# Patient Record
Sex: MALE | Race: WHITE | Employment: OTHER | URBAN - METROPOLITAN AREA
[De-identification: names, ages, dates, MRNs, and addresses within clinical notes are randomized per-mention and may not be internally consistent; named-entity substitution may affect disease eponyms.]

---

## 2017-10-18 PROBLEM — M17.12 PRIMARY OSTEOARTHRITIS OF LEFT KNEE: Status: ACTIVE | Noted: 2017-10-18

## 2019-04-16 ENCOUNTER — HOSPITAL ENCOUNTER (INPATIENT)
Age: 74
LOS: 2 days | Discharge: HOME | DRG: 728 | End: 2019-04-18
Attending: EMERGENCY MEDICINE | Admitting: FAMILY MEDICINE
Payer: MEDICARE

## 2019-04-16 ENCOUNTER — APPOINTMENT (OUTPATIENT)
Dept: ULTRASOUND IMAGING | Age: 74
DRG: 728 | End: 2019-04-16
Payer: MEDICARE

## 2019-04-16 ENCOUNTER — APPOINTMENT (OUTPATIENT)
Dept: GENERAL RADIOLOGY | Age: 74
DRG: 728 | End: 2019-04-16
Payer: MEDICARE

## 2019-04-16 DIAGNOSIS — N45.1 EPIDIDYMITIS: Primary | ICD-10-CM

## 2019-04-16 DIAGNOSIS — N30.00 ACUTE CYSTITIS WITHOUT HEMATURIA: ICD-10-CM

## 2019-04-16 DIAGNOSIS — R52 PAIN: ICD-10-CM

## 2019-04-16 PROBLEM — E66.9 CLASS 1 OBESITY IN ADULT: Status: ACTIVE | Noted: 2019-04-16

## 2019-04-16 PROBLEM — N39.0 UTI (URINARY TRACT INFECTION): Status: ACTIVE | Noted: 2019-04-16

## 2019-04-16 PROBLEM — G47.33 OSA ON CPAP: Status: ACTIVE | Noted: 2019-04-16

## 2019-04-16 PROBLEM — N40.0 BPH (BENIGN PROSTATIC HYPERPLASIA): Status: ACTIVE | Noted: 2019-04-16

## 2019-04-16 PROBLEM — E78.5 HLD (HYPERLIPIDEMIA): Status: ACTIVE | Noted: 2019-04-16

## 2019-04-16 PROBLEM — R00.0 SINUS TACHYCARDIA: Status: ACTIVE | Noted: 2019-04-16

## 2019-04-16 PROBLEM — Z99.89 OSA ON CPAP: Status: ACTIVE | Noted: 2019-04-16

## 2019-04-16 PROBLEM — E11.9 TYPE 2 DIABETES MELLITUS (HCC): Status: ACTIVE | Noted: 2019-04-16

## 2019-04-16 PROBLEM — E66.811 CLASS 1 OBESITY IN ADULT: Status: ACTIVE | Noted: 2019-04-16

## 2019-04-16 PROBLEM — R65.10 SIRS (SYSTEMIC INFLAMMATORY RESPONSE SYNDROME) (HCC): Status: ACTIVE | Noted: 2019-04-16

## 2019-04-16 PROBLEM — R60.9 EDEMA: Status: ACTIVE | Noted: 2019-04-16

## 2019-04-16 PROBLEM — N43.1 INFECTED HYDROCELE: Status: ACTIVE | Noted: 2019-04-16

## 2019-04-16 PROBLEM — R31.9 URINARY TRACT INFECTION WITH HEMATURIA: Status: ACTIVE | Noted: 2019-04-16

## 2019-04-16 PROBLEM — Z96.652 STATUS POST TOTAL LEFT KNEE REPLACEMENT: Status: ACTIVE | Noted: 2019-04-16

## 2019-04-16 PROBLEM — H91.93 HEARING IMPAIRED PERSON, BILATERAL: Status: ACTIVE | Noted: 2019-04-16

## 2019-04-16 PROBLEM — D64.9 ANEMIA: Status: ACTIVE | Noted: 2019-04-16

## 2019-04-16 PROBLEM — I10 ESSENTIAL HYPERTENSION: Status: ACTIVE | Noted: 2019-04-16

## 2019-04-16 LAB
ALBUMIN SERPL-MCNC: 3.8 G/DL (ref 3.5–5.2)
ALP BLD-CCNC: 133 U/L (ref 40–129)
ALT SERPL-CCNC: 42 U/L (ref 0–40)
ANION GAP SERPL CALCULATED.3IONS-SCNC: 10 MMOL/L (ref 7–16)
AST SERPL-CCNC: 35 U/L (ref 0–39)
BACTERIA: ABNORMAL /HPF
BASOPHILS ABSOLUTE: 0.03 E9/L (ref 0–0.2)
BASOPHILS RELATIVE PERCENT: 0.3 % (ref 0–2)
BILIRUB SERPL-MCNC: 0.6 MG/DL (ref 0–1.2)
BILIRUBIN URINE: NEGATIVE
BLOOD, URINE: ABNORMAL
BUN BLDV-MCNC: 15 MG/DL (ref 8–23)
CALCIUM SERPL-MCNC: 9.4 MG/DL (ref 8.6–10.2)
CHLORIDE BLD-SCNC: 100 MMOL/L (ref 98–107)
CLARITY: CLEAR
CO2: 29 MMOL/L (ref 22–29)
COLOR: YELLOW
CREAT SERPL-MCNC: 0.8 MG/DL (ref 0.7–1.2)
EOSINOPHILS ABSOLUTE: 0.47 E9/L (ref 0.05–0.5)
EOSINOPHILS RELATIVE PERCENT: 4 % (ref 0–6)
EPITHELIAL CELLS, UA: ABNORMAL /HPF
GFR AFRICAN AMERICAN: >60
GFR NON-AFRICAN AMERICAN: >60 ML/MIN/1.73
GLUCOSE BLD-MCNC: 161 MG/DL (ref 74–99)
GLUCOSE URINE: NEGATIVE MG/DL
HCT VFR BLD CALC: 37.7 % (ref 37–54)
HEMOGLOBIN: 12.3 G/DL (ref 12.5–16.5)
IMMATURE GRANULOCYTES #: 0.05 E9/L
IMMATURE GRANULOCYTES %: 0.4 % (ref 0–5)
KETONES, URINE: NEGATIVE MG/DL
LACTIC ACID: 1.3 MMOL/L (ref 0.5–2.2)
LEUKOCYTE ESTERASE, URINE: ABNORMAL
LYMPHOCYTES ABSOLUTE: 1.06 E9/L (ref 1.5–4)
LYMPHOCYTES RELATIVE PERCENT: 9.1 % (ref 20–42)
MCH RBC QN AUTO: 31.1 PG (ref 26–35)
MCHC RBC AUTO-ENTMCNC: 32.6 % (ref 32–34.5)
MCV RBC AUTO: 95.4 FL (ref 80–99.9)
MONOCYTES ABSOLUTE: 1.05 E9/L (ref 0.1–0.95)
MONOCYTES RELATIVE PERCENT: 9 % (ref 2–12)
NEUTROPHILS ABSOLUTE: 9.04 E9/L (ref 1.8–7.3)
NEUTROPHILS RELATIVE PERCENT: 77.2 % (ref 43–80)
NITRITE, URINE: POSITIVE
PDW BLD-RTO: 11.8 FL (ref 11.5–15)
PH UA: 5.5 (ref 5–9)
PLATELET # BLD: 337 E9/L (ref 130–450)
PMV BLD AUTO: 9.8 FL (ref 7–12)
POTASSIUM REFLEX MAGNESIUM: 4 MMOL/L (ref 3.5–5)
PRO-BNP: 48 PG/ML (ref 0–125)
PROTEIN UA: ABNORMAL MG/DL
RBC # BLD: 3.95 E12/L (ref 3.8–5.8)
RBC UA: ABNORMAL /HPF (ref 0–2)
SODIUM BLD-SCNC: 139 MMOL/L (ref 132–146)
SPECIFIC GRAVITY UA: >=1.03 (ref 1–1.03)
TOTAL PROTEIN: 7.5 G/DL (ref 6.4–8.3)
TROPONIN: <0.01 NG/ML (ref 0–0.03)
UROBILINOGEN, URINE: 2 E.U./DL
WBC # BLD: 11.7 E9/L (ref 4.5–11.5)
WBC UA: >20 /HPF (ref 0–5)

## 2019-04-16 PROCEDURE — 96366 THER/PROPH/DIAG IV INF ADDON: CPT

## 2019-04-16 PROCEDURE — 6360000002 HC RX W HCPCS: Performed by: EMERGENCY MEDICINE

## 2019-04-16 PROCEDURE — 83880 ASSAY OF NATRIURETIC PEPTIDE: CPT

## 2019-04-16 PROCEDURE — 85025 COMPLETE CBC W/AUTO DIFF WBC: CPT

## 2019-04-16 PROCEDURE — 87040 BLOOD CULTURE FOR BACTERIA: CPT

## 2019-04-16 PROCEDURE — 99285 EMERGENCY DEPT VISIT HI MDM: CPT

## 2019-04-16 PROCEDURE — 2060000000 HC ICU INTERMEDIATE R&B

## 2019-04-16 PROCEDURE — 71045 X-RAY EXAM CHEST 1 VIEW: CPT

## 2019-04-16 PROCEDURE — 80053 COMPREHEN METABOLIC PANEL: CPT

## 2019-04-16 PROCEDURE — 93971 EXTREMITY STUDY: CPT

## 2019-04-16 PROCEDURE — 84484 ASSAY OF TROPONIN QUANT: CPT

## 2019-04-16 PROCEDURE — 93005 ELECTROCARDIOGRAM TRACING: CPT | Performed by: EMERGENCY MEDICINE

## 2019-04-16 PROCEDURE — 83605 ASSAY OF LACTIC ACID: CPT

## 2019-04-16 PROCEDURE — 93975 VASCULAR STUDY: CPT

## 2019-04-16 PROCEDURE — 36415 COLL VENOUS BLD VENIPUNCTURE: CPT

## 2019-04-16 PROCEDURE — 96365 THER/PROPH/DIAG IV INF INIT: CPT

## 2019-04-16 PROCEDURE — 81001 URINALYSIS AUTO W/SCOPE: CPT

## 2019-04-16 PROCEDURE — 96375 TX/PRO/DX INJ NEW DRUG ADDON: CPT

## 2019-04-16 PROCEDURE — 76870 US EXAM SCROTUM: CPT

## 2019-04-16 PROCEDURE — 2580000003 HC RX 258: Performed by: EMERGENCY MEDICINE

## 2019-04-16 RX ORDER — GUAIFENESIN 400 MG/1
400 TABLET ORAL 3 TIMES DAILY PRN
COMMUNITY

## 2019-04-16 RX ORDER — DIMETHICONE, OXYBENZONE, AND PADIMATE O 2; 2.5; 6.6 G/100G; G/100G; G/100G
STICK TOPICAL PRN
Status: DISCONTINUED | OUTPATIENT
Start: 2019-04-16 | End: 2019-04-18 | Stop reason: HOSPADM

## 2019-04-16 RX ORDER — CIPROFLOXACIN 2 MG/ML
400 INJECTION, SOLUTION INTRAVENOUS ONCE
Status: DISCONTINUED | OUTPATIENT
Start: 2019-04-16 | End: 2019-04-16

## 2019-04-16 RX ORDER — DEXTROSE MONOHYDRATE 25 G/50ML
12.5 INJECTION, SOLUTION INTRAVENOUS PRN
Status: DISCONTINUED | OUTPATIENT
Start: 2019-04-16 | End: 2019-04-18 | Stop reason: HOSPADM

## 2019-04-16 RX ORDER — FLUCONAZOLE 150 MG/1
150 TABLET ORAL
COMMUNITY
Start: 2019-04-15 | End: 2021-03-27

## 2019-04-16 RX ORDER — ACETAMINOPHEN 325 MG/1
650 TABLET ORAL EVERY 4 HOURS PRN
Status: DISCONTINUED | OUTPATIENT
Start: 2019-04-16 | End: 2019-04-18 | Stop reason: HOSPADM

## 2019-04-16 RX ORDER — GLIPIZIDE 5 MG/1
5 TABLET ORAL
Status: DISCONTINUED | OUTPATIENT
Start: 2019-04-17 | End: 2019-04-18 | Stop reason: HOSPADM

## 2019-04-16 RX ORDER — FLUTICASONE PROPIONATE 50 MCG
1 SPRAY, SUSPENSION (ML) NASAL 2 TIMES DAILY
COMMUNITY

## 2019-04-16 RX ORDER — FENTANYL CITRATE 50 UG/ML
100 INJECTION, SOLUTION INTRAMUSCULAR; INTRAVENOUS ONCE
Status: COMPLETED | OUTPATIENT
Start: 2019-04-16 | End: 2019-04-16

## 2019-04-16 RX ORDER — TAMSULOSIN HYDROCHLORIDE 0.4 MG/1
0.4 CAPSULE ORAL DAILY
Status: DISCONTINUED | OUTPATIENT
Start: 2019-04-17 | End: 2019-04-18 | Stop reason: HOSPADM

## 2019-04-16 RX ORDER — DIPHENHYDRAMINE HCL 25 MG
25 TABLET ORAL EVERY 6 HOURS PRN
COMMUNITY

## 2019-04-16 RX ORDER — FINASTERIDE 5 MG/1
5 TABLET, FILM COATED ORAL DAILY
Status: DISCONTINUED | OUTPATIENT
Start: 2019-04-17 | End: 2019-04-18 | Stop reason: HOSPADM

## 2019-04-16 RX ORDER — LEVOFLOXACIN 5 MG/ML
750 INJECTION, SOLUTION INTRAVENOUS ONCE
Status: COMPLETED | OUTPATIENT
Start: 2019-04-16 | End: 2019-04-16

## 2019-04-16 RX ORDER — DEXTROSE MONOHYDRATE 50 MG/ML
100 INJECTION, SOLUTION INTRAVENOUS PRN
Status: DISCONTINUED | OUTPATIENT
Start: 2019-04-16 | End: 2019-04-18 | Stop reason: HOSPADM

## 2019-04-16 RX ORDER — M-VIT,TX,IRON,MINS/CALC/FOLIC 27MG-0.4MG
1 TABLET ORAL DAILY
Status: DISCONTINUED | OUTPATIENT
Start: 2019-04-17 | End: 2019-04-18 | Stop reason: HOSPADM

## 2019-04-16 RX ORDER — NICOTINE POLACRILEX 4 MG
15 LOZENGE BUCCAL PRN
Status: DISCONTINUED | OUTPATIENT
Start: 2019-04-16 | End: 2019-04-18 | Stop reason: HOSPADM

## 2019-04-16 RX ORDER — SIMVASTATIN 40 MG
40 TABLET ORAL NIGHTLY
Status: DISCONTINUED | OUTPATIENT
Start: 2019-04-17 | End: 2019-04-18 | Stop reason: HOSPADM

## 2019-04-16 RX ORDER — LEVOFLOXACIN 5 MG/ML
750 INJECTION, SOLUTION INTRAVENOUS EVERY 24 HOURS
Status: DISCONTINUED | OUTPATIENT
Start: 2019-04-17 | End: 2019-04-18 | Stop reason: HOSPADM

## 2019-04-16 RX ORDER — CIPROFLOXACIN 500 MG/1
500 TABLET, FILM COATED ORAL 2 TIMES DAILY
Refills: 0 | Status: ON HOLD | COMMUNITY
Start: 2019-04-15 | End: 2019-04-18 | Stop reason: HOSPADM

## 2019-04-16 RX ORDER — 0.9 % SODIUM CHLORIDE 0.9 %
1000 INTRAVENOUS SOLUTION INTRAVENOUS ONCE
Status: COMPLETED | OUTPATIENT
Start: 2019-04-16 | End: 2019-04-16

## 2019-04-16 RX ORDER — LORATADINE 10 MG/1
10 TABLET ORAL DAILY PRN
COMMUNITY

## 2019-04-16 RX ORDER — NYSTATIN 100000 U/G
10000 CREAM TOPICAL 2 TIMES DAILY
COMMUNITY
Start: 2019-04-15 | End: 2019-04-30

## 2019-04-16 RX ORDER — VALSARTAN AND HYDROCHLOROTHIAZIDE 80; 12.5 MG/1; MG/1
1 TABLET, FILM COATED ORAL DAILY
Status: DISCONTINUED | OUTPATIENT
Start: 2019-04-17 | End: 2019-04-17 | Stop reason: CLARIF

## 2019-04-16 RX ORDER — FLUTICASONE PROPIONATE 50 MCG
1 SPRAY, SUSPENSION (ML) NASAL 2 TIMES DAILY
Status: DISCONTINUED | OUTPATIENT
Start: 2019-04-17 | End: 2019-04-18 | Stop reason: HOSPADM

## 2019-04-16 RX ORDER — DOXAZOSIN 2 MG/1
1 TABLET ORAL DAILY
Status: DISCONTINUED | OUTPATIENT
Start: 2019-04-17 | End: 2019-04-18 | Stop reason: HOSPADM

## 2019-04-16 RX ADMIN — SODIUM CHLORIDE 1000 ML: 9 INJECTION, SOLUTION INTRAVENOUS at 17:10

## 2019-04-16 RX ADMIN — LEVOFLOXACIN 750 MG: 5 INJECTION, SOLUTION INTRAVENOUS at 20:21

## 2019-04-16 RX ADMIN — FENTANYL CITRATE 100 MCG: 50 INJECTION, SOLUTION INTRAMUSCULAR; INTRAVENOUS at 17:10

## 2019-04-16 ASSESSMENT — ENCOUNTER SYMPTOMS
EYE PAIN: 0
SINUS PRESSURE: 0
EYE DISCHARGE: 0
WHEEZING: 0
SORE THROAT: 0
EYE REDNESS: 0
BACK PAIN: 0
DIARRHEA: 0
VOMITING: 0
ABDOMINAL PAIN: 0
COUGH: 0
SHORTNESS OF BREATH: 0
NAUSEA: 0

## 2019-04-16 ASSESSMENT — PAIN DESCRIPTION - ORIENTATION: ORIENTATION: LEFT

## 2019-04-16 ASSESSMENT — PAIN DESCRIPTION - LOCATION: LOCATION: KNEE

## 2019-04-16 ASSESSMENT — PAIN SCALES - GENERAL
PAINLEVEL_OUTOF10: 5
PAINLEVEL_OUTOF10: 8
PAINLEVEL_OUTOF10: 0

## 2019-04-16 NOTE — ED NOTES
Bed: 18A-18  Expected date:   Expected time:   Means of arrival:   Comments:  ems     Kareen Hodgkins, RN  04/16/19 0503

## 2019-04-16 NOTE — ED PROVIDER NOTES
ATTENDING PROVIDER ATTESTATION:     Kolton Garvin presented to the emergency department for evaluation of Groin Swelling (From [facility], [place], pt is deaf but can read lips. Uses oxygen normally, is being treated for UTI. Went to get an ultrasound done but his heart rate was too high)   and was initially evaluated by the Medical Resident. See Original ED Note for H&P and ED course above. I have reviewed and discussed the case, including pertinent history (medical, surgical, family and social) and exam findings with the Medical Resident assigned to Kolton Garvin. I have personally performed and/or participated in the history, exam, medical decision making, and procedures and agree with all pertinent clinical information. I have reviewed my findings and recommendations with the assigned Medical Resident, Kolton Garvin and members of family present at the time of disposition. My findings/plan: The primary encounter diagnosis was Epididymitis. Diagnoses of Pain and Acute cystitis without hematuria were also pertinent to this visit. Discharge Medication List as of 4/18/2019  3:13 PM      START taking these medications    Details   levofloxacin (LEVAQUIN) 250 MG tablet Take 2 tablets by mouth daily for 10 days, Disp-20 tablet, R-0Normal           Kristie Jinny, DO      29-year-old deaf male, brought to the emergency Department for primary complaint of testicular swelling and pain. Patient states symptoms began approximately 3 days ago with dysuria, he notes pain and swelling in his right testicle which began today, his symptoms are better with nothing, and worse with palpation and movement. He also notes burning with urination, he denies any blood in his urine or change in his urinary color or smell. Additionally he notes swelling throughout the entirety of his left lower extremity, however denies any pain. He recently had a knee replacement surgery.  He denies any fevers, chills, nausea, vomiting, chest pain, shortness of breath. He has never experienced symptoms like this before. He denies any testicular trauma. He was started on ciprofloxacin by PCP for urinary tract infection symptoms, he took his 1st dose last night 2nd dose this morning. The history is provided by the patient. Review of Systems   Constitutional: Negative for chills and fever. HENT: Negative for ear pain, sinus pressure and sore throat. Eyes: Negative for pain, discharge and redness. Respiratory: Negative for cough, shortness of breath and wheezing. Cardiovascular: Positive for leg swelling. Negative for chest pain. Gastrointestinal: Negative for abdominal pain, diarrhea, nausea and vomiting. Genitourinary: Positive for dysuria, scrotal swelling and testicular pain. Negative for frequency. Musculoskeletal: Negative for arthralgias and back pain. Skin: Negative for rash and wound. Neurological: Negative for weakness and headaches. Hematological: Negative for adenopathy. All other systems reviewed and are negative. Physical Exam   Constitutional: He is oriented to person, place, and time. He appears well-developed and well-nourished. Sitting upright, no acute distress   HENT:   Head: Normocephalic and atraumatic. Right Ear: External ear normal.   Left Ear: External ear normal.   Mouth/Throat: Oropharynx is clear and moist.   Eyes: Pupils are equal, round, and reactive to light. EOM are normal.   Neck: Normal range of motion. Neck supple. Cardiovascular: Regular rhythm and normal heart sounds. No murmur heard. Tachycardic   Pulmonary/Chest: Effort normal and breath sounds normal. No respiratory distress. He has no wheezes. He has no rales. Abdominal: Soft. Bowel sounds are normal. There is no tenderness. There is no rebound and no guarding. Genitourinary: Penis normal.   Genitourinary Comments:  There is notable swelling and erythema with tenderness to palpation of the patient's right mL/min/1.73    GFR African American >60     Calcium 9.4 8.6 - 10.2 mg/dL    Total Protein 7.5 6.4 - 8.3 g/dL    Alb 3.8 3.5 - 5.2 g/dL    Total Bilirubin 0.6 0.0 - 1.2 mg/dL    Alkaline Phosphatase 133 (H) 40 - 129 U/L    ALT 42 (H) 0 - 40 U/L    AST 35 0 - 39 U/L   Troponin   Result Value Ref Range    Troponin <0.01 0.00 - 0.03 ng/mL   Brain Natriuretic Peptide   Result Value Ref Range    Pro-BNP 48 0 - 125 pg/mL   Urinalysis, reflex to microscopic   Result Value Ref Range    Color, UA Yellow Straw/Yellow    Clarity, UA Clear Clear    Glucose, Ur Negative Negative mg/dL    Bilirubin Urine Negative Negative    Ketones, Urine Negative Negative mg/dL    Specific Gravity, UA >=1.030 1.005 - 1.030    Blood, Urine MODERATE (A) Negative    pH, UA 5.5 5.0 - 9.0    Protein, UA TRACE Negative mg/dL    Urobilinogen, Urine 2.0 (A) <2.0 E.U./dL    Nitrite, Urine POSITIVE (A) Negative    Leukocyte Esterase, Urine SMALL (A) Negative   Lactic Acid, Plasma   Result Value Ref Range    Lactic Acid 1.3 0.5 - 2.2 mmol/L   Microscopic Urinalysis   Result Value Ref Range    WBC, UA >20 0 - 5 /HPF    RBC, UA 0-1 0 - 2 /HPF    Epi Cells RARE /HPF    Bacteria, UA FEW (A) /HPF   Magnesium   Result Value Ref Range    Magnesium 1.9 1.6 - 2.6 mg/dL   TSH without Reflex   Result Value Ref Range    TSH 1.670 0.270 - 4.200 uIU/mL   T4, free   Result Value Ref Range    T4 Free 1.56 0.93 - 1.70 ng/dL   Basic Metabolic Panel   Result Value Ref Range    Sodium 137 132 - 146 mmol/L    Potassium 3.6 3.5 - 5.0 mmol/L    Chloride 101 98 - 107 mmol/L    CO2 26 22 - 29 mmol/L    Anion Gap 10 7 - 16 mmol/L    Glucose 181 (H) 74 - 99 mg/dL    BUN 13 8 - 23 mg/dL    CREATININE 0.9 0.7 - 1.2 mg/dL    GFR Non-African American >60 >=60 mL/min/1.73    GFR African American >60     Calcium 8.5 (L) 8.6 - 10.2 mg/dL   Procalcitonin   Result Value Ref Range    Procalcitonin 0.16 (H) 0.00 - 0.08 ng/mL   Psa screening   Result Value Ref Range    PSA 17.63 (H) 0.00 - 4.00 ng/mL   Basic Metabolic Panel   Result Value Ref Range    Sodium 142 132 - 146 mmol/L    Potassium 4.2 3.5 - 5.0 mmol/L    Chloride 104 98 - 107 mmol/L    CO2 25 22 - 29 mmol/L    Anion Gap 13 7 - 16 mmol/L    Glucose 169 (H) 74 - 99 mg/dL    BUN 11 8 - 23 mg/dL    CREATININE 0.8 0.7 - 1.2 mg/dL    GFR Non-African American >60 >=60 mL/min/1.73    GFR African American >60     Calcium 8.9 8.6 - 10.2 mg/dL   CBC Auto Differential   Result Value Ref Range    WBC 7.1 4.5 - 11.5 E9/L    RBC 3.46 (L) 3.80 - 5.80 E12/L    Hemoglobin 11.0 (L) 12.5 - 16.5 g/dL    Hematocrit 33.2 (L) 37.0 - 54.0 %    MCV 96.0 80.0 - 99.9 fL    MCH 31.8 26.0 - 35.0 pg    MCHC 33.1 32.0 - 34.5 %    RDW 11.9 11.5 - 15.0 fL    Platelets 310 949 - 891 E9/L    MPV 9.9 7.0 - 12.0 fL    Neutrophils % 65.0 43.0 - 80.0 %    Immature Granulocytes % 0.6 0.0 - 5.0 %    Lymphocytes % 14.6 (L) 20.0 - 42.0 %    Monocytes % 11.8 2.0 - 12.0 %    Eosinophils % 7.6 (H) 0.0 - 6.0 %    Basophils % 0.4 0.0 - 2.0 %    Neutrophils # 4.63 1.80 - 7.30 E9/L    Immature Granulocytes # 0.04 E9/L    Lymphocytes # 1.04 (L) 1.50 - 4.00 E9/L    Monocytes # 0.84 0.10 - 0.95 E9/L    Eosinophils # 0.54 (H) 0.05 - 0.50 E9/L    Basophils # 0.03 0.00 - 0.20 E9/L   POCT Glucose   Result Value Ref Range    Meter Glucose 152 (H) 74 - 99 mg/dL   POCT Glucose   Result Value Ref Range    Meter Glucose 144 (H) 74 - 99 mg/dL   POCT Glucose   Result Value Ref Range    Meter Glucose 181 (H) 74 - 99 mg/dL   POCT Glucose   Result Value Ref Range    Meter Glucose 145 (H) 74 - 99 mg/dL   POCT Glucose   Result Value Ref Range    Meter Glucose 209 (H) 74 - 99 mg/dL   POCT Glucose   Result Value Ref Range    Meter Glucose 167 (H) 74 - 99 mg/dL   POCT Glucose   Result Value Ref Range    Meter Glucose 208 (H) 74 - 99 mg/dL   EKG 12 Lead   Result Value Ref Range    Ventricular Rate 105 BPM    Atrial Rate 105 BPM    P-R Interval 174 ms    QRS Duration 88 ms    Q-T Interval 350 ms    QTc Calculation (Hailey) 462 ms    P Axis 52 degrees    R Axis 21 degrees    T Axis 14 degrees       RADIOLOGY:  Ct Abdomen Pelvis Wo Contrast    Result Date: 2019  Patient MRN:  [id number] : 1945 Age: 69 years Gender: Male Order Date:  2019 8:00 AM EXAM: CT ABDOMEN PELVIS WO CONTRAST number of images 388 Technique: Low-dose CT  acquisition technique included one of following options; 1 . Automated exposure control, 2. Adjustment of MA and or KV according to patient's size or 3. Use of iterative reconstruction. INDICATION:  uti  stones? COMPARISON: None FINDINGS: The lung bases demonstrate cardiomegaly with a tiny pericardial effusion. Small pleural effusions and atelectasis are present in the lung bases likely mild CHF. There is coronary artery calcification. Liver is fatty infiltrated. 1.1 cm low-attenuation lesion is noted in the liver and similar one in the spleen. The gallbladder is slightly distended. Pancreas, the adrenals the kidneys are unremarkable except for a 9 mm cystic lesion in the right kidney. Degenerative changes are identified in the lumbar spine Pelvis. The bladder is partially distended with wall thickening. 5.5 x 5.8 cm mass is identified in the bladder floor which appears to be contiguous with enlarged lobulated prostate gland which measures 6 x 6.1 x 6.1 cm. There is diverticulosis of the colon without diverticulitis. The appendix is normal.     No obstructing renal or ureteral calculus or hydronephrosis. Enlarged lobulated prostate gland with invasion of the bladder floor with appearance of a mass in the bladder floor. Prostate malignancy with the bladder invasion is considered. Intrinsic bladder malignancy is less likely and cystoscopic assessment is recommended. There is mild thickening of the bladder wall which may be due to bladder outlet obstruction versus cystitis. Atelectasis and pleural effusion in lung bases likely mild CHF.  ALERT:  THIS IS AN ABNORMAL REPORT     Us Scrotum And Testicles    Result Date: 2019  Patient MRN: [id number] : 1945 Age:  69 years Gender: Male Order Date: 2019 6:15 PM Exam: [address] Number of Images: 62 views Indication:  R52 pain Comparison: None. Findings: Ultrasound examination of the testicles reveals a normal right and left testicle without masses. The right testicle measures 4.5 x 3.7 x 2.4 cm and the left testicle measures 4.5 x 3.2 x 2.8 cm. Homogenous echo pattern is seen throughout. The left epididymis appears to be normal. The right epididymis is enlarged measuring 4.7 x 1.5 x 1.1 cm. The left epididymis measures 1.8 x 1.2 x 1.0 cm. . There is bilateral hydrocele seen. The right hydrocele appears to be septated. .      Enlarged right a previous suggesting of epididymitis Bilateral hydrocele Right-sided hydrocele appears to have septation. Vee Spindle Chest Portable    Result Date: 2019  Patient MRN: [id number] : 1945 Age:  69 years Gender: Male Order Date: 2019 4:00 PM Exam: XR CHEST PORTABLE Number of Views: 1 Indication:   Arrhythmia. Groin swelling. Comparison: None Findings: There is a normal cardiomediastinal silhouette with clear lungs. . No pneumothorax, pleural effusion or focal areas of airspace consolidation. No radiographic evidence of acute cardiopulmonary disease. Us Dup Abd Pel Retro Scrot Complete    Result Date: 2019  Patient MRN:  [id number] : 1945 Age: 69 years Gender: Male Order Date:  2019 4:00 PM EXAM: US DUP ABD PEL RETRO SCROT COMPLETE NUMBER OF IMAGES:  62 INDICATION: Right scrotal pain and swelling  COMPARISON: None The right testicle measures 4.5 x 3.7 x 2.4 cm, and shows intact vascularity and no evidence of focal parenchymal abnormality. The epididymis shows mildly prominent flow and slightly increased size which could represent a spectrum of epididymis.  There is a small epididymal head cyst. A septated hydrocele on the right may represent a pyocele, given physician, Dr. Aylin Boone. I have discussed the patient's initial exam, treatment and plan of care with the on coming physician. I have notified the patient / family of the change in treating physician and answered their questions to this point. [KS]   2030 Discussed case with Dr. Jesenia Martines, she will admit patient. [MF]      ED Course User Index  [KS] Arabella Price DO  [MF] Aramis Juárez DO             Counseling:  I have spoken with the patient and discussed todays results, in addition to providing specific details for the plan of care and counseling regarding the diagnosis and prognosis. Their questions are answered at this time and they are agreeable with the plan of admission.    --------------------------------- ADDITIONAL PROVIDER NOTES ---------------------------------  Consultations:   Spoke with Dr. Jesenia Martines. Discussed case. They will admit the patient. This patient's ED course included: a personal history and physicial examination    This patient has remained hemodynamically stable during their ED course. Diagnosis:  1. Epididymitis    2. Pain    3. Acute cystitis without hematuria        Disposition:  Patient's disposition: Admit to med/surg floor  Patient's condition is stable.            Arabella Price DO  Resident  04/19/19 2134       Darylene Budge,   04/20/19 5787 Vandana Patterson, DO  04/22/19 2890

## 2019-04-17 ENCOUNTER — APPOINTMENT (OUTPATIENT)
Dept: CT IMAGING | Age: 74
DRG: 728 | End: 2019-04-17
Payer: MEDICARE

## 2019-04-17 LAB
ANION GAP SERPL CALCULATED.3IONS-SCNC: 10 MMOL/L (ref 7–16)
BUN BLDV-MCNC: 13 MG/DL (ref 8–23)
CALCIUM SERPL-MCNC: 8.5 MG/DL (ref 8.6–10.2)
CHLORIDE BLD-SCNC: 101 MMOL/L (ref 98–107)
CO2: 26 MMOL/L (ref 22–29)
CREAT SERPL-MCNC: 0.9 MG/DL (ref 0.7–1.2)
GFR AFRICAN AMERICAN: >60
GFR NON-AFRICAN AMERICAN: >60 ML/MIN/1.73
GLUCOSE BLD-MCNC: 181 MG/DL (ref 74–99)
MAGNESIUM: 1.9 MG/DL (ref 1.6–2.6)
METER GLUCOSE: 144 MG/DL (ref 74–99)
METER GLUCOSE: 145 MG/DL (ref 74–99)
METER GLUCOSE: 152 MG/DL (ref 74–99)
METER GLUCOSE: 181 MG/DL (ref 74–99)
METER GLUCOSE: 209 MG/DL (ref 74–99)
POTASSIUM SERPL-SCNC: 3.6 MMOL/L (ref 3.5–5)
PROCALCITONIN: 0.16 NG/ML (ref 0–0.08)
PROSTATE SPECIFIC ANTIGEN: 17.63 NG/ML (ref 0–4)
SODIUM BLD-SCNC: 137 MMOL/L (ref 132–146)
T4 FREE: 1.56 NG/DL (ref 0.93–1.7)
TSH SERPL DL<=0.05 MIU/L-ACNC: 1.67 UIU/ML (ref 0.27–4.2)

## 2019-04-17 PROCEDURE — 97110 THERAPEUTIC EXERCISES: CPT

## 2019-04-17 PROCEDURE — G0103 PSA SCREENING: HCPCS

## 2019-04-17 PROCEDURE — 82962 GLUCOSE BLOOD TEST: CPT

## 2019-04-17 PROCEDURE — 2060000000 HC ICU INTERMEDIATE R&B

## 2019-04-17 PROCEDURE — 97161 PT EVAL LOW COMPLEX 20 MIN: CPT

## 2019-04-17 PROCEDURE — 84439 ASSAY OF FREE THYROXINE: CPT

## 2019-04-17 PROCEDURE — 6360000002 HC RX W HCPCS: Performed by: FAMILY MEDICINE

## 2019-04-17 PROCEDURE — 2580000003 HC RX 258: Performed by: FAMILY MEDICINE

## 2019-04-17 PROCEDURE — 6370000000 HC RX 637 (ALT 250 FOR IP): Performed by: FAMILY MEDICINE

## 2019-04-17 PROCEDURE — 83735 ASSAY OF MAGNESIUM: CPT

## 2019-04-17 PROCEDURE — 87088 URINE BACTERIA CULTURE: CPT

## 2019-04-17 PROCEDURE — 74176 CT ABD & PELVIS W/O CONTRAST: CPT

## 2019-04-17 PROCEDURE — 80048 BASIC METABOLIC PNL TOTAL CA: CPT

## 2019-04-17 PROCEDURE — 84145 PROCALCITONIN (PCT): CPT

## 2019-04-17 PROCEDURE — 97530 THERAPEUTIC ACTIVITIES: CPT

## 2019-04-17 PROCEDURE — 36415 COLL VENOUS BLD VENIPUNCTURE: CPT

## 2019-04-17 PROCEDURE — 84443 ASSAY THYROID STIM HORMONE: CPT

## 2019-04-17 RX ORDER — HYDROCHLOROTHIAZIDE 12.5 MG/1
12.5 TABLET ORAL DAILY
Status: DISCONTINUED | OUTPATIENT
Start: 2019-04-17 | End: 2019-04-18 | Stop reason: HOSPADM

## 2019-04-17 RX ORDER — VALSARTAN 80 MG/1
80 TABLET ORAL DAILY
Status: DISCONTINUED | OUTPATIENT
Start: 2019-04-17 | End: 2019-04-18 | Stop reason: HOSPADM

## 2019-04-17 RX ORDER — SODIUM CHLORIDE 9 MG/ML
INJECTION, SOLUTION INTRAVENOUS ONCE
Status: COMPLETED | OUTPATIENT
Start: 2019-04-17 | End: 2019-04-17

## 2019-04-17 RX ADMIN — HYDROCHLOROTHIAZIDE 12.5 MG: 12.5 TABLET ORAL at 10:25

## 2019-04-17 RX ADMIN — Medication: at 01:18

## 2019-04-17 RX ADMIN — INSULIN LISPRO 4 UNITS: 100 INJECTION, SOLUTION INTRAVENOUS; SUBCUTANEOUS at 21:40

## 2019-04-17 RX ADMIN — GLIPIZIDE 5 MG: 5 TABLET ORAL at 07:36

## 2019-04-17 RX ADMIN — FLUTICASONE PROPIONATE 1 SPRAY: 50 SPRAY, METERED NASAL at 10:25

## 2019-04-17 RX ADMIN — TAMSULOSIN HYDROCHLORIDE 0.4 MG: 0.4 CAPSULE ORAL at 10:25

## 2019-04-17 RX ADMIN — VALSARTAN 80 MG: 80 TABLET, FILM COATED ORAL at 10:25

## 2019-04-17 RX ADMIN — SODIUM CHLORIDE: 9 INJECTION, SOLUTION INTRAVENOUS at 01:25

## 2019-04-17 RX ADMIN — FINASTERIDE 5 MG: 5 TABLET, FILM COATED ORAL at 10:25

## 2019-04-17 RX ADMIN — SIMVASTATIN 40 MG: 40 TABLET, FILM COATED ORAL at 21:40

## 2019-04-17 RX ADMIN — MULTIPLE VITAMINS W/ MINERALS TAB 1 TABLET: TAB at 10:25

## 2019-04-17 RX ADMIN — ACETAMINOPHEN 650 MG: 325 TABLET, FILM COATED ORAL at 01:22

## 2019-04-17 RX ADMIN — DOXAZOSIN 1 MG: 2 TABLET ORAL at 10:30

## 2019-04-17 RX ADMIN — ACETAMINOPHEN 650 MG: 325 TABLET, FILM COATED ORAL at 11:35

## 2019-04-17 RX ADMIN — ACETAMINOPHEN 650 MG: 325 TABLET, FILM COATED ORAL at 21:40

## 2019-04-17 RX ADMIN — LEVOFLOXACIN 750 MG: 5 INJECTION, SOLUTION INTRAVENOUS at 21:40

## 2019-04-17 RX ADMIN — FLUTICASONE PROPIONATE 1 SPRAY: 50 SPRAY, METERED NASAL at 21:40

## 2019-04-17 RX ADMIN — INSULIN LISPRO 2 UNITS: 100 INJECTION, SOLUTION INTRAVENOUS; SUBCUTANEOUS at 11:35

## 2019-04-17 RX ADMIN — GLIPIZIDE 5 MG: 5 TABLET ORAL at 17:13

## 2019-04-17 ASSESSMENT — PAIN DESCRIPTION - FREQUENCY: FREQUENCY: INTERMITTENT

## 2019-04-17 ASSESSMENT — PAIN DESCRIPTION - DESCRIPTORS: DESCRIPTORS: SORE;DISCOMFORT

## 2019-04-17 ASSESSMENT — PAIN DESCRIPTION - PAIN TYPE: TYPE: ACUTE PAIN

## 2019-04-17 ASSESSMENT — PAIN SCALES - GENERAL
PAINLEVEL_OUTOF10: 5
PAINLEVEL_OUTOF10: 0
PAINLEVEL_OUTOF10: 5
PAINLEVEL_OUTOF10: 0
PAINLEVEL_OUTOF10: 0
PAINLEVEL_OUTOF10: 5

## 2019-04-17 ASSESSMENT — PAIN DESCRIPTION - LOCATION: LOCATION: ABDOMEN

## 2019-04-17 ASSESSMENT — PAIN DESCRIPTION - ORIENTATION: ORIENTATION: LOWER

## 2019-04-17 NOTE — CARE COORDINATION
Per pt report from home requesting shower chair, would like MARGOT for [facility] at discharge, plan is home with no further needs. S/O Razia will provide transport home at discharge (she was leaving for work and thought her phone number was in the chart however, it is not) no phone provided.

## 2019-04-17 NOTE — CONSULTS
[address]                  [address] [facility]  [facility]                                  CONSULTATION    PATIENT NAME: Alberto Cade                  :        1945  MED REC NO:   [id number]                            ROOM:       7408  ACCOUNT NO:   [de-identified]                           ADMIT DATE: 2019  PROVIDER:     Lincoln Bo MD    CONSULT DATE:  2019    He is in room 7408. CHIEF COMPLAINT:  Swelling of his scrotum. HISTORY OF PRESENT ILLNESS:  This 77-year-old male who began having some  dysuria and was treated as a urinary tract infection, then developed  swelling and pain in his scrotum, came to the emergency room and was  found to have significant swelling of his right scrotum. His urinalysis  is compatible with urinary tract infection. He is being treated with  Levaquin. The patient denies any significant history of urinary  difficulty in the past.  A CAT scan of his abdomen was done showing a  rather significant enlargement of his prostate with intravesical  extension. PAST MEDICAL HISTORY:  Significant for hearing loss, diabetes mellitus,  hyperlipidemia, hypertension, anemia and osteoarthritis. ALLERGIES:  None known to medication. PAST SURGICAL HISTORY:  The patient has had a knee replacement, has also  had rotator cuff repair and shoulder surgery in the past.    FAMILY HISTORY:  Denies any history of genitourinary cancers  significant. SOCIAL HISTORY:  The patient is a lifelong nonsmoker. He is . PHYSICAL EXAMINATION:  GENERAL:  The patient is a very alert male, who speaks with nasal  quality of his voice. He is alert and oriented. His abdomen is soft. GENITOURINARY:  Penis is normal.  His left scrotum is normal.  On his  right, he has symmetrical enlargement, tenderness without any definable  differentiation between his testes and his epididymis.     IMPRESSION AND PLAN:  His probable BPH with bladder outlet obstruction  causing urinary tract infection and now with a right epididymo-orchitis. The plan is to start him on Flomax and Proscar, treat him for the  infection. The patient would most likely benefit from cystoscopy and  TUR of his prostate at some point in the future.               Nila Wiggins MD    D: 04/17/2019 11:22:42       T: 04/17/2019 11:24:34     [id number]  Job#: [id number]     Doc#: [id number]    CC:

## 2019-04-17 NOTE — H&P
Hospital Medicine History & Physical      PCP: Thea Hale DO    Date of Admission: 4/16/2019    Date of Service: Pt seen/examined on 4/16/19 and Admitted to Inpatient with expected LOS greater than two midnights due to medical therapy. Chief Complaint:  Testicular pain      History Of Present Illness:      69 y.o. male who presented to [facility] with past medical history of sleep apnea on CPAP, diabetes, arthritis status post left total knee arthroplasty done on March 26, 2019, BPH, oxygen dependent, uses 2 L at home, hypertension, hyperlipidemia and hearing impairment. Patient was just discharged from the rehab facility 5 days ago after his surgery. He started having pain in his groin 2 days ago. He describes this as burning, mild and on and off, associated with dysuria and hematuria. He has had subjective fevers and sweats. No chest pain or shortness of breath. No nausea or vomiting. No cough, he reports having cold sores on his lips. Patient was seen by PCP, he was started on Cipro yesterday for UTI, he has only had 2 doses. He was sent from the PCPs office to the ER because of tachycardia heart rate as high as 152 and hypotension. Vital signs notable for heart rate of 115, labs shows hemoglobin of 12.3, white count 11.5, ALT 42, chest x-ray is negative, Doppler ultrasound of the left leg is negative for DVT. Ultrasound of the scrotum shows bilateral epididymitis worse on the right than the left with right-sided hydrocele that has septations concerning for pyocele. He is being admitted for further management.     Past Medical History:          Diagnosis Date    Deaf, bilateral     Diabetes ([address]     Hypertension     Osteoarthritis     Primary osteoarthritis of left knee 10/18/2017       Past Surgical History:          Procedure Laterality Date    CARPAL TUNNEL RELEASE      ROTATOR CUFF REPAIR  2/12    SHOULDER SURGERY         Medications Prior to Admission:      Prior to Admission medications    Medication Sig Start Date End Date Taking? Authorizing Provider   fluticasone (FLONASE) 50 MCG/ACT nasal spray 1 spray by Each Nare route 2 times daily   Yes Historical Provider, MD   nystatin (MYCOSTATIN) 019224 UNIT/GM cream Apply 10,000 Units topically 2 times daily 4/15/19 4/30/19 Yes Historical Provider, MD   ciprofloxacin (CIPRO) 500 MG tablet Take 500 mg by mouth 2 times daily 4/15/19 4/29/19 Yes Historical Provider, MD   loratadine (CLARITIN) 10 MG tablet Take 10 mg by mouth daily as needed   Yes Historical Provider, MD   guaiFENesin (MUCOSA) 400 MG tablet Take 400 mg by mouth 3 times daily as needed for Cough   Yes Historical Provider, MD   fluconazole (DIFLUCAN) 150 MG tablet Take 150 mg by mouth every 7 days FOR 4 MORE DOSES 4/15/19  Yes Historical Provider, MD   diphenhydrAMINE (BENADRYL ALLERGY) 25 MG tablet Take 25 mg by mouth every 6 hours as needed for Itching   Yes Historical Provider, MD   meloxicam (MOBIC) 15 MG tablet Take 1 tablet by mouth daily 11/29/17  Yes EPI Benoit   tamsulosin (FLOMAX) 0.4 MG capsule Take 0.4 mg by mouth daily   Yes Historical Provider, MD   glipiZIDE (GLUCOTROL) 5 MG tablet Take 5 mg by mouth 2 times daily (before meals)   Yes Historical Provider, MD   finasteride (PROSCAR) 5 MG tablet Take 5 mg by mouth daily   Yes Historical Provider, MD   valsartan-hydrochlorothiazide (DIOVAN-HCT) 80-12.5 MG per tablet  6/12/16  Yes Historical Provider, MD   metFORMIN (GLUCOPHAGE) 500 MG tablet  6/12/16  Yes Historical Provider, MD   simvastatin (ZOCOR) 40 MG tablet  6/12/16  Yes Historical Provider, MD   aspirin 81 MG tablet Take 81 mg by mouth daily   Yes Historical Provider, MD   Multiple Vitamins-Minerals (THERAPEUTIC MULTIVITAMIN-MINERALS) tablet Take 1 tablet by mouth daily   Yes Historical Provider, MD   terazosin (HYTRIN) 1 MG capsule Take 1 mg by mouth nightly   Yes Historical Provider, MD       Allergies:  Patient has no known allergies.     Social insight  Capillary Refill: Brisk,< 3 seconds   Peripheral Pulses: +2 palpable, equal bilaterally       Labs:     Recent Labs     04/16/19  1651   WBC 11.7*   HGB 12.3*   HCT 37.7        Recent Labs     04/16/19  1651      K 4.0      CO2 29   BUN 15   CREATININE 0.8   CALCIUM 9.4     Recent Labs     04/16/19  1651   AST 35   ALT 42*   BILITOT 0.6   ALKPHOS 133*     No results for input(s): INR in the last 72 hours. Recent Labs     04/16/19  1651   TROPONINI <0.01       Urinalysis:      Lab Results   Component Value Date    NITRU POSITIVE 04/16/2019    WBCUA >20 04/16/2019    BACTERIA FEW 04/16/2019    RBCUA 0-1 04/16/2019    BLOODU MODERATE 04/16/2019    SPECGRAV >=1.030 04/16/2019    GLUCOSEU Negative 04/16/2019       Radiology: Reviewed and documented    US DUP ABD PEL RETRO SCROT COMPLETE   Final Result   A septated hydrocele in the right hemiscrotum could represent a   pyocele, given appropriate clinical evidence, such as leukocytosis. The scrotal wall is minimally prominent. The right epididymis is asymmetrically prominent and shows increased   vascularity suggesting a spectrum of epididymitis, which could be   correlated clinically. There is no evidence of testicular parenchymal abnormality or vascular   compromise in the scrotum on either side. Varices are suspected in the left hemiscrotum, but are small. ALERT:  THIS IS AN ABNORMAL REPORT-possible right-sided epididymitis   and septated hydrocele on the right which may represent a pyocele, no   evidence of torsion on either side. US SCROTUM AND TESTICLES   Final Result       Enlarged right a previous suggesting of epididymitis      Bilateral hydrocele      Right-sided hydrocele appears to have septation. Madonna Martinez US DUP LOWER EXTREMITY LEFT ADAN   Final Result      No evidence for deep vein thrombosis of the left lower extremity.       XR CHEST PORTABLE   Final Result   No radiographic evidence of acute cardiopulmonary disease. ASSESSMENT:    Active Hospital Problems    Diagnosis Date Noted    Epididymitis [N45.1] 04/16/2019    Urinary tract infection with hematuria [N39.0, R31.9] 04/16/2019    SIRS (systemic inflammatory response syndrome) ([facility] [address] [R65.10] 04/16/2019    Anemia [D64.9] 04/16/2019    Status post total left knee replacement [Z96.652] 04/16/2019    Edema [R60.9] 04/16/2019    Sinus tachycardia [R00.0] 04/16/2019    Type 2 diabetes mellitus ([address] [E11.9] 04/16/2019    BPH (benign prostatic hyperplasia) [N40.0] 04/16/2019    [place]LL[place] on CPAP [G47.33, Z99.89] 04/16/2019    Essential hypertension [I10] 04/16/2019    HLD (hyperlipidemia) [E78.5] 04/16/2019    Infected hydrocele [N43.1] 04/16/2019    Hearing impaired person, bilateral [H91.93] 04/16/2019    Class 1 obesity in adult [E66.9] 04/16/2019       PLAN:  #1. Bilateral epididymitis with possible pyocele. IV Levaquin, consult to urology. Pain control. #2. Bilateral hydrocele secondary to I. #3. SIRS secondary to above. Blood cultures, procal.  #4. Anemia, monitor hemoglobin. #5. Bilateral lower extremities edema likely secondary to immobility postop. Ultrasound of the left leg is negative for DVT. 6. Sinus tachycardia secondary to infection. Gentle fluids, continuous cardiac monitoring, check thyroid function and magnesium. #7. Diabetes type II controlled, insulin sliding scale. #8. Osteoarthritis of the knee status post left total knee arthroplasty  #9. BPH, resume home meds. #10. Sleep apnea on CPAP. He does not have his machine and does not know his settings. We'll order CPAP when its available. #11. Hyperlipidemia, continue home meds. #12. Hypertension, controlled, continue home meds. #13. Hearing impaired person. #14. Obesity, dietary and lifestyle modification.     DVT Prophylaxis: lovenox  Diet: No diet orders on file  ada  Code Status: No Order full    PT/OT Eval Status: as needed    Dispo - inpatient tele Jaime Traylor MD    Thank you Sofia Marie DO for the opportunity to be involved in this patient's care.

## 2019-04-17 NOTE — CARE COORDINATION
Verified with Zenaida liaison via phone [phone] pt is active with [facility] care was receiving therapy, and CPA, will need MARGOT order at discharge.

## 2019-04-17 NOTE — PROGRESS NOTES
Patient given new urinal and specimen cup for urine specimen, instructed to call when patient voids, in order to send specimen as well as get a post void residual measurement.      Dario Hubbard RN, BSN

## 2019-04-17 NOTE — PROGRESS NOTES
Hospitalist Progress Note    CC: Epididymitis    Hospital course:    -- Admitted overnight for Testicular Swelling and Pain  -- Possible UTI / Epidodymitis   -- CT Imaging suggests Prostatic Mass    Admit date: 4/16/2019  Days in hospital:  1    24 Hour Events:   -- Admitted overnight     Subjective:   -- Patient seen and examined, chart reviewed  -- He is deaf and  is used. He is also able to read lips rather well. -- States that his pain is more bearable at this time   -- Denies F/C/N/V/D       ROS:   Pertinent items are noted in HPI. Objective:    /70   Pulse 86   Temp 98.1 °F (36.7 °C) (Temporal)   Resp 18   Ht 6' 1\" (1.854 m)   Wt 244 lb (110.7 kg)   SpO2 95%   BMI 32.19 kg/m²     Gen: Awake, alert, very pleasant   HEENT: NC/AT, moist mucous membranes, no oropharyngeal erythema or exudate  Neck: supple, trachea midline, no anterior cervical or SC LAD  Heart:  Normal s1/s2, RRR, no murmurs, gallops, or rubs. Lungs:  Clear to ausuclation bilaterally, no wheeze, no rales  Abd: bowel sounds present, soft, nontender, nondistended, no masses  : Swollen and tender Bilateral Testicles. Impressively so   Extrem:  No clubbing, cyanosis  Skin: no rashes or lesions  Psych: A & O x3  Neuro: grossly intact, moves all four extremities. Assessment:    Principal Problem:    Epididymitis  Active Problems:    Urinary tract infection with hematuria    SIRS (systemic inflammatory response syndrome) (HCC)    BPH (benign prostatic hyperplasia)    Anemia    Status post total left knee replacement    Edema    Sinus tachycardia    Type 2 diabetes mellitus (HCC)    WELLINGTON on CPAP    Essential hypertension    HLD (hyperlipidemia)    Infected hydrocele    Hearing impaired person, bilateral    Class 1 obesity in adult  Resolved Problems:    * No resolved hospital problems.  *      Plan:  Continue on antibiotics at this time  -- Await urine culture, will the last 72 hours.   UA:  Recent Labs     04/16/19 1915   COLORU Yellow   PHUR 5.5   WBCUA >20   RBCUA 0-1   BACTERIA FEW*   CLARITYU Clear   SPECGRAV >=1.030   LEUKOCYTESUR SMALL*   UROBILINOGEN 2.0*   BILIRUBINUR Negative   BLOODU MODERATE*   GLUCOSEU Negative       Invalid input(s): ABG  Lab Results   Component Value Date    CALCIUM 8.5 (L) 04/17/2019                 Electronically signed by Melody Shah DO on 4/17/2019 at 5:38 PM

## 2019-04-17 NOTE — ED NOTES
Blood cultures obtained from right antecubital , per policy. Set one of two drawn at this time. Blood cultures obtained from left hand, per policy. Set two of two drawn at this time.                           Jennifer Fletcher RN  04/17/19 7989

## 2019-04-18 VITALS
OXYGEN SATURATION: 96 % | SYSTOLIC BLOOD PRESSURE: 134 MMHG | BODY MASS INDEX: 32.34 KG/M2 | TEMPERATURE: 98.2 F | DIASTOLIC BLOOD PRESSURE: 72 MMHG | HEIGHT: 73 IN | RESPIRATION RATE: 20 BRPM | HEART RATE: 96 BPM | WEIGHT: 244 LBS

## 2019-04-18 LAB
ANION GAP SERPL CALCULATED.3IONS-SCNC: 13 MMOL/L (ref 7–16)
BASOPHILS ABSOLUTE: 0.03 E9/L (ref 0–0.2)
BASOPHILS RELATIVE PERCENT: 0.4 % (ref 0–2)
BUN BLDV-MCNC: 11 MG/DL (ref 8–23)
CALCIUM SERPL-MCNC: 8.9 MG/DL (ref 8.6–10.2)
CHLORIDE BLD-SCNC: 104 MMOL/L (ref 98–107)
CO2: 25 MMOL/L (ref 22–29)
CREAT SERPL-MCNC: 0.8 MG/DL (ref 0.7–1.2)
EOSINOPHILS ABSOLUTE: 0.54 E9/L (ref 0.05–0.5)
EOSINOPHILS RELATIVE PERCENT: 7.6 % (ref 0–6)
GFR AFRICAN AMERICAN: >60
GFR NON-AFRICAN AMERICAN: >60 ML/MIN/1.73
GLUCOSE BLD-MCNC: 169 MG/DL (ref 74–99)
HCT VFR BLD CALC: 33.2 % (ref 37–54)
HEMOGLOBIN: 11 G/DL (ref 12.5–16.5)
IMMATURE GRANULOCYTES #: 0.04 E9/L
IMMATURE GRANULOCYTES %: 0.6 % (ref 0–5)
LYMPHOCYTES ABSOLUTE: 1.04 E9/L (ref 1.5–4)
LYMPHOCYTES RELATIVE PERCENT: 14.6 % (ref 20–42)
MCH RBC QN AUTO: 31.8 PG (ref 26–35)
MCHC RBC AUTO-ENTMCNC: 33.1 % (ref 32–34.5)
MCV RBC AUTO: 96 FL (ref 80–99.9)
METER GLUCOSE: 167 MG/DL (ref 74–99)
METER GLUCOSE: 208 MG/DL (ref 74–99)
MONOCYTES ABSOLUTE: 0.84 E9/L (ref 0.1–0.95)
MONOCYTES RELATIVE PERCENT: 11.8 % (ref 2–12)
NEUTROPHILS ABSOLUTE: 4.63 E9/L (ref 1.8–7.3)
NEUTROPHILS RELATIVE PERCENT: 65 % (ref 43–80)
PDW BLD-RTO: 11.9 FL (ref 11.5–15)
PLATELET # BLD: 269 E9/L (ref 130–450)
PMV BLD AUTO: 9.9 FL (ref 7–12)
POTASSIUM SERPL-SCNC: 4.2 MMOL/L (ref 3.5–5)
RBC # BLD: 3.46 E12/L (ref 3.8–5.8)
SODIUM BLD-SCNC: 142 MMOL/L (ref 132–146)
WBC # BLD: 7.1 E9/L (ref 4.5–11.5)

## 2019-04-18 PROCEDURE — 97530 THERAPEUTIC ACTIVITIES: CPT

## 2019-04-18 PROCEDURE — 85025 COMPLETE CBC W/AUTO DIFF WBC: CPT

## 2019-04-18 PROCEDURE — 82962 GLUCOSE BLOOD TEST: CPT

## 2019-04-18 PROCEDURE — 80048 BASIC METABOLIC PNL TOTAL CA: CPT

## 2019-04-18 PROCEDURE — 36415 COLL VENOUS BLD VENIPUNCTURE: CPT

## 2019-04-18 PROCEDURE — 6370000000 HC RX 637 (ALT 250 FOR IP): Performed by: FAMILY MEDICINE

## 2019-04-18 PROCEDURE — 97165 OT EVAL LOW COMPLEX 30 MIN: CPT

## 2019-04-18 RX ORDER — LEVOFLOXACIN 250 MG/1
500 TABLET ORAL DAILY
Qty: 20 TABLET | Refills: 0 | Status: SHIPPED | OUTPATIENT
Start: 2019-04-18 | End: 2019-04-28

## 2019-04-18 RX ADMIN — GLIPIZIDE 5 MG: 5 TABLET ORAL at 06:54

## 2019-04-18 RX ADMIN — TAMSULOSIN HYDROCHLORIDE 0.4 MG: 0.4 CAPSULE ORAL at 08:46

## 2019-04-18 RX ADMIN — INSULIN LISPRO 2 UNITS: 100 INJECTION, SOLUTION INTRAVENOUS; SUBCUTANEOUS at 06:53

## 2019-04-18 RX ADMIN — FINASTERIDE 5 MG: 5 TABLET, FILM COATED ORAL at 08:43

## 2019-04-18 RX ADMIN — HYDROCHLOROTHIAZIDE 12.5 MG: 12.5 TABLET ORAL at 08:43

## 2019-04-18 RX ADMIN — ACETAMINOPHEN 650 MG: 325 TABLET, FILM COATED ORAL at 07:42

## 2019-04-18 RX ADMIN — FLUTICASONE PROPIONATE 1 SPRAY: 50 SPRAY, METERED NASAL at 08:43

## 2019-04-18 RX ADMIN — MULTIPLE VITAMINS W/ MINERALS TAB 1 TABLET: TAB at 08:43

## 2019-04-18 RX ADMIN — VALSARTAN 80 MG: 80 TABLET, FILM COATED ORAL at 08:43

## 2019-04-18 RX ADMIN — INSULIN LISPRO 4 UNITS: 100 INJECTION, SOLUTION INTRAVENOUS; SUBCUTANEOUS at 11:54

## 2019-04-18 RX ADMIN — DOXAZOSIN 1 MG: 2 TABLET ORAL at 08:43

## 2019-04-18 ASSESSMENT — PAIN SCALES - GENERAL
PAINLEVEL_OUTOF10: 2
PAINLEVEL_OUTOF10: 0
PAINLEVEL_OUTOF10: 4

## 2019-04-18 ASSESSMENT — PAIN DESCRIPTION - PAIN TYPE
TYPE: ACUTE PAIN
TYPE: ACUTE PAIN

## 2019-04-18 ASSESSMENT — PAIN DESCRIPTION - FREQUENCY
FREQUENCY: INTERMITTENT
FREQUENCY: INTERMITTENT

## 2019-04-18 ASSESSMENT — PAIN - FUNCTIONAL ASSESSMENT
PAIN_FUNCTIONAL_ASSESSMENT: ACTIVITIES ARE NOT PREVENTED
PAIN_FUNCTIONAL_ASSESSMENT: ACTIVITIES ARE NOT PREVENTED

## 2019-04-18 ASSESSMENT — PAIN DESCRIPTION - ORIENTATION
ORIENTATION: LOWER
ORIENTATION: LOWER;MID

## 2019-04-18 ASSESSMENT — PAIN DESCRIPTION - ONSET
ONSET: SUDDEN
ONSET: GRADUAL

## 2019-04-18 ASSESSMENT — PAIN DESCRIPTION - DESCRIPTORS
DESCRIPTORS: DISCOMFORT;SORE
DESCRIPTORS: ACHING;DISCOMFORT;SORE

## 2019-04-18 ASSESSMENT — PAIN DESCRIPTION - PROGRESSION
CLINICAL_PROGRESSION: GRADUALLY IMPROVING
CLINICAL_PROGRESSION: NOT CHANGED

## 2019-04-18 ASSESSMENT — PAIN DESCRIPTION - LOCATION
LOCATION: BACK
LOCATION: ABDOMEN

## 2019-04-18 NOTE — DISCHARGE INSTR - COC
Continuity of Care Form    Patient Name: Keyla Borrero   :  1945  MRN:  [id number]    Admit date:  2019  Discharge date:  ***    Code Status Order: No Order   Advance Directives:   [facility] Documentation     Date/Time Healthcare Directive Type of Healthcare Directive Copy in [address] [facility] [address] Agent's Name Healthcare Agent's Phone Number    19 2215  No, patient does not have an advance directive for healthcare treatment -- -- -- -- --          Admitting Physician:  Alex Mays MD  PCP: Zeina Phillips DO    Discharging Nurse: [facility] Unit/Room#: 3736/1774-U  Discharging Unit Phone Number: ***    Emergency Contact:   Extended Emergency Contact Information  Primary Emergency Contact: Jaydon Phone: [phone]  Relation: Other    Past Surgical History:  Past Surgical History:   Procedure Laterality Date    CARPAL TUNNEL RELEASE      ROTATOR CUFF REPAIR      SHOULDER SURGERY         Immunization History:   Immunization History   Administered Date(s) Administered    Influenza Vaccine, unspecified formulation 10/13/2018    Td, unspecified formulation 2013       Active Problems:  Patient Active Problem List   Diagnosis Code    Degenerative arthritis of knee, bilateral M17.0    Prepatellar bursitis of both knees M70.41, M70.42    Primary osteoarthritis of left knee M17.12    Epididymitis N45.1    Urinary tract infection with hematuria N39.0, R31.9    SIRS (systemic inflammatory response syndrome) (HCC) R65.10    Anemia D64.9    Status post total left knee replacement Z96.652    Edema R60.9    Sinus tachycardia R00.0    Type 2 diabetes mellitus ([facility] [address] E11.9    BPH (benign prostatic hyperplasia) N40.0    WELL[place] on CPAP G47.33, Z99.89    Essential hypertension I10    HLD (hyperlipidemia) E78.5    Infected hydrocele N43.1    Hearing impaired person, bilateral H91.93    Class 1 obesity in adult E66.9 Pt found laying on the couch in the quite room. Escorted pt back to room. Provided wash clothes and towels, soap, tooth brush, comb, mouthwash, lotion, and deodorant for pt to clean up in sink with.   Notified HIEN ANTON. Isolation/Infection:   Isolation          No Isolation            Nurse Assessment:  Last Vital Signs: /72   Pulse 96   Temp 98.2 °F (36.8 °C) (Temporal)   Resp 20   Ht 6' 1\" (1.854 m)   Wt 244 lb (110.7 kg)   SpO2 96%   BMI 32.19 kg/m²     Last documented pain score (0-10 scale): Pain Level: 0  Last Weight:   Wt Readings from Last 1 Encounters:   19 244 lb (110.7 kg)     Mental Status:  {IP PT MENTAL STATUS:}    IV Access:  { ELINOR IV ACCESS:675894812}    Nursing Mobility/ADLs:  Walking   {CHP DME YHVF:907496770}  Transfer  {CHP DME QOKU:950377840}  Bathing  {CHP DME WQOL:096784037}  Dressing  {CHP DME BSMD:072416373}  Toileting  {CHP DME EUIQ:860688841}  Feeding  {P DME FEXM:080234249}  Med Admin  {P DME OXO}  Med Delivery   { ELIONR MED Delivery:693192443}    Wound Care Documentation and Therapy:        Elimination:  Continence:   · Bowel: {YES / PB:43911}  · Bladder: {YES / BREANNA:32064}  Urinary Catheter: {Urinary Catheter:844133872}   Colostomy/Ileostomy/Ileal Conduit: {YES / FH:21792}       Date of Last BM: ***    Intake/Output Summary (Last 24 hours) at 2019 1513  Last data filed at 2019 1457  Gross per 24 hour   Intake 1080 ml   Output 1800 ml   Net -720 ml     I/O last 3 completed shifts:   In: 1080 [P.O.:1080]  Out: 1800 [Urine:1800]    Safety Concerns:     [address] Safety Concerns:285395001}    Impairments/Disabilities:      [address] Impairments/Disabilities:487802263}    Nutrition Therapy:  Current Nutrition Therapy:   [address] Diet List:452265999}    Routes of Feeding: {CHP DME Other Feedings:991788275}  Liquids: {Slp liquid thickness:79546}  Daily Fluid Restriction: {CHP DME Yes amt example:762491911}  Last Modified Barium Swallow with Video (Video Swallowing Test): {Done Not Done FTX}    Treatments at the Time of Hospital Discharge:   Respiratory Treatments: ***  Oxygen Therapy:  {Therapy; copd oxygen:67195}  Ventilator:    { CC Vent

## 2019-04-18 NOTE — PROGRESS NOTES
on antibiotics at this time  -- Await urine culture - pending   -- Reviewed CT A/P --> Noted Prostate Lesion  -- Urology input noted   -- Continue other chronic home medications       Prognosis:  Fair    Code status:  Full     DVT prophylaxis: [] Lovenox  [] SQ Heparin  [x] SCDs  [] warfarin/oral direct thrombin inhibitor [] Encourage ambulation  GI prophylaxis: [] PPI/P0dbogndc  [] not indicated  Diet:  DIET CARB CONTROL;    Disposition:  [] Home  [] Home with home health [] Rehab [] Psych [] SNF  [] LTAC  [] Long term nursing home or group home [] Transfer to ICU  [] Transfer to PCU [] Other:     Medications:  Scheduled Meds:   valsartan  80 mg Oral Daily    And    hydrochlorothiazide  12.5 mg Oral Daily    levofloxacin  750 mg Intravenous Q24H    finasteride  5 mg Oral Daily    fluticasone  1 spray Each Nare BID    glipiZIDE  5 mg Oral BID AC    therapeutic multivitamin-minerals  1 tablet Oral Daily    simvastatin  40 mg Oral Nightly    tamsulosin  0.4 mg Oral Daily    doxazosin  1 mg Oral Daily    insulin lispro  0-10 Units Subcutaneous 4x Daily WC       PRN Meds:  medicated lip balm, glucose, dextrose, glucagon (rDNA), dextrose, acetaminophen    IV:   dextrose           Intake/Output Summary (Last 24 hours) at 4/18/2019 0941  Last data filed at 4/18/2019 0614  Gross per 24 hour   Intake 1254 ml   Output 2800 ml   Net -1546 ml       Results:  CBC:   Recent Labs     04/16/19  1651 04/18/19  0641   WBC 11.7* 7.1   HGB 12.3* 11.0*   HCT 37.7 33.2*   MCV 95.4 96.0    269     BMP:   Recent Labs     04/16/19  1651 04/17/19  1124 04/18/19  0641    137 142   K 4.0 3.6 4.2    101 104   CO2 29 26 25   BUN 15 13 11   CREATININE 0.8 0.9 0.8     Mag: No results for input(s): MAG in the last 72 hours.   Phos: No results found for: PHOS  No components found for: GLU    LIVER PROFILE:   Recent Labs     04/16/19  1651   AST 35   ALT 42*   BILITOT 0.6   ALKPHOS 133*     PT/INR: No results for input(s): PROTIME, INR in the last 72 hours. APTT: No results for input(s): APTT in the last 72 hours.   UA:  Recent Labs     04/16/19  1915   COLORU Yellow   PHUR 5.5   WBCUA >20   RBCUA 0-1   BACTERIA FEW*   CLARITYU Clear   SPECGRAV >=1.030   LEUKOCYTESUR SMALL*   UROBILINOGEN 2.0*   BILIRUBINUR Negative   BLOODU MODERATE*   GLUCOSEU Negative       Invalid input(s): ABG  Lab Results   Component Value Date    CALCIUM 8.9 04/18/2019                 Electronically signed by Vadim Morillo MD on 4/18/2019 at 9:41 AM

## 2019-04-18 NOTE — PROGRESS NOTES
Functional mobility [x]  ADLs [x] Strength [x]  Cognition []  Functional transfers  [x] IADLs [x] Safety Awareness [x]  Endurance [x]  Fine Motor Coordination [] Balance [x] Vision/perception [] Sensation []   Gross Motor Coordination [] ROM [] Delirium []                  Motor Control []    Plan of Care:   ADL retraining [x]   Equipment needs [x]   Neuromuscular re-education [x] Energy Conservation Techniques [x]  Functional Transfer training [x] Patient and/or Family Education [x]  Functional Mobility training [x]  Environmental Modifications [x]  Cognitive re-training []   Compensatory techniques for ADLs [x]  Splinting Needs []   Positioning to improve overall function [x]   Therapeutic Activity [x]  Therapeutic Exercise  [x]  Visual/Perceptual: []    Delirium prevention/treatment  []   Other:  []    Rehab Potential: Good for established goals     Patient / Family Goal:  Return home    Patient and/or family were instructed diagnosis, prognosis/goals and plan of care. Demonstrated good understanding. [] Malnutrition indicators have been identified and nursing has been notified to ensure a dietitian consult is ordered.        AM-PAC Daily Activity Inpatient   How much help for putting on and taking off regular lower body clothing?: A Little  How much help for Bathing?: A Little  How much help for Toileting?: A Little  How much help for putting on and taking off regular upper body clothing?: None  How much help for taking care of personal grooming?: A Little  How much help for eating meals?: None  AM-[facility] Inpatient Daily Activity Raw Score: 20  AM-PAC Inpatient ADL T-Scale Score : 42.03  ADL Inpatient CMS 0-100% Score: 38.32  ADL Inpatient CMS G-Code Modifier : CJ       low Evaluation + 11 timed treatment minutes  Tx Time in: 1019  Tx Time out: [address] OTR/L #3389

## 2019-04-18 NOTE — PLAN OF CARE
Problem: Falls - Risk of:  Goal: Will remain free from falls  Description  Will remain free from falls  Outcome: Met This Shift     Problem: Falls - Risk of:  Goal: Absence of physical injury  Description  Absence of physical injury  Outcome: Met This Shift     Problem: Risk for Impaired Skin Integrity  Goal: Tissue integrity - skin and mucous membranes  Description  Structural intactness and normal physiological function of skin and  mucous membranes.   Outcome: Met This Shift     Problem: Pain:  Goal: Pain level will decrease  Description  Pain level will decrease  Outcome: Met This Shift

## 2019-04-18 NOTE — PROGRESS NOTES
Perfect serve message sent to Dr. Eliana García per wife/family request for the patient to be discharged home today.

## 2019-04-18 NOTE — PROGRESS NOTES
Physical Therapy  Facility/Department: [facility]  Daily Treatment Note  NAME: Ilene Manjarrez  : 1945  MRN: [id number]    Date of Service: 2019  Evaluating Therapist: Jeffrie Habermann, PT, DPT  [id number]     Room #: 3795/1658-W  DIAGNOSIS: Epididymitis   PRECAUTIONS: Falls, Deaf Bilaterally,  tablet, also reads lips  PMH: L TKA 3/26, OA, HTN, DM, RTC repair, Carpal tunnel L wrist     Social:  Pt lives with his girlfriend in a 2 story home with first floor setup. There are 3 stairs and 1 HR to enter. Pt was recently discharged home from [place] [id number] on 19. Pt has a friend that comes and assists from 11-7 every day. Pt uses a [employer] for amb.               Initial Evaluation  Date:  Treatment   19 Short Term/ Long Term   Goals   Was pt agreeable to Eval/treatment? yes yes      Does pt have pain? 4-5 L knee at rest   6/10 L knee with amb No c/o pain in L knee, increased LBP did not quantify on pan scale       Bed Mobility  Rolling: Min A  Supine to sit: Min A  Sit to supine: Supervision  Scooting: SBA to EOB Rolling: SBA to R using bed rail   Supine to sit: SBA  Sit to supine: NT  Scooting: SBA to EOB Mod Independent    Transfers Sit to stand: SBA  Stand to sit: SBA  Stand pivot: SBA Sit to stand: SBA  Stand to sit: SBA  Stand pivot: SBA w/ww Mod Independent    Ambulation   220 feet using wide WW with Supervision 150 ft  X 1 w/ww SBA >400 feet using WW with Mod Independent    Stair negotiation:  4 steps using 1 rail with SBA  NT  See comments >4 steps using 1 rail with Mod Independent      AM-PAC Basic Mobility Inpatient Short Form Raw Score:             Balance: fair +        Patient education  Pt was educated on safety w/ functional mobility     Patient response to education:   Pt verbalized understanding Pt demonstrated skill Pt requires further education in this area   x x x     Additional Comments: pt supine , alert.  Performed bed mobility as noted , sat EOB x 6 mins d/t

## 2019-04-19 LAB
EKG ATRIAL RATE: 105 BPM
EKG P AXIS: 52 DEGREES
EKG P-R INTERVAL: 174 MS
EKG Q-T INTERVAL: 350 MS
EKG QRS DURATION: 88 MS
EKG QTC CALCULATION (BAZETT): 462 MS
EKG R AXIS: 21 DEGREES
EKG T AXIS: 14 DEGREES
EKG VENTRICULAR RATE: 105 BPM

## 2019-04-20 LAB — URINE CULTURE, ROUTINE: NORMAL

## 2019-04-21 LAB
BLOOD CULTURE, ROUTINE: NORMAL
CULTURE, BLOOD 2: NORMAL

## 2019-12-29 ENCOUNTER — APPOINTMENT (OUTPATIENT)
Dept: GENERAL RADIOLOGY | Age: 74
End: 2019-12-29
Payer: MEDICARE

## 2019-12-29 ENCOUNTER — APPOINTMENT (OUTPATIENT)
Dept: CT IMAGING | Age: 74
End: 2019-12-29
Payer: MEDICARE

## 2019-12-29 ENCOUNTER — HOSPITAL ENCOUNTER (EMERGENCY)
Age: 74
Discharge: HOME | End: 2019-12-29
Attending: EMERGENCY MEDICINE
Payer: MEDICARE

## 2019-12-29 VITALS
HEIGHT: 74 IN | TEMPERATURE: 98.7 F | HEART RATE: 105 BPM | SYSTOLIC BLOOD PRESSURE: 130 MMHG | BODY MASS INDEX: 30.16 KG/M2 | OXYGEN SATURATION: 94 % | RESPIRATION RATE: 18 BRPM | DIASTOLIC BLOOD PRESSURE: 83 MMHG | WEIGHT: 235 LBS

## 2019-12-29 DIAGNOSIS — S16.1XXA ACUTE STRAIN OF NECK MUSCLE, INITIAL ENCOUNTER: Primary | ICD-10-CM

## 2019-12-29 DIAGNOSIS — S46.912A STRAIN OF LEFT SHOULDER, INITIAL ENCOUNTER: ICD-10-CM

## 2019-12-29 DIAGNOSIS — M79.605 LEFT LEG PAIN: ICD-10-CM
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
## 2019-12-29 DIAGNOSIS — S80.852A FOREIGN BODY OF LEFT LOWER LEG, INITIAL ENCOUNTER: ICD-10-CM

## 2019-12-29 PROCEDURE — 73030 X-RAY EXAM OF SHOULDER: CPT

## 2019-12-29 PROCEDURE — 73552 X-RAY EXAM OF FEMUR 2/>: CPT

## 2019-12-29 PROCEDURE — 99284 EMERGENCY DEPT VISIT MOD MDM: CPT

## 2019-12-29 PROCEDURE — 73562 X-RAY EXAM OF KNEE 3: CPT

## 2019-12-29 PROCEDURE — 70450 CT HEAD/BRAIN W/O DYE: CPT

## 2019-12-29 PROCEDURE — 72125 CT NECK SPINE W/O DYE: CPT

## 2019-12-29 RX ORDER — ORPHENADRINE CITRATE 100 MG/1
100 TABLET, EXTENDED RELEASE ORAL 2 TIMES DAILY
Qty: 20 TABLET | Refills: 0 | Status: SHIPPED | OUTPATIENT
Start: 2019-12-29 | End: 2020-01-08

## 2021-03-27 ENCOUNTER — APPOINTMENT (OUTPATIENT)
Dept: GENERAL RADIOLOGY | Age: 76
DRG: 310 | End: 2021-03-27
Payer: MEDICARE

## 2021-03-27 ENCOUNTER — HOSPITAL ENCOUNTER (INPATIENT)
Age: 76
LOS: 1 days | Discharge: HOME | DRG: 310 | End: 2021-03-28
Attending: EMERGENCY MEDICINE | Admitting: INTERNAL MEDICINE
Payer: MEDICARE

## 2021-03-27 DIAGNOSIS — I49.8 OTHER CARDIAC ARRHYTHMIA: Primary | ICD-10-CM

## 2021-03-27 DIAGNOSIS — I48.91 ATRIAL FIBRILLATION WITH RVR (HCC): ICD-10-CM

## 2021-03-27 LAB
ALBUMIN SERPL-MCNC: 4.2 G/DL (ref 3.5–5.2)
ALP BLD-CCNC: 61 U/L (ref 40–129)
ALT SERPL-CCNC: 13 U/L (ref 0–40)
ANION GAP SERPL CALCULATED.3IONS-SCNC: 12 MMOL/L (ref 7–16)
APTT: 31.2 SEC (ref 24.5–35.1)
AST SERPL-CCNC: 12 U/L (ref 0–39)
BASOPHILS ABSOLUTE: 0.02 E9/L (ref 0–0.2)
BASOPHILS RELATIVE PERCENT: 0.2 % (ref 0–2)
BILIRUB SERPL-MCNC: 0.7 MG/DL (ref 0–1.2)
BUN BLDV-MCNC: 17 MG/DL (ref 8–23)
CALCIUM SERPL-MCNC: 9.4 MG/DL (ref 8.6–10.2)
CHLORIDE BLD-SCNC: 100 MMOL/L (ref 98–107)
CO2: 23 MMOL/L (ref 22–29)
CREAT SERPL-MCNC: 0.9 MG/DL (ref 0.7–1.2)
EKG ATRIAL RATE: 86 BPM
EKG P AXIS: 6 DEGREES
EKG P-R INTERVAL: 208 MS
EKG Q-T INTERVAL: 352 MS
EKG QRS DURATION: 84 MS
EKG QTC CALCULATION (BAZETT): 437 MS
EKG R AXIS: 5 DEGREES
EKG T AXIS: 2 DEGREES
EKG VENTRICULAR RATE: 93 BPM
EOSINOPHILS ABSOLUTE: 0.06 E9/L (ref 0.05–0.5)
EOSINOPHILS RELATIVE PERCENT: 0.6 % (ref 0–6)
GFR AFRICAN AMERICAN: >60
GFR NON-AFRICAN AMERICAN: >60 ML/MIN/1.73
GLUCOSE BLD-MCNC: 290 MG/DL (ref 74–99)
HBA1C MFR BLD: 8.1 % (ref 4–5.6)
HCT VFR BLD CALC: 41.9 % (ref 37–54)
HEMOGLOBIN: 14.4 G/DL (ref 12.5–16.5)
IMMATURE GRANULOCYTES #: 0.04 E9/L
IMMATURE GRANULOCYTES %: 0.4 % (ref 0–5)
INR BLD: 1.1
LYMPHOCYTES ABSOLUTE: 1.48 E9/L (ref 1.5–4)
LYMPHOCYTES RELATIVE PERCENT: 15.8 % (ref 20–42)
MCH RBC QN AUTO: 32.1 PG (ref 26–35)
MCHC RBC AUTO-ENTMCNC: 34.4 % (ref 32–34.5)
MCV RBC AUTO: 93.3 FL (ref 80–99.9)
METER GLUCOSE: 205 MG/DL (ref 74–99)
METER GLUCOSE: 277 MG/DL (ref 74–99)
MONOCYTES ABSOLUTE: 0.92 E9/L (ref 0.1–0.95)
MONOCYTES RELATIVE PERCENT: 9.8 % (ref 2–12)
NEUTROPHILS ABSOLUTE: 6.83 E9/L (ref 1.8–7.3)
NEUTROPHILS RELATIVE PERCENT: 73.2 % (ref 43–80)
PDW BLD-RTO: 11.1 FL (ref 11.5–15)
PLATELET # BLD: 215 E9/L (ref 130–450)
PMV BLD AUTO: 10.7 FL (ref 7–12)
POTASSIUM SERPL-SCNC: 4.4 MMOL/L (ref 3.5–5)
PROTHROMBIN TIME: 11.7 SEC (ref 9.3–12.4)
RBC # BLD: 4.49 E12/L (ref 3.8–5.8)
SODIUM BLD-SCNC: 135 MMOL/L (ref 132–146)
TOTAL PROTEIN: 7.1 G/DL (ref 6.4–8.3)
TROPONIN: <0.01 NG/ML (ref 0–0.03)
TSH SERPL DL<=0.05 MIU/L-ACNC: 1.16 UIU/ML (ref 0.27–4.2)
WBC # BLD: 9.4 E9/L (ref 4.5–11.5)

## 2021-03-27 PROCEDURE — 93005 ELECTROCARDIOGRAM TRACING: CPT | Performed by: INTERNAL MEDICINE

## 2021-03-27 PROCEDURE — 84443 ASSAY THYROID STIM HORMONE: CPT

## 2021-03-27 PROCEDURE — 71045 X-RAY EXAM CHEST 1 VIEW: CPT

## 2021-03-27 PROCEDURE — 85610 PROTHROMBIN TIME: CPT

## 2021-03-27 PROCEDURE — 2580000003 HC RX 258: Performed by: EMERGENCY MEDICINE

## 2021-03-27 PROCEDURE — 85730 THROMBOPLASTIN TIME PARTIAL: CPT

## 2021-03-27 PROCEDURE — 2580000003 HC RX 258: Performed by: NURSE PRACTITIONER

## 2021-03-27 PROCEDURE — 96366 THER/PROPH/DIAG IV INF ADDON: CPT

## 2021-03-27 PROCEDURE — 80053 COMPREHEN METABOLIC PANEL: CPT

## 2021-03-27 PROCEDURE — 96375 TX/PRO/DX INJ NEW DRUG ADDON: CPT

## 2021-03-27 PROCEDURE — 93010 ELECTROCARDIOGRAM REPORT: CPT | Performed by: INTERNAL MEDICINE

## 2021-03-27 PROCEDURE — 84484 ASSAY OF TROPONIN QUANT: CPT

## 2021-03-27 PROCEDURE — 93005 ELECTROCARDIOGRAM TRACING: CPT | Performed by: EMERGENCY MEDICINE

## 2021-03-27 PROCEDURE — 96372 THER/PROPH/DIAG INJ SC/IM: CPT

## 2021-03-27 PROCEDURE — 36415 COLL VENOUS BLD VENIPUNCTURE: CPT

## 2021-03-27 PROCEDURE — 96365 THER/PROPH/DIAG IV INF INIT: CPT

## 2021-03-27 PROCEDURE — 6360000002 HC RX W HCPCS: Performed by: EMERGENCY MEDICINE

## 2021-03-27 PROCEDURE — 6370000000 HC RX 637 (ALT 250 FOR IP): Performed by: NURSE PRACTITIONER

## 2021-03-27 PROCEDURE — 83036 HEMOGLOBIN GLYCOSYLATED A1C: CPT

## 2021-03-27 PROCEDURE — 2140000000 HC CCU INTERMEDIATE R&B

## 2021-03-27 PROCEDURE — 2500000003 HC RX 250 WO HCPCS: Performed by: EMERGENCY MEDICINE

## 2021-03-27 PROCEDURE — 96361 HYDRATE IV INFUSION ADD-ON: CPT

## 2021-03-27 PROCEDURE — 85025 COMPLETE CBC W/AUTO DIFF WBC: CPT

## 2021-03-27 PROCEDURE — 99284 EMERGENCY DEPT VISIT MOD MDM: CPT

## 2021-03-27 PROCEDURE — 73110 X-RAY EXAM OF WRIST: CPT

## 2021-03-27 PROCEDURE — 82962 GLUCOSE BLOOD TEST: CPT

## 2021-03-27 RX ORDER — VALSARTAN AND HYDROCHLOROTHIAZIDE 80; 12.5 MG/1; MG/1
1 TABLET, FILM COATED ORAL DAILY
Status: DISCONTINUED | OUTPATIENT
Start: 2021-03-27 | End: 2021-03-27 | Stop reason: SDUPTHER

## 2021-03-27 RX ORDER — FINASTERIDE 5 MG/1
5 TABLET, FILM COATED ORAL DAILY
Status: DISCONTINUED | OUTPATIENT
Start: 2021-03-28 | End: 2021-03-28 | Stop reason: HOSPADM

## 2021-03-27 RX ORDER — TAMSULOSIN HYDROCHLORIDE 0.4 MG/1
0.4 CAPSULE ORAL DAILY
Status: DISCONTINUED | OUTPATIENT
Start: 2021-03-28 | End: 2021-03-28 | Stop reason: HOSPADM

## 2021-03-27 RX ORDER — ACETAMINOPHEN 650 MG/1
650 SUPPOSITORY RECTAL EVERY 6 HOURS PRN
Status: DISCONTINUED | OUTPATIENT
Start: 2021-03-27 | End: 2021-03-28 | Stop reason: HOSPADM

## 2021-03-27 RX ORDER — VALSARTAN 80 MG/1
80 TABLET ORAL DAILY
Status: DISCONTINUED | OUTPATIENT
Start: 2021-03-28 | End: 2021-03-28 | Stop reason: HOSPADM

## 2021-03-27 RX ORDER — ONDANSETRON 2 MG/ML
4 INJECTION INTRAMUSCULAR; INTRAVENOUS EVERY 6 HOURS PRN
Status: DISCONTINUED | OUTPATIENT
Start: 2021-03-27 | End: 2021-03-28 | Stop reason: HOSPADM

## 2021-03-27 RX ORDER — DEXTROSE MONOHYDRATE 25 G/50ML
12.5 INJECTION, SOLUTION INTRAVENOUS PRN
Status: DISCONTINUED | OUTPATIENT
Start: 2021-03-27 | End: 2021-03-28 | Stop reason: HOSPADM

## 2021-03-27 RX ORDER — DILTIAZEM HYDROCHLORIDE 5 MG/ML
INJECTION INTRAVENOUS
Status: DISCONTINUED
Start: 2021-03-27 | End: 2021-03-27

## 2021-03-27 RX ORDER — DOXAZOSIN 2 MG/1
1 TABLET ORAL DAILY
Status: DISCONTINUED | OUTPATIENT
Start: 2021-03-27 | End: 2021-03-27

## 2021-03-27 RX ORDER — NICOTINE POLACRILEX 4 MG
15 LOZENGE BUCCAL PRN
Status: DISCONTINUED | OUTPATIENT
Start: 2021-03-27 | End: 2021-03-28 | Stop reason: HOSPADM

## 2021-03-27 RX ORDER — CETIRIZINE HYDROCHLORIDE 10 MG/1
10 TABLET ORAL DAILY
Status: DISCONTINUED | OUTPATIENT
Start: 2021-03-28 | End: 2021-03-28 | Stop reason: HOSPADM

## 2021-03-27 RX ORDER — SODIUM CHLORIDE 0.9 % (FLUSH) 0.9 %
10 SYRINGE (ML) INJECTION PRN
Status: DISCONTINUED | OUTPATIENT
Start: 2021-03-27 | End: 2021-03-28 | Stop reason: HOSPADM

## 2021-03-27 RX ORDER — 0.9 % SODIUM CHLORIDE 0.9 %
250 INTRAVENOUS SOLUTION INTRAVENOUS ONCE
Status: COMPLETED | OUTPATIENT
Start: 2021-03-27 | End: 2021-03-27

## 2021-03-27 RX ORDER — SODIUM CHLORIDE 9 MG/ML
25 INJECTION, SOLUTION INTRAVENOUS PRN
Status: DISCONTINUED | OUTPATIENT
Start: 2021-03-27 | End: 2021-03-28 | Stop reason: HOSPADM

## 2021-03-27 RX ORDER — SODIUM CHLORIDE 0.9 % (FLUSH) 0.9 %
10 SYRINGE (ML) INJECTION EVERY 12 HOURS SCHEDULED
Status: DISCONTINUED | OUTPATIENT
Start: 2021-03-27 | End: 2021-03-28 | Stop reason: HOSPADM

## 2021-03-27 RX ORDER — PROMETHAZINE HYDROCHLORIDE 25 MG/1
12.5 TABLET ORAL EVERY 6 HOURS PRN
Status: DISCONTINUED | OUTPATIENT
Start: 2021-03-27 | End: 2021-03-28 | Stop reason: HOSPADM

## 2021-03-27 RX ORDER — DILTIAZEM HYDROCHLORIDE 5 MG/ML
10 INJECTION INTRAVENOUS ONCE
Status: COMPLETED | OUTPATIENT
Start: 2021-03-27 | End: 2021-03-27

## 2021-03-27 RX ORDER — POLYETHYLENE GLYCOL 3350 17 G/17G
17 POWDER, FOR SOLUTION ORAL DAILY PRN
Status: DISCONTINUED | OUTPATIENT
Start: 2021-03-27 | End: 2021-03-28 | Stop reason: HOSPADM

## 2021-03-27 RX ORDER — ASPIRIN 81 MG/1
81 TABLET ORAL DAILY
Status: DISCONTINUED | OUTPATIENT
Start: 2021-03-27 | End: 2021-03-28 | Stop reason: HOSPADM

## 2021-03-27 RX ORDER — DEXTROSE MONOHYDRATE 50 MG/ML
100 INJECTION, SOLUTION INTRAVENOUS PRN
Status: DISCONTINUED | OUTPATIENT
Start: 2021-03-27 | End: 2021-03-28 | Stop reason: HOSPADM

## 2021-03-27 RX ORDER — HYDROCHLOROTHIAZIDE 12.5 MG/1
12.5 TABLET ORAL DAILY
Status: DISCONTINUED | OUTPATIENT
Start: 2021-03-28 | End: 2021-03-28 | Stop reason: HOSPADM

## 2021-03-27 RX ORDER — INSULIN GLARGINE 100 [IU]/ML
20 INJECTION, SOLUTION SUBCUTANEOUS NIGHTLY
Status: DISCONTINUED | OUTPATIENT
Start: 2021-03-27 | End: 2021-03-28 | Stop reason: HOSPADM

## 2021-03-27 RX ORDER — ACETAMINOPHEN 325 MG/1
650 TABLET ORAL EVERY 6 HOURS PRN
Status: DISCONTINUED | OUTPATIENT
Start: 2021-03-27 | End: 2021-03-28 | Stop reason: HOSPADM

## 2021-03-27 RX ORDER — ATORVASTATIN CALCIUM 20 MG/1
20 TABLET, FILM COATED ORAL DAILY
Status: DISCONTINUED | OUTPATIENT
Start: 2021-03-28 | End: 2021-03-28 | Stop reason: HOSPADM

## 2021-03-27 RX ORDER — DILTIAZEM HYDROCHLORIDE 5 MG/ML
10 INJECTION INTRAVENOUS ONCE
Status: DISCONTINUED | OUTPATIENT
Start: 2021-03-27 | End: 2021-03-27

## 2021-03-27 RX ADMIN — ENOXAPARIN SODIUM 120 MG: 120 INJECTION SUBCUTANEOUS at 10:55

## 2021-03-27 RX ADMIN — INSULIN LISPRO 4 UNITS: 100 INJECTION, SOLUTION INTRAVENOUS; SUBCUTANEOUS at 17:24

## 2021-03-27 RX ADMIN — INSULIN LISPRO 3 UNITS: 100 INJECTION, SOLUTION INTRAVENOUS; SUBCUTANEOUS at 21:14

## 2021-03-27 RX ADMIN — INSULIN GLARGINE 20 UNITS: 100 INJECTION, SOLUTION SUBCUTANEOUS at 21:14

## 2021-03-27 RX ADMIN — SODIUM CHLORIDE, PRESERVATIVE FREE 10 ML: 5 INJECTION INTRAVENOUS at 21:14

## 2021-03-27 RX ADMIN — SODIUM CHLORIDE 250 ML: 9 INJECTION, SOLUTION INTRAVENOUS at 08:40

## 2021-03-27 RX ADMIN — DILTIAZEM HYDROCHLORIDE 10 MG: 5 INJECTION INTRAVENOUS at 08:41

## 2021-03-27 ASSESSMENT — PAIN SCALES - GENERAL
PAINLEVEL_OUTOF10: 0
PAINLEVEL_OUTOF10: 0

## 2021-03-27 ASSESSMENT — PAIN DESCRIPTION - PAIN TYPE: TYPE: ACUTE PAIN
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
## 2021-03-27 NOTE — ED PROVIDER NOTES
HPI:  3/27/21,   Time: 8:28 AM EDT         Amy Redmond is a 78 y.o. male presenting to the ED for left wrist pain, beginnin 3 days ago. The complaint has been persistent, mild in severity, and worsened by changing position, movement . Patient has a secondary more concerning complaining that on arrival here he was found to be moderately dyspneic and was found to have a pulse of 140 and slightly irregular. Patient is deaf and history and physical are obtained through wife on the cell phone and the . He denies any cardiac history. Denies any history of atrial fibrillation. He denies any chest pain. He states he is very slightly short of breath. No fever no chills. He has never had this before. History and physical is limited due to the fact the patient is deaf. Information is exchanged through  through the video screen. ROS:   Pertinent positives and negatives are stated within HPI, all other systems reviewed and are negative.  --------------------------------------------- PAST HISTORY ---------------------------------------------  Past Medical History:  has a past medical history of Deaf, bilateral, Diabetes ([address] Hypertension, Osteoarthritis, and Primary osteoarthritis of left knee. Past Surgical History:  has a past surgical history that includes Rotator cuff repair (2/12); shoulder surgery; and Carpal tunnel release. Social History:  reports that he has never smoked. He has never used smokeless tobacco. He reports that he does not drink alcohol or use drugs. Family History: family history is not on file. The patients home medications have been reviewed.     Allergies: Ibuprofen    -------------------------------------------------- RESULTS -------------------------------------------------  All laboratory and radiology results have been personally reviewed by myself   LABS:  Results for orders placed or performed during the hospital encounter of 03/27/21   Comprehensive Metabolic Panel   Result Value Ref Range    Sodium 135 132 - 146 mmol/L    Potassium 4.4 3.5 - 5.0 mmol/L    Chloride 100 98 - 107 mmol/L    CO2 23 22 - 29 mmol/L    Anion Gap 12 7 - 16 mmol/L    Glucose 290 (H) 74 - 99 mg/dL    BUN 17 8 - 23 mg/dL    CREATININE 0.9 0.7 - 1.2 mg/dL    GFR Non-African American >60 >=60 mL/min/1.73    GFR African American >60     Calcium 9.4 8.6 - 10.2 mg/dL    Total Protein 7.1 6.4 - 8.3 g/dL    Albumin 4.2 3.5 - 5.2 g/dL    Total Bilirubin 0.7 0.0 - 1.2 mg/dL    Alkaline Phosphatase 61 40 - 129 U/L    ALT 13 0 - 40 U/L    AST 12 0 - 39 U/L   CBC Auto Differential   Result Value Ref Range    WBC 9.4 4.5 - 11.5 E9/L    RBC 4.49 3.80 - 5.80 E12/L    Hemoglobin 14.4 12.5 - 16.5 g/dL    Hematocrit 41.9 37.0 - 54.0 %    MCV 93.3 80.0 - 99.9 fL    MCH 32.1 26.0 - 35.0 pg    MCHC 34.4 32.0 - 34.5 %    RDW 11.1 (L) 11.5 - 15.0 fL    Platelets 258 717 - 786 E9/L    MPV 10.7 7.0 - 12.0 fL    Neutrophils % 73.2 43.0 - 80.0 %    Immature Granulocytes % 0.4 0.0 - 5.0 %    Lymphocytes % 15.8 (L) 20.0 - 42.0 %    Monocytes % 9.8 2.0 - 12.0 %    Eosinophils % 0.6 0.0 - 6.0 %    Basophils % 0.2 0.0 - 2.0 %    Neutrophils Absolute 6.83 1.80 - 7.30 E9/L    Immature Granulocytes # 0.04 E9/L    Lymphocytes Absolute 1.48 (L) 1.50 - 4.00 E9/L    Monocytes Absolute 0.92 0.10 - 0.95 E9/L    Eosinophils Absolute 0.06 0.05 - 0.50 E9/L    Basophils Absolute 0.02 0.00 - 0.20 E9/L   APTT   Result Value Ref Range    aPTT 31.2 24.5 - 35.1 sec   Protime-INR   Result Value Ref Range    Protime 11.7 9.3 - 12.4 sec    INR 1.1    Troponin   Result Value Ref Range    Troponin <0.01 0.00 - 0.03 ng/mL   EKG 12 Lead   Result Value Ref Range    Ventricular Rate 93 BPM    Atrial Rate 86 BPM    P-R Interval 208 ms    QRS Duration 84 ms    Q-T Interval 352 ms    QTc Calculation (Bazett) 437 ms    P Axis 6 degrees    R Axis 5 degrees    T Axis 2 degrees       RADIOLOGY:  Interpreted by Radiologist.  XR CHEST PORTABLE   Final Result   No acute process. XR WRIST LEFT (MIN 3 VIEWS)   Final Result   1. There is no acute fracture or dislocation of the left wrist.             ------------------------- NURSING NOTES AND VITALS REVIEWED ---------------------------   The nursing notes within the ED encounter and vital signs as below have been reviewed. /86   Pulse 88   Temp 97.7 °F (36.5 °C) (Infrared)   Resp 16   Ht 6' 2\" (1.88 m)   Wt 248 lb (112.5 kg)   SpO2 96%   BMI 31.84 kg/m²   Oxygen Saturation Interpretation: Normal      ---------------------------------------------------PHYSICAL EXAM--------------------------------------      Constitutional/General: Alert and oriented x3, well appearing, non toxic in NAD  Head: NC/AT  Eyes: PERRL, EOMI  Mouth: Oropharynx clear, handling secretions, no trismus  Neck: Supple, full ROM, no meningeal signs  Pulmonary: Lungs clear to auscultation bilaterally, no wheezes, rales, or rhonchi. Not in respiratory distress  Cardiovascular: Tachycardic rate approximately 140 and irregularly irregular. , no murmurs, gallops, or rubs. 2+ distal pulses  Abdomen: Soft, non tender, non distended,   Extremities: Moves all extremities x 4.  Warm and well perfused  Skin: warm and dry without rash  Neurologic: GCS 15,  Psych: Normal Affect      ------------------------------ ED COURSE/MEDICAL DECISION MAKING----------------------  Medications   dilTIAZem 100 mg in dextrose 5 % 100 mL infusion ([facility] [place]) (5 mg/hr Intravenous New Bag 3/27/21 1056)   dilTIAZem 25 MG/5ML injection (has no administration in time range)   dilTIAZem injection 10 mg (10 mg Intravenous Given 3/27/21 0841)   0.9 % sodium chloride bolus (0 mLs Intravenous Stopped 3/27/21 1022)   enoxaparin (LOVENOX) injection 120 mg (120 mg Subcutaneous Given 3/27/21 1055)         Medical Decision Making:      Hospital course; patient's heart rate slowed down into the 90s with PACs however then he would intermittently revert into A. fib -130; thus patient was advised to the  that he should be admitted for cardiac monitoring and for evaluation of his atrial fibrillation and he is agreeable although he states he only wants to stay 1 day. Spoke with hospitalist, Dr. Warren Malone and she will accept the patient to telemetry bed  Counseling: The emergency provider has spoken with the patient through a  and discussed todays results, in addition to providing specific details for the plan of care and counseling regarding the diagnosis and prognosis. Questions are answered at this time and they are agreeable with the plan.      --------------------------------- IMPRESSION AND DISPOSITION ---------------------------------    IMPRESSION  1.  Atrial fibrillation with RVR ([address]        DISPOSITION  Disposition: Admit to telemetry  Patient condition is stable                  Yobani Bruce MD  03/28/21 9144

## 2021-03-28 VITALS
RESPIRATION RATE: 20 BRPM | HEIGHT: 74 IN | OXYGEN SATURATION: 94 % | DIASTOLIC BLOOD PRESSURE: 87 MMHG | SYSTOLIC BLOOD PRESSURE: 157 MMHG | WEIGHT: 248 LBS | BODY MASS INDEX: 31.83 KG/M2 | HEART RATE: 80 BPM | TEMPERATURE: 98 F

## 2021-03-28 PROBLEM — M25.532 LEFT WRIST PAIN: Status: ACTIVE | Noted: 2021-03-28

## 2021-03-28 PROBLEM — I48.91 ATRIAL FIBRILLATION WITH RVR (HCC): Status: RESOLVED | Noted: 2021-03-27 | Resolved: 2021-03-28

## 2021-03-28 PROBLEM — I49.9 ARRHYTHMIA: Status: ACTIVE | Noted: 2021-03-28

## 2021-03-28 LAB
ANION GAP SERPL CALCULATED.3IONS-SCNC: 6 MMOL/L (ref 7–16)
BASOPHILS ABSOLUTE: 0.01 E9/L (ref 0–0.2)
BASOPHILS RELATIVE PERCENT: 0.2 % (ref 0–2)
BUN BLDV-MCNC: 14 MG/DL (ref 8–23)
CALCIUM SERPL-MCNC: 8.7 MG/DL (ref 8.6–10.2)
CHLORIDE BLD-SCNC: 104 MMOL/L (ref 98–107)
CO2: 30 MMOL/L (ref 22–29)
CREAT SERPL-MCNC: 0.9 MG/DL (ref 0.7–1.2)
EKG ATRIAL RATE: 84 BPM
EKG P AXIS: 49 DEGREES
EKG P-R INTERVAL: 204 MS
EKG Q-T INTERVAL: 356 MS
EKG QRS DURATION: 84 MS
EKG QTC CALCULATION (BAZETT): 420 MS
EKG R AXIS: 24 DEGREES
EKG T AXIS: 12 DEGREES
EKG VENTRICULAR RATE: 84 BPM
EOSINOPHILS ABSOLUTE: 0.13 E9/L (ref 0.05–0.5)
EOSINOPHILS RELATIVE PERCENT: 2.2 % (ref 0–6)
GFR AFRICAN AMERICAN: >60
GFR NON-AFRICAN AMERICAN: >60 ML/MIN/1.73
GLUCOSE BLD-MCNC: 169 MG/DL (ref 74–99)
HCT VFR BLD CALC: 37.9 % (ref 37–54)
HEMOGLOBIN: 12.8 G/DL (ref 12.5–16.5)
IMMATURE GRANULOCYTES #: 0.02 E9/L
IMMATURE GRANULOCYTES %: 0.3 % (ref 0–5)
LYMPHOCYTES ABSOLUTE: 1.58 E9/L (ref 1.5–4)
LYMPHOCYTES RELATIVE PERCENT: 27.3 % (ref 20–42)
MCH RBC QN AUTO: 32.9 PG (ref 26–35)
MCHC RBC AUTO-ENTMCNC: 33.8 % (ref 32–34.5)
MCV RBC AUTO: 97.4 FL (ref 80–99.9)
METER GLUCOSE: 166 MG/DL (ref 74–99)
METER GLUCOSE: 202 MG/DL (ref 74–99)
MONOCYTES ABSOLUTE: 0.8 E9/L (ref 0.1–0.95)
MONOCYTES RELATIVE PERCENT: 13.8 % (ref 2–12)
NEUTROPHILS ABSOLUTE: 3.25 E9/L (ref 1.8–7.3)
NEUTROPHILS RELATIVE PERCENT: 56.2 % (ref 43–80)
PDW BLD-RTO: 11.4 FL (ref 11.5–15)
PLATELET # BLD: 173 E9/L (ref 130–450)
PMV BLD AUTO: 10.8 FL (ref 7–12)
POTASSIUM REFLEX MAGNESIUM: 4.2 MMOL/L (ref 3.5–5)
RBC # BLD: 3.89 E12/L (ref 3.8–5.8)
SODIUM BLD-SCNC: 140 MMOL/L (ref 132–146)
WBC # BLD: 5.8 E9/L (ref 4.5–11.5)

## 2021-03-28 PROCEDURE — 85025 COMPLETE CBC W/AUTO DIFF WBC: CPT

## 2021-03-28 PROCEDURE — 2580000003 HC RX 258: Performed by: NURSE PRACTITIONER

## 2021-03-28 PROCEDURE — 36415 COLL VENOUS BLD VENIPUNCTURE: CPT

## 2021-03-28 PROCEDURE — 80048 BASIC METABOLIC PNL TOTAL CA: CPT

## 2021-03-28 PROCEDURE — G0378 HOSPITAL OBSERVATION PER HR: HCPCS

## 2021-03-28 PROCEDURE — 6370000000 HC RX 637 (ALT 250 FOR IP): Performed by: NURSE PRACTITIONER

## 2021-03-28 PROCEDURE — 82962 GLUCOSE BLOOD TEST: CPT

## 2021-03-28 PROCEDURE — 6370000000 HC RX 637 (ALT 250 FOR IP): Performed by: INTERNAL MEDICINE

## 2021-03-28 PROCEDURE — 93010 ELECTROCARDIOGRAM REPORT: CPT | Performed by: INTERNAL MEDICINE

## 2021-03-28 RX ORDER — IBUPROFEN 400 MG/1
400 TABLET ORAL EVERY 6 HOURS PRN
Qty: 56 TABLET | Refills: 0 | Status: SHIPPED | OUTPATIENT
Start: 2021-03-28 | End: 2021-04-11

## 2021-03-28 RX ORDER — IBUPROFEN 400 MG/1
400 TABLET ORAL EVERY 6 HOURS PRN
Status: DISCONTINUED | OUTPATIENT
Start: 2021-03-28 | End: 2021-03-28 | Stop reason: HOSPADM

## 2021-03-28 RX ADMIN — CETIRIZINE HYDROCHLORIDE 10 MG: 10 TABLET, FILM COATED ORAL at 08:23

## 2021-03-28 RX ADMIN — ASPIRIN 81 MG: 81 TABLET, COATED ORAL at 08:23

## 2021-03-28 RX ADMIN — IBUPROFEN 400 MG: 400 TABLET, FILM COATED ORAL at 10:00

## 2021-03-28 RX ADMIN — HYDROCHLOROTHIAZIDE 12.5 MG: 12.5 TABLET ORAL at 10:00

## 2021-03-28 RX ADMIN — INSULIN LISPRO 2 UNITS: 100 INJECTION, SOLUTION INTRAVENOUS; SUBCUTANEOUS at 08:24

## 2021-03-28 RX ADMIN — ACETAMINOPHEN 650 MG: 325 TABLET ORAL at 08:23

## 2021-03-28 RX ADMIN — SODIUM CHLORIDE, PRESERVATIVE FREE 10 ML: 5 INJECTION INTRAVENOUS at 08:23

## 2021-03-28 RX ADMIN — TAMSULOSIN HYDROCHLORIDE 0.4 MG: 0.4 CAPSULE ORAL at 08:26

## 2021-03-28 RX ADMIN — VALSARTAN 80 MG: 80 TABLET, FILM COATED ORAL at 08:23

## 2021-03-28 RX ADMIN — FINASTERIDE 5 MG: 5 TABLET, FILM COATED ORAL at 08:23

## 2021-03-28 RX ADMIN — INSULIN LISPRO 4 UNITS: 100 INJECTION, SOLUTION INTRAVENOUS; SUBCUTANEOUS at 11:03

## 2021-03-28 ASSESSMENT — PAIN SCALES - GENERAL
PAINLEVEL_OUTOF10: 3
PAINLEVEL_OUTOF10: 3

## 2021-03-28 ASSESSMENT — PAIN DESCRIPTION - FREQUENCY
FREQUENCY: INTERMITTENT
FREQUENCY: INTERMITTENT

## 2021-03-28 ASSESSMENT — PAIN DESCRIPTION - DESCRIPTORS
DESCRIPTORS: ACHING;SORE;DISCOMFORT
DESCRIPTORS: ACHING;SORE;DISCOMFORT

## 2021-03-28 ASSESSMENT — PAIN DESCRIPTION - PAIN TYPE
TYPE: ACUTE PAIN
TYPE: ACUTE PAIN

## 2021-03-28 ASSESSMENT — PAIN DESCRIPTION - LOCATION
LOCATION: WRIST
LOCATION: WRIST

## 2021-03-28 NOTE — DISCHARGE SUMMARY
atherosclerotic development    Activity: activity as tolerated    Significant labs:  CBC:   Recent Labs     03/27/21  0831 03/28/21  0618   WBC 9.4 5.8   RBC 4.49 3.89   HGB 14.4 12.8   HCT 41.9 37.9   MCV 93.3 97.4   RDW 11.1* 11.4*    173     BMP:   Recent Labs     03/27/21  0831 03/28/21  0618    140   K 4.4 4.2    104   CO2 23 30*   BUN 17 14   CREATININE 0.9 0.9     LFT:  Recent Labs     03/27/21  0831   PROT 7.1   ALKPHOS 61   ALT 13   AST 12   BILITOT 0.7     PT/INR:   Recent Labs     03/27/21  0831   INR 1.1   APTT 31.2     BNP: No results for input(s): BNP in the last 72 hours. Hgb A1C:   Lab Results   Component Value Date    LABA1C 8.1 (H) 03/27/2021     Folate and B12: No results found for: [facility], No results found for: FOLATE  Thyroid Studies:   Lab Results   Component Value Date    TSH 1.160 03/27/2021       Urinalysis:    Lab Results   Component Value Date    NITRU POSITIVE 04/16/2019    WBCUA >20 04/16/2019    BACTERIA FEW 04/16/2019    RBCUA 0-1 04/16/2019    BLOODU MODERATE 04/16/2019    SPECGRAV >=1.030 04/16/2019    GLUCOSEU Negative 04/16/2019       Imaging:  Xr Wrist Left (min 3 Views)    Result Date: 3/27/2021  EXAMINATION: 3 XRAY VIEWS OF THE LEFT WRIST 3/27/2021 8:43 am COMPARISON: None. HISTORY: ORDERING SYSTEM PROVIDED HISTORY: pain TECHNOLOGIST PROVIDED HISTORY: portable Reason for exam:->pain FINDINGS: There is no fracture or dislocation of the left wrist.  There is a subchondral cyst seen within the scaphoid bone and capitate bone. The radiocarpal joint is unremarkable. 1. There is no acute fracture or dislocation of the left wrist.     Xr Chest Portable    Result Date: 3/27/2021  EXAMINATION: ONE XRAY VIEW OF THE CHEST 3/27/2021 8:43 am COMPARISON: 04/16/2019 HISTORY: ORDERING SYSTEM PROVIDED HISTORY: a fib TECHNOLOGIST PROVIDED HISTORY: Reason for exam:->a fib FINDINGS: The lungs are without acute focal process. There is no effusion or pneumothorax.  The cardiomediastinal silhouette is without acute process. The osseous structures are without acute process. No acute process.        Discharge Medications:      Medication List      START taking these medications    ibuprofen 400 MG tablet  Commonly known as: ADVIL;MOTRIN  Take 1 tablet by mouth every 6 hours as needed for Pain        CONTINUE taking these medications    aspirin 81 MG tablet     Benadryl Allergy 25 MG tablet  Generic drug: diphenhydrAMINE     finasteride 5 MG tablet  Commonly known as: PROSCAR     fluticasone 50 MCG/ACT nasal spray  Commonly known as: FLONASE     glipiZIDE 5 MG tablet  Commonly known as: GLUCOTROL     loratadine 10 MG tablet  Commonly known as: CLARITIN     meloxicam 15 MG tablet  Commonly known as: Mobic  Take 1 tablet by mouth daily     metFORMIN 500 MG tablet  Commonly known as: GLUCOPHAGE     Mucosa 400 MG tablet  Generic drug: guaiFENesin     simvastatin 40 MG tablet  Commonly known as: ZOCOR     tamsulosin 0.4 MG capsule  Commonly known as: FLOMAX     terazosin 1 MG capsule  Commonly known as: HYTRIN     therapeutic multivitamin-minerals tablet     valsartan-hydroCHLOROthiazide 80-12.5 MG per tablet  Commonly known as: DIOVAN-HCT        STOP taking these medications    fluconazole 150 MG tablet  Commonly known as: DIFLUCAN           Where to Get Your Medications      These medications were sent to [id number] [address] [place] [facility] [phone]  [address] [place] [address] [facility] [address] [place] [id number]    Phone: [phone]   · ibuprofen 400 MG tablet         Time Spent on discharge is more than 35 minutes in the examination, evaluation, counseling and review of medications and discharge plan.    +++++++++++++++++++++++++++++++++++++++++++++++++  DO Lelo Armenta Physician - [address] [place], [place]  +++++++++++++++++++++++++++++++++++++++++++++++++  NOTE: This report was transcribed using voice recognition software. Every effort was made to ensure accuracy; however, inadvertent computerized transcription errors may be present.

## 2021-03-28 NOTE — PLAN OF CARE
Problem: Pain:  Goal: Pain level will decrease  Description: Pain level will decrease  Outcome: Met This Shift  Goal: Control of acute pain  Description: Control of acute pain  Outcome: Met This Shift     Problem: Cardiac Output - Decreased:  Goal: Hemodynamic stability will improve  Description: Hemodynamic stability will improve  Outcome: Met This Shift

## 2022-09-01 ENCOUNTER — APPOINTMENT (OUTPATIENT)
Dept: CT IMAGING | Age: 77
End: 2022-09-01
Payer: MEDICARE

## 2022-09-01 ENCOUNTER — HOSPITAL ENCOUNTER (EMERGENCY)
Age: 77
Discharge: ANOTHER HOSPITAL | End: 2022-09-01
Attending: EMERGENCY MEDICINE
Payer: MEDICARE

## 2022-09-01 ENCOUNTER — APPOINTMENT (OUTPATIENT)
Dept: GENERAL RADIOLOGY | Age: 77
End: 2022-09-01
Payer: MEDICARE

## 2022-09-01 VITALS
DIASTOLIC BLOOD PRESSURE: 70 MMHG | RESPIRATION RATE: 17 BRPM | TEMPERATURE: 97 F | SYSTOLIC BLOOD PRESSURE: 111 MMHG | HEART RATE: 78 BPM | WEIGHT: 248 LBS | BODY MASS INDEX: 31.84 KG/M2 | OXYGEN SATURATION: 95 %
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
## 2022-09-01 DIAGNOSIS — I71.019 DISSECTION OF THORACIC AORTA: Primary | ICD-10-CM

## 2022-09-01 LAB
ALBUMIN SERPL-MCNC: 4.5 G/DL (ref 3.5–5.2)
ALP BLD-CCNC: 65 U/L (ref 40–129)
ALT SERPL-CCNC: 15 U/L (ref 0–40)
ANION GAP SERPL CALCULATED.3IONS-SCNC: 14 MMOL/L (ref 7–16)
APTT: 28.1 SEC (ref 24.5–35.1)
AST SERPL-CCNC: 14 U/L (ref 0–39)
BASOPHILS ABSOLUTE: 0.03 E9/L (ref 0–0.2)
BASOPHILS RELATIVE PERCENT: 0.4 % (ref 0–2)
BILIRUB SERPL-MCNC: 0.6 MG/DL (ref 0–1.2)
BUN BLDV-MCNC: 16 MG/DL (ref 6–23)
CALCIUM SERPL-MCNC: 9.8 MG/DL (ref 8.6–10.2)
CHLORIDE BLD-SCNC: 107 MMOL/L (ref 98–107)
CO2: 21 MMOL/L (ref 22–29)
CREAT SERPL-MCNC: 0.8 MG/DL (ref 0.7–1.2)
EOSINOPHILS ABSOLUTE: 0.08 E9/L (ref 0.05–0.5)
EOSINOPHILS RELATIVE PERCENT: 1 % (ref 0–6)
GFR AFRICAN AMERICAN: >60
GFR NON-AFRICAN AMERICAN: >60 ML/MIN/1.73
GLUCOSE BLD-MCNC: 268 MG/DL (ref 74–99)
HCT VFR BLD CALC: 41.5 % (ref 37–54)
HEMOGLOBIN: 14.7 G/DL (ref 12.5–16.5)
IMMATURE GRANULOCYTES #: 0.01 E9/L
IMMATURE GRANULOCYTES %: 0.1 % (ref 0–5)
INR BLD: 1
LACTIC ACID: 1.7 MMOL/L (ref 0.5–2.2)
LYMPHOCYTES ABSOLUTE: 1.57 E9/L (ref 1.5–4)
LYMPHOCYTES RELATIVE PERCENT: 20.4 % (ref 20–42)
MAGNESIUM: 1.7 MG/DL (ref 1.6–2.6)
MCH RBC QN AUTO: 33.4 PG (ref 26–35)
MCHC RBC AUTO-ENTMCNC: 35.4 % (ref 32–34.5)
MCV RBC AUTO: 94.3 FL (ref 80–99.9)
METER GLUCOSE: 264 MG/DL (ref 74–99)
MONOCYTES ABSOLUTE: 0.64 E9/L (ref 0.1–0.95)
MONOCYTES RELATIVE PERCENT: 8.3 % (ref 2–12)
NEUTROPHILS ABSOLUTE: 5.36 E9/L (ref 1.8–7.3)
NEUTROPHILS RELATIVE PERCENT: 69.8 % (ref 43–80)
PDW BLD-RTO: 11.3 FL (ref 11.5–15)
PLATELET # BLD: 202 E9/L (ref 130–450)
PMV BLD AUTO: 11.1 FL (ref 7–12)
POTASSIUM SERPL-SCNC: 4.2 MMOL/L (ref 3.5–5)
PROTHROMBIN TIME: 11.2 SEC (ref 9.3–12.4)
RBC # BLD: 4.4 E12/L (ref 3.8–5.8)
SODIUM BLD-SCNC: 142 MMOL/L (ref 132–146)
TOTAL PROTEIN: 7.2 G/DL (ref 6.4–8.3)
TROPONIN, HIGH SENSITIVITY: 14 NG/L (ref 0–11)
TROPONIN, HIGH SENSITIVITY: 15 NG/L (ref 0–11)
WBC # BLD: 7.7 E9/L (ref 4.5–11.5)

## 2022-09-01 PROCEDURE — 84484 ASSAY OF TROPONIN QUANT: CPT

## 2022-09-01 PROCEDURE — 85730 THROMBOPLASTIN TIME PARTIAL: CPT

## 2022-09-01 PROCEDURE — 74177 CT ABD & PELVIS W/CONTRAST: CPT

## 2022-09-01 PROCEDURE — 72125 CT NECK SPINE W/O DYE: CPT

## 2022-09-01 PROCEDURE — 85025 COMPLETE CBC W/AUTO DIFF WBC: CPT

## 2022-09-01 PROCEDURE — 6360000002 HC RX W HCPCS: Performed by: EMERGENCY MEDICINE

## 2022-09-01 PROCEDURE — 70450 CT HEAD/BRAIN W/O DYE: CPT

## 2022-09-01 PROCEDURE — 72170 X-RAY EXAM OF PELVIS: CPT

## 2022-09-01 PROCEDURE — 83735 ASSAY OF MAGNESIUM: CPT

## 2022-09-01 PROCEDURE — 85610 PROTHROMBIN TIME: CPT

## 2022-09-01 PROCEDURE — 96374 THER/PROPH/DIAG INJ IV PUSH: CPT

## 2022-09-01 PROCEDURE — 82962 GLUCOSE BLOOD TEST: CPT

## 2022-09-01 PROCEDURE — 93005 ELECTROCARDIOGRAM TRACING: CPT | Performed by: EMERGENCY MEDICINE

## 2022-09-01 PROCEDURE — 71275 CT ANGIOGRAPHY CHEST: CPT

## 2022-09-01 PROCEDURE — 2580000003 HC RX 258: Performed by: EMERGENCY MEDICINE

## 2022-09-01 PROCEDURE — 6360000004 HC RX CONTRAST MEDICATION: Performed by: RADIOLOGY

## 2022-09-01 PROCEDURE — 72131 CT LUMBAR SPINE W/O DYE: CPT

## 2022-09-01 PROCEDURE — 80053 COMPREHEN METABOLIC PANEL: CPT

## 2022-09-01 PROCEDURE — 83605 ASSAY OF LACTIC ACID: CPT

## 2022-09-01 PROCEDURE — 99285 EMERGENCY DEPT VISIT HI MDM: CPT

## 2022-09-01 RX ORDER — FENTANYL CITRATE 50 UG/ML
50 INJECTION, SOLUTION INTRAMUSCULAR; INTRAVENOUS ONCE
Status: COMPLETED | OUTPATIENT
Start: 2022-09-01 | End: 2022-09-01

## 2022-09-01 RX ORDER — 0.9 % SODIUM CHLORIDE 0.9 %
1000 INTRAVENOUS SOLUTION INTRAVENOUS ONCE
Status: COMPLETED | OUTPATIENT
Start: 2022-09-01 | End: 2022-09-01

## 2022-09-01 RX ADMIN — FENTANYL CITRATE 50 MCG: 50 INJECTION, SOLUTION INTRAMUSCULAR; INTRAVENOUS at 18:50

## 2022-09-01 RX ADMIN — SODIUM CHLORIDE 1000 ML: 9 INJECTION, SOLUTION INTRAVENOUS at 18:49

## 2022-09-01 RX ADMIN — IOPAMIDOL 90 ML: 755 INJECTION, SOLUTION INTRAVENOUS at 18:44

## 2022-09-01 ASSESSMENT — PAIN SCALES - GENERAL: PAINLEVEL_OUTOF10: 2

## 2022-09-01 ASSESSMENT — PAIN DESCRIPTION - LOCATION: LOCATION: CHEST

## 2022-09-01 ASSESSMENT — PAIN DESCRIPTION - FREQUENCY: FREQUENCY: INTERMITTENT

## 2022-09-01 ASSESSMENT — PAIN DESCRIPTION - DESCRIPTORS: DESCRIPTORS: ACHING

## 2022-09-01 ASSESSMENT — PAIN DESCRIPTION - PAIN TYPE: TYPE: ACUTE PAIN

## 2022-09-01 ASSESSMENT — PAIN DESCRIPTION - ORIENTATION: ORIENTATION: LEFT

## 2022-09-01 ASSESSMENT — PAIN - FUNCTIONAL ASSESSMENT: PAIN_FUNCTIONAL_ASSESSMENT: 0-10
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
## 2022-09-01 NOTE — ED NOTES
Radiology Procedure Waiver   Name: John De La Vega  : 1945  MRN: [id number]    Date:  22    Time: 6:04 PM EDT    Benefits of immediately proceeding with Radiology exam(s) without pre-testing outweigh the risks or are not indicated as specified below and therefore the following is/are being waived:    [] Pregnancy test   [] Patients LMP on-time and regular.   [] Patient had Tubal Ligation or has other Contraception Device. [] Patient  is Menopausal or Premenarcheal.    [] Patient had Full or Partial Hysterectomy. [] Protocol for Iodine allergy    [] MRI Questionnaire     [x] BUN/Creatinine   [] Patient age w/no hx of renal dysfunction. [] Patient on Dialysis. [] Recent Normal Labs. Electronically signed by Jesusita Greer MD on 22 at 6:04 PM EDT          Emergent. Missed life threatening diagnosis outweighs risk of contrast administration.      Jesusita Greer MD  22

## 2022-09-01 NOTE — ED NOTES
CT called, per akhil to wait 15 minutes to take pt. This RN asked if pt could go now due to pt being tachycardic and per Dr. Negrito Espinosa pt needs to go over now.  Notified MD of wait in [address] [place] [address]  09/01/22 8398

## 2022-09-02 LAB
EKG ATRIAL RATE: 94 BPM
EKG P AXIS: 18 DEGREES
EKG P-R INTERVAL: 198 MS
EKG Q-T INTERVAL: 352 MS
EKG QRS DURATION: 94 MS
EKG QTC CALCULATION (BAZETT): 440 MS
EKG R AXIS: 57 DEGREES
EKG T AXIS: 49 DEGREES
EKG VENTRICULAR RATE: 94 BPM

## 2022-10-04 PROBLEM — E66.8 MODERATE OBESITY: Status: ACTIVE | Noted: 2019-04-16

## 2022-10-04 PROBLEM — R07.89 ATYPICAL CHEST PAIN: Status: ACTIVE | Noted: 2022-10-04

## 2022-11-09 ENCOUNTER — OFFICE VISIT (OUTPATIENT)
Dept: CARDIOLOGY CLINIC | Age: 77
End: 2022-11-09
Payer: MEDICARE

## 2022-11-09 VITALS
DIASTOLIC BLOOD PRESSURE: 82 MMHG | RESPIRATION RATE: 18 BRPM | WEIGHT: 243 LBS | SYSTOLIC BLOOD PRESSURE: 124 MMHG | HEART RATE: 79 BPM | HEIGHT: 74 IN | BODY MASS INDEX: 31.18 KG/M2 | OXYGEN SATURATION: 97 %

## 2022-11-09 DIAGNOSIS — R07.9 CHEST PAIN, UNSPECIFIED TYPE: ICD-10-CM

## 2022-11-09 DIAGNOSIS — R07.89 ATYPICAL CHEST PAIN: Primary | ICD-10-CM

## 2022-11-09 PROCEDURE — 99203 OFFICE O/P NEW LOW 30 MIN: CPT | Performed by: INTERNAL MEDICINE

## 2022-11-09 PROCEDURE — 1036F TOBACCO NON-USER: CPT | Performed by: INTERNAL MEDICINE

## 2022-11-09 PROCEDURE — 3078F DIAST BP <80 MM HG: CPT | Performed by: INTERNAL MEDICINE

## 2022-11-09 PROCEDURE — 1123F ACP DISCUSS/DSCN MKR DOCD: CPT | Performed by: INTERNAL MEDICINE

## 2022-11-09 PROCEDURE — G8427 DOCREV CUR MEDS BY ELIG CLIN: HCPCS | Performed by: INTERNAL MEDICINE

## 2022-11-09 PROCEDURE — 93000 ELECTROCARDIOGRAM COMPLETE: CPT | Performed by: INTERNAL MEDICINE

## 2022-11-09 PROCEDURE — 3074F SYST BP LT 130 MM HG: CPT | Performed by: INTERNAL MEDICINE

## 2022-11-09 PROCEDURE — G8484 FLU IMMUNIZE NO ADMIN: HCPCS | Performed by: INTERNAL MEDICINE

## 2022-11-09 PROCEDURE — G8417 CALC BMI ABV UP PARAM F/U: HCPCS | Performed by: INTERNAL MEDICINE

## 2022-11-09 PROCEDURE — 3017F COLORECTAL CA SCREEN DOC REV: CPT | Performed by: INTERNAL MEDICINE

## 2022-11-09 RX ORDER — METOPROLOL SUCCINATE 25 MG/1
25 TABLET, EXTENDED RELEASE ORAL DAILY
Qty: 30 TABLET | Refills: 5 | Status: SHIPPED
Start: 2022-11-09 | End: 2022-11-11

## 2022-11-09 ASSESSMENT — ENCOUNTER SYMPTOMS
CONSTIPATION: 0
NAUSEA: 0
VOMITING: 0
ABDOMINAL PAIN: 0
WHEEZING: 0
BLOOD IN STOOL: 0
BACK PAIN: 0
SHORTNESS OF BREATH: 0
COUGH: 0
DIARRHEA: 0
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
## 2022-11-11 RX ORDER — METOPROLOL SUCCINATE 25 MG/1
25 TABLET, EXTENDED RELEASE ORAL DAILY
Qty: 90 TABLET | Refills: 3 | Status: SHIPPED | OUTPATIENT
Start: 2022-11-11

## 2022-12-09 ENCOUNTER — TELEPHONE (OUTPATIENT)
Dept: CARDIOLOGY | Age: 77
End: 2022-12-09

## 2022-12-09 NOTE — TELEPHONE ENCOUNTER
Spoke to Derek, the patient's interpretor on the phone. She was reminded of Michael's schedule stress test at [facility]. Cardiology at 915 am and where to report. She was instructed on NPO after MN except meds with water but to avoid taking his diabetic medications the am of the test. She was also instructed on avoidance of caffeine products for 12 hours. She verbalized an understanding.

## 2022-12-12 ENCOUNTER — HOSPITAL ENCOUNTER (OUTPATIENT)
Dept: CARDIOLOGY | Age: 77
Discharge: HOME | End: 2022-12-12
Payer: MEDICARE

## 2022-12-12 VITALS
SYSTOLIC BLOOD PRESSURE: 118 MMHG | HEIGHT: 74 IN | WEIGHT: 243 LBS | HEART RATE: 79 BPM | DIASTOLIC BLOOD PRESSURE: 74 MMHG | BODY MASS INDEX: 31.18 KG/M2 | RESPIRATION RATE: 16 BRPM

## 2022-12-12 DIAGNOSIS — R07.9 CHEST PAIN, UNSPECIFIED TYPE: ICD-10-CM

## 2022-12-12 PROCEDURE — 2580000003 HC RX 258: Performed by: INTERNAL MEDICINE

## 2022-12-12 PROCEDURE — 93017 CV STRESS TEST TRACING ONLY: CPT

## 2022-12-12 PROCEDURE — 78452 HT MUSCLE IMAGE SPECT MULT: CPT

## 2022-12-12 PROCEDURE — 3430000000 HC RX DIAGNOSTIC RADIOPHARMACEUTICAL: Performed by: INTERNAL MEDICINE

## 2022-12-12 PROCEDURE — A9502 TC99M TETROFOSMIN: HCPCS | Performed by: INTERNAL MEDICINE

## 2022-12-12 PROCEDURE — 6360000002 HC RX W HCPCS: Performed by: INTERNAL MEDICINE

## 2022-12-12 RX ORDER — SODIUM CHLORIDE 0.9 % (FLUSH) 0.9 %
10 SYRINGE (ML) INJECTION PRN
Status: DISCONTINUED | OUTPATIENT
Start: 2022-12-12 | End: 2022-12-13 | Stop reason: HOSPADM

## 2022-12-12 RX ADMIN — SODIUM CHLORIDE, PRESERVATIVE FREE 10 ML: 5 INJECTION INTRAVENOUS at 10:37

## 2022-12-12 RX ADMIN — REGADENOSON 0.4 MG: 0.08 INJECTION, SOLUTION INTRAVENOUS at 10:37

## 2022-12-12 RX ADMIN — SODIUM CHLORIDE, PRESERVATIVE FREE 10 ML: 5 INJECTION INTRAVENOUS at 09:39

## 2022-12-12 RX ADMIN — SODIUM CHLORIDE, PRESERVATIVE FREE 10 ML: 5 INJECTION INTRAVENOUS at 10:38

## 2022-12-12 RX ADMIN — TETROFOSMIN 27.9 MILLICURIE: 0.23 INJECTION, POWDER, LYOPHILIZED, FOR SOLUTION INTRAVENOUS at 10:37

## 2022-12-12 RX ADMIN — TETROFOSMIN 8.6 MILLICURIE: 0.23 INJECTION, POWDER, LYOPHILIZED, FOR SOLUTION INTRAVENOUS at 09:39

## 2022-12-12 NOTE — DISCHARGE INSTRUCTIONS
[address] and Vascular Lab      Instructions to Patients    The following are the instructions for patients who have had a procedure in our office today. Patient name: Dipesh Valdes    Radionuclide Activity: 40mCi of 99mTc-Tetrofosmin (Myoview)    Date Administered: 12/12/2022    Expires: 48 hours after scheduled appointment time      Patient may resume normal activity unless otherwise instructed. Patient may resume medications as normal.  If the need should arise, patient may call [phone] between the hours of 8:00am-4:30pm.  After hours there is at least one physician on-call at all times for those patients needing assistance. Patients may call [phone] and the answering service will direct the patient to one of our physicians for assistance. After the patient's test if they are going to be leaving from an airport in the near future they should take this letter with them to verify the test and radionuclide used for their test.      This letter verifies that the above named bearer received an injection of a radionuclide for medical purpose/usage only.         Electronically signed by Opal Dawson on 12/12/2022 at 10:32 AM

## 2022-12-12 NOTE — PROCEDURES
[id number] [facility] [address] and Vascular Lab - [address]. [facility], [address] [place], [place] [phone]               Pharmacologic Stress Nuclear Gated SPECT Study    Name: [address] [address] Account Number: [de-identified]    :  1945          Sex: male         Date of Study:  2022    Height: 6' 2\" (188 cm)         Weight: 243 lb (110.2 kg)     Ordering Provider: Alyson Franks MD          PCP: Melissa Uriostegui DO      Cardiologist: Alyson Franks MD             Interpreting Physician: Alyson Franks MD  _________________________________________________________________________________    Indication:   Detecting the presence and location of coronary artery disease    Clinical History:   Patient has no known history of coronary artery disease. Resting ECG: Sinus rhythm at 79 bpm with borderline first-degree block, low voltage in precordial leads and nonspecific T wave changes. Procedure:   Pharmacologic stress testing was performed with regadenoson 0.4 mg for 15 seconds. The heart rate was 79 at baseline and colby to 120 beats during the infusion. The blood pressure at baseline was 118/74 and blood pressure at the end of infusion was 90/52. Blood pressure response was hypotensive during the stress procedure. The patient experienced shortness of breathe and lightheadedness during the infusion. ECG during the infusion remained unchanged above abnormal baseline. IMAGING: Myocardial perfusion imaging was performed at rest 30-35 minutes following the intravenous injection of 8.6 mCi of (Tc-tetrofosmin) followed by 10 ml of Normal Saline. As per infusion protocol, the patient was injected intravenously with 27.9 mCi of (Tc-tetrofosmin) followed by 10 ml of Normal Saline. Gated post-stress tomographic imaging was performed 45 minutes after stress. FINDINGS: The overall quality of the study was good.      Left ventricular cavity size was noted to be normal during stress and rest.   TID was 1.05. Rotational analog analysis demonstrated mild increase in bowel uptake noted mainly on resting imaging. The gated SPECT stress imaging in the short, vertical long, and horizontal long axis demonstrated moderate size inferior defect of moderate intensity. This defect was fixed. Gated SPECT left ventricular ejection fraction was calculated to be 57%, with absence of segmental wall motion abnormalities. Impression:    Moderate size fixed inferior defect probably due to diaphragmatic attenuation artifact and less likely scar from previous myocardial infarction  Absence of reversible ischemia. Absence of segmental wall motion abnormalities. Ejection fraction 57%. 4.   Low risk pharmacologic stress test    Thank you for sending your patient to this [employer].      Electronically signed by Grey Sharp MD on 12/12/22 at 12:07 PM EST

## 2023-01-11 ENCOUNTER — TELEPHONE (OUTPATIENT)
Dept: CARDIOLOGY | Age: 78
End: 2023-01-11

## 2023-01-11 NOTE — TELEPHONE ENCOUNTER
I called patient to schedule an Echo, but I had to l/m.   Electronically signed by Chuckie Pelletier on 1/11/2023 at 3:04 PM

## 2023-02-09 ENCOUNTER — TELEPHONE (OUTPATIENT)
Dept: CARDIOLOGY | Age: 78
End: 2023-02-09

## 2023-02-09 NOTE — TELEPHONE ENCOUNTER
Attempt to reach the patient was unsuccessful. Left a VM reminding him of his 2:00pm echo. Phone number given to return call with any questions.

## 2023-02-10 ENCOUNTER — HOSPITAL ENCOUNTER (OUTPATIENT)
Dept: CARDIOLOGY | Age: 78
Discharge: HOME | End: 2023-02-10
Payer: MEDICARE

## 2023-02-10 DIAGNOSIS — R07.9 CHEST PAIN, UNSPECIFIED TYPE: ICD-10-CM

## 2023-02-10 LAB
LV EF: 55 %
LVEF MODALITY: NORMAL

## 2023-02-10 PROCEDURE — 93306 TTE W/DOPPLER COMPLETE: CPT

## 2023-04-22 ENCOUNTER — HOSPITAL ENCOUNTER (OUTPATIENT)
Age: 78
Discharge: HOME | End: 2023-04-22
Payer: MEDICARE

## 2023-04-22 LAB
BILIRUB UR QL STRIP: NEGATIVE
CLARITY UR: CLEAR
COLOR UR: YELLOW
GLUCOSE UR STRIP-MCNC: >=1000 MG/DL
HGB UR QL STRIP: NEGATIVE
KETONES UR STRIP-MCNC: NEGATIVE MG/DL
LEUKOCYTE ESTERASE UR QL STRIP: NEGATIVE
NITRITE UR QL STRIP: NEGATIVE
PH UR STRIP: 5 [PH] (ref 5–9)
PROT UR STRIP-MCNC: NEGATIVE MG/DL
SP GR UR STRIP: 1.02 (ref 1–1.03)
UROBILINOGEN UR STRIP-ACNC: 0.2 E.U./DL

## 2023-04-22 PROCEDURE — 87186 SC STD MICRODIL/AGAR DIL: CPT

## 2023-04-22 PROCEDURE — 87088 URINE BACTERIA CULTURE: CPT

## 2023-04-22 PROCEDURE — 81003 URINALYSIS AUTO W/O SCOPE: CPT

## 2023-04-22 PROCEDURE — 87077 CULTURE AEROBIC IDENTIFY: CPT

## 2023-04-23 LAB
BACTERIA UR CULT: ABNORMAL
ORGANISM: ABNORMAL

## 2023-04-24 LAB
BACTERIA UR CULT: ABNORMAL
ORGANISM: ABNORMAL

## 2023-04-26 RX ORDER — METOPROLOL SUCCINATE 25 MG/1
25 TABLET, EXTENDED RELEASE ORAL DAILY
Qty: 30 TABLET | Refills: 3 | Status: SHIPPED | OUTPATIENT
Start: 2023-04-26

## 2023-04-27 ENCOUNTER — OFFICE VISIT (OUTPATIENT)
Dept: CARDIOLOGY CLINIC | Age: 78
End: 2023-04-27
Payer: MEDICARE

## 2023-04-27 VITALS
SYSTOLIC BLOOD PRESSURE: 120 MMHG | DIASTOLIC BLOOD PRESSURE: 64 MMHG | HEIGHT: 74 IN | HEART RATE: 82 BPM | BODY MASS INDEX: 28.44 KG/M2 | RESPIRATION RATE: 18 BRPM | WEIGHT: 221.6 LBS

## 2023-04-27 DIAGNOSIS — E78.00 PURE HYPERCHOLESTEROLEMIA: Primary | ICD-10-CM

## 2023-04-27 PROCEDURE — 3078F DIAST BP <80 MM HG: CPT | Performed by: INTERNAL MEDICINE

## 2023-04-27 PROCEDURE — 1123F ACP DISCUSS/DSCN MKR DOCD: CPT | Performed by: INTERNAL MEDICINE

## 2023-04-27 PROCEDURE — 99213 OFFICE O/P EST LOW 20 MIN: CPT | Performed by: INTERNAL MEDICINE

## 2023-04-27 PROCEDURE — 93000 ELECTROCARDIOGRAM COMPLETE: CPT | Performed by: INTERNAL MEDICINE

## 2023-04-27 PROCEDURE — 1036F TOBACCO NON-USER: CPT | Performed by: INTERNAL MEDICINE

## 2023-04-27 PROCEDURE — 3074F SYST BP LT 130 MM HG: CPT | Performed by: INTERNAL MEDICINE

## 2023-04-27 PROCEDURE — G8417 CALC BMI ABV UP PARAM F/U: HCPCS | Performed by: INTERNAL MEDICINE

## 2023-04-27 PROCEDURE — G8427 DOCREV CUR MEDS BY ELIG CLIN: HCPCS | Performed by: INTERNAL MEDICINE

## 2023-04-27 RX ORDER — CETIRIZINE HYDROCHLORIDE 10 MG/1
10 TABLET ORAL DAILY
COMMUNITY

## 2023-04-27 RX ORDER — MONTELUKAST SODIUM 10 MG/1
10 TABLET ORAL NIGHTLY
COMMUNITY

## 2023-04-27 ASSESSMENT — ENCOUNTER SYMPTOMS
ABDOMINAL PAIN: 0
COUGH: 0
NAUSEA: 0
WHEEZING: 0
BACK PAIN: 0
BLOOD IN STOOL: 0
DIARRHEA: 0
VOMITING: 0
SHORTNESS OF BREATH: 0
CONSTIPATION: 0
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
## 2023-05-02 ENCOUNTER — HOSPITAL ENCOUNTER (OUTPATIENT)
Age: 78
Discharge: HOME | End: 2023-05-04

## 2023-05-02 PROCEDURE — 88305 TISSUE EXAM BY PATHOLOGIST: CPT

## 2023-05-10 ENCOUNTER — HOSPITAL ENCOUNTER (OUTPATIENT)
Age: 78
Discharge: HOME | End: 2023-05-10
Payer: MEDICARE

## 2023-05-10 LAB
BUN SERPL-MCNC: 16 MG/DL (ref 6–23)
CREAT SERPL-MCNC: 0.8 MG/DL (ref 0.7–1.2)

## 2023-05-10 PROCEDURE — 84520 ASSAY OF UREA NITROGEN: CPT

## 2023-05-10 PROCEDURE — 82565 ASSAY OF CREATININE: CPT

## 2023-05-10 PROCEDURE — 36415 COLL VENOUS BLD VENIPUNCTURE: CPT

## 2023-07-05 ENCOUNTER — TELEPHONE (OUTPATIENT)
Dept: CASE MANAGEMENT | Age: 78
End: 2023-07-05

## 2023-07-05 NOTE — TELEPHONE ENCOUNTER
Met with patient during his initial consultation with Dr Marcos Kwok for his recent Prostate Cancer diagnosis. Introduced myself and explained my role with patients receiving treatment at our center. Patient was friendly and receptive. He is deaf and  Shashank Miller was with him to interpret with sign language for visit today. They deny any questions or needs at this time. He follows [facility] Dr Claus Norman for Urology. Next steps include repeat PSA, 24 months of Hormone Therapy, Space OAR, CT simulation and Radiation planning and treatments per Dr Kayleen Mora recommendations and follow up care. Provided patient with literature on Patient Resource Prostate Cancer, [employer] Possible Side Effects of Radiation Treatment for Prostate Cancer, Cancer Care Online Prostate Cancer Support Group/Resources, Local Resources and [employer] List. Provided with my contact information and encouraged patient to call with questions, concerns or needs. Verbalizes understanding. Patient appreciative of visit. Will continue to follow.

## 2023-11-27 ENCOUNTER — HOSPITAL ENCOUNTER (OUTPATIENT)
Age: 78
Discharge: HOME | End: 2023-11-27
Payer: MEDICARE

## 2023-11-27 LAB — PSA SERPL-MCNC: 0.75 NG/ML (ref 0–4)

## 2023-11-27 PROCEDURE — 36415 COLL VENOUS BLD VENIPUNCTURE: CPT

## 2023-11-27 PROCEDURE — 84153 ASSAY OF PSA TOTAL: CPT

## 2024-01-03 RX ORDER — METOPROLOL SUCCINATE 25 MG/1
25 TABLET, EXTENDED RELEASE ORAL DAILY
Qty: 90 TABLET | Refills: 3 | Status: SHIPPED | OUTPATIENT
Start: 2024-01-03

## 2024-02-14 ENCOUNTER — HOSPITAL ENCOUNTER (OUTPATIENT)
Age: 79
Discharge: HOME | End: 2024-02-14
Payer: MEDICARE

## 2024-02-14 LAB — PSA SERPL-MCNC: 0.04 NG/ML (ref 0–4)

## 2024-02-14 PROCEDURE — 84153 ASSAY OF PSA TOTAL: CPT

## 2024-02-14 PROCEDURE — 36415 COLL VENOUS BLD VENIPUNCTURE: CPT

## 2024-03-27 PROBLEM — C61 PROSTATE CANCER (HCC): Status: ACTIVE | Noted: 2024-03-27

## 2024-03-28 ENCOUNTER — HOSPITAL ENCOUNTER (OUTPATIENT)
Age: 79
Discharge: HOME | End: 2024-03-30

## 2024-04-03 LAB — SURGICAL PATHOLOGY REPORT: NORMAL

## 2024-04-04 ENCOUNTER — TELEPHONE (OUTPATIENT)
Dept: CASE MANAGEMENT | Age: 79
End: 2024-04-04

## 2024-04-04 NOTE — TELEPHONE ENCOUNTER
Patient had his 3 month follow up with Dr Valenzuela on 3/29/24. I was not in the office on the day of his visit. Copy of his Treatment Summary and Survivorship Care Plan mailed to patient at his home address. Copies sent to his PCP and Urologist. Provided him with a letter outlining information on the document and provided with my contact information to call with any questions, concerns or needs. He will continue to follow with urology.

## 2024-07-30 ENCOUNTER — APPOINTMENT (OUTPATIENT)
Dept: GENERAL RADIOLOGY | Age: 79
DRG: 392 | End: 2024-07-30
Payer: MEDICARE

## 2024-07-30 ENCOUNTER — APPOINTMENT (OUTPATIENT)
Dept: CT IMAGING | Age: 79
DRG: 392 | End: 2024-07-30
Payer: MEDICARE

## 2024-07-30 ENCOUNTER — HOSPITAL ENCOUNTER (INPATIENT)
Age: 79
LOS: 4 days | Discharge: ANOTHER HOSPITAL | DRG: 392 | End: 2024-08-03
Attending: STUDENT IN AN ORGANIZED HEALTH CARE EDUCATION/TRAINING PROGRAM | Admitting: STUDENT IN AN ORGANIZED HEALTH CARE EDUCATION/TRAINING PROGRAM
Payer: MEDICARE

## 2024-07-30 DIAGNOSIS — K31.89 DUODENAL MASS: Primary | ICD-10-CM

## 2024-07-30 DIAGNOSIS — R10.9 ABDOMINAL PAIN, UNSPECIFIED ABDOMINAL LOCATION: ICD-10-CM

## 2024-07-30 DIAGNOSIS — R74.8 ELEVATED LIVER ENZYMES: ICD-10-CM

## 2024-07-30 PROBLEM — R17 JAUNDICE: Status: ACTIVE | Noted: 2024-07-30

## 2024-07-30 LAB
ALBUMIN SERPL-MCNC: 3.9 G/DL (ref 3.5–5.2)
ALP SERPL-CCNC: 413 U/L (ref 40–129)
ALT SERPL-CCNC: 365 U/L (ref 0–40)
AMMONIA PLAS-SCNC: 20 UMOL/L (ref 16–60)
ANION GAP SERPL CALCULATED.3IONS-SCNC: 10 MMOL/L (ref 7–16)
AST SERPL-CCNC: 200 U/L (ref 0–39)
BACTERIA URNS QL MICRO: ABNORMAL
BASOPHILS # BLD: 0.02 K/UL (ref 0–0.2)
BASOPHILS NFR BLD: 1 % (ref 0–2)
BILIRUB SERPL-MCNC: 6.5 MG/DL (ref 0–1.2)
BILIRUB UR QL STRIP: ABNORMAL
BUN SERPL-MCNC: 20 MG/DL (ref 6–23)
CALCIUM SERPL-MCNC: 9.4 MG/DL (ref 8.6–10.2)
CHLORIDE SERPL-SCNC: 100 MMOL/L (ref 98–107)
CLARITY UR: CLEAR
CO2 SERPL-SCNC: 22 MMOL/L (ref 22–29)
COLOR UR: YELLOW
CREAT SERPL-MCNC: 0.8 MG/DL (ref 0.7–1.2)
EOSINOPHIL # BLD: 0.1 K/UL (ref 0.05–0.5)
EOSINOPHILS RELATIVE PERCENT: 2 % (ref 0–6)
ERYTHROCYTE [DISTWIDTH] IN BLOOD BY AUTOMATED COUNT: 11.9 % (ref 11.5–15)
GFR, ESTIMATED: >90 ML/MIN/1.73M2
GLUCOSE BLD-MCNC: 216 MG/DL (ref 74–99)
GLUCOSE SERPL-MCNC: 358 MG/DL (ref 74–99)
GLUCOSE UR STRIP-MCNC: >=1000 MG/DL
HCT VFR BLD AUTO: 40.7 % (ref 37–54)
HGB BLD-MCNC: 13.5 G/DL (ref 12.5–16.5)
HGB UR QL STRIP.AUTO: NEGATIVE
IMM GRANULOCYTES # BLD AUTO: 0.03 K/UL (ref 0–0.58)
IMM GRANULOCYTES NFR BLD: 1 % (ref 0–5)
INR PPP: 1.1
KETONES UR STRIP-MCNC: NEGATIVE MG/DL
LACTATE BLDV-SCNC: 1.4 MMOL/L (ref 0.5–2.2)
LEUKOCYTE ESTERASE UR QL STRIP: NEGATIVE
LIPASE SERPL-CCNC: 35 U/L (ref 13–60)
LYMPHOCYTES NFR BLD: 0.65 K/UL (ref 1.5–4)
LYMPHOCYTES RELATIVE PERCENT: 15 % (ref 20–42)
MAGNESIUM SERPL-MCNC: 2.2 MG/DL (ref 1.6–2.6)
MCH RBC QN AUTO: 33 PG (ref 26–35)
MCHC RBC AUTO-ENTMCNC: 33.2 G/DL (ref 32–34.5)
MCV RBC AUTO: 99.5 FL (ref 80–99.9)
MONOCYTES NFR BLD: 0.54 K/UL (ref 0.1–0.95)
MONOCYTES NFR BLD: 13 % (ref 2–12)
NEUTROPHILS NFR BLD: 68 % (ref 43–80)
NEUTS SEG NFR BLD: 2.91 K/UL (ref 1.8–7.3)
NITRITE UR QL STRIP: NEGATIVE
PARTIAL THROMBOPLASTIN TIME: 38.3 SEC (ref 24.5–35.1)
PH UR STRIP: 6 [PH] (ref 5–9)
PLATELET # BLD AUTO: 212 K/UL (ref 130–450)
PMV BLD AUTO: 10.7 FL (ref 7–12)
POTASSIUM SERPL-SCNC: 4.4 MMOL/L (ref 3.5–5)
PROT SERPL-MCNC: 7.2 G/DL (ref 6.4–8.3)
PROT UR STRIP-MCNC: NEGATIVE MG/DL
PROTHROMBIN TIME: 11.8 SEC (ref 9.3–12.4)
RBC # BLD AUTO: 4.09 M/UL (ref 3.8–5.8)
RBC #/AREA URNS HPF: ABNORMAL /HPF
SODIUM SERPL-SCNC: 132 MMOL/L (ref 132–146)
SP GR UR STRIP: 1.01 (ref 1–1.03)
TROPONIN I SERPL HS-MCNC: 13 NG/L (ref 0–11)
TROPONIN I SERPL HS-MCNC: 15 NG/L (ref 0–11)
UROBILINOGEN UR STRIP-ACNC: 1 EU/DL (ref 0–1)
WBC #/AREA URNS HPF: ABNORMAL /HPF
WBC OTHER # BLD: 4.3 K/UL (ref 4.5–11.5)

## 2024-07-30 PROCEDURE — 6360000002 HC RX W HCPCS

## 2024-07-30 PROCEDURE — 85730 THROMBOPLASTIN TIME PARTIAL: CPT

## 2024-07-30 PROCEDURE — 6370000000 HC RX 637 (ALT 250 FOR IP): Performed by: NURSE PRACTITIONER

## 2024-07-30 PROCEDURE — 83605 ASSAY OF LACTIC ACID: CPT

## 2024-07-30 PROCEDURE — 99285 EMERGENCY DEPT VISIT HI MDM: CPT

## 2024-07-30 PROCEDURE — 93005 ELECTROCARDIOGRAM TRACING: CPT

## 2024-07-30 PROCEDURE — 71046 X-RAY EXAM CHEST 2 VIEWS: CPT

## 2024-07-30 PROCEDURE — 85610 PROTHROMBIN TIME: CPT

## 2024-07-30 PROCEDURE — 82962 GLUCOSE BLOOD TEST: CPT

## 2024-07-30 PROCEDURE — 74177 CT ABD & PELVIS W/CONTRAST: CPT

## 2024-07-30 PROCEDURE — 83735 ASSAY OF MAGNESIUM: CPT

## 2024-07-30 PROCEDURE — 2580000003 HC RX 258: Performed by: NURSE PRACTITIONER

## 2024-07-30 PROCEDURE — 80053 COMPREHEN METABOLIC PANEL: CPT

## 2024-07-30 PROCEDURE — 81001 URINALYSIS AUTO W/SCOPE: CPT

## 2024-07-30 PROCEDURE — 84484 ASSAY OF TROPONIN QUANT: CPT

## 2024-07-30 PROCEDURE — 82140 ASSAY OF AMMONIA: CPT

## 2024-07-30 PROCEDURE — 85025 COMPLETE CBC W/AUTO DIFF WBC: CPT

## 2024-07-30 PROCEDURE — 83690 ASSAY OF LIPASE: CPT

## 2024-07-30 PROCEDURE — 99223 1ST HOSP IP/OBS HIGH 75: CPT | Performed by: STUDENT IN AN ORGANIZED HEALTH CARE EDUCATION/TRAINING PROGRAM

## 2024-07-30 PROCEDURE — APPSS60 APP SPLIT SHARED TIME 46-60 MINUTES: Performed by: NURSE PRACTITIONER

## 2024-07-30 PROCEDURE — 1200000000 HC SEMI PRIVATE

## 2024-07-30 PROCEDURE — 6360000004 HC RX CONTRAST MEDICATION: Performed by: RADIOLOGY

## 2024-07-30 RX ORDER — POTASSIUM CHLORIDE 7.45 MG/ML
10 INJECTION INTRAVENOUS PRN
Status: DISCONTINUED | OUTPATIENT
Start: 2024-07-30 | End: 2024-08-03 | Stop reason: HOSPADM

## 2024-07-30 RX ORDER — ENOXAPARIN SODIUM 100 MG/ML
40 INJECTION SUBCUTANEOUS DAILY
Status: DISCONTINUED | OUTPATIENT
Start: 2024-07-31 | End: 2024-08-02 | Stop reason: DRUGHIGH

## 2024-07-30 RX ORDER — M-VIT,TX,IRON,MINS/CALC/FOLIC 27MG-0.4MG
1 TABLET ORAL DAILY
Status: DISCONTINUED | OUTPATIENT
Start: 2024-07-31 | End: 2024-08-03 | Stop reason: HOSPADM

## 2024-07-30 RX ORDER — SODIUM CHLORIDE 9 MG/ML
INJECTION, SOLUTION INTRAVENOUS PRN
Status: DISCONTINUED | OUTPATIENT
Start: 2024-07-30 | End: 2024-08-03 | Stop reason: HOSPADM

## 2024-07-30 RX ORDER — SODIUM CHLORIDE 9 MG/ML
INJECTION, SOLUTION INTRAVENOUS CONTINUOUS
Status: ACTIVE | OUTPATIENT
Start: 2024-07-30 | End: 2024-08-01

## 2024-07-30 RX ORDER — FINASTERIDE 5 MG/1
5 TABLET, FILM COATED ORAL DAILY
Status: DISCONTINUED | OUTPATIENT
Start: 2024-07-31 | End: 2024-08-03 | Stop reason: HOSPADM

## 2024-07-30 RX ORDER — INSULIN LISPRO 100 [IU]/ML
0-4 INJECTION, SOLUTION INTRAVENOUS; SUBCUTANEOUS NIGHTLY
Status: DISCONTINUED | OUTPATIENT
Start: 2024-07-30 | End: 2024-08-03 | Stop reason: HOSPADM

## 2024-07-30 RX ORDER — POTASSIUM CHLORIDE 20 MEQ/1
40 TABLET, EXTENDED RELEASE ORAL PRN
Status: DISCONTINUED | OUTPATIENT
Start: 2024-07-30 | End: 2024-08-03 | Stop reason: HOSPADM

## 2024-07-30 RX ORDER — METOPROLOL SUCCINATE 25 MG/1
25 TABLET, EXTENDED RELEASE ORAL DAILY
Status: DISCONTINUED | OUTPATIENT
Start: 2024-07-31 | End: 2024-08-03 | Stop reason: HOSPADM

## 2024-07-30 RX ORDER — ACETAMINOPHEN 650 MG/1
650 SUPPOSITORY RECTAL EVERY 6 HOURS PRN
Status: DISCONTINUED | OUTPATIENT
Start: 2024-07-30 | End: 2024-07-30

## 2024-07-30 RX ORDER — ACETAMINOPHEN 325 MG/1
650 TABLET ORAL EVERY 6 HOURS PRN
Status: DISCONTINUED | OUTPATIENT
Start: 2024-07-30 | End: 2024-07-30

## 2024-07-30 RX ORDER — SODIUM CHLORIDE 0.9 % (FLUSH) 0.9 %
5-40 SYRINGE (ML) INJECTION EVERY 12 HOURS SCHEDULED
Status: DISCONTINUED | OUTPATIENT
Start: 2024-07-30 | End: 2024-08-03 | Stop reason: HOSPADM

## 2024-07-30 RX ORDER — ONDANSETRON 2 MG/ML
4 INJECTION INTRAMUSCULAR; INTRAVENOUS EVERY 6 HOURS PRN
Status: DISCONTINUED | OUTPATIENT
Start: 2024-07-30 | End: 2024-08-03 | Stop reason: HOSPADM

## 2024-07-30 RX ORDER — MAGNESIUM SULFATE IN WATER 40 MG/ML
2000 INJECTION, SOLUTION INTRAVENOUS PRN
Status: DISCONTINUED | OUTPATIENT
Start: 2024-07-30 | End: 2024-08-03 | Stop reason: HOSPADM

## 2024-07-30 RX ORDER — 0.9 % SODIUM CHLORIDE 0.9 %
1000 INTRAVENOUS SOLUTION INTRAVENOUS ONCE
Status: COMPLETED | OUTPATIENT
Start: 2024-07-30 | End: 2024-07-30

## 2024-07-30 RX ORDER — POLYETHYLENE GLYCOL 3350 17 G/17G
17 POWDER, FOR SOLUTION ORAL DAILY PRN
Status: DISCONTINUED | OUTPATIENT
Start: 2024-07-30 | End: 2024-08-03 | Stop reason: HOSPADM

## 2024-07-30 RX ORDER — ONDANSETRON 4 MG/1
4 TABLET, ORALLY DISINTEGRATING ORAL EVERY 8 HOURS PRN
Status: DISCONTINUED | OUTPATIENT
Start: 2024-07-30 | End: 2024-08-03 | Stop reason: HOSPADM

## 2024-07-30 RX ORDER — ATORVASTATIN CALCIUM 20 MG/1
20 TABLET, FILM COATED ORAL DAILY
Status: DISCONTINUED | OUTPATIENT
Start: 2024-07-31 | End: 2024-07-30

## 2024-07-30 RX ORDER — CETIRIZINE HYDROCHLORIDE 10 MG/1
10 TABLET ORAL DAILY
Status: DISCONTINUED | OUTPATIENT
Start: 2024-07-31 | End: 2024-08-03 | Stop reason: HOSPADM

## 2024-07-30 RX ORDER — SODIUM CHLORIDE 0.9 % (FLUSH) 0.9 %
5-40 SYRINGE (ML) INJECTION PRN
Status: DISCONTINUED | OUTPATIENT
Start: 2024-07-30 | End: 2024-08-03 | Stop reason: HOSPADM

## 2024-07-30 RX ORDER — TAMSULOSIN HYDROCHLORIDE 0.4 MG/1
0.4 CAPSULE ORAL DAILY
Status: DISCONTINUED | OUTPATIENT
Start: 2024-07-31 | End: 2024-08-03 | Stop reason: HOSPADM

## 2024-07-30 RX ORDER — INSULIN LISPRO 100 [IU]/ML
0-8 INJECTION, SOLUTION INTRAVENOUS; SUBCUTANEOUS
Status: DISCONTINUED | OUTPATIENT
Start: 2024-07-31 | End: 2024-08-03 | Stop reason: HOSPADM

## 2024-07-30 RX ORDER — MONTELUKAST SODIUM 10 MG/1
10 TABLET ORAL NIGHTLY
Status: DISCONTINUED | OUTPATIENT
Start: 2024-07-30 | End: 2024-08-03 | Stop reason: HOSPADM

## 2024-07-30 RX ADMIN — SODIUM CHLORIDE: 9 INJECTION, SOLUTION INTRAVENOUS at 22:21

## 2024-07-30 RX ADMIN — MONTELUKAST SODIUM 10 MG: 10 TABLET, FILM COATED ORAL at 22:30

## 2024-07-30 RX ADMIN — SODIUM CHLORIDE, PRESERVATIVE FREE 10 ML: 5 INJECTION INTRAVENOUS at 22:21

## 2024-07-30 RX ADMIN — IOPAMIDOL 75 ML: 755 INJECTION, SOLUTION INTRAVENOUS at 18:35

## 2024-07-30 RX ADMIN — SODIUM CHLORIDE 1000 ML: 9 INJECTION, SOLUTION INTRAVENOUS at 16:05

## 2024-07-30 ASSESSMENT — LIFESTYLE VARIABLES
HOW OFTEN DO YOU HAVE A DRINK CONTAINING ALCOHOL: NEVER
HOW MANY STANDARD DRINKS CONTAINING ALCOHOL DO YOU HAVE ON A TYPICAL DAY: PATIENT DOES NOT DRINK

## 2024-07-30 ASSESSMENT — PAIN - FUNCTIONAL ASSESSMENT: PAIN_FUNCTIONAL_ASSESSMENT: NONE - DENIES PAIN

## 2024-07-30 NOTE — ED PROVIDER NOTES
Shared JOHANNA-ED Attending Visit.  CC: No        [facility] 5W MED SURG  ED  Encounter Note  Admit Date/RoomTime: 2024  3:18 PM  ED Room: [facility]  NAME: Michael Garcia  : 1945  MRN: [id number]  PCP: Moshe Moran DO    CHIEF COMPLAINT     Abnormal Lab (Sent by [facility] high bilirubin and liver enzymes)    HISTORY OF PRESENT ILLNESS        Michael Garcia is a 79 y.o. male who presents to the ED patient  Emergency department after being seen by his family doctor yesterday.  Patient states he has been having abdominal pain for the past 3 weeks.  Patient states fatigue.  Patient states the whites of his eyes have yellowed over the last 3 weeks.  Patient states he is scheduled for an echocardiogram in 2 days.  Patient states he had a cardiac workup yesterday at his doctor's office and the lab work revealed elevated liver enzymes.  Patient received a call this morning that he is present to the emergency department for further evaluation.  Patient is likewise to have a stress test completed on Thursday.  Patient at present denies chest pain or shortness of breath.  Patient complains of diffuse abdominal pain.  Patient states he is a diabetic.  REVIEW OF SYSTEMS     Pertinent positives and negatives are stated within HPI, all other systems reviewed and are negative.    Past Medical History:  has a past medical history of Deaf, bilateral, Diabetes (HCC), Hypertension, Osteoarthritis, Primary osteoarthritis of left knee, and Prostate cancer (HCC).  Surgical History:  has a past surgical history that includes Rotator cuff repair (); shoulder surgery; and Carpal tunnel release.  Social History:  reports that he has never smoked. He has never used smokeless tobacco. He reports that he does not drink alcohol and does not use drugs.  Family History: family history includes Heart Attack in his brother; Heart Disease in his paternal cousin, sister, and sister.   Allergies: Patient has no known

## 2024-07-30 NOTE — ED NOTES
RN has spoken with patient about plan of care and interventions. All questions and concerns addressed at this time. No further issues at this time. Pt with family member. Awaiting CT results.

## 2024-07-31 ENCOUNTER — APPOINTMENT (OUTPATIENT)
Dept: GENERAL RADIOLOGY | Age: 79
DRG: 392 | End: 2024-07-31
Payer: MEDICARE

## 2024-07-31 LAB
ALBUMIN SERPL-MCNC: 3.5 G/DL (ref 3.5–5.2)
ALP SERPL-CCNC: 368 U/L (ref 40–129)
ALT SERPL-CCNC: 335 U/L (ref 0–40)
ANION GAP SERPL CALCULATED.3IONS-SCNC: 9 MMOL/L (ref 7–16)
AST SERPL-CCNC: 192 U/L (ref 0–39)
BASOPHILS # BLD: 0.01 K/UL (ref 0–0.2)
BASOPHILS NFR BLD: 0 % (ref 0–2)
BILIRUB DIRECT SERPL-MCNC: 5.6 MG/DL (ref 0–0.3)
BILIRUB INDIRECT SERPL-MCNC: 1.2 MG/DL (ref 0–1)
BILIRUB SERPL-MCNC: 6.7 MG/DL (ref 0–1.2)
BILIRUB SERPL-MCNC: 6.8 MG/DL (ref 0–1.2)
BUN SERPL-MCNC: 14 MG/DL (ref 6–23)
CALCIUM SERPL-MCNC: 9.2 MG/DL (ref 8.6–10.2)
CEA SERPL-MCNC: 2.1 NG/ML (ref 0–5.2)
CHLORIDE SERPL-SCNC: 106 MMOL/L (ref 98–107)
CHOLEST SERPL-MCNC: 182 MG/DL
CO2 SERPL-SCNC: 22 MMOL/L (ref 22–29)
CREAT SERPL-MCNC: 0.7 MG/DL (ref 0.7–1.2)
EOSINOPHIL # BLD: 0.13 K/UL (ref 0.05–0.5)
EOSINOPHILS RELATIVE PERCENT: 4 % (ref 0–6)
ERYTHROCYTE [DISTWIDTH] IN BLOOD BY AUTOMATED COUNT: 12.2 % (ref 11.5–15)
FERRITIN SERPL-MCNC: 653 NG/ML
GFR, ESTIMATED: >90 ML/MIN/1.73M2
GLUCOSE BLD-MCNC: 174 MG/DL (ref 74–99)
GLUCOSE BLD-MCNC: 188 MG/DL (ref 74–99)
GLUCOSE BLD-MCNC: 284 MG/DL (ref 74–99)
GLUCOSE SERPL-MCNC: 197 MG/DL (ref 74–99)
HCT VFR BLD AUTO: 38.6 % (ref 37–54)
HDLC SERPL-MCNC: 41 MG/DL
HGB BLD-MCNC: 12.4 G/DL (ref 12.5–16.5)
IMM GRANULOCYTES # BLD AUTO: <0.03 K/UL (ref 0–0.58)
IMM GRANULOCYTES NFR BLD: 0 % (ref 0–5)
IRON SATN MFR SERPL: 37 % (ref 20–55)
IRON SERPL-MCNC: 136 UG/DL (ref 59–158)
LDLC SERPL CALC-MCNC: 106 MG/DL
LYMPHOCYTES NFR BLD: 0.52 K/UL (ref 1.5–4)
LYMPHOCYTES RELATIVE PERCENT: 15 % (ref 20–42)
MCH RBC QN AUTO: 32.6 PG (ref 26–35)
MCHC RBC AUTO-ENTMCNC: 32.1 G/DL (ref 32–34.5)
MCV RBC AUTO: 101.6 FL (ref 80–99.9)
MONOCYTES NFR BLD: 0.52 K/UL (ref 0.1–0.95)
MONOCYTES NFR BLD: 15 % (ref 2–12)
NEUTROPHILS NFR BLD: 66 % (ref 43–80)
NEUTS SEG NFR BLD: 2.29 K/UL (ref 1.8–7.3)
PLATELET # BLD AUTO: 178 K/UL (ref 130–450)
PMV BLD AUTO: 11.3 FL (ref 7–12)
POTASSIUM SERPL-SCNC: 4.4 MMOL/L (ref 3.5–5)
PROT SERPL-MCNC: 6.3 G/DL (ref 6.4–8.3)
RBC # BLD AUTO: 3.8 M/UL (ref 3.8–5.8)
RBC # BLD: ABNORMAL 10*6/UL
RBC # BLD: ABNORMAL 10*6/UL
SODIUM SERPL-SCNC: 137 MMOL/L (ref 132–146)
TIBC SERPL-MCNC: 369 UG/DL (ref 250–450)
TRIGL SERPL-MCNC: 177 MG/DL
VLDLC SERPL CALC-MCNC: 35 MG/DL
WBC OTHER # BLD: 3.5 K/UL (ref 4.5–11.5)

## 2024-07-31 PROCEDURE — 6370000000 HC RX 637 (ALT 250 FOR IP): Performed by: NURSE PRACTITIONER

## 2024-07-31 PROCEDURE — 83540 ASSAY OF IRON: CPT

## 2024-07-31 PROCEDURE — 2580000003 HC RX 258: Performed by: NURSE PRACTITIONER

## 2024-07-31 PROCEDURE — 86039 ANTINUCLEAR ANTIBODIES (ANA): CPT

## 2024-07-31 PROCEDURE — 82785 ASSAY OF IGE: CPT

## 2024-07-31 PROCEDURE — 80074 ACUTE HEPATITIS PANEL: CPT

## 2024-07-31 PROCEDURE — 6370000000 HC RX 637 (ALT 250 FOR IP)

## 2024-07-31 PROCEDURE — 86665 EPSTEIN-BARR CAPSID VCA: CPT

## 2024-07-31 PROCEDURE — 82787 IGG 1 2 3 OR 4 EACH: CPT

## 2024-07-31 PROCEDURE — 82962 GLUCOSE BLOOD TEST: CPT

## 2024-07-31 PROCEDURE — 86790 VIRUS ANTIBODY NOS: CPT

## 2024-07-31 PROCEDURE — 80053 COMPREHEN METABOLIC PANEL: CPT

## 2024-07-31 PROCEDURE — 36415 COLL VENOUS BLD VENIPUNCTURE: CPT

## 2024-07-31 PROCEDURE — 86644 CMV ANTIBODY: CPT

## 2024-07-31 PROCEDURE — 80061 LIPID PANEL: CPT

## 2024-07-31 PROCEDURE — 99232 SBSQ HOSP IP/OBS MODERATE 35: CPT

## 2024-07-31 PROCEDURE — 86255 FLUORESCENT ANTIBODY SCREEN: CPT

## 2024-07-31 PROCEDURE — 86301 IMMUNOASSAY TUMOR CA 19-9: CPT

## 2024-07-31 PROCEDURE — 74240 X-RAY XM UPR GI TRC 1CNTRST: CPT

## 2024-07-31 PROCEDURE — 82784 ASSAY IGA/IGD/IGG/IGM EACH: CPT

## 2024-07-31 PROCEDURE — 82248 BILIRUBIN DIRECT: CPT

## 2024-07-31 PROCEDURE — 82378 CARCINOEMBRYONIC ANTIGEN: CPT

## 2024-07-31 PROCEDURE — 85025 COMPLETE CBC W/AUTO DIFF WBC: CPT

## 2024-07-31 PROCEDURE — 86038 ANTINUCLEAR ANTIBODIES: CPT

## 2024-07-31 PROCEDURE — 83550 IRON BINDING TEST: CPT

## 2024-07-31 PROCEDURE — 6360000002 HC RX W HCPCS: Performed by: NURSE PRACTITIONER

## 2024-07-31 PROCEDURE — 82728 ASSAY OF FERRITIN: CPT

## 2024-07-31 PROCEDURE — 1200000000 HC SEMI PRIVATE

## 2024-07-31 PROCEDURE — 6360000004 HC RX CONTRAST MEDICATION: Performed by: RADIOLOGY

## 2024-07-31 RX ORDER — INSULIN GLARGINE 100 [IU]/ML
10 INJECTION, SOLUTION SUBCUTANEOUS NIGHTLY
Status: DISCONTINUED | OUTPATIENT
Start: 2024-07-31 | End: 2024-08-03 | Stop reason: HOSPADM

## 2024-07-31 RX ORDER — GLUCAGON 1 MG/ML
1 KIT INJECTION PRN
Status: DISCONTINUED | OUTPATIENT
Start: 2024-07-31 | End: 2024-08-03 | Stop reason: HOSPADM

## 2024-07-31 RX ORDER — DEXTROSE MONOHYDRATE 100 MG/ML
INJECTION, SOLUTION INTRAVENOUS CONTINUOUS PRN
Status: DISCONTINUED | OUTPATIENT
Start: 2024-07-31 | End: 2024-08-03 | Stop reason: HOSPADM

## 2024-07-31 RX ORDER — DOCUSATE SODIUM 100 MG/1
200 CAPSULE, LIQUID FILLED ORAL DAILY
Status: DISCONTINUED | OUTPATIENT
Start: 2024-07-31 | End: 2024-08-03 | Stop reason: HOSPADM

## 2024-07-31 RX ADMIN — CETIRIZINE HYDROCHLORIDE 10 MG: 10 TABLET, FILM COATED ORAL at 13:58

## 2024-07-31 RX ADMIN — SODIUM CHLORIDE, PRESERVATIVE FREE 10 ML: 5 INJECTION INTRAVENOUS at 14:02

## 2024-07-31 RX ADMIN — DOCUSATE SODIUM 200 MG: 100 CAPSULE, LIQUID FILLED ORAL at 13:57

## 2024-07-31 RX ADMIN — TAMSULOSIN HYDROCHLORIDE 0.4 MG: 0.4 CAPSULE ORAL at 13:57

## 2024-07-31 RX ADMIN — SODIUM CHLORIDE: 9 INJECTION, SOLUTION INTRAVENOUS at 14:07

## 2024-07-31 RX ADMIN — FINASTERIDE 5 MG: 5 TABLET, FILM COATED ORAL at 13:58

## 2024-07-31 RX ADMIN — Medication 1 TABLET: at 13:57

## 2024-07-31 RX ADMIN — MONTELUKAST SODIUM 10 MG: 10 TABLET, FILM COATED ORAL at 21:51

## 2024-07-31 RX ADMIN — METOPROLOL SUCCINATE 25 MG: 25 TABLET, EXTENDED RELEASE ORAL at 13:58

## 2024-07-31 RX ADMIN — DIATRIZOATE MEGLUMINE AND DIATRIZOATE SODIUM 120 ML: 660; 100 LIQUID ORAL; RECTAL at 12:04

## 2024-07-31 RX ADMIN — INSULIN LISPRO 4 UNITS: 100 INJECTION, SOLUTION INTRAVENOUS; SUBCUTANEOUS at 17:11

## 2024-07-31 RX ADMIN — INSULIN GLARGINE 10 UNITS: 100 INJECTION, SOLUTION SUBCUTANEOUS at 21:52

## 2024-07-31 RX ADMIN — ENOXAPARIN SODIUM 40 MG: 100 INJECTION SUBCUTANEOUS at 13:57
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
## 2024-07-31 NOTE — CARE COORDINATION
Social Work/Discharge Planning:  Met with patient and his girlfriend Razia and completed initial assessment.  Explained Social Work role and discussed transition of care/discharge planning.  Patient is hearing impaired.  He is able to read lips, but his girlfriend assisted with sign language.  Patient lives with his girlfriend in a three story house.  PTA he is independent with no adaptive device.  He has a glucometer at home.  He has a skilled nursing facility at [facility].  He has a home health care history but can not recall company name.  His PCP is Dr. Dave Marinelli whom he saw on Monday 7/29.  He plans to return home at discharge and denies any home care needs at this time.  Will continue to follow and assist if needed.  Electronically signed by RUDI Ohara on 7/31/2024 at 1:24 PM

## 2024-07-31 NOTE — ACP (ADVANCE CARE PLANNING)
Advance Care Planning   Healthcare Decision Maker:    Primary Decision Maker: Razia Tim - [facility] - [phone]    Click here to complete Healthcare Decision Makers including selection of the Healthcare Decision Maker Relationship (ie \"Primary\").

## 2024-07-31 NOTE — ED NOTES
ED to Inpatient Handoff Report    Notified Aleksandra that electronic handoff available and patient ready for transport to room 502.    Safety Risks: Risk of falls    Patient in Restraints: no    Constant Observer or Patient : no    Telemetry Monitoring Ordered: No          Order to transfer to unit without monitor: NA    Last MEWS: 1 Time completed: 2121         Vitals:    07/30/24 1433 07/30/24 2121   BP: 98/61 127/80   Pulse: 92 69   Resp: 16 16   Temp: 99 °F (37.2 °C) 98.1 °F (36.7 °C)   TempSrc: Oral Oral   SpO2: 96% 96%   Weight: 99.8 kg (220 lb)    Height: 1.88 m (6' 2\")        Opportunity for questions and clarification was provided.

## 2024-07-31 NOTE — PROGRESS NOTES
[facility] Hospitalist Progress Note    Admitting Date and Time: 7/30/2024  3:18 PM  Admit Dx: Duodenal mass [K31.89]  Elevated liver enzymes [R74.8]    Subjective:  Patient is being followed for Duodenal mass [K31.89]  Elevated liver enzymes [R74.8]     Patient was seen at bedside.  Patient deaf able to have conversation and understand appropriately.  Feels fatigue and tired and having constipation.  Denies any fever, chills, nausea, vomiting, abdominal pain.  Vital stable    ROS: denies fever, chills, cp, sob, n/v, HA unless stated above.      insulin glargine  10 Units SubCUTAneous Nightly    docusate sodium  200 mg Oral Daily    sodium chloride flush  5-40 mL IntraVENous 2 times per day    enoxaparin  40 mg SubCUTAneous Daily    cetirizine  10 mg Oral Daily    finasteride  5 mg Oral Daily    metoprolol succinate  25 mg Oral Daily    montelukast  10 mg Oral Nightly    therapeutic multivitamin-minerals  1 tablet Oral Daily    tamsulosin  0.4 mg Oral Daily    insulin lispro  0-8 Units SubCUTAneous TID WC    insulin lispro  0-4 Units SubCUTAneous Nightly     glucose, 4 tablet, PRN  dextrose bolus, 125 mL, PRN   Or  dextrose bolus, 250 mL, PRN  glucagon (rDNA), 1 mg, PRN  dextrose, , Continuous PRN  diatrizoate meglumine-sodium, 140 mL, ONCE PRN  sodium chloride flush, 5-40 mL, PRN  sodium chloride, , PRN  potassium chloride, 40 mEq, PRN   Or  potassium alternative oral replacement, 40 mEq, PRN   Or  potassium chloride, 10 mEq, PRN  magnesium sulfate, 2,000 mg, PRN  ondansetron, 4 mg, Q8H PRN   Or  ondansetron, 4 mg, Q6H PRN  polyethylene glycol, 17 g, Daily PRN         Objective:    /60   Pulse 59   Temp 98.8 °F (37.1 °C) (Oral)   Resp 18   Ht 1.88 m (6' 2\")   Wt 99.3 kg (219 lb)   SpO2 94%   BMI 28.12 kg/m²     General Appearance: alert and oriented to person, place and time and in no acute distress  Skin: warm and dry  Head: normocephalic and atraumatic  Eyes: pupils equal, round, and

## 2024-07-31 NOTE — H&P
[facility] Hospitalist Group History and Physical      CHIEF COMPLAINT:  Abdominal pain, fatigue, and abnormal labs    History of Present Illness:  This is a 79 year old male with PMH significant for diabetes, HTN, [place], prostate CA, BPH, HLD, and bilateral deafness.   at bedside.  Patient to ED due to abdominal pain, fatigue, and abnormal lab results.  Patient evaluated by PCP and sent to ED for additional evaluation due to elevated liver enzymes.  Patient reports having abdominal pain and fatigue for the last 3 weeks.  Reports abdominal pain as intermittent and describes pain as feeling \"hungry.\"  Denies recent illness, fever, chest pain, palpitations, back pain, and nausea/vomiting.  Does report that his urine is brown in color and that he has had problems this last week with constipation.  Also has had symptoms of chest pain and back pain prior to this last week and has an appointment/follow-up with cardiology on Thursday.    Labs remarkable for blood sugar 358, troponin 15 and then 13, alkaline phos 413, , , and total bili 6.5.  Urine positive for glucose, moderate bili, and 1+ bacteria.  CT scan showing possible duodenitis or groove pancreatitis or underlying mass.  Chest x-ray negative.  EKG sinus rhythm with first-degree AV block.  Patient received 1 L fluid bolus in ED.  GI consulted.  Will admit for further evaluation and treatment.    Informant(s) for H&P: Patient and chart    REVIEW OF SYSTEMS:  A comprehensive review of systems was negative except for: what is in the HPI      PMH:  Past Medical History:   Diagnosis Date    Deaf, bilateral     Diabetes (HCC)     Hypertension     Osteoarthritis     Primary osteoarthritis of left knee 10/18/2017    Prostate cancer (HCC) 03/27/2024       Surgical History:  Past Surgical History:   Procedure Laterality Date    CARPAL TUNNEL RELEASE      ROTATOR CUFF REPAIR  2/12    SHOULDER SURGERY         Medications Prior to    GLUCOSE 358*   CALCIUM 9.4       Recent Labs     07/30/24  1550   WBC 4.3*   RBC 4.09   HGB 13.5   HCT 40.7   MCV 99.5   MCH 33.0   MCHC 33.2   RDW 11.9      MPV 10.7       No results for input(s): \"POCGLU\" in the last 72 hours.        Radiology:   CT ABDOMEN PELVIS W IV CONTRAST Additional Contrast? None   Final Result   1. Asymmetric thickening of the duodenal C loop region medial margin adjacent   to the pancreas concerning for duodenitis or groove pancreatitis however   given asymmetry associated ulceration or underlying mass would be difficult   to exclude with attention to resolution on follow-up recommended.   Considerations for endoscopy based upon lab values and lipase values.   2. Other incidental findings as above.         XR CHEST (2 VW)   Final Result   No acute process.             EKG:     ASSESSMENT:      Principal Problem:    Duodenal mass  Active Problems:    Type 2 diabetes mellitus without complication, without long-term current use of insulin (HCC)    BPH (benign prostatic hyperplasia)    Essential hypertension    Hearing impaired person, bilateral    Elevated liver enzymes    Jaundice  Resolved Problems:    * No resolved hospital problems. *      PLAN:    Elevated liver enzymes  Jaundice  Duodenal mass  -clear liquid diet and then NPO after midnight for possible procedure.  -GI consulted in ED. Input appreciated  -Monitor liver enzymes and trend  -IVF    DM type 2  -Hold oral medications  -AC HS BS checks with insulin SSC. Hypoglycemic protocol as needed    BPH  -Also history of prostate CA.   -Continue Proscar and Flomax.    Essential HTN  -Monitor BP  -Continue lopressor and hold valsartan-hydrochlorothiazide for now    Code Status: Full code  DVT prophylaxis: Lovenox    46 minutes or more spent reviewing patient chart, assessing patient, discussing plan of care with patient and family, discussing plan of care with collaborating physician, and documentation.       NOTE: This report

## 2024-08-01 ENCOUNTER — ANESTHESIA EVENT (OUTPATIENT)
Dept: ENDOSCOPY | Age: 79
End: 2024-08-01
Payer: MEDICARE

## 2024-08-01 ENCOUNTER — APPOINTMENT (OUTPATIENT)
Dept: CT IMAGING | Age: 79
DRG: 392 | End: 2024-08-01
Payer: MEDICARE

## 2024-08-01 PROBLEM — R63.4 ABNORMAL WEIGHT LOSS: Status: ACTIVE | Noted: 2024-08-01

## 2024-08-01 PROBLEM — C80.1 OBSTRUCTIVE JAUNDICE DUE TO MALIGNANT NEOPLASM (HCC): Status: ACTIVE | Noted: 2024-08-01

## 2024-08-01 PROBLEM — K83.1 OBSTRUCTIVE JAUNDICE DUE TO MALIGNANT NEOPLASM (HCC): Status: ACTIVE | Noted: 2024-08-01

## 2024-08-01 PROBLEM — C25.9 PANCREATIC ADENOCARCINOMA (HCC): Status: ACTIVE | Noted: 2024-08-01

## 2024-08-01 LAB
ALBUMIN SERPL-MCNC: 3.3 G/DL (ref 3.5–5.2)
ALP SERPL-CCNC: 347 U/L (ref 40–129)
ALT SERPL-CCNC: 324 U/L (ref 0–40)
ANA SER QL IA: NEGATIVE
ANION GAP SERPL CALCULATED.3IONS-SCNC: 8 MMOL/L (ref 7–16)
AST SERPL-CCNC: 185 U/L (ref 0–39)
BASOPHILS # BLD: 0.02 K/UL (ref 0–0.2)
BASOPHILS NFR BLD: 1 % (ref 0–2)
BILIRUB SERPL-MCNC: 6.8 MG/DL (ref 0–1.2)
BUN SERPL-MCNC: 9 MG/DL (ref 6–23)
CALCIUM SERPL-MCNC: 9 MG/DL (ref 8.6–10.2)
CHLORIDE SERPL-SCNC: 107 MMOL/L (ref 98–107)
CMV IGG SERPL QL IA: PRESENT
CO2 SERPL-SCNC: 23 MMOL/L (ref 22–29)
CREAT SERPL-MCNC: 0.7 MG/DL (ref 0.7–1.2)
EOSINOPHIL # BLD: 0.11 K/UL (ref 0.05–0.5)
EOSINOPHILS RELATIVE PERCENT: 4 % (ref 0–6)
ERYTHROCYTE [DISTWIDTH] IN BLOOD BY AUTOMATED COUNT: 12.2 % (ref 11.5–15)
GFR, ESTIMATED: >90 ML/MIN/1.73M2
GLUCOSE BLD-MCNC: 158 MG/DL (ref 74–99)
GLUCOSE BLD-MCNC: 192 MG/DL (ref 74–99)
GLUCOSE BLD-MCNC: 206 MG/DL (ref 74–99)
GLUCOSE BLD-MCNC: 275 MG/DL (ref 74–99)
GLUCOSE SERPL-MCNC: 171 MG/DL (ref 74–99)
HAV IGM SERPL QL IA: NONREACTIVE
HBV CORE IGM SERPL QL IA: NONREACTIVE
HBV SURFACE AG SERPL QL IA: NONREACTIVE
HCT VFR BLD AUTO: 36.6 % (ref 37–54)
HCV AB SERPL QL IA: NONREACTIVE
HGB BLD-MCNC: 11.9 G/DL (ref 12.5–16.5)
IGG SERPL-MCNC: 746 MG/DL (ref 700–1600)
IGM SERPL-MCNC: 78 MG/DL (ref 40–230)
IMM GRANULOCYTES # BLD AUTO: <0.03 K/UL (ref 0–0.58)
IMM GRANULOCYTES NFR BLD: 0 % (ref 0–5)
LYMPHOCYTES NFR BLD: 0.49 K/UL (ref 1.5–4)
LYMPHOCYTES RELATIVE PERCENT: 16 % (ref 20–42)
MCH RBC QN AUTO: 33.1 PG (ref 26–35)
MCHC RBC AUTO-ENTMCNC: 32.5 G/DL (ref 32–34.5)
MCV RBC AUTO: 101.7 FL (ref 80–99.9)
MITOCHONDRIA AB SER QL: NEGATIVE
MONOCYTES NFR BLD: 0.47 K/UL (ref 0.1–0.95)
MONOCYTES NFR BLD: 16 % (ref 2–12)
NEUTROPHILS NFR BLD: 63 % (ref 43–80)
NEUTS SEG NFR BLD: 1.89 K/UL (ref 1.8–7.3)
PLATELET # BLD AUTO: 169 K/UL (ref 130–450)
PMV BLD AUTO: 11 FL (ref 7–12)
POTASSIUM SERPL-SCNC: 4.4 MMOL/L (ref 3.5–5)
PROT SERPL-MCNC: 6.1 G/DL (ref 6.4–8.3)
RBC # BLD AUTO: 3.6 M/UL (ref 3.8–5.8)
RBC # BLD: NORMAL 10*6/UL
SODIUM SERPL-SCNC: 138 MMOL/L (ref 132–146)
WBC OTHER # BLD: 3 K/UL (ref 4.5–11.5)

## 2024-08-01 PROCEDURE — APPSS60 APP SPLIT SHARED TIME 46-60 MINUTES: Performed by: CLINICAL NURSE SPECIALIST

## 2024-08-01 PROCEDURE — 74175 CTA ABDOMEN W/CONTRAST: CPT

## 2024-08-01 PROCEDURE — 6360000004 HC RX CONTRAST MEDICATION: Performed by: RADIOLOGY

## 2024-08-01 PROCEDURE — 71260 CT THORAX DX C+: CPT

## 2024-08-01 PROCEDURE — 6370000000 HC RX 637 (ALT 250 FOR IP)

## 2024-08-01 PROCEDURE — 82962 GLUCOSE BLOOD TEST: CPT

## 2024-08-01 PROCEDURE — 80053 COMPREHEN METABOLIC PANEL: CPT

## 2024-08-01 PROCEDURE — 85025 COMPLETE CBC W/AUTO DIFF WBC: CPT

## 2024-08-01 PROCEDURE — 2580000003 HC RX 258: Performed by: NURSE PRACTITIONER

## 2024-08-01 PROCEDURE — 6370000000 HC RX 637 (ALT 250 FOR IP): Performed by: NURSE PRACTITIONER

## 2024-08-01 PROCEDURE — 99232 SBSQ HOSP IP/OBS MODERATE 35: CPT | Performed by: STUDENT IN AN ORGANIZED HEALTH CARE EDUCATION/TRAINING PROGRAM

## 2024-08-01 PROCEDURE — 36415 COLL VENOUS BLD VENIPUNCTURE: CPT

## 2024-08-01 PROCEDURE — 99223 1ST HOSP IP/OBS HIGH 75: CPT | Performed by: TRANSPLANT SURGERY

## 2024-08-01 PROCEDURE — 1200000000 HC SEMI PRIVATE

## 2024-08-01 RX ORDER — INDOMETHACIN 100 MG
100 SUPPOSITORY, RECTAL RECTAL SEE ADMIN INSTRUCTIONS
Status: DISCONTINUED | OUTPATIENT
Start: 2024-08-02 | End: 2024-08-03 | Stop reason: HOSPADM

## 2024-08-01 RX ORDER — SODIUM CHLORIDE, SODIUM LACTATE, POTASSIUM CHLORIDE, AND CALCIUM CHLORIDE .6; .31; .03; .02 G/100ML; G/100ML; G/100ML; G/100ML
800 INJECTION, SOLUTION INTRAVENOUS ONCE
Status: COMPLETED | OUTPATIENT
Start: 2024-08-02 | End: 2024-08-02

## 2024-08-01 RX ADMIN — TAMSULOSIN HYDROCHLORIDE 0.4 MG: 0.4 CAPSULE ORAL at 08:49

## 2024-08-01 RX ADMIN — SODIUM CHLORIDE, PRESERVATIVE FREE 10 ML: 5 INJECTION INTRAVENOUS at 21:20

## 2024-08-01 RX ADMIN — IOPAMIDOL 75 ML: 755 INJECTION, SOLUTION INTRAVENOUS at 15:58

## 2024-08-01 RX ADMIN — SODIUM CHLORIDE: 9 INJECTION, SOLUTION INTRAVENOUS at 16:14

## 2024-08-01 RX ADMIN — METOPROLOL SUCCINATE 25 MG: 25 TABLET, EXTENDED RELEASE ORAL at 08:49

## 2024-08-01 RX ADMIN — DOCUSATE SODIUM 200 MG: 100 CAPSULE, LIQUID FILLED ORAL at 08:49

## 2024-08-01 RX ADMIN — MONTELUKAST SODIUM 10 MG: 10 TABLET, FILM COATED ORAL at 21:19

## 2024-08-01 RX ADMIN — FINASTERIDE 5 MG: 5 TABLET, FILM COATED ORAL at 08:49

## 2024-08-01 RX ADMIN — INSULIN GLARGINE 10 UNITS: 100 INJECTION, SOLUTION SUBCUTANEOUS at 21:19

## 2024-08-01 RX ADMIN — INSULIN LISPRO 2 UNITS: 100 INJECTION, SOLUTION INTRAVENOUS; SUBCUTANEOUS at 11:01

## 2024-08-01 ASSESSMENT — ENCOUNTER SYMPTOMS
COLOR CHANGE: 1
ABDOMINAL PAIN: 1

## 2024-08-01 ASSESSMENT — LIFESTYLE VARIABLES: SMOKING_STATUS: 0

## 2024-08-01 NOTE — PATIENT CARE CONFERENCE
[facility] Quality Flow/Interdisciplinary Rounds Progress Note        Quality Flow Rounds held on August 1, 2024    Disciplines Attending:  Bedside Nurse, , , and Nursing Unit Leadership    Michael Garcia was admitted on 7/30/2024  3:18 PM    Anticipated Discharge Date:       Disposition:    Mike Score:  Mike Scale Score: 21    Readmission Risk              Risk of Unplanned Readmission:  13           Discussed patient goal for the day, patient clinical progression, and barriers to discharge.  The following Goal(s) of the Day/Commitment(s) have been identified:  Diagnostics - Report Results      Sulma Patel RN  August 1, 2024

## 2024-08-01 NOTE — PROGRESS NOTES
Notified Dr. Santoyo of new consult via perfect serve  Electronically signed by Marisa Hdz RN on 8/1/2024 at 8:05 AM

## 2024-08-01 NOTE — PLAN OF CARE
Problem: Safety - Adult  Goal: Free from fall injury  8/1/2024 1123 by Leonel Cunningham RN  Outcome: Progressing  7/31/2024 2334 by Eliza Borjas RN  Outcome: Progressing  7/31/2024 2333 by Eliza Borjas RN  Outcome: Progressing     Problem: Nutrition Deficit:  Goal: Optimize nutritional status  8/1/2024 1123 by Leonel Cunningham RN  Outcome: Progressing  7/31/2024 2334 by Eliza Borjas RN  Outcome: Progressing  7/31/2024 2333 by Eliza Borjas RN  Outcome: Progressing     Problem: Skin/Tissue Integrity  Goal: Absence of new skin breakdown  Description: 1.  Monitor for areas of redness and/or skin breakdown  2.  Assess vascular access sites hourly  3.  Every 4-6 hours minimum:  Change oxygen saturation probe site  4.  Every 4-6 hours:  If on nasal continuous positive airway pressure, respiratory therapy assess nares and determine need for appliance change or resting period.  8/1/2024 1123 by Leonel Cunningham RN  Outcome: Progressing  7/31/2024 2334 by Eliza Borjas RN  Outcome: Progressing  7/31/2024 2333 by Eliza Borjas RN  Outcome: Progressing

## 2024-08-01 NOTE — PROGRESS NOTES
PROGRESS NOTE      Patient Presents with/Seen in Consultation For      Reason for Consult: elevated liver enzymes   CHIEF COMPLAINT:  abdominal pain, fatigues and abnormal labs   Subjective:     Patient seen laying in bed in no apparent distress. States he feels well. Tolerated diet. Had BM this am. SBFT d/w pt. Plan for EGD/ERCP tomorrow pt agrees.     Review of Systems  Aside from what was mentioned in the PMH and HPI, essentially unremarkable, all others negative.    Objective:     /72   Pulse 63   Temp 97.9 °F (36.6 °C) (Oral)   Resp 18   Ht 1.88 m (6' 2\")   Wt 100.7 kg (222 lb)   SpO2 95%   BMI 28.50 kg/m²     General appearance: alert, awake, laying in bed, and cooperative  Eyes: conjunctiva pale, sclera anicteric. PERRL.  Lungs: clear to auscultation bilaterally  Heart: regular rate and rhythm, no murmur, 2+ pulses; no edema  Abdomen: soft, non-tender; bowel sounds normal; no masses,  no organomegaly  Extremities: extremities without edema  Pulses: 2+ and symmetric  Skin: Skin color, texture, turgor normal.   Neurologic: Grossly normal, deaf (able to communicate)    [START ON 8/2/2024] ampicillin-sulbactam (UNASYN) 3,000 mg in sodium chloride 0.9 % 100 mL IVPB, See Admin Instructions  [START ON 8/2/2024] lactated ringers bolus 800 mL, Once  [START ON 8/2/2024] indomethacin (INDOCIN) 100 MG suppository 100 mg, See Admin Instructions  insulin glargine (LANTUS) injection vial 10 Units, Nightly  glucose chewable tablet 16 g, PRN  dextrose bolus 10% 125 mL, PRN   Or  dextrose bolus 10% 250 mL, PRN  glucagon injection 1 mg, PRN  dextrose 10 % infusion, Continuous PRN  docusate sodium (COLACE) capsule 200 mg, Daily  diatrizoate meglumine-sodium (GASTROGRAFIN) 66-10 % solution 140 mL, ONCE PRN  sodium chloride flush 0.9 % injection 5-40 mL, 2 times per day  sodium chloride flush 0.9 % injection 5-40 mL, PRN  0.9 % sodium chloride infusion, PRN  potassium chloride (KLOR-CON M) extended release tablet 40

## 2024-08-01 NOTE — PROGRESS NOTES
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]  Elevated liver enzymes [R74.8]   Pt feels okay  Per RN: no additional concerns    ROS: denies fever, chills, cp, sob, n/v, HA unless otherwise noted above     [START ON 8/2/2024] ampicillin-sulbactam  3,000 mg IntraVENous See Admin Instructions    [START ON 8/2/2024] lactated ringers  800 mL IntraVENous Once    [START ON 8/2/2024] indomethacin  100 mg Rectal See Admin Instructions    insulin glargine  10 Units SubCUTAneous Nightly    docusate sodium  200 mg Oral Daily    sodium chloride flush  5-40 mL IntraVENous 2 times per day    enoxaparin  40 mg SubCUTAneous Daily    cetirizine  10 mg Oral Daily    finasteride  5 mg Oral Daily    metoprolol succinate  25 mg Oral Daily    montelukast  10 mg Oral Nightly    therapeutic multivitamin-minerals  1 tablet Oral Daily    tamsulosin  0.4 mg Oral Daily    insulin lispro  0-8 Units SubCUTAneous TID WC    insulin lispro  0-4 Units SubCUTAneous Nightly     glucose, 4 tablet, PRN  dextrose bolus, 125 mL, PRN   Or  dextrose bolus, 250 mL, PRN  glucagon (rDNA), 1 mg, PRN  dextrose, , Continuous PRN  diatrizoate meglumine-sodium, 140 mL, ONCE PRN  sodium chloride flush, 5-40 mL, PRN  sodium chloride, , PRN  potassium chloride, 40 mEq, PRN   Or  potassium alternative oral replacement, 40 mEq, PRN   Or  potassium chloride, 10 mEq, PRN  magnesium sulfate, 2,000 mg, PRN  ondansetron, 4 mg, Q8H PRN   Or  ondansetron, 4 mg, Q6H PRN  polyethylene glycol, 17 g, Daily PRN         Objective:  /72   Pulse 63   Temp 97.9 °F (36.6 °C) (Oral)   Resp 18   Ht 1.88 m (6' 2\")   Wt 100.7 kg (222 lb)   SpO2 95%   BMI 28.50 kg/m²     General Appearance: alert and oriented to person, place and time and in NAD, sitting in bed, deaf but able to read lips  Skin: warm

## 2024-08-01 NOTE — PLAN OF CARE
Problem: Safety - Adult  Goal: Free from fall injury  7/31/2024 2334 by Eliza Borjas RN  Outcome: Progressing  7/31/2024 2333 by Eliza Borjas RN  Outcome: Progressing     Problem: Nutrition Deficit:  Goal: Optimize nutritional status  7/31/2024 2334 by Eliza Borjas RN  Outcome: Progressing  7/31/2024 2333 by Eliza Borjas RN  Outcome: Progressing     Problem: Skin/Tissue Integrity  Goal: Absence of new skin breakdown  Description: 1.  Monitor for areas of redness and/or skin breakdown  2.  Assess vascular access sites hourly  3.  Every 4-6 hours minimum:  Change oxygen saturation probe site  4.  Every 4-6 hours:  If on nasal continuous positive airway pressure, respiratory therapy assess nares and determine need for appliance change or resting period.  7/31/2024 2334 by Eliza Borjas RN  Outcome: Progressing  7/31/2024 2333 by Eliza Borjas RN  Outcome: Progressing

## 2024-08-01 NOTE — PLAN OF CARE
Problem: Safety - Adult  Goal: Free from fall injury  Outcome: Progressing     Problem: Nutrition Deficit:  Goal: Optimize nutritional status  Outcome: Progressing     Problem: Skin/Tissue Integrity  Goal: Absence of new skin breakdown  Description: 1.  Monitor for areas of redness and/or skin breakdown  2.  Assess vascular access sites hourly  3.  Every 4-6 hours minimum:  Change oxygen saturation probe site  4.  Every 4-6 hours:  If on nasal continuous positive airway pressure, respiratory therapy assess nares and determine need for appliance change or resting period.  Outcome: Progressing

## 2024-08-01 NOTE — PROGRESS NOTES
Physician Progress Note      PATIENT:               KELSEY AYALA  CSN #:                  [id number]  :                       1945  ADMIT DATE:       2024 3:18 PM  DISCH DATE:  RESPONDING  PROVIDER #:        Mendel Flower MD          QUERY TEXT:    Patient admitted with duodenal mass. Pt noted to have BPH and was treated with   Flomax.?If possible, please document in progress notes and discharge summary   if you are evaluating and/or treating any of the following:    The medical record reflects the following:  Risk Factors: 79 year old male with hx of BPH  Clinical Indicators: BPH  Treatment: Flomax    Thank you,  Stephan RN, CCDS  [phone]  Options provided:  -- BPH with partial/complete urinary obstruction  -- BPH with urinary retention without obstruction  -- Other - I will add my own diagnosis  -- Disagree - Not applicable / Not valid  -- Disagree - Clinically unable to determine / Unknown  -- Refer to Clinical Documentation Reviewer    PROVIDER RESPONSE TEXT:    This patient has BPH with urinary retention without obstruction.    Query created by: Stephan Del Toro on 2024 12:00 PM      Electronically signed by:  Mendel Flower MD 2024 10:45 PM

## 2024-08-01 NOTE — CONSULTS
Hepatobiliary and Pancreatic Surgery Attending History and Physical    Patient's Name/Date of Birth: Michael Garcia /1945 (79 y.o.)    Date: August 1, 2024     CC:fatigue, abd pain, and jaundice      Physician Consulted: Dr. Santoyo  Reason for consultation: duodenal mass   Consulting Provider: BUD Ruby    HPI:  Michael Garcia is a 79 y.o. male with PMHx Prostate CA, DM, HTN, Osteoarthritis, and he is deaf who presents with abdominal pain x 3 weeks, fatigue, and jaundice. He was seen by his PCP yesterday, had labs and was called to go to ED for further evaluation of elevated LFTs.  Pt reportedly had pain upper abdomen/epigastric region x 3 weeks, noted dark urine, and weight loss 20# over the past month so he saw his PCP, was noted to be jaundiced, labs done and he was called to go to ED for evaluation.  States he has lost 225#but is unsure over the period of time and states that it was initially intentional.  States since admission his pain has resolved. Reports diarrhea post CT scan with oral contrast, stool brown.  Denies nausea, vomiting, chills, fever, hematochezia, melena, or hematemesis.  CT concerning for duodenal mass therefore HPB consulted.  Colonoscopy 3/28/2024 with Dr. Monge pathology showed Sigmoid Colon Biopsy: Focal active colitis with focal cryptitis, mild to moderate mucosal inflammation and erosions. No granulomas, dysplasia, significant architectural distortion nor   evidence of microscopic, lymphocytic or collagenous colitis seen. The findings are non-specific and may be ischemia or treatment effect. The differential diagnosis includes acute self-limited colitis, infectious, diverticular disease associated colitis or colitis associated with NSAIDs. If lesion persist or progress, then   inflammatory bowel disease may be considered.  Currently denies abdominal pain, nausea, or vomiting.  States his urine remains dark.  Denies tobacco, alcohol, or illicit drug        ROS:   Review of Systems   Constitutional:  Positive for fatigue.   Gastrointestinal:  Positive for abdominal pain.   Genitourinary:         Dark urine   Skin:  Positive for color change.       Physical Exam:  Vitals:    08/01/24 0659   BP: 123/72   Pulse: 63   Resp: 18   Temp: 97.9 °F (36.6 °C)   SpO2: 95%       PSYCH: mood and affect normal, alert and oriented x 3: No apparent distress, comfortable  EYES: Sclera white, pupils equal round and reactive to light  ENMT: Deaf, lip reads, answers questions appropriately, trachea midline, ears externally intact  LYMPH: no obvious lympadenopathy in neck.   RESP: Respiratory effort was normal with no retractions or use of accessory muscles.  CV:  No pedal edema  GI/ Abdomen: Soft, nondistended, nontender, no guarding, no peritoneal signs  MSK: no clubbing/ no cyanosis/ gaitnormal    Assessment & Plan 79-year-old male with unintentional weight loss and jaundice found to have bile duct obstruction concerning for duodenal mass versus pancreatic mass versus cholangiocarcinoma  -Triphasic CTA   -Chest CT for staging  -EGD with ERCP tomorrow with Dr. Carlos  -CEA normal at 2.1, CA 19-9 pending, chromogranin A ordered  -Full liquid diet, NPO after MN for EGD tomorrow  -IVF  -Trend labs    Time/Communication  Greater than 50% of time spent, total 55 minutes in counseling and coordination of care at the bedside regarding goals of care and diagnosis and prognosis.    Thank you for the consultation allowing me to take part in Mr. Garcia's care.     Case discussed, images reviewed, and plan developed with Dr Santoyo     NOTE: This report was transcribed using voice recognition software. Every effort was made to ensure accuracy; however, inadvertent computerized transcription errors may be present.    SLADE FARRELL-ACNS-BC, FNP-BC  8/1/2024  11:12 AM    Attending Physician Statement:    Chief Complaint:   Chief Complaint   Patient presents with    Abnormal Lab     Sent by

## 2024-08-01 NOTE — ANESTHESIA PRE PROCEDURE
9.0 08/01/2024 06:25 AM    BILITOT 6.8 08/01/2024 06:25 AM    ALKPHOS 347 08/01/2024 06:25 AM     08/01/2024 06:25 AM     08/01/2024 06:25 AM       POC Tests:   Recent Labs     08/01/24  1100   POCGLU 206*       Coags:   Lab Results   Component Value Date/Time    PROTIME 11.8 07/30/2024 03:50 PM    INR 1.1 07/30/2024 03:50 PM    APTT 38.3 07/30/2024 03:50 PM    APTT 28.1 09/01/2022 06:09 PM       HCG (If Applicable): No results found for: \"PREGTESTUR\", \"PREGSERUM\", \"HCG\", \"HCGQUANT\"     ABGs: No results found for: \"PHART\", \"PO2ART\", \"UXT0FMS\", \"RQX1GLD\", \"BEART\", \"U1IDMWCX\"     Type & Screen (If Applicable):  No results found for: \"LABABO\"    Drug/Infectious Status (If Applicable):  Lab Results   Component Value Date/Time    HEPCAB NONREACTIVE 07/31/2024 01:47 PM       COVID-19 Screening (If Applicable): No results found for: \"COVID19\"        Anesthesia Evaluation  Patient summary reviewed and Nursing notes reviewed   no history of anesthetic complications:   Airway: Mallampati: III  TM distance: >3 FB   Neck ROM: full  Mouth opening: > = 3 FB   Dental:          Pulmonary:normal exam  breath sounds clear to auscultation  (+)     sleep apnea (Doesn't use CPAP since 60# weight loss):           (-) not a current smoker                          ROS comment: Allergic rhinitis   Cardiovascular:  Exercise tolerance: good (>4 METS)  (+) hypertension: mild, valvular problems/murmurs (Mild TR):, dysrhythmias:, murmur: Grade 2, hyperlipidemia      ECG reviewed  Rhythm: regular  Rate: normal  Echocardiogram reviewed         Beta Blocker:  Dose within 24 Hrs      ROS comment: EKG:  Sinus rhythm with 1st degree AV block  Otherwise normal ECG  No previous ECGs available    ECHO:   Technically adequate study.   Mild tricuspid regurgitation.   RVSP 33 mmHg.   EF 55%.       Neuro/Psych:                ROS comment: Deafness GI/Hepatic/Renal:   (+) liver disease (Transaminitis):         HAN comment: BPH  Duodenal

## 2024-08-02 ENCOUNTER — APPOINTMENT (OUTPATIENT)
Dept: GENERAL RADIOLOGY | Age: 79
DRG: 392 | End: 2024-08-02
Payer: MEDICARE

## 2024-08-02 ENCOUNTER — ANESTHESIA (OUTPATIENT)
Dept: ENDOSCOPY | Age: 79
End: 2024-08-02
Payer: MEDICARE

## 2024-08-02 LAB
ALBUMIN SERPL-MCNC: 3.4 G/DL (ref 3.5–5.2)
ALP SERPL-CCNC: 350 U/L (ref 40–129)
ALT SERPL-CCNC: 318 U/L (ref 0–40)
ANION GAP SERPL CALCULATED.3IONS-SCNC: 9 MMOL/L (ref 7–16)
AST SERPL-CCNC: 168 U/L (ref 0–39)
BASOPHILS # BLD: 0.02 K/UL (ref 0–0.2)
BASOPHILS NFR BLD: 1 % (ref 0–2)
BILIRUB SERPL-MCNC: 6.8 MG/DL (ref 0–1.2)
BUN SERPL-MCNC: 6 MG/DL (ref 6–23)
CALCIUM SERPL-MCNC: 8.9 MG/DL (ref 8.6–10.2)
CANCER AG19-9 SERPL IA-ACNC: 569 U/ML (ref 0–35)
CHLORIDE SERPL-SCNC: 106 MMOL/L (ref 98–107)
CO2 SERPL-SCNC: 22 MMOL/L (ref 22–29)
CREAT SERPL-MCNC: 0.6 MG/DL (ref 0.7–1.2)
EOSINOPHIL # BLD: 0.15 K/UL (ref 0.05–0.5)
EOSINOPHILS RELATIVE PERCENT: 5 % (ref 0–6)
ERYTHROCYTE [DISTWIDTH] IN BLOOD BY AUTOMATED COUNT: 12 % (ref 11.5–15)
GFR, ESTIMATED: >90 ML/MIN/1.73M2
GLUCOSE BLD-MCNC: 176 MG/DL (ref 74–99)
GLUCOSE BLD-MCNC: 191 MG/DL (ref 74–99)
GLUCOSE BLD-MCNC: 206 MG/DL (ref 74–99)
GLUCOSE BLD-MCNC: 247 MG/DL (ref 74–99)
GLUCOSE SERPL-MCNC: 191 MG/DL (ref 74–99)
HCT VFR BLD AUTO: 36.6 % (ref 37–54)
HGB BLD-MCNC: 12.1 G/DL (ref 12.5–16.5)
IGE SERPL-ACNC: 15 IU/ML (ref 0–100)
IMM GRANULOCYTES # BLD AUTO: <0.03 K/UL (ref 0–0.58)
IMM GRANULOCYTES NFR BLD: 0 % (ref 0–5)
INR PPP: 1.1
LIPASE SERPL-CCNC: 16 U/L (ref 13–60)
LYMPHOCYTES NFR BLD: 0.48 K/UL (ref 1.5–4)
LYMPHOCYTES RELATIVE PERCENT: 15 % (ref 20–42)
MCH RBC QN AUTO: 33 PG (ref 26–35)
MCHC RBC AUTO-ENTMCNC: 33.1 G/DL (ref 32–34.5)
MCV RBC AUTO: 99.7 FL (ref 80–99.9)
MONOCYTES NFR BLD: 0.42 K/UL (ref 0.1–0.95)
MONOCYTES NFR BLD: 13 % (ref 2–12)
NEUTROPHILS NFR BLD: 66 % (ref 43–80)
NEUTS SEG NFR BLD: 2.12 K/UL (ref 1.8–7.3)
PLATELET # BLD AUTO: 176 K/UL (ref 130–450)
PMV BLD AUTO: 10.8 FL (ref 7–12)
POTASSIUM SERPL-SCNC: 3.8 MMOL/L (ref 3.5–5)
PROT SERPL-MCNC: 6 G/DL (ref 6.4–8.3)
PROTHROMBIN TIME: 11.7 SEC (ref 9.3–12.4)
RBC # BLD AUTO: 3.67 M/UL (ref 3.8–5.8)
RBC # BLD: NORMAL 10*6/UL
SODIUM SERPL-SCNC: 137 MMOL/L (ref 132–146)
WBC OTHER # BLD: 3.2 K/UL (ref 4.5–11.5)

## 2024-08-02 PROCEDURE — 74330 X-RAY BILE/PANC ENDOSCOPY: CPT

## 2024-08-02 PROCEDURE — 2720000010 HC SURG SUPPLY STERILE: Performed by: INTERNAL MEDICINE

## 2024-08-02 PROCEDURE — 6370000000 HC RX 637 (ALT 250 FOR IP)

## 2024-08-02 PROCEDURE — 80053 COMPREHEN METABOLIC PANEL: CPT

## 2024-08-02 PROCEDURE — 7100000010 HC PHASE II RECOVERY - FIRST 15 MIN: Performed by: INTERNAL MEDICINE

## 2024-08-02 PROCEDURE — 6360000002 HC RX W HCPCS: Performed by: NURSE ANESTHETIST, CERTIFIED REGISTERED

## 2024-08-02 PROCEDURE — 0DB98ZX EXCISION OF DUODENUM, VIA NATURAL OR ARTIFICIAL OPENING ENDOSCOPIC, DIAGNOSTIC: ICD-10-PCS | Performed by: INTERNAL MEDICINE

## 2024-08-02 PROCEDURE — 86316 IMMUNOASSAY TUMOR OTHER: CPT

## 2024-08-02 PROCEDURE — 7100000011 HC PHASE II RECOVERY - ADDTL 15 MIN: Performed by: INTERNAL MEDICINE

## 2024-08-02 PROCEDURE — 3700000001 HC ADD 15 MINUTES (ANESTHESIA): Performed by: INTERNAL MEDICINE

## 2024-08-02 PROCEDURE — 2709999900 HC NON-CHARGEABLE SUPPLY: Performed by: INTERNAL MEDICINE

## 2024-08-02 PROCEDURE — 3609018800 HC ERCP DX COLLECTION SPECIMEN BRUSHING/WASHING: Performed by: INTERNAL MEDICINE

## 2024-08-02 PROCEDURE — 0D798ZZ DILATION OF DUODENUM, VIA NATURAL OR ARTIFICIAL OPENING ENDOSCOPIC: ICD-10-PCS | Performed by: INTERNAL MEDICINE

## 2024-08-02 PROCEDURE — 85025 COMPLETE CBC W/AUTO DIFF WBC: CPT

## 2024-08-02 PROCEDURE — 2580000003 HC RX 258: Performed by: NURSE ANESTHETIST, CERTIFIED REGISTERED

## 2024-08-02 PROCEDURE — C1726 CATH, BAL DIL, NON-VASCULAR: HCPCS | Performed by: INTERNAL MEDICINE

## 2024-08-02 PROCEDURE — 2500000003 HC RX 250 WO HCPCS: Performed by: NURSE ANESTHETIST, CERTIFIED REGISTERED

## 2024-08-02 PROCEDURE — 3609017700 HC EGD DILATION GASTRIC/DUODENAL STRICTURE: Performed by: INTERNAL MEDICINE

## 2024-08-02 PROCEDURE — 6360000002 HC RX W HCPCS: Performed by: NURSE PRACTITIONER

## 2024-08-02 PROCEDURE — 2580000003 HC RX 258: Performed by: NURSE PRACTITIONER

## 2024-08-02 PROCEDURE — 82962 GLUCOSE BLOOD TEST: CPT

## 2024-08-02 PROCEDURE — 99232 SBSQ HOSP IP/OBS MODERATE 35: CPT | Performed by: STUDENT IN AN ORGANIZED HEALTH CARE EDUCATION/TRAINING PROGRAM

## 2024-08-02 PROCEDURE — 83690 ASSAY OF LIPASE: CPT

## 2024-08-02 PROCEDURE — 1200000000 HC SEMI PRIVATE

## 2024-08-02 PROCEDURE — C1769 GUIDE WIRE: HCPCS | Performed by: INTERNAL MEDICINE

## 2024-08-02 PROCEDURE — 6370000000 HC RX 637 (ALT 250 FOR IP): Performed by: NURSE PRACTITIONER

## 2024-08-02 PROCEDURE — 88305 TISSUE EXAM BY PATHOLOGIST: CPT

## 2024-08-02 PROCEDURE — 3700000000 HC ANESTHESIA ATTENDED CARE: Performed by: INTERNAL MEDICINE

## 2024-08-02 PROCEDURE — 85610 PROTHROMBIN TIME: CPT

## 2024-08-02 RX ORDER — LABETALOL HYDROCHLORIDE 5 MG/ML
INJECTION, SOLUTION INTRAVENOUS PRN
Status: DISCONTINUED | OUTPATIENT
Start: 2024-08-02 | End: 2024-08-02 | Stop reason: SDUPTHER

## 2024-08-02 RX ORDER — ENOXAPARIN SODIUM 100 MG/ML
30 INJECTION SUBCUTANEOUS 2 TIMES DAILY
Status: DISCONTINUED | OUTPATIENT
Start: 2024-08-02 | End: 2024-08-03 | Stop reason: HOSPADM

## 2024-08-02 RX ORDER — HYDRALAZINE HYDROCHLORIDE 20 MG/ML
5 INJECTION INTRAMUSCULAR; INTRAVENOUS
Status: DISCONTINUED | OUTPATIENT
Start: 2024-08-02 | End: 2024-08-03

## 2024-08-02 RX ORDER — MEPERIDINE HYDROCHLORIDE 25 MG/ML
12.5 INJECTION INTRAMUSCULAR; INTRAVENOUS; SUBCUTANEOUS ONCE
Status: DISCONTINUED | OUTPATIENT
Start: 2024-08-02 | End: 2024-08-03

## 2024-08-02 RX ORDER — NALOXONE HYDROCHLORIDE 0.4 MG/ML
INJECTION, SOLUTION INTRAMUSCULAR; INTRAVENOUS; SUBCUTANEOUS PRN
Status: DISCONTINUED | OUTPATIENT
Start: 2024-08-02 | End: 2024-08-03

## 2024-08-02 RX ORDER — SODIUM CHLORIDE, SODIUM LACTATE, POTASSIUM CHLORIDE, CALCIUM CHLORIDE 600; 310; 30; 20 MG/100ML; MG/100ML; MG/100ML; MG/100ML
INJECTION, SOLUTION INTRAVENOUS CONTINUOUS PRN
Status: DISCONTINUED | OUTPATIENT
Start: 2024-08-02 | End: 2024-08-02 | Stop reason: SDUPTHER

## 2024-08-02 RX ORDER — SODIUM CHLORIDE 9 MG/ML
INJECTION, SOLUTION INTRAVENOUS CONTINUOUS PRN
Status: DISCONTINUED | OUTPATIENT
Start: 2024-08-02 | End: 2024-08-02

## 2024-08-02 RX ORDER — SODIUM CHLORIDE 0.9 % (FLUSH) 0.9 %
5-40 SYRINGE (ML) INJECTION PRN
Status: DISCONTINUED | OUTPATIENT
Start: 2024-08-02 | End: 2024-08-03

## 2024-08-02 RX ORDER — FENTANYL CITRATE 50 UG/ML
25 INJECTION, SOLUTION INTRAMUSCULAR; INTRAVENOUS EVERY 5 MIN PRN
Status: DISCONTINUED | OUTPATIENT
Start: 2024-08-02 | End: 2024-08-03

## 2024-08-02 RX ORDER — PROPOFOL 10 MG/ML
INJECTION, EMULSION INTRAVENOUS PRN
Status: DISCONTINUED | OUTPATIENT
Start: 2024-08-02 | End: 2024-08-02 | Stop reason: SDUPTHER

## 2024-08-02 RX ORDER — LIDOCAINE HYDROCHLORIDE 20 MG/ML
INJECTION, SOLUTION INFILTRATION; PERINEURAL PRN
Status: DISCONTINUED | OUTPATIENT
Start: 2024-08-02 | End: 2024-08-02 | Stop reason: SDUPTHER

## 2024-08-02 RX ORDER — PROCHLORPERAZINE EDISYLATE 5 MG/ML
5 INJECTION INTRAMUSCULAR; INTRAVENOUS
Status: DISCONTINUED | OUTPATIENT
Start: 2024-08-02 | End: 2024-08-03

## 2024-08-02 RX ORDER — SODIUM CHLORIDE 9 MG/ML
INJECTION, SOLUTION INTRAVENOUS PRN
Status: DISCONTINUED | OUTPATIENT
Start: 2024-08-02 | End: 2024-08-03

## 2024-08-02 RX ORDER — SODIUM CHLORIDE 0.9 % (FLUSH) 0.9 %
5-40 SYRINGE (ML) INJECTION EVERY 12 HOURS SCHEDULED
Status: DISCONTINUED | OUTPATIENT
Start: 2024-08-02 | End: 2024-08-03

## 2024-08-02 RX ORDER — LABETALOL HYDROCHLORIDE 5 MG/ML
5 INJECTION, SOLUTION INTRAVENOUS
Status: DISCONTINUED | OUTPATIENT
Start: 2024-08-02 | End: 2024-08-03

## 2024-08-02 RX ORDER — DIPHENHYDRAMINE HYDROCHLORIDE 50 MG/ML
12.5 INJECTION INTRAMUSCULAR; INTRAVENOUS
Status: DISCONTINUED | OUTPATIENT
Start: 2024-08-02 | End: 2024-08-03

## 2024-08-02 RX ADMIN — PROPOFOL 20 MG: 10 INJECTION, EMULSION INTRAVENOUS at 14:26

## 2024-08-02 RX ADMIN — Medication 100 MG: at 12:44

## 2024-08-02 RX ADMIN — SODIUM CHLORIDE, POTASSIUM CHLORIDE, SODIUM LACTATE AND CALCIUM CHLORIDE: 600; 310; 30; 20 INJECTION, SOLUTION INTRAVENOUS at 13:42

## 2024-08-02 RX ADMIN — SODIUM CHLORIDE, PRESERVATIVE FREE 10 ML: 5 INJECTION INTRAVENOUS at 20:40

## 2024-08-02 RX ADMIN — INSULIN GLARGINE 10 UNITS: 100 INJECTION, SOLUTION SUBCUTANEOUS at 20:37

## 2024-08-02 RX ADMIN — PROPOFOL 70 MG: 10 INJECTION, EMULSION INTRAVENOUS at 13:46

## 2024-08-02 RX ADMIN — PROPOFOL 30 MG: 10 INJECTION, EMULSION INTRAVENOUS at 14:08

## 2024-08-02 RX ADMIN — PROPOFOL 75 MCG/KG/MIN: 10 INJECTION, EMULSION INTRAVENOUS at 13:47

## 2024-08-02 RX ADMIN — SODIUM CHLORIDE 3000 MG: 9 INJECTION, SOLUTION INTRAVENOUS at 12:44

## 2024-08-02 RX ADMIN — PROPOFOL 20 MG: 10 INJECTION, EMULSION INTRAVENOUS at 14:42

## 2024-08-02 RX ADMIN — SODIUM CHLORIDE, POTASSIUM CHLORIDE, SODIUM LACTATE AND CALCIUM CHLORIDE 800 ML: 600; 310; 30; 20 INJECTION, SOLUTION INTRAVENOUS at 10:37

## 2024-08-02 RX ADMIN — ONDANSETRON 4 MG: 2 INJECTION INTRAMUSCULAR; INTRAVENOUS at 16:27

## 2024-08-02 RX ADMIN — SODIUM CHLORIDE, PRESERVATIVE FREE 10 ML: 5 INJECTION INTRAVENOUS at 10:45

## 2024-08-02 RX ADMIN — LABETALOL HYDROCHLORIDE 5 MG: 5 INJECTION INTRAVENOUS at 14:08

## 2024-08-02 RX ADMIN — ENOXAPARIN SODIUM 30 MG: 100 INJECTION SUBCUTANEOUS at 20:37

## 2024-08-02 RX ADMIN — PROPOFOL 20 MG: 10 INJECTION, EMULSION INTRAVENOUS at 14:00

## 2024-08-02 RX ADMIN — LIDOCAINE HYDROCHLORIDE 60 MG: 20 INJECTION, SOLUTION INFILTRATION; PERINEURAL at 13:46

## 2024-08-02 RX ADMIN — MONTELUKAST SODIUM 10 MG: 10 TABLET, FILM COATED ORAL at 20:38

## 2024-08-02 ASSESSMENT — PAIN SCALES - GENERAL
PAINLEVEL_OUTOF10: 0

## 2024-08-02 NOTE — CARE COORDINATION
Updated plan of care. Patient is from home independently. Patient admitted with ABD pain. Plan for a EDG/ERCP today. Discharge plan is home with no needs. Family to provide transport at time of discharge.   Electronically signed by Rere Foster RN on 8/2/2024 at 8:11 AM

## 2024-08-02 NOTE — PROGRESS NOTES
Hepatobiliary and Pancreatic Surgery Progress Note    CC:fatigue, abd pain, and jaundice     Subjective: Patient declines ASL iPad, states he reads lips and appropriate conversation well.  Patient states he is feeling better today.  Denies abdominal pain, nausea, or diarrhea.  States he was tolerating diet, currently n.p.o. for EGD today.  Discussed with patient's await findings prior to delineation of further plans and he agrees.  All his questions answered.    OBJECTIVE      Physical    /67   Pulse 53   Temp 98 °F (36.7 °C) (Oral)   Resp 18   Ht 1.88 m (6' 2\")   Wt 103 kg (227 lb)   SpO2 94%   BMI 29.15 kg/m²       General appearance: deaf, reads lips, appropriate conversation, answers all questions appropriately, appears in no acute distress  Lungs:respiratory effort normal without accessory numbers  Heart: no pedal edema  Abdomen: soft, nondistended, nontympanic, no guarding, no peritoneal signs, normoactive bowel sounds  Extremities: ROM normal    ASSESSMENT/PLAN:  79-year-old male who is deaf however is able to read lips. He presented to the hospital with obstructive jaundice with a bilirubin of 6.8. 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  -Patient is scheduled for an EGD with ERCP today with Dr. Carlos.  -I re-discussed concerns with him.  We also discussed that if an ERCP is unsuccessful he will need transfer to [place] for a PTHC insertion, -Patient has obstructive jaundice due to malignant neoplasm consistent with pancreatic adenocarcinoma.  -Patient will likely need an endoscopic ultrasound after he undergoes biliary decompression unless his brushings are positive  -His CEA equals 2.1, CA 19-9 and chromogranin A are pending.  -Chest CT for staging noted, negative for metastasis     Thank you for the consultation and allowing me to take part in Ty Garcia's care.    Greater than or equal to 35 minutes was spent providing face-to-face  patient care, including:  and coordinating care, reviewing the chart, labs, and diagnostics, as well as medical decision making. Greater than 50% of this time was spent instructing and counseling the patient face to face regarding findings and recommendations.    Case discussed, images reviewed, and plan developed with Dr Abdifatah Arreola [id number], FNP-BC 8/2/2024 10:22 AM     Attending Physician Statement:    Chief Complaint:   Chief Complaint   Patient presents with    Abnormal Lab     Sent by [facility] high bilirubin and liver enzymes       I have examined the patient and performed the key aspects of physical exam, reviewed the record (including all pertinent and new radiology images and laboratory findings), and discussed the case with the surgical team.  I agree with the assessment and plan with the following additions, corrections, and changes. 14pt review of symptoms completed and negative except as mentioned.    No issues. Patient feels well. Heading down for ERCP today. Bili is still 6.8. Discussed he will need EUS after ERCP is done likely outpatient for biopsies of the mass in his pancreas. Ca 19-9 569 in setting of obstruction,  CEA 2.1. Likely pancreatic adenocarcinoma with obstructive jaundice and likely PV involvement.     Yary Gardiner MD  08/02/24  1:36 PM

## 2024-08-02 NOTE — PROGRESS NOTES
PROCEDURE PERFORMED:  EGD and ERCP with any interventions as indicated     PREOPERATIVE DIAGNOSES:  Elevated liver chemistries, dilated common bile duct, biliary obstruction.      POSTOPERATIVE DIAGNOSES:  Biliary stricture, biliary obstruction     ANESTHESIA:  LMAC     DESCRIPTION OF PROCEDURE:  With the patient in swimmers position, an Olympus gastroscope was introduced into the esophagus, advanced through the GE junction into the gastric body, advanced through the pylorus into duodenal bulb, second portion of duodenum, the ampulla was visualized. Biliary stricture was noted in the distal duodenal bulb. The scope was able to pass with moderate difficulty.  Stricturing was secondary to what appeared to be an exophtic mass. Mass was noted to be 3cm in length with significant erythema an nodularity. Multiple biopsies were perform. The stricture was dilated up to 15mm starting at 10mm with pneumatic balloon, this was performed over a guidewire and under fluoroscopy. A Guide wire was advaned as a guide  Next a the side-viewing video duodenoscope was introduced into the esophagus, advanced through the GE junction into the gastric body, advanced through the pylorus into duodenal bulb stricturing in the bulb however prevented the scope from traversing the stricture/mass. Attempts were make with the dilation balloon again with an attempt of a \"slide-by\" however efforts proved futile. Attempts to traverse the stricture were made for ~1hour at which time the patient began vomiting gastric secretions request made by anesthesi and decision was made to terminate the procedure.     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 PTHC  3) Will require EUS to evaluate further.    4) Assessment and plan

## 2024-08-02 NOTE — PATIENT CARE CONFERENCE
P Quality Flow/Interdisciplinary Rounds Progress Note        Quality Flow Rounds held on August 2, 2024    Disciplines Attending:  Bedside Nurse, , , and Nursing Unit Leadership    Michael Garcia was admitted on 7/30/2024  3:18 PM    Anticipated Discharge Date:       Disposition:    Mike Score:  Mike Scale Score: 21    Readmission Risk              Risk of Unplanned Readmission:  14           Discussed patient goal for the day, patient clinical progression, and barriers to discharge.  The following Goal(s) of the Day/Commitment(s) have been identified:  Diagnostics - Report Results EGD/ERCP      Sulma Patel RN  August 2, 2024

## 2024-08-02 NOTE — PLAN OF CARE
Problem: Safety - Adult  Goal: Free from fall injury  8/2/2024 1112 by Jasmyne Ding RN  Outcome: Progressing  8/1/2024 2233 by Alize Mcnally RN  Outcome: Progressing     Problem: Nutrition Deficit:  Goal: Optimize nutritional status  8/2/2024 1112 by Jasmyne Ding RN  Outcome: Progressing  8/1/2024 2233 by Alize Mcnally RN  Outcome: Progressing     Problem: Skin/Tissue Integrity  Goal: Absence of new skin breakdown  Description: 1.  Monitor for areas of redness and/or skin breakdown  2.  Assess vascular access sites hourly  3.  Every 4-6 hours minimum:  Change oxygen saturation probe site  4.  Every 4-6 hours:  If on nasal continuous positive airway pressure, respiratory therapy assess nares and determine need for appliance change or resting period.  8/2/2024 1112 by Jasmyne Ding RN  Outcome: Progressing  8/1/2024 2233 by Alize Mcnally RN  Outcome: Progressing     Problem: Chronic Conditions and Co-morbidities  Goal: Patient's chronic conditions and co-morbidity symptoms are monitored and maintained or improved  Outcome: Progressing

## 2024-08-02 NOTE — PROGRESS NOTES
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]  Elevated liver enzymes [R74.8]    Subjective:  Patient is being followed for Duodenal mass [K31.89]  Elevated liver enzymes [R74.8]   Pt feels okay  Per RN: no additional concerns    ROS: denies fever, chills, cp, sob, n/v, HA unless otherwise noted above     ampicillin-sulbactam  3,000 mg IntraVENous See Admin Instructions    lactated ringers  800 mL IntraVENous Once    indomethacin  100 mg Rectal See Admin Instructions    insulin glargine  10 Units SubCUTAneous Nightly    docusate sodium  200 mg Oral Daily    sodium chloride flush  5-40 mL IntraVENous 2 times per day    enoxaparin  40 mg SubCUTAneous Daily    cetirizine  10 mg Oral Daily    finasteride  5 mg Oral Daily    metoprolol succinate  25 mg Oral Daily    montelukast  10 mg Oral Nightly    therapeutic multivitamin-minerals  1 tablet Oral Daily    tamsulosin  0.4 mg Oral Daily    insulin lispro  0-8 Units SubCUTAneous TID WC    insulin lispro  0-4 Units SubCUTAneous Nightly     glucose, 4 tablet, PRN  dextrose bolus, 125 mL, PRN   Or  dextrose bolus, 250 mL, PRN  glucagon (rDNA), 1 mg, PRN  dextrose, , Continuous PRN  diatrizoate meglumine-sodium, 140 mL, ONCE PRN  sodium chloride flush, 5-40 mL, PRN  sodium chloride, , PRN  potassium chloride, 40 mEq, PRN   Or  potassium alternative oral replacement, 40 mEq, PRN   Or  potassium chloride, 10 mEq, PRN  magnesium sulfate, 2,000 mg, PRN  ondansetron, 4 mg, Q8H PRN   Or  ondansetron, 4 mg, Q6H PRN  polyethylene glycol, 17 g, Daily PRN         Objective:  /67   Pulse 53   Temp 98 °F (36.7 °C) (Oral)   Resp 18   Ht 1.88 m (6' 2\")   Wt 103 kg (227 lb)   SpO2 94%   BMI 29.15 kg/m²     General Appearance: alert and oriented to person, place and time and in NAD, sleeping in bed but wakes easily to touch, deaf but able to read lips  Skin: warm and dry  Head: normocephalic and  mass vs duodenal mass. Trend labs, improving. Hepatitis labs ordered  Benign prostate hyperplasia: History of prostate cancer.  Continue with medication  Diabetic mellitus SSI with hypoglycemic protocol and Lantus 10 units, adjust as needed  Essential hypertension continue with home medication      Dispo: home    NOTE: Portions of this report may have been transcribed using voice recognition software. Every effort was made to ensure accuracy; however, inadvertent computerized transcription errors may be present.  Electronically signed by Galen Graf MD on 8/2/2024 at 7:58 AM

## 2024-08-02 NOTE — PROGRESS NOTES
Patient for EGD/ERCP today with Dr. Carlos.  Consent has been signed.  Patient denies any chest pain and/or shortness of breath.  Has been NPO.  Assessment unchanged.  Labs reviewed.  Okay to proceed

## 2024-08-02 NOTE — PROGRESS NOTES
program not used due to patient refusal.  Patient preferred for his girlfriend to translate using sign [email or web address].

## 2024-08-02 NOTE — PLAN OF CARE
Problem: Safety - Adult  Goal: Free from fall injury  8/1/2024 2233 by Alize Mcnally RN  Outcome: Progressing  8/1/2024 1123 by Leonel Cunningham RN  Outcome: Progressing     Problem: Nutrition Deficit:  Goal: Optimize nutritional status  8/1/2024 2233 by Alize Mcnally RN  Outcome: Progressing  8/1/2024 1123 by Leonel Cunningham RN  Outcome: Progressing     Problem: Skin/Tissue Integrity  Goal: Absence of new skin breakdown  Description: 1.  Monitor for areas of redness and/or skin breakdown  2.  Assess vascular access sites hourly  3.  Every 4-6 hours minimum:  Change oxygen saturation probe site  4.  Every 4-6 hours:  If on nasal continuous positive airway pressure, respiratory therapy assess nares and determine need for appliance change or resting period.  8/1/2024 2233 by Alize Mcnally RN  Outcome: Progressing  8/1/2024 1123 by Leonel Cunningham RN  Outcome: Progressing

## 2024-08-02 NOTE — PROGRESS NOTES
DVT Prophylaxis Adjustment Policy (DVT Prophylaxis)     This patient is on DVT Prophylaxis medication that requires a dose adjustment      Date Body Weight IBW  Adjusted BW SCr  CrCl Dialysis status   8/2/2024 103 kg (227 lb) Ideal body weight: 82.2 kg (181 lb 3.5 oz)  Adjusted ideal body weight: 90.5 kg (199 lb 8.5 oz) Serum creatinine: 0.6 mg/dL (L) 08/02/24 0350  Estimated creatinine clearance: 132 mL/min (A) N/a       Pharmacy has dose-adjusted the DVT Prophylaxis regimen to match   the recommendations from the following table        Ordered Medication:Lovenox 40mg daily    Order Changed/converted to: Lovenox 30mg BID      These changes were made per protocol according to the [facility] Pharmacist   Review for Appropriate Use and Automatic Dose Adjustments of   Subcutaneous Anticoagulants Policy     *Please note this dose may need readjusted if patient's condition changes.    Please contact pharmacy with any questions regarding these changes.    Crystal Cifuentes RPH  8/2/2024  8:13 AM

## 2024-08-03 ENCOUNTER — HOSPITAL ENCOUNTER (INPATIENT)
Age: 79
LOS: 6 days | Discharge: HOME | DRG: 445 | End: 2024-08-09
Attending: TRANSPLANT SURGERY | Admitting: TRANSPLANT SURGERY
Payer: MEDICARE

## 2024-08-03 VITALS
OXYGEN SATURATION: 95 % | TEMPERATURE: 98.8 F | WEIGHT: 227 LBS | RESPIRATION RATE: 18 BRPM | BODY MASS INDEX: 29.13 KG/M2 | DIASTOLIC BLOOD PRESSURE: 72 MMHG | SYSTOLIC BLOOD PRESSURE: 110 MMHG | HEIGHT: 74 IN | HEART RATE: 62 BPM

## 2024-08-03 DIAGNOSIS — C25.9 ADENOCARCINOMA OF PANCREAS (HCC): Primary | ICD-10-CM

## 2024-08-03 LAB
ALBUMIN SERPL-MCNC: 3.3 G/DL (ref 3.5–5.2)
ALP SERPL-CCNC: 308 U/L (ref 40–129)
ALT SERPL-CCNC: 291 U/L (ref 0–40)
ANION GAP SERPL CALCULATED.3IONS-SCNC: 8 MMOL/L (ref 7–16)
AST SERPL-CCNC: 154 U/L (ref 0–39)
BASOPHILS # BLD: 0.02 K/UL (ref 0–0.2)
BASOPHILS NFR BLD: 1 % (ref 0–2)
BILIRUB SERPL-MCNC: 6.7 MG/DL (ref 0–1.2)
BUN SERPL-MCNC: 10 MG/DL (ref 6–23)
CALCIUM SERPL-MCNC: 8.8 MG/DL (ref 8.6–10.2)
CHLORIDE SERPL-SCNC: 107 MMOL/L (ref 98–107)
CO2 SERPL-SCNC: 25 MMOL/L (ref 22–29)
CREAT SERPL-MCNC: 0.6 MG/DL (ref 0.7–1.2)
EOSINOPHIL # BLD: 0.07 K/UL (ref 0.05–0.5)
EOSINOPHILS RELATIVE PERCENT: 2 % (ref 0–6)
ERYTHROCYTE [DISTWIDTH] IN BLOOD BY AUTOMATED COUNT: 12.3 % (ref 11.5–15)
GFR, ESTIMATED: >90 ML/MIN/1.73M2
GLUCOSE BLD-MCNC: 168 MG/DL (ref 74–99)
GLUCOSE BLD-MCNC: 206 MG/DL (ref 74–99)
GLUCOSE BLD-MCNC: 250 MG/DL (ref 74–99)
GLUCOSE BLD-MCNC: 305 MG/DL (ref 74–99)
GLUCOSE SERPL-MCNC: 243 MG/DL (ref 74–99)
HCT VFR BLD AUTO: 34.5 % (ref 37–54)
HGB BLD-MCNC: 11.3 G/DL (ref 12.5–16.5)
IGG4 SER-MCNC: 13 MG/DL (ref 1–123)
IMM GRANULOCYTES # BLD AUTO: <0.03 K/UL (ref 0–0.58)
IMM GRANULOCYTES NFR BLD: 1 % (ref 0–5)
LIPASE SERPL-CCNC: 16 U/L (ref 13–60)
LYMPHOCYTES NFR BLD: 0.4 K/UL (ref 1.5–4)
LYMPHOCYTES RELATIVE PERCENT: 11 % (ref 20–42)
MCH RBC QN AUTO: 32.7 PG (ref 26–35)
MCHC RBC AUTO-ENTMCNC: 32.8 G/DL (ref 32–34.5)
MCV RBC AUTO: 99.7 FL (ref 80–99.9)
MONOCYTES NFR BLD: 0.49 K/UL (ref 0.1–0.95)
MONOCYTES NFR BLD: 13 % (ref 2–12)
NEUTROPHILS NFR BLD: 73 % (ref 43–80)
NEUTS SEG NFR BLD: 2.71 K/UL (ref 1.8–7.3)
PLATELET # BLD AUTO: 167 K/UL (ref 130–450)
PMV BLD AUTO: 11.2 FL (ref 7–12)
POTASSIUM SERPL-SCNC: 4.2 MMOL/L (ref 3.5–5)
PROT SERPL-MCNC: 5.8 G/DL (ref 6.4–8.3)
RBC # BLD AUTO: 3.46 M/UL (ref 3.8–5.8)
RBC # BLD: NORMAL 10*6/UL
SODIUM SERPL-SCNC: 140 MMOL/L (ref 132–146)
WBC OTHER # BLD: 3.7 K/UL (ref 4.5–11.5)

## 2024-08-03 PROCEDURE — 83690 ASSAY OF LIPASE: CPT

## 2024-08-03 PROCEDURE — 82962 GLUCOSE BLOOD TEST: CPT

## 2024-08-03 PROCEDURE — 80053 COMPREHEN METABOLIC PANEL: CPT

## 2024-08-03 PROCEDURE — 2580000003 HC RX 258: Performed by: NURSE PRACTITIONER

## 2024-08-03 PROCEDURE — 6370000000 HC RX 637 (ALT 250 FOR IP): Performed by: NURSE PRACTITIONER

## 2024-08-03 PROCEDURE — 0F9930Z DRAINAGE OF COMMON BILE DUCT WITH DRAINAGE DEVICE, PERCUTANEOUS APPROACH: ICD-10-PCS | Performed by: TRANSPLANT SURGERY

## 2024-08-03 PROCEDURE — 85025 COMPLETE CBC W/AUTO DIFF WBC: CPT

## 2024-08-03 PROCEDURE — 6360000002 HC RX W HCPCS: Performed by: NURSE PRACTITIONER

## 2024-08-03 PROCEDURE — 99232 SBSQ HOSP IP/OBS MODERATE 35: CPT | Performed by: TRANSPLANT SURGERY

## 2024-08-03 PROCEDURE — 6370000000 HC RX 637 (ALT 250 FOR IP): Performed by: STUDENT IN AN ORGANIZED HEALTH CARE EDUCATION/TRAINING PROGRAM

## 2024-08-03 PROCEDURE — 99222 1ST HOSP IP/OBS MODERATE 55: CPT | Performed by: TRANSPLANT SURGERY

## 2024-08-03 PROCEDURE — 99239 HOSP IP/OBS DSCHRG MGMT >30: CPT | Performed by: STUDENT IN AN ORGANIZED HEALTH CARE EDUCATION/TRAINING PROGRAM

## 2024-08-03 PROCEDURE — 1200000000 HC SEMI PRIVATE

## 2024-08-03 PROCEDURE — BF101ZZ FLUOROSCOPY OF BILE DUCTS USING LOW OSMOLAR CONTRAST: ICD-10-PCS | Performed by: TRANSPLANT SURGERY

## 2024-08-03 RX ORDER — SODIUM CHLORIDE 9 MG/ML
INJECTION, SOLUTION INTRAVENOUS PRN
Status: CANCELLED | OUTPATIENT
Start: 2024-08-03

## 2024-08-03 RX ORDER — ENOXAPARIN SODIUM 100 MG/ML
30 INJECTION SUBCUTANEOUS 2 TIMES DAILY
Status: DISCONTINUED | OUTPATIENT
Start: 2024-08-03 | End: 2024-08-09 | Stop reason: HOSPADM

## 2024-08-03 RX ORDER — INSULIN GLARGINE 100 [IU]/ML
10 INJECTION, SOLUTION SUBCUTANEOUS NIGHTLY
Status: DISCONTINUED | OUTPATIENT
Start: 2024-08-03 | End: 2024-08-09 | Stop reason: HOSPADM

## 2024-08-03 RX ORDER — METOPROLOL SUCCINATE 25 MG/1
25 TABLET, EXTENDED RELEASE ORAL DAILY
Status: DISCONTINUED | OUTPATIENT
Start: 2024-08-04 | End: 2024-08-09 | Stop reason: HOSPADM

## 2024-08-03 RX ORDER — INSULIN LISPRO 100 [IU]/ML
0-4 INJECTION, SOLUTION INTRAVENOUS; SUBCUTANEOUS NIGHTLY
Status: CANCELLED | OUTPATIENT
Start: 2024-08-03

## 2024-08-03 RX ORDER — MAGNESIUM SULFATE IN WATER 40 MG/ML
2000 INJECTION, SOLUTION INTRAVENOUS PRN
Status: CANCELLED | OUTPATIENT
Start: 2024-08-03

## 2024-08-03 RX ORDER — INSULIN LISPRO 100 [IU]/ML
0-8 INJECTION, SOLUTION INTRAVENOUS; SUBCUTANEOUS
Status: DISCONTINUED | OUTPATIENT
Start: 2024-08-04 | End: 2024-08-07

## 2024-08-03 RX ORDER — POTASSIUM CHLORIDE 7.45 MG/ML
10 INJECTION INTRAVENOUS PRN
Status: DISCONTINUED | OUTPATIENT
Start: 2024-08-03 | End: 2024-08-04 | Stop reason: SDUPTHER

## 2024-08-03 RX ORDER — ONDANSETRON 4 MG/1
4 TABLET, ORALLY DISINTEGRATING ORAL EVERY 8 HOURS PRN
Status: CANCELLED | OUTPATIENT
Start: 2024-08-03

## 2024-08-03 RX ORDER — MAGNESIUM SULFATE HEPTAHYDRATE 40 MG/ML
2000 INJECTION, SOLUTION INTRAVENOUS PRN
Status: DISCONTINUED | OUTPATIENT
Start: 2024-08-03 | End: 2024-08-04 | Stop reason: SDUPTHER

## 2024-08-03 RX ORDER — POTASSIUM CHLORIDE 7.45 MG/ML
10 INJECTION INTRAVENOUS PRN
Status: CANCELLED | OUTPATIENT
Start: 2024-08-03

## 2024-08-03 RX ORDER — ONDANSETRON 2 MG/ML
4 INJECTION INTRAMUSCULAR; INTRAVENOUS EVERY 6 HOURS PRN
Status: CANCELLED | OUTPATIENT
Start: 2024-08-03

## 2024-08-03 RX ORDER — INDOMETHACIN 100 MG
100 SUPPOSITORY, RECTAL RECTAL SEE ADMIN INSTRUCTIONS
Status: CANCELLED | OUTPATIENT
Start: 2024-08-03

## 2024-08-03 RX ORDER — DEXTROSE MONOHYDRATE 100 MG/ML
INJECTION, SOLUTION INTRAVENOUS CONTINUOUS PRN
Status: CANCELLED | OUTPATIENT
Start: 2024-08-03

## 2024-08-03 RX ORDER — ENOXAPARIN SODIUM 100 MG/ML
30 INJECTION SUBCUTANEOUS 2 TIMES DAILY
Status: CANCELLED | OUTPATIENT
Start: 2024-08-03

## 2024-08-03 RX ORDER — POLYETHYLENE GLYCOL 3350 17 G/17G
17 POWDER, FOR SOLUTION ORAL DAILY PRN
Status: CANCELLED | OUTPATIENT
Start: 2024-08-03

## 2024-08-03 RX ORDER — FINASTERIDE 5 MG/1
5 TABLET, FILM COATED ORAL DAILY
Status: DISCONTINUED | OUTPATIENT
Start: 2024-08-04 | End: 2024-08-09 | Stop reason: HOSPADM

## 2024-08-03 RX ORDER — SODIUM CHLORIDE 0.9 % (FLUSH) 0.9 %
10 SYRINGE (ML) INJECTION PRN
Status: DISCONTINUED | OUTPATIENT
Start: 2024-08-03 | End: 2024-08-04 | Stop reason: SDUPTHER

## 2024-08-03 RX ORDER — TAMSULOSIN HYDROCHLORIDE 0.4 MG/1
0.4 CAPSULE ORAL DAILY
Status: DISCONTINUED | OUTPATIENT
Start: 2024-08-04 | End: 2024-08-09 | Stop reason: HOSPADM

## 2024-08-03 RX ORDER — ONDANSETRON 2 MG/ML
4 INJECTION INTRAMUSCULAR; INTRAVENOUS EVERY 6 HOURS PRN
Status: DISCONTINUED | OUTPATIENT
Start: 2024-08-03 | End: 2024-08-04 | Stop reason: SDUPTHER

## 2024-08-03 RX ORDER — TAMSULOSIN HYDROCHLORIDE 0.4 MG/1
0.4 CAPSULE ORAL DAILY
Status: CANCELLED | OUTPATIENT
Start: 2024-08-04

## 2024-08-03 RX ORDER — MONTELUKAST SODIUM 10 MG/1
10 TABLET ORAL NIGHTLY
Status: CANCELLED | OUTPATIENT
Start: 2024-08-03

## 2024-08-03 RX ORDER — M-VIT,TX,IRON,MINS/CALC/FOLIC 27MG-0.4MG
1 TABLET ORAL DAILY
Status: DISCONTINUED | OUTPATIENT
Start: 2024-08-04 | End: 2024-08-09 | Stop reason: HOSPADM

## 2024-08-03 RX ORDER — METOPROLOL SUCCINATE 25 MG/1
25 TABLET, EXTENDED RELEASE ORAL DAILY
Status: CANCELLED | OUTPATIENT
Start: 2024-08-04

## 2024-08-03 RX ORDER — M-VIT,TX,IRON,MINS/CALC/FOLIC 27MG-0.4MG
1 TABLET ORAL DAILY
Status: CANCELLED | OUTPATIENT
Start: 2024-08-04

## 2024-08-03 RX ORDER — CETIRIZINE HYDROCHLORIDE 10 MG/1
10 TABLET ORAL DAILY
Status: DISCONTINUED | OUTPATIENT
Start: 2024-08-04 | End: 2024-08-09 | Stop reason: HOSPADM

## 2024-08-03 RX ORDER — CETIRIZINE HYDROCHLORIDE 10 MG/1
10 TABLET ORAL DAILY
Status: CANCELLED | OUTPATIENT
Start: 2024-08-04

## 2024-08-03 RX ORDER — POTASSIUM CHLORIDE 20 MEQ/1
40 TABLET, EXTENDED RELEASE ORAL PRN
Status: CANCELLED | OUTPATIENT
Start: 2024-08-03

## 2024-08-03 RX ORDER — SODIUM CHLORIDE 9 MG/ML
INJECTION, SOLUTION INTRAVENOUS PRN
Status: DISCONTINUED | OUTPATIENT
Start: 2024-08-03 | End: 2024-08-04 | Stop reason: SDUPTHER

## 2024-08-03 RX ORDER — ONDANSETRON 4 MG/1
4 TABLET, ORALLY DISINTEGRATING ORAL EVERY 8 HOURS PRN
Status: DISCONTINUED | OUTPATIENT
Start: 2024-08-03 | End: 2024-08-04 | Stop reason: SDUPTHER

## 2024-08-03 RX ORDER — INSULIN GLARGINE 100 [IU]/ML
10 INJECTION, SOLUTION SUBCUTANEOUS NIGHTLY
Status: CANCELLED | OUTPATIENT
Start: 2024-08-03

## 2024-08-03 RX ORDER — SODIUM CHLORIDE 0.9 % (FLUSH) 0.9 %
10 SYRINGE (ML) INJECTION EVERY 12 HOURS SCHEDULED
Status: DISCONTINUED | OUTPATIENT
Start: 2024-08-03 | End: 2024-08-04 | Stop reason: SDUPTHER

## 2024-08-03 RX ORDER — POTASSIUM CHLORIDE 20 MEQ/1
40 TABLET, EXTENDED RELEASE ORAL PRN
Status: DISCONTINUED | OUTPATIENT
Start: 2024-08-03 | End: 2024-08-04 | Stop reason: SDUPTHER

## 2024-08-03 RX ORDER — MONTELUKAST SODIUM 10 MG/1
10 TABLET ORAL NIGHTLY
Status: DISCONTINUED | OUTPATIENT
Start: 2024-08-03 | End: 2024-08-09 | Stop reason: HOSPADM

## 2024-08-03 RX ORDER — FINASTERIDE 5 MG/1
5 TABLET, FILM COATED ORAL DAILY
Status: CANCELLED | OUTPATIENT
Start: 2024-08-04

## 2024-08-03 RX ORDER — GLUCAGON 1 MG/ML
1 KIT INJECTION PRN
Status: CANCELLED | OUTPATIENT
Start: 2024-08-03

## 2024-08-03 RX ORDER — DOCUSATE SODIUM 100 MG/1
200 CAPSULE, LIQUID FILLED ORAL DAILY
Status: DISCONTINUED | OUTPATIENT
Start: 2024-08-04 | End: 2024-08-08

## 2024-08-03 RX ORDER — SODIUM CHLORIDE 0.9 % (FLUSH) 0.9 %
5-40 SYRINGE (ML) INJECTION PRN
Status: CANCELLED | OUTPATIENT
Start: 2024-08-03

## 2024-08-03 RX ORDER — INDOMETHACIN 100 MG
100 SUPPOSITORY, RECTAL RECTAL SEE ADMIN INSTRUCTIONS
Status: DISCONTINUED | OUTPATIENT
Start: 2024-08-03 | End: 2024-08-05

## 2024-08-03 RX ORDER — INSULIN LISPRO 100 [IU]/ML
0-8 INJECTION, SOLUTION INTRAVENOUS; SUBCUTANEOUS
Status: CANCELLED | OUTPATIENT
Start: 2024-08-04

## 2024-08-03 RX ORDER — INSULIN LISPRO 100 [IU]/ML
0-4 INJECTION, SOLUTION INTRAVENOUS; SUBCUTANEOUS NIGHTLY
Status: DISCONTINUED | OUTPATIENT
Start: 2024-08-03 | End: 2024-08-07

## 2024-08-03 RX ORDER — SODIUM CHLORIDE 0.9 % (FLUSH) 0.9 %
5-40 SYRINGE (ML) INJECTION EVERY 12 HOURS SCHEDULED
Status: CANCELLED | OUTPATIENT
Start: 2024-08-03

## 2024-08-03 RX ORDER — DOCUSATE SODIUM 100 MG/1
200 CAPSULE, LIQUID FILLED ORAL DAILY
Status: CANCELLED | OUTPATIENT
Start: 2024-08-04

## 2024-08-03 RX ADMIN — TAMSULOSIN HYDROCHLORIDE 0.4 MG: 0.4 CAPSULE ORAL at 08:50

## 2024-08-03 RX ADMIN — METOPROLOL SUCCINATE 25 MG: 25 TABLET, EXTENDED RELEASE ORAL at 08:43

## 2024-08-03 RX ADMIN — FINASTERIDE 5 MG: 5 TABLET, FILM COATED ORAL at 08:43

## 2024-08-03 RX ADMIN — SODIUM CHLORIDE, PRESERVATIVE FREE 10 ML: 5 INJECTION INTRAVENOUS at 08:44

## 2024-08-03 RX ADMIN — CETIRIZINE HYDROCHLORIDE 10 MG: 10 TABLET, FILM COATED ORAL at 08:43

## 2024-08-03 RX ADMIN — INSULIN LISPRO 2 UNITS: 100 INJECTION, SOLUTION INTRAVENOUS; SUBCUTANEOUS at 16:28

## 2024-08-03 RX ADMIN — INSULIN LISPRO 6 UNITS: 100 INJECTION, SOLUTION INTRAVENOUS; SUBCUTANEOUS at 11:31

## 2024-08-03 RX ADMIN — Medication 1 TABLET: at 08:43

## 2024-08-03 RX ADMIN — INSULIN GLARGINE 10 UNITS: 100 INJECTION, SOLUTION SUBCUTANEOUS at 20:36

## 2024-08-03 RX ADMIN — MONTELUKAST 10 MG: 10 TABLET, FILM COATED ORAL at 20:35

## 2024-08-03 RX ADMIN — DOCUSATE SODIUM 200 MG: 100 CAPSULE, LIQUID FILLED ORAL at 08:43

## 2024-08-03 RX ADMIN — ENOXAPARIN SODIUM 30 MG: 100 INJECTION SUBCUTANEOUS at 08:43

## 2024-08-03 ASSESSMENT — PAIN SCALES - GENERAL
PAINLEVEL_OUTOF10: 0
PAINLEVEL_OUTOF10: 0

## 2024-08-03 NOTE — PLAN OF CARE
Problem: Safety - Adult  Goal: Free from fall injury  8/3/2024 0046 by Alize Mcnally RN  Outcome: Progressing  8/2/2024 1112 by Jasmyne Ding RN  Outcome: Progressing     Problem: Nutrition Deficit:  Goal: Optimize nutritional status  8/3/2024 0046 by Alize Mcnally RN  Outcome: Progressing  8/2/2024 1112 by Jasmyne Ding RN  Outcome: Progressing     Problem: Skin/Tissue Integrity  Goal: Absence of new skin breakdown  Description: 1.  Monitor for areas of redness and/or skin breakdown  2.  Assess vascular access sites hourly  3.  Every 4-6 hours minimum:  Change oxygen saturation probe site  4.  Every 4-6 hours:  If on nasal continuous positive airway pressure, respiratory therapy assess nares and determine need for appliance change or resting period.  8/3/2024 0046 by Alize Mcnally RN  Outcome: Progressing  8/2/2024 1112 by Jasmyne Ding RN  Outcome: Progressing     Problem: Chronic Conditions and Co-morbidities  Goal: Patient's chronic conditions and co-morbidity symptoms are monitored and maintained or improved  8/3/2024 0046 by Alize Mcnally RN  Outcome: Progressing  8/2/2024 1112 by Jasmyne Ding RN  Outcome: Progressing     Problem: Pain  Goal: Verbalizes/displays adequate comfort level or baseline comfort level  Outcome: Progressing
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
## 2024-08-03 NOTE — PROGRESS NOTES
PROGRESS NOTE      Patient Presents with/Seen in Consultation For      Reason for Consult: elevated liver enzymes   CHIEF COMPLAINT:  abdominal pain, fatigues and abnormal labs   Subjective:   Patient seen sitting in bed in no apparent distress.  Procedures discussed with patient.  Plan for PTHC drain and transfer to [facility].  Patient will need EUS.  No current complaints.    Review of Systems  Aside from what was mentioned in the PMH and HPI, essentially unremarkable, all others negative.    Objective:     /67   Pulse 55   Temp 98.1 °F (36.7 °C) (Oral)   Resp 18   Ht 1.88 m (6' 2\")   Wt 103 kg (227 lb)   SpO2 93%   BMI 29.15 kg/m²     General appearance: alert, awake, laying in bed, and cooperative  Eyes: conjunctiva pale, sclera anicteric. PERRL.  Lungs: clear to auscultation bilaterally  Heart: regular rate and rhythm, no murmur, 2+ pulses; no edema  Abdomen: soft, non-tender; bowel sounds normal; no masses,  no organomegaly  Extremities: extremities without edema  Pulses: 2+ and symmetric  Skin: Skin color, texture, turgor normal.   Neurologic: Grossly normal, deaf (able to communicate)    enoxaparin Sodium (LOVENOX) injection 30 mg, BID  ampicillin-sulbactam (UNASYN) 3,000 mg in sodium chloride 0.9 % 100 mL IVPB, See Admin Instructions  indomethacin (INDOCIN) 100 MG suppository 100 mg, See Admin Instructions  insulin glargine (LANTUS) injection vial 10 Units, Nightly  glucose chewable tablet 16 g, PRN  dextrose bolus 10% 125 mL, PRN   Or  dextrose bolus 10% 250 mL, PRN  glucagon injection 1 mg, PRN  dextrose 10 % infusion, Continuous PRN  docusate sodium (COLACE) capsule 200 mg, Daily  diatrizoate meglumine-sodium (GASTROGRAFIN) 66-10 % solution 140 mL, ONCE PRN  sodium chloride flush 0.9 % injection 5-40 mL, 2 times per day  sodium chloride flush 0.9 % injection 5-40 mL, PRN  0.9 % sodium chloride infusion, PRN  potassium chloride (KLOR-CON M) extended release tablet 40 mEq, PRN   Or  potassium  bicarb-citric acid (EFFER-K) effervescent tablet 40 mEq, PRN   Or  potassium chloride 10 mEq/100 mL IVPB (Peripheral Line), PRN  magnesium sulfate 2000 mg in 50 mL IVPB premix, PRN  ondansetron (ZOFRAN-ODT) disintegrating tablet 4 mg, Q8H PRN   Or  ondansetron (ZOFRAN) injection 4 mg, Q6H PRN  polyethylene glycol (GLYCOLAX) packet 17 g, Daily PRN  cetirizine (ZYRTEC) tablet 10 mg, Daily  finasteride (PROSCAR) tablet 5 mg, Daily  metoprolol succinate (TOPROL XL) extended release tablet 25 mg, Daily  montelukast (SINGULAIR) tablet 10 mg, Nightly  therapeutic multivitamin-minerals 1 tablet, Daily  tamsulosin (FLOMAX) capsule 0.4 mg, Daily  insulin lispro (HUMALOG,ADMELOG) injection vial 0-8 Units, TID WC  insulin lispro (HUMALOG,ADMELOG) injection vial 0-4 Units, Nightly         Data Review  CBC:   Lab Results   Component Value Date/Time    WBC 3.2 08/02/2024 03:50 AM    RBC 3.67 08/02/2024 03:50 AM    HGB 12.1 08/02/2024 03:50 AM    HCT 36.6 08/02/2024 03:50 AM    MCV 99.7 08/02/2024 03:50 AM    MCH 33.0 08/02/2024 03:50 AM    MCHC 33.1 08/02/2024 03:50 AM    RDW 12.0 08/02/2024 03:50 AM     08/02/2024 03:50 AM    MPV 10.8 08/02/2024 03:50 AM     CMP:    Lab Results   Component Value Date/Time     08/02/2024 03:50 AM    K 3.8 08/02/2024 03:50 AM    K 4.2 03/28/2021 06:18 AM     08/02/2024 03:50 AM    CO2 22 08/02/2024 03:50 AM    BUN 6 08/02/2024 03:50 AM    CREATININE 0.6 08/02/2024 03:50 AM    GFRAA >60 09/01/2022 06:09 PM    LABGLOM >90 08/02/2024 03:50 AM    LABGLOM >60 05/10/2023 08:51 AM    GLUCOSE 191 08/02/2024 03:50 AM    GLUCOSE 146 10/04/2010 10:20 AM    CALCIUM 8.9 08/02/2024 03:50 AM    BILITOT 6.8 08/02/2024 03:50 AM    ALKPHOS 350 08/02/2024 03:50 AM     08/02/2024 03:50 AM     08/02/2024 03:50 AM     Hepatic Function Panel:    Lab Results   Component Value Date/Time    ALKPHOS 350 08/02/2024 03:50 AM     08/02/2024 03:50 AM     08/02/2024 03:50 AM

## 2024-08-03 NOTE — PROGRESS NOTES
Access Center called with bed assignment to [facility] Room number 5207, nurse to nurse number [phone]. [facility] tentative  time 8 pm. Electronically signed by Peggy Rea RN on 8/3/2024 at 5:03 PM

## 2024-08-03 NOTE — PROGRESS NOTES
Hepatobiliary and Pancreatic Surgery Progress Note    CC:fatigue, abd pain, and jaundice     Subjective: Patient underwent an attempted EGD with ERCP yesterday.  The ERCP was unsuccessful due to compression of the duodenum and inability to pass the scope.    OBJECTIVE      Physical    /67   Pulse 55   Temp 98.1 °F (36.7 °C) (Oral)   Resp 18   Ht 1.88 m (6' 2\")   Wt 103 kg (227 lb)   SpO2 93%   BMI 29.15 kg/m²       General appearance: deaf, reads lips, appropriate conversation, answers all questions appropriately, appears in no acute distress  Lungs:respiratory effort normal without accessory numbers  Heart: no pedal edema  Abdomen: soft, nondistended, nontympanic, no guarding, no peritoneal signs, normoactive bowel sounds  Extremities: ROM normal    ASSESSMENT/PLAN:  79-year-old male who is deaf however is able to read lips. He presented to the hospital with obstructive jaundice with a bilirubin of 6.8. 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  -Discussed with patient that he will need a PTHC.  -Will plan to transfer patient to my service at [facility] for the PTHC.  He will be admitted to me.  The transfer center has already been notified.  -Patient will also need an endoscopic ultrasound  -CEA is normal, CA 19-9 = 569  -I discussed the case with Dr. Garcia about needing a PTHC.    Demetrius Santoyo MD  08/03/24  9:11 AM

## 2024-08-03 NOTE — ANESTHESIA POSTPROCEDURE EVALUATION
Department of Anesthesiology  Postprocedure Note    Patient: Michael Garcia  MRN: [id number]  YOB: 1945  Date of evaluation: 8/2/2024    Procedure Summary       Date: 08/02/24 Room / Location: [facility]    Anesthesia Start: 1342 Anesthesia Stop: 1507    Procedures:       ESOPHAGOGASTRODUODENOSCOPY DILATION BALLOON      FAILED ENDOSCOPIC RETROGRADE CHOLANGIOPANCREATOGRAPHY Diagnosis:       Elevated liver enzymes      Abdominal pain, unspecified abdominal location      (Elevated liver enzymes [R74.8])      (Abdominal pain, unspecified abdominal location [R10.9])    Surgeons: Demetrius Carlos DO Responsible Provider: Ashley Batista DO    Anesthesia Type: MAC ASA Status: 3            Anesthesia Type: No value filed.    Lindsey Phase I:      Lindsey Phase II: Lindsey Score: 10    Anesthesia Post Evaluation    Patient location during evaluation: PACU  Patient participation: complete - patient participated  Level of consciousness: awake  Airway patency: patent  Nausea & Vomiting: no nausea and no vomiting  Cardiovascular status: hemodynamically stable  Respiratory status: acceptable  Hydration status: stable  Pain management: adequate    No notable events documented.

## 2024-08-03 NOTE — PROGRESS NOTES
Call from [facility]. Patient to be transferred to [facility] at 2000 by PAS.    Bed 5207  Report #: [phone]    Patient and family updated.

## 2024-08-03 NOTE — PLAN OF CARE
Problem: Safety - Adult  Goal: Free from fall injury  8/3/2024 1214 by Elizabeth Swanson RN  Outcome: Progressing     Problem: Nutrition Deficit:  Goal: Optimize nutritional status  8/3/2024 1214 by Elizabeth Swanson RN  Outcome: Progressing     Problem: Skin/Tissue Integrity  Goal: Absence of new skin breakdown  Description: 1.  Monitor for areas of redness and/or skin breakdown  2.  Assess vascular access sites hourly  3.  Every 4-6 hours minimum:  Change oxygen saturation probe site  4.  Every 4-6 hours:  If on nasal continuous positive airway pressure, respiratory therapy assess nares and determine need for appliance change or resting period.  8/3/2024 1214 by Elizabeth Swanson RN  Outcome: Progressing     Problem: Chronic Conditions and Co-morbidities  Goal: Patient's chronic conditions and co-morbidity symptoms are monitored and maintained or improved  8/3/2024 1214 by Elizabeth Swanson RN  Outcome: Progressing     Problem: Pain  Goal: Verbalizes/displays adequate comfort level or baseline comfort level  8/3/2024 1214 by Elizabeth Swanson RN  Outcome: Progressing     Problem: Discharge Planning  Goal: Discharge to home or other facility with appropriate resources  Outcome: Progressing     Problem: Gastrointestinal - Adult  Goal: Minimal or absence of nausea and vomiting  Outcome: Progressing     Problem: Gastrointestinal - Adult  Goal: Maintains adequate nutritional intake  Outcome: Progressing

## 2024-08-04 LAB
ALBUMIN SERPL-MCNC: 3.3 G/DL (ref 3.5–5.2)
ALP SERPL-CCNC: 294 U/L (ref 40–129)
ALT SERPL-CCNC: 292 U/L (ref 0–40)
ANION GAP SERPL CALCULATED.3IONS-SCNC: 7 MMOL/L (ref 7–16)
AST SERPL-CCNC: 154 U/L (ref 0–39)
BASOPHILS # BLD: 0.03 K/UL (ref 0–0.2)
BASOPHILS NFR BLD: 1 % (ref 0–2)
BILIRUB SERPL-MCNC: 6.7 MG/DL (ref 0–1.2)
BUN SERPL-MCNC: 6 MG/DL (ref 6–23)
CALCIUM SERPL-MCNC: 8.8 MG/DL (ref 8.6–10.2)
CHLORIDE SERPL-SCNC: 108 MMOL/L (ref 98–107)
CHROMOGRANIN A: 74 NG/ML (ref 0–187)
CO2 SERPL-SCNC: 25 MMOL/L (ref 22–29)
CREAT SERPL-MCNC: 0.8 MG/DL (ref 0.7–1.2)
EKG ATRIAL RATE: 78 BPM
EKG P AXIS: 53 DEGREES
EKG P-R INTERVAL: 216 MS
EKG Q-T INTERVAL: 374 MS
EKG QRS DURATION: 88 MS
EKG QTC CALCULATION (BAZETT): 426 MS
EKG R AXIS: 21 DEGREES
EKG T AXIS: 19 DEGREES
EKG VENTRICULAR RATE: 78 BPM
EOSINOPHIL # BLD: 0.14 K/UL (ref 0.05–0.5)
EOSINOPHILS RELATIVE PERCENT: 4 % (ref 0–6)
ERYTHROCYTE [DISTWIDTH] IN BLOOD BY AUTOMATED COUNT: 12.2 % (ref 11.5–15)
GFR, ESTIMATED: >90 ML/MIN/1.73M2
GLUCOSE BLD-MCNC: 162 MG/DL (ref 74–99)
GLUCOSE BLD-MCNC: 187 MG/DL (ref 74–99)
GLUCOSE BLD-MCNC: 233 MG/DL (ref 74–99)
GLUCOSE BLD-MCNC: 304 MG/DL (ref 74–99)
GLUCOSE SERPL-MCNC: 178 MG/DL (ref 74–99)
HCT VFR BLD AUTO: 34.6 % (ref 37–54)
HEPATITIS E AB, IGG: NEGATIVE
HEPATITIS E IGM AB: NEGATIVE
HGB BLD-MCNC: 11.4 G/DL (ref 12.5–16.5)
LIPASE SERPL-CCNC: 14 U/L (ref 13–60)
LYMPHOCYTES NFR BLD: 0.47 K/UL (ref 1.5–4)
LYMPHOCYTES RELATIVE PERCENT: 12 % (ref 20–42)
MCH RBC QN AUTO: 32.7 PG (ref 26–35)
MCHC RBC AUTO-ENTMCNC: 32.9 G/DL (ref 32–34.5)
MCV RBC AUTO: 99.1 FL (ref 80–99.9)
MONOCYTES NFR BLD: 0.44 K/UL (ref 0.1–0.95)
MONOCYTES NFR BLD: 11 % (ref 2–12)
NEUTROPHILS NFR BLD: 72 % (ref 43–80)
NEUTS SEG NFR BLD: 2.81 K/UL (ref 1.8–7.3)
PLATELET # BLD AUTO: 169 K/UL (ref 130–450)
PMV BLD AUTO: 11.3 FL (ref 7–12)
POTASSIUM SERPL-SCNC: 4.1 MMOL/L (ref 3.5–5)
PROT SERPL-MCNC: 5.6 G/DL (ref 6.4–8.3)
RBC # BLD AUTO: 3.49 M/UL (ref 3.8–5.8)
RBC # BLD: ABNORMAL 10*6/UL
SODIUM SERPL-SCNC: 140 MMOL/L (ref 132–146)
WBC OTHER # BLD: 3.9 K/UL (ref 4.5–11.5)

## 2024-08-04 PROCEDURE — 6370000000 HC RX 637 (ALT 250 FOR IP)

## 2024-08-04 PROCEDURE — 85025 COMPLETE CBC W/AUTO DIFF WBC: CPT

## 2024-08-04 PROCEDURE — 1200000000 HC SEMI PRIVATE

## 2024-08-04 PROCEDURE — 2580000003 HC RX 258: Performed by: STUDENT IN AN ORGANIZED HEALTH CARE EDUCATION/TRAINING PROGRAM

## 2024-08-04 PROCEDURE — 36415 COLL VENOUS BLD VENIPUNCTURE: CPT

## 2024-08-04 PROCEDURE — 83690 ASSAY OF LIPASE: CPT

## 2024-08-04 PROCEDURE — 82962 GLUCOSE BLOOD TEST: CPT

## 2024-08-04 PROCEDURE — 80053 COMPREHEN METABOLIC PANEL: CPT

## 2024-08-04 RX ORDER — SODIUM CHLORIDE 9 MG/ML
INJECTION, SOLUTION INTRAVENOUS PRN
Status: DISCONTINUED | OUTPATIENT
Start: 2024-08-04 | End: 2024-08-09 | Stop reason: HOSPADM

## 2024-08-04 RX ORDER — LIDOCAINE 4 G/G
1 PATCH TOPICAL DAILY
Status: DISCONTINUED | OUTPATIENT
Start: 2024-08-04 | End: 2024-08-09 | Stop reason: HOSPADM

## 2024-08-04 RX ORDER — ENOXAPARIN SODIUM 100 MG/ML
30 INJECTION SUBCUTANEOUS 2 TIMES DAILY
Status: DISCONTINUED | OUTPATIENT
Start: 2024-08-04 | End: 2024-08-04 | Stop reason: SDUPTHER

## 2024-08-04 RX ORDER — POTASSIUM CHLORIDE 7.45 MG/ML
10 INJECTION INTRAVENOUS PRN
Status: DISCONTINUED | OUTPATIENT
Start: 2024-08-04 | End: 2024-08-05

## 2024-08-04 RX ORDER — SODIUM CHLORIDE 0.9 % (FLUSH) 0.9 %
5-40 SYRINGE (ML) INJECTION PRN
Status: DISCONTINUED | OUTPATIENT
Start: 2024-08-04 | End: 2024-08-09 | Stop reason: HOSPADM

## 2024-08-04 RX ORDER — ONDANSETRON 2 MG/ML
4 INJECTION INTRAMUSCULAR; INTRAVENOUS EVERY 6 HOURS PRN
Status: DISCONTINUED | OUTPATIENT
Start: 2024-08-04 | End: 2024-08-09 | Stop reason: HOSPADM

## 2024-08-04 RX ORDER — MAGNESIUM SULFATE HEPTAHYDRATE 40 MG/ML
2000 INJECTION, SOLUTION INTRAVENOUS PRN
Status: DISCONTINUED | OUTPATIENT
Start: 2024-08-04 | End: 2024-08-05

## 2024-08-04 RX ORDER — SODIUM CHLORIDE, SODIUM LACTATE, POTASSIUM CHLORIDE, CALCIUM CHLORIDE 600; 310; 30; 20 MG/100ML; MG/100ML; MG/100ML; MG/100ML
INJECTION, SOLUTION INTRAVENOUS CONTINUOUS
Status: DISCONTINUED | OUTPATIENT
Start: 2024-08-04 | End: 2024-08-06

## 2024-08-04 RX ORDER — POTASSIUM CHLORIDE 20 MEQ/1
40 TABLET, EXTENDED RELEASE ORAL PRN
Status: DISCONTINUED | OUTPATIENT
Start: 2024-08-04 | End: 2024-08-05

## 2024-08-04 RX ORDER — POLYETHYLENE GLYCOL 3350 17 G/17G
17 POWDER, FOR SOLUTION ORAL DAILY PRN
Status: DISCONTINUED | OUTPATIENT
Start: 2024-08-04 | End: 2024-08-08

## 2024-08-04 RX ORDER — GLUCAGON 1 MG/ML
1 KIT INJECTION PRN
Status: DISCONTINUED | OUTPATIENT
Start: 2024-08-04 | End: 2024-08-09 | Stop reason: HOSPADM

## 2024-08-04 RX ORDER — SODIUM CHLORIDE 0.9 % (FLUSH) 0.9 %
5-40 SYRINGE (ML) INJECTION EVERY 12 HOURS SCHEDULED
Status: DISCONTINUED | OUTPATIENT
Start: 2024-08-04 | End: 2024-08-09 | Stop reason: HOSPADM

## 2024-08-04 RX ORDER — DEXTROSE MONOHYDRATE 100 MG/ML
INJECTION, SOLUTION INTRAVENOUS CONTINUOUS PRN
Status: DISCONTINUED | OUTPATIENT
Start: 2024-08-04 | End: 2024-08-09 | Stop reason: HOSPADM

## 2024-08-04 RX ORDER — ONDANSETRON 4 MG/1
4 TABLET, ORALLY DISINTEGRATING ORAL EVERY 8 HOURS PRN
Status: DISCONTINUED | OUTPATIENT
Start: 2024-08-04 | End: 2024-08-09 | Stop reason: HOSPADM

## 2024-08-04 RX ADMIN — SODIUM CHLORIDE, POTASSIUM CHLORIDE, SODIUM LACTATE AND CALCIUM CHLORIDE: 600; 310; 30; 20 INJECTION, SOLUTION INTRAVENOUS at 10:31

## 2024-08-04 RX ADMIN — FINASTERIDE 5 MG: 5 TABLET, FILM COATED ORAL at 10:16

## 2024-08-04 RX ADMIN — MONTELUKAST 10 MG: 10 TABLET, FILM COATED ORAL at 20:42

## 2024-08-04 RX ADMIN — DOCUSATE SODIUM 200 MG: 100 CAPSULE, LIQUID FILLED ORAL at 10:16

## 2024-08-04 RX ADMIN — CETIRIZINE HYDROCHLORIDE 10 MG: 10 TABLET, FILM COATED ORAL at 10:16

## 2024-08-04 RX ADMIN — Medication 1 TABLET: at 10:16

## 2024-08-04 RX ADMIN — INSULIN LISPRO 6 UNITS: 100 INJECTION, SOLUTION INTRAVENOUS; SUBCUTANEOUS at 16:26

## 2024-08-04 RX ADMIN — INSULIN LISPRO 2 UNITS: 100 INJECTION, SOLUTION INTRAVENOUS; SUBCUTANEOUS at 12:43

## 2024-08-04 RX ADMIN — TAMSULOSIN HYDROCHLORIDE 0.4 MG: 0.4 CAPSULE ORAL at 10:16

## 2024-08-04 RX ADMIN — SODIUM CHLORIDE, PRESERVATIVE FREE 10 ML: 5 INJECTION INTRAVENOUS at 10:16

## 2024-08-04 ASSESSMENT — PAIN DESCRIPTION - LOCATION: LOCATION: BACK

## 2024-08-04 ASSESSMENT — PAIN SCALES - GENERAL: PAINLEVEL_OUTOF10: 3

## 2024-08-04 ASSESSMENT — PAIN DESCRIPTION - ORIENTATION: ORIENTATION: POSTERIOR

## 2024-08-04 ASSESSMENT — PAIN DESCRIPTION - PAIN TYPE: TYPE: ACUTE PAIN

## 2024-08-04 NOTE — PLAN OF CARE
Problem: Chronic Conditions and Co-morbidities  Goal: Patient's chronic conditions and co-morbidity symptoms are monitored and maintained or improved  Outcome: Progressing     Problem: Discharge Planning  Goal: Discharge to home or other facility with appropriate resources  Outcome: Progressing     Problem: Pain  Goal: Verbalizes/displays adequate comfort level or baseline comfort level  Outcome: Adequate for Discharge     Problem: Safety - Adult  Goal: Free from fall injury  Outcome: Completed     Problem: ABCDS Injury Assessment  Goal: Absence of physical injury  Outcome: Completed
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
## 2024-08-04 NOTE — H&P (VIEW-ONLY)
Hepatobiliary and Pancreatic Surgery   H&P Note      Patient's Name/Date of Birth: Michael Garcia / 1945    Date: August 3, 2024     PCP: Moshe Moran DO     Chief Complaint: fatigue, abd pain and jaundice    HPI:   Michael Garcia is a 79 y.o. male with PMHx Prostate CA, DM, HTN, Osteoarthritis, and he is deaf who presents with abdominal pain x 3 weeks, fatigue, and jaundice. He was seen by his PCP yesterday, had labs and was called to go to ED for further evaluation of elevated LFTs.  Pt reportedly had pain upper abdomen/epigastric region x 3 weeks, noted dark urine, and weight loss 20# over the past month so he saw his PCP, was noted to be jaundiced, labs done and he was called to go to ED for evaluation.  States he has lost 225# but is unsure over the period of time and states that it was initially intentional.  States since admission his pain has resolved. Reports diarrhea post CT scan with oral contrast, stool brown.  Denies nausea, vomiting, chills, fever, hematochezia, melena, or hematemesis.  CT concerning for duodenal mass therefore [facility] consulted.  Colonoscopy 3/28/2024 with Dr. Monge pathology showed Sigmoid Colon Biopsy: Focal active colitis with focal cryptitis, mild to moderate mucosal inflammation and erosions. No granulomas, dysplasia, significant architectural distortion nor   evidence of microscopic, lymphocytic or collagenous colitis seen. The findings are non-specific and may be ischemia or treatment effect. The differential diagnosis includes acute self-limited colitis, infectious, diverticular disease associated colitis or colitis associated with NSAIDs. If lesion persist or progress, then   inflammatory bowel disease may be considered.      Currently denies abdominal pain, nausea, or vomiting.  States his urine remains dark.    Denies tobacco, alcohol, or illicit drug use.  Mother  from bone cancer.  Father  but unknown cause of death.  1 sister  from heart  cancer (HCC)    Duodenal mass    Elevated liver enzymes    Jaundice    Pancreatic adenocarcinoma (HCC)    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 (HCC)    Abnormal weight loss       Plan:  -Discussed with patient that he will need a PTHC.  -Patient will also need an endoscopic ultrasound this admission with Dr. Carlos, timing TBD  -CEA is normal, CA 19-9 = 569  -Dr. Santoyo discussed the case with Dr. Dennis about needing a PTHC. Plan tentatively for Monday 8/5.   -Ok for FLD through the weekend.     Discussed w Dr. Yarelis Alonzo,   PGY-3 General Surgery Resident     Attending Physician Statement:    Chief Complaint: 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    I have examined the patient and performed the key aspects of physical exam, reviewed the record (including all pertinent and new radiology images and laboratory findings), and discussed the case with the surgical team.  I agree with the assessment and plan with the following additions, corrections, and changes. 14pt review of symptoms completed and negative except as mentioned.    Patient states that he is feeling better.  Start IV fluids   Continue diet then n.p.o. at midnight  Will plan to reconsult Dr. Carlos for an EUS  Patient needs a PTHC placement due to a failed ERCP due to duodenal compression      Demetrius Santoyo MD  08/04/24  10:42 AM

## 2024-08-04 NOTE — PROGRESS NOTES
4 Eyes Skin Assessment     NAME:  Michael Garcia  YOB: 1945  MEDICAL RECORD NUMBER:  [id number]    The patient is being assessed for  Admission    I agree that at least one RN has performed a thorough Head to Toe Skin Assessment on the patient. ALL assessment sites listed below have been assessed.      Areas assessed by both nurses:    Head, Face, Ears, Shoulders, Back, Chest, Arms, Elbows, Hands, Sacrum. Buttock, Coccyx, Ischium, Legs. Feet and Heels, and Under Medical Devices         Does the Patient have a Wound? No noted wound(s)       Mike Prevention initiated by RN: No  Wound Care Orders initiated by RN: No    Pressure Injury (Stage 3,4, Unstageable, DTI, NWPT, and Complex wounds) if present, place Wound referral order by RN under : No    New Ostomies, if present place, Ostomy referral order under : No     Nurse 1 eSignature: Electronically signed by Lien Dwyer RN on 8/3/24 at 10:39 PM EDT    **SHARE this note so that the co-signing nurse can place an eSignature**    Nurse 2 eSignature: Electronically signed by Justo Myers RN on 8/3/24 at 10:41 PM EDT

## 2024-08-04 NOTE — CONSULTS
RE-CONSULT NOTE      Patient Presents with/Seen in Consultation For      Reason for Consult: elevated liver enzymes   CHIEF COMPLAINT:  abdominal pain, fatigues and abnormal labs   Subjective:   Patient transfer from [facility] for PTHC and EUS. Re-consulted for EUS procedure.    Patient without complaints at this time.  States he feels well.  Tolerating diet.  Family at bedside plan of care discussed with both.  Review of Systems  Aside from what was mentioned in the PMH and HPI, essentially unremarkable, all others negative.    Objective:     /78   Pulse 53   Temp 97.7 °F (36.5 °C) (Temporal)   Resp 16   Ht 1.88 m (6' 2\")   Wt 104.8 kg (231 lb)   SpO2 95%   BMI 29.66 kg/m²     General appearance: alert, awake, laying in bed, and cooperative  Eyes: conjunctiva pale, sclera anicteric. PERRL.  Lungs: clear to auscultation bilaterally  Heart: regular rate and rhythm, no murmur, 2+ pulses; no edema  Abdomen: soft, non-tender; bowel sounds normal; no masses,  no organomegaly  Extremities: extremities without edema  Pulses: 2+ and symmetric  Skin: Skin color, texture, turgor normal.   Neurologic: Grossly normal, deaf (able to communicate)    0.9 % sodium chloride infusion, PRN  dextrose 10 % infusion, Continuous PRN  dextrose bolus 10% 125 mL, PRN   Or  dextrose bolus 10% 250 mL, PRN  glucagon injection 1 mg, PRN  glucose chewable tablet 16 g, PRN  magnesium sulfate 2000 mg in water 50 mL IVPB, PRN  ondansetron (ZOFRAN-ODT) disintegrating tablet 4 mg, Q8H PRN   Or  ondansetron (ZOFRAN) injection 4 mg, Q6H PRN  polyethylene glycol (GLYCOLAX) packet 17 g, Daily PRN  potassium chloride (KLOR-CON M) extended release tablet 40 mEq, PRN   Or  potassium bicarb-citric acid (EFFER-K) effervescent tablet 40 mEq, PRN   Or  potassium chloride 10 mEq/100 mL IVPB (Peripheral Line), PRN  sodium chloride flush 0.9 % injection 5-40 mL, 2 times per day  sodium chloride flush 0.9 % injection 5-40 mL, PRN  lactated ringers IV soln  294 08/04/2024 05:35 AM     08/04/2024 05:35 AM     08/04/2024 05:35 AM    BILITOT 6.7 08/04/2024 05:35 AM    BILIDIR 5.6 07/31/2024 06:08 AM    IBILI 1.2 07/31/2024 06:08 AM     No components found for: \"CHLPL\"    Lab Results   Component Value Date    TRIG 182 (H) 10/04/2010       Lab Results   Component Value Date    HDL 41 07/31/2024    HDL 32.0 (A) 10/04/2010     No components found for: \"LDLCALC\"  No components found for: \"LABVLDL\"   PT/INR:    Lab Results   Component Value Date/Time    PROTIME 11.7 08/02/2024 03:50 AM    INR 1.1 08/02/2024 03:50 AM     IRON:    Lab Results   Component Value Date/Time    IRON 136 07/31/2024 01:47 PM     Iron Saturation:  No components found for: \"PERCENTFE\"  FERRITIN:    Lab Results   Component Value Date/Time    FERRITIN 653 07/31/2024 01:47 PM         Assessment:   Pancreatic mass with duodenal obstruction  Elevated LFT's 2/2 to above   Obstructive jaundice   Fatigue  Constipation  Abdominal bloating  EGD 8/2/24-1) 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 2) Terminated ERCP, unable to traverse duodenal stricture with ERCP scope  CA 19-9 569    Plan:   EUS TBD, pending endo availability  [facility] placement ordered, plan for tomorrow   CEA WNL, CA 19-9 569  Continue Stool softeners   Trend labs  Supportive care  Will follow      Note: This report was completed utilizing computer voice recognition software. Every effort has been made to ensure accuracy, however; inadvertent computerized transcription errors may be present.     Discussed with Dr. Carlos  Plan per Dr. Breanna Saavedra APRN-NP-C 8/4/2024  12:35 PM For Dr. Carlos    GI attending addendum:  The patient case was reviewed and discussed with the GI midlevel team.     Demetrius Carlos DO

## 2024-08-04 NOTE — H&P
Hepatobiliary and Pancreatic Surgery   H&P Note      Patient's Name/Date of Birth: Michael Garcia / 1945    Date: August 3, 2024     PCP: Msohe Moran DO     Chief Complaint: fatigue, abd pain and jaundice    HPI:   Michael Garcia is a 79 y.o. male with PMHx Prostate CA, DM, HTN, Osteoarthritis, and he is deaf who presents with abdominal pain x 3 weeks, fatigue, and jaundice. He was seen by his PCP yesterday, had labs and was called to go to ED for further evaluation of elevated LFTs.  Pt reportedly had pain upper abdomen/epigastric region x 3 weeks, noted dark urine, and weight loss 20# over the past month so he saw his PCP, was noted to be jaundiced, labs done and he was called to go to ED for evaluation.  States he has lost 225# but is unsure over the period of time and states that it was initially intentional.  States since admission his pain has resolved. Reports diarrhea post CT scan with oral contrast, stool brown.  Denies nausea, vomiting, chills, fever, hematochezia, melena, or hematemesis.  CT concerning for duodenal mass therefore [facility] consulted.  Colonoscopy 3/28/2024 with Dr. Monge pathology showed Sigmoid Colon Biopsy: Focal active colitis with focal cryptitis, mild to moderate mucosal inflammation and erosions. No granulomas, dysplasia, significant architectural distortion nor   evidence of microscopic, lymphocytic or collagenous colitis seen. The findings are non-specific and may be ischemia or treatment effect. The differential diagnosis includes acute self-limited colitis, infectious, diverticular disease associated colitis or colitis associated with NSAIDs. If lesion persist or progress, then   inflammatory bowel disease may be considered.      Currently denies abdominal pain, nausea, or vomiting.  States his urine remains dark.    Denies tobacco, alcohol, or illicit drug use.  Mother  from bone cancer.  Father  but unknown cause of death.  1 sister  from heart

## 2024-08-04 NOTE — PROGRESS NOTES
GENERAL SURGERY  DAILY PROGRESS NOTE  8/4/2024  Subjective:  Patient indicates he is doing well.  Says he is passing gas, denies nausea or vomiting.  Was tolerating full liquid diet.  I/O this shift:  In: 920 [P.O.:920]  Out: -   No intake/output data recorded.    Objective:  /78   Pulse 53   Temp 97.7 °F (36.5 °C) (Temporal)   Resp 16   Ht 1.88 m (6' 2\")   Wt 104.8 kg (231 lb)   SpO2 95%   BMI 29.66 kg/m²     - General: No acute distress. Awake and conversant.  - CV: Regular rate.  - Respiratory: Respirations are non-labored   - Abdomen: Soft, minimally-distended, non-nontender.  - Skin: Warm. No rashes or ulcers.  - Extremities: No cyanosis or edema.    Lab Results   Component Value Date    WBC 3.9 (L) 08/04/2024    HGB 11.4 (L) 08/04/2024    HCT 34.6 (L) 08/04/2024    MCV 99.1 08/04/2024     08/04/2024     Lab Results   Component Value Date     08/04/2024    K 4.1 08/04/2024     (H) 08/04/2024    CO2 25 08/04/2024    BUN 6 08/04/2024    CREATININE 0.8 08/04/2024    GLUCOSE 178 (H) 08/04/2024    CALCIUM 8.8 08/04/2024    BILITOT 6.7 (H) 08/04/2024    ALKPHOS 294 (H) 08/04/2024     (H) 08/04/2024     (H) 08/04/2024    LABGLOM >90 08/04/2024    GFRAA >60 09/01/2022       ASSESSMENT/PLAN:    79 y.o. male who is deaf however is able to read lips. He presented to the hospital with obstructive jaundice with a bilirubin of 6.8. 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     Advance to regular diet  N.p.o. at midnight  Will check a morning INR  IR consult for PTHC tomorrow  GI consult Dr. Carlos  -Discussed with Dr. Yarelis Dodd DO  General Surgery Resident, PGY-1    Electronically signed by Jax Dodd DO on 8/4/24 at 1:32 PM EDT

## 2024-08-05 ENCOUNTER — PREP FOR PROCEDURE (OUTPATIENT)
Dept: HEMATOLOGY | Age: 79
End: 2024-08-05

## 2024-08-05 DIAGNOSIS — C25.9 ADENOCARCINOMA OF PANCREAS (HCC): ICD-10-CM

## 2024-08-05 LAB
ALBUMIN SERPL-MCNC: 3.5 G/DL (ref 3.5–5.2)
ALP SERPL-CCNC: 316 U/L (ref 40–129)
ALT SERPL-CCNC: 268 U/L (ref 0–40)
ANION GAP SERPL CALCULATED.3IONS-SCNC: 12 MMOL/L (ref 7–16)
AST SERPL-CCNC: 123 U/L (ref 0–39)
BASOPHILS # BLD: 0 K/UL (ref 0–0.2)
BASOPHILS NFR BLD: 0 % (ref 0–2)
BILIRUB SERPL-MCNC: 7.7 MG/DL (ref 0–1.2)
BUN SERPL-MCNC: 11 MG/DL (ref 6–23)
CALCIUM SERPL-MCNC: 9.2 MG/DL (ref 8.6–10.2)
CHLORIDE SERPL-SCNC: 100 MMOL/L (ref 98–107)
CO2 SERPL-SCNC: 24 MMOL/L (ref 22–29)
CREAT SERPL-MCNC: 0.7 MG/DL (ref 0.7–1.2)
EBV VCA IGG SER-ACNC: 773 U/ML
EOSINOPHIL # BLD: 0.04 K/UL (ref 0.05–0.5)
EOSINOPHILS RELATIVE PERCENT: 1 % (ref 0–6)
ERYTHROCYTE [DISTWIDTH] IN BLOOD BY AUTOMATED COUNT: 12.3 % (ref 11.5–15)
GFR, ESTIMATED: >90 ML/MIN/1.73M2
GLUCOSE BLD-MCNC: 160 MG/DL (ref 74–99)
GLUCOSE BLD-MCNC: 175 MG/DL (ref 74–99)
GLUCOSE BLD-MCNC: 213 MG/DL (ref 74–99)
GLUCOSE BLD-MCNC: 239 MG/DL (ref 74–99)
GLUCOSE SERPL-MCNC: 182 MG/DL (ref 74–99)
HCT VFR BLD AUTO: 40.2 % (ref 37–54)
HGB BLD-MCNC: 13.2 G/DL (ref 12.5–16.5)
INR PPP: 1.1
LYMPHOCYTES NFR BLD: 0.48 K/UL (ref 1.5–4)
LYMPHOCYTES RELATIVE PERCENT: 10 % (ref 20–42)
MCH RBC QN AUTO: 32.8 PG (ref 26–35)
MCHC RBC AUTO-ENTMCNC: 32.8 G/DL (ref 32–34.5)
MCV RBC AUTO: 99.8 FL (ref 80–99.9)
MONOCYTES NFR BLD: 0.43 K/UL (ref 0.1–0.95)
MONOCYTES NFR BLD: 9 % (ref 2–12)
NEUTROPHILS NFR BLD: 81 % (ref 43–80)
NEUTS SEG NFR BLD: 4.04 K/UL (ref 1.8–7.3)
PLATELET # BLD AUTO: 216 K/UL (ref 130–450)
PMV BLD AUTO: 11.9 FL (ref 7–12)
POTASSIUM SERPL-SCNC: 3.8 MMOL/L (ref 3.5–5)
PROT SERPL-MCNC: 6.3 G/DL (ref 6.4–8.3)
PROTHROMBIN TIME: 11.8 SEC (ref 9.3–12.4)
RBC # BLD AUTO: 4.03 M/UL (ref 3.8–5.8)
RBC # BLD: ABNORMAL 10*6/UL
SODIUM SERPL-SCNC: 136 MMOL/L (ref 132–146)
WBC OTHER # BLD: 5 K/UL (ref 4.5–11.5)

## 2024-08-05 PROCEDURE — 80053 COMPREHEN METABOLIC PANEL: CPT

## 2024-08-05 PROCEDURE — 99232 SBSQ HOSP IP/OBS MODERATE 35: CPT | Performed by: TRANSPLANT SURGERY

## 2024-08-05 PROCEDURE — 6370000000 HC RX 637 (ALT 250 FOR IP)

## 2024-08-05 PROCEDURE — 85610 PROTHROMBIN TIME: CPT

## 2024-08-05 PROCEDURE — 36415 COLL VENOUS BLD VENIPUNCTURE: CPT

## 2024-08-05 PROCEDURE — 85025 COMPLETE CBC W/AUTO DIFF WBC: CPT

## 2024-08-05 PROCEDURE — 82962 GLUCOSE BLOOD TEST: CPT

## 2024-08-05 PROCEDURE — 1200000000 HC SEMI PRIVATE

## 2024-08-05 PROCEDURE — 6360000002 HC RX W HCPCS: Performed by: STUDENT IN AN ORGANIZED HEALTH CARE EDUCATION/TRAINING PROGRAM

## 2024-08-05 PROCEDURE — 2580000003 HC RX 258: Performed by: STUDENT IN AN ORGANIZED HEALTH CARE EDUCATION/TRAINING PROGRAM

## 2024-08-05 RX ORDER — OXYCODONE HYDROCHLORIDE 10 MG/1
10 TABLET ORAL EVERY 4 HOURS PRN
Status: DISCONTINUED | OUTPATIENT
Start: 2024-08-05 | End: 2024-08-09 | Stop reason: HOSPADM

## 2024-08-05 RX ORDER — SODIUM CHLORIDE 9 MG/ML
INJECTION, SOLUTION INTRAVENOUS PRN
Status: CANCELLED | OUTPATIENT
Start: 2024-08-05

## 2024-08-05 RX ORDER — OXYCODONE HYDROCHLORIDE 5 MG/1
5 TABLET ORAL EVERY 4 HOURS PRN
Status: DISCONTINUED | OUTPATIENT
Start: 2024-08-05 | End: 2024-08-09 | Stop reason: HOSPADM

## 2024-08-05 RX ORDER — SODIUM CHLORIDE 0.9 % (FLUSH) 0.9 %
5-40 SYRINGE (ML) INJECTION PRN
Status: CANCELLED | OUTPATIENT
Start: 2024-08-05

## 2024-08-05 RX ORDER — BISACODYL 10 MG
10 SUPPOSITORY, RECTAL RECTAL DAILY PRN
Status: DISCONTINUED | OUTPATIENT
Start: 2024-08-05 | End: 2024-08-05

## 2024-08-05 RX ORDER — SODIUM CHLORIDE 0.9 % (FLUSH) 0.9 %
5-40 SYRINGE (ML) INJECTION EVERY 12 HOURS SCHEDULED
Status: CANCELLED | OUTPATIENT
Start: 2024-08-05

## 2024-08-05 RX ADMIN — DOCUSATE SODIUM 200 MG: 100 CAPSULE, LIQUID FILLED ORAL at 14:27

## 2024-08-05 RX ADMIN — FINASTERIDE 5 MG: 5 TABLET, FILM COATED ORAL at 14:21

## 2024-08-05 RX ADMIN — TAMSULOSIN HYDROCHLORIDE 0.4 MG: 0.4 CAPSULE ORAL at 14:23

## 2024-08-05 RX ADMIN — CETIRIZINE HYDROCHLORIDE 10 MG: 10 TABLET, FILM COATED ORAL at 14:28

## 2024-08-05 RX ADMIN — INSULIN LISPRO 2 UNITS: 100 INJECTION, SOLUTION INTRAVENOUS; SUBCUTANEOUS at 17:39

## 2024-08-05 RX ADMIN — INSULIN GLARGINE 10 UNITS: 100 INJECTION, SOLUTION SUBCUTANEOUS at 22:14

## 2024-08-05 RX ADMIN — MONTELUKAST 10 MG: 10 TABLET, FILM COATED ORAL at 20:17

## 2024-08-05 RX ADMIN — ONDANSETRON 4 MG: 2 INJECTION INTRAMUSCULAR; INTRAVENOUS at 15:42

## 2024-08-05 RX ADMIN — SODIUM CHLORIDE, PRESERVATIVE FREE 10 ML: 5 INJECTION INTRAVENOUS at 15:43

## 2024-08-05 RX ADMIN — Medication 1 TABLET: at 11:45

## 2024-08-05 NOTE — PRE-PROCEDURE INSTRUCTIONS
Special Procedures/Interventional Radiology Pre-Procedure Evaluation    Patient Name: Michael Garcia  MRN: [id number]  : 1945  Date of Procedure: 24  Planned Procedure: PTHC    Is the patient alert, oriented, and capable of providing informed procedural consent? Yes  Has the patient adhered to NPO status since midnight? Yes  Are recent lab results within acceptable parameters to safely proceed with the procedure? Yes  Has medical/surgical/allergy history been reviewed? Yes  Have anticoagulant medications been appropriately withheld as per protocol? Yes  Is the patient receiving intravenous antibiotic therapy? Yes    I have discussed and reviewed this information with the patient's primary RN. Plan to proceed with the procedure today. However, the exact timing of the procedure cannot be specified at this moment. There is also a possibility that the procedure may be rescheduled due to the department's caseload and prioritization of cases.

## 2024-08-05 NOTE — PLAN OF CARE
Problem: Chronic Conditions and Co-morbidities  Goal: Patient's chronic conditions and co-morbidity symptoms are monitored and maintained or improved  8/5/2024 0908 by Jesika Sin RN  Outcome: Progressing     Problem: Discharge Planning  Goal: Discharge to home or other facility with appropriate resources  8/5/2024 0908 by Jesika Sin RN  Outcome: Progressing     Problem: Pain  Goal: Verbalizes/displays adequate comfort level or baseline comfort level  8/5/2024 0908 by Jesika Sin RN  Outcome: Progressing

## 2024-08-05 NOTE — PROGRESS NOTES
PROGRESS NOTE      Patient Presents with/Seen in Consultation For      Reason for Consult: elevated liver enzymes   CHIEF COMPLAINT:  abdominal pain, fatigues and abnormal labs   Subjective:   Patient seen sitting in chair in no apparent distress. Waiting procedure. No current complaints. Reports stools are hard. POC d/w pt and family at bedside.   Review of Systems  Aside from what was mentioned in the PMH and HPI, essentially unremarkable, all others negative.    Objective:     /72   Pulse 50   Temp 97.9 °F (36.6 °C) (Temporal)   Resp 16   Ht 1.88 m (6' 2\")   Wt 104.8 kg (231 lb)   SpO2 96%   BMI 29.66 kg/m²     General appearance: alert, awake, sitting in chair, and cooperative  Eyes: conjunctiva pale, sclera icteric. PERRL.  Lungs: clear to auscultation bilaterally  Heart: regular rate and rhythm, no murmur, 2+ pulses; no edema  Abdomen: soft, non-tender; bowel sounds normal; no masses,  no organomegaly  Extremities: extremities without edema  Pulses: 2+ and symmetric  Skin: Skin color AA , texture, turgor normal.   Neurologic: Grossly normal, deaf (able to communicate)    0.9 % sodium chloride infusion, PRN  dextrose 10 % infusion, Continuous PRN  dextrose bolus 10% 125 mL, PRN   Or  dextrose bolus 10% 250 mL, PRN  glucagon injection 1 mg, PRN  glucose chewable tablet 16 g, PRN  ondansetron (ZOFRAN-ODT) disintegrating tablet 4 mg, Q8H PRN   Or  ondansetron (ZOFRAN) injection 4 mg, Q6H PRN  polyethylene glycol (GLYCOLAX) packet 17 g, Daily PRN  sodium chloride flush 0.9 % injection 5-40 mL, 2 times per day  sodium chloride flush 0.9 % injection 5-40 mL, PRN  lactated ringers IV soln infusion, Continuous  lidocaine 4 % external patch 1 patch, Daily  ampicillin-sulbactam (UNASYN) 3,000 mg in sodium chloride 0.9 % 100 mL IVPB ([id number]), See Admin Instructions  cetirizine (ZYRTEC) tablet 10 mg, Daily  diatrizoate meglumine-sodium (GASTROGRAFIN) 66-10 % solution 140 mL, ONCE PRN  docusate sodium

## 2024-08-05 NOTE — CARE COORDINATION
08/05/24 Transition of Care: patient is a transfer from [facility] due to need for hepatobiliary surgeon consult. Dr Santoyo consulted. He is NPO for PTHC due to failed erpc/egd on 8/2 at [place]. He is alert and oriented. He is from home with his SO. He is deaf but reads lips. He has a three story home. He was independent. He has a glucometer. He follows with DR Dave Marinelli and his pharmacy is [facility] on [address] . He plans on returning home at discharge. He does not expect to have any home going  needs. Will follow and assist as needed. Electronically signed by Lanie Epps RN CM on 8/5/2024 at 10:17 AM    Case Management Assessment  Initial Evaluation    Date/Time of Evaluation: 8/5/2024 10:18 AM  Assessment Completed by: Lanie Epps RN    If patient is discharged prior to next notation, then this note serves as note for discharge by case management.    Patient Name: Michael Garcia                   YOB: 1945  Diagnosis: 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 (HCC) [K83.1, C80.1]                   Date / Time: 8/3/2024  7:41 PM    Patient Admission Status: Inpatient   Readmission Risk (Low < 19, Mod (19-27), High > 27): Readmission Risk Score: 17.2    Current PCP: Moshe Moran, DO  PCP verified by ? Yes    Chart Reviewed: Yes      History Provided by: Patient  Patient Orientation: Alert and Oriented    Patient Cognition: Alert    Hospitalization in the last 30 days (Readmission):  Yes    If yes, Readmission Assessment in  Navigator will be completed.    Advance Directives:      Code Status: Full Code   Patient's Primary Decision Maker is: Legal Next of Kin    Primary Decision Maker: Razia Tim - Ismael - [phone]    Discharge Planning:    Patient lives with: Spouse/Significant Other Type of Home: House  Primary Care Giver: Self  Patient Support Systems include: Spouse/Significant Other   Current Financial resources:    Current community resources:    Current services  prior to admission: None            Current DME:              Type of Home Care services:  None    ADLS  Prior functional level: Independent in ADLs/IADLs  Current functional level: Independent in ADLs/IADLs    PT AM-PAC:   /24  OT AM-PAC:   /24    Family can provide assistance at DC: Yes  Would you like Case Management to discuss the discharge plan with any other family members/significant others, and if so, who? No  Plans to Return to Present Housing: Yes  Other Identified Issues/Barriers to RETURNING to current housing: none   Potential Assistance needed at discharge: N/A            Potential DME:    Patient expects to discharge to: House  Plan for transportation at discharge:      Financial    Payor: MEDICARE / Plan: MEDICARE PART A AND B / Product Type: *No Product type* /     Does insurance require precert for SNF: Yes    Potential assistance Purchasing Medications:    Meds-to-Beds request:        [id number] - [place], [place] - [address] - P [phone] - F [phone]  [facility] [address]  Phone: [phone] Fax: [phone]      Notes:    Factors facilitating achievement of predicted outcomes: Family support    Barriers to discharge: Pain and Medical complications    Additional Case Management Notes: see notes     The Plan for Transition of Care is related to the following treatment goals of 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 (HCC) [K83.1, C80.1]    IF APPLICABLE: The Patient and/or patient representative Michael and his family were provided with a choice of provider and agrees with the discharge plan. Freedom of choice list with basic dialogue that supports the patient's individualized plan of care/goals and shares the quality data associated with the providers was provided to:     Patient Representative Name:       The Patient and/or Patient Representative Agree with the Discharge Plan?      Lanie Epps RN  Case Management Department  Ph:  [phone]

## 2024-08-05 NOTE — PROGRESS NOTES
[facility] rescheduled for tomorrow 8/6/24, updated perfect Dr. Asia Serrano via perfect serve and diet order placed.  NPO at midnight.

## 2024-08-05 NOTE — PLAN OF CARE
Problem: Chronic Conditions and Co-morbidities  Goal: Patient's chronic conditions and co-morbidity symptoms are monitored and maintained or improved  8/5/2024 1747 by Jesika Sin RN  Outcome: Progressing     Problem: Discharge Planning  Goal: Discharge to home or other facility with appropriate resources  8/5/2024 1747 by Jesika Sin RN  Outcome: Progressing     Problem: Pain  Goal: Verbalizes/displays adequate comfort level or baseline comfort level  8/5/2024 1747 by Jesika Sin RN  Outcome: Progressing

## 2024-08-05 NOTE — PLAN OF CARE
Problem: Chronic Conditions and Co-morbidities  Goal: Patient's chronic conditions and co-morbidity symptoms are monitored and maintained or improved  8/4/2024 2302 by Juanita Garcia RN  Outcome: Progressing  8/4/2024 2302 by Juanita Garcia RN  Outcome: Progressing  8/4/2024 1734 by Pipo Abdullahi RN  Outcome: Progressing     Problem: Discharge Planning  Goal: Discharge to home or other facility with appropriate resources  8/4/2024 2302 by Juanita Garcia RN  Outcome: Progressing  8/4/2024 2302 by Juanita Garcia RN  Outcome: Progressing  8/4/2024 1734 by Pipo Abdullahi RN  Outcome: Progressing     Problem: Pain  Goal: Verbalizes/displays adequate comfort level or baseline comfort level  8/4/2024 2302 by Juanita Garcia RN  Outcome: Progressing  8/4/2024 2302 by Juanita Garcia RN  Outcome: Progressing  8/4/2024 1734 by Pipo Abdullahi RN  Outcome: Adequate for Discharge

## 2024-08-05 NOTE — PROGRESS NOTES
HEPATOBILIARY AND PANCREATIC SURGERY  DAILY PROGRESS NOTE  8/5/2024  Subjective:  No issues overnight. Patient doing well this morning, no complaints.     No intake/output data recorded.  I/O last 3 completed shifts:  In: 2135.2 [P.O.:1160; I.V.:975.2]  Out: -     Objective:  /65   Pulse 50   Temp 97.7 °F (36.5 °C) (Temporal)   Resp 16   Ht 1.88 m (6' 2\")   Wt 104.8 kg (231 lb)   SpO2 95%   BMI 29.66 kg/m²     - General: No acute distress. Awake and conversant.  - CV: Regular rate.  - Respiratory: Respirations are non-labored   - Abdomen: Soft, minimally-distended, non-nontender.  - Skin: Warm. No rashes or ulcers.  - Extremities: No cyanosis or edema.    Lab Results   Component Value Date    WBC 5.0 08/05/2024    HGB 13.2 08/05/2024    HCT 40.2 08/05/2024    MCV 99.8 08/05/2024     08/05/2024     Lab Results   Component Value Date     08/04/2024    K 4.1 08/04/2024     (H) 08/04/2024    CO2 25 08/04/2024    BUN 6 08/04/2024    CREATININE 0.8 08/04/2024    GLUCOSE 178 (H) 08/04/2024    CALCIUM 8.8 08/04/2024    BILITOT 6.7 (H) 08/04/2024    ALKPHOS 294 (H) 08/04/2024     (H) 08/04/2024     (H) 08/04/2024    LABGLOM >90 08/04/2024    GFRAA >60 09/01/2022       ASSESSMENT/PLAN:    79 y.o. male who is deaf however is able to read lips. He presented to the hospital with obstructive jaundice with a bilirubin of 6.8. 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  I agree with the assessment and plan with the following additions, corrections, and changes. 14pt review of symptoms completed and negative except as mentioned.    CEA= 2.1, CA 19-9= 561  Biopsies pending from EGD  Discussed case with Dr. Carlos who states that he had a large ulceration at the duodenal bulb that was noncompliant with balloon dilation.  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  [facility] today  Given the close proximity of the duodenum to the hepatic artery I am concerned that if this is a cancer and placing a duodenal stent for a duodenal obstruction will cause significant issues after neoadjuvant chemotherapy at the time of his surgical resection.  I have placed him on the schedule next week for a Whipple procedure with a possible portal vein reconstruction after evaluating his images.  Patient does not need to be in the hospital until next week.  However hopefully we can have some decompression after the [facility].    Demetrius Santoyo MD  08/05/24  12:21 PM

## 2024-08-06 ENCOUNTER — APPOINTMENT (OUTPATIENT)
Dept: INTERVENTIONAL RADIOLOGY/VASCULAR | Age: 79
DRG: 445 | End: 2024-08-06
Attending: TRANSPLANT SURGERY
Payer: MEDICARE

## 2024-08-06 LAB
ALBUMIN SERPL-MCNC: 3 G/DL (ref 3.5–5.2)
ALP SERPL-CCNC: 282 U/L (ref 40–129)
ALT SERPL-CCNC: 224 U/L (ref 0–40)
ANION GAP SERPL CALCULATED.3IONS-SCNC: 9 MMOL/L (ref 7–16)
AST SERPL-CCNC: 115 U/L (ref 0–39)
BASOPHILS # BLD: 0.02 K/UL (ref 0–0.2)
BASOPHILS NFR BLD: 1 % (ref 0–2)
BILIRUB SERPL-MCNC: 7.6 MG/DL (ref 0–1.2)
BUN SERPL-MCNC: 9 MG/DL (ref 6–23)
CALCIUM SERPL-MCNC: 8.9 MG/DL (ref 8.6–10.2)
CHLORIDE SERPL-SCNC: 102 MMOL/L (ref 98–107)
CO2 SERPL-SCNC: 26 MMOL/L (ref 22–29)
CREAT SERPL-MCNC: 0.8 MG/DL (ref 0.7–1.2)
EOSINOPHIL # BLD: 0.14 K/UL (ref 0.05–0.5)
EOSINOPHILS RELATIVE PERCENT: 4 % (ref 0–6)
ERYTHROCYTE [DISTWIDTH] IN BLOOD BY AUTOMATED COUNT: 12.5 % (ref 11.5–15)
GFR, ESTIMATED: >90 ML/MIN/1.73M2
GLUCOSE BLD-MCNC: 166 MG/DL (ref 74–99)
GLUCOSE BLD-MCNC: 200 MG/DL (ref 74–99)
GLUCOSE BLD-MCNC: 201 MG/DL (ref 74–99)
GLUCOSE BLD-MCNC: 260 MG/DL (ref 74–99)
GLUCOSE SERPL-MCNC: 190 MG/DL (ref 74–99)
HCT VFR BLD AUTO: 35.5 % (ref 37–54)
HGB BLD-MCNC: 11.9 G/DL (ref 12.5–16.5)
IMM GRANULOCYTES # BLD AUTO: <0.03 K/UL (ref 0–0.58)
IMM GRANULOCYTES NFR BLD: 1 % (ref 0–5)
INR PPP: 1.2
LYMPHOCYTES NFR BLD: 0.46 K/UL (ref 1.5–4)
LYMPHOCYTES RELATIVE PERCENT: 13 % (ref 20–42)
MCH RBC QN AUTO: 33.9 PG (ref 26–35)
MCHC RBC AUTO-ENTMCNC: 33.5 G/DL (ref 32–34.5)
MCV RBC AUTO: 101.1 FL (ref 80–99.9)
MONOCYTES NFR BLD: 0.63 K/UL (ref 0.1–0.95)
MONOCYTES NFR BLD: 18 % (ref 2–12)
NEUTROPHILS NFR BLD: 65 % (ref 43–80)
NEUTS SEG NFR BLD: 2.32 K/UL (ref 1.8–7.3)
PLATELET # BLD AUTO: 180 K/UL (ref 130–450)
PMV BLD AUTO: 11.6 FL (ref 7–12)
POTASSIUM SERPL-SCNC: 4.3 MMOL/L (ref 3.5–5)
PROT SERPL-MCNC: 5.5 G/DL (ref 6.4–8.3)
PROTHROMBIN TIME: 13.3 SEC (ref 9.3–12.4)
RBC # BLD AUTO: 3.51 M/UL (ref 3.8–5.8)
RBC # BLD: ABNORMAL 10*6/UL
SODIUM SERPL-SCNC: 137 MMOL/L (ref 132–146)
WBC OTHER # BLD: 3.6 K/UL (ref 4.5–11.5)

## 2024-08-06 PROCEDURE — 6370000000 HC RX 637 (ALT 250 FOR IP)

## 2024-08-06 PROCEDURE — 99232 SBSQ HOSP IP/OBS MODERATE 35: CPT | Performed by: TRANSPLANT SURGERY

## 2024-08-06 PROCEDURE — 2580000003 HC RX 258: Performed by: STUDENT IN AN ORGANIZED HEALTH CARE EDUCATION/TRAINING PROGRAM

## 2024-08-06 PROCEDURE — 6360000002 HC RX W HCPCS: Performed by: STUDENT IN AN ORGANIZED HEALTH CARE EDUCATION/TRAINING PROGRAM

## 2024-08-06 PROCEDURE — 85025 COMPLETE CBC W/AUTO DIFF WBC: CPT

## 2024-08-06 PROCEDURE — 85610 PROTHROMBIN TIME: CPT

## 2024-08-06 PROCEDURE — 2709999900 IR BILIARY DRAIN INT AND EXT

## 2024-08-06 PROCEDURE — 99153 MOD SED SAME PHYS/QHP EA: CPT

## 2024-08-06 PROCEDURE — 87205 SMEAR GRAM STAIN: CPT

## 2024-08-06 PROCEDURE — 36415 COLL VENOUS BLD VENIPUNCTURE: CPT

## 2024-08-06 PROCEDURE — 87075 CULTR BACTERIA EXCEPT BLOOD: CPT

## 2024-08-06 PROCEDURE — 87102 FUNGUS ISOLATION CULTURE: CPT

## 2024-08-06 PROCEDURE — 6360000002 HC RX W HCPCS

## 2024-08-06 PROCEDURE — 2580000003 HC RX 258: Performed by: RADIOLOGY

## 2024-08-06 PROCEDURE — 2500000003 HC RX 250 WO HCPCS: Performed by: RADIOLOGY

## 2024-08-06 PROCEDURE — 87070 CULTURE OTHR SPECIMN AEROBIC: CPT

## 2024-08-06 PROCEDURE — 47534 PLMT BILIARY DRAINAGE CATH: CPT

## 2024-08-06 PROCEDURE — 80053 COMPREHEN METABOLIC PANEL: CPT

## 2024-08-06 PROCEDURE — 6370000000 HC RX 637 (ALT 250 FOR IP): Performed by: STUDENT IN AN ORGANIZED HEALTH CARE EDUCATION/TRAINING PROGRAM

## 2024-08-06 PROCEDURE — 6360000004 HC RX CONTRAST MEDICATION: Performed by: RADIOLOGY

## 2024-08-06 PROCEDURE — 6360000002 HC RX W HCPCS: Performed by: RADIOLOGY

## 2024-08-06 PROCEDURE — 82962 GLUCOSE BLOOD TEST: CPT

## 2024-08-06 PROCEDURE — 99152 MOD SED SAME PHYS/QHP 5/>YRS: CPT

## 2024-08-06 PROCEDURE — 1200000000 HC SEMI PRIVATE

## 2024-08-06 RX ORDER — KETOROLAC TROMETHAMINE 30 MG/ML
INJECTION, SOLUTION INTRAMUSCULAR; INTRAVENOUS PRN
Status: COMPLETED | OUTPATIENT
Start: 2024-08-06 | End: 2024-08-06

## 2024-08-06 RX ORDER — LIDOCAINE HYDROCHLORIDE AND EPINEPHRINE BITARTRATE 20; .01 MG/ML; MG/ML
INJECTION, SOLUTION SUBCUTANEOUS PRN
Status: COMPLETED | OUTPATIENT
Start: 2024-08-06 | End: 2024-08-06

## 2024-08-06 RX ORDER — DIPHENHYDRAMINE HYDROCHLORIDE 50 MG/ML
INJECTION INTRAMUSCULAR; INTRAVENOUS PRN
Status: COMPLETED | OUTPATIENT
Start: 2024-08-06 | End: 2024-08-06

## 2024-08-06 RX ORDER — LIDOCAINE HYDROCHLORIDE 20 MG/ML
INJECTION, SOLUTION INFILTRATION; PERINEURAL PRN
Status: COMPLETED | OUTPATIENT
Start: 2024-08-06 | End: 2024-08-06

## 2024-08-06 RX ORDER — PROCHLORPERAZINE EDISYLATE 5 MG/ML
10 INJECTION INTRAMUSCULAR; INTRAVENOUS EVERY 6 HOURS PRN
Status: DISCONTINUED | OUTPATIENT
Start: 2024-08-06 | End: 2024-08-09 | Stop reason: HOSPADM

## 2024-08-06 RX ORDER — MIDAZOLAM HYDROCHLORIDE 1 MG/ML
INJECTION INTRAMUSCULAR; INTRAVENOUS PRN
Status: COMPLETED | OUTPATIENT
Start: 2024-08-06 | End: 2024-08-06

## 2024-08-06 RX ORDER — FENTANYL CITRATE 50 UG/ML
INJECTION, SOLUTION INTRAMUSCULAR; INTRAVENOUS PRN
Status: COMPLETED | OUTPATIENT
Start: 2024-08-06 | End: 2024-08-06

## 2024-08-06 RX ADMIN — SODIUM CHLORIDE, PRESERVATIVE FREE 10 ML: 5 INJECTION INTRAVENOUS at 20:49

## 2024-08-06 RX ADMIN — MONTELUKAST 10 MG: 10 TABLET, FILM COATED ORAL at 20:43

## 2024-08-06 RX ADMIN — FENTANYL CITRATE 25 MCG: 50 INJECTION, SOLUTION INTRAMUSCULAR; INTRAVENOUS at 13:43

## 2024-08-06 RX ADMIN — Medication 1 TABLET: at 18:51

## 2024-08-06 RX ADMIN — FENTANYL CITRATE 25 MCG: 50 INJECTION, SOLUTION INTRAMUSCULAR; INTRAVENOUS at 13:02

## 2024-08-06 RX ADMIN — MIDAZOLAM 0.5 MG: 1 INJECTION INTRAMUSCULAR; INTRAVENOUS at 12:54

## 2024-08-06 RX ADMIN — INSULIN LISPRO 2 UNITS: 100 INJECTION, SOLUTION INTRAVENOUS; SUBCUTANEOUS at 11:03

## 2024-08-06 RX ADMIN — FINASTERIDE 5 MG: 5 TABLET, FILM COATED ORAL at 18:50

## 2024-08-06 RX ADMIN — MIDAZOLAM 0.5 MG: 1 INJECTION INTRAMUSCULAR; INTRAVENOUS at 13:24

## 2024-08-06 RX ADMIN — PROCHLORPERAZINE EDISYLATE 10 MG: 5 INJECTION INTRAMUSCULAR; INTRAVENOUS at 22:37

## 2024-08-06 RX ADMIN — INSULIN LISPRO 2 UNITS: 100 INJECTION, SOLUTION INTRAVENOUS; SUBCUTANEOUS at 16:41

## 2024-08-06 RX ADMIN — DIPHENHYDRAMINE HYDROCHLORIDE 25 MG: 50 INJECTION INTRAMUSCULAR; INTRAVENOUS at 12:55

## 2024-08-06 RX ADMIN — DIPHENHYDRAMINE HYDROCHLORIDE 25 MG: 50 INJECTION INTRAMUSCULAR; INTRAVENOUS at 13:39

## 2024-08-06 RX ADMIN — FENTANYL CITRATE 25 MCG: 50 INJECTION, SOLUTION INTRAMUSCULAR; INTRAVENOUS at 12:54

## 2024-08-06 RX ADMIN — TAMSULOSIN HYDROCHLORIDE 0.4 MG: 0.4 CAPSULE ORAL at 18:51

## 2024-08-06 RX ADMIN — MIDAZOLAM 0.5 MG: 1 INJECTION INTRAMUSCULAR; INTRAVENOUS at 13:02

## 2024-08-06 RX ADMIN — OXYCODONE HYDROCHLORIDE 5 MG: 5 TABLET ORAL at 18:51

## 2024-08-06 RX ADMIN — MIDAZOLAM 0.5 MG: 1 INJECTION INTRAMUSCULAR; INTRAVENOUS at 13:39

## 2024-08-06 RX ADMIN — INSULIN GLARGINE 10 UNITS: 100 INJECTION, SOLUTION SUBCUTANEOUS at 20:43

## 2024-08-06 RX ADMIN — PIPERACILLIN AND TAZOBACTAM 3375 MG: 3; .375 INJECTION, POWDER, FOR SOLUTION INTRAVENOUS at 12:33

## 2024-08-06 RX ADMIN — LIDOCAINE HYDROCHLORIDE,EPINEPHRINE BITARTRATE 10 ML: 20; .01 INJECTION, SOLUTION INFILTRATION; PERINEURAL at 13:12

## 2024-08-06 RX ADMIN — HYDROMORPHONE HYDROCHLORIDE 0.5 MG: 1 INJECTION, SOLUTION INTRAMUSCULAR; INTRAVENOUS; SUBCUTANEOUS at 13:45

## 2024-08-06 RX ADMIN — ONDANSETRON 4 MG: 2 INJECTION INTRAMUSCULAR; INTRAVENOUS at 21:32

## 2024-08-06 RX ADMIN — IOPAMIDOL 70 ML: 755 INJECTION, SOLUTION INTRAVENOUS at 13:57

## 2024-08-06 RX ADMIN — LIDOCAINE HYDROCHLORIDE 10 ML: 20 INJECTION, SOLUTION INFILTRATION; PERINEURAL at 13:11

## 2024-08-06 RX ADMIN — CETIRIZINE HYDROCHLORIDE 10 MG: 10 TABLET, FILM COATED ORAL at 18:49

## 2024-08-06 RX ADMIN — HYDROMORPHONE HYDROCHLORIDE 0.5 MG: 1 INJECTION, SOLUTION INTRAMUSCULAR; INTRAVENOUS; SUBCUTANEOUS at 12:55

## 2024-08-06 RX ADMIN — FENTANYL CITRATE 25 MCG: 50 INJECTION, SOLUTION INTRAMUSCULAR; INTRAVENOUS at 13:24

## 2024-08-06 RX ADMIN — KETOROLAC TROMETHAMINE 30 MG: 30 INJECTION, SOLUTION INTRAMUSCULAR at 12:55

## 2024-08-06 RX ADMIN — DOCUSATE SODIUM 200 MG: 100 CAPSULE, LIQUID FILLED ORAL at 18:49

## 2024-08-06 RX ADMIN — FENTANYL CITRATE 25 MCG: 50 INJECTION, SOLUTION INTRAMUSCULAR; INTRAVENOUS at 13:39

## 2024-08-06 ASSESSMENT — PAIN DESCRIPTION - DESCRIPTORS: DESCRIPTORS: DISCOMFORT;SORE;TENDER

## 2024-08-06 ASSESSMENT — PAIN DESCRIPTION - ORIENTATION: ORIENTATION: ANTERIOR;RIGHT

## 2024-08-06 ASSESSMENT — PAIN - FUNCTIONAL ASSESSMENT: PAIN_FUNCTIONAL_ASSESSMENT: PREVENTS OR INTERFERES SOME ACTIVE ACTIVITIES AND ADLS

## 2024-08-06 ASSESSMENT — PAIN SCALES - GENERAL: PAINLEVEL_OUTOF10: 6

## 2024-08-06 ASSESSMENT — PAIN DESCRIPTION - LOCATION: LOCATION: ABDOMEN

## 2024-08-06 NOTE — PROGRESS NOTES
HEPATOBILIARY AND PANCREATIC SURGERY  DAILY PROGRESS NOTE  8/6/2024  Subjective:  No issues overnight. Patient doing well this morning, no complaints.     No intake/output data recorded.  I/O last 3 completed shifts:  In: 2077.9 [P.O.:480; I.V.:1597.9]  Out: -     Objective:  BP (!) 108/55   Pulse 50   Temp 97.8 °F (36.6 °C) (Temporal)   Resp 18   Ht 1.88 m (6' 2\")   Wt 104.8 kg (231 lb)   SpO2 98%   BMI 29.66 kg/m²     - General: No acute distress. Awake and conversant.  - CV: Regular rate.  - Respiratory: Respirations are non-labored   - Abdomen: Soft, minimally-distended, non-nontender.  - Skin: Warm. No rashes or ulcers.  - Extremities: No cyanosis or edema.    Lab Results   Component Value Date    WBC 3.6 (L) 08/06/2024    HGB 11.9 (L) 08/06/2024    HCT 35.5 (L) 08/06/2024    .1 (H) 08/06/2024     08/06/2024     Lab Results   Component Value Date     08/06/2024    K 4.3 08/06/2024     08/06/2024    CO2 26 08/06/2024    BUN 9 08/06/2024    CREATININE 0.8 08/06/2024    GLUCOSE 190 (H) 08/06/2024    CALCIUM 8.9 08/06/2024    BILITOT 7.6 (H) 08/06/2024    ALKPHOS 282 (H) 08/06/2024     (H) 08/06/2024     (H) 08/06/2024    LABGLOM >90 08/06/2024    GFRAA >60 09/01/2022       ASSESSMENT/PLAN:    79 y.o. male who is deaf however is able to read lips.  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    Plan:  -Failed ERCP/EGD 8/2 due to duo compression and unable to pass scope  -Plan for PTHC with IR today 8/6  -NPO for procedure, okay for diet after  -Lovenox held for procedure   -OOB as tolerated, ambulate in halls   -patient is scheduled for Whipple 8/14    Asia Major MD  General Surgery Resident, PGY-1    Attending Physician Statement:    Chief Complaint: 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  I have examined the patient and performed the key aspects of physical exam, reviewed the record (including all pertinent and  new radiology images and laboratory findings), and discussed the case with the surgical team.  I agree with the assessment and plan with the following additions, corrections, and changes. 14pt review of symptoms completed and negative except as mentioned.    Awaiting pathology from patient's EGD  Patient is scheduled for a PTHC today  Patient had emesis last evening which is consistent with his partial duodenal obstruction as there was an inability to pass the duodenoscope into the duodenum at the time of the ERCP.  Post PTHC I will start him on a full liquid diet with shakes and we will not advance his diet.  Patient is scheduled for surgery next week on Wednesday 8/14 for a Whipple procedure with possible portal vein reconstruction.  We will keep his PTHC open and draining for 48 hours prior to capping    Demetrius Santoyo MD  08/06/24  10:12 AM

## 2024-08-06 NOTE — PROGRESS NOTES
PROGRESS NOTE      Patient Presents with/Seen in Consultation For      Reason for Consult: elevated liver enzymes   CHIEF COMPLAINT:  abdominal pain, fatigues and abnormal labs   Subjective:   Patient seen laying in bed in no apparent distress.  Patient back from PTHC drain placement.  States he feels well.  Bilious drainage noted.  Plan of care discussed with patient and family at bedside.  Review of Systems  Aside from what was mentioned in the PMH and HPI, essentially unremarkable, all others negative.    Objective:     BP (!) 142/79   Pulse 50   Temp 97.9 °F (36.6 °C) (Temporal)   Resp 18   Ht 1.88 m (6' 2\")   Wt 104.8 kg (231 lb)   SpO2 94%   BMI 29.66 kg/m²     General appearance: alert, awake, sitting in chair, and cooperative  Eyes: conjunctiva pale, sclera icteric. PERRL.  Lungs: clear to auscultation bilaterally  Heart: regular rate and rhythm, no murmur, 2+ pulses; no edema  Abdomen: soft, non-tender; bowel sounds normal; no masses,  no organomegaly, PTHC drain with bilious drainage noted  Extremities: extremities without edema  Pulses: 2+ and symmetric  Skin: Skin color AA , texture, turgor normal.   Neurologic: Grossly normal, deaf (able to communicate)    HYDROmorphone (DILAUDID) injection 0.5 mg, Q4H PRN  oxyCODONE (ROXICODONE) immediate release tablet 5 mg, Q4H PRN   Or  oxyCODONE HCl (OXY-IR) immediate release tablet 10 mg, Q4H PRN  0.9 % sodium chloride infusion, PRN  dextrose 10 % infusion, Continuous PRN  dextrose bolus 10% 125 mL, PRN   Or  dextrose bolus 10% 250 mL, PRN  glucagon injection 1 mg, PRN  glucose chewable tablet 16 g, PRN  ondansetron (ZOFRAN-ODT) disintegrating tablet 4 mg, Q8H PRN   Or  ondansetron (ZOFRAN) injection 4 mg, Q6H PRN  polyethylene glycol (GLYCOLAX) packet 17 g, Daily PRN  sodium chloride flush 0.9 % injection 5-40 mL, 2 times per day  sodium chloride flush 0.9 % injection 5-40 mL, PRN  lactated ringers IV soln infusion, Continuous  lidocaine 4 % external patch  1 patch, Daily  ampicillin-sulbactam (UNASYN) 3,000 mg in sodium chloride 0.9 % 100 mL IVPB (Cigf5Tls), See Admin Instructions  cetirizine (ZYRTEC) tablet 10 mg, Daily  diatrizoate meglumine-sodium (GASTROGRAFIN) 66-10 % solution 140 mL, ONCE PRN  docusate sodium (COLACE) capsule 200 mg, Daily  finasteride (PROSCAR) tablet 5 mg, Daily  insulin glargine (LANTUS) injection vial 10 Units, Nightly  insulin lispro (HUMALOG,ADMELOG) injection vial 0-4 Units, Nightly  insulin lispro (HUMALOG,ADMELOG) injection vial 0-8 Units, TID WC  metoprolol succinate (TOPROL XL) extended release tablet 25 mg, Daily  montelukast (SINGULAIR) tablet 10 mg, Nightly  tamsulosin (FLOMAX) capsule 0.4 mg, Daily  therapeutic multivitamin-minerals 1 tablet, Daily  [Held by provider] enoxaparin Sodium (LOVENOX) injection 30 mg, BID         Data Review  CBC:   Lab Results   Component Value Date/Time    WBC 3.6 08/06/2024 06:08 AM    RBC 3.51 08/06/2024 06:08 AM    HGB 11.9 08/06/2024 06:08 AM    HCT 35.5 08/06/2024 06:08 AM    .1 08/06/2024 06:08 AM    MCH 33.9 08/06/2024 06:08 AM    MCHC 33.5 08/06/2024 06:08 AM    RDW 12.5 08/06/2024 06:08 AM     08/06/2024 06:08 AM    MPV 11.6 08/06/2024 06:08 AM     CMP:    Lab Results   Component Value Date/Time     08/06/2024 06:08 AM    K 4.3 08/06/2024 06:08 AM    K 4.2 03/28/2021 06:18 AM     08/06/2024 06:08 AM    CO2 26 08/06/2024 06:08 AM    BUN 9 08/06/2024 06:08 AM    CREATININE 0.8 08/06/2024 06:08 AM    GFRAA >60 09/01/2022 06:09 PM    LABGLOM >90 08/06/2024 06:08 AM    LABGLOM >60 05/10/2023 08:51 AM    GLUCOSE 190 08/06/2024 06:08 AM    GLUCOSE 146 10/04/2010 10:20 AM    CALCIUM 8.9 08/06/2024 06:08 AM    BILITOT 7.6 08/06/2024 06:08 AM    ALKPHOS 282 08/06/2024 06:08 AM     08/06/2024 06:08 AM     08/06/2024 06:08 AM     Hepatic Function Panel:    Lab Results   Component Value Date/Time    ALKPHOS 282 08/06/2024 06:08 AM     08/06/2024 06:08 AM

## 2024-08-06 NOTE — BRIEF OP NOTE
Brief Postoperative Note      Patient: Michael Garcia  YOB: 1945  MRN: [id number]    Date of Procedure: 8/6/2024    Biliary neoplasm and obstruction    Post-Op Diagnosis: Same       PTC RY Dennis    Assistant:  * No surgical staff found *    Anesthesia: * Moderate sedation*    Estimated Blood Loss (mL): Minimal    Complications: None    Specimens:   Bile    Implants:  * No implants in log *      Drains: * No LDAs found *    Findings:  Infection Present At Time Of Surgery (PATOS) (choose all levels that have infection present):  No infection present  Other Findings: 10 Fr biliary catheter placement.  CBD obstruction    Electronically signed by Rodríguez Dennis MD on 8/6/2024 at 1:54 PM

## 2024-08-06 NOTE — PROCEDURES
PROCEDURE NOTE  Date: 8/6/2024   Name: Michael Garcia  YOB: 1945    [facility]  Discussed patient and IR procedure with bedside RN, all questions answered. Will call when able to send for patient.

## 2024-08-06 NOTE — PROGRESS NOTES
Patient arrival to procedure area via stretcher with hospital transportation staff for an anticipated and scheduled PTHC.    Allergies, current home medications, and history & physical reviewed. Confirmation that patient adhered to the required fasting instructions prior to the procedure. Vital signs indicate a safe level to proceed with procedure. See flowsheets for detailed documentation of vital signs.    Detailed procedural instructions were provided to the patient, ensuring they understood each step of the upcoming procedure. Ample opportunity given to ask questions, to which satisfactory answers were provided. The patient demonstrated a clear understanding of the information.    Throughout the interaction, the patient appeared calm and cooperative. The patient was reassured and made comfortable in preparation for the procedure.

## 2024-08-06 NOTE — PLAN OF CARE
Problem: Chronic Conditions and Co-morbidities  Goal: Patient's chronic conditions and co-morbidity symptoms are monitored and maintained or improved  8/6/2024 0456 by Lien Dwyer RN  Outcome: Progressing  8/5/2024 1747 by Jesika Sin RN  Outcome: Progressing     Problem: Discharge Planning  Goal: Discharge to home or other facility with appropriate resources  8/6/2024 0456 by Lien Dwyer RN  Outcome: Progressing  8/5/2024 1747 by Jesika Sin RN  Outcome: Progressing     Problem: Pain  Goal: Verbalizes/displays adequate comfort level or baseline comfort level  8/6/2024 0456 by Lien Dwyer RN  Outcome: Progressing  8/5/2024 1747 by Jesika Sin RN  Outcome: Progressing

## 2024-08-06 NOTE — PROCEDURES
PROCEDURE NOTE  Date: 8/6/2024   Name: Michael Garcia  YOB: 1945    Procedures: [facility]  Report given to IGLESIA Lopes regarding procedure. All questions answered at this time.

## 2024-08-06 NOTE — CARE COORDINATION
08/06/24 Update CM Note: patient is NPO for PTHC today. Fluids are continued. Discharge plan remains unchanged and patient will discharge to home once cleared by all involved. Electronically signed by Lanie Epps RN CM on 8/6/2024 at 6:41 AM

## 2024-08-06 NOTE — PRE SEDATION
Sedation Pre-Procedure Note    Patient Name: Michael Garcia   YOB: 1945  Room/Bed: 5207/5207-A  Medical Record Number: [id number]  Date: 8/6/2024   Time: 1:53 PM       Indication:  Biliary neoplasm    Consent: I have discussed with the patient and/or the patient representative the indication, alternatives, and the possible risks and/or complications of the planned procedure and the anesthesia methods. The patient and/or patient representative appear to understand and agree to proceed.    Vital Signs:   Vitals:    08/06/24 1345   BP: (!) 163/94   Pulse: 70   Resp: 20   Temp:    SpO2: 100%       Past Medical History:   has a past medical history of Deaf, bilateral, Diabetes (HCC), Hypertension, Osteoarthritis, Primary osteoarthritis of left knee, and Prostate cancer (HCC).    Past Surgical History:   has a past surgical history that includes Rotator cuff repair (2/12); shoulder surgery; Carpal tunnel release; Upper gastrointestinal endoscopy (N/A, 8/2/2024); and ERCP (N/A, 8/2/2024).    Medications:   Scheduled Meds:    piperacillin-tazobactam  3,375 mg IntraVENous Once    sodium chloride flush  5-40 mL IntraVENous 2 times per day    lidocaine  1 patch TransDERmal Daily    ampicillin-sulbactam  3,000 mg IntraVENous See Admin Instructions    cetirizine  10 mg Oral Daily    docusate sodium  200 mg Oral Daily    finasteride  5 mg Oral Daily    insulin glargine  10 Units SubCUTAneous Nightly    insulin lispro  0-4 Units SubCUTAneous Nightly    insulin lispro  0-8 Units SubCUTAneous TID     metoprolol succinate  25 mg Oral Daily    montelukast  10 mg Oral Nightly    tamsulosin  0.4 mg Oral Daily    therapeutic multivitamin-minerals  1 tablet Oral Daily    [Held by provider] enoxaparin  30 mg SubCUTAneous BID     Continuous Infusions:    sodium chloride      dextrose      lactated ringers IV soln 50 mL/hr at 08/04/24 1051     PRN Meds: fentanNYL, midazolam, diphenhydrAMINE, ketorolac, HYDROmorphone,  lidocaine, lidocaine-EPINEPHrine, HYDROmorphone, oxyCODONE **OR** oxyCODONE, sodium chloride, dextrose, dextrose bolus **OR** dextrose bolus, glucagon (rDNA), glucose, ondansetron **OR** ondansetron, polyethylene glycol, sodium chloride flush, diatrizoate meglumine-sodium  Home Meds:   Prior to Admission medications    Medication Sig Start Date End Date Taking? Authorizing Provider   metoprolol succinate (TOPROL XL) 25 MG extended release tablet TAKE 1 TABLET BY MOUTH DAILY 1/3/24   Elana Michael MD   montelukast (SINGULAIR) 10 MG tablet Take 1 tablet by mouth nightly    Elliott Thakkar MD   empagliflozin (JARDIANCE) 25 MG tablet Take 1 tablet by mouth daily    Elliott Thakkar MD   cetirizine (ZYRTEC) 10 MG tablet Take 1 tablet by mouth daily    Elliott Thakkar MD   Cholecalciferol (VITAMIN D3) 125 MCG (5000 UT) TABS Take by mouth  Patient not taking: Reported on 4/27/2023    Elliott Thakkar MD   ibuprofen (ADVIL;MOTRIN) 400 MG tablet Take 1 tablet by mouth every 6 hours as needed for Pain  Patient not taking: Reported on 4/27/2023 3/28/21 4/27/23  Percy Gamboa DO   fluticasone (FLONASE) 50 MCG/ACT nasal spray 1 spray by Each Nare route 2 times daily  Patient not taking: Reported on 4/27/2023    Elliott Thakkar MD   loratadine (CLARITIN) 10 MG tablet Take 1 tablet by mouth daily as needed  Patient not taking: Reported on 4/27/2023    Elliott Thakkar MD   guaiFENesin 400 MG tablet Take 400 mg by mouth 3 times daily as needed for Cough  Patient not taking: Reported on 11/9/2022    Elliott Thakkar MD   diphenhydrAMINE (BENADRYL) 25 MG tablet Take 25 mg by mouth every 6 hours as needed for Itching  Patient not taking: Reported on 11/9/2022    Elliott Thakkar MD   meloxicam (MOBIC) 15 MG tablet Take 1 tablet by mouth daily  Patient not taking: Reported on 11/9/2022 11/29/17   Stacey Mobley PA   tamsulosin (FLOMAX) 0.4 MG capsule Take 1 capsule by mouth daily Pt  taking 2 capsules at 0.4mg in the morning.    Elliott Thakkar MD   glipiZIDE (GLUCOTROL) 5 MG tablet Take 3 tablets by mouth daily Pt taking 10mg twice a day.    Elliott Thakkar MD   finasteride (PROSCAR) 5 MG tablet Take 1 tablet by mouth daily    Elliott Thakkar MD   valsartan-hydrochlorothiazide (DIOVAN-HCT) 80-12.5 MG per tablet  6/12/16   Elliott Thakkar MD   metFORMIN (GLUCOPHAGE) 500 MG tablet  6/12/16   Elliott Thakkar MD   simvastatin (ZOCOR) 40 MG tablet  6/12/16   Elliott Thakkar MD   aspirin 81 MG tablet Take 81 mg by mouth daily  Patient not taking: Reported on 4/27/2023    Elliott Thakkar MD   Multiple Vitamins-Minerals (THERAPEUTIC MULTIVITAMIN-MINERALS) tablet Take 1 tablet by mouth daily    Elliott Thakkar MD   terazosin (HYTRIN) 1 MG capsule Take 1 mg by mouth nightly  Patient not taking: Reported on 4/27/2023    Elliott Thakkar MD     Coumadin Use Last 7 Days:  no  Antiplatelet drug therapy use last 7 days: no  Other anticoagulant use last 7 days: no  Additional Medication Information:  N/A      Pre-Sedation Documentation and Exam:   I have reviewed the patient's history and review of systems.    Mallampati Airway Assessment:  Mallampati Class I - (soft palate, fauces, uvula & anterior/posterior tonsillar pillars are visible)    Prior History of Anesthesia Complications:   none    ASA Classification:  Class 3 - A patient with severe systemic disease that limits activity but is not incapacitating    Sedation/ Anesthesia Plan:   intravenous sedation    Medications Planned:   fentanyl intravenously    Patient is an appropriate candidate for plan of sedation: yes    Electronically signed by Rodríguez Dennis MD on 8/6/2024 at 1:53 PM

## 2024-08-07 LAB
ALBUMIN SERPL-MCNC: 3.2 G/DL (ref 3.5–5.2)
ALP SERPL-CCNC: 273 U/L (ref 40–129)
ALT SERPL-CCNC: 208 U/L (ref 0–40)
ANION GAP SERPL CALCULATED.3IONS-SCNC: 10 MMOL/L (ref 7–16)
AST SERPL-CCNC: 91 U/L (ref 0–39)
BASOPHILS # BLD: 0.02 K/UL (ref 0–0.2)
BASOPHILS NFR BLD: 0 % (ref 0–2)
BILIRUB SERPL-MCNC: 4.1 MG/DL (ref 0–1.2)
BUN SERPL-MCNC: 12 MG/DL (ref 6–23)
CALCIUM SERPL-MCNC: 9 MG/DL (ref 8.6–10.2)
CHLORIDE SERPL-SCNC: 104 MMOL/L (ref 98–107)
CO2 SERPL-SCNC: 24 MMOL/L (ref 22–29)
CREAT SERPL-MCNC: 0.8 MG/DL (ref 0.7–1.2)
EOSINOPHIL # BLD: 0.03 K/UL (ref 0.05–0.5)
EOSINOPHILS RELATIVE PERCENT: 0 % (ref 0–6)
ERYTHROCYTE [DISTWIDTH] IN BLOOD BY AUTOMATED COUNT: 12.3 % (ref 11.5–15)
GFR, ESTIMATED: >90 ML/MIN/1.73M2
GLUCOSE BLD-MCNC: 166 MG/DL (ref 74–99)
GLUCOSE BLD-MCNC: 189 MG/DL (ref 74–99)
GLUCOSE BLD-MCNC: 243 MG/DL (ref 74–99)
GLUCOSE BLD-MCNC: 261 MG/DL (ref 74–99)
GLUCOSE SERPL-MCNC: 188 MG/DL (ref 74–99)
HCT VFR BLD AUTO: 36.1 % (ref 37–54)
HGB BLD-MCNC: 12.1 G/DL (ref 12.5–16.5)
IMM GRANULOCYTES # BLD AUTO: 0.03 K/UL (ref 0–0.58)
IMM GRANULOCYTES NFR BLD: 0 % (ref 0–5)
LYMPHOCYTES NFR BLD: 0.4 K/UL (ref 1.5–4)
LYMPHOCYTES RELATIVE PERCENT: 5 % (ref 20–42)
MCH RBC QN AUTO: 33.9 PG (ref 26–35)
MCHC RBC AUTO-ENTMCNC: 33.5 G/DL (ref 32–34.5)
MCV RBC AUTO: 101.1 FL (ref 80–99.9)
MONOCYTES NFR BLD: 0.68 K/UL (ref 0.1–0.95)
MONOCYTES NFR BLD: 9 % (ref 2–12)
NEUTROPHILS NFR BLD: 84 % (ref 43–80)
NEUTS SEG NFR BLD: 6.23 K/UL (ref 1.8–7.3)
PLATELET # BLD AUTO: 176 K/UL (ref 130–450)
PMV BLD AUTO: 11.3 FL (ref 7–12)
POTASSIUM SERPL-SCNC: 3.9 MMOL/L (ref 3.5–5)
PROT SERPL-MCNC: 5.8 G/DL (ref 6.4–8.3)
RBC # BLD AUTO: 3.57 M/UL (ref 3.8–5.8)
RBC # BLD: ABNORMAL 10*6/UL
SODIUM SERPL-SCNC: 138 MMOL/L (ref 132–146)
WBC OTHER # BLD: 7.4 K/UL (ref 4.5–11.5)

## 2024-08-07 PROCEDURE — 6370000000 HC RX 637 (ALT 250 FOR IP)

## 2024-08-07 PROCEDURE — 80053 COMPREHEN METABOLIC PANEL: CPT

## 2024-08-07 PROCEDURE — 85025 COMPLETE CBC W/AUTO DIFF WBC: CPT

## 2024-08-07 PROCEDURE — 2580000003 HC RX 258: Performed by: STUDENT IN AN ORGANIZED HEALTH CARE EDUCATION/TRAINING PROGRAM

## 2024-08-07 PROCEDURE — 82962 GLUCOSE BLOOD TEST: CPT

## 2024-08-07 PROCEDURE — 6370000000 HC RX 637 (ALT 250 FOR IP): Performed by: STUDENT IN AN ORGANIZED HEALTH CARE EDUCATION/TRAINING PROGRAM

## 2024-08-07 PROCEDURE — 99233 SBSQ HOSP IP/OBS HIGH 50: CPT | Performed by: TRANSPLANT SURGERY

## 2024-08-07 PROCEDURE — 1200000000 HC SEMI PRIVATE

## 2024-08-07 PROCEDURE — 36415 COLL VENOUS BLD VENIPUNCTURE: CPT

## 2024-08-07 PROCEDURE — 6360000002 HC RX W HCPCS

## 2024-08-07 RX ORDER — INSULIN LISPRO 100 [IU]/ML
0-4 INJECTION, SOLUTION INTRAVENOUS; SUBCUTANEOUS NIGHTLY
Status: DISCONTINUED | OUTPATIENT
Start: 2024-08-07 | End: 2024-08-09 | Stop reason: HOSPADM

## 2024-08-07 RX ORDER — INSULIN LISPRO 100 [IU]/ML
0-16 INJECTION, SOLUTION INTRAVENOUS; SUBCUTANEOUS
Status: DISCONTINUED | OUTPATIENT
Start: 2024-08-07 | End: 2024-08-09 | Stop reason: HOSPADM

## 2024-08-07 RX ADMIN — ENOXAPARIN SODIUM 30 MG: 100 INJECTION SUBCUTANEOUS at 21:59

## 2024-08-07 RX ADMIN — OXYCODONE HYDROCHLORIDE 10 MG: 10 TABLET ORAL at 00:28

## 2024-08-07 RX ADMIN — INSULIN LISPRO 8 UNITS: 100 INJECTION, SOLUTION INTRAVENOUS; SUBCUTANEOUS at 12:08

## 2024-08-07 RX ADMIN — SODIUM CHLORIDE, PRESERVATIVE FREE 10 ML: 5 INJECTION INTRAVENOUS at 10:06

## 2024-08-07 RX ADMIN — PROCHLORPERAZINE EDISYLATE 10 MG: 5 INJECTION INTRAMUSCULAR; INTRAVENOUS at 05:25

## 2024-08-07 RX ADMIN — OXYCODONE HYDROCHLORIDE 5 MG: 5 TABLET ORAL at 10:04

## 2024-08-07 RX ADMIN — INSULIN GLARGINE 10 UNITS: 100 INJECTION, SOLUTION SUBCUTANEOUS at 21:59

## 2024-08-07 RX ADMIN — FINASTERIDE 5 MG: 5 TABLET, FILM COATED ORAL at 09:56

## 2024-08-07 RX ADMIN — SODIUM CHLORIDE, PRESERVATIVE FREE 10 ML: 5 INJECTION INTRAVENOUS at 22:00

## 2024-08-07 RX ADMIN — CETIRIZINE HYDROCHLORIDE 10 MG: 10 TABLET, FILM COATED ORAL at 09:56

## 2024-08-07 RX ADMIN — MONTELUKAST 10 MG: 10 TABLET, FILM COATED ORAL at 21:59

## 2024-08-07 RX ADMIN — TAMSULOSIN HYDROCHLORIDE 0.4 MG: 0.4 CAPSULE ORAL at 09:55

## 2024-08-07 RX ADMIN — Medication 1 TABLET: at 09:55

## 2024-08-07 RX ADMIN — DOCUSATE SODIUM 200 MG: 100 CAPSULE, LIQUID FILLED ORAL at 09:55

## 2024-08-07 RX ADMIN — INSULIN LISPRO 4 UNITS: 100 INJECTION, SOLUTION INTRAVENOUS; SUBCUTANEOUS at 16:17

## 2024-08-07 RX ADMIN — OXYCODONE HYDROCHLORIDE 10 MG: 10 TABLET ORAL at 06:31

## 2024-08-07 RX ADMIN — ENOXAPARIN SODIUM 30 MG: 100 INJECTION SUBCUTANEOUS at 09:56

## 2024-08-07 ASSESSMENT — PAIN DESCRIPTION - ORIENTATION
ORIENTATION: RIGHT
ORIENTATION: RIGHT;UPPER;ANTERIOR
ORIENTATION: RIGHT

## 2024-08-07 ASSESSMENT — PAIN DESCRIPTION - LOCATION
LOCATION: ABDOMEN

## 2024-08-07 ASSESSMENT — PAIN SCALES - GENERAL
PAINLEVEL_OUTOF10: 5
PAINLEVEL_OUTOF10: 6
PAINLEVEL_OUTOF10: 7

## 2024-08-07 ASSESSMENT — PAIN DESCRIPTION - DESCRIPTORS
DESCRIPTORS: DISCOMFORT;SORE;TENDER
DESCRIPTORS: SHARP;SORE;DISCOMFORT
DESCRIPTORS: PATIENT UNABLE TO DESCRIBE

## 2024-08-07 ASSESSMENT — PAIN - FUNCTIONAL ASSESSMENT
PAIN_FUNCTIONAL_ASSESSMENT: PREVENTS OR INTERFERES SOME ACTIVE ACTIVITIES AND ADLS
PAIN_FUNCTIONAL_ASSESSMENT: PREVENTS OR INTERFERES SOME ACTIVE ACTIVITIES AND ADLS

## 2024-08-07 NOTE — PROGRESS NOTES
PROGRESS NOTE      Patient Presents with/Seen in Consultation For      Reason for Consult: elevated liver enzymes   CHIEF COMPLAINT:  abdominal pain, fatigues and abnormal labs   Subjective:   Patient seen resting comfortably in bed, pt without complaints at this time. POC d/w pt.   Review of Systems  Aside from what was mentioned in the PMH and HPI, essentially unremarkable, all others negative.    Objective:     /66   Pulse 78   Temp 97.7 °F (36.5 °C) (Temporal)   Resp 16   Ht 1.88 m (6' 2\")   Wt 104.8 kg (231 lb)   SpO2 94%   BMI 29.66 kg/m²     General appearance: alert, awake, laying in bed, and cooperative  Eyes: conjunctiva pale, sclera icteric. PERRL.  Lungs: clear to auscultation bilaterally  Heart: regular rate and rhythm, no murmur, 2+ pulses; no edema  Abdomen: soft, non-tender; bowel sounds normal; no masses,  no organomegaly, PTHC drain with bilious drainage noted  Extremities: extremities without edema  Pulses: 2+ and symmetric  Skin: Skin color AA , texture, turgor normal.   Neurologic: Grossly normal, deaf (able to communicate)    insulin lispro (HUMALOG,ADMELOG) injection vial 0-16 Units, TID WC  insulin lispro (HUMALOG,ADMELOG) injection vial 0-4 Units, Nightly  HYDROmorphone (DILAUDID) injection 0.5 mg, Q4H PRN  prochlorperazine (COMPAZINE) injection 10 mg, Q6H PRN  oxyCODONE (ROXICODONE) immediate release tablet 5 mg, Q4H PRN   Or  oxyCODONE HCl (OXY-IR) immediate release tablet 10 mg, Q4H PRN  0.9 % sodium chloride infusion, PRN  dextrose 10 % infusion, Continuous PRN  dextrose bolus 10% 125 mL, PRN   Or  dextrose bolus 10% 250 mL, PRN  glucagon injection 1 mg, PRN  glucose chewable tablet 16 g, PRN  ondansetron (ZOFRAN-ODT) disintegrating tablet 4 mg, Q8H PRN   Or  ondansetron (ZOFRAN) injection 4 mg, Q6H PRN  polyethylene glycol (GLYCOLAX) packet 17 g, Daily PRN  sodium chloride flush 0.9 % injection 5-40 mL, 2 times per day  sodium chloride flush 0.9 % injection 5-40 mL,  06:08 AM     No components found for: \"CHLPL\"    Lab Results   Component Value Date    TRIG 182 (H) 10/04/2010       Lab Results   Component Value Date    HDL 41 07/31/2024    HDL 32.0 (A) 10/04/2010     No components found for: \"LDLCALC\"  No components found for: \"LABVLDL\"   PT/INR:    Lab Results   Component Value Date/Time    PROTIME 13.3 08/06/2024 06:08 AM    INR 1.2 08/06/2024 06:08 AM     IRON:    Lab Results   Component Value Date/Time    IRON 136 07/31/2024 01:47 PM     Iron Saturation:  No components found for: \"PERCENTFE\"  FERRITIN:    Lab Results   Component Value Date/Time    FERRITIN 653 07/31/2024 01:47 PM         Assessment:   Pancreatic/duodenal mass  Duodenal obstruction  Elevated LFT's 2/2 to above   Obstructive jaundice   Fatigue  Constipation  Abdominal bloating  EGD 8/2/24-1) 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 2) Terminated ERCP, unable to traverse duodenal stricture with ERCP scope  CA [id number] [id number]    Plan:   Surgery per [facility] as OP   PTHC drain placed 8/6/24; drain care per protocol   Diet per [facility]   Continue Stool softeners   Dulcolax suppositories as needed   Supportive care  Will see as needed    Note: This report was completed utilizing computer voice recognition software. Every effort has been made to ensure accuracy, however; inadvertent computerized transcription errors may be present.     Discussed with Dr. Carlos  Plan per Dr. Breanna Saavedra APRN-NP-C 8/7/2024  1:36 PM For Dr. Carlos    GI attending addendum:    The patient case was reviewed and discussed with the GI midlevel team.       Demetrius Carlos DO       Ndc (100 Mg/Mg Syringe): 07513-357-46 Ndc (100 Mg/Mg Syringe): 02974-802-09

## 2024-08-07 NOTE — CARE COORDINATION
08/07/24 Update CM Note: patient is post PTHC placement. He is on full liquids/supplements only. Drain is open. He is to ambulate in the hallway. He is resumed on his lovenox. Currently he plans on returning home with his fiance. Will follow for home going needs. He is scheduled for surgery on 8/14/24 for a Whipple procedure. Electronically signed by Lanie Epps RN CM on 8/7/2024 at 10:28 AM

## 2024-08-07 NOTE — PROGRESS NOTES
HEPATOBILIARY AND PANCREATIC SURGERY  DAILY PROGRESS NOTE  8/7/2024  Subjective:  PTHC placed by IR yesterday. Patient had two more episodes of emesis in the afternoon. Feels a bit nauseated. Some soreness in RUQ otherwise states pain is well controlled.      No intake/output data recorded.  I/O last 3 completed shifts:  In: 240 [P.O.:240]  Out: 500 [Emesis/NG output:500]    Objective:  BP (!) 143/54   Pulse 50   Temp 97.6 °F (36.4 °C) (Temporal)   Resp 16   Ht 1.88 m (6' 2\")   Wt 104.8 kg (231 lb)   SpO2 94%   BMI 29.66 kg/m²     - General: No acute distress. Awake and conversant.  - CV: Regular rate.  - Respiratory: Respirations are non-labored   - Abdomen: Soft, non-distended, minimally tender to palpation RUQ. PTHC drain in place with 500 cc light bilious output   - Skin: Warm. No rashes or ulcers.  - Extremities: No cyanosis or edema.    Lab Results   Component Value Date    WBC 7.4 08/07/2024    HGB 12.1 (L) 08/07/2024    HCT 36.1 (L) 08/07/2024    .1 (H) 08/07/2024     08/07/2024     Lab Results   Component Value Date     08/06/2024    K 4.3 08/06/2024     08/06/2024    CO2 26 08/06/2024    BUN 9 08/06/2024    CREATININE 0.8 08/06/2024    GLUCOSE 190 (H) 08/06/2024    CALCIUM 8.9 08/06/2024    BILITOT 7.6 (H) 08/06/2024    ALKPHOS 282 (H) 08/06/2024     (H) 08/06/2024     (H) 08/06/2024    LABGLOM >90 08/06/2024    GFRAA >60 09/01/2022       ASSESSMENT/PLAN:    79 y.o. male who is deaf however is able to read lips.  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     Plan:  - maintain patient on FLD + supplements only, no plans to advance diet at this time  - drain care - please flush per order   - plan to keep PTHC open and draining for at least 48 hours  - strict intake/output  - resume Lovenox for DVT ppx  -Lovenox held for  procedure   - OOB as tolerated, ambulate in halls   - patient is scheduled for Whipple 8/14, no plans for rendezvous at this time    Discussed with Dr. Santoyo.     Latosha Faustin MD  General Surgery Resident, PGY-4    Attending Physician Statement:    Chief Complaint: 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    I have examined the patient and performed the key aspects of physical exam, reviewed the record (including all pertinent and new radiology images and laboratory findings), and discussed the case with the surgical team.  I agree with the assessment and plan with the following additions, corrections, and changes. 14pt review of symptoms completed and negative except as mentioned.    -CEA is normal however CA 19-9 is elevated  -Patient had a PTHC placed yesterday and it is draining appropriately.  There is draining old dark bile.  - Keep PTHC open and draining for the next 24 hours  -Patient did have emesis x 3 yesterday.  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  The patient has a partial small bowel obstruction.  -If patient has any emesis today patient will need a PICC placed with TPN.  -I discussed the Whipple procedure today with him at length.  -Patient is scheduled for Whipple procedure next week.  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  Due to the proximal stricture causing the bowel obstruction if a duodenal stent is placed proximally it would be extremely difficult dissection along the hepatic artery after neoadjuvant chemotherapy and the inflammatory nature of having a duodenal stent in place.  Due to this we are planning for an upfront resection.  -Will see her in the next 24 hours ago.  If the patient is able to tolerate shakes he will be able to be discharged home at the earliest on Friday hopefully with a capped PTHC  -- Patient and  MD Yarelis  08/07/24  10:20 AM

## 2024-08-07 NOTE — PLAN OF CARE
Problem: Chronic Conditions and Co-morbidities  Goal: Patient's chronic conditions and co-morbidity symptoms are monitored and maintained or improved  Outcome: Progressing     Problem: Discharge Planning  Goal: Discharge to home or other facility with appropriate resources  Outcome: Progressing     Problem: Pain  Goal: Verbalizes/displays adequate comfort level or baseline comfort level  8/7/2024 1939 by Marianne Vera, RN  Outcome: Progressing  8/7/2024 1314 by Ari Hassan, RN  Outcome: Progressing

## 2024-08-08 LAB
ALBUMIN SERPL-MCNC: 3.3 G/DL (ref 3.5–5.2)
ALP SERPL-CCNC: 255 U/L (ref 40–129)
ALT SERPL-CCNC: 171 U/L (ref 0–40)
ANION GAP SERPL CALCULATED.3IONS-SCNC: 8 MMOL/L (ref 7–16)
AST SERPL-CCNC: 58 U/L (ref 0–39)
BASOPHILS # BLD: 0.02 K/UL (ref 0–0.2)
BASOPHILS NFR BLD: 0 % (ref 0–2)
BILIRUB SERPL-MCNC: 3.9 MG/DL (ref 0–1.2)
BUN SERPL-MCNC: 9 MG/DL (ref 6–23)
CALCIUM SERPL-MCNC: 9.4 MG/DL (ref 8.6–10.2)
CHLORIDE SERPL-SCNC: 104 MMOL/L (ref 98–107)
CO2 SERPL-SCNC: 26 MMOL/L (ref 22–29)
CREAT SERPL-MCNC: 0.7 MG/DL (ref 0.7–1.2)
EOSINOPHIL # BLD: 0.11 K/UL (ref 0.05–0.5)
EOSINOPHILS RELATIVE PERCENT: 2 % (ref 0–6)
ERYTHROCYTE [DISTWIDTH] IN BLOOD BY AUTOMATED COUNT: 12.7 % (ref 11.5–15)
GFR, ESTIMATED: >90 ML/MIN/1.73M2
GLUCOSE BLD-MCNC: 141 MG/DL (ref 74–99)
GLUCOSE BLD-MCNC: 167 MG/DL (ref 74–99)
GLUCOSE BLD-MCNC: 168 MG/DL (ref 74–99)
GLUCOSE BLD-MCNC: 212 MG/DL (ref 74–99)
GLUCOSE SERPL-MCNC: 171 MG/DL (ref 74–99)
HCT VFR BLD AUTO: 37 % (ref 37–54)
HGB BLD-MCNC: 12.2 G/DL (ref 12.5–16.5)
IMM GRANULOCYTES # BLD AUTO: 0.03 K/UL (ref 0–0.58)
IMM GRANULOCYTES NFR BLD: 0 % (ref 0–5)
LYMPHOCYTES NFR BLD: 0.5 K/UL (ref 1.5–4)
LYMPHOCYTES RELATIVE PERCENT: 7 % (ref 20–42)
MCH RBC QN AUTO: 32.6 PG (ref 26–35)
MCHC RBC AUTO-ENTMCNC: 33 G/DL (ref 32–34.5)
MCV RBC AUTO: 98.9 FL (ref 80–99.9)
MONOCYTES NFR BLD: 0.76 K/UL (ref 0.1–0.95)
MONOCYTES NFR BLD: 11 % (ref 2–12)
NEUTROPHILS NFR BLD: 80 % (ref 43–80)
NEUTS SEG NFR BLD: 5.73 K/UL (ref 1.8–7.3)
PHOSPHATE SERPL-MCNC: 3.4 MG/DL (ref 2.5–4.5)
PLATELET # BLD AUTO: 180 K/UL (ref 130–450)
PMV BLD AUTO: 11.4 FL (ref 7–12)
POTASSIUM SERPL-SCNC: 4.5 MMOL/L (ref 3.5–5)
PROT SERPL-MCNC: 6.1 G/DL (ref 6.4–8.3)
RBC # BLD AUTO: 3.74 M/UL (ref 3.8–5.8)
RBC # BLD: ABNORMAL 10*6/UL
SODIUM SERPL-SCNC: 138 MMOL/L (ref 132–146)
WBC OTHER # BLD: 7.2 K/UL (ref 4.5–11.5)

## 2024-08-08 PROCEDURE — 84100 ASSAY OF PHOSPHORUS: CPT

## 2024-08-08 PROCEDURE — 2580000003 HC RX 258: Performed by: STUDENT IN AN ORGANIZED HEALTH CARE EDUCATION/TRAINING PROGRAM

## 2024-08-08 PROCEDURE — 1200000000 HC SEMI PRIVATE

## 2024-08-08 PROCEDURE — 36415 COLL VENOUS BLD VENIPUNCTURE: CPT

## 2024-08-08 PROCEDURE — 80053 COMPREHEN METABOLIC PANEL: CPT

## 2024-08-08 PROCEDURE — 6360000002 HC RX W HCPCS: Performed by: STUDENT IN AN ORGANIZED HEALTH CARE EDUCATION/TRAINING PROGRAM

## 2024-08-08 PROCEDURE — 82962 GLUCOSE BLOOD TEST: CPT

## 2024-08-08 PROCEDURE — 2580000003 HC RX 258

## 2024-08-08 PROCEDURE — 6370000000 HC RX 637 (ALT 250 FOR IP): Performed by: STUDENT IN AN ORGANIZED HEALTH CARE EDUCATION/TRAINING PROGRAM

## 2024-08-08 PROCEDURE — 6360000002 HC RX W HCPCS

## 2024-08-08 PROCEDURE — 6370000000 HC RX 637 (ALT 250 FOR IP)

## 2024-08-08 PROCEDURE — 85025 COMPLETE CBC W/AUTO DIFF WBC: CPT

## 2024-08-08 PROCEDURE — 2500000003 HC RX 250 WO HCPCS: Performed by: STUDENT IN AN ORGANIZED HEALTH CARE EDUCATION/TRAINING PROGRAM

## 2024-08-08 PROCEDURE — 99232 SBSQ HOSP IP/OBS MODERATE 35: CPT | Performed by: TRANSPLANT SURGERY

## 2024-08-08 RX ORDER — POLYETHYLENE GLYCOL 3350 17 G/17G
17 POWDER, FOR SOLUTION ORAL 2 TIMES DAILY
Status: DISCONTINUED | OUTPATIENT
Start: 2024-08-08 | End: 2024-08-09 | Stop reason: HOSPADM

## 2024-08-08 RX ORDER — SENNA AND DOCUSATE SODIUM 50; 8.6 MG/1; MG/1
2 TABLET, FILM COATED ORAL 2 TIMES DAILY
Status: DISCONTINUED | OUTPATIENT
Start: 2024-08-08 | End: 2024-08-09 | Stop reason: HOSPADM

## 2024-08-08 RX ORDER — BISACODYL 5 MG/1
5 TABLET, DELAYED RELEASE ORAL DAILY
Status: DISCONTINUED | OUTPATIENT
Start: 2024-08-08 | End: 2024-08-09 | Stop reason: HOSPADM

## 2024-08-08 RX ADMIN — TAMSULOSIN HYDROCHLORIDE 0.4 MG: 0.4 CAPSULE ORAL at 08:39

## 2024-08-08 RX ADMIN — AMPICILLIN SODIUM AND SULBACTAM SODIUM 3000 MG: 2; 1 INJECTION, POWDER, FOR SOLUTION INTRAMUSCULAR; INTRAVENOUS at 22:32

## 2024-08-08 RX ADMIN — POLYETHYLENE GLYCOL 3350 17 G: 17 POWDER, FOR SOLUTION ORAL at 21:12

## 2024-08-08 RX ADMIN — BISACODYL 5 MG: 5 TABLET, COATED ORAL at 21:12

## 2024-08-08 RX ADMIN — MONTELUKAST 10 MG: 10 TABLET, FILM COATED ORAL at 21:12

## 2024-08-08 RX ADMIN — INSULIN LISPRO 4 UNITS: 100 INJECTION, SOLUTION INTRAVENOUS; SUBCUTANEOUS at 11:34

## 2024-08-08 RX ADMIN — SENNOSIDES AND DOCUSATE SODIUM 2 TABLET: 50; 8.6 TABLET ORAL at 22:40

## 2024-08-08 RX ADMIN — ENOXAPARIN SODIUM 30 MG: 100 INJECTION SUBCUTANEOUS at 21:11

## 2024-08-08 RX ADMIN — ENOXAPARIN SODIUM 30 MG: 100 INJECTION SUBCUTANEOUS at 08:40

## 2024-08-08 RX ADMIN — CETIRIZINE HYDROCHLORIDE 10 MG: 10 TABLET, FILM COATED ORAL at 08:50

## 2024-08-08 RX ADMIN — SODIUM CHLORIDE, PRESERVATIVE FREE 10 ML: 5 INJECTION INTRAVENOUS at 22:16

## 2024-08-08 RX ADMIN — SODIUM CHLORIDE, PRESERVATIVE FREE 10 ML: 5 INJECTION INTRAVENOUS at 08:40

## 2024-08-08 RX ADMIN — ONDANSETRON 4 MG: 2 INJECTION INTRAMUSCULAR; INTRAVENOUS at 08:59

## 2024-08-08 RX ADMIN — SENNOSIDES AND DOCUSATE SODIUM 2 TABLET: 50; 8.6 TABLET ORAL at 08:49

## 2024-08-08 RX ADMIN — INSULIN GLARGINE 10 UNITS: 100 INJECTION, SOLUTION SUBCUTANEOUS at 22:04

## 2024-08-08 RX ADMIN — Medication 1 TABLET: at 08:49

## 2024-08-08 RX ADMIN — FINASTERIDE 5 MG: 5 TABLET, FILM COATED ORAL at 08:49

## 2024-08-08 RX ADMIN — BISACODYL 5 MG: 5 TABLET, COATED ORAL at 08:49

## 2024-08-08 RX ADMIN — METOPROLOL SUCCINATE 25 MG: 25 TABLET, EXTENDED RELEASE ORAL at 08:40

## 2024-08-08 RX ADMIN — SODIUM PHOSPHATE, MONOBASIC, MONOHYDRATE AND SODIUM PHOSPHATE, DIBASIC, ANHYDROUS 30 MMOL: 142; 276 INJECTION, SOLUTION INTRAVENOUS at 23:43

## 2024-08-08 RX ADMIN — POLYETHYLENE GLYCOL 3350 17 G: 17 POWDER, FOR SOLUTION ORAL at 08:49

## 2024-08-08 NOTE — PROGRESS NOTES
HEPATOBILIARY AND PANCREATIC SURGERY  DAILY PROGRESS NOTE  8/8/2024  Subjective:  Patient with no further episodes of emesis. Soreness improving. Denies any other issues.     I/O this shift:  In: 240 [P.O.:240]  Out: 60 [Emesis/NG output:60]  I/O last 3 completed shifts:  In: 1450 [P.O.:1440; Other:10]  Out: 720 [Emesis/NG output:720]    Objective:  /76   Pulse 84   Temp 97.8 °F (36.6 °C) (Temporal)   Resp 16   Ht 1.88 m (6' 2\")   Wt 104.8 kg (231 lb)   SpO2 98%   BMI 29.66 kg/m²     - General: No acute distress. Awake and conversant.  - CV: Regular rate.  - Respiratory: Respirations are non-labored   - Abdomen: Soft, non-distended, minimally tender to palpation RUQ. PTHC drain in place with 445 cc light bilious output   - Skin: Warm. No rashes or ulcers.  - Extremities: No cyanosis or edema.    Lab Results   Component Value Date    WBC 7.2 08/08/2024    HGB 12.2 (L) 08/08/2024    HCT 37.0 08/08/2024    MCV 98.9 08/08/2024     08/08/2024     Lab Results   Component Value Date     08/08/2024    K 4.5 08/08/2024     08/08/2024    CO2 26 08/08/2024    BUN 9 08/08/2024    CREATININE 0.7 08/08/2024    GLUCOSE 171 (H) 08/08/2024    CALCIUM 9.4 08/08/2024    BILITOT 3.9 (H) 08/08/2024    ALKPHOS 255 (H) 08/08/2024    AST 58 (H) 08/08/2024     (H) 08/08/2024    LABGLOM >90 08/08/2024    GFRAA >60 09/01/2022       ASSESSMENT/PLAN:    79 y.o. male who is deaf however is able to read lips.  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     Plan:  - maintain patient on FLD + supplements only, no plans to advance diet at this time  - drain care - please flush per order   - to cap PTHC today  - strict intake/output  - resume Lovenox for DVT ppx  - OOB as tolerated, ambulate in halls   - patient is scheduled for Whipple 8/14, no plans for rendezvous at

## 2024-08-08 NOTE — DISCHARGE SUMMARY
Physician Discharge Summary     Patient ID:  Michael Garcia  [id number]  79 y.o.  1945    Admit date: 8/3/2024    Discharge date and time:8/9/2024    Admitting Physician: Demetrius Santoyo III, MD     Admission Diagnoses: 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 (HCC) [K83.1, C80.1]    Discharge Diagnoses: Principal Problem:    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 (HCC)  Resolved Problems:    * No resolved hospital problems. *      Admission Condition: poor    Discharged Condition: stable    Hospital Course:  Michael Garcia is a 79 y.o. male who presented with abdominal pain and jaundice. Work up revealed bilirubin 6.8 and a 3.6 x 2.3 cm pancreatic head mass invading the duodenum. Underwent EGD/ERCP with biopsy 8/2 and PTHC placement 8/6. The patient's course was otherwise uneventful. He progressed well, pain was controlled on PO medications. He was tolerating a regular diet with no nausea or vomiting, and was in a suitable condition for discharge to home in stable condition.    Consults:   IP CONSULT TO GENERAL SURGERY  IP CONSULT TO GI    Significant Diagnostic Studies:   No results found.    Discharge Exam:    /75   Pulse 63   Temp 97 °F (36.1 °C) (Temporal)   Resp 16   Ht 1.88 m (6' 2\")   Wt 100.2 kg (221 lb)   SpO2 97%   BMI 28.37 kg/m²     - General: No acute distress. Awake and conversant.  - CV: Regular rate.  - Respiratory: Respirations are non-labored   - Abdomen: Soft, non-distended, minimally tender to palpation RUQ. PTHC drain in place.    - Skin: Warm. No rashes or ulcers.  - Extremities: No cyanosis or edema.    Disposition: home    In process/preliminary results:  Outstanding Order Results       Date and Time Order Name Status Description    8/6/2024  1:40 PM Culture, Body Fluid Preliminary     8/6/2024  1:40 PM Culture, Fungus In process     8/6/2024  1:40 PM Culture, Anaerobic Preliminary             Patient Instructions:   Current Discharge Medication  Dr. Santoyo's office first BEFORE taking the cap off the drain. We will send a fresh bag home with you in case you need it.   You may shower with the drain.     When should I call the doctor?  Call your doctor if:  You have an elevated temperature (greater than 101 or higher)  The drain stitches come loose or break, or if the drain comes out  The area on your skin around the drain gets red, swollen, or painful    Medications  Take medications as prescribed. Do not drive while on narcotic pain medications.     Follow Up  You will follow up with Dr. Santoyo in the office next week.     Follow up:   Demetrius Santoyo III, MD  [address] [facility] [address]  [phone]    Follow up       Signed:  Latosha Faustin MD  8/8/2024  4:59 PM    Attending Physician Statement:    Chief Complaint: obstructive jaundice, pancreatic adenocarcinoma    I have examined the patient and performed the key aspects of physical exam, reviewed the record (including all pertinent and new radiology images and laboratory findings), and discussed the case with the surgical team.  I agree with the assessment and plan with the following additions, corrections, and changes. 14pt review of symptoms completed and negative except as mentioned.    Patient states that he is doing well and tolerating a a full liquid diet with supplements.  His duodenal biopsy was negative with only inflammation despite the ulceration.  This is mostly consistent with his pancreatic adenocarcinoma  Keep PTHC capped  Patient is scheduled for a Whipple next week  We went over the risk benefits return of the complications of Whipple procedure and he agreed to proceed    Demetrius Santoyo MD  08/09/24  10:41 AM

## 2024-08-08 NOTE — CARE COORDINATION
08/08/24 Update CM Note: Patient is POD 2 of PTHC drain. Today will be capped. He is continued on clear diet. Will follow for next plan of care. Patient is scheduled for a Whipple procedure on 8/14/24. Electronically signed by Lanie Epps RN CM on 8/8/2024 at 9:28 AM

## 2024-08-08 NOTE — PROGRESS NOTES
Patient has no complaints of increased pain or discomfort in abdomen since capping Biliary drain. Instructed patient to notify immediately if symptoms change.

## 2024-08-08 NOTE — PLAN OF CARE
Problem: Chronic Conditions and Co-morbidities  Goal: Patient's chronic conditions and co-morbidity symptoms are monitored and maintained or improved  8/8/2024 0921 by Solange Louis, RN  Outcome: Progressing     Problem: Discharge Planning  Goal: Discharge to home or other facility with appropriate resources  8/8/2024 0921 by Solange Louis, RN  Outcome: Progressing     Problem: Pain  Goal: Verbalizes/displays adequate comfort level or baseline comfort level  8/8/2024 0921 by Solange Louis, RN  Outcome: Progressing

## 2024-08-09 VITALS
RESPIRATION RATE: 16 BRPM | WEIGHT: 221 LBS | SYSTOLIC BLOOD PRESSURE: 143 MMHG | HEIGHT: 74 IN | OXYGEN SATURATION: 96 % | DIASTOLIC BLOOD PRESSURE: 69 MMHG | BODY MASS INDEX: 28.36 KG/M2 | HEART RATE: 48 BPM | TEMPERATURE: 97.3 F

## 2024-08-09 LAB
ALBUMIN SERPL-MCNC: 3 G/DL (ref 3.5–5.2)
ALP SERPL-CCNC: 233 U/L (ref 40–129)
ALT SERPL-CCNC: 129 U/L (ref 0–40)
ANION GAP SERPL CALCULATED.3IONS-SCNC: 11 MMOL/L (ref 7–16)
AST SERPL-CCNC: 48 U/L (ref 0–39)
BASOPHILS # BLD: 0.02 K/UL (ref 0–0.2)
BASOPHILS NFR BLD: 0 % (ref 0–2)
BILIRUB SERPL-MCNC: 3.5 MG/DL (ref 0–1.2)
BUN SERPL-MCNC: 9 MG/DL (ref 6–23)
CALCIUM SERPL-MCNC: 8.8 MG/DL (ref 8.6–10.2)
CHLORIDE SERPL-SCNC: 105 MMOL/L (ref 98–107)
CO2 SERPL-SCNC: 25 MMOL/L (ref 22–29)
CREAT SERPL-MCNC: 0.7 MG/DL (ref 0.7–1.2)
EOSINOPHIL # BLD: 0.13 K/UL (ref 0.05–0.5)
EOSINOPHILS RELATIVE PERCENT: 3 % (ref 0–6)
ERYTHROCYTE [DISTWIDTH] IN BLOOD BY AUTOMATED COUNT: 12.3 % (ref 11.5–15)
GFR, ESTIMATED: >90 ML/MIN/1.73M2
GLUCOSE BLD-MCNC: 137 MG/DL (ref 74–99)
GLUCOSE SERPL-MCNC: 131 MG/DL (ref 74–99)
HCT VFR BLD AUTO: 35.9 % (ref 37–54)
HGB BLD-MCNC: 12.2 G/DL (ref 12.5–16.5)
IMM GRANULOCYTES # BLD AUTO: <0.03 K/UL (ref 0–0.58)
IMM GRANULOCYTES NFR BLD: 0 % (ref 0–5)
LYMPHOCYTES NFR BLD: 0.43 K/UL (ref 1.5–4)
LYMPHOCYTES RELATIVE PERCENT: 8 % (ref 20–42)
MCH RBC QN AUTO: 34 PG (ref 26–35)
MCHC RBC AUTO-ENTMCNC: 34 G/DL (ref 32–34.5)
MCV RBC AUTO: 100 FL (ref 80–99.9)
MICROORGANISM SPEC CULT: NO GROWTH
MICROORGANISM/AGENT SPEC: NORMAL
MONOCYTES NFR BLD: 0.62 K/UL (ref 0.1–0.95)
MONOCYTES NFR BLD: 12 % (ref 2–12)
NEUTROPHILS NFR BLD: 77 % (ref 43–80)
NEUTS SEG NFR BLD: 3.95 K/UL (ref 1.8–7.3)
PHOSPHATE SERPL-MCNC: 4.7 MG/DL (ref 2.5–4.5)
PLATELET # BLD AUTO: 173 K/UL (ref 130–450)
PMV BLD AUTO: 11.2 FL (ref 7–12)
POTASSIUM SERPL-SCNC: 4.1 MMOL/L (ref 3.5–5)
PROT SERPL-MCNC: 5.8 G/DL (ref 6.4–8.3)
RBC # BLD AUTO: 3.59 M/UL (ref 3.8–5.8)
RBC # BLD: ABNORMAL 10*6/UL
SODIUM SERPL-SCNC: 141 MMOL/L (ref 132–146)
SPECIMEN DESCRIPTION: NORMAL
SURGICAL PATHOLOGY REPORT: NORMAL
WBC OTHER # BLD: 5.2 K/UL (ref 4.5–11.5)

## 2024-08-09 PROCEDURE — 6370000000 HC RX 637 (ALT 250 FOR IP)

## 2024-08-09 PROCEDURE — 82962 GLUCOSE BLOOD TEST: CPT

## 2024-08-09 PROCEDURE — 85025 COMPLETE CBC W/AUTO DIFF WBC: CPT

## 2024-08-09 PROCEDURE — 6360000002 HC RX W HCPCS

## 2024-08-09 PROCEDURE — 99232 SBSQ HOSP IP/OBS MODERATE 35: CPT | Performed by: TRANSPLANT SURGERY

## 2024-08-09 PROCEDURE — 80053 COMPREHEN METABOLIC PANEL: CPT

## 2024-08-09 PROCEDURE — 84100 ASSAY OF PHOSPHORUS: CPT

## 2024-08-09 PROCEDURE — 2580000003 HC RX 258: Performed by: STUDENT IN AN ORGANIZED HEALTH CARE EDUCATION/TRAINING PROGRAM

## 2024-08-09 PROCEDURE — 6370000000 HC RX 637 (ALT 250 FOR IP): Performed by: STUDENT IN AN ORGANIZED HEALTH CARE EDUCATION/TRAINING PROGRAM

## 2024-08-09 PROCEDURE — 36415 COLL VENOUS BLD VENIPUNCTURE: CPT

## 2024-08-09 RX ORDER — OXYCODONE HYDROCHLORIDE 10 MG/1
10 TABLET ORAL EVERY 4 HOURS PRN
Qty: 42 TABLET | Refills: 0 | Status: ON HOLD | OUTPATIENT
Start: 2024-08-09 | End: 2024-08-23 | Stop reason: HOSPADM

## 2024-08-09 RX ORDER — PANTOPRAZOLE SODIUM 40 MG/1
40 TABLET, DELAYED RELEASE ORAL 2 TIMES DAILY
Qty: 60 TABLET | Refills: 0 | Status: ON HOLD | OUTPATIENT
Start: 2024-08-09 | End: 2024-08-23

## 2024-08-09 RX ADMIN — SENNOSIDES AND DOCUSATE SODIUM 2 TABLET: 50; 8.6 TABLET ORAL at 09:10

## 2024-08-09 RX ADMIN — TAMSULOSIN HYDROCHLORIDE 0.4 MG: 0.4 CAPSULE ORAL at 09:10

## 2024-08-09 RX ADMIN — Medication 1 TABLET: at 09:11

## 2024-08-09 RX ADMIN — SODIUM CHLORIDE, PRESERVATIVE FREE 10 ML: 5 INJECTION INTRAVENOUS at 09:10

## 2024-08-09 RX ADMIN — CETIRIZINE HYDROCHLORIDE 10 MG: 10 TABLET, FILM COATED ORAL at 09:11

## 2024-08-09 RX ADMIN — FINASTERIDE 5 MG: 5 TABLET, FILM COATED ORAL at 09:11

## 2024-08-09 RX ADMIN — ENOXAPARIN SODIUM 30 MG: 100 INJECTION SUBCUTANEOUS at 09:10

## 2024-08-09 RX ADMIN — POLYETHYLENE GLYCOL 3350 17 G: 17 POWDER, FOR SOLUTION ORAL at 09:10

## 2024-08-09 RX ADMIN — METOPROLOL SUCCINATE 25 MG: 25 TABLET, EXTENDED RELEASE ORAL at 09:10

## 2024-08-09 NOTE — CARE COORDINATION
Met with patient at bedside to notify of hospital follow-up appointment with Dr Moran.    AUG    12 Appointment 11:00 AM  Dr. Moshe Moran, DO

## 2024-08-09 NOTE — PLAN OF CARE
Problem: Chronic Conditions and Co-morbidities  Goal: Patient's chronic conditions and co-morbidity symptoms are monitored and maintained or improved  8/9/2024 0942 by Solange Louis, RN  Outcome: Adequate for Discharge  8/9/2024 0940 by Sloange Louis, RN  Outcome: Progressing  8/9/2024 0121 by Juanita Garcia RN  Outcome: Progressing     Problem: Discharge Planning  Goal: Discharge to home or other facility with appropriate resources  8/9/2024 0942 by Solange Louis, RN  Outcome: Adequate for Discharge  8/9/2024 0940 by Solange Louis, RN  Outcome: Progressing  8/9/2024 0121 by Juanita Garcia RN  Outcome: Progressing     Problem: Pain  Goal: Verbalizes/displays adequate comfort level or baseline comfort level  8/9/2024 0942 by Solange Louis, RN  Outcome: Adequate for Discharge  8/9/2024 0940 by Solange Louis, RN  Outcome: Progressing  8/9/2024 0121 by Juanita Garcia RN  Outcome: Progressing

## 2024-08-09 NOTE — CARE COORDINATION
08/09/24 Update CM Note; patient will discharge to home today with his fiance. He is alert and oriented. He does not voice any needs currently. Electronically signed by Lanie Epps RN CM on 8/9/2024 at 8:49 AM

## 2024-08-09 NOTE — PLAN OF CARE
Problem: Chronic Conditions and Co-morbidities  Goal: Patient's chronic conditions and co-morbidity symptoms are monitored and maintained or improved  8/9/2024 0940 by Solange Louis, RN  Outcome: Progressing  8/9/2024 0121 by Juanita Garcia RN  Outcome: Progressing     Problem: Discharge Planning  Goal: Discharge to home or other facility with appropriate resources  8/9/2024 0940 by Solange Louis, RN  Outcome: Progressing  8/9/2024 0121 by Juanita Garcia RN  Outcome: Progressing     Problem: Pain  Goal: Verbalizes/displays adequate comfort level or baseline comfort level  8/9/2024 0940 by Solange Louis, RN  Outcome: Progressing  8/9/2024 0121 by Juanita Garcia, RN  Outcome: Progressing

## 2024-08-09 NOTE — PROGRESS NOTES
CLINICAL PHARMACY NOTE: MEDS TO BEDS    Total # of Prescriptions Filled: 2   The following medications were delivered to the patient:  Pantoprazole 40  Oxycodone 10    Additional Documentation:  Delivered to pt

## 2024-08-09 NOTE — DISCHARGE INSTRUCTIONS
Drain Care  What is the drain for?  Your drain is to stop fluid from building up under the skin.     General Instructions  Keep the drain capped. Please call Dr. Santoyo's office first BEFORE taking the cap off the drain. We will send a fresh bag home with you in case you need it.   You may shower with the drain.     When should I call the doctor?  Call your doctor if:  You have an elevated temperature (greater than 101 or higher)  The drain stitches come loose or break, or if the drain comes out  The area on your skin around the drain gets red, swollen, or painful    Medications  Take medications as prescribed. Do not drive while on narcotic pain medications.     Follow Up  You will follow up with Dr. Santoyo in the office next week.          Learning About Biliary Drain Care  What is a biliary drain?  A biliary drain allows bile to flow out from a blocked bile duct into a collection bag outside the body. Bile is a liquid made by the liver. It helps digest fats. Blocked or narrowed bile ducts can stop the flow of bile and cause yellowing of the skin (jaundice) or an infection of the liver.  The drain is a thin plastic tube (catheter) that the doctor places in the bile duct. From there the tube passes out through a drain site on your skin and into a collection bag. The bag may be attached to a belt or strapped to the leg. About 2 to 4 cups of bile will collect in the bag each day. The amount should be fairly constant from day to day.  The bile that comes out of the drain may look bloody at first, but it will soon change to its normal yellow-green color.  How long the drain stays in place depends on what caused the problem with your bile duct. Your doctor will discuss this with you.  How can you care for your biliary drain at home?  Your doctor or ostomy nurse can answer any questions you have about caring for your drain or bag.  Learn to change the bandage.   You may have a bandage on your skin where the

## 2024-08-11 LAB
MICROORGANISM SPEC CULT: NORMAL
MICROORGANISM SPEC CULT: NORMAL
MICROORGANISM/AGENT SPEC: NORMAL
SPECIMEN DESCRIPTION: NORMAL
SPECIMEN DESCRIPTION: NORMAL

## 2024-08-12 ENCOUNTER — ANESTHESIA EVENT (OUTPATIENT)
Dept: OPERATING ROOM | Age: 79
End: 2024-08-12
Payer: MEDICARE

## 2024-08-12 ENCOUNTER — TELEPHONE (OUTPATIENT)
Dept: HEMATOLOGY | Age: 79
End: 2024-08-12

## 2024-08-12 NOTE — PROGRESS NOTES
Patient was recently discharged from hospital on 8/9.  Additional labs did not need to be drawn.  Verified with Amber from Dr. Santoyo that T&S can be drawn day of surgery.

## 2024-08-12 NOTE — TELEPHONE ENCOUNTER
I scheduled patient for his Whipple per Dr. Santoyo and he is scheduled on 8/14/24 at 7:30am at [facility].  Patient was just recently discharged from the hospital and I checked with Dr. Santoyo to see if patient needed to come in for PAT and he does need type and cross done prior to surgery.  I left a message on PAT VM with above information and Natalee from PAT will have patient come in early on day of surgery to have that done since everything else was completed on the inpatient side.    Electronically signed by Amber Schmitz RN on 8/12/2024 at 12:26 PM

## 2024-08-12 NOTE — PROGRESS NOTES
[facility]   PRE-ADMISSION TESTING GENERAL INSTRUCTIONS  PAT Phone Number: [phone]      GENERAL INSTRUCTIONS:    [x] Antibacterial Soap Shower the Night before AND the morning of surgery.  [] CHG Wipes instruction sheet and wipes given.  [x] Do not wear makeup, lotions, powders, deodorant the morning of surgery.  [x] No solid food after midnight. You may have SIPS of clear liquids up until 2 hours before your arrival time to the hospital.   [x] You may brush your teeth, gargle, but do not swallow water.   [x] No tobacco products, illegal drugs, or alcohol within 24 hours of your surgery.  [x] Jewelry or valuables should not be brought to the hospital. All body and/or tongue piercing's must be removed prior to arriving to hospital. No contact lens or hair pins.   [x] Arrange transportation with a responsible adult  to and from the hospital. Arrange for someone to be with you for the remainder of the day and for 24 hours after your procedure due to having had anesthesia.          -Who will be your  for transportation? rosita        -Who will be staying with you for 24 hrs after your procedure? rosita  [x] Bring insurance card and photo ID.  [x] Bring copy of living will or healthcare power of  papers to be placed in your electronic record.  [] Urine Pregnancy test will be preformed the day of surgery. A specimen sample may be brought from home.  [] Transfusion (Green) Bracelet: Please bring with you to hospital, day of surgery.     PARKING INSTRUCTIONS:     [x] ARRIVAL DATE & TIME: 8/14 @ 0500  [x] Times are subject to change. We will contact you the business day before surgery if that were to occur.  [x] Enter into the [facility] Entrance. Two people may accompany you. Masks are not required.  [x] Parking Lot \"I\" is where you will park. It is located on the corner of [address] and [address]. The entrance is on [address].   Only one vehicle - per

## 2024-08-14 ENCOUNTER — HOSPITAL ENCOUNTER (INPATIENT)
Age: 79
LOS: 23 days | Discharge: NURSING, REHAB OR LONG-TERM CARE | End: 2024-09-06
Attending: TRANSPLANT SURGERY | Admitting: TRANSPLANT SURGERY
Payer: MEDICARE

## 2024-08-14 ENCOUNTER — ANESTHESIA (OUTPATIENT)
Dept: OPERATING ROOM | Age: 79
End: 2024-08-14
Payer: MEDICARE

## 2024-08-14 DIAGNOSIS — C25.9 ADENOCARCINOMA OF PANCREAS (HCC): ICD-10-CM

## 2024-08-14 DIAGNOSIS — Z46.59 ENCOUNTER FOR NASOGASTRIC (NG) TUBE PLACEMENT: ICD-10-CM

## 2024-08-14 DIAGNOSIS — Z01.812 PRE-OPERATIVE LABORATORY EXAMINATION: ICD-10-CM

## 2024-08-14 DIAGNOSIS — C25.0 ADENOCARCINOMA OF HEAD OF PANCREAS (HCC): Primary | ICD-10-CM

## 2024-08-14 DIAGNOSIS — Z98.890 S/P EXPLORATORY LAPAROTOMY: ICD-10-CM

## 2024-08-14 LAB
ALBUMIN SERPL-MCNC: 3 G/DL (ref 3.5–5.2)
ALBUMIN SERPL-MCNC: 3.2 G/DL (ref 3.5–5.2)
ALBUMIN SERPL-MCNC: 3.6 G/DL (ref 3.5–5.2)
ALP SERPL-CCNC: 134 U/L (ref 40–129)
ALP SERPL-CCNC: 135 U/L (ref 40–129)
ALP SERPL-CCNC: 168 U/L (ref 40–129)
ALT SERPL-CCNC: 162 U/L (ref 0–40)
ALT SERPL-CCNC: 446 U/L (ref 0–40)
ALT SERPL-CCNC: 477 U/L (ref 0–40)
AMYLASE SERPL-CCNC: 9 U/L (ref 20–100)
ANION GAP SERPL CALCULATED.3IONS-SCNC: 12 MMOL/L (ref 7–16)
ANION GAP SERPL CALCULATED.3IONS-SCNC: 13 MMOL/L (ref 7–16)
ANION GAP SERPL CALCULATED.3IONS-SCNC: 16 MMOL/L (ref 7–16)
AST SERPL-CCNC: 155 U/L (ref 0–39)
AST SERPL-CCNC: 535 U/L (ref 0–39)
AST SERPL-CCNC: 628 U/L (ref 0–39)
BASOPHILS # BLD: 0.02 K/UL (ref 0–0.2)
BASOPHILS NFR BLD: 0 % (ref 0–2)
BILIRUB SERPL-MCNC: 10.8 MG/DL (ref 0–1.2)
BILIRUB SERPL-MCNC: 12.6 MG/DL (ref 0–1.2)
BILIRUB SERPL-MCNC: 13.8 MG/DL (ref 0–1.2)
BUN BLD-MCNC: 18 MG/DL (ref 6–23)
BUN BLD-MCNC: 18 MG/DL (ref 6–23)
BUN BLD-MCNC: 19 MG/DL (ref 6–23)
BUN BLD-MCNC: 19 MG/DL (ref 6–23)
BUN BLD-MCNC: 21 MG/DL (ref 6–23)
BUN BLD-MCNC: 21 MG/DL (ref 6–23)
BUN SERPL-MCNC: 17 MG/DL (ref 6–23)
BUN SERPL-MCNC: 20 MG/DL (ref 6–23)
BUN SERPL-MCNC: 20 MG/DL (ref 6–23)
CA-I BLD-SCNC: 1.08 MMOL/L (ref 1.15–1.33)
CA-I BLD-SCNC: 1.11 MMOL/L (ref 1.15–1.33)
CA-I BLD-SCNC: 1.11 MMOL/L (ref 1.15–1.33)
CA-I BLD-SCNC: 1.14 MMOL/L (ref 1.15–1.33)
CA-I BLD-SCNC: 1.17 MMOL/L (ref 1.15–1.33)
CA-I BLD-SCNC: 1.17 MMOL/L (ref 1.15–1.33)
CA-I BLD-SCNC: 1.18 MMOL/L (ref 1.15–1.33)
CALCIUM SERPL-MCNC: 8.3 MG/DL (ref 8.6–10.2)
CALCIUM SERPL-MCNC: 8.4 MG/DL (ref 8.6–10.2)
CALCIUM SERPL-MCNC: 8.6 MG/DL (ref 8.6–10.2)
CHLORIDE BLD-SCNC: 103 MMOL/L (ref 100–108)
CHLORIDE BLD-SCNC: 104 MMOL/L (ref 100–108)
CHLORIDE BLD-SCNC: 106 MMOL/L (ref 100–108)
CHLORIDE BLD-SCNC: 108 MMOL/L (ref 100–108)
CHLORIDE SERPL-SCNC: 103 MMOL/L (ref 98–107)
CHLORIDE SERPL-SCNC: 105 MMOL/L (ref 98–107)
CHLORIDE SERPL-SCNC: 106 MMOL/L (ref 98–107)
CO2 BLD CALC-SCNC: 19 MMOL/L (ref 22–29)
CO2 BLD CALC-SCNC: 20 MMOL/L (ref 22–29)
CO2 BLD CALC-SCNC: 20 MMOL/L (ref 22–29)
CO2 BLD CALC-SCNC: 21 MMOL/L (ref 22–29)
CO2 BLD CALC-SCNC: 22 MMOL/L (ref 22–29)
CO2 BLD CALC-SCNC: 23 MMOL/L (ref 22–29)
CO2 SERPL-SCNC: 19 MMOL/L (ref 22–29)
CO2 SERPL-SCNC: 21 MMOL/L (ref 22–29)
CO2 SERPL-SCNC: 22 MMOL/L (ref 22–29)
CREAT BLD-MCNC: 0.6 MG/DL (ref 0.7–1.2)
CREAT BLD-MCNC: 0.7 MG/DL (ref 0.7–1.2)
CREAT BLD-MCNC: 0.8 MG/DL (ref 0.7–1.2)
CREAT BLD-MCNC: 0.8 MG/DL (ref 0.7–1.2)
CREAT SERPL-MCNC: 0.7 MG/DL (ref 0.7–1.2)
CREAT SERPL-MCNC: 0.7 MG/DL (ref 0.7–1.2)
CREAT SERPL-MCNC: 0.8 MG/DL (ref 0.7–1.2)
EGFR, POC: >90 ML/MIN/1.73M2
EOSINOPHIL # BLD: 0 K/UL (ref 0.05–0.5)
EOSINOPHILS RELATIVE PERCENT: 0 % (ref 0–6)
ERYTHROCYTE [DISTWIDTH] IN BLOOD BY AUTOMATED COUNT: 11.9 % (ref 11.5–15)
GFR, ESTIMATED: >90 ML/MIN/1.73M2
GLUCOSE BLD-MCNC: 127 MG/DL (ref 74–99)
GLUCOSE BLD-MCNC: 166 MG/DL (ref 74–99)
GLUCOSE BLD-MCNC: 172 MG/DL (ref 74–99)
GLUCOSE BLD-MCNC: 173 MG/DL (ref 74–99)
GLUCOSE BLD-MCNC: 176 MG/DL (ref 74–99)
GLUCOSE BLD-MCNC: 184 MG/DL (ref 74–99)
GLUCOSE BLD-MCNC: 188 MG/DL (ref 74–99)
GLUCOSE BLD-MCNC: 188 MG/DL (ref 74–99)
GLUCOSE BLD-MCNC: 191 MG/DL (ref 74–99)
GLUCOSE BLD-MCNC: 193 MG/DL (ref 74–99)
GLUCOSE BLD-MCNC: 200 MG/DL (ref 74–99)
GLUCOSE BLD-MCNC: 200 MG/DL (ref 74–99)
GLUCOSE BLD-MCNC: 218 MG/DL (ref 74–99)
GLUCOSE BLD-MCNC: 228 MG/DL (ref 74–99)
GLUCOSE BLD-MCNC: 237 MG/DL (ref 74–99)
GLUCOSE BLD-MCNC: 241 MG/DL (ref 74–99)
GLUCOSE BLD-MCNC: 241 MG/DL (ref 74–99)
GLUCOSE BLD-MCNC: 253 MG/DL (ref 74–99)
GLUCOSE BLD-MCNC: 264 MG/DL (ref 74–99)
GLUCOSE SERPL-MCNC: 215 MG/DL (ref 74–99)
GLUCOSE SERPL-MCNC: 224 MG/DL (ref 74–99)
GLUCOSE SERPL-MCNC: 232 MG/DL (ref 74–99)
HBA1C MFR BLD: 8 % (ref 4–5.6)
HCT VFR BLD AUTO: 30 % (ref 37–54)
HCT VFR BLD AUTO: 30.6 % (ref 37–54)
HCT VFR BLD AUTO: 32 % (ref 37–54)
HCT VFR BLD AUTO: 33 % (ref 37–54)
HCT VFR BLD AUTO: 33 % (ref 37–54)
HCT VFR BLD AUTO: 35 % (ref 37–54)
HCT VFR BLD AUTO: 36 % (ref 37–54)
HGB BLD-MCNC: 10.4 G/DL (ref 12.5–16.5)
IMM GRANULOCYTES # BLD AUTO: <0.03 K/UL (ref 0–0.58)
IMM GRANULOCYTES NFR BLD: 0 % (ref 0–5)
LYMPHOCYTES NFR BLD: 0.32 K/UL (ref 1.5–4)
LYMPHOCYTES RELATIVE PERCENT: 4 % (ref 20–42)
MAGNESIUM SERPL-MCNC: 2.2 MG/DL (ref 1.6–2.6)
MCH RBC QN AUTO: 33.5 PG (ref 26–35)
MCHC RBC AUTO-ENTMCNC: 34 G/DL (ref 32–34.5)
MCV RBC AUTO: 98.7 FL (ref 80–99.9)
MONOCYTES NFR BLD: 1.11 K/UL (ref 0.1–0.95)
MONOCYTES NFR BLD: 14 % (ref 2–12)
NEGATIVE BASE EXCESS, ART: 2.8 MMOL/L
NEGATIVE BASE EXCESS, ART: 3.1 MMOL/L
NEGATIVE BASE EXCESS, ART: 4 MMOL/L
NEGATIVE BASE EXCESS, ART: 4.5 MMOL/L
NEGATIVE BASE EXCESS, ART: 6.5 MMOL/L
NEGATIVE BASE EXCESS, ART: 7.1 MMOL/L
NEUTROPHILS NFR BLD: 82 % (ref 43–80)
NEUTS SEG NFR BLD: 6.63 K/UL (ref 1.8–7.3)
PHOSPHATE SERPL-MCNC: 3.1 MG/DL (ref 2.5–4.5)
PLATELET # BLD AUTO: 250 K/UL (ref 130–450)
PMV BLD AUTO: 10.4 FL (ref 7–12)
POC ANION GAP: 10 MMOL/L (ref 7–16)
POC ANION GAP: 11 MMOL/L (ref 7–16)
POC ANION GAP: 11 MMOL/L (ref 7–16)
POC ANION GAP: 13 MMOL/L (ref 7–16)
POC ANION GAP: 7 MMOL/L (ref 7–16)
POC ANION GAP: 8 MMOL/L (ref 7–16)
POC HCO3: 18.8 MMOL/L (ref 22–26)
POC HCO3: 20.1 MMOL/L (ref 22–26)
POC HCO3: 20.4 MMOL/L (ref 22–26)
POC HCO3: 21.2 MMOL/L (ref 22–26)
POC HCO3: 22.2 MMOL/L (ref 22–26)
POC HCO3: 23.1 MMOL/L (ref 22–26)
POC HEMOGLOBIN (CALC): 10.2 G/DL (ref 12.5–15.5)
POC HEMOGLOBIN (CALC): 10.8 G/DL (ref 12.5–15.5)
POC HEMOGLOBIN (CALC): 11.1 G/DL (ref 12.5–15.5)
POC HEMOGLOBIN (CALC): 11.3 G/DL (ref 12.5–15.5)
POC HEMOGLOBIN (CALC): 12.1 G/DL (ref 12.5–15.5)
POC HEMOGLOBIN (CALC): 12.3 G/DL (ref 12.5–15.5)
POC LACTIC ACID: 0.7 MMOL/L (ref 0.5–2.2)
POC LACTIC ACID: 1 MMOL/L (ref 0.5–2.2)
POC LACTIC ACID: 1.7 MMOL/L (ref 0.5–2.2)
POC LACTIC ACID: 2.2 MMOL/L (ref 0.5–2.2)
POC LACTIC ACID: 2.2 MMOL/L (ref 0.5–2.2)
POC LACTIC ACID: 2.6 MMOL/L (ref 0.5–2.2)
POC O2 SATURATION: 98.3 % (ref 92–98.5)
POC O2 SATURATION: 98.7 % (ref 92–98.5)
POC O2 SATURATION: 98.9 % (ref 92–98.5)
POC O2 SATURATION: 99.5 % (ref 92–98.5)
POC O2 SATURATION: 99.6 % (ref 92–98.5)
POC O2 SATURATION: 99.8 % (ref 92–98.5)
POC PCO2: 36.4 MM HG (ref 35–45)
POC PCO2: 38.3 MM HG (ref 35–45)
POC PCO2: 38.5 MM HG (ref 35–45)
POC PCO2: 39.9 MM HG (ref 35–45)
POC PCO2: 43.3 MM HG (ref 35–45)
POC PCO2: 43.8 MM HG (ref 35–45)
POC PH: 7.28 (ref 7.35–7.45)
POC PH: 7.3 (ref 7.35–7.45)
POC PH: 7.33 (ref 7.35–7.45)
POC PH: 7.35 (ref 7.35–7.45)
POC PH: 7.35 (ref 7.35–7.45)
POC PH: 7.36 (ref 7.35–7.45)
POC PO2: 113 MM HG (ref 60–80)
POC PO2: 131.7 MM HG (ref 60–80)
POC PO2: 137.6 MM HG (ref 60–80)
POC PO2: 181.4 MM HG (ref 60–80)
POC PO2: 186.8 MM HG (ref 60–80)
POC PO2: 243.9 MM HG (ref 60–80)
POTASSIUM BLD-SCNC: 3.2 MMOL/L (ref 3.5–5)
POTASSIUM BLD-SCNC: 3.3 MMOL/L (ref 3.5–5)
POTASSIUM BLD-SCNC: 3.3 MMOL/L (ref 3.5–5)
POTASSIUM BLD-SCNC: 3.5 MMOL/L (ref 3.5–5)
POTASSIUM BLD-SCNC: 3.7 MMOL/L (ref 3.5–5)
POTASSIUM BLD-SCNC: 3.9 MMOL/L (ref 3.5–5)
POTASSIUM SERPL-SCNC: 3.8 MMOL/L (ref 3.5–5)
POTASSIUM SERPL-SCNC: 4.3 MMOL/L (ref 3.5–5)
POTASSIUM SERPL-SCNC: 4.3 MMOL/L (ref 3.5–5)
PROT SERPL-MCNC: 5.5 G/DL (ref 6.4–8.3)
PROT SERPL-MCNC: 5.5 G/DL (ref 6.4–8.3)
PROT SERPL-MCNC: 6.4 G/DL (ref 6.4–8.3)
RBC # BLD AUTO: 3.1 M/UL (ref 3.8–5.8)
RBC # BLD: ABNORMAL 10*6/UL
SODIUM BLD-SCNC: 132 MMOL/L (ref 132–146)
SODIUM BLD-SCNC: 134 MMOL/L (ref 132–146)
SODIUM BLD-SCNC: 135 MMOL/L (ref 132–146)
SODIUM BLD-SCNC: 135 MMOL/L (ref 132–146)
SODIUM BLD-SCNC: 139 MMOL/L (ref 132–146)
SODIUM BLD-SCNC: 139 MMOL/L (ref 132–146)
SODIUM SERPL-SCNC: 138 MMOL/L (ref 132–146)
SODIUM SERPL-SCNC: 139 MMOL/L (ref 132–146)
SODIUM SERPL-SCNC: 140 MMOL/L (ref 132–146)
WBC # BLD: ABNORMAL 10*3/UL
WBC OTHER # BLD: 8.1 K/UL (ref 4.5–11.5)

## 2024-08-14 PROCEDURE — 2500000003 HC RX 250 WO HCPCS: Performed by: STUDENT IN AN ORGANIZED HEALTH CARE EDUCATION/TRAINING PROGRAM

## 2024-08-14 PROCEDURE — P9047 ALBUMIN (HUMAN), 25%, 50ML: HCPCS

## 2024-08-14 PROCEDURE — 88342 IMHCHEM/IMCYTCHM 1ST ANTB: CPT

## 2024-08-14 PROCEDURE — 37799 UNLISTED PX VASCULAR SURGERY: CPT

## 2024-08-14 PROCEDURE — 86900 BLOOD TYPING SEROLOGIC ABO: CPT

## 2024-08-14 PROCEDURE — 86901 BLOOD TYPING SEROLOGIC RH(D): CPT

## 2024-08-14 PROCEDURE — 80047 BASIC METABLC PNL IONIZED CA: CPT

## 2024-08-14 PROCEDURE — 2000000000 HC ICU R&B

## 2024-08-14 PROCEDURE — 2580000003 HC RX 258: Performed by: CLINICAL NURSE SPECIALIST

## 2024-08-14 PROCEDURE — 85014 HEMATOCRIT: CPT

## 2024-08-14 PROCEDURE — 06Q80ZZ REPAIR PORTAL VEIN, OPEN APPROACH: ICD-10-PCS | Performed by: TRANSPLANT SURGERY

## 2024-08-14 PROCEDURE — 36556 INSERT NON-TUNNEL CV CATH: CPT

## 2024-08-14 PROCEDURE — 36620 INSERTION CATHETER ARTERY: CPT

## 2024-08-14 PROCEDURE — 49905 OMENTAL FLAP INTRA-ABDOM: CPT | Performed by: TRANSPLANT SURGERY

## 2024-08-14 PROCEDURE — 3700000000 HC ANESTHESIA ATTENDED CARE: Performed by: TRANSPLANT SURGERY

## 2024-08-14 PROCEDURE — 85025 COMPLETE CBC W/AUTO DIFF WBC: CPT

## 2024-08-14 PROCEDURE — 0DB90ZZ EXCISION OF DUODENUM, OPEN APPROACH: ICD-10-PCS | Performed by: TRANSPLANT SURGERY

## 2024-08-14 PROCEDURE — 84100 ASSAY OF PHOSPHORUS: CPT

## 2024-08-14 PROCEDURE — 2720000010 HC SURG SUPPLY STERILE: Performed by: TRANSPLANT SURGERY

## 2024-08-14 PROCEDURE — 7100000000 HC PACU RECOVERY - FIRST 15 MIN

## 2024-08-14 PROCEDURE — 6360000002 HC RX W HCPCS

## 2024-08-14 PROCEDURE — 88309 TISSUE EXAM BY PATHOLOGIST: CPT

## 2024-08-14 PROCEDURE — 48150 PARTIAL REMOVAL OF PANCREAS: CPT | Performed by: TRANSPLANT SURGERY

## 2024-08-14 PROCEDURE — 82150 ASSAY OF AMYLASE: CPT

## 2024-08-14 PROCEDURE — 86850 RBC ANTIBODY SCREEN: CPT

## 2024-08-14 PROCEDURE — 86923 COMPATIBILITY TEST ELECTRIC: CPT

## 2024-08-14 PROCEDURE — 80053 COMPREHEN METABOLIC PANEL: CPT

## 2024-08-14 PROCEDURE — 07BD0ZZ EXCISION OF AORTIC LYMPHATIC, OPEN APPROACH: ICD-10-PCS | Performed by: TRANSPLANT SURGERY

## 2024-08-14 PROCEDURE — 3600000005 HC SURGERY LEVEL 5 BASE: Performed by: TRANSPLANT SURGERY

## 2024-08-14 PROCEDURE — 6370000000 HC RX 637 (ALT 250 FOR IP)

## 2024-08-14 PROCEDURE — 83605 ASSAY OF LACTIC ACID: CPT

## 2024-08-14 PROCEDURE — 2580000003 HC RX 258

## 2024-08-14 PROCEDURE — 3700000001 HC ADD 15 MINUTES (ANESTHESIA): Performed by: TRANSPLANT SURGERY

## 2024-08-14 PROCEDURE — 3600000015 HC SURGERY LEVEL 5 ADDTL 15MIN: Performed by: TRANSPLANT SURGERY

## 2024-08-14 PROCEDURE — 35221 RPR BLD VSL DIR INTRA-ABDL: CPT | Performed by: TRANSPLANT SURGERY

## 2024-08-14 PROCEDURE — 7100000001 HC PACU RECOVERY - ADDTL 15 MIN

## 2024-08-14 PROCEDURE — 0FBG0ZZ EXCISION OF PANCREAS, OPEN APPROACH: ICD-10-PCS | Performed by: TRANSPLANT SURGERY

## 2024-08-14 PROCEDURE — 82962 GLUCOSE BLOOD TEST: CPT

## 2024-08-14 PROCEDURE — 6360000002 HC RX W HCPCS: Performed by: CLINICAL NURSE SPECIALIST

## 2024-08-14 PROCEDURE — 2709999900 HC NON-CHARGEABLE SUPPLY: Performed by: TRANSPLANT SURGERY

## 2024-08-14 PROCEDURE — 6360000002 HC RX W HCPCS: Performed by: STUDENT IN AN ORGANIZED HEALTH CARE EDUCATION/TRAINING PROGRAM

## 2024-08-14 PROCEDURE — 88305 TISSUE EXAM BY PATHOLOGIST: CPT

## 2024-08-14 PROCEDURE — 47801 PLACEMENT BILE DUCT SUPPORT: CPT | Performed by: TRANSPLANT SURGERY

## 2024-08-14 PROCEDURE — 82330 ASSAY OF CALCIUM: CPT

## 2024-08-14 PROCEDURE — 76998 US GUIDE INTRAOP: CPT | Performed by: TRANSPLANT SURGERY

## 2024-08-14 PROCEDURE — 02HV33Z INSERTION OF INFUSION DEVICE INTO SUPERIOR VENA CAVA, PERCUTANEOUS APPROACH: ICD-10-PCS | Performed by: TRANSPLANT SURGERY

## 2024-08-14 PROCEDURE — 82803 BLOOD GASES ANY COMBINATION: CPT

## 2024-08-14 PROCEDURE — 6370000000 HC RX 637 (ALT 250 FOR IP): Performed by: STUDENT IN AN ORGANIZED HEALTH CARE EDUCATION/TRAINING PROGRAM

## 2024-08-14 PROCEDURE — 0DXU0ZW TRANSFER OMENTUM TO ABDOMINAL REGION, OPEN APPROACH: ICD-10-PCS | Performed by: TRANSPLANT SURGERY

## 2024-08-14 PROCEDURE — 88304 TISSUE EXAM BY PATHOLOGIST: CPT

## 2024-08-14 PROCEDURE — 83036 HEMOGLOBIN GLYCOSYLATED A1C: CPT

## 2024-08-14 PROCEDURE — 0FT40ZZ RESECTION OF GALLBLADDER, OPEN APPROACH: ICD-10-PCS | Performed by: TRANSPLANT SURGERY

## 2024-08-14 PROCEDURE — 2500000003 HC RX 250 WO HCPCS

## 2024-08-14 PROCEDURE — 38747 REMOVE ABDOMINAL LYMPH NODES: CPT | Performed by: TRANSPLANT SURGERY

## 2024-08-14 PROCEDURE — 87081 CULTURE SCREEN ONLY: CPT

## 2024-08-14 PROCEDURE — P9041 ALBUMIN (HUMAN),5%, 50ML: HCPCS

## 2024-08-14 PROCEDURE — 2580000003 HC RX 258: Performed by: STUDENT IN AN ORGANIZED HEALTH CARE EDUCATION/TRAINING PROGRAM

## 2024-08-14 PROCEDURE — 83735 ASSAY OF MAGNESIUM: CPT

## 2024-08-14 DEVICE — CLIP INT M L GRN TI TRNSVRS GRV CHEVRON SHP W/ PRECIS TIP: Type: IMPLANTABLE DEVICE | Status: FUNCTIONAL

## 2024-08-14 DEVICE — CLIP INT SM TI EZ LD LIG SYS WECK HORIZON: Type: IMPLANTABLE DEVICE | Status: FUNCTIONAL

## 2024-08-14 DEVICE — FEEDING TUBE
Type: IMPLANTABLE DEVICE | Site: BILE DUCT | Status: FUNCTIONAL
Brand: ARGYLE

## 2024-08-14 RX ORDER — GLUCAGON 1 MG/ML
1 KIT INJECTION PRN
Status: DISCONTINUED | OUTPATIENT
Start: 2024-08-14 | End: 2024-09-07 | Stop reason: HOSPADM

## 2024-08-14 RX ORDER — DEXAMETHASONE SODIUM PHOSPHATE 10 MG/ML
INJECTION INTRAMUSCULAR; INTRAVENOUS PRN
Status: DISCONTINUED | OUTPATIENT
Start: 2024-08-14 | End: 2024-08-14 | Stop reason: SDUPTHER

## 2024-08-14 RX ORDER — SODIUM CHLORIDE, SODIUM LACTATE, POTASSIUM CHLORIDE, CALCIUM CHLORIDE 600; 310; 30; 20 MG/100ML; MG/100ML; MG/100ML; MG/100ML
INJECTION, SOLUTION INTRAVENOUS CONTINUOUS
Status: DISCONTINUED | OUTPATIENT
Start: 2024-08-14 | End: 2024-08-16

## 2024-08-14 RX ORDER — LIDOCAINE HYDROCHLORIDE 20 MG/ML
INJECTION, SOLUTION INTRAVENOUS PRN
Status: DISCONTINUED | OUTPATIENT
Start: 2024-08-14 | End: 2024-08-14 | Stop reason: SDUPTHER

## 2024-08-14 RX ORDER — OXYCODONE HYDROCHLORIDE 10 MG/1
10 TABLET ORAL EVERY 4 HOURS PRN
Status: DISCONTINUED | OUTPATIENT
Start: 2024-08-14 | End: 2024-09-07 | Stop reason: HOSPADM

## 2024-08-14 RX ORDER — SODIUM CHLORIDE 0.9 % (FLUSH) 0.9 %
5-40 SYRINGE (ML) INJECTION PRN
Status: DISCONTINUED | OUTPATIENT
Start: 2024-08-14 | End: 2024-08-14 | Stop reason: HOSPADM

## 2024-08-14 RX ORDER — SODIUM CHLORIDE, SODIUM LACTATE, POTASSIUM CHLORIDE, CALCIUM CHLORIDE 600; 310; 30; 20 MG/100ML; MG/100ML; MG/100ML; MG/100ML
INJECTION, SOLUTION INTRAVENOUS CONTINUOUS PRN
Status: DISCONTINUED | OUTPATIENT
Start: 2024-08-14 | End: 2024-08-14 | Stop reason: SDUPTHER

## 2024-08-14 RX ORDER — LIDOCAINE HYDROCHLORIDE 10 MG/ML
INJECTION, SOLUTION EPIDURAL; INFILTRATION; INTRACAUDAL; PERINEURAL PRN
Status: DISCONTINUED | OUTPATIENT
Start: 2024-08-14 | End: 2024-08-14 | Stop reason: SDUPTHER

## 2024-08-14 RX ORDER — LIDOCAINE HYDROCHLORIDE ANHYDROUS AND DEXTROSE MONOHYDRATE 5; 400 G/100ML; MG/100ML
INJECTION, SOLUTION INTRAVENOUS CONTINUOUS PRN
Status: DISCONTINUED | OUTPATIENT
Start: 2024-08-14 | End: 2024-08-14 | Stop reason: SDUPTHER

## 2024-08-14 RX ORDER — INSULIN GLARGINE 100 [IU]/ML
15 INJECTION, SOLUTION SUBCUTANEOUS NIGHTLY
Status: DISCONTINUED | OUTPATIENT
Start: 2024-08-14 | End: 2024-08-28

## 2024-08-14 RX ORDER — HYDROMORPHONE HYDROCHLORIDE 2 MG/ML
INJECTION, SOLUTION INTRAMUSCULAR; INTRAVENOUS; SUBCUTANEOUS PRN
Status: DISCONTINUED | OUTPATIENT
Start: 2024-08-14 | End: 2024-08-14 | Stop reason: SDUPTHER

## 2024-08-14 RX ORDER — SODIUM CHLORIDE 9 MG/ML
INJECTION, SOLUTION INTRAVENOUS PRN
Status: DISCONTINUED | OUTPATIENT
Start: 2024-08-14 | End: 2024-08-14 | Stop reason: HOSPADM

## 2024-08-14 RX ORDER — SODIUM CHLORIDE 0.9 % (FLUSH) 0.9 %
5-40 SYRINGE (ML) INJECTION PRN
Status: DISCONTINUED | OUTPATIENT
Start: 2024-08-14 | End: 2024-09-07 | Stop reason: HOSPADM

## 2024-08-14 RX ORDER — ONDANSETRON 2 MG/ML
4 INJECTION INTRAMUSCULAR; INTRAVENOUS EVERY 6 HOURS PRN
Status: DISCONTINUED | OUTPATIENT
Start: 2024-08-14 | End: 2024-09-07 | Stop reason: HOSPADM

## 2024-08-14 RX ORDER — GABAPENTIN 100 MG/1
200 CAPSULE ORAL 3 TIMES DAILY
Status: DISCONTINUED | OUTPATIENT
Start: 2024-08-14 | End: 2024-09-07 | Stop reason: HOSPADM

## 2024-08-14 RX ORDER — INSULIN LISPRO 100 [IU]/ML
0-8 INJECTION, SOLUTION INTRAVENOUS; SUBCUTANEOUS EVERY 4 HOURS
Status: DISCONTINUED | OUTPATIENT
Start: 2024-08-14 | End: 2024-08-14

## 2024-08-14 RX ORDER — CALCIUM CHLORIDE 100 MG/ML
INJECTION INTRAVENOUS; INTRAVENTRICULAR PRN
Status: DISCONTINUED | OUTPATIENT
Start: 2024-08-14 | End: 2024-08-14 | Stop reason: SDUPTHER

## 2024-08-14 RX ORDER — PROPOFOL 10 MG/ML
INJECTION, EMULSION INTRAVENOUS PRN
Status: DISCONTINUED | OUTPATIENT
Start: 2024-08-14 | End: 2024-08-14 | Stop reason: SDUPTHER

## 2024-08-14 RX ORDER — SODIUM CHLORIDE 9 MG/ML
INJECTION, SOLUTION INTRAVENOUS CONTINUOUS PRN
Status: DISCONTINUED | OUTPATIENT
Start: 2024-08-14 | End: 2024-08-14 | Stop reason: SDUPTHER

## 2024-08-14 RX ORDER — ALBUMIN, HUMAN INJ 5% 5 %
SOLUTION INTRAVENOUS PRN
Status: DISCONTINUED | OUTPATIENT
Start: 2024-08-14 | End: 2024-08-14 | Stop reason: SDUPTHER

## 2024-08-14 RX ORDER — ACETAMINOPHEN 325 MG/1
650 TABLET ORAL EVERY 6 HOURS SCHEDULED
Status: DISCONTINUED | OUTPATIENT
Start: 2024-08-14 | End: 2024-08-16

## 2024-08-14 RX ORDER — MIDAZOLAM HYDROCHLORIDE 1 MG/ML
INJECTION INTRAMUSCULAR; INTRAVENOUS PRN
Status: DISCONTINUED | OUTPATIENT
Start: 2024-08-14 | End: 2024-08-14 | Stop reason: SDUPTHER

## 2024-08-14 RX ORDER — FENTANYL CITRATE 50 UG/ML
INJECTION, SOLUTION INTRAMUSCULAR; INTRAVENOUS PRN
Status: DISCONTINUED | OUTPATIENT
Start: 2024-08-14 | End: 2024-08-14 | Stop reason: SDUPTHER

## 2024-08-14 RX ORDER — METOPROLOL TARTRATE 1 MG/ML
INJECTION, SOLUTION INTRAVENOUS PRN
Status: DISCONTINUED | OUTPATIENT
Start: 2024-08-14 | End: 2024-08-14 | Stop reason: SDUPTHER

## 2024-08-14 RX ORDER — ACETAMINOPHEN 650 MG/1
650 SUPPOSITORY RECTAL EVERY 6 HOURS SCHEDULED
Status: DISCONTINUED | OUTPATIENT
Start: 2024-08-14 | End: 2024-08-16

## 2024-08-14 RX ORDER — VASOPRESSIN 20 U/ML
INJECTION PARENTERAL PRN
Status: DISCONTINUED | OUTPATIENT
Start: 2024-08-14 | End: 2024-08-14 | Stop reason: SDUPTHER

## 2024-08-14 RX ORDER — ONDANSETRON 2 MG/ML
INJECTION INTRAMUSCULAR; INTRAVENOUS PRN
Status: DISCONTINUED | OUTPATIENT
Start: 2024-08-14 | End: 2024-08-14 | Stop reason: SDUPTHER

## 2024-08-14 RX ORDER — ALBUMIN (HUMAN) 12.5 G/50ML
SOLUTION INTRAVENOUS PRN
Status: DISCONTINUED | OUTPATIENT
Start: 2024-08-14 | End: 2024-08-14 | Stop reason: SDUPTHER

## 2024-08-14 RX ORDER — SODIUM CHLORIDE 0.9 % (FLUSH) 0.9 %
5-40 SYRINGE (ML) INJECTION EVERY 12 HOURS SCHEDULED
Status: DISCONTINUED | OUTPATIENT
Start: 2024-08-14 | End: 2024-09-07 | Stop reason: HOSPADM

## 2024-08-14 RX ORDER — SODIUM CHLORIDE, SODIUM LACTATE, POTASSIUM CHLORIDE, CALCIUM CHLORIDE 600; 310; 30; 20 MG/100ML; MG/100ML; MG/100ML; MG/100ML
INJECTION, SOLUTION INTRAVENOUS ONCE
Status: COMPLETED | OUTPATIENT
Start: 2024-08-14 | End: 2024-08-14

## 2024-08-14 RX ORDER — OXYCODONE HYDROCHLORIDE 5 MG/1
5 TABLET ORAL EVERY 4 HOURS PRN
Status: DISCONTINUED | OUTPATIENT
Start: 2024-08-14 | End: 2024-09-07 | Stop reason: HOSPADM

## 2024-08-14 RX ORDER — ONDANSETRON 4 MG/1
4 TABLET, ORALLY DISINTEGRATING ORAL EVERY 8 HOURS PRN
Status: DISCONTINUED | OUTPATIENT
Start: 2024-08-14 | End: 2024-09-07 | Stop reason: HOSPADM

## 2024-08-14 RX ORDER — VECURONIUM BROMIDE 1 MG/ML
INJECTION, POWDER, LYOPHILIZED, FOR SOLUTION INTRAVENOUS PRN
Status: DISCONTINUED | OUTPATIENT
Start: 2024-08-14 | End: 2024-08-14 | Stop reason: SDUPTHER

## 2024-08-14 RX ORDER — ROCURONIUM BROMIDE 10 MG/ML
INJECTION, SOLUTION INTRAVENOUS PRN
Status: DISCONTINUED | OUTPATIENT
Start: 2024-08-14 | End: 2024-08-14 | Stop reason: SDUPTHER

## 2024-08-14 RX ORDER — SODIUM CHLORIDE 9 MG/ML
INJECTION, SOLUTION INTRAVENOUS PRN
Status: DISCONTINUED | OUTPATIENT
Start: 2024-08-14 | End: 2024-08-16

## 2024-08-14 RX ORDER — SODIUM CHLORIDE 9 MG/ML
INJECTION, SOLUTION INTRAVENOUS PRN
Status: DISCONTINUED | OUTPATIENT
Start: 2024-08-14 | End: 2024-09-07 | Stop reason: HOSPADM

## 2024-08-14 RX ORDER — MAGNESIUM SULFATE HEPTAHYDRATE 500 MG/ML
INJECTION, SOLUTION INTRAMUSCULAR; INTRAVENOUS PRN
Status: DISCONTINUED | OUTPATIENT
Start: 2024-08-14 | End: 2024-08-14 | Stop reason: SDUPTHER

## 2024-08-14 RX ORDER — DEXTROSE MONOHYDRATE 100 MG/ML
INJECTION, SOLUTION INTRAVENOUS CONTINUOUS PRN
Status: DISCONTINUED | OUTPATIENT
Start: 2024-08-14 | End: 2024-09-07 | Stop reason: HOSPADM

## 2024-08-14 RX ORDER — SODIUM CHLORIDE 0.9 % (FLUSH) 0.9 %
5-40 SYRINGE (ML) INJECTION EVERY 12 HOURS SCHEDULED
Status: DISCONTINUED | OUTPATIENT
Start: 2024-08-14 | End: 2024-08-14 | Stop reason: HOSPADM

## 2024-08-14 RX ORDER — INSULIN LISPRO 100 [IU]/ML
0-16 INJECTION, SOLUTION INTRAVENOUS; SUBCUTANEOUS EVERY 4 HOURS
Status: DISCONTINUED | OUTPATIENT
Start: 2024-08-14 | End: 2024-08-23

## 2024-08-14 RX ADMIN — PROPOFOL 50 MG: 10 INJECTION, EMULSION INTRAVENOUS at 08:10

## 2024-08-14 RX ADMIN — PROPOFOL 150 MG: 10 INJECTION, EMULSION INTRAVENOUS at 07:37

## 2024-08-14 RX ADMIN — METOPROLOL TARTRATE 1 MG: 5 INJECTION INTRAVENOUS at 13:07

## 2024-08-14 RX ADMIN — PHENYLEPHRINE HYDROCHLORIDE 40 MCG/MIN: 10 INJECTION INTRAVENOUS at 08:36

## 2024-08-14 RX ADMIN — PROPOFOL 20 MG: 10 INJECTION, EMULSION INTRAVENOUS at 11:15

## 2024-08-14 RX ADMIN — LIDOCAINE HYDROCHLORIDE 100 MG: 20 INJECTION, SOLUTION INTRAVENOUS at 07:37

## 2024-08-14 RX ADMIN — VASOPRESSIN 2 UNITS: 20 INJECTION INTRAVENOUS at 14:37

## 2024-08-14 RX ADMIN — FENTANYL CITRATE 50 MCG: 0.05 INJECTION, SOLUTION INTRAMUSCULAR; INTRAVENOUS at 10:24

## 2024-08-14 RX ADMIN — SUGAMMADEX 200 MG: 100 INJECTION, SOLUTION INTRAVENOUS at 15:04

## 2024-08-14 RX ADMIN — INSULIN HUMAN 8 UNITS: 100 INJECTION, SOLUTION PARENTERAL at 10:05

## 2024-08-14 RX ADMIN — VASOPRESSIN 2 UNITS: 20 INJECTION INTRAVENOUS at 11:35

## 2024-08-14 RX ADMIN — SODIUM CHLORIDE: 9 INJECTION, SOLUTION INTRAVENOUS at 06:16

## 2024-08-14 RX ADMIN — VASOPRESSIN 1 UNITS: 20 INJECTION INTRAVENOUS at 09:08

## 2024-08-14 RX ADMIN — ROCURONIUM BROMIDE 50 MG: 10 INJECTION, SOLUTION INTRAVENOUS at 07:37

## 2024-08-14 RX ADMIN — PHENYLEPHRINE HYDROCHLORIDE 200 MCG: 10 INJECTION INTRAVENOUS at 08:28

## 2024-08-14 RX ADMIN — FENTANYL CITRATE 100 MCG: 0.05 INJECTION, SOLUTION INTRAMUSCULAR; INTRAVENOUS at 07:37

## 2024-08-14 RX ADMIN — INSULIN HUMAN 5 UNITS: 100 INJECTION, SOLUTION PARENTERAL at 09:01

## 2024-08-14 RX ADMIN — VASOPRESSIN 1 UNITS: 20 INJECTION INTRAVENOUS at 15:02

## 2024-08-14 RX ADMIN — LIDOCAINE HYDROCHLORIDE 1 MG: 10 INJECTION, SOLUTION EPIDURAL; INFILTRATION; INTRACAUDAL; PERINEURAL at 07:06

## 2024-08-14 RX ADMIN — VECURONIUM BROMIDE 3 MG: 1 INJECTION, POWDER, LYOPHILIZED, FOR SOLUTION INTRAVENOUS at 12:07

## 2024-08-14 RX ADMIN — SODIUM CHLORIDE, PRESERVATIVE FREE 40 MG: 5 INJECTION INTRAVENOUS at 16:35

## 2024-08-14 RX ADMIN — HYDROMORPHONE HYDROCHLORIDE 0.5 MG: 2 INJECTION, SOLUTION INTRAMUSCULAR; INTRAVENOUS; SUBCUTANEOUS at 11:29

## 2024-08-14 RX ADMIN — HYDROMORPHONE HYDROCHLORIDE 0.5 MG: 2 INJECTION, SOLUTION INTRAMUSCULAR; INTRAVENOUS; SUBCUTANEOUS at 13:06

## 2024-08-14 RX ADMIN — Medication 30 MG: at 08:11

## 2024-08-14 RX ADMIN — SODIUM PHOSPHATE, MONOBASIC, MONOHYDRATE AND SODIUM PHOSPHATE, DIBASIC, ANHYDROUS 20 MMOL: 142; 276 INJECTION, SOLUTION INTRAVENOUS at 19:00

## 2024-08-14 RX ADMIN — PHENYLEPHRINE HYDROCHLORIDE 200 MCG: 10 INJECTION INTRAVENOUS at 07:56

## 2024-08-14 RX ADMIN — INSULIN HUMAN 5 UNITS: 100 INJECTION, SOLUTION PARENTERAL at 10:43

## 2024-08-14 RX ADMIN — VECURONIUM BROMIDE 2 MG: 1 INJECTION, POWDER, LYOPHILIZED, FOR SOLUTION INTRAVENOUS at 09:08

## 2024-08-14 RX ADMIN — SODIUM BICARBONATE 50 MEQ: 84 INJECTION INTRAVENOUS at 09:03

## 2024-08-14 RX ADMIN — Medication 10 MG: at 11:29

## 2024-08-14 RX ADMIN — INSULIN HUMAN 5 UNITS: 100 INJECTION, SOLUTION PARENTERAL at 08:39

## 2024-08-14 RX ADMIN — PHENYLEPHRINE HYDROCHLORIDE 200 MCG: 10 INJECTION INTRAVENOUS at 07:38

## 2024-08-14 RX ADMIN — SODIUM CHLORIDE, POTASSIUM CHLORIDE, SODIUM LACTATE AND CALCIUM CHLORIDE: 600; 310; 30; 20 INJECTION, SOLUTION INTRAVENOUS at 14:52

## 2024-08-14 RX ADMIN — PIPERACILLIN AND TAZOBACTAM 3375 MG: 3; .375 INJECTION, POWDER, LYOPHILIZED, FOR SOLUTION INTRAVENOUS; PARENTERAL at 07:48

## 2024-08-14 RX ADMIN — SODIUM CHLORIDE: 9 INJECTION, SOLUTION INTRAVENOUS at 07:22

## 2024-08-14 RX ADMIN — VASOPRESSIN 1 UNITS: 20 INJECTION INTRAVENOUS at 11:10

## 2024-08-14 RX ADMIN — VECURONIUM BROMIDE 2 MG: 1 INJECTION, POWDER, LYOPHILIZED, FOR SOLUTION INTRAVENOUS at 11:26

## 2024-08-14 RX ADMIN — MAGNESIUM SULFATE HEPTAHYDRATE 2 G: 500 INJECTION, SOLUTION INTRAMUSCULAR; INTRAVENOUS at 08:23

## 2024-08-14 RX ADMIN — VECURONIUM BROMIDE 2 MG: 1 INJECTION, POWDER, LYOPHILIZED, FOR SOLUTION INTRAVENOUS at 13:07

## 2024-08-14 RX ADMIN — HYDROMORPHONE HYDROCHLORIDE 0.5 MG: 2 INJECTION, SOLUTION INTRAMUSCULAR; INTRAVENOUS; SUBCUTANEOUS at 09:49

## 2024-08-14 RX ADMIN — ONDANSETRON 4 MG: 2 INJECTION INTRAMUSCULAR; INTRAVENOUS at 14:58

## 2024-08-14 RX ADMIN — VECURONIUM BROMIDE 1 MG: 1 INJECTION, POWDER, LYOPHILIZED, FOR SOLUTION INTRAVENOUS at 10:48

## 2024-08-14 RX ADMIN — SODIUM CHLORIDE 5 UNITS/HR: 9 INJECTION, SOLUTION INTRAVENOUS at 14:43

## 2024-08-14 RX ADMIN — SODIUM CHLORIDE, POTASSIUM CHLORIDE, SODIUM LACTATE AND CALCIUM CHLORIDE: 600; 310; 30; 20 INJECTION, SOLUTION INTRAVENOUS at 15:59

## 2024-08-14 RX ADMIN — SODIUM CHLORIDE, POTASSIUM CHLORIDE, SODIUM LACTATE AND CALCIUM CHLORIDE: 600; 310; 30; 20 INJECTION, SOLUTION INTRAVENOUS at 11:55

## 2024-08-14 RX ADMIN — VASOPRESSIN 1 UNITS: 20 INJECTION INTRAVENOUS at 10:34

## 2024-08-14 RX ADMIN — VASOPRESSIN 1 UNITS: 20 INJECTION INTRAVENOUS at 08:36

## 2024-08-14 RX ADMIN — PHENYLEPHRINE HYDROCHLORIDE 200 MCG: 10 INJECTION INTRAVENOUS at 07:47

## 2024-08-14 RX ADMIN — VECURONIUM BROMIDE 5 MG: 1 INJECTION, POWDER, LYOPHILIZED, FOR SOLUTION INTRAVENOUS at 08:13

## 2024-08-14 RX ADMIN — PHENYLEPHRINE HYDROCHLORIDE 80 MCG/MIN: 10 INJECTION INTRAVENOUS at 10:37

## 2024-08-14 RX ADMIN — INSULIN LISPRO 4 UNITS: 100 INJECTION, SOLUTION INTRAVENOUS; SUBCUTANEOUS at 23:15

## 2024-08-14 RX ADMIN — SODIUM CHLORIDE, POTASSIUM CHLORIDE, SODIUM LACTATE AND CALCIUM CHLORIDE: 600; 310; 30; 20 INJECTION, SOLUTION INTRAVENOUS at 21:52

## 2024-08-14 RX ADMIN — SODIUM BICARBONATE 50 MEQ: 84 INJECTION INTRAVENOUS at 11:54

## 2024-08-14 RX ADMIN — HYDROMORPHONE HYDROCHLORIDE 0.5 MG: 1 INJECTION, SOLUTION INTRAMUSCULAR; INTRAVENOUS; SUBCUTANEOUS at 21:19

## 2024-08-14 RX ADMIN — SODIUM CHLORIDE, SODIUM LACTATE, POTASSIUM CHLORIDE, CALCIUM CHLORIDE: 600; 310; 30; 20 INJECTION, SOLUTION INTRAVENOUS at 09:04

## 2024-08-14 RX ADMIN — ONDANSETRON 4 MG: 2 INJECTION INTRAMUSCULAR; INTRAVENOUS at 21:19

## 2024-08-14 RX ADMIN — SODIUM CHLORIDE, POTASSIUM CHLORIDE, SODIUM LACTATE AND CALCIUM CHLORIDE: 600; 310; 30; 20 INJECTION, SOLUTION INTRAVENOUS at 07:22

## 2024-08-14 RX ADMIN — MIDAZOLAM 2 MG: 1 INJECTION INTRAMUSCULAR; INTRAVENOUS at 07:22

## 2024-08-14 RX ADMIN — VASOPRESSIN 2 UNITS: 20 INJECTION INTRAVENOUS at 14:55

## 2024-08-14 RX ADMIN — INSULIN HUMAN 5 UNITS: 100 INJECTION, SOLUTION PARENTERAL at 14:39

## 2024-08-14 RX ADMIN — PIPERACILLIN AND TAZOBACTAM 3375 MG: 3; .375 INJECTION, POWDER, LYOPHILIZED, FOR SOLUTION INTRAVENOUS; PARENTERAL at 09:48

## 2024-08-14 RX ADMIN — SODIUM CHLORIDE, POTASSIUM CHLORIDE, SODIUM LACTATE AND CALCIUM CHLORIDE: 600; 310; 30; 20 INJECTION, SOLUTION INTRAVENOUS at 18:21

## 2024-08-14 RX ADMIN — PHENYLEPHRINE HYDROCHLORIDE 80 MCG/MIN: 10 INJECTION INTRAVENOUS at 13:32

## 2024-08-14 RX ADMIN — PIPERACILLIN AND TAZOBACTAM 3375 MG: 3; .375 INJECTION, POWDER, LYOPHILIZED, FOR SOLUTION INTRAVENOUS at 17:41

## 2024-08-14 RX ADMIN — INSULIN GLARGINE 15 UNITS: 100 INJECTION, SOLUTION SUBCUTANEOUS at 21:07

## 2024-08-14 RX ADMIN — VECURONIUM BROMIDE 2 MG: 1 INJECTION, POWDER, LYOPHILIZED, FOR SOLUTION INTRAVENOUS at 10:08

## 2024-08-14 RX ADMIN — HYDROMORPHONE HYDROCHLORIDE 0.5 MG: 1 INJECTION, SOLUTION INTRAMUSCULAR; INTRAVENOUS; SUBCUTANEOUS at 16:55

## 2024-08-14 RX ADMIN — VASOPRESSIN 1 UNITS: 20 INJECTION INTRAVENOUS at 09:38

## 2024-08-14 RX ADMIN — INSULIN HUMAN 10 UNITS: 100 INJECTION, SOLUTION PARENTERAL at 11:55

## 2024-08-14 RX ADMIN — INSULIN HUMAN 10 UNITS: 100 INJECTION, SOLUTION PARENTERAL at 12:35

## 2024-08-14 RX ADMIN — CALCIUM CHLORIDE INJECTION 0.5 G: 100 INJECTION, SOLUTION INTRAVENOUS at 11:54

## 2024-08-14 RX ADMIN — VASOPRESSIN 1 UNITS: 20 INJECTION INTRAVENOUS at 10:25

## 2024-08-14 RX ADMIN — DEXAMETHASONE SODIUM PHOSPHATE 5 MG: 10 INJECTION INTRAMUSCULAR; INTRAVENOUS at 08:15

## 2024-08-14 RX ADMIN — HYDROMORPHONE HYDROCHLORIDE 0.5 MG: 2 INJECTION, SOLUTION INTRAMUSCULAR; INTRAVENOUS; SUBCUTANEOUS at 08:23

## 2024-08-14 RX ADMIN — PIPERACILLIN AND TAZOBACTAM 3375 MG: 3; .375 INJECTION, POWDER, LYOPHILIZED, FOR SOLUTION INTRAVENOUS; PARENTERAL at 11:46

## 2024-08-14 RX ADMIN — ALBUMIN (HUMAN) 25 G: 12.5 INJECTION, SOLUTION INTRAVENOUS at 07:32

## 2024-08-14 RX ADMIN — Medication 10 MG: at 09:47

## 2024-08-14 RX ADMIN — FENTANYL CITRATE 100 MCG: 0.05 INJECTION, SOLUTION INTRAMUSCULAR; INTRAVENOUS at 08:23

## 2024-08-14 RX ADMIN — LIDOCAINE HYDROCHLORIDE ANHYDROUS AND DEXTROSE MONOHYDRATE 1 MG/KG/HR: .4; 5 INJECTION, SOLUTION INTRAVENOUS at 08:07

## 2024-08-14 RX ADMIN — ALBUMIN (HUMAN) 25 G: 25 SOLUTION INTRAVENOUS at 10:29

## 2024-08-14 ASSESSMENT — PAIN - FUNCTIONAL ASSESSMENT
PAIN_FUNCTIONAL_ASSESSMENT: 0-10
PAIN_FUNCTIONAL_ASSESSMENT: PREVENTS OR INTERFERES SOME ACTIVE ACTIVITIES AND ADLS
PAIN_FUNCTIONAL_ASSESSMENT: PREVENTS OR INTERFERES SOME ACTIVE ACTIVITIES AND ADLS

## 2024-08-14 ASSESSMENT — PAIN DESCRIPTION - ORIENTATION: ORIENTATION: MID;RIGHT

## 2024-08-14 ASSESSMENT — PAIN SCALES - GENERAL
PAINLEVEL_OUTOF10: 0
PAINLEVEL_OUTOF10: 1
PAINLEVEL_OUTOF10: 10
PAINLEVEL_OUTOF10: 10
PAINLEVEL_OUTOF10: 0
PAINLEVEL_OUTOF10: 0

## 2024-08-14 ASSESSMENT — ENCOUNTER SYMPTOMS: SHORTNESS OF BREATH: 1

## 2024-08-14 ASSESSMENT — LIFESTYLE VARIABLES: SMOKING_STATUS: 0

## 2024-08-14 ASSESSMENT — PAIN DESCRIPTION - PAIN TYPE: TYPE: ACUTE PAIN;SURGICAL PAIN

## 2024-08-14 ASSESSMENT — PAIN DESCRIPTION - LOCATION: LOCATION: ABDOMEN

## 2024-08-14 ASSESSMENT — PAIN DESCRIPTION - DESCRIPTORS: DESCRIPTORS: DISCOMFORT;SORE

## 2024-08-14 NOTE — PROGRESS NOTES
Patient admitted to SICU with the following belongings:  None. The following belongings admitted with the patient, None, were sent home with the patient's family.

## 2024-08-14 NOTE — PROGRESS NOTES
HEPATOBILIARY  SURGERY  DAILY PROGRESS NOTE  8/14/2024    No chief complaint on file.      Subjective:  Seen in SICU post op day 0 from whipple procedure. Resting comfortably. Pain controlled     Objective:  /73   Pulse 93   Temp 99.1 °F (37.3 °C) (Bladder)   Resp 16   Ht 1.88 m (6' 2\")   Wt 95.5 kg (210 lb 8.6 oz)   SpO2 96%   BMI 27.03 kg/m²     GENERAL:  Lying in bed. No acute distress   HEAD: No gross abnormalities   EYES: scleral icterus   LUNGS:  No increased work of breathing  CARDIOVASCULAR:  RR, normotensive   ABDOMEN:  Soft, appropriately tender. SHARON x 2 w SS output. Jasmine w edilia colored urin   EXTREMITIES: No edema or swelling  SKIN: Warm and dry    UOP: 0.55cc/kg/hr     Assessment/Plan:  79 y.o. male with obstructive jaundice 2/2 pancreatic head mass s/p whipple procedure 8/14     - continue NPO, ok for sips with meds   - multimodal pain control  - monitor SHARON output and UOP   - invasive cardiac monitoring  - continue zosyn due to hyperbilirubinemia   - appreciate ICU care   - insulin gtt per ICU     Electronically signed by Anna Taylor MD on 8/14/2024 at 7:56 PM

## 2024-08-14 NOTE — PROGRESS NOTES
4 Eyes Skin Assessment     NAME:  Michael Garcia  YOB: 1945  MEDICAL RECORD NUMBER:  [id number]    The patient is being assessed for  Admission    I agree that at least one RN has performed a thorough Head to Toe Skin Assessment on the patient. ALL assessment sites listed below have been assessed.      Areas assessed by both nurses:    Head, Face, Ears, Shoulders, Back, Chest, Arms, Elbows, Hands, Sacrum. Buttock, Coccyx, Ischium, Legs. Feet and Heels, Under Medical Devices , and Other    -dry/flaky bilateral feet  -MLI surgical incision glued  -SHARON x2 RLQ/LLQ  -darkened skin on bilateral knees, BLE, BUE  -jaundiced       Does the Patient have a Wound? No noted wound(s)       Mike Prevention initiated by RN: Yes  Wound Care Orders initiated by RN: Yes    Pressure Injury (Stage 3,4, Unstageable, DTI, NWPT, and Complex wounds) if present, place Wound referral order by RN under : No    New Ostomies, if present place, Ostomy referral order under : No     Nurse 1 eSignature: Electronically signed by Jovanna Riggins RN on 8/14/24 at 4:01 PM EDT    **SHARE this note so that the co-signing nurse can place an eSignature**    Nurse 2 eSignature: Electronically signed by Claribel Martinez RN on 8/14/24 at 4:10 PM EDT

## 2024-08-14 NOTE — OP NOTE
Operative Note      Patient: Michael Garcia  YOB: 1945  MRN: [id number]    Date of Procedure: 8/14/2024    Pre-Op Diagnosis: 3.6cm pancreatic head mass consistent with pancreatic adenocarcinoma, small bowel obstruction due to tumor invasion into duodenum, obstructive jaundice due to malignant neoplasm, diabetes mellitus type 2     Post-Op Diagnosis: Same, accessory segment 6 hepatic duct       Procedure:  1.  Pancreaticoduodenectomy with placement of an 8f and 5F biliary stent   2.  omental pedicle flap  3.  Placement of SHARON drains  2  4.  Extended lymphadenectomy  5.  Intraoperative ultrasound for anatomy   6.  Portal vein reconstruction  7.  Removal of PTHC     Surgeon(s):  Demetrius Santoyo III, MD  FACS     Assistant:   PARDEEP Faustin MD ([id number])     ANESTHESIA: General Endotracheal      EBL: 700 mL     COMPLICATIONS: None.      SPECIMENS: pancreaticoduodenectomy, lymph nodes     DESCRIPTION OF PROCEDURE: The patient was brought into the operating room and  placed supine on the operating table. He was given zosyn preoperatively  and general anesthesia was induced.  He had riojas catheter was inserted.   The abdomen was then prepped and  draped in sterile fashion. A midline incision was made and dissection was  carried down through the subcutaneous tissue using electrocautery.  The  fascia was then incised at the umbilical region.  It was grasped with  Kocher's and elevated.  Peritoneum was then entered and the remainder ofthe incision was then opened.  Upon initial inspection of the abdomen,  there was no abnormality or obvious metastatic disease in the pelvis or at the root of the small bowel mesentery.  There was no metastatic disease in the liver.  There was also no peritoneal metastasis either. The pancreas was able to be palpated and the entire pancreas was soft with the mass present in the pancreatic head.  Next the colon and the duodenum were Kocherized. Attention was turned to the second and

## 2024-08-14 NOTE — ANESTHESIA PROCEDURE NOTES
Central Venous Line:    A central venous line was placed using ultrasound guidance, in the OR for the following indication(s): central venous access and CVP monitoring.8/14/2024 8:00 AM8/14/2024 8:09 AM    Sterility preparation included the following: provider used sterile gloves, gown, hat and mask, hand hygiene performed prior to central venous catheter insertion, sterile gel and probe cover used in ultrasound-guided central venous catheter insertion and maximum sterile barriers used during central venous catheter insertion.    The patient was placed in Trendelenburg position.The right internal jugular vein was prepped.    The site was prepped with Chloraprep.  A 7.5 Fr (size), 20 (length), introducer triple lumen was placed.    During the procedure, the following specific steps were taken: target vein identified, needle advanced into vein and blood aspirated and guidewire advanced into vein.    Intravenous verification was obtained by ultrasound and venous blood return.    Post insertion care included: all ports aspirated, all ports flushed easily, guidewire removed intact, Biopatch applied, line sutured in place and dressing applied.    During the procedure the patient experienced: patient tolerated procedure well with no complications and EBL 0mL.      Insertion site scrubbed per usage guidelines?: Yes  Skin prep agent dried for 3 minutes prior to procedure?:yes  Outcomes: uncomplicated and patient tolerated procedure well  Real-time US image taken/store: Yes  Anesthesia type: general..No  Staffing  Performed: Anesthesiologist   Anesthesiologist: Lien Adrian MD  Other anesthesia staff: Aram Vasquez RN  Performed by: Aram Vasquez RN  Authorized by: Lien Adrian MD    Preanesthetic Checklist  Completed: patient identified, IV checked, site marked, risks and benefits discussed, surgical/procedural consents, equipment checked, pre-op evaluation, timeout performed, anesthesia consent given, oxygen

## 2024-08-14 NOTE — ANESTHESIA PRE PROCEDURE
(<4 METS)  (+) hypertension:, angina:, CAD:, CABG/stent:, CHF:, orthopnea, pulmonary hypertension:, hyperlipidemia      ECG reviewed  Rhythm: regular  Rate: normal  Echocardiogram reviewed         Beta Blocker:  No for medical reason (pt had hypotension at recent doctors appt. stopped BP meds)         Neuro/Psych:   Negative Neuro/Psych ROS               ROS comment: Deaf bilateral GI/Hepatic/Renal:   (+) liver disease (elevated LFTs, albumin 3):     (-) hiatal hernia, GERD, PUD, no renal disease and no morbid obesity       Endo/Other:    (+) DiabetesType II DM, well controlled, blood dyscrasia (H&H 12.2, 35.9): anemia, arthritis: OA., electrolyte abnormalities, malignancy/cancer (pancreatic, prostate).                  ROS comment: Pancreatic adenocarcinoma 3.6 x 2.3 cm pancreatic head mass invading the duodenum. Abdominal:       Abdomen: tender.       PE comment: RUQ pain   Vascular: negative vascular ROS.         Other Findings:        EKG 12- Lead 07/30/2024  Sinus rhythm with 1st degree AV block  Otherwise normal ECG    ECHO 02/10/2023  Findings   Left Ventricle   Normal left ventricle size.   Normal left ventricle wall thickness.   No gross regional wall motion abnormality.   Normal diastolic function.   EF 55%.      Right Ventricle   Normal right ventricular size and function.      Left Atrium   Left atrium is of normal size.      Right Atrium   Normal right atrium size.      Mitral Valve   Normal mitral valve structure and function.   Physiologic and/or trace mitral regurgitation is present.   No evidence of mitral valve stenosis.      Tricuspid Valve   The tricuspid valve appears structurally normal.   Mild tricuspid regurgitation.   RVSP is 33 mmHg.      Aortic Valve   The aortic valve is trileaflet.   No evidence of aortic valve regurgitation.   No hemodynamically significant aortic stenosis is present.      Pulmonic Valve   Pulmonic valve is structurally normal.   No evidence of any pulmonic

## 2024-08-14 NOTE — INTERVAL H&P NOTE
Update History & Physical    The patient's History and Physical of August 3, 2024 was reviewed with the patient and I examined the patient. There was no change. The surgical site was confirmed by the patient and me.     Plan: The risks, benefits, expected outcome, and alternative to the recommended procedure have been discussed with the patient. Patient understands and wants to proceed with the procedure.     Electronically signed by Latosha Faustin MD on 8/14/2024 at 7:13 AM

## 2024-08-14 NOTE — CONSULTS
SURGICAL INTENSIVE CARE  CONSULT NOTE      Date of admission:  2024  Reason for ICU transfer:  critical care management after whipple procedure.     Physician Consulted: Dr. Acevedo.   Reason for Consult: critical care management after whipple procedure.   Referring Physician: Dr. Lockett     SUBJECTIVE:  Michael Garcia is a 79 y.o. male with PMHx of DM, HTN, prostate cancer, osteoarthritis, deafness, and a pancreatic head mass who is admitted to SICU after undergoing a whipple procedure. In the OR pt received 25 g of 5% albumin, 25 g of 25% albumin, 3 L of crystalloid fluid. He was extubated successfully. Pt was mildly hypotensive on arrival to SICU (107/57 via ruby). He has two drains in place.     Pt initially presented on  for abdominal pain, fatigue, jaundice, weight loss and dark urine. He had elevated LFTs at that time with Alk phos 413, ,  and tbili 6.5. CTAP showed asymmetric thickening at the duodenal C loop concerning for underlying mass/ulceration. Pt underwent ERCP on  that was unsuccessful due to the mass causing duodenal compression. HBP was consulted, CTA triphasic pancreas showed findings of intrahepatic biliary dilatation from possible CBD obstruction and minimal enhancement and heterogeneity near the ampullary portion of duodenum with considerations for duodenitis or groove pancreatitis versus underlying mass. Pt had a PTHC placed at that admission.     Fhx significant for bone cancer (mother), CAD (sister, brother), MI (brother). Father and 2 brothers  from unknown causes.       Past Medical History:   Diagnosis Date    Deaf, bilateral     Diabetes (HCC)     Hypertension     Osteoarthritis     Primary osteoarthritis of left knee 10/18/2017    Prostate cancer (HCC) 2024       Past Surgical History:   Procedure Laterality Date    CARPAL TUNNEL RELEASE      ERCP N/A 2024    FAILED ENDOSCOPIC RETROGRADE CHOLANGIOPANCREATOGRAPHY performed by

## 2024-08-14 NOTE — CONSENT
Informed Consent for Blood Component Transfusion Note    I have discussed with the patient the rationale for blood component transfusion; its benefits in treating or preventing fatigue, organ damage, or death; and its risk which includes mild transfusion reactions, rare risk of blood borne infection, or more serious but rare reactions. I have discussed the alternatives to transfusion, including the risk and consequences of not receiving transfusion. The patient had an opportunity to ask questions and had agreed to proceed with transfusion of blood components.    Electronically signed by Latosha Faustin MD on 8/14/24 at 4:42 PM EDT

## 2024-08-14 NOTE — PROGRESS NOTES
Patient arrived to the Surgical ICU from OR with riojas catheter intact and patent. Securing device applied. Riojas bag is hanging below the level of the bladder, safety clip attached to the bed sheet, tamper seal is intact, drainage bag is not on the floor, lack of dependent loop in tubing, and the drainage bag is less than half full.

## 2024-08-14 NOTE — PROCEDURES
PROCEDURE NOTE  Date: 8/14/2024   Name: Michael Garcia  YOB: 1945      Arterial Line:    An arterial line was placed using surface landmarks, in the procedure area for the following indication(s): continuous blood pressure monitoring and blood sampling needed.    A 20 gauge (size), 4.45 cm (length), Arrow (type) catheter was placed, Seldinger technique used, into the right radial artery and 1% Lidocaine 1mL, secured by Tegaderm.  Anesthesia type: Local  Local infiltration: Injection    Events:  patient tolerated procedure well with no complications and EBL < 5mL.8/14/2024 7:07 AM8/14/2024 7:14 AM  Anesthesiologist: Lien Adrian MD  Other anesthesia staff: Aram Vasquez RN  Performed: Other anesthesia staff   Preanesthetic Checklist  Completed: patient identified, IV checked, site marked, risks and benefits discussed, surgical/procedural consents, equipment checked, pre-op evaluation, timeout performed, anesthesia consent given, oxygen available, monitors applied/VS acknowledged, fire risk safety assessment completed and verbalized and blood product R/B/A discussed and consented

## 2024-08-15 PROBLEM — K56.609 SMALL BOWEL OBSTRUCTION (HCC): Status: ACTIVE | Noted: 2024-08-15

## 2024-08-15 PROBLEM — E11.9 DIABETES MELLITUS TYPE 2, DIET-CONTROLLED (HCC): Status: ACTIVE | Noted: 2024-08-15

## 2024-08-15 LAB
ALBUMIN SERPL-MCNC: 2.9 G/DL (ref 3.5–5.2)
ALP SERPL-CCNC: 130 U/L (ref 40–129)
ALT SERPL-CCNC: 378 U/L (ref 0–40)
ANION GAP SERPL CALCULATED.3IONS-SCNC: 14 MMOL/L (ref 7–16)
ARM BAND NUMBER: NORMAL
AST SERPL-CCNC: 346 U/L (ref 0–39)
BASOPHILS # BLD: 0 K/UL (ref 0–0.2)
BASOPHILS NFR BLD: 0 % (ref 0–2)
BILIRUB SERPL-MCNC: 9.7 MG/DL (ref 0–1.2)
BLOOD BANK DISPENSE STATUS: NORMAL
BLOOD BANK DISPENSE STATUS: NORMAL
BPU ID: NORMAL
BPU ID: NORMAL
BUN SERPL-MCNC: 14 MG/DL (ref 6–23)
CA-I BLD-SCNC: 1.13 MMOL/L (ref 1.15–1.33)
CALCIUM SERPL-MCNC: 8.3 MG/DL (ref 8.6–10.2)
CHLORIDE SERPL-SCNC: 106 MMOL/L (ref 98–107)
CO2 SERPL-SCNC: 22 MMOL/L (ref 22–29)
COMPONENT: NORMAL
COMPONENT: NORMAL
CREAT SERPL-MCNC: 0.7 MG/DL (ref 0.7–1.2)
EOSINOPHIL # BLD: 0 K/UL (ref 0.05–0.5)
EOSINOPHILS RELATIVE PERCENT: 0 % (ref 0–6)
ERYTHROCYTE [DISTWIDTH] IN BLOOD BY AUTOMATED COUNT: 12 % (ref 11.5–15)
GFR, ESTIMATED: >90 ML/MIN/1.73M2
GLUCOSE BLD-MCNC: 150 MG/DL (ref 74–99)
GLUCOSE BLD-MCNC: 164 MG/DL (ref 74–99)
GLUCOSE BLD-MCNC: 167 MG/DL (ref 74–99)
GLUCOSE BLD-MCNC: 168 MG/DL (ref 74–99)
GLUCOSE BLD-MCNC: 172 MG/DL (ref 74–99)
GLUCOSE BLD-MCNC: 206 MG/DL (ref 74–99)
GLUCOSE SERPL-MCNC: 203 MG/DL (ref 74–99)
HCT VFR BLD AUTO: 28.1 % (ref 37–54)
HGB BLD-MCNC: 9.5 G/DL (ref 12.5–16.5)
LYMPHOCYTES NFR BLD: 0.14 K/UL (ref 1.5–4)
LYMPHOCYTES RELATIVE PERCENT: 2 % (ref 20–42)
MAGNESIUM SERPL-MCNC: 2.1 MG/DL (ref 1.6–2.6)
MCH RBC QN AUTO: 33.7 PG (ref 26–35)
MCHC RBC AUTO-ENTMCNC: 33.8 G/DL (ref 32–34.5)
MCV RBC AUTO: 99.6 FL (ref 80–99.9)
MONOCYTES NFR BLD: 0.77 K/UL (ref 0.1–0.95)
MONOCYTES NFR BLD: 10 % (ref 2–12)
NEUTROPHILS NFR BLD: 89 % (ref 43–80)
NEUTS SEG NFR BLD: 7.18 K/UL (ref 1.8–7.3)
PHOSPHATE SERPL-MCNC: 2.6 MG/DL (ref 2.5–4.5)
PLATELET # BLD AUTO: 215 K/UL (ref 130–450)
PMV BLD AUTO: 10.9 FL (ref 7–12)
POTASSIUM SERPL-SCNC: 4.1 MMOL/L (ref 3.5–5)
PROT SERPL-MCNC: 5.2 G/DL (ref 6.4–8.3)
RBC # BLD AUTO: 2.82 M/UL (ref 3.8–5.8)
RBC # BLD: ABNORMAL 10*6/UL
SODIUM SERPL-SCNC: 142 MMOL/L (ref 132–146)
TRANSFUSION STATUS: NORMAL
TRANSFUSION STATUS: NORMAL
UNIT DIVISION: 0
UNIT DIVISION: 0
WBC OTHER # BLD: 8.1 K/UL (ref 4.5–11.5)

## 2024-08-15 PROCEDURE — 6360000002 HC RX W HCPCS

## 2024-08-15 PROCEDURE — 2580000003 HC RX 258: Performed by: STUDENT IN AN ORGANIZED HEALTH CARE EDUCATION/TRAINING PROGRAM

## 2024-08-15 PROCEDURE — 97530 THERAPEUTIC ACTIVITIES: CPT

## 2024-08-15 PROCEDURE — 6370000000 HC RX 637 (ALT 250 FOR IP): Performed by: SURGERY

## 2024-08-15 PROCEDURE — 6360000002 HC RX W HCPCS: Performed by: TRANSPLANT SURGERY

## 2024-08-15 PROCEDURE — P9047 ALBUMIN (HUMAN), 25%, 50ML: HCPCS

## 2024-08-15 PROCEDURE — 80053 COMPREHEN METABOLIC PANEL: CPT

## 2024-08-15 PROCEDURE — 6370000000 HC RX 637 (ALT 250 FOR IP)

## 2024-08-15 PROCEDURE — 82330 ASSAY OF CALCIUM: CPT

## 2024-08-15 PROCEDURE — 2580000003 HC RX 258

## 2024-08-15 PROCEDURE — 85025 COMPLETE CBC W/AUTO DIFF WBC: CPT

## 2024-08-15 PROCEDURE — 97166 OT EVAL MOD COMPLEX 45 MIN: CPT

## 2024-08-15 PROCEDURE — 97535 SELF CARE MNGMENT TRAINING: CPT

## 2024-08-15 PROCEDURE — 6370000000 HC RX 637 (ALT 250 FOR IP): Performed by: STUDENT IN AN ORGANIZED HEALTH CARE EDUCATION/TRAINING PROGRAM

## 2024-08-15 PROCEDURE — 37799 UNLISTED PX VASCULAR SURGERY: CPT

## 2024-08-15 PROCEDURE — 2140000000 HC CCU INTERMEDIATE R&B

## 2024-08-15 PROCEDURE — 82962 GLUCOSE BLOOD TEST: CPT

## 2024-08-15 PROCEDURE — 99024 POSTOP FOLLOW-UP VISIT: CPT | Performed by: TRANSPLANT SURGERY

## 2024-08-15 PROCEDURE — 94667 MNPJ CHEST WALL 1ST: CPT

## 2024-08-15 PROCEDURE — 99232 SBSQ HOSP IP/OBS MODERATE 35: CPT | Performed by: SURGERY

## 2024-08-15 PROCEDURE — 83735 ASSAY OF MAGNESIUM: CPT

## 2024-08-15 PROCEDURE — 84100 ASSAY OF PHOSPHORUS: CPT

## 2024-08-15 PROCEDURE — 2500000003 HC RX 250 WO HCPCS: Performed by: STUDENT IN AN ORGANIZED HEALTH CARE EDUCATION/TRAINING PROGRAM

## 2024-08-15 PROCEDURE — 6360000002 HC RX W HCPCS: Performed by: STUDENT IN AN ORGANIZED HEALTH CARE EDUCATION/TRAINING PROGRAM

## 2024-08-15 PROCEDURE — 97162 PT EVAL MOD COMPLEX 30 MIN: CPT

## 2024-08-15 RX ORDER — ALBUMIN (HUMAN) 12.5 G/50ML
25 SOLUTION INTRAVENOUS ONCE
Status: COMPLETED | OUTPATIENT
Start: 2024-08-15 | End: 2024-08-15

## 2024-08-15 RX ORDER — KETOROLAC TROMETHAMINE 30 MG/ML
15 INJECTION, SOLUTION INTRAMUSCULAR; INTRAVENOUS EVERY 6 HOURS
Status: COMPLETED | OUTPATIENT
Start: 2024-08-15 | End: 2024-08-20

## 2024-08-15 RX ORDER — SODIUM CHLORIDE, SODIUM LACTATE, POTASSIUM CHLORIDE, CALCIUM CHLORIDE 600; 310; 30; 20 MG/100ML; MG/100ML; MG/100ML; MG/100ML
INJECTION, SOLUTION INTRAVENOUS ONCE
Status: COMPLETED | OUTPATIENT
Start: 2024-08-15 | End: 2024-08-15

## 2024-08-15 RX ORDER — TAMSULOSIN HYDROCHLORIDE 0.4 MG/1
0.8 CAPSULE ORAL DAILY
Status: DISCONTINUED | OUTPATIENT
Start: 2024-08-15 | End: 2024-08-16

## 2024-08-15 RX ORDER — ENOXAPARIN SODIUM 100 MG/ML
40 INJECTION SUBCUTANEOUS DAILY
Status: DISCONTINUED | OUTPATIENT
Start: 2024-08-15 | End: 2024-08-17

## 2024-08-15 RX ADMIN — HYDROMORPHONE HYDROCHLORIDE 0.5 MG: 1 INJECTION, SOLUTION INTRAMUSCULAR; INTRAVENOUS; SUBCUTANEOUS at 02:41

## 2024-08-15 RX ADMIN — KETOROLAC TROMETHAMINE 15 MG: 30 INJECTION, SOLUTION INTRAMUSCULAR at 17:16

## 2024-08-15 RX ADMIN — OXYCODONE HYDROCHLORIDE 5 MG: 5 TABLET ORAL at 05:35

## 2024-08-15 RX ADMIN — ACETAMINOPHEN 650 MG: 325 TABLET ORAL at 23:24

## 2024-08-15 RX ADMIN — KETOROLAC TROMETHAMINE 15 MG: 30 INJECTION, SOLUTION INTRAMUSCULAR at 12:15

## 2024-08-15 RX ADMIN — ACETAMINOPHEN 650 MG: 325 TABLET ORAL at 17:16

## 2024-08-15 RX ADMIN — TAMSULOSIN HYDROCHLORIDE 0.8 MG: 0.4 CAPSULE ORAL at 09:02

## 2024-08-15 RX ADMIN — INSULIN LISPRO 4 UNITS: 100 INJECTION, SOLUTION INTRAVENOUS; SUBCUTANEOUS at 23:32

## 2024-08-15 RX ADMIN — SODIUM CHLORIDE, PRESERVATIVE FREE 10 ML: 5 INJECTION INTRAVENOUS at 16:38

## 2024-08-15 RX ADMIN — SODIUM CHLORIDE, POTASSIUM CHLORIDE, SODIUM LACTATE AND CALCIUM CHLORIDE: 600; 310; 30; 20 INJECTION, SOLUTION INTRAVENOUS at 15:22

## 2024-08-15 RX ADMIN — INSULIN GLARGINE 15 UNITS: 100 INJECTION, SOLUTION SUBCUTANEOUS at 19:51

## 2024-08-15 RX ADMIN — SODIUM CHLORIDE, PRESERVATIVE FREE 40 MG: 5 INJECTION INTRAVENOUS at 02:43

## 2024-08-15 RX ADMIN — PIPERACILLIN AND TAZOBACTAM 3375 MG: 3; .375 INJECTION, POWDER, LYOPHILIZED, FOR SOLUTION INTRAVENOUS at 03:00

## 2024-08-15 RX ADMIN — INSULIN LISPRO 4 UNITS: 100 INJECTION, SOLUTION INTRAVENOUS; SUBCUTANEOUS at 19:50

## 2024-08-15 RX ADMIN — SODIUM CHLORIDE, PRESERVATIVE FREE 10 ML: 5 INJECTION INTRAVENOUS at 10:35

## 2024-08-15 RX ADMIN — ALBUMIN (HUMAN) 25 G: 0.25 INJECTION, SOLUTION INTRAVENOUS at 17:28

## 2024-08-15 RX ADMIN — GABAPENTIN 200 MG: 100 CAPSULE ORAL at 19:51

## 2024-08-15 RX ADMIN — SODIUM CHLORIDE, POTASSIUM CHLORIDE, SODIUM LACTATE AND CALCIUM CHLORIDE: 600; 310; 30; 20 INJECTION, SOLUTION INTRAVENOUS at 03:25

## 2024-08-15 RX ADMIN — GABAPENTIN 200 MG: 100 CAPSULE ORAL at 08:57

## 2024-08-15 RX ADMIN — ENOXAPARIN SODIUM 40 MG: 100 INJECTION SUBCUTANEOUS at 09:02

## 2024-08-15 RX ADMIN — INSULIN LISPRO 4 UNITS: 100 INJECTION, SOLUTION INTRAVENOUS; SUBCUTANEOUS at 12:27

## 2024-08-15 RX ADMIN — OXYCODONE HYDROCHLORIDE 5 MG: 5 TABLET ORAL at 18:46

## 2024-08-15 RX ADMIN — HYDROMORPHONE HYDROCHLORIDE 0.25 MG: 1 INJECTION, SOLUTION INTRAMUSCULAR; INTRAVENOUS; SUBCUTANEOUS at 10:36

## 2024-08-15 RX ADMIN — ONDANSETRON 4 MG: 2 INJECTION INTRAMUSCULAR; INTRAVENOUS at 08:57

## 2024-08-15 RX ADMIN — PIPERACILLIN AND TAZOBACTAM 3375 MG: 3; .375 INJECTION, POWDER, LYOPHILIZED, FOR SOLUTION INTRAVENOUS at 10:12

## 2024-08-15 RX ADMIN — ONDANSETRON 4 MG: 2 INJECTION INTRAMUSCULAR; INTRAVENOUS at 17:16

## 2024-08-15 RX ADMIN — CALCIUM GLUCONATE 2000 MG: 98 INJECTION, SOLUTION INTRAVENOUS at 15:08

## 2024-08-15 RX ADMIN — PIPERACILLIN AND TAZOBACTAM 3375 MG: 3; .375 INJECTION, POWDER, LYOPHILIZED, FOR SOLUTION INTRAVENOUS at 17:25

## 2024-08-15 RX ADMIN — GABAPENTIN 200 MG: 100 CAPSULE ORAL at 14:09

## 2024-08-15 RX ADMIN — SODIUM CHLORIDE, PRESERVATIVE FREE 40 MG: 5 INJECTION INTRAVENOUS at 16:39

## 2024-08-15 RX ADMIN — SODIUM CHLORIDE, POTASSIUM CHLORIDE, SODIUM LACTATE AND CALCIUM CHLORIDE: 600; 310; 30; 20 INJECTION, SOLUTION INTRAVENOUS at 16:36

## 2024-08-15 RX ADMIN — OXYCODONE HYDROCHLORIDE 5 MG: 5 TABLET ORAL at 14:09

## 2024-08-15 RX ADMIN — SODIUM CHLORIDE, POTASSIUM CHLORIDE, SODIUM LACTATE AND CALCIUM CHLORIDE: 600; 310; 30; 20 INJECTION, SOLUTION INTRAVENOUS at 09:01

## 2024-08-15 RX ADMIN — SODIUM CHLORIDE, PRESERVATIVE FREE 10 ML: 5 INJECTION INTRAVENOUS at 12:16

## 2024-08-15 RX ADMIN — SODIUM CHLORIDE, PRESERVATIVE FREE 10 ML: 5 INJECTION INTRAVENOUS at 17:16

## 2024-08-15 RX ADMIN — INSULIN LISPRO 4 UNITS: 100 INJECTION, SOLUTION INTRAVENOUS; SUBCUTANEOUS at 06:49

## 2024-08-15 RX ADMIN — OXYCODONE HYDROCHLORIDE 5 MG: 5 TABLET ORAL at 09:36

## 2024-08-15 RX ADMIN — INSULIN LISPRO 8 UNITS: 100 INJECTION, SOLUTION INTRAVENOUS; SUBCUTANEOUS at 02:45

## 2024-08-15 RX ADMIN — SODIUM CHLORIDE, PRESERVATIVE FREE 10 ML: 5 INJECTION INTRAVENOUS at 08:56

## 2024-08-15 RX ADMIN — SODIUM PHOSPHATE, MONOBASIC, MONOHYDRATE AND SODIUM PHOSPHATE, DIBASIC, ANHYDROUS 30 MMOL: 142; 276 INJECTION, SOLUTION INTRAVENOUS at 09:06

## 2024-08-15 RX ADMIN — KETOROLAC TROMETHAMINE 15 MG: 30 INJECTION, SOLUTION INTRAMUSCULAR at 23:24

## 2024-08-15 RX ADMIN — ACETAMINOPHEN 650 MG: 325 TABLET ORAL at 12:16

## 2024-08-15 ASSESSMENT — PAIN DESCRIPTION - PAIN TYPE
TYPE: SURGICAL PAIN
TYPE: SURGICAL PAIN

## 2024-08-15 ASSESSMENT — PAIN DESCRIPTION - ORIENTATION
ORIENTATION: MID;LOWER
ORIENTATION: RIGHT;LEFT;MID;LOWER;UPPER
ORIENTATION: RIGHT;LEFT;MID;LOWER;UPPER
ORIENTATION: MID

## 2024-08-15 ASSESSMENT — PAIN SCALES - GENERAL
PAINLEVEL_OUTOF10: 5
PAINLEVEL_OUTOF10: 5
PAINLEVEL_OUTOF10: 0
PAINLEVEL_OUTOF10: 6
PAINLEVEL_OUTOF10: 5
PAINLEVEL_OUTOF10: 0
PAINLEVEL_OUTOF10: 5
PAINLEVEL_OUTOF10: 5
PAINLEVEL_OUTOF10: 6
PAINLEVEL_OUTOF10: 0
PAINLEVEL_OUTOF10: 5
PAINLEVEL_OUTOF10: 6
PAINLEVEL_OUTOF10: 0
PAINLEVEL_OUTOF10: 0
PAINLEVEL_OUTOF10: 5
PAINLEVEL_OUTOF10: 6
PAINLEVEL_OUTOF10: 0
PAINLEVEL_OUTOF10: 6
PAINLEVEL_OUTOF10: 0
PAINLEVEL_OUTOF10: 5
PAINLEVEL_OUTOF10: 0
PAINLEVEL_OUTOF10: 5
PAINLEVEL_OUTOF10: 0

## 2024-08-15 ASSESSMENT — PAIN DESCRIPTION - FREQUENCY
FREQUENCY: CONTINUOUS
FREQUENCY: CONTINUOUS

## 2024-08-15 ASSESSMENT — PAIN - FUNCTIONAL ASSESSMENT
PAIN_FUNCTIONAL_ASSESSMENT: PREVENTS OR INTERFERES SOME ACTIVE ACTIVITIES AND ADLS

## 2024-08-15 ASSESSMENT — PAIN DESCRIPTION - LOCATION
LOCATION: ABDOMEN

## 2024-08-15 ASSESSMENT — PAIN DESCRIPTION - ONSET
ONSET: ON-GOING
ONSET: ON-GOING

## 2024-08-15 ASSESSMENT — PAIN DESCRIPTION - DESCRIPTORS
DESCRIPTORS: ACHING;DISCOMFORT;SHARP;SORE
DESCRIPTORS: ACHING;SHARP;SORE
DESCRIPTORS: ACHING;SORE;TENDER;DISCOMFORT
DESCRIPTORS: ACHING;DISCOMFORT;SORE

## 2024-08-15 NOTE — PROGRESS NOTES
Please keep in place the patients right internal jugular central line upon transfer out of the SICU as it is still clinically indicated .    Bill Valente, DO  General Surgery

## 2024-08-15 NOTE — PROGRESS NOTES
OCCUPATIONAL THERAPY INITIAL EVALUATION    [facility]  [address], [place]       Date:8/15/2024                                                               Patient Name: Michael Garcia  MRN: [id number]  : 1945  Room: [address] [place], [place] [address]       Evaluating OT: Prabhu Dia OTR/L; [id number]     Referring Provider: Demetrius Santoyo III, MD    Specific Provider Orders/Date: OT eval and treat (08/15/24 1200 )     Diagnosis: Adenocarcinoma of pancreas (HCC) [C25.9]  Adenocarcinoma of head of pancreas (HCC) [C25.0]     Surgery/Procedures:   24  1.  Pancreaticoduodenectomy with placement of an 8f and 5F biliary stent   2.  omental pedicle flap  3.  Placement of SHARON drains  2  4.  Extended lymphadenectomy  5.  Intraoperative ultrasound for anatomy   6.  Portal vein reconstruction  7.  Removal of PTHC     Pertinent Medical History:    Past Medical History:   Diagnosis Date    Deaf, bilateral     Diabetes (HCC)     Hypertension     Osteoarthritis     Primary osteoarthritis of left knee 10/18/2017    Prostate cancer (HCC) 2024        *Precautions:  Fall Risk, +alarms, abdominal precautions, deaf - ASL (able to read lips), NPO, riojas, O2, x2 abdominal SHARON drains    Session Code: 06003    Assessment of current deficits   [x] Functional mobility  [x]ADLs  [x] Strength               []Cognition   [x] Functional transfers   [x] IADLs         [x] Safety Awareness   [x]Endurance   [x] Fine Coordination        [x] ROM     [] Vision/perception   []Sensation    []Gross Motor Coordination [x] Balance   [] Delirium                  []Motor Control     [] Communication    OT PLAN OF CARE   OT POC based on physician orders, patient diagnosis and results of clinical assessment.       Frequency/Duration: 1-3 days/wk for 1-2 weeks PRN    Specific OT Treatment Interventions to include:   * Instruction/training on adapted ADL techniques and AE

## 2024-08-15 NOTE — PROGRESS NOTES
Patient has transfer order for Room 6515A. Nurse to nurse report given to Ceci, transport requested. Per Resident physician, patient is allowed to transfer to new unit with Triple Lumen Catheter.

## 2024-08-15 NOTE — PROGRESS NOTES
Pressure post session 96/57 (70) mmHg   BP in chair after transfer 97/57 (67) mmHg    Functional Status Score-Intensive Care Unit (FSS-ICU)   Rolling -/7   Supine to sit transfer 2/7   Unsupported sitting  4/7   Sit to stand transfers 3/7   Ambulation 1/7   Total  10/35     Therapeutic Exercises:  sit to stand x 2 reps    Patient education  Pt educated on safety    Patient response to education:   Pt verbalized understanding Pt demonstrated skill Pt requires further education in this area   yes yes yes     ASSESSMENT:    Conditions Requiring Skilled Therapeutic Intervention:    [x]Decreased strength     []Decreased ROM  [x]Decreased functional mobility  [x]Decreased balance   [x]Decreased endurance   [x]Decreased posture  []Decreased sensation  []Decreased coordination   []Decreased vision  []Decreased safety awareness   [x]Increased pain       Comments:  RN reported pt was medically stable.  Pt was in bed upon arrival, agreeable to initial evaluation.  Interpretor #[id number] used for session.  Pt was mostly limited by pain but motivated to complete tasks.  Pt stood with flexed posture and completed shuffled steps to chair.  Additional sit to stand completed from chair to ensure safety with nursing staff.  Pt was left in chair with all needs met and call light in reach.  All lines remained intact and chair alarm on.  Educated pt on safety and need for assistance when getting out of chair; pt understood and agreed to use call light.  RN aware.     Treatment:  Patient practiced and was instructed in the following treatment:    Bed mobility training - pt given verbal and tactile cues to facilitate proper sequencing and safety during supine>sit as well as provided with physical assistance.  Sitting EOB for >10 minutes for upright tolerance, postural awareness and BLE ROM  Transfer training - pt was given verbal and tactile cues to facilitate proper hand placement, technique and safety during sit to stand, stand to sit and

## 2024-08-15 NOTE — PROGRESS NOTES
Patient having sustained hypotension with Systolic 70s-80s. Resident physician notified. Order placed for Lactated Ringers 500mL bolus and IV Albumin x 1. Manual blood pressure reading performed, last 2 readings were 104/56 with MAP 72, and 98/60 with MAP 73. Per Dr. Bill Valente, patient able to transfer to new unit with current blood pressure readings. Patient has no complaints of discomfort.

## 2024-08-15 NOTE — CARE COORDINATION
8/15 Care Coordination:Pt admit to SICU s/p Christale.continue NPO, ontinue zosyn,  insulin gtt.  Went to see pt but was sleeping.Per RN Pt is deaf,He is able to read lips, but his girlfriend assisted with sign language.  will await family, His girlfriend Razia. Patient lives with his girlfriend. HAs a Hx at [facility] and had HHC per darek morrison. At that time pt did not remember HHC name. CM/SW will continue to follow for discharge planning.   Jamar RIDLEY,IGLESIA-TRI-BC  [phone]     8/15 Care Coordination:CM spoke with Pt sig other. Discussed discharge plan. If pt needs Rehab wants him back at [facility], If can come home is agreeable to [facility]HC. She will let CM know what HHC they want. She needs to talk with pt's cousin that works for [facility]. Referral called to Jane at [facility]. CM/SW will continue to follow for discharge planning.   Jamar RIDLEY,RN-TRI-BC  [phone]

## 2024-08-15 NOTE — PROGRESS NOTES
HEPATOBILIARY  SURGERY  DAILY PROGRESS NOTE  8/15/2024        Subjective:  One episode of emesis overnight after pain medication. Pain reasonably controlled. No flatus yet.    Objective:  /65   Pulse (!) 101   Temp 99.1 °F (37.3 °C) (Bladder)   Resp (!) 32   Ht 1.88 m (6' 2\")   Wt 96.2 kg (212 lb 1.3 oz)   SpO2 91%   BMI 27.23 kg/m²     GENERAL:  Lying in bed. No acute distress   HEAD: No gross abnormalities   EYES: scleral icterus   LUNGS:  No increased work of breathing  CARDIOVASCULAR:  RR, normotensive   ABDOMEN:  Soft, moderately distended, appropriately tender around midline incision. SHARON x 2 w SS output. Riojas w edilia colored urine   EXTREMITIES: No edema or swelling  SKIN: Warm and dry    UOP: 0.73cc/kg/hr     Assessment/Plan:  79 y.o. male with obstructive jaundice 2/2 pancreatic head adenocarcinoma s/p whipple procedure w portal vein reconstruction 8/14     - continue NPO, ok for sips/chips <250 mL every 4 hours  - multimodal pain control  - monitor SHARON output and UOP   - decrease mIVF to 83 cc/hr  - remove riojas today  - remove a-line  - continue zosyn due to hyperbilirubinemia   - appreciate ICU care   - off insulin gtt, monitor glucose  - DVT ppx with lovenox  - stable for transfer out of SICU today    Electronically signed by Latosha Faustin MD on 8/15/2024 at 11:36 AM    Patient is doing well  Patient is ambulated be out of bed today  PT OT was ordered  Continues Zosyn due to his obstructive jaundice with a bilirubin greater than 10 on admission.  Continue to follow the Whipple pathway  SHARON output serosanguineous  Start Toradol 15 every 6  May need additional narcotics  Okay to transfer out of the ICU    Electronically signed by Demetrius Santoyo MD on 8/15/2024 at 11:50 AM

## 2024-08-15 NOTE — PROGRESS NOTES
CRITICAL CARE  PROGRESS NOTE    Nausea and emesis overnight x 1; feeling better today; passing a little flatus    BP (!) 75/56   Pulse (!) 106   Temp 99.1 °F (37.3 °C) (Bladder)   Resp 24   Ht 1.88 m (6' 2\")   Wt 96.2 kg (212 lb 1.3 oz)   SpO2 96%   BMI 27.23 kg/m²   Gen:  NAD  Chest:  CTAB, RRR  Abd:  soft mild distention, mod TTP diffusely  SHARON:  serosanguinous    ASSESSMENT/PLAN;  Obstructive jaundice--s/p whipple with PV reconstruction  --follow Whipple pathway  --d/c insulin gtt-->lantus, ISS  --ok for monitored bed    Sharon Acevedo MD, MSc, FACS  8/15/2024  3:11 PM

## 2024-08-15 NOTE — PLAN OF CARE
Problem: Safety - Adult  Goal: Free from fall injury  Outcome: Progressing     Problem: Pain  Goal: Verbalizes/displays adequate comfort level or baseline comfort level  Outcome: Progressing  Flowsheets  Taken 8/15/2024 0900  Verbalizes/displays adequate comfort level or baseline comfort level:   Encourage patient to monitor pain and request assistance   Assess pain using appropriate pain scale   Administer analgesics based on type and severity of pain and evaluate response   Implement non-pharmacological measures as appropriate and evaluate response  Taken 8/15/2024 0800  Verbalizes/displays adequate comfort level or baseline comfort level:   Encourage patient to monitor pain and request assistance   Assess pain using appropriate pain scale   Administer analgesics based on type and severity of pain and evaluate response   Implement non-pharmacological measures as appropriate and evaluate response     Problem: ABCDS Injury Assessment  Goal: Absence of physical injury  Outcome: Progressing     Problem: Neurosensory - Adult  Goal: Achieves stable or improved neurological status  Outcome: Progressing     Problem: Neurosensory - Adult  Goal: Achieves maximal functionality and self care  Outcome: Not Progressing     Problem: Respiratory - Adult  Goal: Achieves optimal ventilation and oxygenation  Outcome: Not Progressing     Problem: Cardiovascular - Adult  Goal: Maintains optimal cardiac output and hemodynamic stability  Outcome: Not Progressing     Problem: Musculoskeletal - Adult  Goal: Return mobility to safest level of function  Outcome: Not Progressing     Problem: Gastrointestinal - Adult  Goal: Maintains or returns to baseline bowel function  Outcome: Not Progressing     Problem: Genitourinary - Adult  Goal: Urinary catheter remains patent  Outcome: Progressing     Problem: Metabolic/Fluid and Electrolytes - Adult  Goal: Electrolytes maintained within normal limits  Outcome: Not Progressing     Problem:

## 2024-08-16 ENCOUNTER — APPOINTMENT (OUTPATIENT)
Dept: GENERAL RADIOLOGY | Age: 79
End: 2024-08-16
Attending: TRANSPLANT SURGERY
Payer: MEDICARE

## 2024-08-16 LAB
ALBUMIN SERPL-MCNC: 2.9 G/DL (ref 3.5–5.2)
ALP SERPL-CCNC: 116 U/L (ref 40–129)
ALT SERPL-CCNC: 220 U/L (ref 0–40)
ANION GAP SERPL CALCULATED.3IONS-SCNC: 9 MMOL/L (ref 7–16)
AST SERPL-CCNC: 113 U/L (ref 0–39)
BASOPHILS # BLD: 0.01 K/UL (ref 0–0.2)
BASOPHILS NFR BLD: 0 % (ref 0–2)
BILIRUB SERPL-MCNC: 8.1 MG/DL (ref 0–1.2)
BUN SERPL-MCNC: 17 MG/DL (ref 6–23)
CA-I BLD-SCNC: 1.18 MMOL/L (ref 1.15–1.33)
CALCIUM SERPL-MCNC: 8.7 MG/DL (ref 8.6–10.2)
CHLORIDE SERPL-SCNC: 106 MMOL/L (ref 98–107)
CO2 SERPL-SCNC: 26 MMOL/L (ref 22–29)
CORTIS SERPL-MCNC: 18.4 UG/DL (ref 2.7–18.4)
CORTISOL COLLECTION INFO: NORMAL
CREAT SERPL-MCNC: 0.8 MG/DL (ref 0.7–1.2)
EOSINOPHIL # BLD: 0.29 K/UL (ref 0.05–0.5)
EOSINOPHILS RELATIVE PERCENT: 4 % (ref 0–6)
ERYTHROCYTE [DISTWIDTH] IN BLOOD BY AUTOMATED COUNT: 12.8 % (ref 11.5–15)
GFR, ESTIMATED: 90 ML/MIN/1.73M2
GLUCOSE BLD-MCNC: 110 MG/DL (ref 74–99)
GLUCOSE BLD-MCNC: 112 MG/DL (ref 74–99)
GLUCOSE BLD-MCNC: 148 MG/DL (ref 74–99)
GLUCOSE BLD-MCNC: 239 MG/DL (ref 74–99)
GLUCOSE BLD-MCNC: 245 MG/DL (ref 74–99)
GLUCOSE SERPL-MCNC: 107 MG/DL (ref 74–99)
HCT VFR BLD AUTO: 23 % (ref 37–54)
HGB BLD-MCNC: 7.7 G/DL (ref 12.5–16.5)
IMM GRANULOCYTES # BLD AUTO: 0.03 K/UL (ref 0–0.58)
IMM GRANULOCYTES NFR BLD: 0 % (ref 0–5)
LYMPHOCYTES NFR BLD: 0.3 K/UL (ref 1.5–4)
LYMPHOCYTES RELATIVE PERCENT: 4 % (ref 20–42)
MAGNESIUM SERPL-MCNC: 2.2 MG/DL (ref 1.6–2.6)
MCH RBC QN AUTO: 33.9 PG (ref 26–35)
MCHC RBC AUTO-ENTMCNC: 33.5 G/DL (ref 32–34.5)
MCV RBC AUTO: 101.3 FL (ref 80–99.9)
MICROORGANISM SPEC CULT: NORMAL
MONOCYTES NFR BLD: 0.63 K/UL (ref 0.1–0.95)
MONOCYTES NFR BLD: 9 % (ref 2–12)
NEUTROPHILS NFR BLD: 82 % (ref 43–80)
NEUTS SEG NFR BLD: 5.86 K/UL (ref 1.8–7.3)
PHOSPHATE SERPL-MCNC: 1.9 MG/DL (ref 2.5–4.5)
PLATELET # BLD AUTO: 174 K/UL (ref 130–450)
PMV BLD AUTO: 10.5 FL (ref 7–12)
POTASSIUM SERPL-SCNC: 3.5 MMOL/L (ref 3.5–5)
PROT SERPL-MCNC: 5.2 G/DL (ref 6.4–8.3)
RBC # BLD AUTO: 2.27 M/UL (ref 3.8–5.8)
RBC # BLD: ABNORMAL 10*6/UL
SODIUM SERPL-SCNC: 141 MMOL/L (ref 132–146)
SPECIMEN DESCRIPTION: NORMAL
WBC OTHER # BLD: 7.1 K/UL (ref 4.5–11.5)

## 2024-08-16 PROCEDURE — 6370000000 HC RX 637 (ALT 250 FOR IP)

## 2024-08-16 PROCEDURE — 2500000003 HC RX 250 WO HCPCS

## 2024-08-16 PROCEDURE — 84100 ASSAY OF PHOSPHORUS: CPT

## 2024-08-16 PROCEDURE — 6370000000 HC RX 637 (ALT 250 FOR IP): Performed by: STUDENT IN AN ORGANIZED HEALTH CARE EDUCATION/TRAINING PROGRAM

## 2024-08-16 PROCEDURE — 80053 COMPREHEN METABOLIC PANEL: CPT

## 2024-08-16 PROCEDURE — 74018 RADEX ABDOMEN 1 VIEW: CPT

## 2024-08-16 PROCEDURE — 83735 ASSAY OF MAGNESIUM: CPT

## 2024-08-16 PROCEDURE — 2580000003 HC RX 258

## 2024-08-16 PROCEDURE — 82533 TOTAL CORTISOL: CPT

## 2024-08-16 PROCEDURE — 51701 INSERT BLADDER CATHETER: CPT

## 2024-08-16 PROCEDURE — 6360000002 HC RX W HCPCS: Performed by: STUDENT IN AN ORGANIZED HEALTH CARE EDUCATION/TRAINING PROGRAM

## 2024-08-16 PROCEDURE — 51798 US URINE CAPACITY MEASURE: CPT

## 2024-08-16 PROCEDURE — 2580000003 HC RX 258: Performed by: STUDENT IN AN ORGANIZED HEALTH CARE EDUCATION/TRAINING PROGRAM

## 2024-08-16 PROCEDURE — 2140000000 HC CCU INTERMEDIATE R&B

## 2024-08-16 PROCEDURE — 85025 COMPLETE CBC W/AUTO DIFF WBC: CPT

## 2024-08-16 PROCEDURE — 82962 GLUCOSE BLOOD TEST: CPT

## 2024-08-16 PROCEDURE — 6360000002 HC RX W HCPCS: Performed by: TRANSPLANT SURGERY

## 2024-08-16 PROCEDURE — 82330 ASSAY OF CALCIUM: CPT

## 2024-08-16 RX ORDER — TAMSULOSIN HYDROCHLORIDE 0.4 MG/1
0.4 CAPSULE ORAL DAILY
Status: DISCONTINUED | OUTPATIENT
Start: 2024-08-16 | End: 2024-09-07 | Stop reason: HOSPADM

## 2024-08-16 RX ORDER — ACETAMINOPHEN 160 MG/5ML
650 LIQUID ORAL EVERY 6 HOURS SCHEDULED
Status: DISCONTINUED | OUTPATIENT
Start: 2024-08-16 | End: 2024-08-18

## 2024-08-16 RX ORDER — DOCUSATE SODIUM 100 MG/1
100 CAPSULE, LIQUID FILLED ORAL 2 TIMES DAILY
Status: DISCONTINUED | OUTPATIENT
Start: 2024-08-16 | End: 2024-08-16

## 2024-08-16 RX ORDER — ATORVASTATIN CALCIUM 20 MG/1
20 TABLET, FILM COATED ORAL NIGHTLY
Status: DISCONTINUED | OUTPATIENT
Start: 2024-08-16 | End: 2024-09-07 | Stop reason: HOSPADM

## 2024-08-16 RX ORDER — SODIUM CHLORIDE 450 MG/100ML
INJECTION, SOLUTION INTRAVENOUS CONTINUOUS
Status: ACTIVE | OUTPATIENT
Start: 2024-08-16 | End: 2024-08-17

## 2024-08-16 RX ORDER — HYDROCHLOROTHIAZIDE 12.5 MG/1
12.5 TABLET ORAL DAILY
Status: DISCONTINUED | OUTPATIENT
Start: 2024-08-16 | End: 2024-09-07 | Stop reason: HOSPADM

## 2024-08-16 RX ORDER — DOCUSATE SODIUM 50 MG/5ML
100 LIQUID ORAL 2 TIMES DAILY
Status: DISCONTINUED | OUTPATIENT
Start: 2024-08-16 | End: 2024-08-23

## 2024-08-16 RX ORDER — MONTELUKAST SODIUM 10 MG/1
10 TABLET ORAL NIGHTLY
Status: DISCONTINUED | OUTPATIENT
Start: 2024-08-16 | End: 2024-09-07 | Stop reason: HOSPADM

## 2024-08-16 RX ORDER — METOPROLOL SUCCINATE 25 MG/1
25 TABLET, EXTENDED RELEASE ORAL DAILY
Status: DISCONTINUED | OUTPATIENT
Start: 2024-08-16 | End: 2024-09-07 | Stop reason: HOSPADM

## 2024-08-16 RX ORDER — FINASTERIDE 5 MG/1
5 TABLET, FILM COATED ORAL DAILY
Status: DISCONTINUED | OUTPATIENT
Start: 2024-08-16 | End: 2024-09-07 | Stop reason: HOSPADM

## 2024-08-16 RX ORDER — VALSARTAN AND HYDROCHLOROTHIAZIDE 80; 12.5 MG/1; MG/1
1 TABLET, FILM COATED ORAL DAILY
Status: DISCONTINUED | OUTPATIENT
Start: 2024-08-16 | End: 2024-08-16 | Stop reason: SDUPTHER

## 2024-08-16 RX ORDER — VALSARTAN 80 MG/1
80 TABLET ORAL DAILY
Status: DISCONTINUED | OUTPATIENT
Start: 2024-08-16 | End: 2024-09-07 | Stop reason: HOSPADM

## 2024-08-16 RX ORDER — CETIRIZINE HYDROCHLORIDE 10 MG/1
10 TABLET ORAL DAILY
Status: DISCONTINUED | OUTPATIENT
Start: 2024-08-16 | End: 2024-09-07 | Stop reason: HOSPADM

## 2024-08-16 RX ADMIN — CETIRIZINE HYDROCHLORIDE 10 MG: 10 TABLET, FILM COATED ORAL at 09:15

## 2024-08-16 RX ADMIN — KETOROLAC TROMETHAMINE 15 MG: 30 INJECTION, SOLUTION INTRAMUSCULAR at 18:03

## 2024-08-16 RX ADMIN — SODIUM CHLORIDE, PRESERVATIVE FREE 40 MG: 5 INJECTION INTRAVENOUS at 16:12

## 2024-08-16 RX ADMIN — INSULIN LISPRO 8 UNITS: 100 INJECTION, SOLUTION INTRAVENOUS; SUBCUTANEOUS at 20:34

## 2024-08-16 RX ADMIN — KETOROLAC TROMETHAMINE 15 MG: 30 INJECTION, SOLUTION INTRAMUSCULAR at 11:42

## 2024-08-16 RX ADMIN — ONDANSETRON 4 MG: 2 INJECTION INTRAMUSCULAR; INTRAVENOUS at 13:41

## 2024-08-16 RX ADMIN — PIPERACILLIN AND TAZOBACTAM 3375 MG: 3; .375 INJECTION, POWDER, LYOPHILIZED, FOR SOLUTION INTRAVENOUS at 09:23

## 2024-08-16 RX ADMIN — ACETAMINOPHEN 650 MG: 325 TABLET ORAL at 06:33

## 2024-08-16 RX ADMIN — ASCORBIC ACID, VITAMIN A PALMITATE, CHOLECALCIFEROL, THIAMINE HYDROCHLORIDE, RIBOFLAVIN-5 PHOSPHATE SODIUM, PYRIDOXINE HYDROCHLORIDE, NIACINAMIDE, DEXPANTHENOL, ALPHA-TOCOPHEROL ACETATE, VITAMIN K1, FOLIC ACID, BIOTIN, CYANOCOBALAMIN: 200; 3300; 200; 6; 3.6; 6; 40; 15; 10; 150; 600; 60; 5 INJECTION, SOLUTION INTRAVENOUS at 19:05

## 2024-08-16 RX ADMIN — SODIUM PHOSPHATE, MONOBASIC, MONOHYDRATE AND SODIUM PHOSPHATE, DIBASIC, ANHYDROUS 30 MMOL: 142; 276 INJECTION, SOLUTION INTRAVENOUS at 16:15

## 2024-08-16 RX ADMIN — SODIUM CHLORIDE, PRESERVATIVE FREE 40 MG: 5 INJECTION INTRAVENOUS at 03:09

## 2024-08-16 RX ADMIN — ACETAMINOPHEN 650 MG: 325 TABLET ORAL at 11:42

## 2024-08-16 RX ADMIN — PIPERACILLIN AND TAZOBACTAM 3375 MG: 3; .375 INJECTION, POWDER, LYOPHILIZED, FOR SOLUTION INTRAVENOUS at 03:18

## 2024-08-16 RX ADMIN — GABAPENTIN 200 MG: 100 CAPSULE ORAL at 09:15

## 2024-08-16 RX ADMIN — KETOROLAC TROMETHAMINE 15 MG: 30 INJECTION, SOLUTION INTRAMUSCULAR at 06:33

## 2024-08-16 RX ADMIN — POTASSIUM BICARBONATE 20 MEQ: 782 TABLET, EFFERVESCENT ORAL at 11:42

## 2024-08-16 RX ADMIN — ENOXAPARIN SODIUM 40 MG: 100 INJECTION SUBCUTANEOUS at 09:15

## 2024-08-16 RX ADMIN — PIPERACILLIN AND TAZOBACTAM 3375 MG: 3; .375 INJECTION, POWDER, LYOPHILIZED, FOR SOLUTION INTRAVENOUS at 18:09

## 2024-08-16 RX ADMIN — I.V. FAT EMULSION 250 ML: 20 EMULSION INTRAVENOUS at 18:55

## 2024-08-16 RX ADMIN — SODIUM CHLORIDE: 4.5 INJECTION, SOLUTION INTRAVENOUS at 18:03

## 2024-08-16 RX ADMIN — SODIUM CHLORIDE, PRESERVATIVE FREE 10 ML: 5 INJECTION INTRAVENOUS at 20:34

## 2024-08-16 RX ADMIN — FINASTERIDE 5 MG: 5 TABLET, FILM COATED ORAL at 09:15

## 2024-08-16 RX ADMIN — SODIUM CHLORIDE, PRESERVATIVE FREE 10 ML: 5 INJECTION INTRAVENOUS at 09:15

## 2024-08-16 RX ADMIN — INSULIN GLARGINE 15 UNITS: 100 INJECTION, SOLUTION SUBCUTANEOUS at 20:34

## 2024-08-16 RX ADMIN — DOCUSATE SODIUM 100 MG: 100 CAPSULE, LIQUID FILLED ORAL at 09:15

## 2024-08-16 RX ADMIN — ONDANSETRON 4 MG: 2 INJECTION INTRAMUSCULAR; INTRAVENOUS at 20:53

## 2024-08-16 ASSESSMENT — PAIN DESCRIPTION - LOCATION: LOCATION: ABDOMEN

## 2024-08-16 ASSESSMENT — PAIN - FUNCTIONAL ASSESSMENT: PAIN_FUNCTIONAL_ASSESSMENT: ACTIVITIES ARE NOT PREVENTED

## 2024-08-16 ASSESSMENT — PAIN SCALES - GENERAL
PAINLEVEL_OUTOF10: 3
PAINLEVEL_OUTOF10: 3
PAINLEVEL_OUTOF10: 0

## 2024-08-16 ASSESSMENT — PAIN DESCRIPTION - DESCRIPTORS: DESCRIPTORS: ACHING;DULL;SORE

## 2024-08-16 ASSESSMENT — PAIN DESCRIPTION - ORIENTATION: ORIENTATION: MID;LOWER

## 2024-08-16 NOTE — PROGRESS NOTES
RN bladder scanned pt due to not voiding 8 hours post riojas removal. Bladder scan volume 469. RN straight cathed pt but due to discomfort pt pulled back and catheter came out early. Straight cath returned 200cc of edilia urine. PVR is 184cc. Pt DTV [phone]

## 2024-08-16 NOTE — PROGRESS NOTES
OC notified that patient's abd is distended and having episodes of nausea and emesis. Also patient has been unable to void since riojas removal and straight cathed twice

## 2024-08-16 NOTE — PROGRESS NOTES
HEPATOBILIARY  SURGERY  DAILY PROGRESS NOTE  8/16/2024        Subjective:  Pain controlled with medications. Passing flatus. Difficulty urinating overnight, straight cath for 200cc.     Objective:  BP (!) 102/56   Pulse 82   Temp 98.2 °F (36.8 °C) (Temporal)   Resp 18   Ht 1.88 m (6' 2\")   Wt 100.3 kg (221 lb 1.9 oz)   SpO2 95%   BMI 28.39 kg/m²     GENERAL:  Lying in bed. No acute distress   HEAD: No gross abnormalities   EYES: scleral icterus   LUNGS:  No increased work of breathing  CARDIOVASCULAR:  RR, normotensive   ABDOMEN:  Soft, moderately distended, appropriately tender around midline incision. L SHARON with 30cc serosanguinous output. R SHARON with 50cc of serous bile tinged output. Jasmine w edilia colored urine   EXTREMITIES: No edema or swelling  SKIN: Warm and dry    Assessment/Plan:  79 y.o. male with obstructive jaundice 2/2 pancreatic head adenocarcinoma s/p whipple procedure w portal vein reconstruction 8/14     - advance to CLD (diabetic) + Ensure clears TID  - colace BID  - continue mIVF to 83 cc/hr  - AM labs pending  - multimodal pain control  - monitor SHARON output and UOP   - continue zosyn due to hyperbilirubinemia   - monitor glucose  - DVT ppx with lovenox  - OOB, ambulate 3 laps today, PT/OT    Electronically signed by Asia Majro MD on 8/16/2024 at 6:58 AM

## 2024-08-16 NOTE — CARE COORDINATION
Transition of care update: S/P whipple procedure with portal vein reconstruction. IVFs at 83ml/hr. IV zosyn 3,375mg q 8 hrs. Labs noted. CLD. Met with pt in room earlier today. Communicated with pt using a note pad. His plan is to return home with [facility] when medically ready. Called pt's juliane Razia, ph#[phone]. Went over therapy scores from yesterday.  PT am-pac 11/24 with few steps to chair with Marco Antonio. OT am-pac 15/24. Pt has a history of tomas at [facility] and Razia would like pt to go there.  Razia said she will talk with patient about [facility]. If pt improves and is able/safe to go home then she would like [facility] setup for skilled nursing and therapies. Spoke with Deangelo with [facility] and they have accepted pt. No pre cert is required. Ambulette form and EXEMPT completed. Transport envelope was placed with pt's soft chart. Referral was made to Kim with [facility] and they accept pt. Cm/sw will follow.

## 2024-08-16 NOTE — PATIENT CARE CONFERENCE
[facility] Quality Flow/Interdisciplinary Rounds Progress Note        Quality Flow Rounds held on August 16, 2024    Disciplines Attending:  Bedside Nurse, , , and Nursing Unit Leadership    Michael Garcia was admitted on 8/14/2024  5:28 AM    Anticipated Discharge Date:       Disposition:    Mike Score:  Mike Scale Score: 18    Readmission Risk              Risk of Unplanned Readmission:  18           Discussed patient goal for the day, patient clinical progression, and barriers to discharge.  The following Goal(s) of the Day/Commitment(s) have been identified:  Labs - Report Results and keep patient informed      Yana De La Garza RN  August 16, 2024

## 2024-08-16 NOTE — PLAN OF CARE
Problem: Discharge Planning  Goal: Discharge to home or other facility with appropriate resources  Outcome: Progressing     Problem: Safety - Adult  Goal: Free from fall injury  8/16/2024 0107 by Augusta Munoz RN  Outcome: Progressing     Problem: Pain  Goal: Verbalizes/displays adequate comfort level or baseline comfort level  8/16/2024 0107 by Augusta Munoz RN  Outcome: Progressing  Flowsheets  Taken 8/15/2024 1800 by Pati Noel RN  Verbalizes/displays adequate comfort level or baseline comfort level:   Encourage patient to monitor pain and request assistance   Assess pain using appropriate pain scale   Administer analgesics based on type and severity of pain and evaluate response   Implement non-pharmacological measures as appropriate and evaluate response  Taken 8/15/2024 1700 by Pati Noel RN  Verbalizes/displays adequate comfort level or baseline comfort level:   Encourage patient to monitor pain and request assistance   Assess pain using appropriate pain scale   Administer analgesics based on type and severity of pain and evaluate response   Implement non-pharmacological measures as appropriate and evaluate response  Taken 8/15/2024 1600 by Pati Noel RN  Verbalizes/displays adequate comfort level or baseline comfort level:   Encourage patient to monitor pain and request assistance   Assess pain using appropriate pain scale   Administer analgesics based on type and severity of pain and evaluate response   Implement non-pharmacological measures as appropriate and evaluate response  Taken 8/15/2024 1500 by Pati Noel RN  Verbalizes/displays adequate comfort level or baseline comfort level:   Encourage patient to monitor pain and request assistance   Assess pain using appropriate pain scale   Administer analgesics based on type and severity of pain and evaluate response   Implement non-pharmacological measures as appropriate and evaluate response  Taken 8/15/2024 1400 by Pati Noel  gases   Encourage broncho-pulmonary hygiene including cough, deep breathe, incentive spirometry   Assess the need for suctioning and aspirate as needed   Assess and instruct to report shortness of breath or any respiratory difficulty   Respiratory therapy support as indicated     Problem: Cardiovascular - Adult  Goal: Maintains optimal cardiac output and hemodynamic stability  8/16/2024 0107 by Augusta Munoz RN  Outcome: Progressing  8/15/2024 1131 by Pati Noel RN  Outcome: Not Progressing  Flowsheets (Taken 8/15/2024 0800)  Maintains optimal cardiac output and hemodynamic stability: Monitor blood pressure and heart rate     Problem: Musculoskeletal - Adult  Goal: Return mobility to safest level of function  8/16/2024 0107 by Augusta Munoz RN  Outcome: Progressing  8/15/2024 1131 by Pati Nole RN  Outcome: Not Progressing     Problem: Gastrointestinal - Adult  Goal: Maintains or returns to baseline bowel function  8/15/2024 1131 by Pati Noel RN  Outcome: Not Progressing     Problem: Metabolic/Fluid and Electrolytes - Adult  Goal: Electrolytes maintained within normal limits  8/16/2024 0107 by Augusta Munoz RN  Outcome: Progressing  8/15/2024 1131 by Pati Noel RN  Outcome: Not Progressing

## 2024-08-16 NOTE — PROGRESS NOTES
Subjective:    The patient is awake and alert, sitting up in chair with cousin at the bedside. The patients juliane normally translates via sign language but she is unavailable. I attempted to call her to get a better understanding of his current condition but was unable to reach her, left voicemail. I conversed with him via a note bad on his bedside table. No problems overnight.  Denies chest pain, angina, or dyspnea. Has some complaints of abdominal pain but states it is improving. No nausea or vomiting. I told him our plan will be to follow up with him in the office to review biopsy results, he is agreeable to this.      Objective:    /61   Pulse 74   Temp 98 °F (36.7 °C) (Temporal)   Resp 17   Ht 1.88 m (6' 2\")   Wt 100.3 kg (221 lb 1.9 oz)   SpO2 98%   BMI 28.39 kg/m²     General: Alert and oriented, no acute distress  HEENT: No thrush or mucositis, EOMI, PERRLA  Heart:  RRR, no murmurs, gallops, or rubs.  Lungs:  CTA bilaterally, no wheeze, rales or rhonchi  Abd: BS present, nontender, nondistended, no masses  Extrem:  No clubbing, cyanosis, or edema  Lymphatics: No palpable adenopathy in cervical and supraclavicular regions  Skin: Intact, no petechia or purpura., Jaundice    CBC with Differential:    Lab Results   Component Value Date/Time    WBC 7.1 08/16/2024 06:00 AM    RBC 2.27 08/16/2024 06:00 AM    HGB 7.7 08/16/2024 06:00 AM    HCT 23.0 08/16/2024 06:00 AM     08/16/2024 06:00 AM    .3 08/16/2024 06:00 AM    MCH 33.9 08/16/2024 06:00 AM    MCHC 33.5 08/16/2024 06:00 AM    RDW 12.8 08/16/2024 06:00 AM    LYMPHOPCT 4 08/16/2024 06:00 AM    MONOPCT 9 08/16/2024 06:00 AM    EOSPCT 4 08/16/2024 06:00 AM    BASOPCT 0 08/16/2024 06:00 AM    MONOSABS 0.63 08/16/2024 06:00 AM    LYMPHSABS 0.30 08/16/2024 06:00 AM    EOSABS 0.29 08/16/2024 06:00 AM    BASOSABS 0.01 08/16/2024 06:00 AM     CMP:    Lab Results   Component Value Date/Time     08/16/2024 06:00 AM    K 3.5 08/16/2024

## 2024-08-16 NOTE — DISCHARGE INSTR - COC
Continuity of Care Form    Patient Name: Michael Garcia   :  1945  MRN:  [id number]    Admit date:  2024  Discharge date:  24    Code Status Order: Full Code   Advance Directives:   Advance Care Flowsheet Documentation        Date/Time Healthcare Directive Type of Healthcare Directive Copy in Chart Healthcare Agent Appointed Healthcare Agent's Name Healthcare Agent's Phone Number    24 0610 No, patient does not have an advance directive for healthcare treatment  --  --  --  --  --                     Admitting Physician:  Demetrius Santoyo III, MD  PCP: Moshe Moran DO    Discharging Nurse: IGLESIA Grey  Discharging Hospital Unit/Room#: 6515/6515-A  Discharging Unit Phone Number: [phone]    Emergency Contact:   Extended Emergency Contact Information  Primary Emergency Contact: Razia Tim  Home Phone: [phone]  Relation: Other    Past Surgical History:  Past Surgical History:   Procedure Laterality Date    CARPAL TUNNEL RELEASE      ERCP N/A 2024    FAILED ENDOSCOPIC RETROGRADE CHOLANGIOPANCREATOGRAPHY performed by Demetrius Carlos DO at [facility] ENDOSCOPY    IR BILIARY DRAIN INT AND EXT  2024    IR BILIARY DRAIN INT AND EXT 2024 Sonny, Rodríguez Greer MD [facility] [place] SPECIAL PROCEDURES    PANCREAS SURGERY N/A 2024    WHIPPLE with portal vein reconstruction, intraoperative US performed by Demetrius Santoyo III, MD at [facility] OR    ROTATOR CUFF REPAIR      SHOULDER SURGERY      UPPER GASTROINTESTINAL ENDOSCOPY N/A 2024    ESOPHAGOGASTRODUODENOSCOPY DILATION BALLOON performed by Demetrius Carlos DO at [facility] ENDOSCOPY       Immunization History:   Immunization History   Administered Date(s) Administered    COVID-19, J&J, (age 18y+), IM, 0.5 mL 2021, 2021    COVID-19, PFIZER Bivalent, DO NOT Dilute, (age 12y+), IM, 30 mcg/0.3 mL 2022    Influenza Vaccine, unspecified formulation 10/13/2018    Td, unspecified formulation 2013

## 2024-08-17 ENCOUNTER — APPOINTMENT (OUTPATIENT)
Dept: GENERAL RADIOLOGY | Age: 79
End: 2024-08-17
Attending: TRANSPLANT SURGERY
Payer: MEDICARE

## 2024-08-17 PROBLEM — E44.0 MODERATE PROTEIN-CALORIE MALNUTRITION (HCC): Status: ACTIVE | Noted: 2024-08-17

## 2024-08-17 LAB
ALBUMIN SERPL-MCNC: 2.6 G/DL (ref 3.5–5.2)
ALP SERPL-CCNC: 138 U/L (ref 40–129)
ALT SERPL-CCNC: 161 U/L (ref 0–40)
AMYLASE FLD-CCNC: 8 U/L
AMYLASE SERPL-CCNC: 9 U/L (ref 20–100)
ANION GAP SERPL CALCULATED.3IONS-SCNC: 10 MMOL/L (ref 7–16)
ANION GAP SERPL CALCULATED.3IONS-SCNC: 11 MMOL/L (ref 7–16)
ANION GAP SERPL CALCULATED.3IONS-SCNC: 13 MMOL/L (ref 7–16)
ANION GAP SERPL CALCULATED.3IONS-SCNC: 9 MMOL/L (ref 7–16)
AST SERPL-CCNC: 52 U/L (ref 0–39)
BASOPHILS # BLD: 0 K/UL (ref 0–0.2)
BASOPHILS NFR BLD: 0 % (ref 0–2)
BILIRUB SERPL-MCNC: 4.8 MG/DL (ref 0–1.2)
BUN SERPL-MCNC: 14 MG/DL (ref 6–23)
BUN SERPL-MCNC: 16 MG/DL (ref 6–23)
CA-I BLD-SCNC: 1.16 MMOL/L (ref 1.15–1.33)
CA-I BLD-SCNC: 1.18 MMOL/L (ref 1.15–1.33)
CALCIUM SERPL-MCNC: 8.1 MG/DL (ref 8.6–10.2)
CALCIUM SERPL-MCNC: 8.2 MG/DL (ref 8.6–10.2)
CALCIUM SERPL-MCNC: 8.3 MG/DL (ref 8.6–10.2)
CALCIUM SERPL-MCNC: 8.5 MG/DL (ref 8.6–10.2)
CHLORIDE SERPL-SCNC: 105 MMOL/L (ref 98–107)
CHLORIDE SERPL-SCNC: 106 MMOL/L (ref 98–107)
CHLORIDE SERPL-SCNC: 107 MMOL/L (ref 98–107)
CHLORIDE SERPL-SCNC: 107 MMOL/L (ref 98–107)
CO2 SERPL-SCNC: 23 MMOL/L (ref 22–29)
CO2 SERPL-SCNC: 23 MMOL/L (ref 22–29)
CO2 SERPL-SCNC: 24 MMOL/L (ref 22–29)
CO2 SERPL-SCNC: 25 MMOL/L (ref 22–29)
CREAT SERPL-MCNC: 0.7 MG/DL (ref 0.7–1.2)
CREAT SERPL-MCNC: 0.8 MG/DL (ref 0.7–1.2)
EOSINOPHIL # BLD: 0.26 K/UL (ref 0.05–0.5)
EOSINOPHILS RELATIVE PERCENT: 4 % (ref 0–6)
ERYTHROCYTE [DISTWIDTH] IN BLOOD BY AUTOMATED COUNT: 12.8 % (ref 11.5–15)
GFR, ESTIMATED: >90 ML/MIN/1.73M2
GLUCOSE BLD-MCNC: 216 MG/DL (ref 74–99)
GLUCOSE BLD-MCNC: 219 MG/DL (ref 74–99)
GLUCOSE BLD-MCNC: 221 MG/DL (ref 74–99)
GLUCOSE BLD-MCNC: 253 MG/DL (ref 74–99)
GLUCOSE BLD-MCNC: 257 MG/DL (ref 74–99)
GLUCOSE BLD-MCNC: 258 MG/DL (ref 74–99)
GLUCOSE BLD-MCNC: 275 MG/DL (ref 74–99)
GLUCOSE SERPL-MCNC: 214 MG/DL (ref 74–99)
GLUCOSE SERPL-MCNC: 241 MG/DL (ref 74–99)
GLUCOSE SERPL-MCNC: 245 MG/DL (ref 74–99)
GLUCOSE SERPL-MCNC: 254 MG/DL (ref 74–99)
HCT VFR BLD AUTO: 23.8 % (ref 37–54)
HGB BLD-MCNC: 7.9 G/DL (ref 12.5–16.5)
LYMPHOCYTES NFR BLD: 0.13 K/UL (ref 1.5–4)
LYMPHOCYTES RELATIVE PERCENT: 2 % (ref 20–42)
MAGNESIUM SERPL-MCNC: 2.1 MG/DL (ref 1.6–2.6)
MAGNESIUM SERPL-MCNC: 2.2 MG/DL (ref 1.6–2.6)
MAGNESIUM SERPL-MCNC: 2.2 MG/DL (ref 1.6–2.6)
MCH RBC QN AUTO: 33.5 PG (ref 26–35)
MCHC RBC AUTO-ENTMCNC: 33.2 G/DL (ref 32–34.5)
MCV RBC AUTO: 100.8 FL (ref 80–99.9)
MONOCYTES NFR BLD: 0.13 K/UL (ref 0.1–0.95)
MONOCYTES NFR BLD: 2 % (ref 2–12)
NEUTROPHILS NFR BLD: 93 % (ref 43–80)
NEUTS SEG NFR BLD: 6.98 K/UL (ref 1.8–7.3)
PHOSPHATE SERPL-MCNC: 1.7 MG/DL (ref 2.5–4.5)
PHOSPHATE SERPL-MCNC: 2 MG/DL (ref 2.5–4.5)
PHOSPHATE SERPL-MCNC: 2.1 MG/DL (ref 2.5–4.5)
PLATELET # BLD AUTO: 206 K/UL (ref 130–450)
PMV BLD AUTO: 10 FL (ref 7–12)
POTASSIUM SERPL-SCNC: 3.2 MMOL/L (ref 3.5–5)
POTASSIUM SERPL-SCNC: 3.5 MMOL/L (ref 3.5–5)
POTASSIUM SERPL-SCNC: 3.7 MMOL/L (ref 3.5–5)
POTASSIUM SERPL-SCNC: 3.7 MMOL/L (ref 3.5–5)
PROT SERPL-MCNC: 5.1 G/DL (ref 6.4–8.3)
RBC # BLD AUTO: 2.36 M/UL (ref 3.8–5.8)
RBC # BLD: ABNORMAL 10*6/UL
SODIUM SERPL-SCNC: 139 MMOL/L (ref 132–146)
SODIUM SERPL-SCNC: 141 MMOL/L (ref 132–146)
SODIUM SERPL-SCNC: 141 MMOL/L (ref 132–146)
SODIUM SERPL-SCNC: 142 MMOL/L (ref 132–146)
SPECIMEN TYPE: NORMAL
TRIGL SERPL-MCNC: 271 MG/DL
WBC OTHER # BLD: 7.5 K/UL (ref 4.5–11.5)

## 2024-08-17 PROCEDURE — 6360000002 HC RX W HCPCS: Performed by: STUDENT IN AN ORGANIZED HEALTH CARE EDUCATION/TRAINING PROGRAM

## 2024-08-17 PROCEDURE — 2700000000 HC OXYGEN THERAPY PER DAY

## 2024-08-17 PROCEDURE — 74018 RADEX ABDOMEN 1 VIEW: CPT

## 2024-08-17 PROCEDURE — 84478 ASSAY OF TRIGLYCERIDES: CPT

## 2024-08-17 PROCEDURE — 2500000003 HC RX 250 WO HCPCS: Performed by: STUDENT IN AN ORGANIZED HEALTH CARE EDUCATION/TRAINING PROGRAM

## 2024-08-17 PROCEDURE — 83735 ASSAY OF MAGNESIUM: CPT

## 2024-08-17 PROCEDURE — 6360000002 HC RX W HCPCS: Performed by: TRANSPLANT SURGERY

## 2024-08-17 PROCEDURE — 6370000000 HC RX 637 (ALT 250 FOR IP)

## 2024-08-17 PROCEDURE — 2580000003 HC RX 258: Performed by: STUDENT IN AN ORGANIZED HEALTH CARE EDUCATION/TRAINING PROGRAM

## 2024-08-17 PROCEDURE — 6370000000 HC RX 637 (ALT 250 FOR IP): Performed by: STUDENT IN AN ORGANIZED HEALTH CARE EDUCATION/TRAINING PROGRAM

## 2024-08-17 PROCEDURE — 80048 BASIC METABOLIC PNL TOTAL CA: CPT

## 2024-08-17 PROCEDURE — 84100 ASSAY OF PHOSPHORUS: CPT

## 2024-08-17 PROCEDURE — 82150 ASSAY OF AMYLASE: CPT

## 2024-08-17 PROCEDURE — 82962 GLUCOSE BLOOD TEST: CPT

## 2024-08-17 PROCEDURE — 80053 COMPREHEN METABOLIC PANEL: CPT

## 2024-08-17 PROCEDURE — 2140000000 HC CCU INTERMEDIATE R&B

## 2024-08-17 PROCEDURE — 51798 US URINE CAPACITY MEASURE: CPT

## 2024-08-17 PROCEDURE — 82330 ASSAY OF CALCIUM: CPT

## 2024-08-17 PROCEDURE — 85025 COMPLETE CBC W/AUTO DIFF WBC: CPT

## 2024-08-17 RX ORDER — MECOBALAMIN 5000 MCG
5 TABLET,DISINTEGRATING ORAL NIGHTLY
Status: DISCONTINUED | OUTPATIENT
Start: 2024-08-17 | End: 2024-09-07 | Stop reason: HOSPADM

## 2024-08-17 RX ORDER — POTASSIUM CHLORIDE 29.8 MG/ML
20 INJECTION INTRAVENOUS
Status: COMPLETED | OUTPATIENT
Start: 2024-08-17 | End: 2024-08-17

## 2024-08-17 RX ORDER — ENOXAPARIN SODIUM 100 MG/ML
30 INJECTION SUBCUTANEOUS 2 TIMES DAILY
Status: DISCONTINUED | OUTPATIENT
Start: 2024-08-17 | End: 2024-09-07 | Stop reason: HOSPADM

## 2024-08-17 RX ADMIN — DOCUSATE SODIUM 100 MG: 50 LIQUID ORAL at 20:11

## 2024-08-17 RX ADMIN — ACETAMINOPHEN 650 MG: 650 SOLUTION ORAL at 11:26

## 2024-08-17 RX ADMIN — INSULIN LISPRO 12 UNITS: 100 INJECTION, SOLUTION INTRAVENOUS; SUBCUTANEOUS at 01:35

## 2024-08-17 RX ADMIN — GABAPENTIN 200 MG: 100 CAPSULE ORAL at 15:13

## 2024-08-17 RX ADMIN — KETOROLAC TROMETHAMINE 15 MG: 30 INJECTION, SOLUTION INTRAMUSCULAR at 22:43

## 2024-08-17 RX ADMIN — POTASSIUM CHLORIDE 20 MEQ: 400 INJECTION, SOLUTION INTRAVENOUS at 10:21

## 2024-08-17 RX ADMIN — CALCIUM GLUCONATE: 98 INJECTION, SOLUTION INTRAVENOUS at 18:11

## 2024-08-17 RX ADMIN — ACETAMINOPHEN 650 MG: 650 SOLUTION ORAL at 17:24

## 2024-08-17 RX ADMIN — POTASSIUM CHLORIDE 20 MEQ: 400 INJECTION, SOLUTION INTRAVENOUS at 09:18

## 2024-08-17 RX ADMIN — DOCUSATE SODIUM 100 MG: 50 LIQUID ORAL at 09:19

## 2024-08-17 RX ADMIN — SODIUM CHLORIDE, PRESERVATIVE FREE 10 ML: 5 INJECTION INTRAVENOUS at 20:11

## 2024-08-17 RX ADMIN — ATORVASTATIN CALCIUM 20 MG: 20 TABLET, FILM COATED ORAL at 20:10

## 2024-08-17 RX ADMIN — CETIRIZINE HYDROCHLORIDE 10 MG: 10 TABLET, FILM COATED ORAL at 10:20

## 2024-08-17 RX ADMIN — FINASTERIDE 5 MG: 5 TABLET, FILM COATED ORAL at 09:12

## 2024-08-17 RX ADMIN — POTASSIUM CHLORIDE 20 MEQ: 29.8 INJECTION, SOLUTION INTRAVENOUS at 18:30

## 2024-08-17 RX ADMIN — MONTELUKAST 10 MG: 10 TABLET, FILM COATED ORAL at 20:11

## 2024-08-17 RX ADMIN — POTASSIUM CHLORIDE 20 MEQ: 400 INJECTION, SOLUTION INTRAVENOUS at 11:27

## 2024-08-17 RX ADMIN — SODIUM CHLORIDE, PRESERVATIVE FREE 40 MG: 5 INJECTION INTRAVENOUS at 16:01

## 2024-08-17 RX ADMIN — KETOROLAC TROMETHAMINE 15 MG: 30 INJECTION, SOLUTION INTRAMUSCULAR at 17:25

## 2024-08-17 RX ADMIN — PIPERACILLIN AND TAZOBACTAM 3375 MG: 3; .375 INJECTION, POWDER, LYOPHILIZED, FOR SOLUTION INTRAVENOUS at 09:16

## 2024-08-17 RX ADMIN — ONDANSETRON 4 MG: 2 INJECTION INTRAMUSCULAR; INTRAVENOUS at 06:15

## 2024-08-17 RX ADMIN — KETOROLAC TROMETHAMINE 15 MG: 30 INJECTION, SOLUTION INTRAMUSCULAR at 01:34

## 2024-08-17 RX ADMIN — GABAPENTIN 200 MG: 100 CAPSULE ORAL at 20:11

## 2024-08-17 RX ADMIN — GABAPENTIN 200 MG: 100 CAPSULE ORAL at 09:12

## 2024-08-17 RX ADMIN — INSULIN LISPRO 8 UNITS: 100 INJECTION, SOLUTION INTRAVENOUS; SUBCUTANEOUS at 18:26

## 2024-08-17 RX ADMIN — POTASSIUM CHLORIDE 20 MEQ: 400 INJECTION, SOLUTION INTRAVENOUS at 12:00

## 2024-08-17 RX ADMIN — INSULIN LISPRO 8 UNITS: 100 INJECTION, SOLUTION INTRAVENOUS; SUBCUTANEOUS at 22:43

## 2024-08-17 RX ADMIN — SODIUM CHLORIDE, PRESERVATIVE FREE 40 MG: 5 INJECTION INTRAVENOUS at 02:56

## 2024-08-17 RX ADMIN — OXYCODONE HYDROCHLORIDE 5 MG: 5 TABLET ORAL at 15:13

## 2024-08-17 RX ADMIN — ENOXAPARIN SODIUM 40 MG: 100 INJECTION SUBCUTANEOUS at 09:19

## 2024-08-17 RX ADMIN — INSULIN LISPRO 12 UNITS: 100 INJECTION, SOLUTION INTRAVENOUS; SUBCUTANEOUS at 11:58

## 2024-08-17 RX ADMIN — PIPERACILLIN AND TAZOBACTAM 3375 MG: 3; .375 INJECTION, POWDER, LYOPHILIZED, FOR SOLUTION INTRAVENOUS at 02:54

## 2024-08-17 RX ADMIN — ENOXAPARIN SODIUM 30 MG: 100 INJECTION SUBCUTANEOUS at 20:10

## 2024-08-17 RX ADMIN — PIPERACILLIN AND TAZOBACTAM 3375 MG: 3; .375 INJECTION, POWDER, LYOPHILIZED, FOR SOLUTION INTRAVENOUS at 17:19

## 2024-08-17 RX ADMIN — POTASSIUM CHLORIDE 20 MEQ: 29.8 INJECTION, SOLUTION INTRAVENOUS at 20:08

## 2024-08-17 RX ADMIN — KETOROLAC TROMETHAMINE 15 MG: 30 INJECTION, SOLUTION INTRAMUSCULAR at 11:26

## 2024-08-17 RX ADMIN — INSULIN LISPRO 12 UNITS: 100 INJECTION, SOLUTION INTRAVENOUS; SUBCUTANEOUS at 05:15

## 2024-08-17 RX ADMIN — KETOROLAC TROMETHAMINE 15 MG: 30 INJECTION, SOLUTION INTRAMUSCULAR at 06:15

## 2024-08-17 RX ADMIN — Medication 5 MG: at 22:43

## 2024-08-17 RX ADMIN — INSULIN LISPRO 12 UNITS: 100 INJECTION, SOLUTION INTRAVENOUS; SUBCUTANEOUS at 16:14

## 2024-08-17 RX ADMIN — INSULIN GLARGINE 15 UNITS: 100 INJECTION, SOLUTION SUBCUTANEOUS at 20:11

## 2024-08-17 RX ADMIN — SODIUM PHOSPHATE, MONOBASIC, MONOHYDRATE AND SODIUM PHOSPHATE, DIBASIC, ANHYDROUS 30 MMOL: 142; 276 INJECTION, SOLUTION INTRAVENOUS at 13:19

## 2024-08-17 ASSESSMENT — PAIN SCALES - GENERAL
PAINLEVEL_OUTOF10: 6
PAINLEVEL_OUTOF10: 0
PAINLEVEL_OUTOF10: 0
PAINLEVEL_OUTOF10: 4
PAINLEVEL_OUTOF10: 5

## 2024-08-17 ASSESSMENT — PAIN - FUNCTIONAL ASSESSMENT
PAIN_FUNCTIONAL_ASSESSMENT: PREVENTS OR INTERFERES SOME ACTIVE ACTIVITIES AND ADLS

## 2024-08-17 ASSESSMENT — PAIN DESCRIPTION - DESCRIPTORS
DESCRIPTORS: ACHING
DESCRIPTORS: ACHING;DISCOMFORT;SORE

## 2024-08-17 ASSESSMENT — PAIN DESCRIPTION - LOCATION
LOCATION: ABDOMEN;BACK
LOCATION: ABDOMEN;INCISION
LOCATION: ABDOMEN;INCISION
LOCATION: BACK

## 2024-08-17 ASSESSMENT — PAIN DESCRIPTION - ORIENTATION
ORIENTATION: MID
ORIENTATION: MID
ORIENTATION: LOWER

## 2024-08-17 ASSESSMENT — PAIN SCALES - WONG BAKER: WONGBAKER_NUMERICALRESPONSE: NO HURT

## 2024-08-17 NOTE — PROGRESS NOTES
HEPATOBILIARY  SURGERY  DAILY PROGRESS NOTE  8/17/2024        Subjective:  Patient states that his pain is controlled.  He did have emesis yesterday which was large-volume which did require a NG tube placement.  His NG tube is bilious    Objective:  BP (!) 141/81   Pulse 84   Temp 97.5 °F (36.4 °C) (Temporal)   Resp 18   Ht 1.88 m (6' 2\")   Wt 104 kg (229 lb 4.5 oz)   SpO2 96%   BMI 29.44 kg/m²     GENERAL:  Lying in bed. No acute distress   HEAD: No gross abnormalities   EYES: scleral icterus   LUNGS:  No increased work of breathing  CARDIOVASCULAR:  RR, normotensive   ABDOMEN:  Soft, moderately distended, appropriately tender around midline incision. L SHARON with 30cc serosanguinous output. R SHARON with 50cc of serous bile tinged output. Jasmine w edilia colored urine   EXTREMITIES: No edema or swelling  SKIN: Warm and dry    Assessment/Plan:  79 y.o. male with obstructive jaundice 2/2 pancreatic head adenocarcinoma with duodenal obstruction s/p whipple procedure w portal vein reconstruction 8/14     Will take the right SHARON drain and place it to a accordion drain.  Check drain amylase neck step serum amylase  -Continue to follow the Whipple pathway with the exception of IV fluids, TPN and diet  Patient has been on DVT prophylaxis since surgery.  Continue TPN  Continue NG tube, okay to clamp NG tube when ambulating then placed back to suction  Restart metoprolol  Patient likely will need a Jasmine catheter if he is unable to urinate  Aggressively replace electrolytes and recheck.  Goal is magnesium 2, phosphorus 4  Continue Zosyn  CT scan with IV and oral contrast through the NG tube tomorrow    Electronically signed by Demetrius Santoyo MD on 8/17/2024 at 9:46 AM

## 2024-08-17 NOTE — PROGRESS NOTES
OC notified that patient was unable to void and bladder scan shows 381 ml. Patient has been straight cathed 2 times today already. OC wants patient to be straight cathed again now, and if unable to void in in 5-6 hours insert riojas.

## 2024-08-17 NOTE — PLAN OF CARE
Problem: Discharge Planning  Goal: Discharge to home or other facility with appropriate resources  8/17/2024 1038 by Elen Echeverria RN  Outcome: Progressing  8/16/2024 2133 by Sylvia Garcia RN  Outcome: Progressing     Problem: Safety - Adult  Goal: Free from fall injury  8/17/2024 1038 by Elen Echeverria RN  Outcome: Progressing  8/16/2024 2133 by Sylvia Garcia RN  Outcome: Progressing     Problem: Pain  Goal: Verbalizes/displays adequate comfort level or baseline comfort level  8/17/2024 1038 by Elen Echeverria RN  Outcome: Progressing  8/16/2024 2133 by Sylvia Garcia RN  Outcome: Progressing     Problem: Skin/Tissue Integrity  Goal: Absence of new skin breakdown  Description: 1.  Monitor for areas of redness and/or skin breakdown  2.  Assess vascular access sites hourly  3.  Every 4-6 hours minimum:  Change oxygen saturation probe site  4.  Every 4-6 hours:  If on nasal continuous positive airway pressure, respiratory therapy assess nares and determine need for appliance change or resting period.  8/17/2024 1038 by Elen Echeverria RN  Outcome: Progressing  8/16/2024 2133 by Sylvia Garcia RN  Outcome: Progressing     Problem: ABCDS Injury Assessment  Goal: Absence of physical injury  8/17/2024 1038 by Elen Echeverria RN  Outcome: Progressing  8/16/2024 2133 by Sylvia Garcia RN  Outcome: Progressing     Problem: Neurosensory - Adult  Goal: Achieves stable or improved neurological status  8/17/2024 1038 by Elen Echeverria RN  Outcome: Progressing  8/16/2024 2133 by Sylvia Garcia RN  Outcome: Progressing  Goal: Absence of seizures  8/17/2024 1038 by Elen Echeverria RN  Outcome: Progressing  8/16/2024 2133 by Sylvia Garcia RN  Outcome: Progressing  Goal: Remains free of injury related to seizures activity  8/17/2024 1038 by Elen Echeverria RN  Outcome: Progressing  8/16/2024 2133 by Sylvia Garcia RN  Outcome: Progressing  Goal:  Achieves maximal functionality and self care  8/17/2024 1038 by Elen Echeverria RN  Outcome: Progressing  8/16/2024 2133 by Sylvia Garcia RN  Outcome: Progressing     Problem: Respiratory - Adult  Goal: Achieves optimal ventilation and oxygenation  8/17/2024 1038 by Elen Echeverria RN  Outcome: Progressing  8/16/2024 2133 by Sylvia Garcia RN  Outcome: Progressing     Problem: Cardiovascular - Adult  Goal: Maintains optimal cardiac output and hemodynamic stability  Outcome: Progressing  Goal: Absence of cardiac dysrhythmias or at baseline  Outcome: Progressing     Problem: Skin/Tissue Integrity - Adult  Goal: Skin integrity remains intact  Outcome: Progressing  Goal: Incisions, wounds, or drain sites healing without S/S of infection  Outcome: Progressing     Problem: Musculoskeletal - Adult  Goal: Return mobility to safest level of function  Outcome: Progressing     Problem: Gastrointestinal - Adult  Goal: Minimal or absence of nausea and vomiting  Outcome: Progressing  Goal: Maintains or returns to baseline bowel function  Outcome: Progressing     Problem: Genitourinary - Adult  Goal: Absence of urinary retention  Outcome: Progressing  Goal: Urinary catheter remains patent  Outcome: Progressing     Problem: Infection - Adult  Goal: Absence of infection at discharge  Outcome: Progressing  Goal: Absence of infection during hospitalization  Outcome: Progressing  Goal: Absence of fever/infection during anticipated neutropenic period  Outcome: Progressing     Problem: Metabolic/Fluid and Electrolytes - Adult  Goal: Electrolytes maintained within normal limits  Outcome: Progressing  Goal: Hemodynamic stability and optimal renal function maintained  Outcome: Progressing  Goal: Glucose maintained within prescribed range  Outcome: Progressing     Problem: Hematologic - Adult  Goal: Maintains hematologic stability  Outcome: Progressing     Problem: Chronic Conditions and Co-morbidities  Goal: Patient's

## 2024-08-17 NOTE — PROGRESS NOTES
Comprehensive Nutrition Assessment    Type and Reason for Visit:  Initial, Consult (TPN)    Nutrition Recommendations/Plan:     Continue NPO, Modify Parenteral Nutrition/ Order at HALF dose d/t high re-feeding risk.    Current K trending down 3.2 & Phos 2.1 - monitor/ replace prior to advancing PN  Noted TG elevated 271 on today's draw    Goal Rec: Custom 3-in-1 (1800 ml tv @ 75 ml/hr)   to provide 125 gm AA, 350 gm dextrose, 51 gm lipid, & 2200 total kcals    Regimen once at goal will meet 100% est calorie & protein needs       Malnutrition Assessment:  Malnutrition Status:  Moderate malnutrition (08/17/24 1118)    Context:  Acute Illness     Findings of the 6 clinical characteristics of malnutrition:  Energy Intake:  50% or less of estimated energy requirements for 5 or more days  Weight Loss:   (mild wt loss - 3.2% x 1 month)     Body Fat Loss:  Mild body fat loss Orbital   Muscle Mass Loss:  Mild muscle mass loss Temples (temporalis), Clavicles (pectoralis & deltoids)  Fluid Accumulation:  No significant fluid accumulation    Strength:  Not Performed    Nutrition Assessment:    Pt admit w/ abd pain x 3 weeks found to have pancreatic head mass/ adenocarcinoma & obstructive jaundice now s/p whipple 8/14. PMHx DM, Prostate CA, & Deafness (reads lips). Pt meets criteris for Moderate Malnutriton. Current NPO status d/t large volume emesis w/ CLD trial. PN initiated, will provide updated TPN recs & monitor.    Nutrition Related Findings:    Pt A&Ox4 (currently resting), noted deaf status/ reads lips, +I/O's 4L, abd distention s/p whipple, emesis post procedure, NGT LIS, TPN running- hypokalemia/hypophosphatemia, elevated LFTs/ Bili 4.8, hyperglycemia/ A1C 8.0 Wound Type: Surgical Incision (MLI)       Current Nutrition Intake & Therapies:    Average Meal Intake: NPO (emesis w/ CLD)     Current Parenteral Nutrition Orders:  Type and Formula: Premix Central (running)   Duration: Continuous  Rate/Volume: current 2000  ml premix running, plans to switch to 3-in-1 next bag tonight  Goal PN Orders Provides: Central Standard 3-in-1 2000 ml tv @ 83.3 ml/hr to provide 100gm AA & 2070 kcals    Anthropometric Measures:  Height: 188 cm (6' 2\")  Ideal Body Weight (IBW): 190 lbs (86 kg)    Admission Body Weight: 96.2 kg (212 lb 1.3 oz) (8/15 first measured)  Current Body Weight: 96.2 kg (212 lb 1.3 oz) (8/15 adm wt as CBW elevated d/t +fluids), 111.6 % IBW.    Current BMI (kg/m2): 27.2  Usual Body Weight: 99.3 kg (219 lb) (7/31/24 EMR measured wt x ~1 mon ago. Noted 221lb office visit 4/2023 for long term hx)  % Weight Change (Calculated): -3.2 wt loss x 1 mon                    BMI Categories: Overweight (BMI 25.0-29.9)    Estimated Daily Nutrient Needs:  Energy Requirements Based On: Formula  Weight Used for Energy Requirements: Admission  Energy (kcal/day): MSJ 1756 x 1.3 SF= 1898-5372  Weight Used for Protein Requirements: Ideal  Protein (g/day): 1.4-1.6 g/kg IBW; 120-140. Monitor LFTs/ Bili trends  Method Used for Fluid Requirements: 1 ml/kcal  Fluid (ml/day): 7544-8771    Nutrition Diagnosis:   Moderate malnutrition, In context of acute illness or injury related to altered GI function (newly found pancreatic head mass) as evidenced by poor intake prior to admission, mild loss of subcutaneous fat, mild muscle loss, weight loss (3.2% x 1 month)    Nutrition Interventions:   Nutrition Education/Counseling: Education not appropriate  Coordination of Nutrition Care: Continue to monitor while inpatient, Coordination of Care  Plan of Care discussed with: TPN & Re-feeding risk d/w pharmacy    Goals:  Goals: Tolerate nutrition support at goal rate      Nutrition Monitoring and Evaluation:   Food/Nutrient Intake Outcomes: Parenteral Nutrition Intake/Tolerance  Physical Signs/Symptoms Outcomes: Skin, Nutrition Focused Physical Findings, Biochemical Data, Weight, GI Status, Nausea or Vomiting, Fluid Status or Edema    Discharge Planning:    Too

## 2024-08-17 NOTE — PROGRESS NOTES
Msg to doctor re: new potassium orders.     Per dr sher: ok to give 2 bags of potassium 20 IV for repeat K of 3.7

## 2024-08-17 NOTE — PLAN OF CARE
Problem: Discharge Planning  Goal: Discharge to home or other facility with appropriate resources  Outcome: Progressing     Problem: Safety - Adult  Goal: Free from fall injury  Outcome: Progressing     Problem: Pain  Goal: Verbalizes/displays adequate comfort level or baseline comfort level  Outcome: Progressing     Problem: Skin/Tissue Integrity  Goal: Absence of new skin breakdown  Description: 1.  Monitor for areas of redness and/or skin breakdown  2.  Assess vascular access sites hourly  3.  Every 4-6 hours minimum:  Change oxygen saturation probe site  4.  Every 4-6 hours:  If on nasal continuous positive airway pressure, respiratory therapy assess nares and determine need for appliance change or resting period.  Outcome: Progressing     Problem: ABCDS Injury Assessment  Goal: Absence of physical injury  Outcome: Progressing     Problem: Neurosensory - Adult  Goal: Achieves stable or improved neurological status  Outcome: Progressing     Problem: Neurosensory - Adult  Goal: Absence of seizures  Outcome: Progressing

## 2024-08-18 ENCOUNTER — APPOINTMENT (OUTPATIENT)
Dept: CT IMAGING | Age: 79
End: 2024-08-18
Attending: TRANSPLANT SURGERY
Payer: MEDICARE

## 2024-08-18 ENCOUNTER — APPOINTMENT (OUTPATIENT)
Dept: GENERAL RADIOLOGY | Age: 79
End: 2024-08-18
Attending: TRANSPLANT SURGERY
Payer: MEDICARE

## 2024-08-18 LAB
ABO/RH: NORMAL
ALBUMIN SERPL-MCNC: 2.5 G/DL (ref 3.5–5.2)
ALP SERPL-CCNC: 146 U/L (ref 40–129)
ALT SERPL-CCNC: 113 U/L (ref 0–40)
ANION GAP SERPL CALCULATED.3IONS-SCNC: 9 MMOL/L (ref 7–16)
ANTIBODY SCREEN: NEGATIVE
ARM BAND NUMBER: NORMAL
AST SERPL-CCNC: 34 U/L (ref 0–39)
BASOPHILS # BLD: 0 K/UL (ref 0–0.2)
BASOPHILS NFR BLD: 0 % (ref 0–2)
BILIRUB SERPL-MCNC: 3.7 MG/DL (ref 0–1.2)
BLOOD BANK DISPENSE STATUS: NORMAL
BLOOD BANK SAMPLE EXPIRATION: NORMAL
BPU ID: NORMAL
BUN SERPL-MCNC: 14 MG/DL (ref 6–23)
CA-I BLD-SCNC: 1.16 MMOL/L (ref 1.15–1.33)
CALCIUM SERPL-MCNC: 8.4 MG/DL (ref 8.6–10.2)
CHLORIDE SERPL-SCNC: 110 MMOL/L (ref 98–107)
CO2 SERPL-SCNC: 24 MMOL/L (ref 22–29)
COMPONENT: NORMAL
CREAT SERPL-MCNC: 0.7 MG/DL (ref 0.7–1.2)
CROSSMATCH RESULT: NORMAL
EOSINOPHIL # BLD: 0.12 K/UL (ref 0.05–0.5)
EOSINOPHILS RELATIVE PERCENT: 2 % (ref 0–6)
ERYTHROCYTE [DISTWIDTH] IN BLOOD BY AUTOMATED COUNT: 12.7 % (ref 11.5–15)
GFR, ESTIMATED: >90 ML/MIN/1.73M2
GLUCOSE BLD-MCNC: 170 MG/DL (ref 74–99)
GLUCOSE BLD-MCNC: 217 MG/DL (ref 74–99)
GLUCOSE BLD-MCNC: 276 MG/DL (ref 74–99)
GLUCOSE BLD-MCNC: 281 MG/DL (ref 74–99)
GLUCOSE BLD-MCNC: 289 MG/DL (ref 74–99)
GLUCOSE BLD-MCNC: 307 MG/DL (ref 74–99)
GLUCOSE SERPL-MCNC: 238 MG/DL (ref 74–99)
HCT VFR BLD AUTO: 22.9 % (ref 37–54)
HGB BLD-MCNC: 7.7 G/DL (ref 12.5–16.5)
LYMPHOCYTES NFR BLD: 0.06 K/UL (ref 1.5–4)
LYMPHOCYTES RELATIVE PERCENT: 1 % (ref 20–42)
MAGNESIUM SERPL-MCNC: 2.1 MG/DL (ref 1.6–2.6)
MCH RBC QN AUTO: 33.6 PG (ref 26–35)
MCHC RBC AUTO-ENTMCNC: 33.6 G/DL (ref 32–34.5)
MCV RBC AUTO: 100 FL (ref 80–99.9)
METAMYELOCYTES ABSOLUTE COUNT: 0.06 K/UL (ref 0–0.12)
METAMYELOCYTES: 1 % (ref 0–1)
MICROORGANISM SPEC CULT: NORMAL
MICROORGANISM/AGENT SPEC: NORMAL
MONOCYTES NFR BLD: 0.48 K/UL (ref 0.1–0.95)
MONOCYTES NFR BLD: 7 % (ref 2–12)
MYELOCYTES ABSOLUTE COUNT: 0.06 K/UL
MYELOCYTES: 1 %
NEUTROPHILS NFR BLD: 89 % (ref 43–80)
NEUTS SEG NFR BLD: 6.12 K/UL (ref 1.8–7.3)
PHOSPHATE SERPL-MCNC: 2.7 MG/DL (ref 2.5–4.5)
PLATELET # BLD AUTO: 216 K/UL (ref 130–450)
PMV BLD AUTO: 10.1 FL (ref 7–12)
POTASSIUM SERPL-SCNC: 3.4 MMOL/L (ref 3.5–5)
PROT SERPL-MCNC: 5.1 G/DL (ref 6.4–8.3)
RBC # BLD AUTO: 2.29 M/UL (ref 3.8–5.8)
RBC # BLD: ABNORMAL 10*6/UL
RBC # BLD: ABNORMAL 10*6/UL
SODIUM SERPL-SCNC: 143 MMOL/L (ref 132–146)
SPECIMEN DESCRIPTION: NORMAL
TRANSFUSION STATUS: NORMAL
UNIT DIVISION: 0
WBC OTHER # BLD: 6.9 K/UL (ref 4.5–11.5)

## 2024-08-18 PROCEDURE — 2500000003 HC RX 250 WO HCPCS

## 2024-08-18 PROCEDURE — 85025 COMPLETE CBC W/AUTO DIFF WBC: CPT

## 2024-08-18 PROCEDURE — 2500000003 HC RX 250 WO HCPCS: Performed by: STUDENT IN AN ORGANIZED HEALTH CARE EDUCATION/TRAINING PROGRAM

## 2024-08-18 PROCEDURE — 74177 CT ABD & PELVIS W/CONTRAST: CPT

## 2024-08-18 PROCEDURE — 6360000002 HC RX W HCPCS: Performed by: STUDENT IN AN ORGANIZED HEALTH CARE EDUCATION/TRAINING PROGRAM

## 2024-08-18 PROCEDURE — 74018 RADEX ABDOMEN 1 VIEW: CPT

## 2024-08-18 PROCEDURE — 2580000003 HC RX 258: Performed by: RADIOLOGY

## 2024-08-18 PROCEDURE — 6360000002 HC RX W HCPCS

## 2024-08-18 PROCEDURE — 6360000004 HC RX CONTRAST MEDICATION: Performed by: RADIOLOGY

## 2024-08-18 PROCEDURE — 6370000000 HC RX 637 (ALT 250 FOR IP): Performed by: STUDENT IN AN ORGANIZED HEALTH CARE EDUCATION/TRAINING PROGRAM

## 2024-08-18 PROCEDURE — 2580000003 HC RX 258: Performed by: TRANSPLANT SURGERY

## 2024-08-18 PROCEDURE — 2580000003 HC RX 258: Performed by: STUDENT IN AN ORGANIZED HEALTH CARE EDUCATION/TRAINING PROGRAM

## 2024-08-18 PROCEDURE — 6360000002 HC RX W HCPCS: Performed by: TRANSPLANT SURGERY

## 2024-08-18 PROCEDURE — 84100 ASSAY OF PHOSPHORUS: CPT

## 2024-08-18 PROCEDURE — 2140000000 HC CCU INTERMEDIATE R&B

## 2024-08-18 PROCEDURE — 83735 ASSAY OF MAGNESIUM: CPT

## 2024-08-18 PROCEDURE — 6370000000 HC RX 637 (ALT 250 FOR IP)

## 2024-08-18 PROCEDURE — 2700000000 HC OXYGEN THERAPY PER DAY

## 2024-08-18 PROCEDURE — 82962 GLUCOSE BLOOD TEST: CPT

## 2024-08-18 PROCEDURE — 2500000003 HC RX 250 WO HCPCS: Performed by: TRANSPLANT SURGERY

## 2024-08-18 PROCEDURE — 80053 COMPREHEN METABOLIC PANEL: CPT

## 2024-08-18 PROCEDURE — 82330 ASSAY OF CALCIUM: CPT

## 2024-08-18 PROCEDURE — 6370000000 HC RX 637 (ALT 250 FOR IP): Performed by: TRANSPLANT SURGERY

## 2024-08-18 PROCEDURE — 2580000003 HC RX 258

## 2024-08-18 RX ORDER — ACETAMINOPHEN 500 MG
500 TABLET ORAL EVERY 6 HOURS SCHEDULED
Status: DISCONTINUED | OUTPATIENT
Start: 2024-08-18 | End: 2024-08-30

## 2024-08-18 RX ORDER — BISACODYL 10 MG
10 SUPPOSITORY, RECTAL RECTAL ONCE
Status: COMPLETED | OUTPATIENT
Start: 2024-08-18 | End: 2024-08-18

## 2024-08-18 RX ORDER — SODIUM CHLORIDE 0.9 % (FLUSH) 0.9 %
10 SYRINGE (ML) INJECTION PRN
Status: DISCONTINUED | OUTPATIENT
Start: 2024-08-18 | End: 2024-09-07 | Stop reason: HOSPADM

## 2024-08-18 RX ORDER — METOCLOPRAMIDE HYDROCHLORIDE 5 MG/ML
10 INJECTION INTRAMUSCULAR; INTRAVENOUS EVERY 6 HOURS
Status: DISCONTINUED | OUTPATIENT
Start: 2024-08-18 | End: 2024-09-07 | Stop reason: HOSPADM

## 2024-08-18 RX ORDER — POTASSIUM CHLORIDE 29.8 MG/ML
20 INJECTION INTRAVENOUS
Status: COMPLETED | OUTPATIENT
Start: 2024-08-18 | End: 2024-08-18

## 2024-08-18 RX ORDER — IOPAMIDOL 755 MG/ML
18 INJECTION, SOLUTION INTRAVASCULAR
Status: COMPLETED | OUTPATIENT
Start: 2024-08-18 | End: 2024-08-18

## 2024-08-18 RX ORDER — PROCHLORPERAZINE EDISYLATE 5 MG/ML
10 INJECTION INTRAMUSCULAR; INTRAVENOUS ONCE
Status: COMPLETED | OUTPATIENT
Start: 2024-08-18 | End: 2024-08-18

## 2024-08-18 RX ORDER — IOPAMIDOL 755 MG/ML
75 INJECTION, SOLUTION INTRAVASCULAR
Status: COMPLETED | OUTPATIENT
Start: 2024-08-18 | End: 2024-08-18

## 2024-08-18 RX ADMIN — ONDANSETRON 4 MG: 2 INJECTION INTRAMUSCULAR; INTRAVENOUS at 08:30

## 2024-08-18 RX ADMIN — KETOROLAC TROMETHAMINE 15 MG: 30 INJECTION, SOLUTION INTRAMUSCULAR at 11:35

## 2024-08-18 RX ADMIN — INSULIN LISPRO 12 UNITS: 100 INJECTION, SOLUTION INTRAVENOUS; SUBCUTANEOUS at 09:29

## 2024-08-18 RX ADMIN — POTASSIUM CHLORIDE 20 MEQ: 29.8 INJECTION, SOLUTION INTRAVENOUS at 11:37

## 2024-08-18 RX ADMIN — SODIUM CHLORIDE, PRESERVATIVE FREE 40 MG: 5 INJECTION INTRAVENOUS at 14:50

## 2024-08-18 RX ADMIN — INSULIN LISPRO 12 UNITS: 100 INJECTION, SOLUTION INTRAVENOUS; SUBCUTANEOUS at 04:10

## 2024-08-18 RX ADMIN — CALCIUM GLUCONATE: 98 INJECTION, SOLUTION INTRAVENOUS at 17:57

## 2024-08-18 RX ADMIN — POTASSIUM CHLORIDE 20 MEQ: 29.8 INJECTION, SOLUTION INTRAVENOUS at 10:52

## 2024-08-18 RX ADMIN — Medication 5 MG: at 21:45

## 2024-08-18 RX ADMIN — POTASSIUM PHOSPHATE, MONOBASIC AND POTASSIUM PHOSPHATE, DIBASIC 30 MMOL: 224; 236 INJECTION, SOLUTION, CONCENTRATE INTRAVENOUS at 09:11

## 2024-08-18 RX ADMIN — METOCLOPRAMIDE 10 MG: 5 INJECTION, SOLUTION INTRAMUSCULAR; INTRAVENOUS at 17:03

## 2024-08-18 RX ADMIN — KETOROLAC TROMETHAMINE 15 MG: 30 INJECTION, SOLUTION INTRAMUSCULAR at 06:23

## 2024-08-18 RX ADMIN — SODIUM PHOSPHATE, MONOBASIC, MONOHYDRATE AND SODIUM PHOSPHATE, DIBASIC, ANHYDROUS 30 MMOL: 142; 276 INJECTION, SOLUTION INTRAVENOUS at 01:54

## 2024-08-18 RX ADMIN — SODIUM CHLORIDE, PRESERVATIVE FREE 40 MG: 5 INJECTION INTRAVENOUS at 04:10

## 2024-08-18 RX ADMIN — INSULIN LISPRO 12 UNITS: 100 INJECTION, SOLUTION INTRAVENOUS; SUBCUTANEOUS at 11:34

## 2024-08-18 RX ADMIN — POTASSIUM CHLORIDE 20 MEQ: 29.8 INJECTION, SOLUTION INTRAVENOUS at 09:13

## 2024-08-18 RX ADMIN — METOCLOPRAMIDE 10 MG: 5 INJECTION, SOLUTION INTRAMUSCULAR; INTRAVENOUS at 21:45

## 2024-08-18 RX ADMIN — ONDANSETRON 4 MG: 2 INJECTION INTRAMUSCULAR; INTRAVENOUS at 14:51

## 2024-08-18 RX ADMIN — INSULIN LISPRO 12 UNITS: 100 INJECTION, SOLUTION INTRAVENOUS; SUBCUTANEOUS at 15:02

## 2024-08-18 RX ADMIN — IOPAMIDOL 75 ML: 755 INJECTION, SOLUTION INTRAVENOUS at 10:19

## 2024-08-18 RX ADMIN — KETOROLAC TROMETHAMINE 15 MG: 30 INJECTION, SOLUTION INTRAMUSCULAR at 17:03

## 2024-08-18 RX ADMIN — PROCHLORPERAZINE EDISYLATE 10 MG: 5 INJECTION INTRAMUSCULAR; INTRAVENOUS at 08:58

## 2024-08-18 RX ADMIN — PIPERACILLIN AND TAZOBACTAM 3375 MG: 3; .375 INJECTION, POWDER, LYOPHILIZED, FOR SOLUTION INTRAVENOUS at 01:55

## 2024-08-18 RX ADMIN — INSULIN LISPRO 4 UNITS: 100 INJECTION, SOLUTION INTRAVENOUS; SUBCUTANEOUS at 21:45

## 2024-08-18 RX ADMIN — BISACODYL 10 MG: 10 SUPPOSITORY RECTAL at 14:51

## 2024-08-18 RX ADMIN — GABAPENTIN 200 MG: 100 CAPSULE ORAL at 17:03

## 2024-08-18 RX ADMIN — ENOXAPARIN SODIUM 30 MG: 100 INJECTION SUBCUTANEOUS at 21:45

## 2024-08-18 RX ADMIN — ACETAMINOPHEN 500 MG: 500 TABLET ORAL at 17:02

## 2024-08-18 RX ADMIN — Medication 10 ML: at 10:23

## 2024-08-18 RX ADMIN — INSULIN GLARGINE 15 UNITS: 100 INJECTION, SOLUTION SUBCUTANEOUS at 21:45

## 2024-08-18 RX ADMIN — IOPAMIDOL 18 ML: 755 INJECTION, SOLUTION INTRAVENOUS at 08:30

## 2024-08-18 RX ADMIN — INSULIN LISPRO 8 UNITS: 100 INJECTION, SOLUTION INTRAVENOUS; SUBCUTANEOUS at 17:56

## 2024-08-18 RX ADMIN — SODIUM CHLORIDE, PRESERVATIVE FREE 10 ML: 5 INJECTION INTRAVENOUS at 21:47

## 2024-08-18 RX ADMIN — METOCLOPRAMIDE 10 MG: 5 INJECTION, SOLUTION INTRAMUSCULAR; INTRAVENOUS at 11:34

## 2024-08-18 ASSESSMENT — PAIN SCALES - GENERAL
PAINLEVEL_OUTOF10: 5
PAINLEVEL_OUTOF10: 0

## 2024-08-18 ASSESSMENT — PAIN DESCRIPTION - LOCATION: LOCATION: BACK

## 2024-08-18 ASSESSMENT — PAIN DESCRIPTION - DESCRIPTORS: DESCRIPTORS: ACHING;DISCOMFORT;SORE

## 2024-08-18 ASSESSMENT — PAIN DESCRIPTION - ORIENTATION: ORIENTATION: LOWER

## 2024-08-18 ASSESSMENT — PAIN - FUNCTIONAL ASSESSMENT: PAIN_FUNCTIONAL_ASSESSMENT: PREVENTS OR INTERFERES SOME ACTIVE ACTIVITIES AND ADLS

## 2024-08-18 NOTE — PROGRESS NOTES
RN notified Dr. Major via perfect serve of patient having an emesis episode after night time medication administration. During emesis event, patient's NG tube became dislodged. RN reinserted back to the 65cm tiffani.     Per Dr. Pedrito RN to obtain stat X-ray to confirm NG tube placement.     RN notified Dr. Major of good position of NG tube per X-ray impression. Per Dr. Major, RN ok to place NG tube back to low intermittent wall suction and patient ok to continue taking oral medications.

## 2024-08-18 NOTE — PROGRESS NOTES
HEPATOBILIARY  SURGERY  DAILY PROGRESS NOTE  8/18/2024        Subjective:  Patient states that his pain is controlled. He is denying nausea this am. Did have an episode of emesis around his NG overnight. Remains distended, no bowel function    NG 1L bilious   R-215cc SS in accordion tubing   LLQ: 95cc     Objective:  BP (!) 164/81   Pulse 83   Temp 98.1 °F (36.7 °C) (Temporal)   Resp 20   Ht 1.88 m (6' 2\")   Wt 103.2 kg (227 lb 9.6 oz)   SpO2 97%   BMI 29.22 kg/m²     GENERAL:  Lying in bed. No acute distress   HEAD: No gross abnormalities   EYES: scleral icterus   LUNGS:  No increased work of breathing on 2L NC  CARDIOVASCULAR:  RR, normotensive   ABDOMEN:  Soft, distended, appropriately tender around midline incision.  Left SHARON drain serosanguineous, right accordion SS in tubing  EXTREMITIES: No edema or swelling  SKIN: Warm and dry    Assessment/Plan:  79 y.o. male with obstructive jaundice 2/2 pancreatic head adenocarcinoma with duodenal obstruction s/p whipple procedure w portal vein reconstruction 8/14     Continue TPN, NPO  Strict I/O's  Patient has been on DVT prophylaxis since surgery.  Continue NG tube, okay to clamp NG tube when ambulating then placed back to suction  CT w/ IV and PO down the NG today   Restart metoprolol  Jasmine for urinary retention   Aggressively replace electrolytes and recheck.  Goal is magnesium 2, phosphorus 4, K 4  Continue Zosyn    Electronically signed by Sarah Guadalupe MD on 8/18/2024 at 8:36 AM    Patient had emesis again last evening around the tube.  His NG tube did put out 1 L of bilious output.  His afferrent limb is definitely open but I am concerned about the Efferent limb and whether there is a mechanical obstruction versus an ileus.  His colon was distended during the operation.  No evidence of a pancreatic leak  Patient only needs 25% of the oral contrast through the NG tube so we can evaluate his efferent limb  Continue to follow the Whipple

## 2024-08-18 NOTE — PROGRESS NOTES
Dr. Rios notified via perfect serve of patient's NG tube being at 45cm at shift change when previous marking was 65. NG tube advanced to 65cm and stat Xray to confirm placement ordered. Also notified of dayshift nurse stating right side accordion drain had fallen off multiple times during her shift.     Per Dr. Rios, RN to replace drain.     Dr. Rios notified of xray showing good placement of NG tube. NG placed back to low intermittent wall suction.

## 2024-08-18 NOTE — PROGRESS NOTES
RN messaged Dr. Major notifying of patient's request for something to help him sleep.     Orders obtained.

## 2024-08-19 LAB
ALBUMIN SERPL-MCNC: 2.8 G/DL (ref 3.5–5.2)
ALP SERPL-CCNC: 200 U/L (ref 40–129)
ALT SERPL-CCNC: 101 U/L (ref 0–40)
ANION GAP SERPL CALCULATED.3IONS-SCNC: 11 MMOL/L (ref 7–16)
AST SERPL-CCNC: 45 U/L (ref 0–39)
BASOPHILS # BLD: 0 K/UL (ref 0–0.2)
BASOPHILS NFR BLD: 0 % (ref 0–2)
BILIRUB SERPL-MCNC: 3.1 MG/DL (ref 0–1.2)
BUN SERPL-MCNC: 16 MG/DL (ref 6–23)
CA-I BLD-SCNC: 1.22 MMOL/L (ref 1.15–1.33)
CALCIUM SERPL-MCNC: 8.4 MG/DL (ref 8.6–10.2)
CHLORIDE SERPL-SCNC: 111 MMOL/L (ref 98–107)
CO2 SERPL-SCNC: 23 MMOL/L (ref 22–29)
CREAT SERPL-MCNC: 0.6 MG/DL (ref 0.7–1.2)
EOSINOPHIL # BLD: 0 K/UL (ref 0.05–0.5)
EOSINOPHILS RELATIVE PERCENT: 0 % (ref 0–6)
ERYTHROCYTE [DISTWIDTH] IN BLOOD BY AUTOMATED COUNT: 12.8 % (ref 11.5–15)
GFR, ESTIMATED: >90 ML/MIN/1.73M2
GLUCOSE BLD-MCNC: 190 MG/DL (ref 74–99)
GLUCOSE BLD-MCNC: 203 MG/DL (ref 74–99)
GLUCOSE BLD-MCNC: 242 MG/DL (ref 74–99)
GLUCOSE BLD-MCNC: 251 MG/DL (ref 74–99)
GLUCOSE BLD-MCNC: 258 MG/DL (ref 74–99)
GLUCOSE BLD-MCNC: 259 MG/DL (ref 74–99)
GLUCOSE SERPL-MCNC: 195 MG/DL (ref 74–99)
HCT VFR BLD AUTO: 25.2 % (ref 37–54)
HGB BLD-MCNC: 8 G/DL (ref 12.5–16.5)
LYMPHOCYTES NFR BLD: 0.13 K/UL (ref 1.5–4)
LYMPHOCYTES RELATIVE PERCENT: 2 % (ref 20–42)
MAGNESIUM SERPL-MCNC: 2.2 MG/DL (ref 1.6–2.6)
MCH RBC QN AUTO: 32 PG (ref 26–35)
MCHC RBC AUTO-ENTMCNC: 31.7 G/DL (ref 32–34.5)
MCV RBC AUTO: 100.8 FL (ref 80–99.9)
MONOCYTES NFR BLD: 0.78 K/UL (ref 0.1–0.95)
MONOCYTES NFR BLD: 10 % (ref 2–12)
MYELOCYTES ABSOLUTE COUNT: 0.13 K/UL
MYELOCYTES: 2 %
NEUTROPHILS NFR BLD: 86 % (ref 43–80)
NEUTS SEG NFR BLD: 6.46 K/UL (ref 1.8–7.3)
PHOSPHATE SERPL-MCNC: 2.4 MG/DL (ref 2.5–4.5)
PLATELET # BLD AUTO: 271 K/UL (ref 130–450)
PMV BLD AUTO: 10.7 FL (ref 7–12)
POTASSIUM SERPL-SCNC: 4 MMOL/L (ref 3.5–5)
PROT SERPL-MCNC: 5.4 G/DL (ref 6.4–8.3)
RBC # BLD AUTO: 2.5 M/UL (ref 3.8–5.8)
RBC # BLD: ABNORMAL 10*6/UL
RBC # BLD: ABNORMAL 10*6/UL
SODIUM SERPL-SCNC: 145 MMOL/L (ref 132–146)
WBC OTHER # BLD: 7.5 K/UL (ref 4.5–11.5)

## 2024-08-19 PROCEDURE — 6360000002 HC RX W HCPCS: Performed by: TRANSPLANT SURGERY

## 2024-08-19 PROCEDURE — 6370000000 HC RX 637 (ALT 250 FOR IP): Performed by: STUDENT IN AN ORGANIZED HEALTH CARE EDUCATION/TRAINING PROGRAM

## 2024-08-19 PROCEDURE — 6370000000 HC RX 637 (ALT 250 FOR IP)

## 2024-08-19 PROCEDURE — 80053 COMPREHEN METABOLIC PANEL: CPT

## 2024-08-19 PROCEDURE — 85025 COMPLETE CBC W/AUTO DIFF WBC: CPT

## 2024-08-19 PROCEDURE — 6370000000 HC RX 637 (ALT 250 FOR IP): Performed by: TRANSPLANT SURGERY

## 2024-08-19 PROCEDURE — 82330 ASSAY OF CALCIUM: CPT

## 2024-08-19 PROCEDURE — 82962 GLUCOSE BLOOD TEST: CPT

## 2024-08-19 PROCEDURE — 2500000003 HC RX 250 WO HCPCS: Performed by: STUDENT IN AN ORGANIZED HEALTH CARE EDUCATION/TRAINING PROGRAM

## 2024-08-19 PROCEDURE — 84100 ASSAY OF PHOSPHORUS: CPT

## 2024-08-19 PROCEDURE — 2580000003 HC RX 258: Performed by: STUDENT IN AN ORGANIZED HEALTH CARE EDUCATION/TRAINING PROGRAM

## 2024-08-19 PROCEDURE — 2140000000 HC CCU INTERMEDIATE R&B

## 2024-08-19 PROCEDURE — 6360000002 HC RX W HCPCS

## 2024-08-19 PROCEDURE — 2700000000 HC OXYGEN THERAPY PER DAY

## 2024-08-19 PROCEDURE — 83735 ASSAY OF MAGNESIUM: CPT

## 2024-08-19 PROCEDURE — 6360000002 HC RX W HCPCS: Performed by: STUDENT IN AN ORGANIZED HEALTH CARE EDUCATION/TRAINING PROGRAM

## 2024-08-19 RX ORDER — BISACODYL 10 MG
10 SUPPOSITORY, RECTAL RECTAL ONCE
Status: COMPLETED | OUTPATIENT
Start: 2024-08-19 | End: 2024-08-19

## 2024-08-19 RX ORDER — HYDRALAZINE HYDROCHLORIDE 20 MG/ML
10 INJECTION INTRAMUSCULAR; INTRAVENOUS EVERY 30 MIN PRN
Status: DISCONTINUED | OUTPATIENT
Start: 2024-08-19 | End: 2024-09-07 | Stop reason: HOSPADM

## 2024-08-19 RX ORDER — LIDOCAINE HYDROCHLORIDE AND EPINEPHRINE 10; 10 MG/ML; UG/ML
20 INJECTION, SOLUTION INFILTRATION; PERINEURAL ONCE
Status: DISCONTINUED | OUTPATIENT
Start: 2024-08-19 | End: 2024-08-20

## 2024-08-19 RX ORDER — LABETALOL HYDROCHLORIDE 5 MG/ML
10 INJECTION, SOLUTION INTRAVENOUS EVERY 30 MIN PRN
Status: DISCONTINUED | OUTPATIENT
Start: 2024-08-19 | End: 2024-09-07 | Stop reason: HOSPADM

## 2024-08-19 RX ORDER — LABETALOL HYDROCHLORIDE 5 MG/ML
20 INJECTION, SOLUTION INTRAVENOUS EVERY 6 HOURS PRN
Status: DISCONTINUED | OUTPATIENT
Start: 2024-08-19 | End: 2024-08-19

## 2024-08-19 RX ADMIN — SODIUM CHLORIDE, PRESERVATIVE FREE 10 ML: 5 INJECTION INTRAVENOUS at 20:20

## 2024-08-19 RX ADMIN — SODIUM CHLORIDE, PRESERVATIVE FREE 40 MG: 5 INJECTION INTRAVENOUS at 03:15

## 2024-08-19 RX ADMIN — GABAPENTIN 200 MG: 100 CAPSULE ORAL at 14:38

## 2024-08-19 RX ADMIN — CALCIUM GLUCONATE: 98 INJECTION, SOLUTION INTRAVENOUS at 18:17

## 2024-08-19 RX ADMIN — INSULIN LISPRO 4 UNITS: 100 INJECTION, SOLUTION INTRAVENOUS; SUBCUTANEOUS at 23:27

## 2024-08-19 RX ADMIN — ACETAMINOPHEN 500 MG: 500 TABLET ORAL at 17:12

## 2024-08-19 RX ADMIN — ACETAMINOPHEN 500 MG: 500 TABLET ORAL at 11:45

## 2024-08-19 RX ADMIN — ATORVASTATIN CALCIUM 20 MG: 20 TABLET, FILM COATED ORAL at 20:18

## 2024-08-19 RX ADMIN — METOCLOPRAMIDE 10 MG: 5 INJECTION, SOLUTION INTRAMUSCULAR; INTRAVENOUS at 05:57

## 2024-08-19 RX ADMIN — MAGNESIUM SULFATE: 1 CRYSTAL ORAL; TOPICAL at 11:47

## 2024-08-19 RX ADMIN — KETOROLAC TROMETHAMINE 15 MG: 30 INJECTION, SOLUTION INTRAMUSCULAR at 05:57

## 2024-08-19 RX ADMIN — KETOROLAC TROMETHAMINE 15 MG: 30 INJECTION, SOLUTION INTRAMUSCULAR at 23:26

## 2024-08-19 RX ADMIN — METOCLOPRAMIDE 10 MG: 5 INJECTION, SOLUTION INTRAMUSCULAR; INTRAVENOUS at 17:12

## 2024-08-19 RX ADMIN — ONDANSETRON 4 MG: 2 INJECTION INTRAMUSCULAR; INTRAVENOUS at 20:27

## 2024-08-19 RX ADMIN — LABETALOL HYDROCHLORIDE 20 MG: 5 INJECTION INTRAVENOUS at 10:01

## 2024-08-19 RX ADMIN — INSULIN GLARGINE 15 UNITS: 100 INJECTION, SOLUTION SUBCUTANEOUS at 20:20

## 2024-08-19 RX ADMIN — INSULIN LISPRO 12 UNITS: 100 INJECTION, SOLUTION INTRAVENOUS; SUBCUTANEOUS at 03:15

## 2024-08-19 RX ADMIN — Medication 1 ENEMA: at 16:04

## 2024-08-19 RX ADMIN — KETOROLAC TROMETHAMINE 15 MG: 30 INJECTION, SOLUTION INTRAMUSCULAR at 17:12

## 2024-08-19 RX ADMIN — METOCLOPRAMIDE 10 MG: 5 INJECTION, SOLUTION INTRAMUSCULAR; INTRAVENOUS at 10:53

## 2024-08-19 RX ADMIN — INSULIN LISPRO 8 UNITS: 100 INJECTION, SOLUTION INTRAVENOUS; SUBCUTANEOUS at 10:53

## 2024-08-19 RX ADMIN — SODIUM CHLORIDE, PRESERVATIVE FREE 40 MG: 5 INJECTION INTRAVENOUS at 15:07

## 2024-08-19 RX ADMIN — METOCLOPRAMIDE 10 MG: 5 INJECTION, SOLUTION INTRAMUSCULAR; INTRAVENOUS at 23:27

## 2024-08-19 RX ADMIN — INSULIN LISPRO 12 UNITS: 100 INJECTION, SOLUTION INTRAVENOUS; SUBCUTANEOUS at 15:06

## 2024-08-19 RX ADMIN — ENOXAPARIN SODIUM 30 MG: 100 INJECTION SUBCUTANEOUS at 09:08

## 2024-08-19 RX ADMIN — INSULIN LISPRO 8 UNITS: 100 INJECTION, SOLUTION INTRAVENOUS; SUBCUTANEOUS at 06:25

## 2024-08-19 RX ADMIN — KETOROLAC TROMETHAMINE 15 MG: 30 INJECTION, SOLUTION INTRAMUSCULAR at 11:45

## 2024-08-19 RX ADMIN — BISACODYL 10 MG: 10 SUPPOSITORY RECTAL at 09:08

## 2024-08-19 RX ADMIN — SODIUM PHOSPHATE, MONOBASIC, MONOHYDRATE AND SODIUM PHOSPHATE, DIBASIC, ANHYDROUS 30 MMOL: 142; 276 INJECTION, SOLUTION INTRAVENOUS at 10:04

## 2024-08-19 RX ADMIN — MONTELUKAST 10 MG: 10 TABLET, FILM COATED ORAL at 20:19

## 2024-08-19 RX ADMIN — SODIUM CHLORIDE, PRESERVATIVE FREE 10 ML: 5 INJECTION INTRAVENOUS at 09:09

## 2024-08-19 RX ADMIN — Medication 5 MG: at 20:18

## 2024-08-19 RX ADMIN — GABAPENTIN 200 MG: 100 CAPSULE ORAL at 20:18

## 2024-08-19 RX ADMIN — INSULIN LISPRO 12 UNITS: 100 INJECTION, SOLUTION INTRAVENOUS; SUBCUTANEOUS at 20:19

## 2024-08-19 RX ADMIN — ENOXAPARIN SODIUM 30 MG: 100 INJECTION SUBCUTANEOUS at 20:19

## 2024-08-19 RX ADMIN — KETOROLAC TROMETHAMINE 15 MG: 30 INJECTION, SOLUTION INTRAMUSCULAR at 00:47

## 2024-08-19 ASSESSMENT — PAIN DESCRIPTION - LOCATION
LOCATION: BACK
LOCATION: BACK

## 2024-08-19 ASSESSMENT — PAIN SCALES - GENERAL
PAINLEVEL_OUTOF10: 3
PAINLEVEL_OUTOF10: 6
PAINLEVEL_OUTOF10: 5
PAINLEVEL_OUTOF10: 0
PAINLEVEL_OUTOF10: 3
PAINLEVEL_OUTOF10: 3

## 2024-08-19 ASSESSMENT — PAIN DESCRIPTION - ORIENTATION
ORIENTATION: LOWER;MID
ORIENTATION: LOWER

## 2024-08-19 ASSESSMENT — PAIN DESCRIPTION - DESCRIPTORS
DESCRIPTORS: ACHING;DISCOMFORT;SORE
DESCRIPTORS: ACHING;DISCOMFORT;SORE

## 2024-08-19 NOTE — CARE COORDINATION
Transition of care update: S/P whipple procedure with portal vein reconstruction on 08/14. TPN.  NG tube cont. Cont right SHARON drain. Left SHARON drain to be d/c'd.  Phos 2.4 and other labs noted. Enema ordered. Plan is [facility]) when medically ready. Updated Deangelo with [facility] on pt. Ambulette form and EXEMPT completed on 08/16. Transport envelope is with pt's soft chart.  Also, [facility] is following pt. If pt continues on TPN, will ask [facility] to look at pt. Cm/sw will follow.

## 2024-08-19 NOTE — PROGRESS NOTES
Dr. Rios notified via perfect serve of patient refusing almost all of PO medications due to nausea and episodes of emesis.

## 2024-08-19 NOTE — PROGRESS NOTES
Left SHARON drain removed. Minimal serous output from drain site. 1mL of 1% lidocaine was injected around the area. Placed one stitch with 2-0 Silk suture to close the site. Patient tolerated well with no complications.     Asia Major MD  Resident, PGY-1  8/19/2024  3:00 PM

## 2024-08-19 NOTE — PROGRESS NOTES
HEPATOBILIARY AND PANCREATIC SURGERY  DAILY PROGRESS NOTE  8/19/2024        Subjective:  Pt states he is tired and appears frustrated, patient's fiance at bedside. Complains of continued back pain. Pt failed clamp trial yesterday afternoon had residual 400cc after 1 hr. Was started onreglan and bowel regimen. 1100 cc bilious NG output noted in canister, total of 1950 for last 24 hours.    CT contrast performed yesterday shows moderate to severe distention of the cecum and right colon with stool. Mild colonic ileus of the transverse and left colon.  No evidence of colonic obstruction.      Output by Drain (mL) 08/17/24 0701 - 08/17/24 1900 08/17/24 1901 - 08/18/24 0700 08/18/24 0701 - 08/18/24 1900 08/18/24 1901 - 08/19/24 0513   Closed/Suction Drain Left Abdomen;LLQ Bulb 30 520 150 140   Closed/Suction Drain Right Abdomen;RLQ Bulb 110 105 30 110        Objective:  BP (!) 151/86   Pulse 70   Temp 97.7 °F (36.5 °C) (Temporal)   Resp 20   Ht 1.88 m (6' 2\")   Wt 103.2 kg (227 lb 9.6 oz)   SpO2 98%   BMI 29.22 kg/m²     GENERAL:  Lying in bed. No acute distress   HEAD: No gross abnormalities   EYES: scleral icterus   LUNGS:  No increased work of breathing on 2L NC  CARDIOVASCULAR:  RR, normotensive   ABDOMEN:  Soft, distended, appropriately tender around midline incision.  Left SHARON drain serosanguineous, right accordion SS in tubing  EXTREMITIES: No edema or swelling  SKIN: Warm and dry      XR ABDOMEN FOR NG/OG/NE TUBE PLACEMENT   Final Result   NG tube in good position.         CT ABDOMEN PELVIS W IV CONTRAST Additional Contrast? Oral   Final Result   1. Findings of Whipple procedure.  No evidence of gastric obstruction.   Gastrojejunostomy appears patent.   2. Small amounts of free air in the upper abdomen including the gallbladder   fossa.  Likely normal postoperative finding, please correlate with date of   surgery.   3. Small amount of fluid and gas measuring 4.1 x 3.0 x 1.1 cm in the   gallbladder fossa

## 2024-08-19 NOTE — PLAN OF CARE
Problem: Discharge Planning  Goal: Discharge to home or other facility with appropriate resources  8/19/2024 1036 by Joann Sarkar RN  Outcome: Progressing  8/19/2024 0117 by Joann Sarkar RN  Outcome: Progressing     Problem: Safety - Adult  Goal: Free from fall injury  8/19/2024 1036 by Joann Sarkar RN  Outcome: Progressing  8/19/2024 0117 by Joann Sarkar RN  Outcome: Progressing     Problem: Pain  Goal: Verbalizes/displays adequate comfort level or baseline comfort level  8/19/2024 1036 by Joann Sarkar RN  Outcome: Progressing  8/19/2024 0117 by Joann Sarkar RN  Outcome: Progressing     Problem: Skin/Tissue Integrity  Goal: Absence of new skin breakdown  Description: 1.  Monitor for areas of redness and/or skin breakdown  2.  Assess vascular access sites hourly  3.  Every 4-6 hours minimum:  Change oxygen saturation probe site  4.  Every 4-6 hours:  If on nasal continuous positive airway pressure, respiratory therapy assess nares and determine need for appliance change or resting period.  8/19/2024 1036 by Joann Sarkar RN  Outcome: Progressing  8/19/2024 0117 by Joann Sarkar RN  Outcome: Progressing     Problem: ABCDS Injury Assessment  Goal: Absence of physical injury  8/19/2024 1036 by Joann Sarkar RN  Outcome: Progressing  8/19/2024 0117 by Joann Sarkar RN  Outcome: Progressing

## 2024-08-20 LAB
ALBUMIN SERPL-MCNC: 2.8 G/DL (ref 3.5–5.2)
ALP SERPL-CCNC: 183 U/L (ref 40–129)
ALT SERPL-CCNC: 88 U/L (ref 0–40)
AMYLASE FLD-CCNC: 3 U/L
AMYLASE SERPL-CCNC: 6 U/L (ref 20–100)
ANION GAP SERPL CALCULATED.3IONS-SCNC: 11 MMOL/L (ref 7–16)
AST SERPL-CCNC: 36 U/L (ref 0–39)
BASOPHILS # BLD: 0 K/UL (ref 0–0.2)
BASOPHILS NFR BLD: 0 % (ref 0–2)
BILIRUB FLD-MCNC: 2.6 MG/DL
BILIRUB SERPL-MCNC: 2.9 MG/DL (ref 0–1.2)
BUN SERPL-MCNC: 20 MG/DL (ref 6–23)
CA-I BLD-SCNC: 1.21 MMOL/L (ref 1.15–1.33)
CALCIUM SERPL-MCNC: 8.7 MG/DL (ref 8.6–10.2)
CHLORIDE SERPL-SCNC: 108 MMOL/L (ref 98–107)
CO2 SERPL-SCNC: 24 MMOL/L (ref 22–29)
CREAT SERPL-MCNC: 0.6 MG/DL (ref 0.7–1.2)
EOSINOPHIL # BLD: 0.1 K/UL (ref 0.05–0.5)
EOSINOPHILS RELATIVE PERCENT: 2 % (ref 0–6)
ERYTHROCYTE [DISTWIDTH] IN BLOOD BY AUTOMATED COUNT: 13.1 % (ref 11.5–15)
GFR, ESTIMATED: >90 ML/MIN/1.73M2
GLUCOSE BLD-MCNC: 187 MG/DL (ref 74–99)
GLUCOSE BLD-MCNC: 192 MG/DL (ref 74–99)
GLUCOSE BLD-MCNC: 194 MG/DL (ref 74–99)
GLUCOSE BLD-MCNC: 194 MG/DL (ref 74–99)
GLUCOSE BLD-MCNC: 230 MG/DL (ref 74–99)
GLUCOSE BLD-MCNC: 259 MG/DL (ref 74–99)
GLUCOSE SERPL-MCNC: 198 MG/DL (ref 74–99)
HCT VFR BLD AUTO: 25.4 % (ref 37–54)
HGB BLD-MCNC: 8.2 G/DL (ref 12.5–16.5)
LYMPHOCYTES NFR BLD: 0.15 K/UL (ref 1.5–4)
LYMPHOCYTES RELATIVE PERCENT: 3 % (ref 20–42)
MAGNESIUM SERPL-MCNC: 2.3 MG/DL (ref 1.6–2.6)
MCH RBC QN AUTO: 33.3 PG (ref 26–35)
MCHC RBC AUTO-ENTMCNC: 32.3 G/DL (ref 32–34.5)
MCV RBC AUTO: 103.3 FL (ref 80–99.9)
METAMYELOCYTES ABSOLUTE COUNT: 0.05 K/UL (ref 0–0.12)
METAMYELOCYTES: 1 % (ref 0–1)
MONOCYTES NFR BLD: 0.51 K/UL (ref 0.1–0.95)
MONOCYTES NFR BLD: 9 % (ref 2–12)
MYELOCYTES ABSOLUTE COUNT: 0.05 K/UL
MYELOCYTES: 1 %
NEUTROPHILS NFR BLD: 85 % (ref 43–80)
NEUTS SEG NFR BLD: 5.03 K/UL (ref 1.8–7.3)
PHOSPHATE SERPL-MCNC: 2.7 MG/DL (ref 2.5–4.5)
PLATELET # BLD AUTO: 290 K/UL (ref 130–450)
PMV BLD AUTO: 10.2 FL (ref 7–12)
POTASSIUM SERPL-SCNC: 4.4 MMOL/L (ref 3.5–5)
PROT SERPL-MCNC: 5.6 G/DL (ref 6.4–8.3)
RBC # BLD AUTO: 2.46 M/UL (ref 3.8–5.8)
RBC # BLD: ABNORMAL 10*6/UL
SODIUM SERPL-SCNC: 143 MMOL/L (ref 132–146)
SPECIMEN TYPE: NORMAL
SPECIMEN TYPE: NORMAL
WBC OTHER # BLD: 5.9 K/UL (ref 4.5–11.5)

## 2024-08-20 PROCEDURE — 82247 BILIRUBIN TOTAL: CPT

## 2024-08-20 PROCEDURE — 6370000000 HC RX 637 (ALT 250 FOR IP): Performed by: STUDENT IN AN ORGANIZED HEALTH CARE EDUCATION/TRAINING PROGRAM

## 2024-08-20 PROCEDURE — 82330 ASSAY OF CALCIUM: CPT

## 2024-08-20 PROCEDURE — 6360000002 HC RX W HCPCS: Performed by: TRANSPLANT SURGERY

## 2024-08-20 PROCEDURE — 2500000003 HC RX 250 WO HCPCS: Performed by: STUDENT IN AN ORGANIZED HEALTH CARE EDUCATION/TRAINING PROGRAM

## 2024-08-20 PROCEDURE — 97535 SELF CARE MNGMENT TRAINING: CPT

## 2024-08-20 PROCEDURE — 6370000000 HC RX 637 (ALT 250 FOR IP): Performed by: TRANSPLANT SURGERY

## 2024-08-20 PROCEDURE — 82962 GLUCOSE BLOOD TEST: CPT

## 2024-08-20 PROCEDURE — 2700000000 HC OXYGEN THERAPY PER DAY

## 2024-08-20 PROCEDURE — 83735 ASSAY OF MAGNESIUM: CPT

## 2024-08-20 PROCEDURE — 97530 THERAPEUTIC ACTIVITIES: CPT

## 2024-08-20 PROCEDURE — 85025 COMPLETE CBC W/AUTO DIFF WBC: CPT

## 2024-08-20 PROCEDURE — 2140000000 HC CCU INTERMEDIATE R&B

## 2024-08-20 PROCEDURE — 6370000000 HC RX 637 (ALT 250 FOR IP)

## 2024-08-20 PROCEDURE — 84100 ASSAY OF PHOSPHORUS: CPT

## 2024-08-20 PROCEDURE — 6360000002 HC RX W HCPCS: Performed by: STUDENT IN AN ORGANIZED HEALTH CARE EDUCATION/TRAINING PROGRAM

## 2024-08-20 PROCEDURE — 80053 COMPREHEN METABOLIC PANEL: CPT

## 2024-08-20 PROCEDURE — 2580000003 HC RX 258: Performed by: STUDENT IN AN ORGANIZED HEALTH CARE EDUCATION/TRAINING PROGRAM

## 2024-08-20 PROCEDURE — 82150 ASSAY OF AMYLASE: CPT

## 2024-08-20 RX ORDER — BISACODYL 10 MG
10 SUPPOSITORY, RECTAL RECTAL ONCE
Status: COMPLETED | OUTPATIENT
Start: 2024-08-20 | End: 2024-08-20

## 2024-08-20 RX ADMIN — FINASTERIDE 5 MG: 5 TABLET, FILM COATED ORAL at 09:48

## 2024-08-20 RX ADMIN — METOCLOPRAMIDE 10 MG: 5 INJECTION, SOLUTION INTRAMUSCULAR; INTRAVENOUS at 05:57

## 2024-08-20 RX ADMIN — INSULIN LISPRO 12 UNITS: 100 INJECTION, SOLUTION INTRAVENOUS; SUBCUTANEOUS at 15:46

## 2024-08-20 RX ADMIN — CETIRIZINE HYDROCHLORIDE 10 MG: 10 TABLET, FILM COATED ORAL at 09:48

## 2024-08-20 RX ADMIN — Medication 5 MG: at 20:21

## 2024-08-20 RX ADMIN — SODIUM PHOSPHATE, MONOBASIC, MONOHYDRATE AND SODIUM PHOSPHATE, DIBASIC, ANHYDROUS 30 MMOL: 142; 276 INJECTION, SOLUTION INTRAVENOUS at 11:52

## 2024-08-20 RX ADMIN — DOCUSATE SODIUM 100 MG: 50 LIQUID ORAL at 20:21

## 2024-08-20 RX ADMIN — GABAPENTIN 200 MG: 100 CAPSULE ORAL at 09:47

## 2024-08-20 RX ADMIN — ONDANSETRON 4 MG: 2 INJECTION INTRAMUSCULAR; INTRAVENOUS at 20:34

## 2024-08-20 RX ADMIN — INSULIN LISPRO 4 UNITS: 100 INJECTION, SOLUTION INTRAVENOUS; SUBCUTANEOUS at 23:49

## 2024-08-20 RX ADMIN — BISACODYL 10 MG: 10 SUPPOSITORY RECTAL at 09:48

## 2024-08-20 RX ADMIN — VALSARTAN 80 MG: 80 TABLET, FILM COATED ORAL at 09:48

## 2024-08-20 RX ADMIN — CALCIUM GLUCONATE: 98 INJECTION, SOLUTION INTRAVENOUS at 18:31

## 2024-08-20 RX ADMIN — BISACODYL 10 MG: 10 SUPPOSITORY RECTAL at 18:26

## 2024-08-20 RX ADMIN — INSULIN GLARGINE 15 UNITS: 100 INJECTION, SOLUTION SUBCUTANEOUS at 20:22

## 2024-08-20 RX ADMIN — INSULIN LISPRO 4 UNITS: 100 INJECTION, SOLUTION INTRAVENOUS; SUBCUTANEOUS at 20:22

## 2024-08-20 RX ADMIN — Medication 1 ENEMA: at 15:46

## 2024-08-20 RX ADMIN — ACETAMINOPHEN 500 MG: 500 TABLET ORAL at 18:26

## 2024-08-20 RX ADMIN — ATORVASTATIN CALCIUM 20 MG: 20 TABLET, FILM COATED ORAL at 20:21

## 2024-08-20 RX ADMIN — SODIUM CHLORIDE, PRESERVATIVE FREE 10 ML: 5 INJECTION INTRAVENOUS at 09:48

## 2024-08-20 RX ADMIN — ACETAMINOPHEN 500 MG: 500 TABLET ORAL at 11:46

## 2024-08-20 RX ADMIN — KETOROLAC TROMETHAMINE 15 MG: 30 INJECTION, SOLUTION INTRAMUSCULAR at 05:57

## 2024-08-20 RX ADMIN — SODIUM CHLORIDE, PRESERVATIVE FREE 40 MG: 5 INJECTION INTRAVENOUS at 15:42

## 2024-08-20 RX ADMIN — INSULIN LISPRO 4 UNITS: 100 INJECTION, SOLUTION INTRAVENOUS; SUBCUTANEOUS at 03:49

## 2024-08-20 RX ADMIN — METOPROLOL SUCCINATE 25 MG: 25 TABLET, EXTENDED RELEASE ORAL at 11:49

## 2024-08-20 RX ADMIN — METOCLOPRAMIDE 10 MG: 5 INJECTION, SOLUTION INTRAMUSCULAR; INTRAVENOUS at 23:49

## 2024-08-20 RX ADMIN — SODIUM CHLORIDE, PRESERVATIVE FREE 10 ML: 5 INJECTION INTRAVENOUS at 20:21

## 2024-08-20 RX ADMIN — MONTELUKAST 10 MG: 10 TABLET, FILM COATED ORAL at 20:21

## 2024-08-20 RX ADMIN — ENOXAPARIN SODIUM 30 MG: 100 INJECTION SUBCUTANEOUS at 09:47

## 2024-08-20 RX ADMIN — GABAPENTIN 200 MG: 100 CAPSULE ORAL at 15:42

## 2024-08-20 RX ADMIN — METOCLOPRAMIDE 10 MG: 5 INJECTION, SOLUTION INTRAMUSCULAR; INTRAVENOUS at 16:31

## 2024-08-20 RX ADMIN — INSULIN LISPRO 8 UNITS: 100 INJECTION, SOLUTION INTRAVENOUS; SUBCUTANEOUS at 11:54

## 2024-08-20 RX ADMIN — INSULIN LISPRO 4 UNITS: 100 INJECTION, SOLUTION INTRAVENOUS; SUBCUTANEOUS at 06:55

## 2024-08-20 RX ADMIN — TAMSULOSIN HYDROCHLORIDE 0.4 MG: 0.4 CAPSULE ORAL at 09:48

## 2024-08-20 RX ADMIN — GABAPENTIN 200 MG: 100 CAPSULE ORAL at 20:21

## 2024-08-20 RX ADMIN — DOCUSATE SODIUM 100 MG: 50 LIQUID ORAL at 09:48

## 2024-08-20 RX ADMIN — SODIUM CHLORIDE, PRESERVATIVE FREE 40 MG: 5 INJECTION INTRAVENOUS at 03:49

## 2024-08-20 RX ADMIN — ENOXAPARIN SODIUM 30 MG: 100 INJECTION SUBCUTANEOUS at 20:21

## 2024-08-20 RX ADMIN — HYDRALAZINE HYDROCHLORIDE 10 MG: 20 INJECTION INTRAMUSCULAR; INTRAVENOUS at 16:31

## 2024-08-20 RX ADMIN — HYDROCHLOROTHIAZIDE 12.5 MG: 12.5 TABLET ORAL at 09:48

## 2024-08-20 RX ADMIN — METOCLOPRAMIDE 10 MG: 5 INJECTION, SOLUTION INTRAMUSCULAR; INTRAVENOUS at 11:46

## 2024-08-20 RX ADMIN — Medication 1 ENEMA: at 09:47

## 2024-08-20 ASSESSMENT — PAIN SCALES - GENERAL
PAINLEVEL_OUTOF10: 0
PAINLEVEL_OUTOF10: 3
PAINLEVEL_OUTOF10: 4

## 2024-08-20 NOTE — PROGRESS NOTES
x 4  Sensation:  no reported paresthesias  Edema:  none    Vitals:  Heart Rate at rest 92 bpm Heart Rate post session 113 bpm   SpO2 at rest 96% RA SpO2 post session 97%   Blood Pressure at rest -- mmHg Blood Pressure post session -       Therapeutic Exercises:  sit to stand x 2 reps    Patient education  Pt educated on safety    Patient response to education:   Pt verbalized understanding Pt demonstrated skill Pt requires further education in this area   yes yes yes     ASSESSMENT:    Conditions Requiring Skilled Therapeutic Intervention:    [x]Decreased strength     []Decreased ROM  [x]Decreased functional mobility  [x]Decreased balance   [x]Decreased endurance   [x]Decreased posture  []Decreased sensation  []Decreased coordination   []Decreased vision  []Decreased safety awareness   [x]Increased pain       Comments:  Pt in bed on arrival, agreeable to PT tx. Co-tx performed d/t medical complexity, poor activity tolerance and line management. Pt required mod A for trunk management to transfer to EOB. Pt requested to ambulate to bathroom prior to mobility today. Pt with no increased difficulty standing from low height toilet. Pt ambulated in hallway with FWW with slow speed and small steps.  Pt was left in chair with all needs met and call light in reach.  All lines remained intact and chair alarm on.  Educated pt on safety and need for assistance when getting out of chair; pt understood and agreed to use call light.  RN aware.     Treatment:  Patient practiced and was instructed in the following treatment:    Bed mobility training - pt given verbal and tactile cues to facilitate proper sequencing and safety during supine>sit as well as provided with physical assistance.  Sitting EOB for >10 minutes for upright tolerance, postural awareness and BLE ROM  Transfer training - pt was given verbal and tactile cues to facilitate proper hand placement, technique and safety during sit to stand, stand to sit and stand pivot  transfers as well as provided with physical assistance.  Gait training- pt was given verbal and tactile cues to facilitate safety and balance during ambulation as well as provided with physical assistance.    Pt's/ family goals   1. Return home    Prognosis is good for reaching above PT goals.    Patient and or family understand(s) diagnosis, prognosis, and plan of care.  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]  Specific instructions for next treatment:  Progress activity    Current Treatment Recommendations:     [x] Strengthening to improve independence with functional mobility   [] ROM to improve independence with functional mobility   [x] Balance Training to improve static/dynamic balance and to reduce fall risk  [x] Endurance Training to improve activity tolerance during functional mobility   [x] Transfer Training to improve safety and independence with all functional transfers   [x] Gait Training to improve gait mechanics, endurance and asses need for appropriate assistive device  [x] Stair Training in preparation for safe discharge home and/or into the community   [] Positioning to prevent skin breakdown and contractures  [x] Safety and Education Training   [x] Patient/Caregiver Education   [] HEP  [] Other     PT long term treatment goals are located in above grid    Frequency of treatments: 2-5x/week x 1-2 weeks.    Time in  1310  Time out  1340    Total Treatment Time 30 minutes     Evaluation Time includes thorough review of current medical information, gathering information on past medical history/social history and prior level of function, completion of standardized testing/informal observation of tasks, assessment of

## 2024-08-20 NOTE — PLAN OF CARE
Problem: Discharge Planning  Goal: Discharge to home or other facility with appropriate resources  8/19/2024 2201 by Augusta Munoz RN  Outcome: Progressing     Problem: Safety - Adult  Goal: Free from fall injury  8/19/2024 2201 by Augusta Munoz RN  Outcome: Progressing     Problem: Pain  Goal: Verbalizes/displays adequate comfort level or baseline comfort level  8/19/2024 2201 by Augusta Munoz RN  Outcome: Progressing     Problem: Skin/Tissue Integrity  Goal: Absence of new skin breakdown  Description: 1.  Monitor for areas of redness and/or skin breakdown  2.  Assess vascular access sites hourly  3.  Every 4-6 hours minimum:  Change oxygen saturation probe site  4.  Every 4-6 hours:  If on nasal continuous positive airway pressure, respiratory therapy assess nares and determine need for appliance change or resting period.  8/19/2024 2201 by Augusta Munoz RN  Outcome: Progressing     Problem: ABCDS Injury Assessment  Goal: Absence of physical injury  8/19/2024 2201 by Augusta Munoz RN  Outcome: Progressing     Problem: Neurosensory - Adult  Goal: Achieves stable or improved neurological status  Outcome: Progressing     Problem: Respiratory - Adult  Goal: Achieves optimal ventilation and oxygenation  Outcome: Progressing     Problem: Cardiovascular - Adult  Goal: Maintains optimal cardiac output and hemodynamic stability  Outcome: Progressing     Problem: Skin/Tissue Integrity - Adult  Goal: Skin integrity remains intact  Outcome: Progressing     Problem: Musculoskeletal - Adult  Goal: Return mobility to safest level of function  Outcome: Progressing     Problem: Gastrointestinal - Adult  Goal: Minimal or absence of nausea and vomiting  Outcome: Progressing     Problem: Genitourinary - Adult  Goal: Absence of urinary retention  Outcome: Progressing     Problem: Infection - Adult  Goal: Absence of infection at discharge  Outcome: Progressing     Problem: Metabolic/Fluid and Electrolytes - Adult  Goal: Electrolytes  maintained within normal limits  Outcome: Progressing     Problem: Hematologic - Adult  Goal: Maintains hematologic stability  Outcome: Progressing     Problem: Chronic Conditions and Co-morbidities  Goal: Patient's chronic conditions and co-morbidity symptoms are monitored and maintained or improved  Outcome: Progressing     Problem: Nutrition Deficit:  Goal: Optimize nutritional status  Outcome: Progressing

## 2024-08-20 NOTE — PROGRESS NOTES
HEPATOBILIARY AND PANCREATIC SURGERY  DAILY PROGRESS NOTE  8/20/2024        Subjective:  Pt has required IV blood pressure medication as he was refusing oral medications yesterday, current BP of 155/74. Complains of continued back pain. Had one small bowel movement, per wife she states pt still has not passed flatus. 1300 cc bilious NG output over 24 hours, 300 noted in canister at bedside. L SHARON drain removed yesterday.      Output by Drain (mL) 08/18/24 0701 - 08/18/24 1900 08/18/24 1901 - 08/19/24 0700 08/19/24 0701 - 08/19/24 1900 08/19/24 1901 - 08/20/24 0700 08/20/24 0701 - 08/20/24 0719   Closed/Suction Drain Right Abdomen;RLQ Bulb 30 110 145 20         Objective:  BP (!) 155/74   Pulse 79   Temp 97.8 °F (36.6 °C) (Temporal)   Resp 18   Ht 1.88 m (6' 2\")   Wt 102.2 kg (225 lb 3.2 oz)   SpO2 100%   BMI 28.91 kg/m²     GENERAL:  Lying in bed. No acute distress   HEAD: No gross abnormalities   EYES: scleral icterus   LUNGS:  No increased work of breathing on 2L NC  CARDIOVASCULAR:  RR, normotensive   ABDOMEN:  Soft, distended, appropriately tender around midline incision.  , Right accordion SS in tubing  EXTREMITIES: No edema or swelling  SKIN: Warm and dry      XR ABDOMEN FOR NG/OG/NE TUBE PLACEMENT   Final Result   NG tube in good position.         CT ABDOMEN PELVIS W IV CONTRAST Additional Contrast? Oral   Final Result   1. Findings of Whipple procedure.  No evidence of gastric obstruction.   Gastrojejunostomy appears patent.   2. Small amounts of free air in the upper abdomen including the gallbladder   fossa.  Likely normal postoperative finding, please correlate with date of   surgery.   3. Small amount of fluid and gas measuring 4.1 x 3.0 x 1.1 cm in the   gallbladder fossa could be a normal postop finding depending on date of   surgery.   4. Moderate to severe distention of the cecum and right colon with stool.   Mild colonic ileus of the transverse and left colon.  No evidence of colonic

## 2024-08-20 NOTE — PROGRESS NOTES
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  Pancreaticoduodenectomy with placement of an 8f and 5F biliary stent   2.  omental pedicle flap  3.  Placement of SHARON drains  2  4.  Extended lymphadenectomy  5.  Intraoperative ultrasound for anatomy   6.  Portal vein reconstruction  7.  Removal of PTHC      Pertinent Medical History:    Past Medical History        Past Medical History:   Diagnosis Date    Deaf, bilateral      Diabetes (HCC)      Hypertension      Osteoarthritis      Primary osteoarthritis of left knee 10/18/2017    Prostate cancer (HCC) 2024            *Precautions:  Fall Risk, +alarms, abdominal precautions, deaf - ASL (able to read lips), NPO, riojas, O2, x2 abdominal SHARON drains     Session Code: 19621     Assessment of current deficits   [x] Functional mobility          [x]ADLs           [x] Strength                  []Cognition   [x] Functional transfers        [x] IADLs         [x] Safety Awareness   [x]Endurance   [x] Fine Coordination           [x] ROM           [] Vision/perception    []Sensation     []Gross Motor Coordination [x] Balance    [] Delirium                  []Motor Control     [] Communication     OT PLAN OF CARE   OT POC based on physician orders, patient diagnosis and results of clinical assessment.        Frequency/Duration: 1-3 days/wk for 1-2 weeks PRN    Specific OT Treatment Interventions to include:   * Instruction/training on

## 2024-08-20 NOTE — PROGRESS NOTES
[facility] Quality Flow/Interdisciplinary Rounds Progress Note        Quality Flow Rounds held on August 20, 2024    Disciplines Attending:  Bedside Nurse, , , and Nursing Unit Leadership    Michael Garcia was admitted on 8/14/2024  5:28 AM    Anticipated Discharge Date:       Disposition:    Mike Score:  Mike Scale Score: 19    Readmission Risk              Risk of Unplanned Readmission:  26           Discussed patient goal for the day, patient clinical progression, and barriers to discharge.  The following Goal(s) of the Day/Commitment(s) have been identified:  Diagnostics - Report Results and Labs - Report Results      Ana Newton RN  August 20, 2024

## 2024-08-20 NOTE — CARE COORDINATION
Transition of care update: S/P whipple procedure with portal vein reconstruction on 08/14. TPN, NG tube - okay to clamp NG when ambulating then place back to suction. Drain cont. Iv reglan 10mg q 6 hrs. Labs noted. [facility], currently we are dealing with delayed gastric emptying. Met with pt briefly on room.  Plan is to [facility]) when medically ready. Ambulette form and EXEMPT completed on 08/16. Transport envelope is with pt's soft chart.  Also, [facility] is following pt as back up plan. Cm/sw will follow.

## 2024-08-21 LAB
ALBUMIN SERPL-MCNC: 2.9 G/DL (ref 3.5–5.2)
ALP SERPL-CCNC: 179 U/L (ref 40–129)
ALT SERPL-CCNC: 69 U/L (ref 0–40)
ANION GAP SERPL CALCULATED.3IONS-SCNC: 11 MMOL/L (ref 7–16)
AST SERPL-CCNC: 26 U/L (ref 0–39)
BASOPHILS # BLD: 0.02 K/UL (ref 0–0.2)
BASOPHILS NFR BLD: 0 % (ref 0–2)
BILIRUB SERPL-MCNC: 2.6 MG/DL (ref 0–1.2)
BUN SERPL-MCNC: 18 MG/DL (ref 6–23)
CA-I BLD-SCNC: 1.19 MMOL/L (ref 1.15–1.33)
CALCIUM SERPL-MCNC: 8.7 MG/DL (ref 8.6–10.2)
CHLORIDE SERPL-SCNC: 109 MMOL/L (ref 98–107)
CO2 SERPL-SCNC: 25 MMOL/L (ref 22–29)
CREAT SERPL-MCNC: 0.6 MG/DL (ref 0.7–1.2)
EOSINOPHIL # BLD: 0.26 K/UL (ref 0.05–0.5)
EOSINOPHILS RELATIVE PERCENT: 3 % (ref 0–6)
ERYTHROCYTE [DISTWIDTH] IN BLOOD BY AUTOMATED COUNT: 13.2 % (ref 11.5–15)
GFR, ESTIMATED: >90 ML/MIN/1.73M2
GLUCOSE BLD-MCNC: 151 MG/DL (ref 74–99)
GLUCOSE BLD-MCNC: 156 MG/DL (ref 74–99)
GLUCOSE BLD-MCNC: 213 MG/DL (ref 74–99)
GLUCOSE BLD-MCNC: 220 MG/DL (ref 74–99)
GLUCOSE BLD-MCNC: 240 MG/DL (ref 74–99)
GLUCOSE BLD-MCNC: 253 MG/DL (ref 74–99)
GLUCOSE SERPL-MCNC: 162 MG/DL (ref 74–99)
HCT VFR BLD AUTO: 25.7 % (ref 37–54)
HGB BLD-MCNC: 8.3 G/DL (ref 12.5–16.5)
IMM GRANULOCYTES # BLD AUTO: 0.23 K/UL (ref 0–0.58)
IMM GRANULOCYTES NFR BLD: 3 % (ref 0–5)
LYMPHOCYTES NFR BLD: 0.36 K/UL (ref 1.5–4)
LYMPHOCYTES RELATIVE PERCENT: 5 % (ref 20–42)
MAGNESIUM SERPL-MCNC: 2.2 MG/DL (ref 1.6–2.6)
MCH RBC QN AUTO: 33.3 PG (ref 26–35)
MCHC RBC AUTO-ENTMCNC: 32.3 G/DL (ref 32–34.5)
MCV RBC AUTO: 103.2 FL (ref 80–99.9)
MONOCYTES NFR BLD: 0.89 K/UL (ref 0.1–0.95)
MONOCYTES NFR BLD: 12 % (ref 2–12)
NEUTROPHILS NFR BLD: 77 % (ref 43–80)
NEUTS SEG NFR BLD: 5.97 K/UL (ref 1.8–7.3)
PHOSPHATE SERPL-MCNC: 2.7 MG/DL (ref 2.5–4.5)
PLATELET # BLD AUTO: 296 K/UL (ref 130–450)
PMV BLD AUTO: 10 FL (ref 7–12)
POTASSIUM SERPL-SCNC: 4 MMOL/L (ref 3.5–5)
PROT SERPL-MCNC: 5.6 G/DL (ref 6.4–8.3)
RBC # BLD AUTO: 2.49 M/UL (ref 3.8–5.8)
RBC # BLD: ABNORMAL 10*6/UL
SODIUM SERPL-SCNC: 145 MMOL/L (ref 132–146)
WBC OTHER # BLD: 7.7 K/UL (ref 4.5–11.5)

## 2024-08-21 PROCEDURE — 6370000000 HC RX 637 (ALT 250 FOR IP): Performed by: TRANSPLANT SURGERY

## 2024-08-21 PROCEDURE — 80053 COMPREHEN METABOLIC PANEL: CPT

## 2024-08-21 PROCEDURE — 85025 COMPLETE CBC W/AUTO DIFF WBC: CPT

## 2024-08-21 PROCEDURE — 6360000002 HC RX W HCPCS: Performed by: TRANSPLANT SURGERY

## 2024-08-21 PROCEDURE — 2140000000 HC CCU INTERMEDIATE R&B

## 2024-08-21 PROCEDURE — 84100 ASSAY OF PHOSPHORUS: CPT

## 2024-08-21 PROCEDURE — 2580000003 HC RX 258: Performed by: STUDENT IN AN ORGANIZED HEALTH CARE EDUCATION/TRAINING PROGRAM

## 2024-08-21 PROCEDURE — 6370000000 HC RX 637 (ALT 250 FOR IP): Performed by: STUDENT IN AN ORGANIZED HEALTH CARE EDUCATION/TRAINING PROGRAM

## 2024-08-21 PROCEDURE — 82962 GLUCOSE BLOOD TEST: CPT

## 2024-08-21 PROCEDURE — 83735 ASSAY OF MAGNESIUM: CPT

## 2024-08-21 PROCEDURE — 6360000002 HC RX W HCPCS: Performed by: STUDENT IN AN ORGANIZED HEALTH CARE EDUCATION/TRAINING PROGRAM

## 2024-08-21 PROCEDURE — 2500000003 HC RX 250 WO HCPCS: Performed by: STUDENT IN AN ORGANIZED HEALTH CARE EDUCATION/TRAINING PROGRAM

## 2024-08-21 PROCEDURE — 6370000000 HC RX 637 (ALT 250 FOR IP)

## 2024-08-21 PROCEDURE — 82330 ASSAY OF CALCIUM: CPT

## 2024-08-21 RX ORDER — BISACODYL 10 MG
10 SUPPOSITORY, RECTAL RECTAL DAILY
Status: DISCONTINUED | OUTPATIENT
Start: 2024-08-21 | End: 2024-08-29

## 2024-08-21 RX ADMIN — SODIUM CHLORIDE, PRESERVATIVE FREE 40 MG: 5 INJECTION INTRAVENOUS at 03:47

## 2024-08-21 RX ADMIN — Medication 5 MG: at 21:44

## 2024-08-21 RX ADMIN — MONTELUKAST 10 MG: 10 TABLET, FILM COATED ORAL at 21:44

## 2024-08-21 RX ADMIN — ACETAMINOPHEN 500 MG: 500 TABLET ORAL at 12:00

## 2024-08-21 RX ADMIN — INSULIN LISPRO 8 UNITS: 100 INJECTION, SOLUTION INTRAVENOUS; SUBCUTANEOUS at 12:00

## 2024-08-21 RX ADMIN — BISACODYL 10 MG: 10 SUPPOSITORY RECTAL at 18:15

## 2024-08-21 RX ADMIN — SODIUM CHLORIDE, PRESERVATIVE FREE 10 ML: 5 INJECTION INTRAVENOUS at 10:12

## 2024-08-21 RX ADMIN — GABAPENTIN 200 MG: 100 CAPSULE ORAL at 21:44

## 2024-08-21 RX ADMIN — ONDANSETRON 4 MG: 2 INJECTION INTRAMUSCULAR; INTRAVENOUS at 10:13

## 2024-08-21 RX ADMIN — FINASTERIDE 5 MG: 5 TABLET, FILM COATED ORAL at 10:12

## 2024-08-21 RX ADMIN — INSULIN LISPRO 12 UNITS: 100 INJECTION, SOLUTION INTRAVENOUS; SUBCUTANEOUS at 18:45

## 2024-08-21 RX ADMIN — METOCLOPRAMIDE 10 MG: 5 INJECTION, SOLUTION INTRAMUSCULAR; INTRAVENOUS at 06:11

## 2024-08-21 RX ADMIN — GABAPENTIN 200 MG: 100 CAPSULE ORAL at 10:12

## 2024-08-21 RX ADMIN — HYDROCHLOROTHIAZIDE 12.5 MG: 12.5 TABLET ORAL at 10:13

## 2024-08-21 RX ADMIN — CALCIUM GLUCONATE: 98 INJECTION, SOLUTION INTRAVENOUS at 18:38

## 2024-08-21 RX ADMIN — INSULIN LISPRO 4 UNITS: 100 INJECTION, SOLUTION INTRAVENOUS; SUBCUTANEOUS at 21:43

## 2024-08-21 RX ADMIN — METOPROLOL SUCCINATE 25 MG: 25 TABLET, EXTENDED RELEASE ORAL at 10:13

## 2024-08-21 RX ADMIN — DOCUSATE SODIUM 100 MG: 50 LIQUID ORAL at 10:13

## 2024-08-21 RX ADMIN — SODIUM CHLORIDE, PRESERVATIVE FREE 40 MG: 5 INJECTION INTRAVENOUS at 16:00

## 2024-08-21 RX ADMIN — TAMSULOSIN HYDROCHLORIDE 0.4 MG: 0.4 CAPSULE ORAL at 10:13

## 2024-08-21 RX ADMIN — INSULIN GLARGINE 15 UNITS: 100 INJECTION, SOLUTION SUBCUTANEOUS at 21:44

## 2024-08-21 RX ADMIN — INSULIN LISPRO 8 UNITS: 100 INJECTION, SOLUTION INTRAVENOUS; SUBCUTANEOUS at 15:58

## 2024-08-21 RX ADMIN — ACETAMINOPHEN 500 MG: 500 TABLET ORAL at 18:14

## 2024-08-21 RX ADMIN — INSULIN LISPRO 4 UNITS: 100 INJECTION, SOLUTION INTRAVENOUS; SUBCUTANEOUS at 06:46

## 2024-08-21 RX ADMIN — CETIRIZINE HYDROCHLORIDE 10 MG: 10 TABLET, FILM COATED ORAL at 10:13

## 2024-08-21 RX ADMIN — ENOXAPARIN SODIUM 30 MG: 100 INJECTION SUBCUTANEOUS at 10:12

## 2024-08-21 RX ADMIN — SODIUM CHLORIDE, PRESERVATIVE FREE 10 ML: 5 INJECTION INTRAVENOUS at 21:45

## 2024-08-21 RX ADMIN — INSULIN LISPRO 8 UNITS: 100 INJECTION, SOLUTION INTRAVENOUS; SUBCUTANEOUS at 03:47

## 2024-08-21 RX ADMIN — METOCLOPRAMIDE 10 MG: 5 INJECTION, SOLUTION INTRAMUSCULAR; INTRAVENOUS at 21:45

## 2024-08-21 RX ADMIN — GABAPENTIN 200 MG: 100 CAPSULE ORAL at 15:59

## 2024-08-21 RX ADMIN — METOCLOPRAMIDE 10 MG: 5 INJECTION, SOLUTION INTRAMUSCULAR; INTRAVENOUS at 18:14

## 2024-08-21 RX ADMIN — METOCLOPRAMIDE 10 MG: 5 INJECTION, SOLUTION INTRAMUSCULAR; INTRAVENOUS at 12:00

## 2024-08-21 RX ADMIN — ENOXAPARIN SODIUM 30 MG: 100 INJECTION SUBCUTANEOUS at 21:43

## 2024-08-21 RX ADMIN — DOCUSATE SODIUM 100 MG: 50 LIQUID ORAL at 21:43

## 2024-08-21 RX ADMIN — VALSARTAN 80 MG: 80 TABLET, FILM COATED ORAL at 10:13

## 2024-08-21 RX ADMIN — ATORVASTATIN CALCIUM 20 MG: 20 TABLET, FILM COATED ORAL at 21:44

## 2024-08-21 ASSESSMENT — PAIN SCALES - GENERAL
PAINLEVEL_OUTOF10: 0
PAINLEVEL_OUTOF10: 0

## 2024-08-21 ASSESSMENT — PAIN SCALES - WONG BAKER: WONGBAKER_NUMERICALRESPONSE: NO HURT

## 2024-08-21 NOTE — PLAN OF CARE
Problem: Discharge Planning  Goal: Discharge to home or other facility with appropriate resources  8/21/2024 0106 by Augusta Munoz RN  Outcome: Progressing     Problem: Safety - Adult  Goal: Free from fall injury  8/21/2024 0106 by Augusta Munoz RN  Outcome: Progressing     Problem: Pain  Goal: Verbalizes/displays adequate comfort level or baseline comfort level  8/21/2024 0106 by Augusta Munoz RN  Outcome: Progressing     Problem: Skin/Tissue Integrity  Goal: Absence of new skin breakdown  Description: 1.  Monitor for areas of redness and/or skin breakdown  2.  Assess vascular access sites hourly  3.  Every 4-6 hours minimum:  Change oxygen saturation probe site  4.  Every 4-6 hours:  If on nasal continuous positive airway pressure, respiratory therapy assess nares and determine need for appliance change or resting period.  8/21/2024 0106 by Augusta Munoz RN  Outcome: Progressing     Problem: ABCDS Injury Assessment  Goal: Absence of physical injury  8/21/2024 0106 by Augusta Munoz RN  Outcome: Progressing     Problem: Neurosensory - Adult  Goal: Achieves stable or improved neurological status  8/21/2024 0106 by Augusta Munoz RN  Outcome: Progressing     Problem: Respiratory - Adult  Goal: Achieves optimal ventilation and oxygenation  8/21/2024 0106 by Augusta Munoz RN  Outcome: Progressing     Problem: Cardiovascular - Adult  Goal: Maintains optimal cardiac output and hemodynamic stability  8/21/2024 0106 by Augusta Munoz RN  Outcome: Progressing     Problem: Skin/Tissue Integrity - Adult  Goal: Skin integrity remains intact  8/21/2024 0106 by Augusta Munoz RN  Outcome: Progressing     Problem: Musculoskeletal - Adult  Goal: Return mobility to safest level of function  8/21/2024 0106 by Augusta Munoz RN  Outcome: Progressing     Problem: Gastrointestinal - Adult  Goal: Minimal or absence of nausea and vomiting  8/21/2024 0106 by Augusta Munoz RN  Outcome: Progressing     Problem: Genitourinary - Adult  Goal: Absence of

## 2024-08-21 NOTE — CONSULTS
2024 4:41 PM  Michael Garcia  [id number]     Chief Complaint:    Urinary retention      History of Present Illness:      The patient is a 79 y.o. male patient who presented to the hospital for whipple procedure. Post operatively he had some urinary retention. A riojas was replaced 4 days ago for 500cc of residual. He does have a history of prostate cancer that was treated with radiation that he completed last year. He also has a history of BPH and takes Flomax. He follows with [facility] and is receiving Lupron injections every 3 months in their office.       Past Medical History:   Diagnosis Date    Deaf, bilateral     Diabetes (HCC)     Hypertension     Osteoarthritis     Primary osteoarthritis of left knee 10/18/2017    Prostate cancer (HCC) 2024         Past Surgical History:   Procedure Laterality Date    CARPAL TUNNEL RELEASE      ERCP N/A 2024    FAILED ENDOSCOPIC RETROGRADE CHOLANGIOPANCREATOGRAPHY performed by Demetrius Carlos DO at [facility] ENDOSCOPY    IR BILIARY DRAIN INT AND EXT  2024    IR BILIARY DRAIN INT AND EXT 2024 [facility]    PANCREAS SURGERY N/A 2024    WHIPPLE with portal vein reconstruction, intraoperative US performed [place]rius Santoyo [place] [facility]    ROTATOR CUFF REPAIR      SHOULDER SURGERY      UPPER GASTROINTESTINAL ENDOSCOPY N/A 2024    ESOPHAGOGASTRODUODENOSCOPY DILATION BALLOON performed by Demetrius Carlos DO at [facility] ENDOSCOPY       Medications Prior to Admission:    Medications Prior to Admission: [] oxyCODONE HCl (OXY-IR) 10 MG immediate release tablet, Take 1 tablet by mouth every 4 hours as needed for Pain for up to 7 days. Max Daily Amount: 60 mg  pantoprazole (PROTONIX) 40 MG tablet, Take 1 tablet by mouth 2 times daily  metoprolol succinate (TOPROL XL) 25 MG extended release tablet, TAKE 1 TABLET BY MOUTH DAILY  montelukast (SINGULAIR) 10 MG tablet, Take 1 tablet by

## 2024-08-21 NOTE — PLAN OF CARE
Problem: Discharge Planning  Goal: Discharge to home or other facility with appropriate resources  8/21/2024 1108 by Joaquina Mcgregor RN  Outcome: Progressing     Problem: Safety - Adult  Goal: Free from fall injury  8/21/2024 1108 by Joaquina Mcgregor RN  Outcome: Progressing     Problem: Pain  Goal: Verbalizes/displays adequate comfort level or baseline comfort level  8/21/2024 1108 by Joaquina Mcgregor RN  Outcome: Progressing     Problem: Skin/Tissue Integrity  Goal: Absence of new skin breakdown  Description: 1.  Monitor for areas of redness and/or skin breakdown  2.  Assess vascular access sites hourly  3.  Every 4-6 hours minimum:  Change oxygen saturation probe site  4.  Every 4-6 hours:  If on nasal continuous positive airway pressure, respiratory therapy assess nares and determine need for appliance change or resting period.  8/21/2024 1108 by Joaquina Mcgregor RN  Outcome: Progressing     Problem: ABCDS Injury Assessment  Goal: Absence of physical injury  8/21/2024 1108 by Joaquina Mcgregor RN  Outcome: Progressing     Problem: Neurosensory - Adult  Goal: Achieves stable or improved neurological status  8/21/2024 1108 by Joaquina Mcgregor RN  Outcome: Progressing     Problem: Respiratory - Adult  Goal: Achieves optimal ventilation and oxygenation  8/21/2024 1108 by Joaquina Mcgregor RN  Outcome: Progressing     Problem: Cardiovascular - Adult  Goal: Maintains optimal cardiac output and hemodynamic stability  8/21/2024 1108 by Joaquina Mcgregor RN  Outcome: Progressing     Problem: Skin/Tissue Integrity - Adult  Goal: Incisions, wounds, or drain sites healing without S/S of infection  Outcome: Progressing     Problem: Musculoskeletal - Adult  Goal: Return mobility to safest level of function  8/21/2024 1108 by Joaquina Mcgregor RN  Outcome: Progressing     Problem: Gastrointestinal - Adult  Goal: Minimal or absence of nausea and vomiting  8/21/2024 1108 by Joaquina Mcgregor RN  Outcome: Progressing     Problem: Genitourinary -

## 2024-08-21 NOTE — PROGRESS NOTES
Comprehensive Nutrition Assessment    Type and Reason for Visit:  Reassess    Nutrition Recommendations/Plan:     1.) Continue Current Parenteral Nutrition (meets 100% est needs)  Electrolytes WNL on today's draw, monitor/ replace    2.) Continue limited clears as tolerated       Malnutrition Assessment:  Malnutrition Status:  Moderate malnutrition (08/17/24 1118)    Context:  Acute Illness     Findings of the 6 clinical characteristics of malnutrition:  Energy Intake:  50% or less of estimated energy requirements for 5 or more days  Weight Loss:   (mild wt loss - 3.2% x 1 month)     Body Fat Loss:  Mild body fat loss Orbital   Muscle Mass Loss:  Mild muscle mass loss Temples (temporalis), Clavicles (pectoralis & deltoids)  Fluid Accumulation:  No significant fluid accumulation    Strength:  Not Performed    Nutrition Assessment:    Pt remains at risk d/t ongoing need for PN support. Admit w/ abd pain x 3 weeks found to have pancreatic head mass/ adenocarcinoma & obstructive jaundice s/p whipple 8/14. PMHx DM, Prostate CA, & Deafness (reads lips). Pt meets criteris for Moderate Malnutriton. Noted previous large volume emesis post-op. Full TPN continued w/ limited clears. NGT just removed. Will monitor further diet prorgression & provide updated recs as needed.    Nutrition Related Findings:    Pt alert, noted deaf status/ reads lips, -I/O's 9L, trace edema, hypoactive BS, abd distention s/p whipple, emesis post procedure, previous NGT w/ large bilious output (removed), noted nausea/ constipation (BM x 1), Jaundice (bili 2.6), TPN running; K/Phos/Mag WNL     Wound Type: Surgical Incision (MLI)       Current Nutrition Intake & Therapies:    Average Meal Intake: 1-25% (limited clears)    Current Parenteral Nutrition Orders:  Type and Formula: 3-in-1 Custom   Duration: Continuous  Rate/Volume: 1800 ml tv @ 75ml/hr  Current PN Order Provides: 125 gm AA, 350 gm dextrose, 51 gm lipid, & 2200 total  kcals    Anthropometric Measures:  Height: 188 cm (6' 2\")  Ideal Body Weight (IBW): 190 lbs (86 kg)    Admission Body Weight: 96.2 kg (212 lb 1.3 oz) (8/15 first measured)  Current Body Weight: 96.2 kg (212 lb 1.3 oz) (8/15 adm wt as CBW remains elevated), 111.6 % IBW.    Current BMI (kg/m2): 27.2  Usual Body Weight: 99.3 kg (219 lb) (7/31/24 EMR measured wt x ~1 mon ago. Noted 221lb office visit 4/2023 for long term hx)  % Weight Change (Calculated): -3.2  BMI Categories: Overweight (BMI 25.0-29.9)    Estimated Daily Nutrient Needs:  Energy Requirements Based On: Formula  Weight Used for Energy Requirements: Admission  Energy (kcal/day): MSJ 1756 x 1.3 SF= 6578-3476  Weight Used for Protein Requirements: Ideal  Protein (g/day): 1.4-1.6 g/kg IBW; 120-140. Monitor LFT's/ Bili trends  Method Used for Fluid Requirements: 1 ml/kcal  Fluid (ml/day): 7150-8023    Nutrition Diagnosis:   Moderate malnutrition, In context of acute illness or injury related to altered GI function (newly found pancreatic head mass) as evidenced by poor intake prior to admission, mild loss of subcutaneous fat, mild muscle loss, weight loss (3.2% x 1 month)    Nutrition Interventions:   Nutrition Education/Counseling: Education not appropriate  Coordination of Nutrition Care: Continue to monitor while inpatient, Coordination of Care  Plan of Care discussed with: TPN & Re-feeding risk d/w pharmacy    Goals:  Previous Goal Met: Progressing toward Goal(s)  Goals: Tolerate nutrition support at goal rate (TPN)    Nutrition Monitoring and Evaluation:   Food/Nutrient Intake Outcomes: Parenteral Nutrition Intake/Tolerance  Physical Signs/Symptoms Outcomes: Skin, Nutrition Focused Physical Findings, Biochemical Data, Weight, GI Status, Nausea or Vomiting, Fluid Status or Edema    Discharge Planning:    Too soon to determine     Jeannie Potter RD, LD  Contact: ext [phone]

## 2024-08-21 NOTE — PROGRESS NOTES
HEPATOBILIARY AND PANCREATIC SURGERY  DAILY PROGRESS NOTE  8/21/2024        Subjective:  Complains of continued back pain. Had one bowel movement, pt unsure of size. 1800 cc bilious NG output over 24 hours, 300 noted in canister at bedside, bilious in color w/ bloody tint. Accordion drain output is bile tinged.      Output by Drain (mL) 08/19/24 0701 - 08/19/24 1900 08/19/24 1901 - 08/20/24 0700 08/20/24 0701 - 08/20/24 1900 08/20/24 1901 - 08/21/24 0700 08/21/24 0701 - 08/21/24 0737   Closed/Suction Drain Right Abdomen;RLQ Bulb 145 20 200 100         Objective:  BP (!) 147/67   Pulse 90   Temp 97.3 °F (36.3 °C) (Temporal)   Resp 20   Ht 1.88 m (6' 2\")   Wt 100.7 kg (222 lb)   SpO2 100%   BMI 28.50 kg/m²     GENERAL:  Lying in bed. No acute distress   HEAD: No gross abnormalities   EYES: scleral icterus   LUNGS:  No increased work of breathing on 2L NC  CARDIOVASCULAR:  RR, normotensive   ABDOMEN:  Soft, distended, appropriately tender around midline incision.  , Right accordion SS in tubing  EXTREMITIES: No edema or swelling  SKIN: Warm and dry      XR ABDOMEN FOR NG/OG/NE TUBE PLACEMENT   Final Result   NG tube in good position.         CT ABDOMEN PELVIS W IV CONTRAST Additional Contrast? Oral   Final Result   1. Findings of Whipple procedure.  No evidence of gastric obstruction.   Gastrojejunostomy appears patent.   2. Small amounts of free air in the upper abdomen including the gallbladder   fossa.  Likely normal postoperative finding, please correlate with date of   surgery.   3. Small amount of fluid and gas measuring 4.1 x 3.0 x 1.1 cm in the   gallbladder fossa could be a normal postop finding depending on date of   surgery.   4. Moderate to severe distention of the cecum and right colon with stool.   Mild colonic ileus of the transverse and left colon.  No evidence of colonic   obstruction.   5. New small bilateral pleural effusions with passive atelectasis of the lung   bases.   6. Periportal edema

## 2024-08-22 LAB
ALBUMIN SERPL-MCNC: 2.6 G/DL (ref 3.5–5.2)
ALP SERPL-CCNC: 200 U/L (ref 40–129)
ALT SERPL-CCNC: 61 U/L (ref 0–40)
ANION GAP SERPL CALCULATED.3IONS-SCNC: 9 MMOL/L (ref 7–16)
AST SERPL-CCNC: 29 U/L (ref 0–39)
BASOPHILS # BLD: 0.01 K/UL (ref 0–0.2)
BASOPHILS NFR BLD: 0 % (ref 0–2)
BILIRUB SERPL-MCNC: 2.3 MG/DL (ref 0–1.2)
BUN SERPL-MCNC: 19 MG/DL (ref 6–23)
CA-I BLD-SCNC: 1.17 MMOL/L (ref 1.15–1.33)
CALCIUM SERPL-MCNC: 8.5 MG/DL (ref 8.6–10.2)
CHLORIDE SERPL-SCNC: 103 MMOL/L (ref 98–107)
CO2 SERPL-SCNC: 25 MMOL/L (ref 22–29)
CREAT SERPL-MCNC: 0.6 MG/DL (ref 0.7–1.2)
EOSINOPHIL # BLD: 0.28 K/UL (ref 0.05–0.5)
EOSINOPHILS RELATIVE PERCENT: 3 % (ref 0–6)
ERYTHROCYTE [DISTWIDTH] IN BLOOD BY AUTOMATED COUNT: 13.5 % (ref 11.5–15)
GFR, ESTIMATED: >90 ML/MIN/1.73M2
GLUCOSE BLD-MCNC: 143 MG/DL (ref 74–99)
GLUCOSE BLD-MCNC: 202 MG/DL (ref 74–99)
GLUCOSE BLD-MCNC: 226 MG/DL (ref 74–99)
GLUCOSE BLD-MCNC: 242 MG/DL (ref 74–99)
GLUCOSE BLD-MCNC: 264 MG/DL (ref 74–99)
GLUCOSE BLD-MCNC: 273 MG/DL (ref 74–99)
GLUCOSE SERPL-MCNC: 190 MG/DL (ref 74–99)
HCT VFR BLD AUTO: 25.1 % (ref 37–54)
HGB BLD-MCNC: 7.7 G/DL (ref 12.5–16.5)
IMM GRANULOCYTES # BLD AUTO: 0.18 K/UL (ref 0–0.58)
IMM GRANULOCYTES NFR BLD: 2 % (ref 0–5)
LYMPHOCYTES NFR BLD: 0.42 K/UL (ref 1.5–4)
LYMPHOCYTES RELATIVE PERCENT: 5 % (ref 20–42)
MAGNESIUM SERPL-MCNC: 2.1 MG/DL (ref 1.6–2.6)
MCH RBC QN AUTO: 32 PG (ref 26–35)
MCHC RBC AUTO-ENTMCNC: 30.7 G/DL (ref 32–34.5)
MCV RBC AUTO: 104.1 FL (ref 80–99.9)
MONOCYTES NFR BLD: 0.9 K/UL (ref 0.1–0.95)
MONOCYTES NFR BLD: 10 % (ref 2–12)
NEUTROPHILS NFR BLD: 81 % (ref 43–80)
NEUTS SEG NFR BLD: 7.41 K/UL (ref 1.8–7.3)
PHOSPHATE SERPL-MCNC: 2.6 MG/DL (ref 2.5–4.5)
PLATELET # BLD AUTO: 293 K/UL (ref 130–450)
PMV BLD AUTO: 10.6 FL (ref 7–12)
POTASSIUM SERPL-SCNC: 4.2 MMOL/L (ref 3.5–5)
PROT SERPL-MCNC: 5.4 G/DL (ref 6.4–8.3)
RBC # BLD AUTO: 2.41 M/UL (ref 3.8–5.8)
RBC # BLD: ABNORMAL 10*6/UL
SODIUM SERPL-SCNC: 137 MMOL/L (ref 132–146)
TROPONIN I SERPL HS-MCNC: 19 NG/L (ref 0–11)
WBC OTHER # BLD: 9.2 K/UL (ref 4.5–11.5)

## 2024-08-22 PROCEDURE — 80053 COMPREHEN METABOLIC PANEL: CPT

## 2024-08-22 PROCEDURE — 6360000002 HC RX W HCPCS: Performed by: TRANSPLANT SURGERY

## 2024-08-22 PROCEDURE — 6360000002 HC RX W HCPCS: Performed by: STUDENT IN AN ORGANIZED HEALTH CARE EDUCATION/TRAINING PROGRAM

## 2024-08-22 PROCEDURE — 83735 ASSAY OF MAGNESIUM: CPT

## 2024-08-22 PROCEDURE — 6370000000 HC RX 637 (ALT 250 FOR IP): Performed by: STUDENT IN AN ORGANIZED HEALTH CARE EDUCATION/TRAINING PROGRAM

## 2024-08-22 PROCEDURE — 51798 US URINE CAPACITY MEASURE: CPT

## 2024-08-22 PROCEDURE — 2140000000 HC CCU INTERMEDIATE R&B

## 2024-08-22 PROCEDURE — 85025 COMPLETE CBC W/AUTO DIFF WBC: CPT

## 2024-08-22 PROCEDURE — 84100 ASSAY OF PHOSPHORUS: CPT

## 2024-08-22 PROCEDURE — 2580000003 HC RX 258: Performed by: STUDENT IN AN ORGANIZED HEALTH CARE EDUCATION/TRAINING PROGRAM

## 2024-08-22 PROCEDURE — 82330 ASSAY OF CALCIUM: CPT

## 2024-08-22 PROCEDURE — 97530 THERAPEUTIC ACTIVITIES: CPT

## 2024-08-22 PROCEDURE — 6370000000 HC RX 637 (ALT 250 FOR IP)

## 2024-08-22 PROCEDURE — 82962 GLUCOSE BLOOD TEST: CPT

## 2024-08-22 PROCEDURE — 93005 ELECTROCARDIOGRAM TRACING: CPT | Performed by: TRANSPLANT SURGERY

## 2024-08-22 PROCEDURE — 6370000000 HC RX 637 (ALT 250 FOR IP): Performed by: TRANSPLANT SURGERY

## 2024-08-22 PROCEDURE — 84484 ASSAY OF TROPONIN QUANT: CPT

## 2024-08-22 RX ADMIN — GABAPENTIN 200 MG: 100 CAPSULE ORAL at 14:30

## 2024-08-22 RX ADMIN — GABAPENTIN 200 MG: 100 CAPSULE ORAL at 21:59

## 2024-08-22 RX ADMIN — DOCUSATE SODIUM 100 MG: 50 LIQUID ORAL at 21:58

## 2024-08-22 RX ADMIN — SODIUM CHLORIDE, PRESERVATIVE FREE 40 MG: 5 INJECTION INTRAVENOUS at 03:39

## 2024-08-22 RX ADMIN — OXYCODONE HYDROCHLORIDE 10 MG: 10 TABLET ORAL at 01:37

## 2024-08-22 RX ADMIN — INSULIN LISPRO 12 UNITS: 100 INJECTION, SOLUTION INTRAVENOUS; SUBCUTANEOUS at 14:55

## 2024-08-22 RX ADMIN — METOPROLOL SUCCINATE 25 MG: 25 TABLET, EXTENDED RELEASE ORAL at 10:17

## 2024-08-22 RX ADMIN — HYDROCHLOROTHIAZIDE 12.5 MG: 12.5 TABLET ORAL at 10:17

## 2024-08-22 RX ADMIN — SODIUM CHLORIDE, PRESERVATIVE FREE 40 MG: 5 INJECTION INTRAVENOUS at 17:16

## 2024-08-22 RX ADMIN — FINASTERIDE 5 MG: 5 TABLET, FILM COATED ORAL at 10:17

## 2024-08-22 RX ADMIN — Medication 5 MG: at 21:59

## 2024-08-22 RX ADMIN — DOCUSATE SODIUM 100 MG: 50 LIQUID ORAL at 10:17

## 2024-08-22 RX ADMIN — GABAPENTIN 200 MG: 100 CAPSULE ORAL at 10:17

## 2024-08-22 RX ADMIN — METOCLOPRAMIDE 10 MG: 5 INJECTION, SOLUTION INTRAMUSCULAR; INTRAVENOUS at 21:59

## 2024-08-22 RX ADMIN — INSULIN LISPRO 8 UNITS: 100 INJECTION, SOLUTION INTRAVENOUS; SUBCUTANEOUS at 11:33

## 2024-08-22 RX ADMIN — BISACODYL 10 MG: 10 SUPPOSITORY RECTAL at 10:18

## 2024-08-22 RX ADMIN — INSULIN LISPRO 8 UNITS: 100 INJECTION, SOLUTION INTRAVENOUS; SUBCUTANEOUS at 06:40

## 2024-08-22 RX ADMIN — INSULIN LISPRO 12 UNITS: 100 INJECTION, SOLUTION INTRAVENOUS; SUBCUTANEOUS at 03:38

## 2024-08-22 RX ADMIN — INSULIN LISPRO 8 UNITS: 100 INJECTION, SOLUTION INTRAVENOUS; SUBCUTANEOUS at 19:04

## 2024-08-22 RX ADMIN — SODIUM CHLORIDE, PRESERVATIVE FREE 10 ML: 5 INJECTION INTRAVENOUS at 10:17

## 2024-08-22 RX ADMIN — VALSARTAN 80 MG: 80 TABLET, FILM COATED ORAL at 10:18

## 2024-08-22 RX ADMIN — ENOXAPARIN SODIUM 30 MG: 100 INJECTION SUBCUTANEOUS at 21:58

## 2024-08-22 RX ADMIN — CETIRIZINE HYDROCHLORIDE 10 MG: 10 TABLET, FILM COATED ORAL at 10:18

## 2024-08-22 RX ADMIN — ATORVASTATIN CALCIUM 20 MG: 20 TABLET, FILM COATED ORAL at 21:59

## 2024-08-22 RX ADMIN — SODIUM CHLORIDE, PRESERVATIVE FREE 10 ML: 5 INJECTION INTRAVENOUS at 21:58

## 2024-08-22 RX ADMIN — METOCLOPRAMIDE 10 MG: 5 INJECTION, SOLUTION INTRAMUSCULAR; INTRAVENOUS at 17:16

## 2024-08-22 RX ADMIN — TAMSULOSIN HYDROCHLORIDE 0.4 MG: 0.4 CAPSULE ORAL at 10:18

## 2024-08-22 RX ADMIN — INSULIN GLARGINE 15 UNITS: 100 INJECTION, SOLUTION SUBCUTANEOUS at 21:58

## 2024-08-22 RX ADMIN — METOCLOPRAMIDE 10 MG: 5 INJECTION, SOLUTION INTRAMUSCULAR; INTRAVENOUS at 11:32

## 2024-08-22 RX ADMIN — MONTELUKAST 10 MG: 10 TABLET, FILM COATED ORAL at 21:59

## 2024-08-22 RX ADMIN — ENOXAPARIN SODIUM 30 MG: 100 INJECTION SUBCUTANEOUS at 10:18

## 2024-08-22 RX ADMIN — METOCLOPRAMIDE 10 MG: 5 INJECTION, SOLUTION INTRAMUSCULAR; INTRAVENOUS at 03:39

## 2024-08-22 ASSESSMENT — PAIN SCALES - GENERAL
PAINLEVEL_OUTOF10: 0
PAINLEVEL_OUTOF10: 7
PAINLEVEL_OUTOF10: 0
PAINLEVEL_OUTOF10: 3
PAINLEVEL_OUTOF10: 0
PAINLEVEL_OUTOF10: 0

## 2024-08-22 ASSESSMENT — PAIN DESCRIPTION - LOCATION: LOCATION: ABDOMEN

## 2024-08-22 ASSESSMENT — PAIN DESCRIPTION - DESCRIPTORS: DESCRIPTORS: SORE;TENDER;DISCOMFORT

## 2024-08-22 ASSESSMENT — PAIN - FUNCTIONAL ASSESSMENT: PAIN_FUNCTIONAL_ASSESSMENT: PREVENTS OR INTERFERES SOME ACTIVE ACTIVITIES AND ADLS

## 2024-08-22 NOTE — PROGRESS NOTES
HEPATOBILIARY AND PANCREATIC SURGERY  DAILY PROGRESS NOTE  8/22/2024        Subjective:  Patient passing flatus and had small BM yesterday. Still a bit nauseated but feels better with NGT out. Planning for void trial today per urology. SHARON to bili.     Output by Drain (mL) 08/20/24 0701 - 08/20/24 1900 08/20/24 1901 - 08/21/24 0700 08/21/24 0701 - 08/21/24 1900 08/21/24 1901 - 08/22/24 0700 08/22/24 0701 - 08/22/24 0844   Closed/Suction Drain Right Abdomen;RLQ Bulb 200 100 40 25         Objective:  /72   Pulse 89   Temp 97.7 °F (36.5 °C) (Temporal)   Resp 18   Ht 1.88 m (6' 2\")   Wt 101.5 kg (223 lb 12.8 oz)   SpO2 99%   BMI 28.73 kg/m²     GENERAL:  Lying in bed. No acute distress   HEAD: No gross abnormalities   EYES: scleral icterus   LUNGS:  No increased work of breathing on 2L NC  CARDIOVASCULAR:  RR, normotensive   ABDOMEN:  Softly distended, appropriately tender around midline incision which is c/d/i, R drain to bili bag bile tinged serosang. Prior L SHARON site stitched no drainage.  EXTREMITIES: No edema or swelling  SKIN: Warm and dry      XR ABDOMEN FOR NG/OG/NE TUBE PLACEMENT   Final Result   NG tube in good position.         CT ABDOMEN PELVIS W IV CONTRAST Additional Contrast? Oral   Final Result   1. Findings of Whipple procedure.  No evidence of gastric obstruction.   Gastrojejunostomy appears patent.   2. Small amounts of free air in the upper abdomen including the gallbladder   fossa.  Likely normal postoperative finding, please correlate with date of   surgery.   3. Small amount of fluid and gas measuring 4.1 x 3.0 x 1.1 cm in the   gallbladder fossa could be a normal postop finding depending on date of   surgery.   4. Moderate to severe distention of the cecum and right colon with stool.   Mild colonic ileus of the transverse and left colon.  No evidence of colonic   obstruction.   5. New small bilateral pleural effusions with passive atelectasis of the lung   bases.   6. Periportal edema  8/22/2024 at 4:27 PM

## 2024-08-22 NOTE — PROGRESS NOTES
Occupational Therapy  OT BEDSIDE TREATMENT NOTE      Date:2024  Patient Name: Michael Garcia  MRN: [id number]  : 1945  Room: [address] [place] [address]A     Per OT Eval:    Evaluating OT: Prabhu Dia OTR/L; [id number]      Referring Provider: Demetrius Santoyo III, MD    Specific Provider Orders/Date: OT eval and treat (08/15/24 1200 )     Diagnosis: Adenocarcinoma of pancreas (HCC) [C25.9]  Adenocarcinoma of head of pancreas (HCC) [C25.0]      Surgery/Procedures:   24  1.  Pancreaticoduodenectomy with placement of an 8f and 5F biliary stent   2.  omental pedicle flap  3.  Placement of SHARON drains  2  4.  Extended lymphadenectomy  5.  Intraoperative ultrasound for anatomy   6.  Portal vein reconstruction  7.  Removal of PTHC      Pertinent Medical History:    Past Medical History           Past Medical History:   Diagnosis Date    Deaf, bilateral      Diabetes (HCC)      Hypertension      Osteoarthritis      Primary osteoarthritis of left knee 10/18/2017    Prostate cancer (HCC) 2024            *Precautions:  Fall Risk, +alarms, abdominal precautions, deaf - ASL (able to read lips), NPO, riojas, O2, x2 abdominal SHARON drains     Session Code: 65024     Assessment of current deficits   [x] Functional mobility          [x]ADLs           [x] Strength                  []Cognition   [x] Functional transfers        [x] IADLs         [x] Safety Awareness   [x]Endurance   [x] Fine Coordination           [x] ROM           [] Vision/perception    []Sensation     []Gross Motor Coordination [x] Balance    [] Delirium                  []Motor Control     [] Communication     OT PLAN OF CARE   OT POC based on physician orders, patient diagnosis and results of clinical assessment.        Frequency/Duration: 1-3 days/wk for 1-2 weeks PRN    Specific OT Treatment Interventions to include:   * Instruction/training on adapted ADL techniques and AE recommendations to increase functional independence within precautions      light and phone within reach, BLE elevated and all lines and tubes intact. Patient continues to be limited by decreased strength, balance, functional activity tolerance and safety awareness which is impacting patient's ability to safely and independently complete self care and functional tasks.     Treatment: OT treatment provided this date included:   Instruction/training on safety and adapted techniques for completion of ADLs.    Instruction/training on safe functional mobility/transfer techniques.    Instruction/training on energy conservation/work simplification for completion of ADLs.     Skilled monitoring of vitals and response to OT treatment     Further skilled OT treatment indicated to increase patient's safety and independence with completion of ADL/IADL tasks in order to maximize patient's functional independence and quality of life.    Education: Patient education provided regardin) OT role/purpose and plan of care 2)use of call light for assistance 3)safety during self care and functional tasks . Patient verbalized understanding.    Patient has made fair progress towards set goals.  Continue OT plan of care.    Time In: 1334  Time Out: 1352  Total Treatment Time: 18 minutes      Minutes Units   Therapeutic Ex 30446     Therapeutic Activities 61611 18 1   ADL/Self Care 17396     Orthotic Management 07470     Neuro Re-Ed 39604     Non-Billable Time N/A ---     FANTA Masters/L  License Number: [id number]

## 2024-08-22 NOTE — PATIENT CARE CONFERENCE
P Quality Flow/Interdisciplinary Rounds Progress Note        Quality Flow Rounds held on August 22, 2024    Disciplines Attending:  Bedside Nurse, , , and Nursing Unit Leadership    Michael Garcia was admitted on 8/14/2024  5:28 AM    Anticipated Discharge Date:       Disposition:    Mike Score:  Mike Scale Score: 20    Readmission Risk              Risk of Unplanned Readmission:  26           Discussed patient goal for the day, patient clinical progression, and barriers to discharge.  The following Goal(s) of the Day/Commitment(s) have been identified:  downgrade/discharge planning       Adriana Sandoval RN  August 22, 2024

## 2024-08-22 NOTE — PLAN OF CARE
Problem: Chronic Conditions and Co-morbidities  Goal: Patient's chronic conditions and co-morbidity symptoms are monitored and maintained or improved  Outcome: Progressing     Problem: Nutrition Deficit:  Goal: Optimize nutritional status  Outcome: Progressing     Problem: Hematologic - Adult  Goal: Maintains hematologic stability  Outcome: Progressing     Problem: Metabolic/Fluid and Electrolytes - Adult  Goal: Electrolytes maintained within normal limits  Outcome: Progressing     Problem: Metabolic/Fluid and Electrolytes - Adult  Goal: Hemodynamic stability and optimal renal function maintained  Outcome: Progressing     Problem: Genitourinary - Adult  Goal: Absence of urinary retention  Outcome: Progressing     Problem: Gastrointestinal - Adult  Goal: Minimal or absence of nausea and vomiting  Outcome: Progressing     Problem: Gastrointestinal - Adult  Goal: Maintains or returns to baseline bowel function  Outcome: Progressing     Problem: Musculoskeletal - Adult  Goal: Return mobility to safest level of function  Outcome: Progressing     Problem: Skin/Tissue Integrity - Adult  Goal: Skin integrity remains intact  Outcome: Progressing     Problem: Skin/Tissue Integrity - Adult  Goal: Incisions, wounds, or drain sites healing without S/S of infection  Outcome: Progressing     Problem: Cardiovascular - Adult  Goal: Maintains optimal cardiac output and hemodynamic stability  Outcome: Progressing     Problem: Cardiovascular - Adult  Goal: Absence of cardiac dysrhythmias or at baseline  Outcome: Progressing     Problem: Respiratory - Adult  Goal: Achieves optimal ventilation and oxygenation  Outcome: Progressing     Problem: Neurosensory - Adult  Goal: Achieves stable or improved neurological status  Outcome: Progressing     Problem: ABCDS Injury Assessment  Goal: Absence of physical injury  Outcome: Progressing     Problem: Skin/Tissue Integrity  Goal: Absence of new skin breakdown  Outcome: Progressing    Problem:  Pain  Goal: Verbalizes/displays adequate comfort level or baseline comfort level  Outcome: Progressing     Problem: Safety - Adult  Goal: Free from fall injury  Outcome: Progressing     Problem: Discharge Planning  Goal: Discharge to home or other facility with appropriate resources  Outcome: Progressing

## 2024-08-22 NOTE — CARE COORDINATION
Transition of care update: S/P whipple procedure with portal vein reconstruction on 08/14. CLD. TPN cont. Right SHARON drain to bili bag. Hgb 7.7 and other labs noted. IV reglan 10mg q 6 hrs. Urology following. Met with pt in room. Up in chair. Plan is [facility]omas) when medically ready.  Ambulette form and EXEMPT completed on 08/16. Transport envelope is with pt's soft chart.  Also, [facility] is following pt as back up plan. Cm/sw will follow.

## 2024-08-22 NOTE — PLAN OF CARE
Problem: Discharge Planning  Goal: Discharge to home or other facility with appropriate resources  8/21/2024 2251 by Joann Sarkar RN  Outcome: Progressing  8/21/2024 1108 by Joaquina Mcgregor RN  Outcome: Progressing     Problem: Safety - Adult  Goal: Free from fall injury  8/21/2024 2251 by Joann Sarkar RN  Outcome: Progressing  8/21/2024 1108 by Joaquina Mcgregor RN  Outcome: Progressing     Problem: Pain  Goal: Verbalizes/displays adequate comfort level or baseline comfort level  8/21/2024 2251 by Joann Sarkar RN  Outcome: Progressing  8/21/2024 1108 by Joaquina Mcgregor RN  Outcome: Progressing     Problem: Skin/Tissue Integrity  Goal: Absence of new skin breakdown  Description: 1.  Monitor for areas of redness and/or skin breakdown  2.  Assess vascular access sites hourly  3.  Every 4-6 hours minimum:  Change oxygen saturation probe site  4.  Every 4-6 hours:  If on nasal continuous positive airway pressure, respiratory therapy assess nares and determine need for appliance change or resting period.  8/21/2024 2251 by Joann Sarkar RN  Outcome: Progressing  8/21/2024 1108 by Joaquina Mcgregor RN  Outcome: Progressing     Problem: ABCDS Injury Assessment  Goal: Absence of physical injury  8/21/2024 2251 by Joann Sarkar RN  Outcome: Progressing  8/21/2024 1108 by Joaquina Mcgregor RN  Outcome: Progressing     Problem: Musculoskeletal - Adult  Goal: Return mobility to safest level of function  8/21/2024 2251 by Joann Sarkar RN  Outcome: Progressing  8/21/2024 1108 by Joaquina Mcgregor RN  Outcome: Progressing     Problem: Genitourinary - Adult  Goal: Urinary catheter remains patent  8/21/2024 2251 by Joann Sarkar RN  Outcome: Progressing  8/21/2024 1108 by Joaquina Mcgregor RN  Outcome: Progressing

## 2024-08-23 LAB
ALBUMIN SERPL-MCNC: 2.7 G/DL (ref 3.5–5.2)
ALBUMIN SERPL-MCNC: 2.9 G/DL (ref 3.5–5.2)
ALP SERPL-CCNC: 247 U/L (ref 40–129)
ALP SERPL-CCNC: 248 U/L (ref 40–129)
ALT SERPL-CCNC: 61 U/L (ref 0–40)
ALT SERPL-CCNC: 64 U/L (ref 0–40)
ANION GAP SERPL CALCULATED.3IONS-SCNC: 10 MMOL/L (ref 7–16)
ANION GAP SERPL CALCULATED.3IONS-SCNC: 12 MMOL/L (ref 7–16)
AST SERPL-CCNC: 32 U/L (ref 0–39)
AST SERPL-CCNC: 34 U/L (ref 0–39)
BASOPHILS # BLD: 0.04 K/UL (ref 0–0.2)
BASOPHILS NFR BLD: 0 % (ref 0–2)
BILIRUB SERPL-MCNC: 2.5 MG/DL (ref 0–1.2)
BILIRUB SERPL-MCNC: 3 MG/DL (ref 0–1.2)
BUN SERPL-MCNC: 17 MG/DL (ref 6–23)
BUN SERPL-MCNC: 19 MG/DL (ref 6–23)
CA-I BLD-SCNC: 1.16 MMOL/L (ref 1.15–1.33)
CALCIUM SERPL-MCNC: 8.4 MG/DL (ref 8.6–10.2)
CALCIUM SERPL-MCNC: 8.4 MG/DL (ref 8.6–10.2)
CHLORIDE SERPL-SCNC: 98 MMOL/L (ref 98–107)
CHLORIDE SERPL-SCNC: 99 MMOL/L (ref 98–107)
CO2 SERPL-SCNC: 24 MMOL/L (ref 22–29)
CO2 SERPL-SCNC: 25 MMOL/L (ref 22–29)
CREAT SERPL-MCNC: 0.7 MG/DL (ref 0.7–1.2)
CREAT SERPL-MCNC: 0.8 MG/DL (ref 0.7–1.2)
EKG ATRIAL RATE: 105 BPM
EKG P AXIS: 36 DEGREES
EKG P-R INTERVAL: 160 MS
EKG Q-T INTERVAL: 354 MS
EKG QRS DURATION: 82 MS
EKG QTC CALCULATION (BAZETT): 467 MS
EKG R AXIS: 7 DEGREES
EKG T AXIS: 1 DEGREES
EKG VENTRICULAR RATE: 105 BPM
EOSINOPHIL # BLD: 0.31 K/UL (ref 0.05–0.5)
EOSINOPHILS RELATIVE PERCENT: 2 % (ref 0–6)
ERYTHROCYTE [DISTWIDTH] IN BLOOD BY AUTOMATED COUNT: 13.7 % (ref 11.5–15)
ERYTHROCYTE [DISTWIDTH] IN BLOOD BY AUTOMATED COUNT: 13.8 % (ref 11.5–15)
GFR, ESTIMATED: >90 ML/MIN/1.73M2
GFR, ESTIMATED: >90 ML/MIN/1.73M2
GLUCOSE BLD-MCNC: 104 MG/DL (ref 74–99)
GLUCOSE BLD-MCNC: 110 MG/DL (ref 74–99)
GLUCOSE BLD-MCNC: 152 MG/DL (ref 74–99)
GLUCOSE BLD-MCNC: 158 MG/DL (ref 74–99)
GLUCOSE BLD-MCNC: 218 MG/DL (ref 74–99)
GLUCOSE SERPL-MCNC: 209 MG/DL (ref 74–99)
GLUCOSE SERPL-MCNC: 98 MG/DL (ref 74–99)
HCT VFR BLD AUTO: 26.1 % (ref 37–54)
HCT VFR BLD AUTO: 26.1 % (ref 37–54)
HGB BLD-MCNC: 8.1 G/DL (ref 12.5–16.5)
HGB BLD-MCNC: 8.4 G/DL (ref 12.5–16.5)
IMM GRANULOCYTES # BLD AUTO: 0.19 K/UL (ref 0–0.58)
IMM GRANULOCYTES NFR BLD: 1 % (ref 0–5)
LYMPHOCYTES NFR BLD: 0.57 K/UL (ref 1.5–4)
LYMPHOCYTES RELATIVE PERCENT: 4 % (ref 20–42)
MAGNESIUM SERPL-MCNC: 1.9 MG/DL (ref 1.6–2.6)
MAGNESIUM SERPL-MCNC: 1.9 MG/DL (ref 1.6–2.6)
MCH RBC QN AUTO: 31.9 PG (ref 26–35)
MCH RBC QN AUTO: 33.3 PG (ref 26–35)
MCHC RBC AUTO-ENTMCNC: 31 G/DL (ref 32–34.5)
MCHC RBC AUTO-ENTMCNC: 32.2 G/DL (ref 32–34.5)
MCV RBC AUTO: 102.8 FL (ref 80–99.9)
MCV RBC AUTO: 103.6 FL (ref 80–99.9)
MONOCYTES NFR BLD: 1.1 K/UL (ref 0.1–0.95)
MONOCYTES NFR BLD: 8 % (ref 2–12)
NEUTROPHILS NFR BLD: 84 % (ref 43–80)
NEUTS SEG NFR BLD: 11.42 K/UL (ref 1.8–7.3)
PHOSPHATE SERPL-MCNC: 3.3 MG/DL (ref 2.5–4.5)
PHOSPHATE SERPL-MCNC: 3.9 MG/DL (ref 2.5–4.5)
PLATELET # BLD AUTO: 310 K/UL (ref 130–450)
PLATELET # BLD AUTO: 358 K/UL (ref 130–450)
PMV BLD AUTO: 10.2 FL (ref 7–12)
PMV BLD AUTO: 10.7 FL (ref 7–12)
POTASSIUM SERPL-SCNC: 4.2 MMOL/L (ref 3.5–5)
POTASSIUM SERPL-SCNC: 4.9 MMOL/L (ref 3.5–5)
PROT SERPL-MCNC: 5.6 G/DL (ref 6.4–8.3)
PROT SERPL-MCNC: 6 G/DL (ref 6.4–8.3)
RBC # BLD AUTO: 2.52 M/UL (ref 3.8–5.8)
RBC # BLD AUTO: 2.54 M/UL (ref 3.8–5.8)
SODIUM SERPL-SCNC: 134 MMOL/L (ref 132–146)
SODIUM SERPL-SCNC: 134 MMOL/L (ref 132–146)
SURGICAL PATHOLOGY REPORT: NORMAL
TROPONIN I SERPL HS-MCNC: 22 NG/L (ref 0–11)
WBC OTHER # BLD: 13.6 K/UL (ref 4.5–11.5)
WBC OTHER # BLD: 18.2 K/UL (ref 4.5–11.5)

## 2024-08-23 PROCEDURE — 6370000000 HC RX 637 (ALT 250 FOR IP): Performed by: TRANSPLANT SURGERY

## 2024-08-23 PROCEDURE — 2140000000 HC CCU INTERMEDIATE R&B

## 2024-08-23 PROCEDURE — 36415 COLL VENOUS BLD VENIPUNCTURE: CPT

## 2024-08-23 PROCEDURE — 82962 GLUCOSE BLOOD TEST: CPT

## 2024-08-23 PROCEDURE — 51798 US URINE CAPACITY MEASURE: CPT

## 2024-08-23 PROCEDURE — 85025 COMPLETE CBC W/AUTO DIFF WBC: CPT

## 2024-08-23 PROCEDURE — 2580000003 HC RX 258: Performed by: STUDENT IN AN ORGANIZED HEALTH CARE EDUCATION/TRAINING PROGRAM

## 2024-08-23 PROCEDURE — 97530 THERAPEUTIC ACTIVITIES: CPT

## 2024-08-23 PROCEDURE — 83735 ASSAY OF MAGNESIUM: CPT

## 2024-08-23 PROCEDURE — 81001 URINALYSIS AUTO W/SCOPE: CPT

## 2024-08-23 PROCEDURE — 87086 URINE CULTURE/COLONY COUNT: CPT

## 2024-08-23 PROCEDURE — 97535 SELF CARE MNGMENT TRAINING: CPT

## 2024-08-23 PROCEDURE — 6370000000 HC RX 637 (ALT 250 FOR IP)

## 2024-08-23 PROCEDURE — 93005 ELECTROCARDIOGRAM TRACING: CPT

## 2024-08-23 PROCEDURE — 85027 COMPLETE CBC AUTOMATED: CPT

## 2024-08-23 PROCEDURE — 87040 BLOOD CULTURE FOR BACTERIA: CPT

## 2024-08-23 PROCEDURE — 2580000003 HC RX 258: Performed by: TRANSPLANT SURGERY

## 2024-08-23 PROCEDURE — 6360000002 HC RX W HCPCS: Performed by: TRANSPLANT SURGERY

## 2024-08-23 PROCEDURE — 93010 ELECTROCARDIOGRAM REPORT: CPT | Performed by: INTERNAL MEDICINE

## 2024-08-23 PROCEDURE — 6370000000 HC RX 637 (ALT 250 FOR IP): Performed by: STUDENT IN AN ORGANIZED HEALTH CARE EDUCATION/TRAINING PROGRAM

## 2024-08-23 PROCEDURE — 80053 COMPREHEN METABOLIC PANEL: CPT

## 2024-08-23 PROCEDURE — 84484 ASSAY OF TROPONIN QUANT: CPT

## 2024-08-23 PROCEDURE — 2580000003 HC RX 258: Performed by: RADIOLOGY

## 2024-08-23 PROCEDURE — 6360000002 HC RX W HCPCS: Performed by: STUDENT IN AN ORGANIZED HEALTH CARE EDUCATION/TRAINING PROGRAM

## 2024-08-23 PROCEDURE — 82330 ASSAY OF CALCIUM: CPT

## 2024-08-23 PROCEDURE — P9047 ALBUMIN (HUMAN), 25%, 50ML: HCPCS

## 2024-08-23 PROCEDURE — 84100 ASSAY OF PHOSPHORUS: CPT

## 2024-08-23 PROCEDURE — 6360000002 HC RX W HCPCS

## 2024-08-23 RX ORDER — PANCRELIPASE 36000; 180000; 114000 [USP'U]/1; [USP'U]/1; [USP'U]/1
2 CAPSULE, DELAYED RELEASE PELLETS ORAL
Qty: 540 CAPSULE | Refills: 3 | Status: SHIPPED | OUTPATIENT
Start: 2024-08-23 | End: 2024-09-06 | Stop reason: HOSPADM

## 2024-08-23 RX ORDER — SODIUM CHLORIDE, SODIUM LACTATE, POTASSIUM CHLORIDE, CALCIUM CHLORIDE 600; 310; 30; 20 MG/100ML; MG/100ML; MG/100ML; MG/100ML
INJECTION, SOLUTION INTRAVENOUS CONTINUOUS
Status: DISCONTINUED | OUTPATIENT
Start: 2024-08-23 | End: 2024-08-27

## 2024-08-23 RX ORDER — INSULIN LISPRO 100 [IU]/ML
0-4 INJECTION, SOLUTION INTRAVENOUS; SUBCUTANEOUS NIGHTLY
Status: DISCONTINUED | OUTPATIENT
Start: 2024-08-23 | End: 2024-08-29

## 2024-08-23 RX ORDER — SENNA AND DOCUSATE SODIUM 50; 8.6 MG/1; MG/1
2 TABLET, FILM COATED ORAL DAILY
Qty: 42 TABLET | Refills: 0 | Status: SHIPPED | OUTPATIENT
Start: 2024-08-23 | End: 2024-09-13

## 2024-08-23 RX ORDER — MAGNESIUM SULFATE IN WATER 40 MG/ML
2000 INJECTION, SOLUTION INTRAVENOUS ONCE
Status: COMPLETED | OUTPATIENT
Start: 2024-08-23 | End: 2024-08-24

## 2024-08-23 RX ORDER — METOCLOPRAMIDE 5 MG/1
5 TABLET ORAL 3 TIMES DAILY
Qty: 90 TABLET | Refills: 0 | Status: SHIPPED | OUTPATIENT
Start: 2024-08-23 | End: 2024-09-22

## 2024-08-23 RX ORDER — OXYCODONE AND ACETAMINOPHEN 5; 325 MG/1; MG/1
1 TABLET ORAL EVERY 6 HOURS PRN
Qty: 28 TABLET | Refills: 0 | Status: SHIPPED | OUTPATIENT
Start: 2024-08-23 | End: 2024-08-30

## 2024-08-23 RX ORDER — ASPIRIN 81 MG/1
81 TABLET ORAL DAILY
Status: DISCONTINUED | OUTPATIENT
Start: 2024-08-23 | End: 2024-08-29

## 2024-08-23 RX ORDER — INSULIN LISPRO 100 [IU]/ML
0-16 INJECTION, SOLUTION INTRAVENOUS; SUBCUTANEOUS
Status: DISCONTINUED | OUTPATIENT
Start: 2024-08-23 | End: 2024-08-29

## 2024-08-23 RX ORDER — PANTOPRAZOLE SODIUM 40 MG/1
40 TABLET, DELAYED RELEASE ORAL 2 TIMES DAILY
Qty: 60 TABLET | Refills: 2 | Status: SHIPPED | OUTPATIENT
Start: 2024-08-23 | End: 2024-09-06

## 2024-08-23 RX ORDER — SENNA AND DOCUSATE SODIUM 50; 8.6 MG/1; MG/1
2 TABLET, FILM COATED ORAL DAILY
Status: DISCONTINUED | OUTPATIENT
Start: 2024-08-23 | End: 2024-09-07 | Stop reason: HOSPADM

## 2024-08-23 RX ORDER — ALBUMIN (HUMAN) 12.5 G/50ML
25 SOLUTION INTRAVENOUS ONCE
Status: COMPLETED | OUTPATIENT
Start: 2024-08-23 | End: 2024-08-23

## 2024-08-23 RX ADMIN — BISACODYL 10 MG: 10 SUPPOSITORY RECTAL at 09:17

## 2024-08-23 RX ADMIN — MONTELUKAST 10 MG: 10 TABLET, FILM COATED ORAL at 21:19

## 2024-08-23 RX ADMIN — METOCLOPRAMIDE 10 MG: 5 INJECTION, SOLUTION INTRAMUSCULAR; INTRAVENOUS at 11:55

## 2024-08-23 RX ADMIN — GABAPENTIN 200 MG: 100 CAPSULE ORAL at 15:26

## 2024-08-23 RX ADMIN — ENOXAPARIN SODIUM 30 MG: 100 INJECTION SUBCUTANEOUS at 21:18

## 2024-08-23 RX ADMIN — METOCLOPRAMIDE 10 MG: 5 INJECTION, SOLUTION INTRAMUSCULAR; INTRAVENOUS at 17:17

## 2024-08-23 RX ADMIN — SENNOSIDES AND DOCUSATE SODIUM 2 TABLET: 50; 8.6 TABLET ORAL at 09:13

## 2024-08-23 RX ADMIN — Medication 10 ML: at 11:55

## 2024-08-23 RX ADMIN — GABAPENTIN 200 MG: 100 CAPSULE ORAL at 09:13

## 2024-08-23 RX ADMIN — SODIUM CHLORIDE, PRESERVATIVE FREE 10 ML: 5 INJECTION INTRAVENOUS at 09:08

## 2024-08-23 RX ADMIN — INSULIN GLARGINE 15 UNITS: 100 INJECTION, SOLUTION SUBCUTANEOUS at 21:18

## 2024-08-23 RX ADMIN — METOCLOPRAMIDE 10 MG: 5 INJECTION, SOLUTION INTRAMUSCULAR; INTRAVENOUS at 23:07

## 2024-08-23 RX ADMIN — SODIUM CHLORIDE, PRESERVATIVE FREE 40 MG: 5 INJECTION INTRAVENOUS at 17:17

## 2024-08-23 RX ADMIN — FINASTERIDE 5 MG: 5 TABLET, FILM COATED ORAL at 09:13

## 2024-08-23 RX ADMIN — SODIUM CHLORIDE, PRESERVATIVE FREE 40 MG: 5 INJECTION INTRAVENOUS at 04:17

## 2024-08-23 RX ADMIN — SODIUM CHLORIDE, POTASSIUM CHLORIDE, SODIUM LACTATE AND CALCIUM CHLORIDE: 600; 310; 30; 20 INJECTION, SOLUTION INTRAVENOUS at 21:39

## 2024-08-23 RX ADMIN — SODIUM CHLORIDE, PRESERVATIVE FREE 10 ML: 5 INJECTION INTRAVENOUS at 21:20

## 2024-08-23 RX ADMIN — ALBUMIN (HUMAN) 25 G: 0.25 INJECTION, SOLUTION INTRAVENOUS at 21:26

## 2024-08-23 RX ADMIN — OXYCODONE HYDROCHLORIDE 5 MG: 5 TABLET ORAL at 21:26

## 2024-08-23 RX ADMIN — CETIRIZINE HYDROCHLORIDE 10 MG: 10 TABLET, FILM COATED ORAL at 09:13

## 2024-08-23 RX ADMIN — GABAPENTIN 200 MG: 100 CAPSULE ORAL at 21:19

## 2024-08-23 RX ADMIN — ASPIRIN 81 MG: 81 TABLET, COATED ORAL at 09:13

## 2024-08-23 RX ADMIN — HYDROCHLOROTHIAZIDE 12.5 MG: 12.5 TABLET ORAL at 09:13

## 2024-08-23 RX ADMIN — ENOXAPARIN SODIUM 30 MG: 100 INJECTION SUBCUTANEOUS at 09:11

## 2024-08-23 RX ADMIN — VALSARTAN 80 MG: 80 TABLET, FILM COATED ORAL at 09:13

## 2024-08-23 RX ADMIN — METOPROLOL SUCCINATE 25 MG: 25 TABLET, EXTENDED RELEASE ORAL at 09:13

## 2024-08-23 RX ADMIN — MAGNESIUM SULFATE HEPTAHYDRATE 2000 MG: 40 INJECTION, SOLUTION INTRAVENOUS at 23:38

## 2024-08-23 RX ADMIN — Medication 10 ML: at 17:17

## 2024-08-23 RX ADMIN — Medication 5 MG: at 21:19

## 2024-08-23 RX ADMIN — Medication 10 ML: at 09:11

## 2024-08-23 RX ADMIN — METOCLOPRAMIDE 10 MG: 5 INJECTION, SOLUTION INTRAMUSCULAR; INTRAVENOUS at 04:17

## 2024-08-23 RX ADMIN — TAMSULOSIN HYDROCHLORIDE 0.4 MG: 0.4 CAPSULE ORAL at 09:13

## 2024-08-23 RX ADMIN — ATORVASTATIN CALCIUM 20 MG: 20 TABLET, FILM COATED ORAL at 21:19

## 2024-08-23 ASSESSMENT — PAIN DESCRIPTION - LOCATION
LOCATION: ABDOMEN
LOCATION: ABDOMEN

## 2024-08-23 ASSESSMENT — PAIN SCALES - GENERAL
PAINLEVEL_OUTOF10: 0
PAINLEVEL_OUTOF10: 5
PAINLEVEL_OUTOF10: 0
PAINLEVEL_OUTOF10: 5

## 2024-08-23 ASSESSMENT — PAIN DESCRIPTION - ORIENTATION
ORIENTATION: MID
ORIENTATION: MID

## 2024-08-23 ASSESSMENT — PAIN DESCRIPTION - DESCRIPTORS
DESCRIPTORS: ACHING;DISCOMFORT;SORE
DESCRIPTORS: ACHING;DISCOMFORT;SORE

## 2024-08-23 NOTE — PROGRESS NOTES
Per patient, okay to provide update to his sister Sarah. Attempted to call back, no answer. LVM with unit phone number.    Electronically signed by Matilda Saldivar RN on 8/23/2024 at 2:05 PM

## 2024-08-23 NOTE — PROGRESS NOTES
Right SHARON drain removed. Minimal bilious output from drain site. 1mL of 1% lidocaine was injected around the area. Placed one stitch with 2-0 Silk suture to close the site. Patient tolerated well with no complications.   Dedrick Larry D.O.  Resident, PGY-1  8/23/2024  9:30 A

## 2024-08-23 NOTE — DISCHARGE INSTRUCTIONS
Post op Abdominal Surgery Instructions    1.  May shower daily with Dial soap and water.  Pat incisions dry. No lotions, creams or powders. No tub baths/hot tubs or pools.    2. No lifting [id number]; no pushing/pulling. No driving until after seen by surgeon in the office. May go up and down a flight of stairs 1-2X daily.  Walk at least 3x daily.  No sexual activity until after seen in the office.    3.  Okay for diet, please eat small meals throughout the day and drink glucerna shakes.  Please try to drink 3-4 glucerna shakes a day.  I highly recommend eating small meals throughout the day so you don't get sick.    4. Take all medications as listed on your Discharge Instruction sheet    5. Call Your Doctor If Any of the Following Occurs     Signs of infection, including fever and chills   Redness, swelling, increasing pain, excessive bleeding, or discharge at the incision site   Cough, shortness of breath, chest pain   Increased abdominal pain or throwing up  Nausea and/or vomiting that does not resolve off narcotics.  Pain, burning, urgency or frequency of urination, or blood in the urine   Pain and/or swelling in your feet, calves, or legs   Dark urine, light stools, or evidence of jaundice (yellowing of the skin or eyes).   In case of an emergency,    CALL 911  immediately.     6. Follow up with Dr. Santoyo this coming week, please call his office ([phone]) upon discharge to schedule an appointment.       Tube Feed Instructions:    Patient is to continue peptide based high protein formula @ 65cc/hr with free water 150cc q4hrs. NO MEDICATIONS TO BE PLACED THROUGH CORPAK. This is for tube feeding only. Patient may take Clear ensures, sips of clear liquids, ice chips, popsicles PO as tolerates.    Your Swan catheter was exchanged in the emergency department.  The urine is now freely draining.  You are stable for discharge home at this time.  Please continue all your regularly scheduled outpatient follow-up.  Please return to the nearest emergency department immediately with new or worsening symptoms  You were also noted to have an infection in your urine.  A prescription for antibiotics was sent to your pharmacy.  Please take as prescribed for the full duration of therapy.  Please follow-up with primary care provider for continued care and reevaluation

## 2024-08-23 NOTE — PROGRESS NOTES
8/23/2024 11:06 AM  Service: Urology  Group: [facility] ([facility])    Michael Garcia  [id number]    Subjective: Afebrile apparently the patient is doing well with his trial of voiding no frequency urgency no incomplete emptying.  No additional urologic plans at this    Review of Systems  Respiratory: negative  Cardiovascular: negative  Gastrointestinal: negative  Hematologic/lymphatic: negative  Musculoskeletal:negative  Neurological: negative  Endocrine: negative    Scheduled Meds:   aspirin  81 mg Oral Daily    sennosides-docusate sodium  2 tablet Oral Daily    bisacodyl  10 mg Rectal Daily    metoclopramide  10 mg IntraVENous Q6H    acetaminophen  500 mg Oral 4 times per day    enoxaparin  30 mg SubCUTAneous BID    melatonin  5 mg Oral Nightly    cetirizine  10 mg Oral Daily    finasteride  5 mg Oral Daily    metoprolol succinate  25 mg Oral Daily    montelukast  10 mg Oral Nightly    atorvastatin  20 mg Oral Nightly    tamsulosin  0.4 mg Oral Daily    valsartan  80 mg Oral Daily    And    hydroCHLOROthiazide  12.5 mg Oral Daily    sodium chloride flush  5-40 mL IntraVENous 2 times per day    pantoprazole (PROTONIX) 40 mg in sodium chloride (PF) 0.9 % 10 mL injection  40 mg IntraVENous Q12H    gabapentin  200 mg Oral TID    insulin glargine  15 Units SubCUTAneous Nightly    insulin lispro  0-16 Units SubCUTAneous Q4H       Objective:  Vitals:    08/23/24 0737   BP: 119/79   Pulse: 96   Resp: 13   Temp: 98.1 °F (36.7 °C)   SpO2: 96%         Allergies: No known allergies    General Appearance: alert and oriented to person, place and time and in no acute distress  Skin: no rash or erythema  Head: normocephalic and atraumatic  Pulmonary/Chest: clear to auscultation bilaterally- no wheezes, rales or rhonchi, normal air movement, no respiratory distress and no chest wall tenderness  Abdomen: soft, non-tender, non-distended, normal bowel sounds, no masses or organomegaly and no inguinal adenopathy  Genitalia: No

## 2024-08-23 NOTE — PROGRESS NOTES
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  Pancreaticoduodenectomy with placement of an 8f and 5F biliary stent   2.  omental pedicle flap  3.  Placement of SHARON drains  2  4.  Extended lymphadenectomy  5.  Intraoperative ultrasound for anatomy   6.  Portal vein reconstruction  7.  Removal of PTHC      Pertinent Medical History:    Past Medical History        Past Medical History:   Diagnosis Date    Deaf, bilateral      Diabetes (HCC)      Hypertension      Osteoarthritis      Primary osteoarthritis of left knee 10/18/2017    Prostate cancer (HCC) 2024            *Precautions:  Fall Risk, +alarms, abdominal precautions, deaf - ASL (able to read lips),    Session ID: [id number]      Assessment of current deficits   [x] Functional mobility          [x]ADLs           [x] Strength                  []Cognition   [x] Functional transfers        [x] IADLs         [x] Safety Awareness   [x]Endurance   [x] Fine Coordination           [x] ROM           [] Vision/perception    []Sensation     []Gross Motor Coordination [x] Balance    [] Delirium                  []Motor Control     [] Communication     OT PLAN OF CARE   OT POC based on physician orders, patient diagnosis and results of clinical assessment.        Frequency/Duration: 1-3 days/wk for 1-2 weeks PRN    Specific OT Treatment Interventions to include:   * Instruction/training on adapted ADL techniques and AE recommendations to increase  functional independence within precautions       * Training on energy conservation strategies, correct breathing pattern and techniques to improve independence/tolerance for self-care routine  * Functional transfer/mobility training/DME recommendations for increased independence, safety, and fall prevention  * Patient/Family education to increase follow through with safety techniques and functional independence  * Recommendation of environmental modifications for increased safety with functional transfers/mobility and ADLs  * Therapeutic exercise to improve motor endurance, ROM, and functional strength for ADLs/functional transfers  * Therapeutic activities to facilitate/challenge dynamic balance, stand tolerance for increased safety and independence with ADLs  * Positioning to improve skin integrity, interaction with environment and functional independence        Recommended Adaptive Equipment: AE LB dressing/bathing PRN      Home Living: Pt lives with significant other in a 2 story home with 3 step(s) to enter and 1 rail(s); bed/bath on 1st floor.  Bathroom setup: Walk-in shower  Equipment owned: ww     Prior Level of Function: IND with ADLs;  IND with IADLs. No use of AD for functional mobility.   Driving: Yes  Occupation: None reported     Pain Level: Pt did not complain of pain this session     Cognition: A&O: 4/4 ; Follows 1-2 step commands             Memory: Good             Comprehension: Fair+             Problem solving: Fair             Judgement/safety: Fair                Communication skills: WFL             Vision: WFL                    Glasses:Yes - readers                                                       Hearing: WFL               RASS: 0  CAM-ICU: (NT) Delirium     Functional Assessment:  AM-PAC Daily Activity Raw Score: 16/24    Initial Eval Status  Date: 8/15/24 Treatment Status  Date: 8/23/24 STGs = LTGs  Time frame: 7-14 days   Feeding Setup  For tray table seated in bedside chair

## 2024-08-23 NOTE — PROGRESS NOTES
HEPATOBILIARY AND PANCREATIC SURGERY  DAILY PROGRESS NOTE  8/23/2024        Subjective:  No issues overnight. Feeling well today. Had a soft BM. Denies abdominal pain, nausea, or vomiting.   Output by Drain (mL) 08/21/24 0701 - 08/21/24 1900 08/21/24 1901 - 08/22/24 0700 08/22/24 0701 - 08/22/24 1900 08/22/24 1901 - 08/23/24 0653   Closed/Suction Drain Right Abdomen;RLQ Bulb 40 25 20 10        Objective:  /65   Pulse 99   Temp 97.5 °F (36.4 °C) (Temporal)   Resp 18   Ht 1.88 m (6' 2\")   Wt 101.6 kg (224 lb)   SpO2 96%   BMI 28.76 kg/m²     GENERAL:  Lying in bed. No acute distress   HEAD: No gross abnormalities   EYES: scleral icterus   LUNGS:  No increased work of breathing on 2L NC  CARDIOVASCULAR:  RR, normotensive   ABDOMEN:  Softly distended, appropriately tender around midline incision which is c/d/i, R drain to bili bag bile tinged serosang. Prior L SHARON site stitched no drainage.  EXTREMITIES: No edema or swelling  SKIN: Warm and dry      XR ABDOMEN FOR NG/OG/NE TUBE PLACEMENT   Final Result   NG tube in good position.         CT ABDOMEN PELVIS W IV CONTRAST Additional Contrast? Oral   Final Result   1. Findings of Whipple procedure.  No evidence of gastric obstruction.   Gastrojejunostomy appears patent.   2. Small amounts of free air in the upper abdomen including the gallbladder   fossa.  Likely normal postoperative finding, please correlate with date of   surgery.   3. Small amount of fluid and gas measuring 4.1 x 3.0 x 1.1 cm in the   gallbladder fossa could be a normal postop finding depending on date of   surgery.   4. Moderate to severe distention of the cecum and right colon with stool.   Mild colonic ileus of the transverse and left colon.  No evidence of colonic   obstruction.   5. New small bilateral pleural effusions with passive atelectasis of the lung   bases.   6. Periportal edema and mild intrahepatic biliary ductal prominence. Stents   are present in the right and left common

## 2024-08-23 NOTE — CARE COORDINATION
Transition of care update:  S/P whipple procedure with portal vein reconstruction on 08/14. Advance to low-fat, diabetic diet. Right SHARON drain removed. Labs noted.  Met with pt in room earlier today. Up in chair. On RA. PT treatment from today am-pac 15/24 and ambulated 2x25ft Amanda FWW, OT treatment from today am-pac 16/24. Will call pt's fianceRazia, to further discuss dc planning.     1440  Spoke with  Razia and plan is to home with [facility]. Pt will need a FWW for home. No preference for dme company and referral was made to G[facility] with [facility]. Gale will deliver FWW to pt's room today. HH orders on chart. Kim with [facility] was updated on pt. Cm/sw will follow.     1455  Updated Jane with [facility] that pt will be returning home at discharge.

## 2024-08-23 NOTE — PROGRESS NOTES
Received a message that a family member had called to request an update. Only contact number listed is Razia. Called her, she states it was not her that called for an update. She denies any questions at this time, states she already talked to patient today and will be in to visit.    Electronically signed by Matilda Saldivar RN on 8/23/2024 at 10:45 AM

## 2024-08-23 NOTE — FLOWSHEET NOTE
08/23/24 1529   Output (mL)   Urine 275 mL   Urine Assessment   Urinary Status Voiding   Urinary Incontinence Absent   Urine Color Mari   Urine Appearance Clear   Bladder Scan Volume (mL) 64 mL   $ Bladder scan $ Yes     Electronically signed by Matilda Saldivar RN on 8/23/2024 at 3:37 PM

## 2024-08-23 NOTE — PATIENT CARE CONFERENCE
[facility] Quality Flow/Interdisciplinary Rounds Progress Note        Quality Flow Rounds held on August 23, 2024    Disciplines Attending:  Bedside Nurse, , , and Nursing Unit Leadership    Michael Garcia was admitted on 8/14/2024  5:28 AM    Anticipated Discharge Date:       Disposition:    Mike Score:  Mike Scale Score: 19    Readmission Risk              Risk of Unplanned Readmission:  31           Discussed patient goal for the day, patient clinical progression, and barriers to discharge.  The following Goal(s) of the Day/Commitment(s) have been identified:  Labs - Report Results and be able to urinate with low post void residual      Yana De La Garza RN  August 23, 2024

## 2024-08-23 NOTE — PROGRESS NOTES
Physician Progress Note      PATIENT:               KELSEY AYALA  [facility] #:                  [id number]  :                       1945  ADMIT DATE:       2024 5:28 AM  DISCH DATE:  RESPONDING  PROVIDER #:        Demetrius BAEZ III, MD          QUERY TEXT:    Patient admitted with pancreatic head adenocarcinoma. Noted to have   documentation of moderate malnutrition in Dietician progress note dated .   If possible, please document in progress notes and discharge summary if you   are evaluating and /or treating any of the following:    The medical record reflects the following:  Risk Factors: Pancreatic head adenocarcinoma,  Clinical Indicators: Per dietician note , Energy Intake:  50% or less of   estimated energy requirements for 5 or more days  Weight Loss:   (mild wt loss   - 3.2% x 1 month)   Body Fat Loss:  Mild body fat loss Orbital  Muscle Mass Loss:  Mild muscle mass loss Temples (temporalis), Clavicles   (pectoralis & deltoids)  Treatment: Dietician consult    ASPEN Criteria:    https://aspenjournals.onlinelibrary.conti.com/doi/full/10.1177/336992652279151  5  Options provided:  -- Protein calorie malnutrition moderate  -- Other - I will add my own diagnosis  -- Disagree - Not applicable / Not valid  -- Disagree - Clinically unable to determine / Unknown  -- Refer to Clinical Documentation Reviewer    PROVIDER RESPONSE TEXT:    This patient has moderate protein calorie malnutrition.    Query created by: Stacey Alonzo on 2024 6:38 PM      Electronically signed by:  Demetrius BAEZ III, MD 2024 7:46 AM

## 2024-08-23 NOTE — PROGRESS NOTES
RN obtained x2 peripheral IV sites on patient. Per order, RN to remove patient's central line once TPN bag completed. RN removed central line without difficulty. Patient tolerated well. Pressure held and dressing applied.

## 2024-08-23 NOTE — PROGRESS NOTES
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      Pt ambulated without device and was independent PTA.    OBJECTIVE:   Initial Evaluation  Date: 8/15/24 Treatment  24 Short Term/ Long Term   Goals   AM-PAC 6 Clicks 11/24 15/24    Was pt agreeable to Eval/treatment? Yes yes    Does pt have pain? 5/10 abdominal pain C/o no pain    Bed Mobility  Rolling: NT  Supine to sit: ModA with HOB elevated  Sit to supine: NT  Scooting: MaxA Rolling: NT  Supine to sit: ModA with HOB elevated  Sit to supine: NT  Scooting: NT Mod Independent    Transfers Sit to stand: ModA  Stand to sit: ModA  Stand pivot: ModA no device Sit to stand: Garland  Stand to sit: Garland  Stand pivot: Garland FWW Mod Independent with AAD   Ambulation   A few steps to chair with ModA no device 2x25 feet min A FWW >400 feet with Mod Independent with AAD   Stair negotiation: ascended and descended NT NT >4 steps with 1 rail Mod Independent   ROM BUE:  Defer to OT note  BLE:  WFL     Strength BUE:  Defer to OT note  BLE:  4/5  Increase by 1/3 MMT grade   Balance Sitting EOB:  Garland   Dynamic Standing:  ModA no device  Sitting EOB:  Independent  Dynamic Standing:  Mod Independent with AAD     Pt is A & O x 4  Sensation:  no reported paresthesias  Edema:

## 2024-08-23 NOTE — PLAN OF CARE
Problem: Discharge Planning  Goal: Discharge to home or other facility with appropriate resources  8/22/2024 2357 by Joann Sarkar RN  Outcome: Progressing  8/22/2024 1315 by Patricia Goodrich RN  Outcome: Progressing     Problem: Safety - Adult  Goal: Free from fall injury  8/22/2024 2357 by Joann Sarkar RN  Outcome: Progressing  8/22/2024 1315 by Patricia Goodrich RN  Outcome: Progressing     Problem: Pain  Goal: Verbalizes/displays adequate comfort level or baseline comfort level  8/22/2024 2357 by Joann Sarkar RN  Outcome: Progressing  8/22/2024 1315 by Patricia Goodrich RN  Outcome: Progressing     Problem: Skin/Tissue Integrity  Goal: Absence of new skin breakdown  Description: 1.  Monitor for areas of redness and/or skin breakdown  2.  Assess vascular access sites hourly  3.  Every 4-6 hours minimum:  Change oxygen saturation probe site  4.  Every 4-6 hours:  If on nasal continuous positive airway pressure, respiratory therapy assess nares and determine need for appliance change or resting period.  8/22/2024 2357 by Joann Sarkar RN  Outcome: Progressing  8/22/2024 1315 by Patricia Goodrich RN  Outcome: Progressing     Problem: ABCDS Injury Assessment  Goal: Absence of physical injury  8/22/2024 2357 by Joann Sarkar RN  Outcome: Progressing  8/22/2024 1315 by Patricia Goodrich RN  Outcome: Progressing

## 2024-08-24 ENCOUNTER — APPOINTMENT (OUTPATIENT)
Dept: GENERAL RADIOLOGY | Age: 79
End: 2024-08-24
Attending: TRANSPLANT SURGERY
Payer: MEDICARE

## 2024-08-24 ENCOUNTER — APPOINTMENT (OUTPATIENT)
Dept: CT IMAGING | Age: 79
End: 2024-08-24
Attending: TRANSPLANT SURGERY
Payer: MEDICARE

## 2024-08-24 PROBLEM — C25.0 ADENOCARCINOMA OF HEAD OF PANCREAS (HCC): Status: RESOLVED | Noted: 2024-08-14 | Resolved: 2024-08-24

## 2024-08-24 LAB
ALBUMIN SERPL-MCNC: 2.8 G/DL (ref 3.5–5.2)
ALP SERPL-CCNC: 212 U/L (ref 40–129)
ALT SERPL-CCNC: 49 U/L (ref 0–40)
ANION GAP SERPL CALCULATED.3IONS-SCNC: 11 MMOL/L (ref 7–16)
AST SERPL-CCNC: 26 U/L (ref 0–39)
BACTERIA URNS QL MICRO: ABNORMAL
BASOPHILS # BLD: 0 K/UL (ref 0–0.2)
BASOPHILS NFR BLD: 0 % (ref 0–2)
BILIRUB SERPL-MCNC: 2.8 MG/DL (ref 0–1.2)
BILIRUB UR QL STRIP: ABNORMAL
BUN SERPL-MCNC: 16 MG/DL (ref 6–23)
CA-I BLD-SCNC: 1.13 MMOL/L (ref 1.15–1.33)
CALCIUM SERPL-MCNC: 8.5 MG/DL (ref 8.6–10.2)
CHLORIDE SERPL-SCNC: 98 MMOL/L (ref 98–107)
CLARITY UR: CLEAR
CO2 SERPL-SCNC: 25 MMOL/L (ref 22–29)
COLOR UR: ABNORMAL
CREAT SERPL-MCNC: 0.8 MG/DL (ref 0.7–1.2)
EOSINOPHIL # BLD: 0.23 K/UL (ref 0.05–0.5)
EOSINOPHILS RELATIVE PERCENT: 2 % (ref 0–6)
ERYTHROCYTE [DISTWIDTH] IN BLOOD BY AUTOMATED COUNT: 13.8 % (ref 11.5–15)
GFR, ESTIMATED: >90 ML/MIN/1.73M2
GLUCOSE BLD-MCNC: 147 MG/DL (ref 74–99)
GLUCOSE BLD-MCNC: 189 MG/DL (ref 74–99)
GLUCOSE BLD-MCNC: 197 MG/DL (ref 74–99)
GLUCOSE BLD-MCNC: 204 MG/DL (ref 74–99)
GLUCOSE SERPL-MCNC: 177 MG/DL (ref 74–99)
GLUCOSE UR STRIP-MCNC: NEGATIVE MG/DL
HCT VFR BLD AUTO: 24.3 % (ref 37–54)
HGB BLD-MCNC: 7.5 G/DL (ref 12.5–16.5)
HGB UR QL STRIP.AUTO: NEGATIVE
KETONES UR STRIP-MCNC: 15 MG/DL
LEUKOCYTE ESTERASE UR QL STRIP: NEGATIVE
LYMPHOCYTES NFR BLD: 0.11 K/UL (ref 1.5–4)
LYMPHOCYTES RELATIVE PERCENT: 1 % (ref 20–42)
MAGNESIUM SERPL-MCNC: 2.2 MG/DL (ref 1.6–2.6)
MCH RBC QN AUTO: 32.3 PG (ref 26–35)
MCHC RBC AUTO-ENTMCNC: 30.9 G/DL (ref 32–34.5)
MCV RBC AUTO: 104.7 FL (ref 80–99.9)
MONOCYTES NFR BLD: 0.45 K/UL (ref 0.1–0.95)
MONOCYTES NFR BLD: 4 % (ref 2–12)
NEUTROPHILS NFR BLD: 94 % (ref 43–80)
NEUTS SEG NFR BLD: 12.21 K/UL (ref 1.8–7.3)
NITRITE UR QL STRIP: NEGATIVE
PH UR STRIP: 6 [PH] (ref 5–9)
PHOSPHATE SERPL-MCNC: 3.5 MG/DL (ref 2.5–4.5)
PLATELET # BLD AUTO: 283 K/UL (ref 130–450)
PMV BLD AUTO: 10.4 FL (ref 7–12)
POTASSIUM SERPL-SCNC: 4.3 MMOL/L (ref 3.5–5)
PROT SERPL-MCNC: 5.5 G/DL (ref 6.4–8.3)
PROT UR STRIP-MCNC: ABNORMAL MG/DL
RBC # BLD AUTO: 2.32 M/UL (ref 3.8–5.8)
RBC # BLD: ABNORMAL 10*6/UL
RBC #/AREA URNS HPF: ABNORMAL /HPF
SODIUM SERPL-SCNC: 134 MMOL/L (ref 132–146)
SP GR UR STRIP: 1.02 (ref 1–1.03)
UROBILINOGEN UR STRIP-ACNC: 4 EU/DL (ref 0–1)
WBC #/AREA URNS HPF: ABNORMAL /HPF
WBC OTHER # BLD: 13 K/UL (ref 4.5–11.5)

## 2024-08-24 PROCEDURE — 6370000000 HC RX 637 (ALT 250 FOR IP): Performed by: STUDENT IN AN ORGANIZED HEALTH CARE EDUCATION/TRAINING PROGRAM

## 2024-08-24 PROCEDURE — 82962 GLUCOSE BLOOD TEST: CPT

## 2024-08-24 PROCEDURE — 83735 ASSAY OF MAGNESIUM: CPT

## 2024-08-24 PROCEDURE — 6360000002 HC RX W HCPCS: Performed by: STUDENT IN AN ORGANIZED HEALTH CARE EDUCATION/TRAINING PROGRAM

## 2024-08-24 PROCEDURE — 74177 CT ABD & PELVIS W/CONTRAST: CPT

## 2024-08-24 PROCEDURE — 2580000003 HC RX 258: Performed by: STUDENT IN AN ORGANIZED HEALTH CARE EDUCATION/TRAINING PROGRAM

## 2024-08-24 PROCEDURE — 36415 COLL VENOUS BLD VENIPUNCTURE: CPT

## 2024-08-24 PROCEDURE — 2580000003 HC RX 258: Performed by: TRANSPLANT SURGERY

## 2024-08-24 PROCEDURE — 6360000004 HC RX CONTRAST MEDICATION: Performed by: RADIOLOGY

## 2024-08-24 PROCEDURE — 80053 COMPREHEN METABOLIC PANEL: CPT

## 2024-08-24 PROCEDURE — 2580000003 HC RX 258: Performed by: RADIOLOGY

## 2024-08-24 PROCEDURE — 71046 X-RAY EXAM CHEST 2 VIEWS: CPT

## 2024-08-24 PROCEDURE — 6370000000 HC RX 637 (ALT 250 FOR IP)

## 2024-08-24 PROCEDURE — 82330 ASSAY OF CALCIUM: CPT

## 2024-08-24 PROCEDURE — 2140000000 HC CCU INTERMEDIATE R&B

## 2024-08-24 PROCEDURE — 84100 ASSAY OF PHOSPHORUS: CPT

## 2024-08-24 PROCEDURE — 6370000000 HC RX 637 (ALT 250 FOR IP): Performed by: TRANSPLANT SURGERY

## 2024-08-24 PROCEDURE — 6360000002 HC RX W HCPCS: Performed by: TRANSPLANT SURGERY

## 2024-08-24 PROCEDURE — 74022 RADEX COMPL AQT ABD SERIES: CPT

## 2024-08-24 PROCEDURE — 85025 COMPLETE CBC W/AUTO DIFF WBC: CPT

## 2024-08-24 RX ORDER — SODIUM CHLORIDE 0.9 % (FLUSH) 0.9 %
10 SYRINGE (ML) INJECTION
Status: COMPLETED | OUTPATIENT
Start: 2024-08-24 | End: 2024-08-24

## 2024-08-24 RX ORDER — IOPAMIDOL 755 MG/ML
75 INJECTION, SOLUTION INTRAVASCULAR
Status: COMPLETED | OUTPATIENT
Start: 2024-08-24 | End: 2024-08-24

## 2024-08-24 RX ORDER — IOPAMIDOL 755 MG/ML
9 INJECTION, SOLUTION INTRAVASCULAR
Status: COMPLETED | OUTPATIENT
Start: 2024-08-24 | End: 2024-08-24

## 2024-08-24 RX ADMIN — INSULIN LISPRO 4 UNITS: 100 INJECTION, SOLUTION INTRAVENOUS; SUBCUTANEOUS at 16:33

## 2024-08-24 RX ADMIN — Medication 5 MG: at 22:08

## 2024-08-24 RX ADMIN — SODIUM CHLORIDE, PRESERVATIVE FREE 10 ML: 5 INJECTION INTRAVENOUS at 09:22

## 2024-08-24 RX ADMIN — IOPAMIDOL 9 ML: 755 INJECTION, SOLUTION INTRAVENOUS at 11:57

## 2024-08-24 RX ADMIN — ACETAMINOPHEN 500 MG: 500 TABLET ORAL at 14:59

## 2024-08-24 RX ADMIN — VALSARTAN 80 MG: 80 TABLET, FILM COATED ORAL at 09:21

## 2024-08-24 RX ADMIN — CETIRIZINE HYDROCHLORIDE 10 MG: 10 TABLET, FILM COATED ORAL at 09:21

## 2024-08-24 RX ADMIN — SODIUM CHLORIDE, PRESERVATIVE FREE 40 MG: 5 INJECTION INTRAVENOUS at 03:50

## 2024-08-24 RX ADMIN — METOPROLOL SUCCINATE 25 MG: 25 TABLET, EXTENDED RELEASE ORAL at 09:21

## 2024-08-24 RX ADMIN — ENOXAPARIN SODIUM 30 MG: 100 INJECTION SUBCUTANEOUS at 09:20

## 2024-08-24 RX ADMIN — METOCLOPRAMIDE 10 MG: 5 INJECTION, SOLUTION INTRAMUSCULAR; INTRAVENOUS at 05:37

## 2024-08-24 RX ADMIN — MONTELUKAST 10 MG: 10 TABLET, FILM COATED ORAL at 22:08

## 2024-08-24 RX ADMIN — HYDROCHLOROTHIAZIDE 12.5 MG: 12.5 TABLET ORAL at 09:21

## 2024-08-24 RX ADMIN — GABAPENTIN 200 MG: 100 CAPSULE ORAL at 14:59

## 2024-08-24 RX ADMIN — TAMSULOSIN HYDROCHLORIDE 0.4 MG: 0.4 CAPSULE ORAL at 09:21

## 2024-08-24 RX ADMIN — METOCLOPRAMIDE 10 MG: 5 INJECTION, SOLUTION INTRAMUSCULAR; INTRAVENOUS at 16:33

## 2024-08-24 RX ADMIN — INSULIN GLARGINE 15 UNITS: 100 INJECTION, SOLUTION SUBCUTANEOUS at 22:08

## 2024-08-24 RX ADMIN — FINASTERIDE 5 MG: 5 TABLET, FILM COATED ORAL at 09:21

## 2024-08-24 RX ADMIN — SODIUM CHLORIDE, PRESERVATIVE FREE 10 ML: 5 INJECTION INTRAVENOUS at 22:09

## 2024-08-24 RX ADMIN — SODIUM CHLORIDE, POTASSIUM CHLORIDE, SODIUM LACTATE AND CALCIUM CHLORIDE: 600; 310; 30; 20 INJECTION, SOLUTION INTRAVENOUS at 22:20

## 2024-08-24 RX ADMIN — ACETAMINOPHEN 500 MG: 500 TABLET ORAL at 05:37

## 2024-08-24 RX ADMIN — ASPIRIN 81 MG: 81 TABLET, COATED ORAL at 09:21

## 2024-08-24 RX ADMIN — METOCLOPRAMIDE 10 MG: 5 INJECTION, SOLUTION INTRAMUSCULAR; INTRAVENOUS at 23:38

## 2024-08-24 RX ADMIN — SODIUM CHLORIDE, PRESERVATIVE FREE 40 MG: 5 INJECTION INTRAVENOUS at 16:33

## 2024-08-24 RX ADMIN — SENNOSIDES AND DOCUSATE SODIUM 2 TABLET: 50; 8.6 TABLET ORAL at 09:21

## 2024-08-24 RX ADMIN — ENOXAPARIN SODIUM 30 MG: 100 INJECTION SUBCUTANEOUS at 22:09

## 2024-08-24 RX ADMIN — Medication 10 ML: at 11:57

## 2024-08-24 RX ADMIN — GABAPENTIN 200 MG: 100 CAPSULE ORAL at 22:08

## 2024-08-24 RX ADMIN — ACETAMINOPHEN 500 MG: 500 TABLET ORAL at 16:33

## 2024-08-24 RX ADMIN — GABAPENTIN 200 MG: 100 CAPSULE ORAL at 09:21

## 2024-08-24 RX ADMIN — METOCLOPRAMIDE 10 MG: 5 INJECTION, SOLUTION INTRAMUSCULAR; INTRAVENOUS at 09:20

## 2024-08-24 RX ADMIN — SODIUM CHLORIDE, POTASSIUM CHLORIDE, SODIUM LACTATE AND CALCIUM CHLORIDE: 600; 310; 30; 20 INJECTION, SOLUTION INTRAVENOUS at 10:36

## 2024-08-24 RX ADMIN — ATORVASTATIN CALCIUM 20 MG: 20 TABLET, FILM COATED ORAL at 22:08

## 2024-08-24 RX ADMIN — IOPAMIDOL 75 ML: 755 INJECTION, SOLUTION INTRAVENOUS at 11:57

## 2024-08-24 ASSESSMENT — PAIN - FUNCTIONAL ASSESSMENT: PAIN_FUNCTIONAL_ASSESSMENT: ACTIVITIES ARE NOT PREVENTED

## 2024-08-24 ASSESSMENT — PAIN SCALES - GENERAL
PAINLEVEL_OUTOF10: 1
PAINLEVEL_OUTOF10: 5
PAINLEVEL_OUTOF10: 0

## 2024-08-24 ASSESSMENT — PAIN DESCRIPTION - LOCATION: LOCATION: ABDOMEN

## 2024-08-24 ASSESSMENT — PAIN DESCRIPTION - ORIENTATION: ORIENTATION: MID

## 2024-08-24 ASSESSMENT — PAIN DESCRIPTION - DESCRIPTORS: DESCRIPTORS: DISCOMFORT;SORE;ACHING

## 2024-08-24 NOTE — PLAN OF CARE
Progressing  Goal: Achieves maximal functionality and self care  Outcome: Progressing     Problem: Respiratory - Adult  Goal: Achieves optimal ventilation and oxygenation  8/24/2024 0032 by Melissa Calero RN  Outcome: Progressing  8/23/2024 1615 by Matilda Saldivar RN  Outcome: Progressing     Problem: Cardiovascular - Adult  Goal: Maintains optimal cardiac output and hemodynamic stability  Outcome: Progressing  Goal: Absence of cardiac dysrhythmias or at baseline  Outcome: Progressing     Problem: Skin/Tissue Integrity - Adult  Goal: Skin integrity remains intact  Outcome: Progressing  Flowsheets (Taken 8/23/2024 2000)  Skin Integrity Remains Intact: Monitor for areas of redness and/or skin breakdown  Goal: Incisions, wounds, or drain sites healing without S/S of infection  8/24/2024 0032 by Melissa Calero RN  Outcome: Progressing  8/23/2024 1615 by Matilda Saldivar RN  Outcome: Progressing     Problem: Musculoskeletal - Adult  Goal: Return mobility to safest level of function  Outcome: Progressing     Problem: Gastrointestinal - Adult  Goal: Minimal or absence of nausea and vomiting  Outcome: Progressing  Goal: Maintains or returns to baseline bowel function  Outcome: Progressing     Problem: Genitourinary - Adult  Goal: Absence of urinary retention  Outcome: Progressing  Goal: Urinary catheter remains patent  Outcome: Progressing     Problem: Infection - Adult  Goal: Absence of infection at discharge  Outcome: Progressing  Goal: Absence of infection during hospitalization  Outcome: Progressing  Goal: Absence of fever/infection during anticipated neutropenic period  Outcome: Progressing     Problem: Metabolic/Fluid and Electrolytes - Adult  Goal: Electrolytes maintained within normal limits  Outcome: Progressing  Goal: Hemodynamic stability and optimal renal function maintained  Outcome: Progressing  Goal: Glucose maintained within prescribed range  Outcome: Progressing     Problem: Hematologic -

## 2024-08-24 NOTE — PROGRESS NOTES
CLINICAL PHARMACY NOTE: MEDS TO BEDS    Total # of Prescriptions Filled: 4   The following medications were delivered to the patient:  Oxycodone 5-325 mg  Senexon 8.6-50 mg  Metoclopramide 5 mg  Pantoprazole 40 mg    Additional Documentation:

## 2024-08-24 NOTE — PLAN OF CARE
Problem: Discharge Planning  Goal: Discharge to home or other facility with appropriate resources  8/24/2024 1417 by Janes Alvarez RN  Outcome: Progressing  8/24/2024 0032 by Melissa Calero RN  Outcome: Progressing     Problem: Safety - Adult  Goal: Free from fall injury  8/24/2024 1417 by Janes Alvarez RN  Outcome: Progressing  8/24/2024 0032 by Melissa Calero RN  Outcome: Progressing     Problem: Pain  Goal: Verbalizes/displays adequate comfort level or baseline comfort level  8/24/2024 1417 by Janes Alvarez RN  Outcome: Progressing  8/24/2024 0032 by Melissa Calero RN  Outcome: Progressing     Problem: Skin/Tissue Integrity  Goal: Absence of new skin breakdown  Description: 1.  Monitor for areas of redness and/or skin breakdown  2.  Assess vascular access sites hourly  3.  Every 4-6 hours minimum:  Change oxygen saturation probe site  4.  Every 4-6 hours:  If on nasal continuous positive airway pressure, respiratory therapy assess nares and determine need for appliance change or resting period.  8/24/2024 1417 by Janes Alvarez RN  Outcome: Progressing  8/24/2024 0032 by Melissa Calero RN  Outcome: Progressing     Problem: ABCDS Injury Assessment  Goal: Absence of physical injury  8/24/2024 1417 by Janes Alvarez RN  Outcome: Progressing  8/24/2024 0032 by Melissa Calero RN  Outcome: Progressing     Problem: Neurosensory - Adult  Goal: Achieves stable or improved neurological status  8/24/2024 1417 by Janes Alvarez RN  Outcome: Progressing  8/24/2024 0032 by Melissa Calero RN  Outcome: Progressing  Goal: Absence of seizures  8/24/2024 1417 by Janes Alvarez RN  Outcome: Progressing  8/24/2024 0032 by Melissa Calero RN  Outcome: Progressing  Goal: Remains free of injury related to seizures activity  8/24/2024 1417 by Janes Alvarez RN  Outcome: Progressing  8/24/2024 0032 by Melissa Calero RN  Outcome: Progressing  Goal: Achieves maximal functionality and self care  8/24/2024 1417 by  Lipford, Janes, RN  Outcome: Progressing  8/24/2024 0032 by Melissa Calero RN  Outcome: Progressing     Problem: Respiratory - Adult  Goal: Achieves optimal ventilation and oxygenation  8/24/2024 1417 by Janes Alvarez RN  Outcome: Progressing  8/24/2024 0032 by Melissa Calero RN  Outcome: Progressing     Problem: Cardiovascular - Adult  Goal: Maintains optimal cardiac output and hemodynamic stability  8/24/2024 1417 by Janes Alvarez RN  Outcome: Progressing  8/24/2024 0032 by Melissa Calero RN  Outcome: Progressing  Goal: Absence of cardiac dysrhythmias or at baseline  8/24/2024 0032 by Melissa Calero RN  Outcome: Progressing     Problem: Skin/Tissue Integrity - Adult  Goal: Skin integrity remains intact  8/24/2024 1417 by Janes Alvarez RN  Outcome: Progressing  8/24/2024 0032 by Melissa Calero RN  Outcome: Progressing  Flowsheets (Taken 8/23/2024 2000)  Skin Integrity Remains Intact: Monitor for areas of redness and/or skin breakdown  Goal: Incisions, wounds, or drain sites healing without S/S of infection  8/24/2024 1417 by Janes Alvarez RN  Outcome: Progressing  8/24/2024 0032 by Melissa Calero RN  Outcome: Progressing     Problem: Musculoskeletal - Adult  Goal: Return mobility to safest level of function  8/24/2024 1417 by Janes Alvarez RN  Outcome: Progressing  8/24/2024 0032 by Melissa Calero RN  Outcome: Progressing     Problem: Gastrointestinal - Adult  Goal: Minimal or absence of nausea and vomiting  8/24/2024 1417 by Janes Alvarez RN  Outcome: Progressing  8/24/2024 0032 by Melissa Calero RN  Outcome: Progressing  Goal: Maintains or returns to baseline bowel function  8/24/2024 1417 by Janes Alvarez RN  Outcome: Progressing  8/24/2024 0032 by Melissa Calero RN  Outcome: Progressing     Problem: Genitourinary - Adult  Goal: Absence of urinary retention  8/24/2024 1417 by Janes Alvarez RN  Outcome: Progressing  8/24/2024 0032 by Melissa Calero RN  Outcome:

## 2024-08-24 NOTE — PROGRESS NOTES
[facility] Surgery    CT scan reviewed  Has Delayed gastric emptying  Will need a PEG-J, if unable to place a PEG-J will need TPN so we can move into chemotherapy within 4-6 weeks for Stage III pancreatic adenocarcinoma.  Whipple was performed upfront due to small bowel obstruction (duodenal obstruction from tumor)  Will discuss case with Dr. Carlos regarding timing of attempted PEG-J    Electronically signed by Demetrius Santoyo MD on 8/24/2024 at 5:47 PM

## 2024-08-24 NOTE — PROGRESS NOTES
HEPATOBILIARY AND PANCREATIC SURGERY  DAILY PROGRESS NOTE  8/24/2024        Subjective:  Feeling ok this morning, no nausea or vomiting Softly distended.     Objective:  /65   Pulse 90   Temp 99.9 °F (37.7 °C) (Temporal)   Resp 19   Ht 1.88 m (6' 2\")   Wt 100.6 kg (221 lb 12.8 oz)   SpO2 95%   BMI 28.48 kg/m²     GENERAL:  Lying in bed. No acute distress   HEAD: No gross abnormalities   EYES: scleral icterus   LUNGS:  No increased work of breathing on 2L NC  CARDIOVASCULAR:  RR, normotensive   ABDOMEN:  Softly distended, appropriately tender around midline incision which is c/d/i, R drain site with stitch in place, no drainage. Prior L SHARON site stitched no drainage.  EXTREMITIES: No edema or swelling  SKIN: Warm and dry    Assessment/Plan:  79 y.o. male with obstructive jaundice 2/2 pancreatic head adenocarcinoma with duodenal obstruction s/p whipple procedure w portal vein reconstruction 8/14     Plan  -NPO today for upper GI, once complete will resume diet and supplements  -Upper GI to check for DGE   -continue electrolyte replacements  -ambulate, OOB  -dispo planning     Electronically signed by Ty Quick MD on 8/24/2024 at 8:28 AM      Patient barely taking any p.o. yesterday.  Due to having poor p.o. intake he was slightly tachycardic with a lower blood pressure.  He responded to IV fluid replacement.  On his chest x-ray yesterday he did appear to have a gastric bubble.  Will perform an upper GI with a small bowel follow-through to evaluate for delayed gastric emptying.  The patient may require a PEG-J placement pending the upper GI.  Discussed with the patient is unsafe to send him home if he is not drinking as he will most certainly return back to the hospital.    Electronically signed by Demetrius Santoyo MD on 8/24/2024 at 9:12 AM

## 2024-08-25 LAB
ALBUMIN SERPL-MCNC: 2.8 G/DL (ref 3.5–5.2)
ALP SERPL-CCNC: 313 U/L (ref 40–129)
ALT SERPL-CCNC: 50 U/L (ref 0–40)
ANION GAP SERPL CALCULATED.3IONS-SCNC: 13 MMOL/L (ref 7–16)
AST SERPL-CCNC: 39 U/L (ref 0–39)
BASOPHILS # BLD: 0.13 K/UL (ref 0–0.2)
BASOPHILS NFR BLD: 1 % (ref 0–2)
BILIRUB DIRECT SERPL-MCNC: 1.7 MG/DL (ref 0–0.3)
BILIRUB SERPL-MCNC: 2.9 MG/DL (ref 0–1.2)
BUN SERPL-MCNC: 10 MG/DL (ref 6–23)
CA-I BLD-SCNC: 1.13 MMOL/L (ref 1.15–1.33)
CALCIUM SERPL-MCNC: 8.4 MG/DL (ref 8.6–10.2)
CHLORIDE SERPL-SCNC: 98 MMOL/L (ref 98–107)
CO2 SERPL-SCNC: 23 MMOL/L (ref 22–29)
CREAT SERPL-MCNC: 0.7 MG/DL (ref 0.7–1.2)
EOSINOPHIL # BLD: 0.38 K/UL (ref 0.05–0.5)
EOSINOPHILS RELATIVE PERCENT: 3 % (ref 0–6)
ERYTHROCYTE [DISTWIDTH] IN BLOOD BY AUTOMATED COUNT: 13.4 % (ref 11.5–15)
GFR, ESTIMATED: >90 ML/MIN/1.73M2
GLUCOSE BLD-MCNC: 135 MG/DL (ref 74–99)
GLUCOSE BLD-MCNC: 168 MG/DL (ref 74–99)
GLUCOSE BLD-MCNC: 170 MG/DL (ref 74–99)
GLUCOSE BLD-MCNC: 202 MG/DL (ref 74–99)
GLUCOSE SERPL-MCNC: 113 MG/DL (ref 74–99)
HCT VFR BLD AUTO: 23.3 % (ref 37–54)
HGB BLD-MCNC: 7.5 G/DL (ref 12.5–16.5)
LYMPHOCYTES NFR BLD: 0.38 K/UL (ref 1.5–4)
LYMPHOCYTES RELATIVE PERCENT: 3 % (ref 20–42)
MAGNESIUM SERPL-MCNC: 2 MG/DL (ref 1.6–2.6)
MCH RBC QN AUTO: 32.6 PG (ref 26–35)
MCHC RBC AUTO-ENTMCNC: 32.2 G/DL (ref 32–34.5)
MCV RBC AUTO: 101.3 FL (ref 80–99.9)
MICROORGANISM SPEC CULT: ABNORMAL
MICROORGANISM SPEC CULT: NORMAL
MICROORGANISM/AGENT SPEC: NORMAL
MONOCYTES NFR BLD: 0.9 K/UL (ref 0.1–0.95)
MONOCYTES NFR BLD: 6 % (ref 2–12)
NEUTROPHILS NFR BLD: 88 % (ref 43–80)
NEUTS SEG NFR BLD: 12.81 K/UL (ref 1.8–7.3)
PHOSPHATE SERPL-MCNC: 2.6 MG/DL (ref 2.5–4.5)
PLATELET # BLD AUTO: 275 K/UL (ref 130–450)
PMV BLD AUTO: 10.6 FL (ref 7–12)
POTASSIUM SERPL-SCNC: 3.8 MMOL/L (ref 3.5–5)
PROT SERPL-MCNC: 5.9 G/DL (ref 6.4–8.3)
RBC # BLD AUTO: 2.3 M/UL (ref 3.8–5.8)
RBC # BLD: ABNORMAL 10*6/UL
SERVICE CMNT-IMP: ABNORMAL
SODIUM SERPL-SCNC: 134 MMOL/L (ref 132–146)
SPECIMEN DESCRIPTION: ABNORMAL
SPECIMEN DESCRIPTION: NORMAL
WBC OTHER # BLD: 14.6 K/UL (ref 4.5–11.5)

## 2024-08-25 PROCEDURE — 6360000002 HC RX W HCPCS: Performed by: STUDENT IN AN ORGANIZED HEALTH CARE EDUCATION/TRAINING PROGRAM

## 2024-08-25 PROCEDURE — 83735 ASSAY OF MAGNESIUM: CPT

## 2024-08-25 PROCEDURE — 2580000003 HC RX 258: Performed by: STUDENT IN AN ORGANIZED HEALTH CARE EDUCATION/TRAINING PROGRAM

## 2024-08-25 PROCEDURE — 6370000000 HC RX 637 (ALT 250 FOR IP): Performed by: TRANSPLANT SURGERY

## 2024-08-25 PROCEDURE — 6370000000 HC RX 637 (ALT 250 FOR IP): Performed by: STUDENT IN AN ORGANIZED HEALTH CARE EDUCATION/TRAINING PROGRAM

## 2024-08-25 PROCEDURE — 82330 ASSAY OF CALCIUM: CPT

## 2024-08-25 PROCEDURE — 36415 COLL VENOUS BLD VENIPUNCTURE: CPT

## 2024-08-25 PROCEDURE — 6360000002 HC RX W HCPCS: Performed by: TRANSPLANT SURGERY

## 2024-08-25 PROCEDURE — 82962 GLUCOSE BLOOD TEST: CPT

## 2024-08-25 PROCEDURE — 80053 COMPREHEN METABOLIC PANEL: CPT

## 2024-08-25 PROCEDURE — 2140000000 HC CCU INTERMEDIATE R&B

## 2024-08-25 PROCEDURE — 6360000002 HC RX W HCPCS

## 2024-08-25 PROCEDURE — 6370000000 HC RX 637 (ALT 250 FOR IP)

## 2024-08-25 PROCEDURE — 85025 COMPLETE CBC W/AUTO DIFF WBC: CPT

## 2024-08-25 PROCEDURE — 82248 BILIRUBIN DIRECT: CPT

## 2024-08-25 PROCEDURE — 84100 ASSAY OF PHOSPHORUS: CPT

## 2024-08-25 RX ORDER — LACTULOSE 10 G/15ML
20 SOLUTION ORAL ONCE
Status: COMPLETED | OUTPATIENT
Start: 2024-08-25 | End: 2024-08-25

## 2024-08-25 RX ORDER — CALCIUM GLUCONATE 10 MG/ML
1000 INJECTION, SOLUTION INTRAVENOUS ONCE
Status: COMPLETED | OUTPATIENT
Start: 2024-08-25 | End: 2024-08-25

## 2024-08-25 RX ADMIN — FINASTERIDE 5 MG: 5 TABLET, FILM COATED ORAL at 08:05

## 2024-08-25 RX ADMIN — TAMSULOSIN HYDROCHLORIDE 0.4 MG: 0.4 CAPSULE ORAL at 08:06

## 2024-08-25 RX ADMIN — HYDROCHLOROTHIAZIDE 12.5 MG: 12.5 TABLET ORAL at 08:06

## 2024-08-25 RX ADMIN — CALCIUM GLUCONATE 1000 MG: 10 INJECTION, SOLUTION INTRAVENOUS at 11:45

## 2024-08-25 RX ADMIN — SENNOSIDES AND DOCUSATE SODIUM 2 TABLET: 50; 8.6 TABLET ORAL at 08:06

## 2024-08-25 RX ADMIN — ACETAMINOPHEN 500 MG: 500 TABLET ORAL at 16:06

## 2024-08-25 RX ADMIN — VALSARTAN 80 MG: 80 TABLET, FILM COATED ORAL at 08:06

## 2024-08-25 RX ADMIN — GABAPENTIN 200 MG: 100 CAPSULE ORAL at 12:54

## 2024-08-25 RX ADMIN — METOCLOPRAMIDE 10 MG: 5 INJECTION, SOLUTION INTRAMUSCULAR; INTRAVENOUS at 10:34

## 2024-08-25 RX ADMIN — GABAPENTIN 200 MG: 100 CAPSULE ORAL at 22:00

## 2024-08-25 RX ADMIN — ENOXAPARIN SODIUM 30 MG: 100 INJECTION SUBCUTANEOUS at 08:05

## 2024-08-25 RX ADMIN — CETIRIZINE HYDROCHLORIDE 10 MG: 10 TABLET, FILM COATED ORAL at 08:06

## 2024-08-25 RX ADMIN — SODIUM CHLORIDE, PRESERVATIVE FREE 10 ML: 5 INJECTION INTRAVENOUS at 08:07

## 2024-08-25 RX ADMIN — SODIUM CHLORIDE, PRESERVATIVE FREE 40 MG: 5 INJECTION INTRAVENOUS at 06:16

## 2024-08-25 RX ADMIN — METOCLOPRAMIDE 10 MG: 5 INJECTION, SOLUTION INTRAMUSCULAR; INTRAVENOUS at 06:16

## 2024-08-25 RX ADMIN — ACETAMINOPHEN 500 MG: 500 TABLET ORAL at 10:34

## 2024-08-25 RX ADMIN — Medication 250 MG: at 12:53

## 2024-08-25 RX ADMIN — ASPIRIN 81 MG: 81 TABLET, COATED ORAL at 08:06

## 2024-08-25 RX ADMIN — Medication 5 MG: at 22:00

## 2024-08-25 RX ADMIN — SODIUM CHLORIDE, PRESERVATIVE FREE 40 MG: 5 INJECTION INTRAVENOUS at 16:05

## 2024-08-25 RX ADMIN — MONTELUKAST 10 MG: 10 TABLET, FILM COATED ORAL at 22:00

## 2024-08-25 RX ADMIN — METOPROLOL SUCCINATE 25 MG: 25 TABLET, EXTENDED RELEASE ORAL at 08:06

## 2024-08-25 RX ADMIN — Medication 250 MG: at 22:00

## 2024-08-25 RX ADMIN — ATORVASTATIN CALCIUM 20 MG: 20 TABLET, FILM COATED ORAL at 22:00

## 2024-08-25 RX ADMIN — ENOXAPARIN SODIUM 30 MG: 100 INJECTION SUBCUTANEOUS at 22:00

## 2024-08-25 RX ADMIN — SODIUM CHLORIDE, PRESERVATIVE FREE 10 ML: 5 INJECTION INTRAVENOUS at 22:01

## 2024-08-25 RX ADMIN — METOCLOPRAMIDE 10 MG: 5 INJECTION, SOLUTION INTRAMUSCULAR; INTRAVENOUS at 22:05

## 2024-08-25 RX ADMIN — LACTULOSE 20 G: 20 SOLUTION ORAL at 10:34

## 2024-08-25 RX ADMIN — INSULIN LISPRO 4 UNITS: 100 INJECTION, SOLUTION INTRAVENOUS; SUBCUTANEOUS at 16:06

## 2024-08-25 RX ADMIN — Medication 250 MG: at 16:07

## 2024-08-25 RX ADMIN — INSULIN GLARGINE 15 UNITS: 100 INJECTION, SOLUTION SUBCUTANEOUS at 22:01

## 2024-08-25 RX ADMIN — GABAPENTIN 200 MG: 100 CAPSULE ORAL at 08:06

## 2024-08-25 RX ADMIN — POTASSIUM BICARBONATE 20 MEQ: 782 TABLET, EFFERVESCENT ORAL at 11:41

## 2024-08-25 RX ADMIN — METOCLOPRAMIDE 10 MG: 5 INJECTION, SOLUTION INTRAMUSCULAR; INTRAVENOUS at 16:05

## 2024-08-25 ASSESSMENT — PAIN SCALES - GENERAL
PAINLEVEL_OUTOF10: 0

## 2024-08-25 NOTE — PROGRESS NOTES
HEPATOBILIARY AND PANCREATIC SURGERY  DAILY PROGRESS NOTE  8/25/2024        Subjective:  Denies abdominal pain, nausea or vomiting this AM.     Objective:  BP (!) 117/56   Pulse (!) 109   Temp 98.6 °F (37 °C) (Temporal)   Resp 20   Ht 1.88 m (6' 2\")   Wt 101.2 kg (223 lb 3.2 oz)   SpO2 96%   BMI 28.66 kg/m²     GENERAL:  Lying in bed. No acute distress   HEAD: No gross abnormalities   EYES: scleral icterus   LUNGS:  No increased work of breathing on 2L NC  CARDIOVASCULAR:  RR, normotensive   ABDOMEN:  Softly distended, mildly tender around midline incision which is c/d/i, R drain site with stitch in place, no drainage. Prior L SHARON site stitched no drainage.  EXTREMITIES: No edema or swelling  SKIN: Warm and dry    Assessment/Plan:  79 y.o. male with pancreatic head adenocarcinoma with duodenal obstruction s/p whipple procedure w portal vein reconstruction 8/14 - T3N2 (Stage III)    Plan  - continue electrolyte replacements  - ambulate, OOB  - dispo planning   - CTAP w PO 8/24:gastric distention, small bowel, colonic distention.   - PEG-J timing tbd w Dr. Carlos   - Diet: FLD and supplemental shakes   - one dose lactulose today     Electronically signed by Ryann Prado MD on 8/25/2024 at     Has DGE on CT scan and delayed imaging  Discussed with patient that he will likely need a PEG-J for nutritional purposes  Discussed that if he can drink 5 shakes a day he might not need a feeding tube.  However I think it will temporarily help him.  He will need to start chemotherapy in  weeks and will need nutrition  He also does not hav ea leak but without nutrition he could develop a leak.  Decrease IVF  I have discussed the case with Dr. Carlos  I have also discussed the above with the patient and his significant other.    Electronically signed by Demetrius Santoyo MD on 8/25/2024 at 10:01 AM

## 2024-08-25 NOTE — PLAN OF CARE
Problem: Discharge Planning  Goal: Discharge to home or other facility with appropriate resources  8/25/2024 0757 by Janes Alvarez RN  Outcome: Progressing  8/24/2024 2204 by Luz Cook RN  Outcome: Progressing     Problem: Safety - Adult  Goal: Free from fall injury  8/25/2024 0757 by Janes Alvarez RN  Outcome: Progressing  8/24/2024 2204 by Luz Cook RN  Outcome: Progressing     Problem: Pain  Goal: Verbalizes/displays adequate comfort level or baseline comfort level  Outcome: Progressing     Problem: Skin/Tissue Integrity  Goal: Absence of new skin breakdown  Description: 1.  Monitor for areas of redness and/or skin breakdown  2.  Assess vascular access sites hourly  3.  Every 4-6 hours minimum:  Change oxygen saturation probe site  4.  Every 4-6 hours:  If on nasal continuous positive airway pressure, respiratory therapy assess nares and determine need for appliance change or resting period.  Outcome: Progressing     Problem: ABCDS Injury Assessment  Goal: Absence of physical injury  Outcome: Progressing     Problem: Neurosensory - Adult  Goal: Achieves stable or improved neurological status  Outcome: Progressing  Goal: Absence of seizures  Outcome: Progressing  Goal: Remains free of injury related to seizures activity  Outcome: Progressing  Goal: Achieves maximal functionality and self care  Outcome: Progressing     Problem: Respiratory - Adult  Goal: Achieves optimal ventilation and oxygenation  Outcome: Progressing     Problem: Cardiovascular - Adult  Goal: Maintains optimal cardiac output and hemodynamic stability  Outcome: Progressing  Goal: Absence of cardiac dysrhythmias or at baseline  Outcome: Progressing     Problem: Skin/Tissue Integrity - Adult  Goal: Skin integrity remains intact  Outcome: Progressing  Goal: Incisions, wounds, or drain sites healing without S/S of infection  Outcome: Progressing     Problem: Musculoskeletal - Adult  Goal: Return mobility to safest level of

## 2024-08-25 NOTE — PATIENT CARE CONFERENCE
P Quality Flow/Interdisciplinary Rounds Progress Note        Quality Flow Rounds held on August 25, 2024    Disciplines Attending:  Bedside Nurse and Nursing Unit Leadership    Michael Garcia was admitted on 8/14/2024  5:28 AM    Anticipated Discharge Date:       Disposition:    Mike Score:  Mike Scale Score: 19    Readmission Risk              Risk of Unplanned Readmission:  32           Discussed patient goal for the day, patient clinical progression, and barriers to discharge.  The following Goal(s) of the Day/Commitment(s) have been identified:  Labs - Report Results and be seen by ASHLEY De La Garza RN  August 25, 2024

## 2024-08-25 NOTE — PLAN OF CARE
Problem: Discharge Planning  Goal: Discharge to home or other facility with appropriate resources  8/24/2024 2204 by Luz Cook RN  Outcome: Progressing  8/24/2024 1417 by Janes Alvarez RN  Outcome: Progressing     Problem: Safety - Adult  Goal: Free from fall injury  8/24/2024 2204 by Luz Cook RN  Outcome: Progressing  8/24/2024 1417 by Janes Alvarez RN  Outcome: Progressing     Problem: Pain  Goal: Verbalizes/displays adequate comfort level or baseline comfort level  8/24/2024 1417 by Janes Alvarez RN  Outcome: Progressing     Problem: Skin/Tissue Integrity  Goal: Absence of new skin breakdown  Description: 1.  Monitor for areas of redness and/or skin breakdown  2.  Assess vascular access sites hourly  3.  Every 4-6 hours minimum:  Change oxygen saturation probe site  4.  Every 4-6 hours:  If on nasal continuous positive airway pressure, respiratory therapy assess nares and determine need for appliance change or resting period.  8/24/2024 1417 by Janes Alvarez RN  Outcome: Progressing     Problem: ABCDS Injury Assessment  Goal: Absence of physical injury  8/24/2024 1417 by Janes Alvarez RN  Outcome: Progressing     Problem: Neurosensory - Adult  Goal: Achieves stable or improved neurological status  8/24/2024 1417 by Janes Alvarez RN  Outcome: Progressing  Goal: Absence of seizures  8/24/2024 1417 by Janes Alvarez RN  Outcome: Progressing  Goal: Remains free of injury related to seizures activity  8/24/2024 1417 by Janes Alvarez RN  Outcome: Progressing  Goal: Achieves maximal functionality and self care  8/24/2024 1417 by Janes Alvarez RN  Outcome: Progressing     Problem: Respiratory - Adult  Goal: Achieves optimal ventilation and oxygenation  8/24/2024 1417 by Janes Alvarez RN  Outcome: Progressing     Problem: Cardiovascular - Adult  Goal: Maintains optimal cardiac output and hemodynamic stability  8/24/2024 1417 by Janes Alvarez RN  Outcome: Progressing     Problem: Skin/Tissue

## 2024-08-26 ENCOUNTER — APPOINTMENT (OUTPATIENT)
Dept: GENERAL RADIOLOGY | Age: 79
End: 2024-08-26
Attending: TRANSPLANT SURGERY
Payer: MEDICARE

## 2024-08-26 LAB
ALBUMIN SERPL-MCNC: 2.8 G/DL (ref 3.5–5.2)
ALP SERPL-CCNC: 330 U/L (ref 40–129)
ALT SERPL-CCNC: 42 U/L (ref 0–40)
ANION GAP SERPL CALCULATED.3IONS-SCNC: 16 MMOL/L (ref 7–16)
AST SERPL-CCNC: 32 U/L (ref 0–39)
BASOPHILS # BLD: 0.02 K/UL (ref 0–0.2)
BASOPHILS NFR BLD: 0 % (ref 0–2)
BILIRUB SERPL-MCNC: 3.1 MG/DL (ref 0–1.2)
BUN SERPL-MCNC: 10 MG/DL (ref 6–23)
CA-I BLD-SCNC: 1.19 MMOL/L (ref 1.15–1.33)
CALCIUM SERPL-MCNC: 8.2 MG/DL (ref 8.6–10.2)
CHLORIDE SERPL-SCNC: 99 MMOL/L (ref 98–107)
CO2 SERPL-SCNC: 22 MMOL/L (ref 22–29)
CREAT SERPL-MCNC: 0.7 MG/DL (ref 0.7–1.2)
EKG ATRIAL RATE: 106 BPM
EKG P AXIS: 33 DEGREES
EKG P-R INTERVAL: 160 MS
EKG Q-T INTERVAL: 360 MS
EKG QRS DURATION: 84 MS
EKG QTC CALCULATION (BAZETT): 478 MS
EKG R AXIS: 6 DEGREES
EKG T AXIS: 2 DEGREES
EKG VENTRICULAR RATE: 106 BPM
EOSINOPHIL # BLD: 0.05 K/UL (ref 0.05–0.5)
EOSINOPHILS RELATIVE PERCENT: 0 % (ref 0–6)
ERYTHROCYTE [DISTWIDTH] IN BLOOD BY AUTOMATED COUNT: 13.6 % (ref 11.5–15)
GFR, ESTIMATED: >90 ML/MIN/1.73M2
GLUCOSE BLD-MCNC: 176 MG/DL (ref 74–99)
GLUCOSE BLD-MCNC: 201 MG/DL (ref 74–99)
GLUCOSE BLD-MCNC: 225 MG/DL (ref 74–99)
GLUCOSE BLD-MCNC: 268 MG/DL (ref 74–99)
GLUCOSE SERPL-MCNC: 172 MG/DL (ref 74–99)
HCT VFR BLD AUTO: 22.6 % (ref 37–54)
HGB BLD-MCNC: 7.1 G/DL (ref 12.5–16.5)
IMM GRANULOCYTES # BLD AUTO: 0.07 K/UL (ref 0–0.58)
IMM GRANULOCYTES NFR BLD: 1 % (ref 0–5)
LYMPHOCYTES NFR BLD: 0.53 K/UL (ref 1.5–4)
LYMPHOCYTES RELATIVE PERCENT: 4 % (ref 20–42)
MAGNESIUM SERPL-MCNC: 2 MG/DL (ref 1.6–2.6)
MCH RBC QN AUTO: 32.3 PG (ref 26–35)
MCHC RBC AUTO-ENTMCNC: 31.4 G/DL (ref 32–34.5)
MCV RBC AUTO: 102.7 FL (ref 80–99.9)
MONOCYTES NFR BLD: 1.1 K/UL (ref 0.1–0.95)
MONOCYTES NFR BLD: 9 % (ref 2–12)
NEUTROPHILS NFR BLD: 86 % (ref 43–80)
NEUTS SEG NFR BLD: 10.67 K/UL (ref 1.8–7.3)
PHOSPHATE SERPL-MCNC: 2.8 MG/DL (ref 2.5–4.5)
PLATELET # BLD AUTO: 278 K/UL (ref 130–450)
PMV BLD AUTO: 10.9 FL (ref 7–12)
POTASSIUM SERPL-SCNC: 4.8 MMOL/L (ref 3.5–5)
PROT SERPL-MCNC: 6 G/DL (ref 6.4–8.3)
RBC # BLD AUTO: 2.2 M/UL (ref 3.8–5.8)
RBC # BLD: ABNORMAL 10*6/UL
SODIUM SERPL-SCNC: 137 MMOL/L (ref 132–146)
WBC OTHER # BLD: 12.4 K/UL (ref 4.5–11.5)

## 2024-08-26 PROCEDURE — 83735 ASSAY OF MAGNESIUM: CPT

## 2024-08-26 PROCEDURE — 02HV33Z INSERTION OF INFUSION DEVICE INTO SUPERIOR VENA CAVA, PERCUTANEOUS APPROACH: ICD-10-PCS | Performed by: TRANSPLANT SURGERY

## 2024-08-26 PROCEDURE — 6360000002 HC RX W HCPCS: Performed by: STUDENT IN AN ORGANIZED HEALTH CARE EDUCATION/TRAINING PROGRAM

## 2024-08-26 PROCEDURE — 93010 ELECTROCARDIOGRAM REPORT: CPT | Performed by: INTERNAL MEDICINE

## 2024-08-26 PROCEDURE — 2140000000 HC CCU INTERMEDIATE R&B

## 2024-08-26 PROCEDURE — 36415 COLL VENOUS BLD VENIPUNCTURE: CPT

## 2024-08-26 PROCEDURE — C1751 CATH, INF, PER/CENT/MIDLINE: HCPCS

## 2024-08-26 PROCEDURE — 6370000000 HC RX 637 (ALT 250 FOR IP): Performed by: STUDENT IN AN ORGANIZED HEALTH CARE EDUCATION/TRAINING PROGRAM

## 2024-08-26 PROCEDURE — 80053 COMPREHEN METABOLIC PANEL: CPT

## 2024-08-26 PROCEDURE — 82962 GLUCOSE BLOOD TEST: CPT

## 2024-08-26 PROCEDURE — 82330 ASSAY OF CALCIUM: CPT

## 2024-08-26 PROCEDURE — 74240 X-RAY XM UPR GI TRC 1CNTRST: CPT

## 2024-08-26 PROCEDURE — 36569 INSJ PICC 5 YR+ W/O IMAGING: CPT

## 2024-08-26 PROCEDURE — 6370000000 HC RX 637 (ALT 250 FOR IP): Performed by: TRANSPLANT SURGERY

## 2024-08-26 PROCEDURE — 6370000000 HC RX 637 (ALT 250 FOR IP)

## 2024-08-26 PROCEDURE — 2580000003 HC RX 258: Performed by: STUDENT IN AN ORGANIZED HEALTH CARE EDUCATION/TRAINING PROGRAM

## 2024-08-26 PROCEDURE — 6360000002 HC RX W HCPCS: Performed by: TRANSPLANT SURGERY

## 2024-08-26 PROCEDURE — 85025 COMPLETE CBC W/AUTO DIFF WBC: CPT

## 2024-08-26 PROCEDURE — 84100 ASSAY OF PHOSPHORUS: CPT

## 2024-08-26 PROCEDURE — 6360000004 HC RX CONTRAST MEDICATION: Performed by: PHYSICIAN ASSISTANT

## 2024-08-26 PROCEDURE — 76937 US GUIDE VASCULAR ACCESS: CPT

## 2024-08-26 PROCEDURE — 2500000003 HC RX 250 WO HCPCS: Performed by: TRANSPLANT SURGERY

## 2024-08-26 PROCEDURE — 2580000003 HC RX 258: Performed by: TRANSPLANT SURGERY

## 2024-08-26 RX ORDER — INSULIN LISPRO 100 [IU]/ML
2 INJECTION, SOLUTION INTRAVENOUS; SUBCUTANEOUS ONCE
Status: DISCONTINUED | OUTPATIENT
Start: 2024-08-26 | End: 2024-08-29

## 2024-08-26 RX ORDER — SODIUM CHLORIDE 0.9 % (FLUSH) 0.9 %
5-40 SYRINGE (ML) INJECTION PRN
Status: DISCONTINUED | OUTPATIENT
Start: 2024-08-26 | End: 2024-09-07 | Stop reason: HOSPADM

## 2024-08-26 RX ORDER — INSULIN LISPRO 100 [IU]/ML
2 INJECTION, SOLUTION INTRAVENOUS; SUBCUTANEOUS ONCE
Status: COMPLETED | OUTPATIENT
Start: 2024-08-26 | End: 2024-08-26

## 2024-08-26 RX ORDER — LIDOCAINE HYDROCHLORIDE 10 MG/ML
50 INJECTION, SOLUTION EPIDURAL; INFILTRATION; INTRACAUDAL; PERINEURAL ONCE
Status: DISCONTINUED | OUTPATIENT
Start: 2024-08-26 | End: 2024-08-30

## 2024-08-26 RX ORDER — SODIUM CHLORIDE 0.9 % (FLUSH) 0.9 %
5-40 SYRINGE (ML) INJECTION EVERY 12 HOURS SCHEDULED
Status: DISCONTINUED | OUTPATIENT
Start: 2024-08-26 | End: 2024-09-07 | Stop reason: HOSPADM

## 2024-08-26 RX ORDER — SODIUM CHLORIDE 9 MG/ML
INJECTION, SOLUTION INTRAVENOUS PRN
Status: DISCONTINUED | OUTPATIENT
Start: 2024-08-26 | End: 2024-09-07 | Stop reason: HOSPADM

## 2024-08-26 RX ADMIN — METOPROLOL SUCCINATE 25 MG: 25 TABLET, EXTENDED RELEASE ORAL at 09:11

## 2024-08-26 RX ADMIN — MONTELUKAST 10 MG: 10 TABLET, FILM COATED ORAL at 21:32

## 2024-08-26 RX ADMIN — ENOXAPARIN SODIUM 30 MG: 100 INJECTION SUBCUTANEOUS at 13:01

## 2024-08-26 RX ADMIN — SODIUM CHLORIDE, PRESERVATIVE FREE 10 ML: 5 INJECTION INTRAVENOUS at 09:13

## 2024-08-26 RX ADMIN — METOCLOPRAMIDE 10 MG: 5 INJECTION, SOLUTION INTRAMUSCULAR; INTRAVENOUS at 21:44

## 2024-08-26 RX ADMIN — METOCLOPRAMIDE 10 MG: 5 INJECTION, SOLUTION INTRAMUSCULAR; INTRAVENOUS at 13:01

## 2024-08-26 RX ADMIN — HYDROCHLOROTHIAZIDE 12.5 MG: 12.5 TABLET ORAL at 09:12

## 2024-08-26 RX ADMIN — METOCLOPRAMIDE 10 MG: 5 INJECTION, SOLUTION INTRAMUSCULAR; INTRAVENOUS at 04:19

## 2024-08-26 RX ADMIN — INSULIN GLARGINE 15 UNITS: 100 INJECTION, SOLUTION SUBCUTANEOUS at 21:32

## 2024-08-26 RX ADMIN — VALSARTAN 80 MG: 80 TABLET, FILM COATED ORAL at 09:12

## 2024-08-26 RX ADMIN — ACETAMINOPHEN 500 MG: 500 TABLET ORAL at 17:21

## 2024-08-26 RX ADMIN — SODIUM CHLORIDE, PRESERVATIVE FREE 40 MG: 5 INJECTION INTRAVENOUS at 17:21

## 2024-08-26 RX ADMIN — FINASTERIDE 5 MG: 5 TABLET, FILM COATED ORAL at 09:11

## 2024-08-26 RX ADMIN — Medication 250 MG: at 13:01

## 2024-08-26 RX ADMIN — Medication 5 MG: at 21:33

## 2024-08-26 RX ADMIN — SODIUM CHLORIDE, PRESERVATIVE FREE 10 ML: 5 INJECTION INTRAVENOUS at 21:33

## 2024-08-26 RX ADMIN — TAMSULOSIN HYDROCHLORIDE 0.4 MG: 0.4 CAPSULE ORAL at 13:02

## 2024-08-26 RX ADMIN — ACETAMINOPHEN 500 MG: 500 TABLET ORAL at 13:02

## 2024-08-26 RX ADMIN — SENNOSIDES AND DOCUSATE SODIUM 2 TABLET: 50; 8.6 TABLET ORAL at 09:08

## 2024-08-26 RX ADMIN — METOCLOPRAMIDE 10 MG: 5 INJECTION, SOLUTION INTRAMUSCULAR; INTRAVENOUS at 17:21

## 2024-08-26 RX ADMIN — ASPIRIN 81 MG: 81 TABLET, COATED ORAL at 09:11

## 2024-08-26 RX ADMIN — ATORVASTATIN CALCIUM 20 MG: 20 TABLET, FILM COATED ORAL at 21:32

## 2024-08-26 RX ADMIN — GABAPENTIN 200 MG: 100 CAPSULE ORAL at 09:10

## 2024-08-26 RX ADMIN — CALCIUM GLUCONATE: 98 INJECTION, SOLUTION INTRAVENOUS at 18:22

## 2024-08-26 RX ADMIN — SODIUM CHLORIDE, PRESERVATIVE FREE 40 MG: 5 INJECTION INTRAVENOUS at 04:19

## 2024-08-26 RX ADMIN — ACETAMINOPHEN 500 MG: 500 TABLET ORAL at 23:41

## 2024-08-26 RX ADMIN — ENOXAPARIN SODIUM 30 MG: 100 INJECTION SUBCUTANEOUS at 21:32

## 2024-08-26 RX ADMIN — INSULIN LISPRO 2 UNITS: 100 INJECTION, SOLUTION INTRAVENOUS; SUBCUTANEOUS at 14:30

## 2024-08-26 RX ADMIN — GABAPENTIN 200 MG: 100 CAPSULE ORAL at 21:32

## 2024-08-26 RX ADMIN — DIATRIZOATE MEGLUMINE AND DIATRIZOATE SODIUM 200 ML: 660; 100 LIQUID ORAL; RECTAL at 10:46

## 2024-08-26 ASSESSMENT — PAIN DESCRIPTION - LOCATION: LOCATION: ABDOMEN

## 2024-08-26 ASSESSMENT — PAIN SCALES - GENERAL
PAINLEVEL_OUTOF10: 0
PAINLEVEL_OUTOF10: 0
PAINLEVEL_OUTOF10: 3
PAINLEVEL_OUTOF10: 0

## 2024-08-26 ASSESSMENT — PAIN - FUNCTIONAL ASSESSMENT: PAIN_FUNCTIONAL_ASSESSMENT: PREVENTS OR INTERFERES SOME ACTIVE ACTIVITIES AND ADLS

## 2024-08-26 ASSESSMENT — PAIN DESCRIPTION - DESCRIPTORS: DESCRIPTORS: DISCOMFORT;CRAMPING;SORE

## 2024-08-26 NOTE — PROGRESS NOTES
Power chg 2 lumen picc line Placement 8/26/2024    Product number: [id number]   Lot Number: [id number]      Ultrasound: yes/sonosite   Right Brachial vein:                Upper Arm Circumference: (CM) 32    Size:(FR)/GUAGE 5.5 fr    Exposed Length: (CM) 1    Internal Length: (CM) 41   Cut: (CM) 13   Vein Measurement: 0.52              Lidocaine Given: yes. 1 %. 3 ml from kit.  Michael Goetz RN  8/26/2024  1:44 PM    Placed per CHON alva RN.. melly obtained with vps tracking.

## 2024-08-26 NOTE — PROGRESS NOTES
Comprehensive Nutrition Assessment    Type and Reason for Visit:  Reassess, Consult (PN Recommendations)    Nutrition Recommendations/Plan:   Continue NPO.  Modify Current TPN to appropriately meet current estimated needs and monitor.  Start Trickle EN once med appropriate/post PEG-J placement w/ eventual plan for TPN wean once tolerating EN at goal.    (Goal) TPN Recommendations:  3-in-1 Central Custom @ 75ml/hr (Goal) Continuous x 24hrs/d= 1800ml TV, 125 gm AA, 350 gm dextrose, 51 gm lipid & 2200 total kcals.    (Goal) TF Recommendations:  Immune Enhancing (Pivot 1.5) @ 60ml/hr (Goal) Continuous x 24hrs/d = 1440ml TV, 2160kcal, 135gm Pro, 1093ml freewater.    Flush per physician mgmt; please reconsult for updated recommendations PRN.    Of note, Immune Enhancing Formula is also peptide based w/ low fiber and appropriate for current needs.     Malnutrition Assessment:  Malnutrition Status:  Moderate malnutrition (08/17/24 1118)    Context:  Acute Illness     Findings of the 6 clinical characteristics of malnutrition:  Energy Intake:  50% or less of estimated energy requirements for 5 or more days  Weight Loss:   (mild wt loss - 3.2% x 1 month)     Body Fat Loss:  Mild body fat loss Orbital   Muscle Mass Loss:  Mild muscle mass loss Temples (temporalis), Clavicles (pectoralis & deltoids)  Fluid Accumulation:  No significant fluid accumulation     Strength:  Not Performed    Nutrition Assessment:    Pt remains at risk d/t ongoing need for PN support d/t noted ongoing intermittent N/V (somewhat improved) and distention/diarrhea w/ PO intake of Full Liquids/ONS shakes.  Admit w/ abd pain x 3 weeks found to have pancreatic head mass/ adenocarcinoma & obstructive jaundice s/p whipple 8/14.  PMHx DM, Prostate CA, & Deafness (reads lips).  Pt meets criteris for Moderate Malnutriton.  Noted plan for Full TPN support today w/ pend plan for PEG-J soon.  Will provide goal TPN and goal EN rec's and continue to

## 2024-08-26 NOTE — PROGRESS NOTES
HEPATOBILIARY AND PANCREATIC SURGERY  DAILY PROGRESS NOTE  8/26/2024        Subjective:  Denies abdominal pain, nausea or vomiting this AM. Pt is somnolent and does not provide much details. Resting comfortably.    Objective:  /63   Pulse (!) 102   Temp 98.9 °F (37.2 °C) (Temporal)   Resp 22   Ht 1.88 m (6' 2\")   Wt 101 kg (222 lb 10.6 oz)   SpO2 94%   BMI 28.59 kg/m²     GENERAL:  Lying in bed. No acute distress   HEAD: No gross abnormalities   EYES: scleral icterus   LUNGS:  No increased work of breathing on 2L NC  CARDIOVASCULAR:  RR, normotensive   ABDOMEN:  Softly distended, mildly tender around midline incision which is c/d/i, R drain site with stitch in place, no drainage. Prior L SHARON site stitched no drainage.  EXTREMITIES: No edema or swelling  SKIN: Warm and dry    Assessment/Plan:  79 y.o. male with pancreatic head adenocarcinoma with duodenal obstruction s/p whipple procedure w portal vein reconstruction 8/14 - T3N2 (Stage III)    Plan  -PEG-J today planned with   to meet pt's nutritional demands  - continue electrolyte replacements  - ambulate, OOB  - dispo planning   - CTAP w PO 8/24:gastric distention, small bowel, colonic distention.   - Diet: FLD and supplemental shakes   - one dose lactulose today     Electronically signed by Dedrick Larry DO on 8/26/2024 at     Patient states he was unable to do 5 shakes yesterday.  He has significant abdominal distention.  I have asked Dr. Carlos to place a PEG-J feeding tube.  Will make patient NPO with medications  Will discuss timing with Dr. Carlos  Place PICC and start TPN    Electronically signed by Demetrius Santoyo MD on 8/26/2024 at 10:05 AM

## 2024-08-26 NOTE — CARE COORDINATION
Transition of care update: S/P whipple procedure with portal vein reconstruction on 08/14. Bloating/Delayed Gastric emptying. FL UGI with small bowel ordered. IVFs at 50ml/hr. Place Picc line and  start TPN.  Labs noted. Chart reviewed. GI following for PEG-J feeding tube. Plan was home with [facility] when medically ready. Also, Jeri stone) is following pt. Updated Deangelo with [facility] on pt. Cm/sw will follow.

## 2024-08-26 NOTE — PATIENT CARE CONFERENCE
P Quality Flow/Interdisciplinary Rounds Progress Note        Quality Flow Rounds held on August 26, 2024    Disciplines Attending:  Bedside Nurse, , , and Nursing Unit Leadership    Michael Garcia was admitted on 8/14/2024  5:28 AM    Anticipated Discharge Date:       Disposition:    Mike Score:  Mike Scale Score: 19    Readmission Risk              Risk of Unplanned Readmission:  33           Discussed patient goal for the day, patient clinical progression, and barriers to discharge.  The following Goal(s) of the Day/Commitment(s) have been identified:  downgrade/discharge planning       Adriana Sandoval RN  August 26, 2024

## 2024-08-26 NOTE — PROGRESS NOTES
Messaged [facility] regarding pt being NPO and not drinking the phos-nak 4x a day.     Phos-nak discontinued and PRN sodium phos scale ordered by [facility].     Ana Newton RN

## 2024-08-26 NOTE — CONSULTS
Advanced Endoscopy/Gastroenterology Consult Note  Demetrius Carlos D.O.    Date:6:58 AM 8/26/2024    Michael Garcia  79 y.o.  male    HPI:  Michael is quite well known to our GI service and underwent attempted ERCP for abnormal imaging, and elevated liver chemistires, ERCP failed secondary to inability to pass duodenal stricture. He was eventually transferred and had a [facility] and underwent whipple with PV reconstruction with Dr. Santoyo on 8/14/24. Stage III adenocarcinoma, T3N2. Pt did well post procedure however appears to have developed delayed gastric emptying issues, GI was consulted for potential placement of a PEG-J tube. Plans are for chemotherapy to begin in the next couple of weeks. This morning he denies any nausea however does admit that he was significantly bloated and did have some diarrhea yesterday. Bloating occurred after ingestion of his 5 supplemental shakes yesterday. He did say it was somewhat of a struggle to get down. We did touch upon placement of a PEG-J and the likely benefit. He denies any pain, fever, chills sweats, CP.     PAST MEDICAL Hx:  Past Medical History:   Diagnosis Date    Deaf, bilateral     Diabetes (HCC)     Hypertension     Osteoarthritis     Primary osteoarthritis of left knee 10/18/2017    Prostate cancer (HCC) 03/27/2024       PAST SURGICAL Hx:   Past Surgical History:   Procedure Laterality Date    CARPAL TUNNEL RELEASE      ERCP N/A 8/2/2024    FAILED ENDOSCOPIC RETROGRADE CHOLANGIOPANCREATOGRAPHY performed by Demetrius Carlos DO at [facility] ENDOSCOPY    IR BILIARY DRAIN INT AND EXT  8/6/2024    IR BILIARY DRAIN INT AND EXT 8/6/2024 Sonny, Rodríguez Greer [place] [facility]    PANCREAS SURGERY N/A 8/14/2024    WHIPPLE with portal vein reconstruction, intraoperative US performed by Demetrius Santoyo III, [place] [facility]    ROTATOR CUFF REPAIR  2/12    SHOULDER SURGERY      UPPER GASTROINTESTINAL ENDOSCOPY N/A 8/2/2024    ESOPHAGOGASTRODUODENOSCOPY DILATION  endoscopy availability, risks/benefits discussed with the patient. He did not appear very engaged this morning and  I will readdress the placement in this afternoon, once more awake. CT scan was reviewed from 8/24, there does appear to be a window for placement approx 4cm x 5cm on L nipple line below the rib cage. Moderate amount of colonic gaseous distension on the left, window may have improved if colon further decompressed. If no improvement in bms, may require enema. Agree with lactulose. UGIS/SBFT pending.     Demetrius Carlos DO  8/26/2024  6:58 AM

## 2024-08-26 NOTE — PLAN OF CARE
Problem: Discharge Planning  Goal: Discharge to home or other facility with appropriate resources  Outcome: Progressing     Problem: Safety - Adult  Goal: Free from fall injury  Outcome: Progressing     Problem: Pain  Goal: Verbalizes/displays adequate comfort level or baseline comfort level  Outcome: Progressing     Problem: Skin/Tissue Integrity  Goal: Absence of new skin breakdown  Description: 1.  Monitor for areas of redness and/or skin breakdown  2.  Assess vascular access sites hourly  3.  Every 4-6 hours minimum:  Change oxygen saturation probe site  4.  Every 4-6 hours:  If on nasal continuous positive airway pressure, respiratory therapy assess nares and determine need for appliance change or resting period.  Outcome: Progressing     Problem: ABCDS Injury Assessment  Goal: Absence of physical injury  Outcome: Progressing     Problem: Neurosensory - Adult  Goal: Achieves stable or improved neurological status  Outcome: Progressing     Problem: Respiratory - Adult  Goal: Achieves optimal ventilation and oxygenation  Outcome: Progressing     Problem: Cardiovascular - Adult  Goal: Maintains optimal cardiac output and hemodynamic stability  Outcome: Progressing     Problem: Skin/Tissue Integrity - Adult  Goal: Skin integrity remains intact  Outcome: Progressing     Problem: Gastrointestinal - Adult  Goal: Minimal or absence of nausea and vomiting  Outcome: Progressing     Problem: Genitourinary - Adult  Goal: Absence of urinary retention  Outcome: Progressing     Problem: Infection - Adult  Goal: Absence of infection at discharge  Outcome: Progressing     Problem: Metabolic/Fluid and Electrolytes - Adult  Goal: Electrolytes maintained within normal limits  Outcome: Progressing     Problem: Hematologic - Adult  Goal: Maintains hematologic stability  Outcome: Progressing     Problem: Chronic Conditions and Co-morbidities  Goal: Patient's chronic conditions and co-morbidity symptoms are monitored and maintained

## 2024-08-27 ENCOUNTER — ANESTHESIA EVENT (OUTPATIENT)
Dept: ENDOSCOPY | Age: 79
End: 2024-08-27
Payer: MEDICARE

## 2024-08-27 LAB
ALBUMIN SERPL-MCNC: 2.5 G/DL (ref 3.5–5.2)
ALP SERPL-CCNC: 269 U/L (ref 40–129)
ALT SERPL-CCNC: 31 U/L (ref 0–40)
ANION GAP SERPL CALCULATED.3IONS-SCNC: 12 MMOL/L (ref 7–16)
AST SERPL-CCNC: 20 U/L (ref 0–39)
BASOPHILS # BLD: 0.02 K/UL (ref 0–0.2)
BASOPHILS NFR BLD: 0 % (ref 0–2)
BILIRUB SERPL-MCNC: 2.6 MG/DL (ref 0–1.2)
BUN SERPL-MCNC: 9 MG/DL (ref 6–23)
CA-I BLD-SCNC: 1.13 MMOL/L (ref 1.15–1.33)
CALCIUM SERPL-MCNC: 7.9 MG/DL (ref 8.6–10.2)
CHLORIDE SERPL-SCNC: 99 MMOL/L (ref 98–107)
CO2 SERPL-SCNC: 24 MMOL/L (ref 22–29)
CREAT SERPL-MCNC: 0.6 MG/DL (ref 0.7–1.2)
EOSINOPHIL # BLD: 0.07 K/UL (ref 0.05–0.5)
EOSINOPHILS RELATIVE PERCENT: 1 % (ref 0–6)
ERYTHROCYTE [DISTWIDTH] IN BLOOD BY AUTOMATED COUNT: 13.7 % (ref 11.5–15)
GFR, ESTIMATED: >90 ML/MIN/1.73M2
GLUCOSE BLD-MCNC: 266 MG/DL (ref 74–99)
GLUCOSE BLD-MCNC: 318 MG/DL (ref 74–99)
GLUCOSE BLD-MCNC: 328 MG/DL (ref 74–99)
GLUCOSE BLD-MCNC: 332 MG/DL (ref 74–99)
GLUCOSE SERPL-MCNC: 300 MG/DL (ref 74–99)
HCT VFR BLD AUTO: 19.4 % (ref 37–54)
HCT VFR BLD AUTO: 21.1 % (ref 37–54)
HGB BLD-MCNC: 6.2 G/DL (ref 12.5–16.5)
HGB BLD-MCNC: 7 G/DL (ref 12.5–16.5)
IMM GRANULOCYTES # BLD AUTO: 0.09 K/UL (ref 0–0.58)
IMM GRANULOCYTES NFR BLD: 1 % (ref 0–5)
LYMPHOCYTES NFR BLD: 0.32 K/UL (ref 1.5–4)
LYMPHOCYTES RELATIVE PERCENT: 3 % (ref 20–42)
MAGNESIUM SERPL-MCNC: 1.9 MG/DL (ref 1.6–2.6)
MCH RBC QN AUTO: 32.3 PG (ref 26–35)
MCHC RBC AUTO-ENTMCNC: 32 G/DL (ref 32–34.5)
MCV RBC AUTO: 101 FL (ref 80–99.9)
MONOCYTES NFR BLD: 0.72 K/UL (ref 0.1–0.95)
MONOCYTES NFR BLD: 8 % (ref 2–12)
NEUTROPHILS NFR BLD: 87 % (ref 43–80)
NEUTS SEG NFR BLD: 8.44 K/UL (ref 1.8–7.3)
PHOSPHATE SERPL-MCNC: 2.4 MG/DL (ref 2.5–4.5)
PHOSPHATE SERPL-MCNC: 3.4 MG/DL (ref 2.5–4.5)
PLATELET # BLD AUTO: 246 K/UL (ref 130–450)
PMV BLD AUTO: 10.7 FL (ref 7–12)
POTASSIUM SERPL-SCNC: 3.5 MMOL/L (ref 3.5–5)
PROT SERPL-MCNC: 5.6 G/DL (ref 6.4–8.3)
RBC # BLD AUTO: 1.92 M/UL (ref 3.8–5.8)
RBC # BLD: ABNORMAL 10*6/UL
SODIUM SERPL-SCNC: 135 MMOL/L (ref 132–146)
TRIGL SERPL-MCNC: 113 MG/DL
WBC OTHER # BLD: 9.7 K/UL (ref 4.5–11.5)

## 2024-08-27 PROCEDURE — 85025 COMPLETE CBC W/AUTO DIFF WBC: CPT

## 2024-08-27 PROCEDURE — 84100 ASSAY OF PHOSPHORUS: CPT

## 2024-08-27 PROCEDURE — 85014 HEMATOCRIT: CPT

## 2024-08-27 PROCEDURE — 2140000000 HC CCU INTERMEDIATE R&B

## 2024-08-27 PROCEDURE — 6370000000 HC RX 637 (ALT 250 FOR IP)

## 2024-08-27 PROCEDURE — 6360000002 HC RX W HCPCS: Performed by: STUDENT IN AN ORGANIZED HEALTH CARE EDUCATION/TRAINING PROGRAM

## 2024-08-27 PROCEDURE — 85018 HEMOGLOBIN: CPT

## 2024-08-27 PROCEDURE — 2500000003 HC RX 250 WO HCPCS

## 2024-08-27 PROCEDURE — 6360000002 HC RX W HCPCS

## 2024-08-27 PROCEDURE — P9016 RBC LEUKOCYTES REDUCED: HCPCS

## 2024-08-27 PROCEDURE — 2580000003 HC RX 258

## 2024-08-27 PROCEDURE — 86850 RBC ANTIBODY SCREEN: CPT

## 2024-08-27 PROCEDURE — 2580000003 HC RX 258: Performed by: STUDENT IN AN ORGANIZED HEALTH CARE EDUCATION/TRAINING PROGRAM

## 2024-08-27 PROCEDURE — 6360000002 HC RX W HCPCS: Performed by: TRANSPLANT SURGERY

## 2024-08-27 PROCEDURE — 36430 TRANSFUSION BLD/BLD COMPNT: CPT

## 2024-08-27 PROCEDURE — 82962 GLUCOSE BLOOD TEST: CPT

## 2024-08-27 PROCEDURE — 86901 BLOOD TYPING SEROLOGIC RH(D): CPT

## 2024-08-27 PROCEDURE — 84478 ASSAY OF TRIGLYCERIDES: CPT

## 2024-08-27 PROCEDURE — 6370000000 HC RX 637 (ALT 250 FOR IP): Performed by: STUDENT IN AN ORGANIZED HEALTH CARE EDUCATION/TRAINING PROGRAM

## 2024-08-27 PROCEDURE — 80053 COMPREHEN METABOLIC PANEL: CPT

## 2024-08-27 PROCEDURE — 2580000003 HC RX 258: Performed by: TRANSPLANT SURGERY

## 2024-08-27 PROCEDURE — 83735 ASSAY OF MAGNESIUM: CPT

## 2024-08-27 PROCEDURE — 86923 COMPATIBILITY TEST ELECTRIC: CPT

## 2024-08-27 PROCEDURE — 82330 ASSAY OF CALCIUM: CPT

## 2024-08-27 PROCEDURE — 6370000000 HC RX 637 (ALT 250 FOR IP): Performed by: TRANSPLANT SURGERY

## 2024-08-27 PROCEDURE — 86900 BLOOD TYPING SEROLOGIC ABO: CPT

## 2024-08-27 PROCEDURE — 30233N1 TRANSFUSION OF NONAUTOLOGOUS RED BLOOD CELLS INTO PERIPHERAL VEIN, PERCUTANEOUS APPROACH: ICD-10-PCS | Performed by: TRANSPLANT SURGERY

## 2024-08-27 RX ORDER — SODIUM CHLORIDE 9 MG/ML
INJECTION, SOLUTION INTRAVENOUS PRN
Status: DISCONTINUED | OUTPATIENT
Start: 2024-08-27 | End: 2024-09-07 | Stop reason: HOSPADM

## 2024-08-27 RX ADMIN — CALCIUM CARBONATE-VITAMIN D TAB 500 MG-200 UNIT 1 TABLET: 500-200 TAB at 13:52

## 2024-08-27 RX ADMIN — METOCLOPRAMIDE 10 MG: 5 INJECTION, SOLUTION INTRAMUSCULAR; INTRAVENOUS at 05:17

## 2024-08-27 RX ADMIN — SODIUM CHLORIDE, PRESERVATIVE FREE 10 ML: 5 INJECTION INTRAVENOUS at 08:45

## 2024-08-27 RX ADMIN — HYDROCHLOROTHIAZIDE 12.5 MG: 12.5 TABLET ORAL at 11:22

## 2024-08-27 RX ADMIN — INSULIN GLARGINE 15 UNITS: 100 INJECTION, SOLUTION SUBCUTANEOUS at 22:21

## 2024-08-27 RX ADMIN — SODIUM CHLORIDE, PRESERVATIVE FREE 10 ML: 5 INJECTION INTRAVENOUS at 22:21

## 2024-08-27 RX ADMIN — INSULIN LISPRO 12 UNITS: 100 INJECTION, SOLUTION INTRAVENOUS; SUBCUTANEOUS at 08:45

## 2024-08-27 RX ADMIN — METOCLOPRAMIDE 10 MG: 5 INJECTION, SOLUTION INTRAMUSCULAR; INTRAVENOUS at 17:18

## 2024-08-27 RX ADMIN — METOPROLOL SUCCINATE 25 MG: 25 TABLET, EXTENDED RELEASE ORAL at 08:45

## 2024-08-27 RX ADMIN — Medication 5 MG: at 22:21

## 2024-08-27 RX ADMIN — CALCIUM GLUCONATE 2000 MG: 98 INJECTION, SOLUTION INTRAVENOUS at 12:52

## 2024-08-27 RX ADMIN — GABAPENTIN 200 MG: 100 CAPSULE ORAL at 22:21

## 2024-08-27 RX ADMIN — ATORVASTATIN CALCIUM 20 MG: 20 TABLET, FILM COATED ORAL at 22:21

## 2024-08-27 RX ADMIN — ENOXAPARIN SODIUM 30 MG: 100 INJECTION SUBCUTANEOUS at 08:45

## 2024-08-27 RX ADMIN — INSULIN LISPRO 12 UNITS: 100 INJECTION, SOLUTION INTRAVENOUS; SUBCUTANEOUS at 17:17

## 2024-08-27 RX ADMIN — ACETAMINOPHEN 500 MG: 500 TABLET ORAL at 05:17

## 2024-08-27 RX ADMIN — ACETAMINOPHEN 500 MG: 500 TABLET ORAL at 11:23

## 2024-08-27 RX ADMIN — CALCIUM GLUCONATE: 98 INJECTION, SOLUTION INTRAVENOUS at 18:22

## 2024-08-27 RX ADMIN — VALSARTAN 80 MG: 80 TABLET, FILM COATED ORAL at 11:23

## 2024-08-27 RX ADMIN — SODIUM CHLORIDE, PRESERVATIVE FREE 40 MG: 5 INJECTION INTRAVENOUS at 05:17

## 2024-08-27 RX ADMIN — SODIUM PHOSPHATE, MONOBASIC, MONOHYDRATE AND SODIUM PHOSPHATE, DIBASIC, ANHYDROUS 15 MMOL: 142; 276 INJECTION, SOLUTION INTRAVENOUS at 09:49

## 2024-08-27 RX ADMIN — INSULIN LISPRO 12 UNITS: 100 INJECTION, SOLUTION INTRAVENOUS; SUBCUTANEOUS at 11:24

## 2024-08-27 RX ADMIN — MONTELUKAST 10 MG: 10 TABLET, FILM COATED ORAL at 22:21

## 2024-08-27 RX ADMIN — ASPIRIN 81 MG: 81 TABLET, COATED ORAL at 08:45

## 2024-08-27 RX ADMIN — SODIUM CHLORIDE, PRESERVATIVE FREE 40 MG: 5 INJECTION INTRAVENOUS at 17:18

## 2024-08-27 RX ADMIN — METOCLOPRAMIDE 10 MG: 5 INJECTION, SOLUTION INTRAMUSCULAR; INTRAVENOUS at 11:24

## 2024-08-27 RX ADMIN — TAMSULOSIN HYDROCHLORIDE 0.4 MG: 0.4 CAPSULE ORAL at 08:45

## 2024-08-27 ASSESSMENT — PAIN DESCRIPTION - DESCRIPTORS: DESCRIPTORS: DULL;CRAMPING;SORE

## 2024-08-27 ASSESSMENT — PAIN SCALES - GENERAL
PAINLEVEL_OUTOF10: 0
PAINLEVEL_OUTOF10: 2
PAINLEVEL_OUTOF10: 0

## 2024-08-27 ASSESSMENT — PAIN SCALES - WONG BAKER: WONGBAKER_NUMERICALRESPONSE: NO HURT

## 2024-08-27 ASSESSMENT — PAIN DESCRIPTION - LOCATION: LOCATION: ABDOMEN

## 2024-08-27 ASSESSMENT — PAIN - FUNCTIONAL ASSESSMENT: PAIN_FUNCTIONAL_ASSESSMENT: PREVENTS OR INTERFERES SOME ACTIVE ACTIVITIES AND ADLS

## 2024-08-27 NOTE — PROGRESS NOTES
Blood administered. Present at bedside while the first fifteen minutes of blood administering. VSS and no adverse reactions at this time.     Ana Newton RN

## 2024-08-27 NOTE — PROGRESS NOTES
HEPATOBILIARY AND PANCREATIC SURGERY  DAILY PROGRESS NOTE  8/27/2024        Subjective:  Denies abdominal pain, nausea or vomiting this AM. Pt has been having multiple bowel movements overnight first several were formed but now are more liquid in consistency, nursing reports 12 bowel movements.  Pt blood sugars have been elevated since starting TPN, last reading was 300. Transfused one unit this AM as Hb was 6.2.   Objective:  BP (!) 112/59   Pulse (!) 108   Temp 99.8 °F (37.7 °C) (Temporal)   Resp 20   Ht 1.88 m (6' 2\")   Wt 101.2 kg (223 lb)   SpO2 95%   BMI 28.63 kg/m²     GENERAL:  Lying in bed. No acute distress   HEAD: No gross abnormalities   EYES: scleral icterus   LUNGS:  No increased work of breathing on 2L NC  CARDIOVASCULAR:  RR, normotensive   ABDOMEN:  Softly distended, mildly tender around midline incision with some serous drainage from inferior portion of midline, R drain site with stitch in place, no drainage. Prior L SHARON site stitched no drainage.  EXTREMITIES: No edema or swelling  SKIN: Warm and dry    Assessment/Plan:  79 y.o. male with pancreatic head adenocarcinoma with duodenal obstruction s/p whipple procedure w portal vein reconstruction 8/14 - T3N2 (Stage III), delayed gastric emptying requiring parenteral nutrition    Plan  -PEG-J after contrast scan per  tentative 8/28  - continue electrolyte replacements  - ambulate, OOB  - dispo planning   - Diet: NPO with meds  -TPN ordered w/ insulin and electrolyte adjustments- will discuss with medicine glucose control with his TPN now.   - aggressive PO4 repletements- recheck in PM       Electronically signed by Dedrick Larry DO on 8/27/2024 at       Continue to replace electrolytes IV  Continue TPN  Patient has delayed gastric emptying refractory to Reglan  We will plan for a 1-3 enema first thing in the morning  Continue to aggressively control blood sugars  Hopefully a PEG-J can be placed with Dr. Carlos

## 2024-08-27 NOTE — PATIENT CARE CONFERENCE
P Quality Flow/Interdisciplinary Rounds Progress Note        Quality Flow Rounds held on August 27, 2024    Disciplines Attending:  Bedside Nurse, , , and Nursing Unit Leadership    Michael Garcia was admitted on 8/14/2024  5:28 AM    Anticipated Discharge Date:       Disposition:    Mike Score:  Mike Scale Score: 19    Readmission Risk              Risk of Unplanned Readmission:  32           Discussed patient goal for the day, patient clinical progression, and barriers to discharge.  The following Goal(s) of the Day/Commitment(s) have been identified:  downgrade/discharge planning       Adriana Sandoval RN  August 27, 2024

## 2024-08-27 NOTE — CARE COORDINATION
Transition of care update: S/P whipple procedure with portal vein reconstruction on 08/14. TPN via picc line.  NPO. Hgb 6.2 and other labs noted. Transfuse 1 unit prbcs. EGD with PEG-J placement tomorrow. Pt having liquid BMs. Orders noted and chart reviewed. Met with pt's Razia cardozo, in room. Pt was resting on bed. [place]([facility] and [facility] following pt. Asked [facility] to look at pt. Cm/sw will follow.

## 2024-08-27 NOTE — PROGRESS NOTES
Spiritual Health Assessment/Progress Note  [facility]    Spiritual/Emotional Needs,  ,  ,      Name: Michael Garcia MRN: [id number]    Age: 79 y.o.     Sex: male   Language: Sign language   Pentecostalism: Druze   Adenocarcinoma of pancreas (HCC)     Date: 8/27/2024                           [facility]        Referral/Consult From: Rounding   Encounter Overview/Reason: Spiritual/Emotional Needs  Service Provided For: Patient    Juanita, Belief, Meaning:   Patient identifies as spiritual and is connected with a juanita tradition or spiritual practice  Family/Friends identify as spiritual and are connected with a juanita tradition or spiritual practice      Importance and Influence:  Patient has spiritual/personal beliefs that influence decisions regarding their health  Family/Friends have spiritual/personal beliefs that influence decisions regarding the patient's health    Community:  Patient feels well-supported. Support system includes: Friends and Extended family  Family/Friends feel well-supported. Support system includes: Friends and Extended family    Assessment and Plan of Care:     Patient Interventions include: Facilitated expression of thoughts and feelings and Explored spiritual coping/struggle/distress and theological reflection  Family/Friends Interventions include: Explored spiritual coping/struggle/distress and theological reflection    Patient Plan of Care: No spiritual needs identified for follow-up and Spiritual Care available upon further referral  Family/Friends Plan of Care: No spiritual needs identified for follow-up and Spiritual Care available upon further referral    Electronically signed by BETHANIE BENTON on 8/27/2024 at 2:24 PM

## 2024-08-27 NOTE — PLAN OF CARE
Problem: Discharge Planning  Goal: Discharge to home or other facility with appropriate resources  8/27/2024 0004 by Joann Sarkar RN  Outcome: Progressing  8/26/2024 1634 by Ana Newton RN  Outcome: Progressing     Problem: Safety - Adult  Goal: Free from fall injury  8/27/2024 0004 by Joann Sarkar RN  Outcome: Progressing  8/26/2024 1634 by Ana Newton RN  Outcome: Progressing     Problem: Pain  Goal: Verbalizes/displays adequate comfort level or baseline comfort level  8/27/2024 0004 by Joann Sarkar RN  Outcome: Progressing  8/26/2024 1634 by Ana Newton RN  Outcome: Progressing     Problem: Skin/Tissue Integrity  Goal: Absence of new skin breakdown  Description: 1.  Monitor for areas of redness and/or skin breakdown  2.  Assess vascular access sites hourly  3.  Every 4-6 hours minimum:  Change oxygen saturation probe site  4.  Every 4-6 hours:  If on nasal continuous positive airway pressure, respiratory therapy assess nares and determine need for appliance change or resting period.  8/27/2024 0004 by Joann Sarkar RN  Outcome: Progressing  8/26/2024 1634 by Ana Newton RN  Outcome: Progressing     Problem: ABCDS Injury Assessment  Goal: Absence of physical injury  Outcome: Progressing

## 2024-08-27 NOTE — PROGRESS NOTES
PROGRESS NOTE        Patient Presents with/Seen in Consultation For      Reason for consult: possible Peg-J  Chief complaint: abdominal pain     Subjective:     Patient seen laying in bed, family at bedside. Discussed PEG-J with pt and family who agree to proceed. Reports he had 12 stools overnight and 5 this am, no melena or hematochezia reported. Having mild abdominal discomfort.     Review of Systems  Aside from what was mentioned in the PMH and HPI, essentially unremarkable, all others negative.    Objective:     BP (!) 122/59   Pulse 100   Temp 99.2 °F (37.3 °C) (Oral)   Resp 22   Ht 1.88 m (6' 2\")   Wt 101.2 kg (223 lb)   SpO2 92%   BMI 28.63 kg/m²     General appearance: alert, awake, laying in bed, and cooperative  Eyes: conjunctiva pale, sclera icteric. PERRL.  Lungs: clear to auscultation bilaterally  Heart: regular rate and rhythm, no murmur, 2+ pulses; no edema  Abdomen: soft, tender to palpation without guarding or rebound; bowel sounds normal; no masses,  no organomegaly  Extremities: extremities without edema  Pulses: 2+ and symmetric  Skin: Skin color jaundiced, texture, turgor normal.   Neurologic: Grossly normal    0.9 % sodium chloride infusion, PRN  oyster shell calcium w/D 500-5 MG-MCG tablet 1 tablet, Daily  calcium gluconate 2,000 mg in sodium chloride 0.9 % 100 mL IVPB, Once  PN-Adult  3-in-1 Central Line (Standard), Continuous TPN  sodium chloride flush 0.9 % injection 5-40 mL, 2 times per day  sodium chloride flush 0.9 % injection 5-40 mL, PRN  0.9 % sodium chloride infusion, PRN  lidocaine PF 1 % injection 50 mg, Once  PN-Adult  3-in-1 Central Line (Standard), Continuous TPN  magnesium-glycerin-water (1-2-3) enema, Once  diatrizoate meglumine-sodium (GASTROGRAFIN) 66-10 % solution 240 mL, ONCE PRN  insulin lispro (HUMALOG,ADMELOG) injection vial 2 Units, Once  sodium phosphate 16.17 mmol in sodium chloride 0.9 % 250 mL IVPB, PRN   Or  sodium phosphate 32.31 mmol in sodium chloride    Component Value Date/Time    WBC 9.7 08/27/2024 05:20 AM    RBC 1.92 08/27/2024 05:20 AM    HGB 6.2 08/27/2024 05:20 AM    HCT 19.4 08/27/2024 05:20 AM    .0 08/27/2024 05:20 AM    MCH 32.3 08/27/2024 05:20 AM    MCHC 32.0 08/27/2024 05:20 AM    RDW 13.7 08/27/2024 05:20 AM     08/27/2024 05:20 AM    MPV 10.7 08/27/2024 05:20 AM     CMP:    Lab Results   Component Value Date/Time     08/27/2024 05:20 AM    K 3.5 08/27/2024 05:20 AM    K 4.2 03/28/2021 06:18 AM    CL 99 08/27/2024 05:20 AM    CO2 24 08/27/2024 05:20 AM    BUN 9 08/27/2024 05:20 AM    CREATININE 0.6 08/27/2024 05:20 AM    GFRAA >60 09/01/2022 06:09 PM    LABGLOM >90 08/27/2024 05:20 AM    LABGLOM >60 05/10/2023 08:51 AM    GLUCOSE 300 08/27/2024 05:20 AM    GLUCOSE 146 10/04/2010 10:20 AM    CALCIUM 7.9 08/27/2024 05:20 AM    BILITOT 2.6 08/27/2024 05:20 AM    ALKPHOS 269 08/27/2024 05:20 AM    AST 20 08/27/2024 05:20 AM    ALT 31 08/27/2024 05:20 AM     Hepatic Function Panel:    Lab Results   Component Value Date/Time    ALKPHOS 269 08/27/2024 05:20 AM    ALT 31 08/27/2024 05:20 AM    AST 20 08/27/2024 05:20 AM    BILITOT 2.6 08/27/2024 05:20 AM    BILIDIR 1.7 08/25/2024 05:24 AM    IBILI 1.2 07/31/2024 06:08 AM     No components found for: \"CHLPL\"    Lab Results   Component Value Date    TRIG 113 08/27/2024    TRIG 271 (H) 08/17/2024    TRIG 182 (H) 10/04/2010       Lab Results   Component Value Date    HDL 41 07/31/2024    HDL 32.0 (A) 10/04/2010     No components found for: \"LDLCALC\"  No components found for: \"LABVLDL\"   PT/INR:    Lab Results   Component Value Date/Time    PROTIME 13.3 08/06/2024 06:08 AM    INR 1.2 08/06/2024 06:08 AM     IRON:    Lab Results   Component Value Date/Time    IRON 136 07/31/2024 01:47 PM     Iron Saturation:  No components found for: \"PERCENTFE\"  FERRITIN:    Lab Results   Component Value Date/Time    FERRITIN 653 07/31/2024 01:47 PM         Assessment:     T3N2 pancreatic adenocarcinoma -

## 2024-08-28 ENCOUNTER — ANESTHESIA (OUTPATIENT)
Dept: ENDOSCOPY | Age: 79
End: 2024-08-28
Payer: MEDICARE

## 2024-08-28 ENCOUNTER — APPOINTMENT (OUTPATIENT)
Dept: GENERAL RADIOLOGY | Age: 79
End: 2024-08-28
Attending: TRANSPLANT SURGERY
Payer: MEDICARE

## 2024-08-28 LAB
ALBUMIN SERPL-MCNC: 2.5 G/DL (ref 3.5–5.2)
ALP SERPL-CCNC: 262 U/L (ref 40–129)
ALT SERPL-CCNC: 30 U/L (ref 0–40)
ANION GAP SERPL CALCULATED.3IONS-SCNC: 11 MMOL/L (ref 7–16)
AST SERPL-CCNC: 28 U/L (ref 0–39)
BACTERIA URNS QL MICRO: ABNORMAL
BASOPHILS # BLD: 0 K/UL (ref 0–0.2)
BASOPHILS NFR BLD: 0 % (ref 0–2)
BILIRUB SERPL-MCNC: 2.3 MG/DL (ref 0–1.2)
BILIRUB UR QL STRIP: ABNORMAL
BILIRUB UR QL STRIP: ABNORMAL
BUN SERPL-MCNC: 9 MG/DL (ref 6–23)
CA-I BLD-SCNC: 1.14 MMOL/L (ref 1.15–1.33)
CALCIUM SERPL-MCNC: 8 MG/DL (ref 8.6–10.2)
CHLORIDE SERPL-SCNC: 99 MMOL/L (ref 98–107)
CLARITY UR: CLEAR
CLARITY UR: CLEAR
CO2 SERPL-SCNC: 25 MMOL/L (ref 22–29)
COLOR UR: ABNORMAL
COLOR UR: YELLOW
CREAT SERPL-MCNC: 0.6 MG/DL (ref 0.7–1.2)
EOSINOPHIL # BLD: 0.22 K/UL (ref 0.05–0.5)
EOSINOPHILS RELATIVE PERCENT: 3 % (ref 0–6)
ERYTHROCYTE [DISTWIDTH] IN BLOOD BY AUTOMATED COUNT: 14.5 % (ref 11.5–15)
GFR, ESTIMATED: >90 ML/MIN/1.73M2
GLUCOSE BLD-MCNC: 307 MG/DL (ref 74–99)
GLUCOSE BLD-MCNC: 315 MG/DL (ref 74–99)
GLUCOSE BLD-MCNC: 334 MG/DL (ref 74–99)
GLUCOSE BLD-MCNC: 342 MG/DL (ref 74–99)
GLUCOSE BLD-MCNC: 346 MG/DL (ref 74–99)
GLUCOSE SERPL-MCNC: 341 MG/DL (ref 74–99)
GLUCOSE UR STRIP-MCNC: >=1000 MG/DL
GLUCOSE UR STRIP-MCNC: >=1000 MG/DL
HCT VFR BLD AUTO: 20.9 % (ref 37–54)
HCT VFR BLD AUTO: 23.9 % (ref 37–54)
HGB BLD-MCNC: 6.6 G/DL (ref 12.5–16.5)
HGB BLD-MCNC: 7.6 G/DL (ref 12.5–16.5)
HGB UR QL STRIP.AUTO: ABNORMAL
HGB UR QL STRIP.AUTO: NEGATIVE
KETONES UR STRIP-MCNC: NEGATIVE MG/DL
KETONES UR STRIP-MCNC: NEGATIVE MG/DL
LEUKOCYTE ESTERASE UR QL STRIP: NEGATIVE
LEUKOCYTE ESTERASE UR QL STRIP: NEGATIVE
LYMPHOCYTES NFR BLD: 0.37 K/UL (ref 1.5–4)
LYMPHOCYTES RELATIVE PERCENT: 4 % (ref 20–42)
MAGNESIUM SERPL-MCNC: 2 MG/DL (ref 1.6–2.6)
MCH RBC QN AUTO: 31.1 PG (ref 26–35)
MCHC RBC AUTO-ENTMCNC: 31.6 G/DL (ref 32–34.5)
MCV RBC AUTO: 98.6 FL (ref 80–99.9)
MICROORGANISM SPEC CULT: NORMAL
MICROORGANISM SPEC CULT: NORMAL
MONOCYTES NFR BLD: 0.59 K/UL (ref 0.1–0.95)
MONOCYTES NFR BLD: 7 % (ref 2–12)
MUCOUS THREADS URNS QL MICRO: PRESENT
NEUTROPHILS NFR BLD: 86 % (ref 43–80)
NEUTS SEG NFR BLD: 7.32 K/UL (ref 1.8–7.3)
NITRITE UR QL STRIP: NEGATIVE
NITRITE UR QL STRIP: NEGATIVE
PH UR STRIP: 6 [PH] (ref 5–9)
PH UR STRIP: 6.5 [PH] (ref 5–9)
PHOSPHATE SERPL-MCNC: 2.7 MG/DL (ref 2.5–4.5)
PLATELET # BLD AUTO: 245 K/UL (ref 130–450)
PMV BLD AUTO: 10.3 FL (ref 7–12)
POTASSIUM SERPL-SCNC: 3.4 MMOL/L (ref 3.5–5)
PROT SERPL-MCNC: 5.7 G/DL (ref 6.4–8.3)
PROT UR STRIP-MCNC: 30 MG/DL
PROT UR STRIP-MCNC: ABNORMAL MG/DL
RBC # BLD AUTO: 2.12 M/UL (ref 3.8–5.8)
RBC # BLD: ABNORMAL 10*6/UL
RBC #/AREA URNS HPF: ABNORMAL /HPF
RBC #/AREA URNS HPF: ABNORMAL /HPF
SERVICE CMNT-IMP: NORMAL
SERVICE CMNT-IMP: NORMAL
SODIUM SERPL-SCNC: 135 MMOL/L (ref 132–146)
SP GR UR STRIP: 1.02 (ref 1–1.03)
SP GR UR STRIP: 1.02 (ref 1–1.03)
SPECIMEN DESCRIPTION: NORMAL
SPECIMEN DESCRIPTION: NORMAL
UROBILINOGEN UR STRIP-ACNC: >8 EU/DL (ref 0–1)
UROBILINOGEN UR STRIP-ACNC: >8 EU/DL (ref 0–1)
WBC #/AREA URNS HPF: ABNORMAL /HPF
WBC #/AREA URNS HPF: ABNORMAL /HPF
WBC OTHER # BLD: 8.5 K/UL (ref 4.5–11.5)

## 2024-08-28 PROCEDURE — 36430 TRANSFUSION BLD/BLD COMPNT: CPT

## 2024-08-28 PROCEDURE — 85014 HEMATOCRIT: CPT

## 2024-08-28 PROCEDURE — 6370000000 HC RX 637 (ALT 250 FOR IP): Performed by: STUDENT IN AN ORGANIZED HEALTH CARE EDUCATION/TRAINING PROGRAM

## 2024-08-28 PROCEDURE — 99221 1ST HOSP IP/OBS SF/LOW 40: CPT | Performed by: INTERNAL MEDICINE

## 2024-08-28 PROCEDURE — 2140000000 HC CCU INTERMEDIATE R&B

## 2024-08-28 PROCEDURE — 85018 HEMOGLOBIN: CPT

## 2024-08-28 PROCEDURE — 6360000002 HC RX W HCPCS

## 2024-08-28 PROCEDURE — 6370000000 HC RX 637 (ALT 250 FOR IP): Performed by: TRANSPLANT SURGERY

## 2024-08-28 PROCEDURE — 6370000000 HC RX 637 (ALT 250 FOR IP)

## 2024-08-28 PROCEDURE — 84100 ASSAY OF PHOSPHORUS: CPT

## 2024-08-28 PROCEDURE — 6360000002 HC RX W HCPCS: Performed by: STUDENT IN AN ORGANIZED HEALTH CARE EDUCATION/TRAINING PROGRAM

## 2024-08-28 PROCEDURE — 71046 X-RAY EXAM CHEST 2 VIEWS: CPT

## 2024-08-28 PROCEDURE — 82962 GLUCOSE BLOOD TEST: CPT

## 2024-08-28 PROCEDURE — 81001 URINALYSIS AUTO W/SCOPE: CPT

## 2024-08-28 PROCEDURE — 85025 COMPLETE CBC W/AUTO DIFF WBC: CPT

## 2024-08-28 PROCEDURE — 6370000000 HC RX 637 (ALT 250 FOR IP): Performed by: INTERNAL MEDICINE

## 2024-08-28 PROCEDURE — 82330 ASSAY OF CALCIUM: CPT

## 2024-08-28 PROCEDURE — 80053 COMPREHEN METABOLIC PANEL: CPT

## 2024-08-28 PROCEDURE — P9016 RBC LEUKOCYTES REDUCED: HCPCS

## 2024-08-28 PROCEDURE — 6360000002 HC RX W HCPCS: Performed by: TRANSPLANT SURGERY

## 2024-08-28 PROCEDURE — 2580000003 HC RX 258: Performed by: STUDENT IN AN ORGANIZED HEALTH CARE EDUCATION/TRAINING PROGRAM

## 2024-08-28 PROCEDURE — 83735 ASSAY OF MAGNESIUM: CPT

## 2024-08-28 PROCEDURE — 2580000003 HC RX 258

## 2024-08-28 PROCEDURE — 2500000003 HC RX 250 WO HCPCS

## 2024-08-28 RX ORDER — POTASSIUM CHLORIDE 7.45 MG/ML
10 INJECTION INTRAVENOUS
Status: COMPLETED | OUTPATIENT
Start: 2024-08-28 | End: 2024-08-28

## 2024-08-28 RX ORDER — INSULIN GLARGINE 100 [IU]/ML
20 INJECTION, SOLUTION SUBCUTANEOUS NIGHTLY
Status: DISCONTINUED | OUTPATIENT
Start: 2024-08-28 | End: 2024-08-29

## 2024-08-28 RX ORDER — SODIUM CHLORIDE 9 MG/ML
INJECTION, SOLUTION INTRAVENOUS PRN
Status: DISCONTINUED | OUTPATIENT
Start: 2024-08-28 | End: 2024-09-07 | Stop reason: HOSPADM

## 2024-08-28 RX ORDER — ACETAMINOPHEN 325 MG/1
650 TABLET ORAL EVERY 4 HOURS PRN
Status: DISCONTINUED | OUTPATIENT
Start: 2024-08-28 | End: 2024-09-07 | Stop reason: HOSPADM

## 2024-08-28 RX ADMIN — INSULIN GLARGINE 20 UNITS: 100 INJECTION, SOLUTION SUBCUTANEOUS at 21:00

## 2024-08-28 RX ADMIN — CALCIUM GLUCONATE: 98 INJECTION, SOLUTION INTRAVENOUS at 18:12

## 2024-08-28 RX ADMIN — VALSARTAN 80 MG: 80 TABLET, FILM COATED ORAL at 08:16

## 2024-08-28 RX ADMIN — GABAPENTIN 200 MG: 100 CAPSULE ORAL at 14:58

## 2024-08-28 RX ADMIN — CALCIUM CARBONATE-VITAMIN D TAB 500 MG-200 UNIT 1 TABLET: 500-200 TAB at 08:16

## 2024-08-28 RX ADMIN — METOCLOPRAMIDE 10 MG: 5 INJECTION, SOLUTION INTRAMUSCULAR; INTRAVENOUS at 10:45

## 2024-08-28 RX ADMIN — METOCLOPRAMIDE 10 MG: 5 INJECTION, SOLUTION INTRAMUSCULAR; INTRAVENOUS at 05:11

## 2024-08-28 RX ADMIN — SENNOSIDES AND DOCUSATE SODIUM 2 TABLET: 50; 8.6 TABLET ORAL at 08:16

## 2024-08-28 RX ADMIN — GABAPENTIN 200 MG: 100 CAPSULE ORAL at 08:16

## 2024-08-28 RX ADMIN — SODIUM CHLORIDE, PRESERVATIVE FREE 40 MG: 5 INJECTION INTRAVENOUS at 05:10

## 2024-08-28 RX ADMIN — FINASTERIDE 5 MG: 5 TABLET, FILM COATED ORAL at 08:15

## 2024-08-28 RX ADMIN — ACETAMINOPHEN 650 MG: 325 TABLET ORAL at 08:14

## 2024-08-28 RX ADMIN — INSULIN LISPRO 4 UNITS: 100 INJECTION, SOLUTION INTRAVENOUS; SUBCUTANEOUS at 21:00

## 2024-08-28 RX ADMIN — POTASSIUM CHLORIDE 10 MEQ: 7.46 INJECTION, SOLUTION INTRAVENOUS at 10:47

## 2024-08-28 RX ADMIN — SODIUM CHLORIDE, PRESERVATIVE FREE 40 MG: 5 INJECTION INTRAVENOUS at 15:04

## 2024-08-28 RX ADMIN — METOCLOPRAMIDE 10 MG: 5 INJECTION, SOLUTION INTRAMUSCULAR; INTRAVENOUS at 16:56

## 2024-08-28 RX ADMIN — SODIUM PHOSPHATE, MONOBASIC, MONOHYDRATE AND SODIUM PHOSPHATE, DIBASIC, ANHYDROUS 20 MMOL: 142; 276 INJECTION, SOLUTION INTRAVENOUS at 12:56

## 2024-08-28 RX ADMIN — TAMSULOSIN HYDROCHLORIDE 0.4 MG: 0.4 CAPSULE ORAL at 08:15

## 2024-08-28 RX ADMIN — OXYCODONE HYDROCHLORIDE 5 MG: 5 TABLET ORAL at 12:51

## 2024-08-28 RX ADMIN — METOPROLOL SUCCINATE 25 MG: 25 TABLET, EXTENDED RELEASE ORAL at 08:16

## 2024-08-28 RX ADMIN — SODIUM CHLORIDE, PRESERVATIVE FREE 10 ML: 5 INJECTION INTRAVENOUS at 08:17

## 2024-08-28 RX ADMIN — INSULIN LISPRO 12 UNITS: 100 INJECTION, SOLUTION INTRAVENOUS; SUBCUTANEOUS at 11:52

## 2024-08-28 RX ADMIN — POTASSIUM CHLORIDE 10 MEQ: 7.46 INJECTION, SOLUTION INTRAVENOUS at 08:30

## 2024-08-28 RX ADMIN — POTASSIUM CHLORIDE 10 MEQ: 7.46 INJECTION, SOLUTION INTRAVENOUS at 11:52

## 2024-08-28 RX ADMIN — GABAPENTIN 200 MG: 100 CAPSULE ORAL at 21:00

## 2024-08-28 RX ADMIN — ATORVASTATIN CALCIUM 20 MG: 20 TABLET, FILM COATED ORAL at 21:00

## 2024-08-28 RX ADMIN — HYDROCHLOROTHIAZIDE 12.5 MG: 12.5 TABLET ORAL at 08:16

## 2024-08-28 RX ADMIN — SODIUM CHLORIDE, PRESERVATIVE FREE 10 ML: 5 INJECTION INTRAVENOUS at 21:00

## 2024-08-28 RX ADMIN — Medication 5 MG: at 21:00

## 2024-08-28 RX ADMIN — METOCLOPRAMIDE 10 MG: 5 INJECTION, SOLUTION INTRAMUSCULAR; INTRAVENOUS at 00:24

## 2024-08-28 RX ADMIN — HYDROMORPHONE HYDROCHLORIDE 0.5 MG: 1 INJECTION, SOLUTION INTRAMUSCULAR; INTRAVENOUS; SUBCUTANEOUS at 01:43

## 2024-08-28 RX ADMIN — INSULIN LISPRO 12 UNITS: 100 INJECTION, SOLUTION INTRAVENOUS; SUBCUTANEOUS at 08:15

## 2024-08-28 RX ADMIN — INSULIN LISPRO 12 UNITS: 100 INJECTION, SOLUTION INTRAVENOUS; SUBCUTANEOUS at 16:56

## 2024-08-28 ASSESSMENT — PAIN DESCRIPTION - LOCATION
LOCATION: BACK
LOCATION: ABDOMEN

## 2024-08-28 ASSESSMENT — PAIN DESCRIPTION - ORIENTATION: ORIENTATION: MID

## 2024-08-28 ASSESSMENT — PAIN - FUNCTIONAL ASSESSMENT
PAIN_FUNCTIONAL_ASSESSMENT: ACTIVITIES ARE NOT PREVENTED
PAIN_FUNCTIONAL_ASSESSMENT: PREVENTS OR INTERFERES SOME ACTIVE ACTIVITIES AND ADLS

## 2024-08-28 ASSESSMENT — PAIN DESCRIPTION - DESCRIPTORS
DESCRIPTORS: ACHING;DISCOMFORT;SORE
DESCRIPTORS: ACHING;SORE;TENDER

## 2024-08-28 ASSESSMENT — PAIN SCALES - GENERAL
PAINLEVEL_OUTOF10: 6
PAINLEVEL_OUTOF10: 9
PAINLEVEL_OUTOF10: 3

## 2024-08-28 NOTE — PLAN OF CARE
Problem: Discharge Planning  Goal: Discharge to home or other facility with appropriate resources  8/28/2024 1005 by India Nolan RN  Outcome: Progressing  8/28/2024 0244 by Selene Joe RN  Outcome: Progressing  Flowsheets (Taken 8/27/2024 2010)  Discharge to home or other facility with appropriate resources: Identify barriers to discharge with patient and caregiver     Problem: Safety - Adult  Goal: Free from fall injury  8/28/2024 1005 by India Nolan RN  Outcome: Progressing  8/28/2024 0244 by Selene Joe RN  Outcome: Progressing     Problem: Pain  Goal: Verbalizes/displays adequate comfort level or baseline comfort level  8/28/2024 1005 by India Nolan RN  Outcome: Progressing  8/28/2024 0244 by Selene Joe RN  Outcome: Progressing     Problem: Skin/Tissue Integrity  Goal: Absence of new skin breakdown  Description: 1.  Monitor for areas of redness and/or skin breakdown  2.  Assess vascular access sites hourly  3.  Every 4-6 hours minimum:  Change oxygen saturation probe site  4.  Every 4-6 hours:  If on nasal continuous positive airway pressure, respiratory therapy assess nares and determine need for appliance change or resting period.  8/28/2024 1005 by India Nolan RN  Outcome: Progressing  8/28/2024 0244 by Selene Joe RN  Outcome: Progressing     Problem: ABCDS Injury Assessment  Goal: Absence of physical injury  8/28/2024 1005 by India Nolan RN  Outcome: Progressing  8/28/2024 0244 by Selene Joe RN  Outcome: Progressing     Problem: Neurosensory - Adult  Goal: Achieves stable or improved neurological status  8/28/2024 0244 by Selene Joe RN  Outcome: Progressing  Flowsheets (Taken 8/27/2024 2010)  Achieves stable or improved neurological status: Assess for and report changes in neurological status  Goal: Absence of seizures  8/28/2024 0244 by Selene Joe RN  Outcome: Progressing  Flowsheets (Taken 8/27/2024 2010)  Absence of seizures: Monitor for

## 2024-08-28 NOTE — PROGRESS NOTES
Patient running low grade oral temperature: 100.1. SROC notified and request of PRN tylenol order.     India Wen RN

## 2024-08-28 NOTE — PATIENT CARE CONFERENCE
P Quality Flow/Interdisciplinary Rounds Progress Note        Quality Flow Rounds held on August 28, 2024    Disciplines Attending:  Bedside Nurse, , , and Nursing Unit Leadership    Michael Garcia was admitted on 8/14/2024  5:28 AM    Anticipated Discharge Date:       Disposition:    Mike Score:  Mike Scale Score: 18    Readmission Risk              Risk of Unplanned Readmission:  38           Discussed patient goal for the day, patient clinical progression, and barriers to discharge.  The following Goal(s) of the Day/Commitment(s) have been identified:  Labs - Report Results and notify surgery patient has a temperature      Yana De La Garza RN  August 28, 2024

## 2024-08-28 NOTE — PROGRESS NOTES
HEPATOBILIARY AND PANCREATIC SURGERY  DAILY PROGRESS NOTE  8/28/2024        Subjective:  Denies abdominal pain, nausea or vomiting this AM. Denies shortness of breath. Pt has been having multiple bowel movements overnight, nursing reports 7 bowel movements, pt refused enema.  Pt blood sugars have been elevated since starting TPN, await medicine recommendations for glucose management. Last blood glucose of 346. Transfused one unit this AM as Hb was 6.6. Had a Tmax of 100.6 today with HR into the 110's.   Objective:    /60   Pulse (!) 105   Temp 100.1 °F (37.8 °C) (Temporal)   Resp 20   Ht 1.88 m (6' 2\")   Wt 102 kg (224 lb 13.9 oz)   SpO2 90%   BMI 28.87 kg/m²     GENERAL:  Lying in bed. No acute distress   HEAD: No gross abnormalities   EYES: scleral icterus   LUNGS:  No increased work of breathing on 2L NC  CARDIOVASCULAR:  RR, normotensive   ABDOMEN:  Soft, non-tender, not distended, R drain site with stitch in place, no drainage. Prior L SHARON site stitched no drainage.  EXTREMITIES: No edema or swelling  SKIN: Warm and dry    Assessment/Plan:  79 y.o. male with pancreatic head adenocarcinoma with duodenal obstruction s/p whipple procedure w portal vein reconstruction 8/14 - T3N2 (Stage III), delayed gastric emptying requiring parenteral nutrition    Plan  -PEG-J with Dr Carlos 8/28, currently NPO for procedure, will address diet following.    - continue electrolyte replacements  - ambulate, OOB  - dispo planning   - Diet: NPO with meds  -TPN ordered w/ insulin and electrolyte adjustments  -Medicine consulted re: improvement in glucose control, recs appreciated   - acute blood loss anemia, likely still equilibrating today. 1 unit rbc, monitor. No active bleeding.   - Coarse sounding breathing this am- continue pulm hygiene, ICS, check CXR this am       Electronically signed by Dedrick Larry DO on 8/28/2024 at     Agree with above  Chest x-ray this morning  Check a UA  Patient is scheduled for a PEG-J  likely tomorrow  Transfuse 1 unit of blood secondary to acute blood loss anemia at the time of surgery and now equilibration.  If a PEG-J is unsuccessful I discussed with Dr. Carlos placing a postpyloric feeding tube into the E ferret limb    Electronically signed by Demetrius Santoyo MD on 8/28/2024 at 1:42 PM

## 2024-08-28 NOTE — PROGRESS NOTES
RN stayed at bedside for first 15 minutes of blood administration. VSS. No adverse reactions noted. Rate increased to 100mL/hr.     India Wen RN

## 2024-08-28 NOTE — PLAN OF CARE
Problem: Discharge Planning  Goal: Discharge to home or other facility with appropriate resources  8/28/2024 0244 by Selene Joe RN  Outcome: Progressing  Flowsheets (Taken 8/27/2024 2010)  Discharge to home or other facility with appropriate resources: Identify barriers to discharge with patient and caregiver  8/27/2024 1829 by Ana Newton RN  Outcome: Progressing     Problem: Safety - Adult  Goal: Free from fall injury  8/28/2024 0244 by Selene Joe RN  Outcome: Progressing  8/27/2024 1829 by Ana Newton RN  Outcome: Progressing     Problem: Pain  Goal: Verbalizes/displays adequate comfort level or baseline comfort level  8/28/2024 0244 by Selene Joe RN  Outcome: Progressing  8/27/2024 1829 by Ana Newton RN  Outcome: Progressing     Problem: Skin/Tissue Integrity  Goal: Absence of new skin breakdown  Description: 1.  Monitor for areas of redness and/or skin breakdown  2.  Assess vascular access sites hourly  3.  Every 4-6 hours minimum:  Change oxygen saturation probe site  4.  Every 4-6 hours:  If on nasal continuous positive airway pressure, respiratory therapy assess nares and determine need for appliance change or resting period.  8/28/2024 0244 by Selene Joe RN  Outcome: Progressing  8/27/2024 1829 by Ana Newton RN  Outcome: Progressing     Problem: ABCDS Injury Assessment  Goal: Absence of physical injury  Outcome: Progressing     Problem: Neurosensory - Adult  Goal: Achieves stable or improved neurological status  Outcome: Progressing  Flowsheets (Taken 8/27/2024 2010)  Achieves stable or improved neurological status: Assess for and report changes in neurological status  Goal: Absence of seizures  Outcome: Progressing  Flowsheets (Taken 8/27/2024 2010)  Absence of seizures: Monitor for seizure activity.  If seizure occurs, document type and location of movements and any associated apnea  Goal: Remains free of injury related to seizures activity  Outcome:  Progressing  Flowsheets (Taken 8/27/2024 2010)  Remains free of injury related to seizure activity: Maintain airway, patient safety  and administer oxygen as ordered  Goal: Achieves maximal functionality and self care  Outcome: Progressing  Flowsheets (Taken 8/27/2024 2010)  Achieves maximal functionality and self care: Monitor swallowing and airway patency with patient fatigue and changes in neurological status     Problem: Respiratory - Adult  Goal: Achieves optimal ventilation and oxygenation  Outcome: Progressing  Flowsheets (Taken 8/27/2024 2010)  Achieves optimal ventilation and oxygenation: Assess for changes in respiratory status     Problem: Cardiovascular - Adult  Goal: Maintains optimal cardiac output and hemodynamic stability  Outcome: Progressing  Flowsheets (Taken 8/27/2024 2010)  Maintains optimal cardiac output and hemodynamic stability: Monitor blood pressure and heart rate  Goal: Absence of cardiac dysrhythmias or at baseline  Outcome: Progressing  Flowsheets (Taken 8/27/2024 2010)  Absence of cardiac dysrhythmias or at baseline: Monitor cardiac rate and rhythm     Problem: Skin/Tissue Integrity - Adult  Goal: Skin integrity remains intact  Outcome: Progressing  Flowsheets (Taken 8/27/2024 2010)  Skin Integrity Remains Intact: Monitor for areas of redness and/or skin breakdown  Goal: Incisions, wounds, or drain sites healing without S/S of infection  Outcome: Progressing  Flowsheets (Taken 8/27/2024 2010)  Incisions, Wounds, or Drain Sites Healing Without Sign and Symptoms of Infection: TWICE DAILY: Assess and document skin integrity     Problem: Musculoskeletal - Adult  Goal: Return mobility to safest level of function  Outcome: Progressing  Flowsheets (Taken 8/27/2024 2010)  Return Mobility to Safest Level of Function: Assess patient stability and activity tolerance for standing, transferring and ambulating with or without assistive devices     Problem: Gastrointestinal - Adult  Goal: Minimal or

## 2024-08-28 NOTE — PROGRESS NOTES
Procedure cancelled for today due to STAT case. Procedure rescheduled for tomorrow 8/29/24 at 1430. Will continue to holdLovenox for now, hgb noted. Clear diet. NPO after midnight. Please call with any concerns.    BUD Ruby - CNP 8/28/2024 10:53 AM

## 2024-08-28 NOTE — PROGRESS NOTES
Geriatric Note    PAtient seen and evaluated for better BS contol after Whipples and TPN  Insulin dosage reviewed  BS remains over 300  Impression; Insulinopenic  Plan; Gradual increases in Lantus and  Novalog            Will follow with you

## 2024-08-29 ENCOUNTER — APPOINTMENT (OUTPATIENT)
Dept: GENERAL RADIOLOGY | Age: 79
End: 2024-08-29
Attending: TRANSPLANT SURGERY
Payer: MEDICARE

## 2024-08-29 LAB
ABO/RH: NORMAL
ALBUMIN SERPL-MCNC: 2.4 G/DL (ref 3.5–5.2)
ALP SERPL-CCNC: 288 U/L (ref 40–129)
ALT SERPL-CCNC: 39 U/L (ref 0–40)
ANION GAP SERPL CALCULATED.3IONS-SCNC: 10 MMOL/L (ref 7–16)
ANTIBODY SCREEN: NEGATIVE
ARM BAND NUMBER: NORMAL
AST SERPL-CCNC: 51 U/L (ref 0–39)
BASOPHILS # BLD: 0 K/UL (ref 0–0.2)
BASOPHILS NFR BLD: 0 % (ref 0–2)
BILIRUB SERPL-MCNC: 2.3 MG/DL (ref 0–1.2)
BLOOD BANK BLOOD PRODUCT EXPIRATION DATE: NORMAL
BLOOD BANK BLOOD PRODUCT EXPIRATION DATE: NORMAL
BLOOD BANK DISPENSE STATUS: NORMAL
BLOOD BANK DISPENSE STATUS: NORMAL
BLOOD BANK ISBT PRODUCT BLOOD TYPE: 6200
BLOOD BANK ISBT PRODUCT BLOOD TYPE: 6200
BLOOD BANK PRODUCT CODE: NORMAL
BLOOD BANK PRODUCT CODE: NORMAL
BLOOD BANK SAMPLE EXPIRATION: NORMAL
BLOOD BANK UNIT TYPE AND RH: NORMAL
BLOOD BANK UNIT TYPE AND RH: NORMAL
BPU ID: NORMAL
BPU ID: NORMAL
BUN SERPL-MCNC: 10 MG/DL (ref 6–23)
CA-I BLD-SCNC: 1.14 MMOL/L (ref 1.15–1.33)
CALCIUM SERPL-MCNC: 7.9 MG/DL (ref 8.6–10.2)
CHLORIDE SERPL-SCNC: 97 MMOL/L (ref 98–107)
CO2 SERPL-SCNC: 24 MMOL/L (ref 22–29)
COMPONENT: NORMAL
COMPONENT: NORMAL
CREAT SERPL-MCNC: 0.6 MG/DL (ref 0.7–1.2)
CROSSMATCH RESULT: NORMAL
CROSSMATCH RESULT: NORMAL
EOSINOPHIL # BLD: 0.21 K/UL (ref 0.05–0.5)
EOSINOPHILS RELATIVE PERCENT: 3 % (ref 0–6)
ERYTHROCYTE [DISTWIDTH] IN BLOOD BY AUTOMATED COUNT: 14.9 % (ref 11.5–15)
GFR, ESTIMATED: >90 ML/MIN/1.73M2
GLUCOSE BLD-MCNC: 286 MG/DL (ref 74–99)
GLUCOSE BLD-MCNC: 302 MG/DL (ref 74–99)
GLUCOSE BLD-MCNC: 325 MG/DL (ref 74–99)
GLUCOSE BLD-MCNC: 347 MG/DL (ref 74–99)
GLUCOSE SERPL-MCNC: 324 MG/DL (ref 74–99)
HCT VFR BLD AUTO: 23.4 % (ref 37–54)
HGB BLD-MCNC: 7.7 G/DL (ref 12.5–16.5)
LYMPHOCYTES NFR BLD: 0.36 K/UL (ref 1.5–4)
LYMPHOCYTES RELATIVE PERCENT: 4 % (ref 20–42)
MAGNESIUM SERPL-MCNC: 2.1 MG/DL (ref 1.6–2.6)
MCH RBC QN AUTO: 31.7 PG (ref 26–35)
MCHC RBC AUTO-ENTMCNC: 32.9 G/DL (ref 32–34.5)
MCV RBC AUTO: 96.3 FL (ref 80–99.9)
MONOCYTES NFR BLD: 0.64 K/UL (ref 0.1–0.95)
MONOCYTES NFR BLD: 8 % (ref 2–12)
NEUTROPHILS NFR BLD: 85 % (ref 43–80)
NEUTS SEG NFR BLD: 6.99 K/UL (ref 1.8–7.3)
PHOSPHATE SERPL-MCNC: 3.1 MG/DL (ref 2.5–4.5)
PLATELET # BLD AUTO: 220 K/UL (ref 130–450)
PMV BLD AUTO: 10.4 FL (ref 7–12)
POTASSIUM SERPL-SCNC: 3.9 MMOL/L (ref 3.5–5)
PROT SERPL-MCNC: 5.9 G/DL (ref 6.4–8.3)
RBC # BLD AUTO: 2.43 M/UL (ref 3.8–5.8)
RBC # BLD: ABNORMAL 10*6/UL
SODIUM SERPL-SCNC: 131 MMOL/L (ref 132–146)
TRANSFUSION STATUS: NORMAL
TRANSFUSION STATUS: NORMAL
UNIT DIVISION: 0
UNIT DIVISION: 0
UNIT ISSUE DATE/TIME: NORMAL
UNIT ISSUE DATE/TIME: NORMAL
WBC OTHER # BLD: 8.2 K/UL (ref 4.5–11.5)

## 2024-08-29 PROCEDURE — 7100000001 HC PACU RECOVERY - ADDTL 15 MIN: Performed by: INTERNAL MEDICINE

## 2024-08-29 PROCEDURE — 3700000001 HC ADD 15 MINUTES (ANESTHESIA): Performed by: INTERNAL MEDICINE

## 2024-08-29 PROCEDURE — 6360000002 HC RX W HCPCS: Performed by: STUDENT IN AN ORGANIZED HEALTH CARE EDUCATION/TRAINING PROGRAM

## 2024-08-29 PROCEDURE — 82330 ASSAY OF CALCIUM: CPT

## 2024-08-29 PROCEDURE — 83735 ASSAY OF MAGNESIUM: CPT

## 2024-08-29 PROCEDURE — 2580000003 HC RX 258: Performed by: RADIOLOGY

## 2024-08-29 PROCEDURE — 94664 DEMO&/EVAL PT USE INHALER: CPT

## 2024-08-29 PROCEDURE — 2140000000 HC CCU INTERMEDIATE R&B

## 2024-08-29 PROCEDURE — 6360000002 HC RX W HCPCS: Performed by: TRANSPLANT SURGERY

## 2024-08-29 PROCEDURE — 6370000000 HC RX 637 (ALT 250 FOR IP): Performed by: TRANSPLANT SURGERY

## 2024-08-29 PROCEDURE — 6370000000 HC RX 637 (ALT 250 FOR IP)

## 2024-08-29 PROCEDURE — 74018 RADEX ABDOMEN 1 VIEW: CPT

## 2024-08-29 PROCEDURE — 3700000000 HC ANESTHESIA ATTENDED CARE: Performed by: INTERNAL MEDICINE

## 2024-08-29 PROCEDURE — 2580000003 HC RX 258: Performed by: TRANSPLANT SURGERY

## 2024-08-29 PROCEDURE — 7100000000 HC PACU RECOVERY - FIRST 15 MIN: Performed by: INTERNAL MEDICINE

## 2024-08-29 PROCEDURE — 2580000003 HC RX 258: Performed by: STUDENT IN AN ORGANIZED HEALTH CARE EDUCATION/TRAINING PROGRAM

## 2024-08-29 PROCEDURE — 2580000003 HC RX 258

## 2024-08-29 PROCEDURE — 2700000000 HC OXYGEN THERAPY PER DAY

## 2024-08-29 PROCEDURE — 6360000002 HC RX W HCPCS

## 2024-08-29 PROCEDURE — 3609013300 HC EGD TUBE PLACEMENT: Performed by: INTERNAL MEDICINE

## 2024-08-29 PROCEDURE — 6370000000 HC RX 637 (ALT 250 FOR IP): Performed by: STUDENT IN AN ORGANIZED HEALTH CARE EDUCATION/TRAINING PROGRAM

## 2024-08-29 PROCEDURE — 85025 COMPLETE CBC W/AUTO DIFF WBC: CPT

## 2024-08-29 PROCEDURE — 84100 ASSAY OF PHOSPHORUS: CPT

## 2024-08-29 PROCEDURE — 6370000000 HC RX 637 (ALT 250 FOR IP): Performed by: INTERNAL MEDICINE

## 2024-08-29 PROCEDURE — 74022 RADEX COMPL AQT ABD SERIES: CPT

## 2024-08-29 PROCEDURE — 82962 GLUCOSE BLOOD TEST: CPT

## 2024-08-29 PROCEDURE — 2500000003 HC RX 250 WO HCPCS

## 2024-08-29 PROCEDURE — 2709999900 HC NON-CHARGEABLE SUPPLY: Performed by: INTERNAL MEDICINE

## 2024-08-29 PROCEDURE — 80053 COMPREHEN METABOLIC PANEL: CPT

## 2024-08-29 PROCEDURE — 94640 AIRWAY INHALATION TREATMENT: CPT

## 2024-08-29 RX ORDER — DEXMEDETOMIDINE HYDROCHLORIDE 100 UG/ML
INJECTION, SOLUTION INTRAVENOUS PRN
Status: DISCONTINUED | OUTPATIENT
Start: 2024-08-29 | End: 2024-08-29 | Stop reason: SDUPTHER

## 2024-08-29 RX ORDER — INSULIN LISPRO 100 [IU]/ML
0-8 INJECTION, SOLUTION INTRAVENOUS; SUBCUTANEOUS EVERY 6 HOURS SCHEDULED
Status: DISCONTINUED | OUTPATIENT
Start: 2024-08-29 | End: 2024-08-29

## 2024-08-29 RX ORDER — INSULIN LISPRO 100 [IU]/ML
0-16 INJECTION, SOLUTION INTRAVENOUS; SUBCUTANEOUS EVERY 6 HOURS
Status: DISCONTINUED | OUTPATIENT
Start: 2024-08-29 | End: 2024-09-01

## 2024-08-29 RX ORDER — MIDAZOLAM HYDROCHLORIDE 1 MG/ML
INJECTION INTRAMUSCULAR; INTRAVENOUS PRN
Status: DISCONTINUED | OUTPATIENT
Start: 2024-08-29 | End: 2024-08-29 | Stop reason: SDUPTHER

## 2024-08-29 RX ORDER — BISACODYL 10 MG
10 SUPPOSITORY, RECTAL RECTAL DAILY
Status: DISCONTINUED | OUTPATIENT
Start: 2024-08-29 | End: 2024-08-30

## 2024-08-29 RX ORDER — FUROSEMIDE 10 MG/ML
40 INJECTION INTRAMUSCULAR; INTRAVENOUS ONCE
Status: COMPLETED | OUTPATIENT
Start: 2024-08-29 | End: 2024-08-29

## 2024-08-29 RX ORDER — INSULIN GLARGINE 100 [IU]/ML
30 INJECTION, SOLUTION SUBCUTANEOUS NIGHTLY
Status: DISCONTINUED | OUTPATIENT
Start: 2024-08-29 | End: 2024-08-31

## 2024-08-29 RX ORDER — PROPOFOL 10 MG/ML
INJECTION, EMULSION INTRAVENOUS PRN
Status: DISCONTINUED | OUTPATIENT
Start: 2024-08-29 | End: 2024-08-29 | Stop reason: SDUPTHER

## 2024-08-29 RX ORDER — CEFAZOLIN SODIUM 1 G/3ML
INJECTION, POWDER, FOR SOLUTION INTRAMUSCULAR; INTRAVENOUS PRN
Status: DISCONTINUED | OUTPATIENT
Start: 2024-08-29 | End: 2024-08-29 | Stop reason: SDUPTHER

## 2024-08-29 RX ORDER — SIMETHICONE 40MG/0.6ML
SUSPENSION, DROPS(FINAL DOSAGE FORM)(ML) ORAL PRN
Status: DISCONTINUED | OUTPATIENT
Start: 2024-08-29 | End: 2024-08-29 | Stop reason: ALTCHOICE

## 2024-08-29 RX ORDER — SODIUM CHLORIDE 9 MG/ML
INJECTION, SOLUTION INTRAVENOUS CONTINUOUS PRN
Status: DISCONTINUED | OUTPATIENT
Start: 2024-08-29 | End: 2024-08-29 | Stop reason: SDUPTHER

## 2024-08-29 RX ORDER — INSULIN LISPRO 100 [IU]/ML
0-8 INJECTION, SOLUTION INTRAVENOUS; SUBCUTANEOUS EVERY 4 HOURS
Status: DISCONTINUED | OUTPATIENT
Start: 2024-08-29 | End: 2024-08-29

## 2024-08-29 RX ORDER — IPRATROPIUM BROMIDE AND ALBUTEROL SULFATE 2.5; .5 MG/3ML; MG/3ML
1 SOLUTION RESPIRATORY (INHALATION)
Status: DISCONTINUED | OUTPATIENT
Start: 2024-08-29 | End: 2024-08-30

## 2024-08-29 RX ORDER — INSULIN LISPRO 100 [IU]/ML
10 INJECTION, SOLUTION INTRAVENOUS; SUBCUTANEOUS EVERY 8 HOURS
Status: DISCONTINUED | OUTPATIENT
Start: 2024-08-29 | End: 2024-08-29

## 2024-08-29 RX ADMIN — PHENYLEPHRINE HYDROCHLORIDE 200 MCG: 10 INJECTION INTRAVENOUS at 16:00

## 2024-08-29 RX ADMIN — ONDANSETRON 4 MG: 2 INJECTION INTRAMUSCULAR; INTRAVENOUS at 08:22

## 2024-08-29 RX ADMIN — FUROSEMIDE 40 MG: 10 INJECTION, SOLUTION INTRAMUSCULAR; INTRAVENOUS at 11:02

## 2024-08-29 RX ADMIN — MIDAZOLAM 2 MG: 1 INJECTION INTRAMUSCULAR; INTRAVENOUS at 15:12

## 2024-08-29 RX ADMIN — CALCIUM GLUCONATE: 98 INJECTION, SOLUTION INTRAVENOUS at 18:57

## 2024-08-29 RX ADMIN — Medication 10 ML: at 18:57

## 2024-08-29 RX ADMIN — METOCLOPRAMIDE 10 MG: 5 INJECTION, SOLUTION INTRAMUSCULAR; INTRAVENOUS at 11:02

## 2024-08-29 RX ADMIN — PHENYLEPHRINE HYDROCHLORIDE 200 MCG: 10 INJECTION INTRAVENOUS at 15:51

## 2024-08-29 RX ADMIN — METOCLOPRAMIDE 10 MG: 5 INJECTION, SOLUTION INTRAMUSCULAR; INTRAVENOUS at 01:00

## 2024-08-29 RX ADMIN — SODIUM PHOSPHATE, MONOBASIC, MONOHYDRATE AND SODIUM PHOSPHATE, DIBASIC, ANHYDROUS 30 MMOL: 142; 276 INJECTION, SOLUTION INTRAVENOUS at 11:12

## 2024-08-29 RX ADMIN — SODIUM CHLORIDE: 0.9 INJECTION, SOLUTION INTRAVENOUS at 15:00

## 2024-08-29 RX ADMIN — DEXMEDETOMIDINE HCL 8 MCG: 100 INJECTION INTRAVENOUS at 15:10

## 2024-08-29 RX ADMIN — PROPOFOL 500 MG: 10 INJECTION, EMULSION INTRAVENOUS at 15:12

## 2024-08-29 RX ADMIN — SODIUM CHLORIDE, PRESERVATIVE FREE 10 ML: 5 INJECTION INTRAVENOUS at 22:16

## 2024-08-29 RX ADMIN — CEFAZOLIN 1 G: 1 INJECTION, POWDER, FOR SOLUTION INTRAMUSCULAR; INTRAVENOUS at 15:13

## 2024-08-29 RX ADMIN — DEXMEDETOMIDINE HCL 8 MCG: 100 INJECTION INTRAVENOUS at 15:15

## 2024-08-29 RX ADMIN — INSULIN LISPRO 12 UNITS: 100 INJECTION, SOLUTION INTRAVENOUS; SUBCUTANEOUS at 11:15

## 2024-08-29 RX ADMIN — IPRATROPIUM BROMIDE AND ALBUTEROL SULFATE 1 DOSE: .5; 2.5 SOLUTION RESPIRATORY (INHALATION) at 20:14

## 2024-08-29 RX ADMIN — DEXMEDETOMIDINE HCL 8 MCG: 100 INJECTION INTRAVENOUS at 15:11

## 2024-08-29 RX ADMIN — Medication 10 ML: at 11:02

## 2024-08-29 RX ADMIN — SODIUM CHLORIDE, PRESERVATIVE FREE 10 ML: 5 INJECTION INTRAVENOUS at 08:01

## 2024-08-29 RX ADMIN — ENOXAPARIN SODIUM 30 MG: 100 INJECTION SUBCUTANEOUS at 22:15

## 2024-08-29 RX ADMIN — METOPROLOL SUCCINATE 25 MG: 25 TABLET, EXTENDED RELEASE ORAL at 08:03

## 2024-08-29 RX ADMIN — INSULIN LISPRO 12 UNITS: 100 INJECTION, SOLUTION INTRAVENOUS; SUBCUTANEOUS at 08:01

## 2024-08-29 RX ADMIN — VALSARTAN 80 MG: 80 TABLET, FILM COATED ORAL at 08:03

## 2024-08-29 RX ADMIN — METOCLOPRAMIDE 10 MG: 5 INJECTION, SOLUTION INTRAMUSCULAR; INTRAVENOUS at 06:45

## 2024-08-29 RX ADMIN — IPRATROPIUM BROMIDE AND ALBUTEROL SULFATE 1 DOSE: .5; 2.5 SOLUTION RESPIRATORY (INHALATION) at 11:39

## 2024-08-29 RX ADMIN — SODIUM CHLORIDE, PRESERVATIVE FREE 40 MG: 5 INJECTION INTRAVENOUS at 18:57

## 2024-08-29 RX ADMIN — DEXMEDETOMIDINE HCL 8 MCG: 100 INJECTION INTRAVENOUS at 15:12

## 2024-08-29 RX ADMIN — PHENYLEPHRINE HYDROCHLORIDE 200 MCG: 10 INJECTION INTRAVENOUS at 15:26

## 2024-08-29 RX ADMIN — PHENYLEPHRINE HYDROCHLORIDE 200 MCG: 10 INJECTION INTRAVENOUS at 15:32

## 2024-08-29 RX ADMIN — METOCLOPRAMIDE 10 MG: 5 INJECTION, SOLUTION INTRAMUSCULAR; INTRAVENOUS at 18:57

## 2024-08-29 RX ADMIN — PHENYLEPHRINE HYDROCHLORIDE 200 MCG: 10 INJECTION INTRAVENOUS at 15:19

## 2024-08-29 RX ADMIN — INSULIN GLARGINE 30 UNITS: 100 INJECTION, SOLUTION SUBCUTANEOUS at 22:15

## 2024-08-29 RX ADMIN — SODIUM CHLORIDE, PRESERVATIVE FREE 40 MG: 5 INJECTION INTRAVENOUS at 06:45

## 2024-08-29 RX ADMIN — INSULIN LISPRO 12 UNITS: 100 INJECTION, SOLUTION INTRAVENOUS; SUBCUTANEOUS at 22:15

## 2024-08-29 RX ADMIN — DEXMEDETOMIDINE HCL 8 MCG: 100 INJECTION INTRAVENOUS at 15:13

## 2024-08-29 ASSESSMENT — PAIN SCALES - GENERAL
PAINLEVEL_OUTOF10: 0
PAINLEVEL_OUTOF10: 0

## 2024-08-29 ASSESSMENT — PAIN - FUNCTIONAL ASSESSMENT: PAIN_FUNCTIONAL_ASSESSMENT: ADULT NONVERBAL PAIN SCALE (NPVS)

## 2024-08-29 ASSESSMENT — LIFESTYLE VARIABLES: SMOKING_STATUS: 0

## 2024-08-29 ASSESSMENT — ENCOUNTER SYMPTOMS: SHORTNESS OF BREATH: 1

## 2024-08-29 NOTE — PROGRESS NOTES
HEPATOBILIARY AND PANCREATIC SURGERY  DAILY PROGRESS NOTE  8/29/2024        Subjective:  No issues or events overnight. Pt has had no bowel movements overnight, no nausea/ emesis.Gj in place. Afebrile vitally stable. Sating 92% on 2L.      Objective:    /77   Pulse (!) 105   Temp 100 °F (37.8 °C) (Temporal)   Resp 18   Ht 1.88 m (6' 2\")   Wt 103.7 kg (228 lb 9.9 oz)   SpO2 93%   BMI 29.35 kg/m²     GENERAL:  Lying in bed. No acute distress   HEAD: No gross abnormalities   EYES: scleral icterus   LUNGS:  No increased work of breathing on 2L NC  CARDIOVASCULAR:  RR, normotensive   ABDOMEN:  Soft, non-tender,mildly distended, R drain site stitched,  Prior L SHARON site stitched   EXTREMITIES: LE edema  SKIN: Warm and dry    Assessment/Plan:  79 y.o. male with pancreatic head adenocarcinoma with duodenal obstruction s/p whipple procedure w portal vein reconstruction 8/14 - T3N2 (Stage III), delayed gastric emptying requiring parenteral nutrition s/p PEG-J 8/29    Plan  - continue electrolyte replacements  - ambulate, OOB  - dispo planning   - Diet: NPO with meds  -TPN ordered w/ insulin and electrolyte adjustments  -Half goal on tube feeds  -TPN at half rate    Electronically signed by Dedrick Larry DO on 8/29/2024 at

## 2024-08-29 NOTE — PROGRESS NOTES
Patient took 2 of his morning oral medications and then vomited. He states \"too many meds.\" Reports he has been having trouble taking oral medications. Message sent to [facility] Dr. Larry to update.    Electronically signed by Matilda Saldivar RN on 8/29/2024 at 8:28 AM

## 2024-08-29 NOTE — PATIENT CARE CONFERENCE
P Quality Flow/Interdisciplinary Rounds Progress Note        Quality Flow Rounds held on August 29, 2024    Disciplines Attending:  Bedside Nurse, , , and Nursing Unit Leadership    Michael Garcia was admitted on 8/14/2024  5:28 AM    Anticipated Discharge Date:       Disposition:    Mike Score:  Mike Scale Score: 19    Readmission Risk              Risk of Unplanned Readmission:  39           Discussed patient goal for the day, patient clinical progression, and barriers to discharge.  The following Goal(s) of the Day/Commitment(s) have been identified:  Labs - Report Results and have him ready for surgery by Jens De La Garza RN  August 29, 2024

## 2024-08-29 NOTE — ANESTHESIA PRE PROCEDURE
Department of Anesthesiology  Preprocedure Note       Name:  Michael Garcia   Age:  79 y.o.  :  1945                                          MRN:  [id number]         Date:  2024      Surgeon: Surgeon(s):  Demetrius Carlos DO    Procedure: Procedure(s):  ESOPHAGOGASTRODUODENOSCOPY PERCUTANEOUS ENDOSCOPIC GASTROSTOMY TUBE INSERTION- PEG J    Medications prior to admission:   Prior to Admission medications    Medication Sig Start Date End Date Taking? Authorizing Provider   oxyCODONE-acetaminophen (PERCOCET) 5-325 MG per tablet Take 1 tablet by mouth every 6 hours as needed for Pain for up to 7 days. Intended supply: 7 days. Take lowest dose possible to manage pain Max Daily Amount: 4 tablets 24 Yes Demetrius Santoyo III, MD   sennosides-docusate sodium (SENOKOT-S) 8.6-50 MG tablet Take 2 tablets by mouth daily for 21 days 24 Yes Demetrius Santoyo III, MD   lipase-protease-amylase (CREON) 54842-241744 units CPEP delayed release capsule Take 2 capsules by mouth 3 times daily (with meals) 24  Yes Demetrius aSntoyo III, MD   metoclopramide (REGLAN) 5 MG tablet Take 1 tablet by mouth 3 times daily 24 Yes Demetrius Santoyo III, MD   pantoprazole (PROTONIX) 40 MG tablet Take 1 tablet by mouth 2 times daily 24 Yes Demetrius Santoyo III, MD   metoprolol succinate (TOPROL XL) 25 MG extended release tablet TAKE 1 TABLET BY MOUTH DAILY 1/3/24   Elana Michael MD   montelukast (SINGULAIR) 10 MG tablet Take 1 tablet by mouth nightly    Elliott Thakkar MD   empagliflozin (JARDIANCE) 25 MG tablet Take 1 tablet by mouth daily    Elliott Thakkar MD   cetirizine (ZYRTEC) 10 MG tablet Take 1 tablet by mouth daily    Elliott Thakkar MD   tamsulosin (FLOMAX) 0.4 MG capsule Take 1 capsule by mouth daily Pt taking 2 capsules at 0.4mg in the morning.    Elliott Thakkar MD   glipiZIDE (GLUCOTROL) 5 MG  (r/t to pain): new,   sleep apnea:           (-) COPD and not a current smoker          Patient did not smoke on day of surgery.                 Cardiovascular:  Exercise tolerance: poor (<4 METS)  (+) hypertension:, angina:, CAD:, CABG/stent:, CHF:, orthopnea, pulmonary hypertension:, hyperlipidemia      ECG reviewed  Rhythm: regular  Rate: normal  Echocardiogram reviewed         Beta Blocker:  Dose within 24 Hrs         Neuro/Psych:                ROS comment: Deaf bilateral GI/Hepatic/Renal:   (+) liver disease (elevated LFTs, albumin 3):     (-) hiatal hernia, GERD, PUD, no renal disease and no morbid obesity       Endo/Other:    (+) DiabetesType II DM, well controlled, using insulin, blood dyscrasia: anemia, arthritis: OA., electrolyte abnormalities, malignancy/cancer (pancreatic, prostate).                  ROS comment: Whipple procedure for pancreatic CA-8/14/24 Abdominal:       Abdomen: tender.       PE comment: RUQ pain   Vascular: negative vascular ROS.         Other Findings:                      Anesthesia Plan      MAC     ASA 3       Induction: intravenous.      Anesthetic plan and risks discussed with patient.      Plan discussed with CRNA.    Attending anesthesiologist reviewed and agrees with Preprocedure content                BUD French - CRNA   8/29/2024

## 2024-08-29 NOTE — CONSULTS
and air-filled small bowel are noted. There is also dilatation of ascending and transverse colon. The findings most likely represent ileus. A significant amount of retained stool is identified within the ascending and transverse colon. This is mildly decreased since the previous study. 6. The urinary bladder is distended and contains a small amount of air. This could be secondary to residual air from the presence of a prior Jasmine catheter. Emphysematous cystitis is less likely.  Clinical correlation is recommended. 7. There is again third-spacing of fluid/fluid overload. 8. There has been a decrease in the bilateral pleural effusions. Trace effusions remain, along with compressive atelectasis. 9. Other findings, as described above.     XR CHEST (2 VW)    Result Date: 8/24/2024  Low lung volumes with some mild increased markings at the left lung base favoring atelectatic change.  Early infiltrate cannot be completely excluded and clinical correlation is needed.         Medications:Current Inpatient    Allergy: No known allergies  Scheduled Medications:    ipratropium 0.5 mg-albuterol 2.5 mg, 1 Dose, Inhalation, Q4H WA RT    sodium phosphate IVPB (PERIPHERAL line), 30 mmol, IntraVENous, Once    bisacodyl, 10 mg, Rectal, Daily    insulin glargine, 30 Units, SubCUTAneous, Nightly    insulin lispro, 0-16 Units, SubCUTAneous, Q6H    oyster shell calcium w/D, 1 tablet, Oral, Daily    sodium chloride flush, 5-40 mL, IntraVENous, 2 times per day    lidocaine 1 % injection, 50 mg, IntraDERmal, Once    [Held by provider] aspirin, 81 mg, Oral, Daily    sennosides-docusate sodium, 2 tablet, Oral, Daily    metoclopramide, 10 mg, IntraVENous, Q6H    acetaminophen, 500 mg, Oral, 4 times per day    [Held by provider] enoxaparin, 30 mg, SubCUTAneous, BID    melatonin, 5 mg, Oral, Nightly    cetirizine, 10 mg, Oral, Daily    finasteride, 5 mg, Oral, Daily    metoprolol succinate, 25 mg, Oral, Daily    montelukast, 10 mg, Oral,

## 2024-08-29 NOTE — CARE COORDINATION
Transition of care update: Pt for PEG-J today. TPN.  and other labs noted. Iv lasix 40mg x 1. Orders and chart reviewed. Attempted to meet with pt in room earlier today and he was resting quietly on bed. Pt's nurse aware pt will need to work with PT/OT when able. [facility] with [facility]) on pt going for peg-j today. Cm/sw will follow.

## 2024-08-29 NOTE — ANESTHESIA POSTPROCEDURE EVALUATION
Department of Anesthesiology  Postprocedure Note    Patient: Michael Garcia  MRN: [id number]  YOB: 1945  Date of evaluation: 8/29/2024    Procedure Summary       Date: 08/29/24 Room / Location: [place] [address] / [facility]    Anesthesia Start: 1500 Anesthesia Stop: 1620    Procedure: ESOPHAGOGASTRODUODENOSCOPY TUBE INSERTION Diagnosis:       Delayed gastric emptying      (Delayed gastric emptying [K30])    Surgeons: Demetrius Carlos DO Responsible Provider: Moshe Chaney DO    Anesthesia Type: MAC ASA Status: 3            Anesthesia Type: No value filed.    Lindsey Phase I: Lindsey Score: 8    Lindsey Phase II:      Anesthesia Post Evaluation    Patient location during evaluation: bedside  Patient participation: complete - patient cannot participate  Level of consciousness: awake and alert  Airway patency: patent  Nausea & Vomiting: no nausea and no vomiting  Cardiovascular status: blood pressure returned to baseline  Respiratory status: acceptable  Hydration status: euvolemic  Multimodal analgesia pain management approach    No notable events documented.

## 2024-08-30 LAB
ALBUMIN SERPL-MCNC: 2.3 G/DL (ref 3.5–5.2)
ALBUMIN SERPL-MCNC: 2.4 G/DL (ref 3.5–5.2)
ALP SERPL-CCNC: 236 U/L (ref 40–129)
ALP SERPL-CCNC: 237 U/L (ref 40–129)
ALT SERPL-CCNC: 30 U/L (ref 0–40)
ALT SERPL-CCNC: 30 U/L (ref 0–40)
ANION GAP SERPL CALCULATED.3IONS-SCNC: 13 MMOL/L (ref 7–16)
AST SERPL-CCNC: 22 U/L (ref 0–39)
AST SERPL-CCNC: 23 U/L (ref 0–39)
BASOPHILS # BLD: 0 K/UL (ref 0–0.2)
BASOPHILS NFR BLD: 0 % (ref 0–2)
BILIRUB DIRECT SERPL-MCNC: 1.1 MG/DL (ref 0–0.3)
BILIRUB INDIRECT SERPL-MCNC: 0.7 MG/DL (ref 0–1)
BILIRUB SERPL-MCNC: 1.7 MG/DL (ref 0–1.2)
BILIRUB SERPL-MCNC: 1.8 MG/DL (ref 0–1.2)
BUN SERPL-MCNC: 12 MG/DL (ref 6–23)
CA-I BLD-SCNC: 1.14 MMOL/L (ref 1.15–1.33)
CALCIUM SERPL-MCNC: 8 MG/DL (ref 8.6–10.2)
CHLORIDE SERPL-SCNC: 100 MMOL/L (ref 98–107)
CO2 SERPL-SCNC: 23 MMOL/L (ref 22–29)
CREAT SERPL-MCNC: 0.6 MG/DL (ref 0.7–1.2)
EOSINOPHIL # BLD: 0 K/UL (ref 0.05–0.5)
EOSINOPHILS RELATIVE PERCENT: 0 % (ref 0–6)
ERYTHROCYTE [DISTWIDTH] IN BLOOD BY AUTOMATED COUNT: 14.7 % (ref 11.5–15)
GFR, ESTIMATED: >90 ML/MIN/1.73M2
GLUCOSE BLD-MCNC: 241 MG/DL (ref 74–99)
GLUCOSE BLD-MCNC: 295 MG/DL (ref 74–99)
GLUCOSE BLD-MCNC: 298 MG/DL (ref 74–99)
GLUCOSE BLD-MCNC: 318 MG/DL (ref 74–99)
GLUCOSE BLD-MCNC: 339 MG/DL (ref 74–99)
GLUCOSE SERPL-MCNC: 239 MG/DL (ref 74–99)
HCT VFR BLD AUTO: 23.2 % (ref 37–54)
HGB BLD-MCNC: 7.3 G/DL (ref 12.5–16.5)
LYMPHOCYTES NFR BLD: 0.31 K/UL (ref 1.5–4)
LYMPHOCYTES RELATIVE PERCENT: 4 % (ref 20–42)
MAGNESIUM SERPL-MCNC: 2.1 MG/DL (ref 1.6–2.6)
MCH RBC QN AUTO: 30.3 PG (ref 26–35)
MCHC RBC AUTO-ENTMCNC: 31.5 G/DL (ref 32–34.5)
MCV RBC AUTO: 96.3 FL (ref 80–99.9)
MONOCYTES NFR BLD: 0.25 K/UL (ref 0.1–0.95)
MONOCYTES NFR BLD: 4 % (ref 2–12)
NEUTROPHILS NFR BLD: 92 % (ref 43–80)
NEUTS SEG NFR BLD: 6.64 K/UL (ref 1.8–7.3)
PHOSPHATE SERPL-MCNC: 2.4 MG/DL (ref 2.5–4.5)
PLATELET # BLD AUTO: 212 K/UL (ref 130–450)
PMV BLD AUTO: 11 FL (ref 7–12)
POTASSIUM SERPL-SCNC: 3.8 MMOL/L (ref 3.5–5)
PROT SERPL-MCNC: 5.8 G/DL (ref 6.4–8.3)
PROT SERPL-MCNC: 5.9 G/DL (ref 6.4–8.3)
RBC # BLD AUTO: 2.41 M/UL (ref 3.8–5.8)
RBC # BLD: ABNORMAL 10*6/UL
RBC # BLD: ABNORMAL 10*6/UL
SODIUM SERPL-SCNC: 136 MMOL/L (ref 132–146)
TRIGL SERPL-MCNC: 86 MG/DL
WBC OTHER # BLD: 7.2 K/UL (ref 4.5–11.5)

## 2024-08-30 PROCEDURE — 97535 SELF CARE MNGMENT TRAINING: CPT

## 2024-08-30 PROCEDURE — 2580000003 HC RX 258: Performed by: RADIOLOGY

## 2024-08-30 PROCEDURE — 94640 AIRWAY INHALATION TREATMENT: CPT

## 2024-08-30 PROCEDURE — 2580000003 HC RX 258

## 2024-08-30 PROCEDURE — 84478 ASSAY OF TRIGLYCERIDES: CPT

## 2024-08-30 PROCEDURE — 2580000003 HC RX 258: Performed by: TRANSPLANT SURGERY

## 2024-08-30 PROCEDURE — 2500000003 HC RX 250 WO HCPCS

## 2024-08-30 PROCEDURE — 6370000000 HC RX 637 (ALT 250 FOR IP)

## 2024-08-30 PROCEDURE — 82330 ASSAY OF CALCIUM: CPT

## 2024-08-30 PROCEDURE — 80053 COMPREHEN METABOLIC PANEL: CPT

## 2024-08-30 PROCEDURE — 2580000003 HC RX 258: Performed by: STUDENT IN AN ORGANIZED HEALTH CARE EDUCATION/TRAINING PROGRAM

## 2024-08-30 PROCEDURE — 80076 HEPATIC FUNCTION PANEL: CPT

## 2024-08-30 PROCEDURE — 2700000000 HC OXYGEN THERAPY PER DAY

## 2024-08-30 PROCEDURE — 6360000002 HC RX W HCPCS: Performed by: STUDENT IN AN ORGANIZED HEALTH CARE EDUCATION/TRAINING PROGRAM

## 2024-08-30 PROCEDURE — 6360000002 HC RX W HCPCS: Performed by: TRANSPLANT SURGERY

## 2024-08-30 PROCEDURE — 84100 ASSAY OF PHOSPHORUS: CPT

## 2024-08-30 PROCEDURE — 97530 THERAPEUTIC ACTIVITIES: CPT

## 2024-08-30 PROCEDURE — 82962 GLUCOSE BLOOD TEST: CPT

## 2024-08-30 PROCEDURE — 83735 ASSAY OF MAGNESIUM: CPT

## 2024-08-30 PROCEDURE — 2140000000 HC CCU INTERMEDIATE R&B

## 2024-08-30 PROCEDURE — 99231 SBSQ HOSP IP/OBS SF/LOW 25: CPT | Performed by: INTERNAL MEDICINE

## 2024-08-30 PROCEDURE — 6360000002 HC RX W HCPCS

## 2024-08-30 PROCEDURE — 85025 COMPLETE CBC W/AUTO DIFF WBC: CPT

## 2024-08-30 RX ORDER — INSULIN LISPRO 100 [IU]/ML
12 INJECTION, SOLUTION INTRAVENOUS; SUBCUTANEOUS EVERY 6 HOURS
Status: DISCONTINUED | OUTPATIENT
Start: 2024-08-30 | End: 2024-08-31

## 2024-08-30 RX ORDER — IPRATROPIUM BROMIDE AND ALBUTEROL SULFATE 2.5; .5 MG/3ML; MG/3ML
1 SOLUTION RESPIRATORY (INHALATION) PRN
Status: DISCONTINUED | OUTPATIENT
Start: 2024-08-30 | End: 2024-09-07 | Stop reason: HOSPADM

## 2024-08-30 RX ORDER — INSULIN LISPRO 100 [IU]/ML
8 INJECTION, SOLUTION INTRAVENOUS; SUBCUTANEOUS EVERY 6 HOURS
Status: DISCONTINUED | OUTPATIENT
Start: 2024-08-30 | End: 2024-08-30

## 2024-08-30 RX ORDER — ACETAMINOPHEN 650 MG/1
650 SUPPOSITORY RECTAL EVERY 4 HOURS PRN
Status: ACTIVE | OUTPATIENT
Start: 2024-08-30 | End: 2024-08-31

## 2024-08-30 RX ORDER — BISACODYL 10 MG
10 SUPPOSITORY, RECTAL RECTAL PRN
Status: DISCONTINUED | OUTPATIENT
Start: 2024-08-30 | End: 2024-09-02

## 2024-08-30 RX ADMIN — METOCLOPRAMIDE 10 MG: 5 INJECTION, SOLUTION INTRAMUSCULAR; INTRAVENOUS at 18:10

## 2024-08-30 RX ADMIN — CALCIUM GLUCONATE: 98 INJECTION, SOLUTION INTRAVENOUS at 18:19

## 2024-08-30 RX ADMIN — SODIUM CHLORIDE, PRESERVATIVE FREE 40 MG: 5 INJECTION INTRAVENOUS at 06:41

## 2024-08-30 RX ADMIN — ENOXAPARIN SODIUM 30 MG: 100 INJECTION SUBCUTANEOUS at 20:49

## 2024-08-30 RX ADMIN — SODIUM CHLORIDE, PRESERVATIVE FREE 10 ML: 5 INJECTION INTRAVENOUS at 20:50

## 2024-08-30 RX ADMIN — Medication 10 ML: at 10:23

## 2024-08-30 RX ADMIN — SODIUM CHLORIDE, PRESERVATIVE FREE 10 ML: 5 INJECTION INTRAVENOUS at 09:28

## 2024-08-30 RX ADMIN — METOCLOPRAMIDE 10 MG: 5 INJECTION, SOLUTION INTRAMUSCULAR; INTRAVENOUS at 06:42

## 2024-08-30 RX ADMIN — INSULIN GLARGINE 30 UNITS: 100 INJECTION, SOLUTION SUBCUTANEOUS at 20:48

## 2024-08-30 RX ADMIN — INSULIN LISPRO 12 UNITS: 100 INJECTION, SOLUTION INTRAVENOUS; SUBCUTANEOUS at 15:19

## 2024-08-30 RX ADMIN — IPRATROPIUM BROMIDE AND ALBUTEROL SULFATE 1 DOSE: .5; 2.5 SOLUTION RESPIRATORY (INHALATION) at 07:53

## 2024-08-30 RX ADMIN — INSULIN LISPRO 4 UNITS: 100 INJECTION, SOLUTION INTRAVENOUS; SUBCUTANEOUS at 20:49

## 2024-08-30 RX ADMIN — METOCLOPRAMIDE 10 MG: 5 INJECTION, SOLUTION INTRAMUSCULAR; INTRAVENOUS at 12:55

## 2024-08-30 RX ADMIN — ONDANSETRON 4 MG: 2 INJECTION INTRAMUSCULAR; INTRAVENOUS at 10:23

## 2024-08-30 RX ADMIN — METOCLOPRAMIDE 10 MG: 5 INJECTION, SOLUTION INTRAMUSCULAR; INTRAVENOUS at 02:12

## 2024-08-30 RX ADMIN — INSULIN LISPRO 12 UNITS: 100 INJECTION, SOLUTION INTRAVENOUS; SUBCUTANEOUS at 04:14

## 2024-08-30 RX ADMIN — INSULIN LISPRO 12 UNITS: 100 INJECTION, SOLUTION INTRAVENOUS; SUBCUTANEOUS at 20:48

## 2024-08-30 RX ADMIN — POTASSIUM PHOSPHATE, MONOBASIC AND POTASSIUM PHOSPHATE, DIBASIC 15 MMOL: 224; 236 INJECTION, SOLUTION, CONCENTRATE INTRAVENOUS at 16:39

## 2024-08-30 RX ADMIN — INSULIN LISPRO 8 UNITS: 100 INJECTION, SOLUTION INTRAVENOUS; SUBCUTANEOUS at 09:27

## 2024-08-30 RX ADMIN — INSULIN LISPRO 8 UNITS: 100 INJECTION, SOLUTION INTRAVENOUS; SUBCUTANEOUS at 15:19

## 2024-08-30 RX ADMIN — ENOXAPARIN SODIUM 30 MG: 100 INJECTION SUBCUTANEOUS at 10:24

## 2024-08-30 RX ADMIN — SODIUM CHLORIDE, PRESERVATIVE FREE 10 ML: 5 INJECTION INTRAVENOUS at 09:29

## 2024-08-30 ASSESSMENT — PAIN SCALES - WONG BAKER
WONGBAKER_NUMERICALRESPONSE: NO HURT
WONGBAKER_NUMERICALRESPONSE: NO HURT

## 2024-08-30 NOTE — PROGRESS NOTES
Results   Component Value Date/Time     08/29/2024 06:00 AM    K 3.9 08/29/2024 06:00 AM    K 4.2 03/28/2021 06:18 AM    CL 97 08/29/2024 06:00 AM    CO2 24 08/29/2024 06:00 AM    BUN 10 08/29/2024 06:00 AM    CREATININE 0.6 08/29/2024 06:00 AM    GFRAA >60 09/01/2022 06:09 PM    LABGLOM >90 08/29/2024 06:00 AM    LABGLOM >60 05/10/2023 08:51 AM    GLUCOSE 324 08/29/2024 06:00 AM    GLUCOSE 146 10/04/2010 10:20 AM    CALCIUM 7.9 08/29/2024 06:00 AM    BILITOT 1.8 08/30/2024 06:30 AM    ALKPHOS 237 08/30/2024 06:30 AM    AST 22 08/30/2024 06:30 AM    ALT 30 08/30/2024 06:30 AM     Hepatic Function Panel:    Lab Results   Component Value Date/Time    ALKPHOS 237 08/30/2024 06:30 AM    ALT 30 08/30/2024 06:30 AM    AST 22 08/30/2024 06:30 AM    BILITOT 1.8 08/30/2024 06:30 AM    BILIDIR 1.1 08/30/2024 06:30 AM    IBILI 0.7 08/30/2024 06:30 AM     No components found for: \"CHLPL\"    Lab Results   Component Value Date    TRIG 113 08/27/2024    TRIG 271 (H) 08/17/2024    TRIG 182 (H) 10/04/2010       Lab Results   Component Value Date    HDL 41 07/31/2024    HDL 32.0 (A) 10/04/2010     No components found for: \"LDLCALC\"  No components found for: \"LABVLDL\"   PT/INR:    Lab Results   Component Value Date/Time    PROTIME 13.3 08/06/2024 06:08 AM    INR 1.2 08/06/2024 06:08 AM     IRON:    Lab Results   Component Value Date/Time    IRON 136 07/31/2024 01:47 PM     Iron Saturation:  No components found for: \"PERCENTFE\"  FERRITIN:    Lab Results   Component Value Date/Time    FERRITIN 653 07/31/2024 01:47 PM         Assessment:     T3N2 pancreatic adenocarcinoma - s/p whipple with PV reconstruction - POD 12  Bloating/Delayed Gastric emptying vs gastric outlet obstruction   EGD with corpak placement 8/29/24- 1.  Whipple anatomy, no appreciable window of PEG-J placement. 2.  Placement of Corpak secured by Bridle     Plan:     Continue Reglan as ordered  Pantoprazole 40 mg q12  Daily suppositories   TPN/TF per  HBS  Supportive care  Will see as needed while patient remains in patient, please call with any questions or concerns       Note: This report was completed utilizing computer voice recognition software. Every effort has been made to ensure accuracy, however; inadvertent computerized transcription errors may be present.     Discussed with Dr. Carlos  Plan per Dr. Breanna Saavedra APRN-NP-C 8/30/2024  1:08 PM For Dr. Carlos      GI Attending Addendum:  The patient was seen and examined independently from the midlevel team. The case was discuss with the team and the above assessment and plan was developed.  Demetrius Carlos, DO

## 2024-08-30 NOTE — PROGRESS NOTES
HEPATOBILIARY AND PANCREATIC SURGERY  DAILY PROGRESS NOTE  8/30/2024        Subjective:  Patient had his PEG-J placed yesterday.  The J-tube is in the E ferret limb and he was tolerating 5 mL an hour.      Objective:    /71   Pulse (!) 101   Temp 97.6 °F (36.4 °C) (Oral)   Resp 22   Ht 1.88 m (6' 2\")   Wt 102.2 kg (225 lb 5 oz)   SpO2 91%   BMI 28.93 kg/m²     GENERAL:  Lying in bed. No acute distress   HEAD: No gross abnormalities   EYES: scleral icterus   LUNGS:  No increased work of breathing on 2L NC  CARDIOVASCULAR:  RR, normotensive   ABDOMEN:  Soft, non-tender,mildly distended, R drain site stitched,  Prior L SHARON site stitched   EXTREMITIES: LE edema  SKIN: Warm and dry    Assessment/Plan:  79 y.o. male with pancreatic head adenocarcinoma with duodenal obstruction s/p whipple procedure w portal vein reconstruction 8/14 - T3N2 (Stage III), delayed gastric emptying requiring parenteral nutrition s/p PEG-J 8/29    Plan  - continue electrolyte replacements  - ambulate, OOB  - dispo planning   - Diet: NPO with meds  -TPN ordered w/ insulin and electrolyte adjustments  -Half goal on tube feeds  -TPN at half rate with plans to stop TPN tomorrow and increase tube feeds to goal.  -Patient's Corpak is bridled and patient will need to be discharged on jejunal tube feeds    Electronically signed by Demetrius Santoyo MD on 8/30/2024 at

## 2024-08-30 NOTE — PROGRESS NOTES
HEPATOBILIARY AND PANCREATIC SURGERY  DAILY PROGRESS NOTE  8/30/2024        Subjective:  Pt has been having bowel movements overnight.  Pt blood sugars have been elevated since starting TPN. Last blood glucose of 307. Transfused one unit yesterday, Hb 7.7 from 7.6. Afebrile, tachy today with HR into the 100's. UA and CXR negative. PEGJ moved for today.     Objective:    /71   Pulse (!) 112   Temp 97.6 °F (36.4 °C) (Oral)   Resp 20   Ht 1.88 m (6' 2\")   Wt 102.2 kg (225 lb 5 oz)   SpO2 92%   BMI 28.93 kg/m²     GENERAL:  Lying in bed. No acute distress   HEAD: No gross abnormalities   EYES: scleral icterus   LUNGS:  No increased work of breathing on 2L NC  CARDIOVASCULAR:  RR, normotensive   ABDOMEN:  Soft, non-tender,more distended, R drain site stitched,  Prior L SHARON site stitched   EXTREMITIES: LE edema  SKIN: Warm and dry    Assessment/Plan:  79 y.o. male with pancreatic head adenocarcinoma with duodenal obstruction s/p whipple procedure w portal vein reconstruction 8/14 - T3N2 (Stage III), delayed gastric emptying requiring parenteral nutrition    Plan  -PEG-J with Dr Carlos 8/29, currently NPO for procedure, will address diet following.    - continue electrolyte replacements  - ambulate, OOB  - dispo planning   - Diet: NPO with meds  -TPN ordered w/ insulin and electrolyte adjustments  -IV Lasiks 40 for fluid overload  -Suppository ordered       Electronically signed by Dedrick Larry DO on 8/30/2024 at     Agree with above  PEG-J versus Corpak today with Dr. Carlos  Patient will need to go home on jejunal feedings    Electronically signed by Dedrick Larry DO on 8/30/2024 at 8:14 AM

## 2024-08-30 NOTE — PROGRESS NOTES
[facility]  Internal Medicine Residency Program  Progress Note - House Team       Patient:  Michael Garcia 79 y.o. male   MRN: [id number]       Date of Service: 8/30/2024    Subjective     Patient was seen and examined at bedside this morning, awake, alert, oriented and following commands ( service used).  Was on tube feed, had few loose bowel movement today.  No other significant or acute issues.     Objective     Physical Exam  Vitals: /71   Pulse (!) 112   Temp 97.6 °F (36.4 °C) (Oral)   Resp 20   Ht 1.88 m (6' 2\")   Wt 102.2 kg (225 lb 5 oz)   SpO2 92%   BMI 28.93 kg/m²     I & O - 24hr: I/O this shift:  In: 55 [NG/GT:55]  Out: -    General Appearance: alert, appears stated age, and cooperative  HEENT:  Head: Normal, normocephalic, atraumatic.  Lung: clear to auscultation bilaterally  Heart: S1, S2 normal  Abdomen: Soft, non-tender, mildly distended, drain site stitched  Extremities:  extremities normal, atraumatic, no cyanosis or edema  Musculokeletal: No joint swelling, no muscle tenderness. ROM normal in all joints of extremities.     Diet:   Diet NPO Exceptions are: Sips of Water with Meds  PN-Adult  3-in-1 Central Line (Standard)  ADULT TUBE FEEDING; Nasojejunal; Diabetic; Continuous; 5; Yes; 5; Q 4 hours; 25; 30; Q 3 hours  PN-Adult  3-in-1 Central Line (Standard)      Pertinent Labs & Imaging Studies     Labs    CBC with Differential:    Lab Results   Component Value Date/Time    WBC 8.2 08/29/2024 06:00 AM    RBC 2.43 08/29/2024 06:00 AM    HGB 7.7 08/29/2024 06:00 AM    HCT 23.4 08/29/2024 06:00 AM     08/29/2024 06:00 AM    MCV 96.3 08/29/2024 06:00 AM    MCH 31.7 08/29/2024 06:00 AM    MCHC 32.9 08/29/2024 06:00 AM    RDW 14.9 08/29/2024 06:00 AM    METASPCT 1 08/20/2024 06:00 AM    LYMPHOPCT 4 08/29/2024 06:00 AM    MONOPCT 8 08/29/2024 06:00 AM    MYELOPCT 1 08/20/2024 06:00 AM    EOSPCT 3 08/29/2024 06:00 AM    BASOPCT 0 08/29/2024 06:00 AM     intrahepatic biliary duct prominence. Stents are again seen in the right   and left common hepatic ducts.   3. Again identified is air and fluid in the gallbladder fossa, however, there   has been an interval increase in the droplets of air. Rule out poorly formed   abscess.  Clinical correlation is recommended.   4. The stomach is distended with fluid, solid contents, and air. Rule out   gastroparesis or gastric outlet obstruction.   5. Mildly distended loops of fluid and air-filled small bowel are noted.   There is also dilatation of ascending and transverse colon. The findings most   likely represent ileus. A significant amount of retained stool is identified   within the ascending and transverse colon. This is mildly decreased since the   previous study.   6. The urinary bladder is distended and contains a small amount of air. This   could be secondary to residual air from the presence of a prior Jasmine   catheter. Emphysematous cystitis is less likely.  Clinical correlation is   recommended.   7. There is again third-spacing of fluid/fluid overload.   8. There has been a decrease in the bilateral pleural effusions. Trace   effusions remain, along with compressive atelectasis.   9. Other findings, as described above.         XR CHEST (2 VW)   Final Result   Low lung volumes with some mild increased markings at the left lung base   favoring atelectatic change.  Early infiltrate cannot be completely excluded   and clinical correlation is needed.         XR ABDOMEN FOR NG/OG/NE TUBE PLACEMENT   Final Result   NG tube in good position.         CT ABDOMEN PELVIS W IV CONTRAST Additional Contrast? Oral   Final Result   1. Findings of Whipple procedure.  No evidence of gastric obstruction.   Gastrojejunostomy appears patent.   2. Small amounts of free air in the upper abdomen including the gallbladder   fossa.  Likely normal postoperative finding, please correlate with date of   surgery.   3. Small amount of fluid and

## 2024-08-30 NOTE — PROGRESS NOTES
RN messaged [facility] regarding patient running temp and refusing oral tylenol. RN asking if tylenol suppository can be ordered.    India Wen RN

## 2024-08-30 NOTE — PROGRESS NOTES
Physical Therapy  Treatment Note    Name: Michael Garcia  : 1945  MRN: [id number]      Date of Service: 2024    Evaluating PT:  Gino Camejo, PT, DPT [id number]    Room #:  6515/6515-A  Diagnosis:  Adenocarcinoma of pancreas (HCC) [C25.9]  Adenocarcinoma of head of pancreas (HCC) [C25.0]  PMHx/PSHx:    Past Medical History:   Diagnosis Date    Deaf, bilateral     Diabetes (HCC)     Hypertension     Osteoarthritis     Primary osteoarthritis of left knee 10/18/2017    Prostate cancer (HCC) 2024     Procedure/Surgery:   whipple  Precautions:  Falls, Deaf - ASL interpretor, chair alarm, , Corpak, TPN  Equipment Needs:  TBD     SUBJECTIVE:    Pt lives with girlfriend in a 2 story home, 1st floor setup, with 3 step(s) to enter and 1 rail(s).      Pt ambulated without device and was independent PTA.    OBJECTIVE:   Initial Evaluation  Date: 8/15/24 Treatment Date: 24 Short Term/ Long Term   Goals   AM-PAC 6 Clicks     Was pt agreeable to Eval/treatment? Yes Yes    Does pt have pain? 5/10 abdominal pain No complaints of pain    Bed Mobility  Rolling: NT  Supine to sit: ModA with HOB elevated  Sit to supine: NT  Scooting: MaxA Rolling: NT  Supine to sit: NT  Sit to supine: NT  Scooting: NT Mod Independent    Transfers Sit to stand: ModA  Stand to sit: ModA  Stand pivot: ModA no device Sit to stand: MaxA  Stand to sit: Garland  Stand pivot: ModA with WW Mod Independent with AAD   Ambulation   A few steps to chair with ModA no device 50 feet with WW with ModA >400 feet with Mod Independent with AAD   Stair negotiation: ascended and descended NT NT >4 steps with 1 rail Mod Independent   ROM BUE:  Defer to OT note  BLE:  WFL NT    Strength BUE:  Defer to OT note  BLE:  4/5 NT Increase by 1/3 MMT grade   Balance Sitting EOB:  Garland   Dynamic Standing:  ModA no device Sitting EOB: NT  Dynamic Standing: ModA with WW Sitting EOB:  Independent  Dynamic Standing:  Mod Independent with AAD      Pt is

## 2024-08-30 NOTE — PROGRESS NOTES
[facility]  Internal Medicine Residency / House Medicine Service    Attending Physician Statement  I have discussed the case, including pertinent history and exam findings with the resident and the team.  I have seen and examined the patient and the key elements of the encounter have been performed by me.  I agree with the assessment, plan and orders as documented by the resident.      A&O  VS stable   BS control still sub optimal at low 300's  we are raising long acting and short acting  insulin dosages   Continues on TPN and core pack to be started   Has been on his feet  Will need PT  Remainder of medical problems as per resident note.      Elliot Jnoes MD FRCP [facility]  Internal Medicine Residency Faculty

## 2024-08-30 NOTE — CARE COORDINATION
Transition of care update: S/P whipple procedure with portal vein reconstruction on 08/14. Pt has Corpak. Tube feeds as ordered. TPN. Labs noted. Asked for pt/ot to work with pt. PT treatment from today am-pac 11/24 and ambulated 50ft with WW with Marco Antonio. OT treatment from today am-pac 12/24. Met with pt in room. Pt is agreeable to [facility]) when medically ready. Spoke with Jane with [facility] and they can take the Corpak and will need a note from physician stating 30 day plan with Corpak. [facility] can accept pt on Tuesday 09/03. Ambulette form and EXEMPT was completed on 08/16. Transport envelope is with pt's soft chart. Updated Kim with [facility] on pt. Called pt's Razia cardozo, and informed her pt agreeable to [facility] at discharge. Cm/sw will follow.

## 2024-08-30 NOTE — PROGRESS NOTES
[facility] Quality Flow/Interdisciplinary Rounds Progress Note        Quality Flow Rounds held on August 30, 2024    Disciplines Attending:  Bedside Nurse, , , and Nursing Unit Leadership    Michael Garcia was admitted on 8/14/2024  5:28 AM    Anticipated Discharge Date:       Disposition:    Mike Score:  Mike Scale Score: 19    Readmission Risk              Risk of Unplanned Readmission:  38           Discussed patient goal for the day, patient clinical progression, and barriers to discharge.  The following Goal(s) of the Day/Commitment(s) have been identified:  Diagnostics - Report Results and Labs - Report Results      Ceci Hoang RN  August 30, 2024

## 2024-08-30 NOTE — PLAN OF CARE
Problem: Discharge Planning  Goal: Discharge to home or other facility with appropriate resources  Outcome: Progressing     Problem: Safety - Adult  Goal: Free from fall injury  Outcome: Progressing     Problem: Pain  Goal: Verbalizes/displays adequate comfort level or baseline comfort level  Outcome: Progressing     Problem: Skin/Tissue Integrity  Goal: Absence of new skin breakdown  Description: 1.  Monitor for areas of redness and/or skin breakdown  2.  Assess vascular access sites hourly  3.  Every 4-6 hours minimum:  Change oxygen saturation probe site  4.  Every 4-6 hours:  If on nasal continuous positive airway pressure, respiratory therapy assess nares and determine need for appliance change or resting period.  Outcome: Progressing     Problem: ABCDS Injury Assessment  Goal: Absence of physical injury  Outcome: Progressing     Problem: Neurosensory - Adult  Goal: Achieves stable or improved neurological status  Outcome: Progressing     Problem: Neurosensory - Adult  Goal: Absence of seizures  Outcome: Progressing     Problem: Respiratory - Adult  Goal: Achieves optimal ventilation and oxygenation  Outcome: Progressing     Problem: Cardiovascular - Adult  Goal: Maintains optimal cardiac output and hemodynamic stability  Outcome: Progressing     Problem: Cardiovascular - Adult  Goal: Absence of cardiac dysrhythmias or at baseline  Outcome: Progressing     Problem: Skin/Tissue Integrity - Adult  Goal: Skin integrity remains intact  Outcome: Progressing     Problem: Musculoskeletal - Adult  Goal: Return mobility to safest level of function  Outcome: Progressing     Problem: Gastrointestinal - Adult  Goal: Minimal or absence of nausea and vomiting  Outcome: Progressing     Problem: Genitourinary - Adult  Goal: Absence of urinary retention  Outcome: Progressing     Problem: Infection - Adult  Goal: Absence of infection at discharge  Outcome: Progressing     Problem: Metabolic/Fluid and Electrolytes -

## 2024-08-30 NOTE — PROGRESS NOTES
Occupational Therapy  OCCUPATIONAL THERAPY TREATMENT NOTE    [facility]  OT BEDSIDE TREATMENT NOTE      Date:2024  Patient Name: Michael Garcia  MRN: [id number]  : 1945  Room: [address] [place] [address]-A       Per OT Eval:    Evaluating OT: Prabhu Dia OTR/L; [id number]      Referring Provider: Demetrius Santoyo III, MD    Specific Provider Orders/Date: OT eval and treat (08/15/24 1200 )     Diagnosis: Adenocarcinoma of pancreas (HCC) [C25.9]  Adenocarcinoma of head of pancreas (HCC) [C25.0]      Surgery/Procedures:   24  1.  Pancreaticoduodenectomy with placement of an 8f and 5F biliary stent   2.  omental pedicle flap  3.  Placement of SHARON drains  2  4.  Extended lymphadenectomy  5.  Intraoperative ultrasound for anatomy   6.  Portal vein reconstruction  7.  Removal of PTHC      Pertinent Medical History:    Past Medical History        Past Medical History:   Diagnosis Date    Deaf, bilateral      Diabetes (HCC)      Hypertension      Osteoarthritis      Primary osteoarthritis of left knee 10/18/2017    Prostate cancer (HCC) 2024            *Precautions:  Fall Risk, +alarms, abdominal precautions, deaf - ASL (able to read lips),    Session ID: [id number]      Assessment of current deficits   [x] Functional mobility          [x]ADLs           [x] Strength                  []Cognition   [x] Functional transfers        [x] IADLs         [x] Safety Awareness   [x]Endurance   [x] Fine Coordination           [x] ROM           [] Vision/perception    []Sensation     []Gross Motor Coordination [x] Balance    [] Delirium                  []Motor Control     [] Communication     OT PLAN OF CARE   OT POC based on physician orders, patient diagnosis and results of clinical assessment.        Frequency/Duration: 1-3 days/wk for 1-2 weeks PRN    Specific OT Treatment Interventions to include:   * Instruction/training on adapted ADL techniques and AE recommendations to increase

## 2024-08-30 NOTE — PROGRESS NOTES
Comprehensive Nutrition Assessment    Type and Reason for Visit:  Reassess, Consult    Nutrition Recommendations/Plan:   Advance diet when medically appropriate.    Recommend to continue current TPN orders at this time.    Tube feeding recommendation per physician consult.    Recommend Immune Enhancing (Pivot 1.5) @60ml/hr to provide 1440ml, 2160 calories, 135g protein, 1093ml water.         Malnutrition Assessment:  Malnutrition Status:  Moderate malnutrition (08/17/24 1118)    Context:  Acute Illness     Findings of the 6 clinical characteristics of malnutrition:  Energy Intake:  50% or less of estimated energy requirements for 5 or more days  Weight Loss:   (mild wt loss - 3.2% x 1 month)     Body Fat Loss:  Mild body fat loss Orbital   Muscle Mass Loss:  Mild muscle mass loss Temples (temporalis), Clavicles (pectoralis & deltoids)  Fluid Accumulation:  No significant fluid accumulation     Strength:  Not Performed    Nutrition Assessment:    Patient is currently NPO ; pt also receiving tube feedings and TPN (half rate) at this time ; plan to stop TPN ; possible PEG-J placement ; adm w/ abd pain ; noted pancreatic adenocarcinoma s/p whipple with PV reconstruction on 8/14 ;  noted bloating/delayed gastric emptying vs gastric outlet obstruction ; s/p EGD with corpak placement 8/29 ; hx of DM/prostate CA/deafness ; pt meets criteria for moderate malnutrition ; will provide updated recommendations    Nutrition Related Findings:    -I&Os (-22.4 L), trace edema, sclera yellow, jaundice, A&O x 4, diarrhea, redness to buttocks, abrasions, hyperglycemia, muscle/fat wasting ; Wound Type: Surgical Incision (Incision x 1)       Current Nutrition Intake & Therapies:    Average Meal Intake: NPO  Average Supplements Intake: NPO  Current Tube Feeding (TF) Orders:  Feeding Route: Nasoenteric  Formula: Diabetic  Schedule: Continuous (@30ml/hr)  Feeding Regimen: @30ml/hr  Additives/Modulars: None  Water Flushes: 30ml q 3

## 2024-08-30 NOTE — PROCEDURES
Procedure:  Endoscopic nasoduodenal tube placement     Indication:    Protein calorie malnutrition  Nausea/bloating post Whipple procedure       Consent: Informed consent was obtained from the patient including and not limited to risk of perforation, aspiration of gastric contents or teeth, bleeding, infection, dental breakage, ileus, need for surgery, or worst case death.     Sedation  MAC     Endoscope was advanced easily through the mouth to both afferent and efferent limbs of the small bowel. A 10F corpak was advanced into the efferent limb of small bowel with assistance of the endoscope and rat tooth forceps. The corpak was then secured using a nasal bridge/bridel device.                             Esophagus:     Mucosa is normal.       Stomach:         Moderate amount of gastric contents and food debris  Gastric body is normal. Anatomotic site appeared healthy, no ulcers/leaks  No appropriate window for PEG placement was identified, transilumination and external compression from skin surface could not identify. Only site appreciable at this time appeared to be at the anastomotic site or one in the proximal efferent jejunal limb just adjacent the GJ anastomosis.                         Small bowel:    Healthy appearing afferent and efferent limbs     IMPRESSION AND PLAN:   1.  Whipple anatomy, no appreciable window of PEG-J placement  2.  Placement of Corpak secured by Bridle     Abdominal x-ray to be performed to confirm placement than will be ok to use.   If any epistasis secondary to placement ok to use nasonex to halt bleeding.

## 2024-08-31 LAB
ALBUMIN SERPL-MCNC: 2.5 G/DL (ref 3.5–5.2)
ALP SERPL-CCNC: 250 U/L (ref 40–129)
ALT SERPL-CCNC: 30 U/L (ref 0–40)
ANION GAP SERPL CALCULATED.3IONS-SCNC: 10 MMOL/L (ref 7–16)
AST SERPL-CCNC: 38 U/L (ref 0–39)
BASOPHILS # BLD: 0.02 K/UL (ref 0–0.2)
BASOPHILS NFR BLD: 0 % (ref 0–2)
BILIRUB SERPL-MCNC: 1.8 MG/DL (ref 0–1.2)
BUN SERPL-MCNC: 14 MG/DL (ref 6–23)
CA-I BLD-SCNC: 1.14 MMOL/L (ref 1.15–1.33)
CALCIUM SERPL-MCNC: 7.9 MG/DL (ref 8.6–10.2)
CHLORIDE SERPL-SCNC: 102 MMOL/L (ref 98–107)
CO2 SERPL-SCNC: 25 MMOL/L (ref 22–29)
CREAT SERPL-MCNC: 0.6 MG/DL (ref 0.7–1.2)
EOSINOPHIL # BLD: 0.1 K/UL (ref 0.05–0.5)
EOSINOPHILS RELATIVE PERCENT: 2 % (ref 0–6)
ERYTHROCYTE [DISTWIDTH] IN BLOOD BY AUTOMATED COUNT: 14.7 % (ref 11.5–15)
GFR, ESTIMATED: >90 ML/MIN/1.73M2
GLUCOSE BLD-MCNC: 104 MG/DL (ref 74–99)
GLUCOSE BLD-MCNC: 141 MG/DL (ref 74–99)
GLUCOSE BLD-MCNC: 154 MG/DL (ref 74–99)
GLUCOSE BLD-MCNC: 75 MG/DL (ref 74–99)
GLUCOSE SERPL-MCNC: 128 MG/DL (ref 74–99)
HCT VFR BLD AUTO: 22.8 % (ref 37–54)
HGB BLD-MCNC: 7.3 G/DL (ref 12.5–16.5)
IMM GRANULOCYTES # BLD AUTO: 0.04 K/UL (ref 0–0.58)
IMM GRANULOCYTES NFR BLD: 1 % (ref 0–5)
LYMPHOCYTES NFR BLD: 0.38 K/UL (ref 1.5–4)
LYMPHOCYTES RELATIVE PERCENT: 6 % (ref 20–42)
MAGNESIUM SERPL-MCNC: 2.1 MG/DL (ref 1.6–2.6)
MCH RBC QN AUTO: 31.5 PG (ref 26–35)
MCHC RBC AUTO-ENTMCNC: 32 G/DL (ref 32–34.5)
MCV RBC AUTO: 98.3 FL (ref 80–99.9)
MONOCYTES NFR BLD: 0.8 K/UL (ref 0.1–0.95)
MONOCYTES NFR BLD: 12 % (ref 2–12)
NEUTROPHILS NFR BLD: 80 % (ref 43–80)
NEUTS SEG NFR BLD: 5.2 K/UL (ref 1.8–7.3)
PHOSPHATE SERPL-MCNC: 2.8 MG/DL (ref 2.5–4.5)
PLATELET # BLD AUTO: 237 K/UL (ref 130–450)
PMV BLD AUTO: 10.9 FL (ref 7–12)
POTASSIUM SERPL-SCNC: 4 MMOL/L (ref 3.5–5)
PROT SERPL-MCNC: 5.7 G/DL (ref 6.4–8.3)
RBC # BLD AUTO: 2.32 M/UL (ref 3.8–5.8)
RBC # BLD: ABNORMAL 10*6/UL
SODIUM SERPL-SCNC: 137 MMOL/L (ref 132–146)
WBC OTHER # BLD: 6.5 K/UL (ref 4.5–11.5)

## 2024-08-31 PROCEDURE — 6370000000 HC RX 637 (ALT 250 FOR IP)

## 2024-08-31 PROCEDURE — 84100 ASSAY OF PHOSPHORUS: CPT

## 2024-08-31 PROCEDURE — 6360000002 HC RX W HCPCS: Performed by: TRANSPLANT SURGERY

## 2024-08-31 PROCEDURE — 99231 SBSQ HOSP IP/OBS SF/LOW 25: CPT | Performed by: INTERNAL MEDICINE

## 2024-08-31 PROCEDURE — 2140000000 HC CCU INTERMEDIATE R&B

## 2024-08-31 PROCEDURE — 83735 ASSAY OF MAGNESIUM: CPT

## 2024-08-31 PROCEDURE — 6370000000 HC RX 637 (ALT 250 FOR IP): Performed by: TRANSPLANT SURGERY

## 2024-08-31 PROCEDURE — 2580000003 HC RX 258: Performed by: STUDENT IN AN ORGANIZED HEALTH CARE EDUCATION/TRAINING PROGRAM

## 2024-08-31 PROCEDURE — 82962 GLUCOSE BLOOD TEST: CPT

## 2024-08-31 PROCEDURE — 80053 COMPREHEN METABOLIC PANEL: CPT

## 2024-08-31 PROCEDURE — 2580000003 HC RX 258: Performed by: TRANSPLANT SURGERY

## 2024-08-31 PROCEDURE — 6370000000 HC RX 637 (ALT 250 FOR IP): Performed by: STUDENT IN AN ORGANIZED HEALTH CARE EDUCATION/TRAINING PROGRAM

## 2024-08-31 PROCEDURE — 82330 ASSAY OF CALCIUM: CPT

## 2024-08-31 PROCEDURE — 85025 COMPLETE CBC W/AUTO DIFF WBC: CPT

## 2024-08-31 RX ORDER — INSULIN GLARGINE 100 [IU]/ML
36 INJECTION, SOLUTION SUBCUTANEOUS NIGHTLY
Status: DISCONTINUED | OUTPATIENT
Start: 2024-08-31 | End: 2024-09-02

## 2024-08-31 RX ORDER — INSULIN LISPRO 100 [IU]/ML
8 INJECTION, SOLUTION INTRAVENOUS; SUBCUTANEOUS EVERY 6 HOURS
Status: DISCONTINUED | OUTPATIENT
Start: 2024-08-31 | End: 2024-08-31

## 2024-08-31 RX ORDER — INSULIN GLARGINE 100 [IU]/ML
6 INJECTION, SOLUTION SUBCUTANEOUS ONCE
Status: COMPLETED | OUTPATIENT
Start: 2024-08-31 | End: 2024-08-31

## 2024-08-31 RX ADMIN — MONTELUKAST 10 MG: 10 TABLET, FILM COATED ORAL at 21:08

## 2024-08-31 RX ADMIN — VALSARTAN 80 MG: 80 TABLET, FILM COATED ORAL at 09:20

## 2024-08-31 RX ADMIN — INSULIN LISPRO 12 UNITS: 100 INJECTION, SOLUTION INTRAVENOUS; SUBCUTANEOUS at 09:18

## 2024-08-31 RX ADMIN — SENNOSIDES AND DOCUSATE SODIUM 2 TABLET: 50; 8.6 TABLET ORAL at 09:19

## 2024-08-31 RX ADMIN — SODIUM CHLORIDE, PRESERVATIVE FREE 10 ML: 5 INJECTION INTRAVENOUS at 09:21

## 2024-08-31 RX ADMIN — TAMSULOSIN HYDROCHLORIDE 0.4 MG: 0.4 CAPSULE ORAL at 09:20

## 2024-08-31 RX ADMIN — ENOXAPARIN SODIUM 30 MG: 100 INJECTION SUBCUTANEOUS at 09:19

## 2024-08-31 RX ADMIN — METOCLOPRAMIDE 10 MG: 5 INJECTION, SOLUTION INTRAMUSCULAR; INTRAVENOUS at 13:37

## 2024-08-31 RX ADMIN — CETIRIZINE HYDROCHLORIDE 10 MG: 10 TABLET, FILM COATED ORAL at 09:20

## 2024-08-31 RX ADMIN — ENOXAPARIN SODIUM 30 MG: 100 INJECTION SUBCUTANEOUS at 21:09

## 2024-08-31 RX ADMIN — INSULIN GLARGINE 36 UNITS: 100 INJECTION, SOLUTION SUBCUTANEOUS at 21:18

## 2024-08-31 RX ADMIN — ATORVASTATIN CALCIUM 20 MG: 20 TABLET, FILM COATED ORAL at 21:07

## 2024-08-31 RX ADMIN — HYDROCHLOROTHIAZIDE 12.5 MG: 12.5 TABLET ORAL at 09:19

## 2024-08-31 RX ADMIN — METOCLOPRAMIDE 10 MG: 5 INJECTION, SOLUTION INTRAMUSCULAR; INTRAVENOUS at 06:50

## 2024-08-31 RX ADMIN — METOCLOPRAMIDE 10 MG: 5 INJECTION, SOLUTION INTRAMUSCULAR; INTRAVENOUS at 01:00

## 2024-08-31 RX ADMIN — CALCIUM CARBONATE-VITAMIN D TAB 500 MG-200 UNIT 1 TABLET: 500-200 TAB at 09:19

## 2024-08-31 RX ADMIN — SODIUM CHLORIDE, PRESERVATIVE FREE 10 ML: 5 INJECTION INTRAVENOUS at 21:11

## 2024-08-31 RX ADMIN — FINASTERIDE 5 MG: 5 TABLET, FILM COATED ORAL at 09:20

## 2024-08-31 RX ADMIN — METOCLOPRAMIDE 10 MG: 5 INJECTION, SOLUTION INTRAMUSCULAR; INTRAVENOUS at 18:12

## 2024-08-31 RX ADMIN — INSULIN GLARGINE 6 UNITS: 100 INJECTION, SOLUTION SUBCUTANEOUS at 01:00

## 2024-08-31 RX ADMIN — METOCLOPRAMIDE 10 MG: 5 INJECTION, SOLUTION INTRAMUSCULAR; INTRAVENOUS at 23:37

## 2024-08-31 RX ADMIN — METOPROLOL SUCCINATE 25 MG: 25 TABLET, EXTENDED RELEASE ORAL at 09:20

## 2024-08-31 RX ADMIN — INSULIN LISPRO 12 UNITS: 100 INJECTION, SOLUTION INTRAVENOUS; SUBCUTANEOUS at 05:14

## 2024-08-31 RX ADMIN — Medication 5 MG: at 21:07

## 2024-08-31 NOTE — PLAN OF CARE
Problem: Discharge Planning  Goal: Discharge to home or other facility with appropriate resources  Outcome: Progressing     Problem: Safety - Adult  Goal: Free from fall injury  8/30/2024 2305 by Radha Schafer RN  Outcome: Progressing     Problem: Pain  Goal: Verbalizes/displays adequate comfort level or baseline comfort level  8/30/2024 2305 by Radha Schafer, RN  Outcome: Progressing     Problem: Skin/Tissue Integrity  Goal: Absence of new skin breakdown  Description: 1.  Monitor for areas of redness and/or skin breakdown  2.  Assess vascular access sites hourly  3.  Every 4-6 hours minimum:  Change oxygen saturation probe site  4.  Every 4-6 hours:  If on nasal continuous positive airway pressure, respiratory therapy assess nares and determine need for appliance change or resting period.  8/30/2024 2305 by Radha Schafer, RN  Outcome: Progressing     Problem: ABCDS Injury Assessment  Goal: Absence of physical injury  8/30/2024 2305 by Radha Schafer, RN  Outcome: Progressing     Problem: Neurosensory - Adult  Goal: Achieves stable or improved neurological status  Outcome: Progressing

## 2024-08-31 NOTE — PLAN OF CARE
Problem: Discharge Planning  Goal: Discharge to home or other facility with appropriate resources  8/31/2024 1024 by Janes Alvarez RN  Outcome: Progressing  8/31/2024 0224 by Ana Rosa Guzman RN  Outcome: Progressing  8/30/2024 2305 by Radha Schafer RN  Outcome: Progressing     Problem: Safety - Adult  Goal: Free from fall injury  8/31/2024 1024 by Janes Alvarez RN  Outcome: Progressing  8/31/2024 0224 by Ana Rosa Guzman RN  Outcome: Progressing  8/30/2024 2305 by Radha Schafer RN  Outcome: Progressing     Problem: Pain  Goal: Verbalizes/displays adequate comfort level or baseline comfort level  8/31/2024 1024 by Janes Alvarez RN  Outcome: Progressing  8/31/2024 0224 by Ana Rosa Guzman RN  Outcome: Progressing  8/30/2024 2305 by Radha Schafer RN  Outcome: Progressing     Problem: Skin/Tissue Integrity  Goal: Absence of new skin breakdown  Description: 1.  Monitor for areas of redness and/or skin breakdown  2.  Assess vascular access sites hourly  3.  Every 4-6 hours minimum:  Change oxygen saturation probe site  4.  Every 4-6 hours:  If on nasal continuous positive airway pressure, respiratory therapy assess nares and determine need for appliance change or resting period.  8/31/2024 1024 by Janes Alvarez RN  Outcome: Progressing  8/31/2024 0224 by Ana Rosa Guzman RN  Outcome: Progressing  8/30/2024 2305 by Radha Schafer RN  Outcome: Progressing     Problem: ABCDS Injury Assessment  Goal: Absence of physical injury  8/31/2024 1024 by Janes Alvarez RN  Outcome: Progressing  8/31/2024 0224 by Ana Rosa Guzman RN  Outcome: Progressing  8/30/2024 2305 by Radha Schafer RN  Outcome: Progressing     Problem: Neurosensory - Adult  Goal: Achieves stable or improved neurological status  8/31/2024 1024 by Janes Alvarez RN  Outcome: Progressing  8/31/2024 0224 by Ana Rosa Guzman RN  Outcome: Progressing  8/30/2024 2305 by Radha Schafer, RN  Outcome: Progressing  Goal: Absence of seizures  8/31/2024 1024 by Antonio

## 2024-08-31 NOTE — PROGRESS NOTES
[facility]   INTERNAL MEDICINE RESIDENCY FACULTY    Attending Physician Statement  I have discussed the case, including pertinent history (medical, surgical, family and social) and exam findings with the resident and the team.  I agree with the assessment, plan and orders as documented by the resident.      The patient was discussed earlier by me on rounds with the team.    IM consult for DM post Whipple for pancreatic cancer. His BS is now stable on Lantus, Lispro scheduled and SSI Lispro and he is on TPN and TF just started. As the TPN is weaned off and he is just on TF, may need less insulin so IM will follow closely and adjust.     I have reviewed my findings and recommendations with Michael Garcia.  Remainder of medical problems as per resident note.      Mukund Au MD, MDAR., [facility]  Internal Medicine Residency Faculty

## 2024-08-31 NOTE — PROGRESS NOTES
AM    HGB 7.3 08/31/2024 05:25 AM    HCT 22.8 08/31/2024 05:25 AM     08/31/2024 05:25 AM    MCV 98.3 08/31/2024 05:25 AM    MCH 31.5 08/31/2024 05:25 AM    MCHC 32.0 08/31/2024 05:25 AM    RDW 14.7 08/31/2024 05:25 AM    METASPCT 1 08/20/2024 06:00 AM    LYMPHOPCT 6 08/31/2024 05:25 AM    MONOPCT 12 08/31/2024 05:25 AM    MYELOPCT 1 08/20/2024 06:00 AM    EOSPCT 2 08/31/2024 05:25 AM    BASOPCT 0 08/31/2024 05:25 AM    MONOSABS 0.80 08/31/2024 05:25 AM    LYMPHSABS 0.38 08/31/2024 05:25 AM    EOSABS 0.10 08/31/2024 05:25 AM    BASOSABS 0.02 08/31/2024 05:25 AM     BMP:    Lab Results   Component Value Date/Time     08/31/2024 05:25 AM    K 4.0 08/31/2024 05:25 AM    K 4.2 03/28/2021 06:18 AM     08/31/2024 05:25 AM    CO2 25 08/31/2024 05:25 AM    BUN 14 08/31/2024 05:25 AM    CREATININE 0.6 08/31/2024 05:25 AM    CALCIUM 7.9 08/31/2024 05:25 AM    GFRAA >60 09/01/2022 06:09 PM    LABGLOM >90 08/31/2024 05:25 AM    LABGLOM >60 05/10/2023 08:51 AM    GLUCOSE 128 08/31/2024 05:25 AM    GLUCOSE 146 10/04/2010 10:20 AM       IMAGING:   Imaging Studies:    XR ABDOMEN (KUB) (SINGLE AP VIEW)    Result Date: 8/29/2024  Enteric tube tip in the mid gastric body.     XR CHEST (2 VW)    Result Date: 8/28/2024  No definitive evidence for pneumonia.     FL UGI W SMALL BOWEL    Result Date: 8/27/2024  1.  Findings compatible with severe gastroparesis versus gastric outlet obstruction. 2.  Redemonstration of small sliding-type hiatal hernia with severe gastroesophageal reflux.     XR ACUTE ABD SERIES CHEST 1 VW    Result Date: 8/24/2024  1. Contrast is seen within the bladder. No definitive oral contrast is seen. 2. Prominent loops of predominantly large bowel with a severe stool burden.     CT ABDOMEN PELVIS W IV CONTRAST Additional Contrast? Oral (drink half oral contrast)    Result Date: 8/24/2024  1. Again status post Whipple procedure when compared to the previous study performed 08/18/2024. 2. There is now  Osteoarthritis, Primary osteoarthritis of left knee, and Prostate cancer (HCC).    Came with CC: had no chief complaint listed for this encounter.    Internal Medicine Consult for diabetes management     Assessment:  Insulin resistance 2/2 diabetes and pancreatectomy  Pancreatic adenocarcinoma s/p Whipple procedure  Loose bowel movements 2/2 tube feeds  Hypocalcemia  Hypoalbuminemia  Microcytic anemia      Plan:   Will increase Lantus to 36 units nightly  Continue on high-dose sliding scale insulin  POCT blood glucose every 6 hours  Will decrease scheduled lispro as TPN is being discontinued and continue to address pending POCT glucose checks    Rose Whiting MD, PGY-3  Attending physician: Dr. Au

## 2024-09-01 LAB
ALBUMIN SERPL-MCNC: 2.4 G/DL (ref 3.5–5.2)
ALP SERPL-CCNC: 244 U/L (ref 40–129)
ALT SERPL-CCNC: 28 U/L (ref 0–40)
ANION GAP SERPL CALCULATED.3IONS-SCNC: 9 MMOL/L (ref 7–16)
AST SERPL-CCNC: 28 U/L (ref 0–39)
BASOPHILS # BLD: 0.02 K/UL (ref 0–0.2)
BASOPHILS NFR BLD: 0 % (ref 0–2)
BILIRUB SERPL-MCNC: 1.9 MG/DL (ref 0–1.2)
BUN SERPL-MCNC: 15 MG/DL (ref 6–23)
CA-I BLD-SCNC: 1.14 MMOL/L (ref 1.15–1.33)
CALCIUM SERPL-MCNC: 7.8 MG/DL (ref 8.6–10.2)
CHLORIDE SERPL-SCNC: 101 MMOL/L (ref 98–107)
CO2 SERPL-SCNC: 26 MMOL/L (ref 22–29)
CREAT SERPL-MCNC: 0.6 MG/DL (ref 0.7–1.2)
EOSINOPHIL # BLD: 0.12 K/UL (ref 0.05–0.5)
EOSINOPHILS RELATIVE PERCENT: 2 % (ref 0–6)
ERYTHROCYTE [DISTWIDTH] IN BLOOD BY AUTOMATED COUNT: 14.6 % (ref 11.5–15)
GFR, ESTIMATED: >90 ML/MIN/1.73M2
GLUCOSE BLD-MCNC: 184 MG/DL (ref 74–99)
GLUCOSE BLD-MCNC: 208 MG/DL (ref 74–99)
GLUCOSE BLD-MCNC: 249 MG/DL (ref 74–99)
GLUCOSE BLD-MCNC: 268 MG/DL (ref 74–99)
GLUCOSE SERPL-MCNC: 190 MG/DL (ref 74–99)
HCT VFR BLD AUTO: 22.7 % (ref 37–54)
HGB BLD-MCNC: 7.3 G/DL (ref 12.5–16.5)
IMM GRANULOCYTES # BLD AUTO: 0.06 K/UL (ref 0–0.58)
IMM GRANULOCYTES NFR BLD: 1 % (ref 0–5)
LYMPHOCYTES NFR BLD: 0.37 K/UL (ref 1.5–4)
LYMPHOCYTES RELATIVE PERCENT: 6 % (ref 20–42)
MAGNESIUM SERPL-MCNC: 2.2 MG/DL (ref 1.6–2.6)
MCH RBC QN AUTO: 31.6 PG (ref 26–35)
MCHC RBC AUTO-ENTMCNC: 32.2 G/DL (ref 32–34.5)
MCV RBC AUTO: 98.3 FL (ref 80–99.9)
MICROORGANISM SPEC CULT: NORMAL
MICROORGANISM/AGENT SPEC: NORMAL
MONOCYTES NFR BLD: 0.82 K/UL (ref 0.1–0.95)
MONOCYTES NFR BLD: 13 % (ref 2–12)
NEUTROPHILS NFR BLD: 78 % (ref 43–80)
NEUTS SEG NFR BLD: 4.89 K/UL (ref 1.8–7.3)
PHOSPHATE SERPL-MCNC: 3.6 MG/DL (ref 2.5–4.5)
PLATELET # BLD AUTO: 246 K/UL (ref 130–450)
PMV BLD AUTO: 10.8 FL (ref 7–12)
POTASSIUM SERPL-SCNC: 4.2 MMOL/L (ref 3.5–5)
PROT SERPL-MCNC: 5.7 G/DL (ref 6.4–8.3)
RBC # BLD AUTO: 2.31 M/UL (ref 3.8–5.8)
RBC # BLD: ABNORMAL 10*6/UL
SODIUM SERPL-SCNC: 136 MMOL/L (ref 132–146)
SPECIMEN DESCRIPTION: NORMAL
WBC OTHER # BLD: 6.3 K/UL (ref 4.5–11.5)

## 2024-09-01 PROCEDURE — 2580000003 HC RX 258: Performed by: TRANSPLANT SURGERY

## 2024-09-01 PROCEDURE — 2140000000 HC CCU INTERMEDIATE R&B

## 2024-09-01 PROCEDURE — 6360000002 HC RX W HCPCS: Performed by: TRANSPLANT SURGERY

## 2024-09-01 PROCEDURE — 85025 COMPLETE CBC W/AUTO DIFF WBC: CPT

## 2024-09-01 PROCEDURE — 6370000000 HC RX 637 (ALT 250 FOR IP)

## 2024-09-01 PROCEDURE — 83735 ASSAY OF MAGNESIUM: CPT

## 2024-09-01 PROCEDURE — 82962 GLUCOSE BLOOD TEST: CPT

## 2024-09-01 PROCEDURE — 6370000000 HC RX 637 (ALT 250 FOR IP): Performed by: TRANSPLANT SURGERY

## 2024-09-01 PROCEDURE — 84100 ASSAY OF PHOSPHORUS: CPT

## 2024-09-01 PROCEDURE — 2580000003 HC RX 258: Performed by: STUDENT IN AN ORGANIZED HEALTH CARE EDUCATION/TRAINING PROGRAM

## 2024-09-01 PROCEDURE — 99231 SBSQ HOSP IP/OBS SF/LOW 25: CPT | Performed by: INTERNAL MEDICINE

## 2024-09-01 PROCEDURE — 6370000000 HC RX 637 (ALT 250 FOR IP): Performed by: STUDENT IN AN ORGANIZED HEALTH CARE EDUCATION/TRAINING PROGRAM

## 2024-09-01 PROCEDURE — 82330 ASSAY OF CALCIUM: CPT

## 2024-09-01 PROCEDURE — 2700000000 HC OXYGEN THERAPY PER DAY

## 2024-09-01 PROCEDURE — 80053 COMPREHEN METABOLIC PANEL: CPT

## 2024-09-01 RX ORDER — INSULIN LISPRO 100 [IU]/ML
0-16 INJECTION, SOLUTION INTRAVENOUS; SUBCUTANEOUS EVERY 6 HOURS
Status: DISCONTINUED | OUTPATIENT
Start: 2024-09-01 | End: 2024-09-03

## 2024-09-01 RX ORDER — INSULIN LISPRO 100 [IU]/ML
0-8 INJECTION, SOLUTION INTRAVENOUS; SUBCUTANEOUS EVERY 6 HOURS
Status: DISCONTINUED | OUTPATIENT
Start: 2024-09-01 | End: 2024-09-01

## 2024-09-01 RX ADMIN — CALCIUM CARBONATE-VITAMIN D TAB 500 MG-200 UNIT 1 TABLET: 500-200 TAB at 09:08

## 2024-09-01 RX ADMIN — MONTELUKAST 10 MG: 10 TABLET, FILM COATED ORAL at 20:56

## 2024-09-01 RX ADMIN — SODIUM CHLORIDE, PRESERVATIVE FREE 10 ML: 5 INJECTION INTRAVENOUS at 09:08

## 2024-09-01 RX ADMIN — SODIUM CHLORIDE, PRESERVATIVE FREE 10 ML: 5 INJECTION INTRAVENOUS at 20:56

## 2024-09-01 RX ADMIN — INSULIN LISPRO 8 UNITS: 100 INJECTION, SOLUTION INTRAVENOUS; SUBCUTANEOUS at 20:55

## 2024-09-01 RX ADMIN — METOPROLOL SUCCINATE 25 MG: 25 TABLET, EXTENDED RELEASE ORAL at 09:07

## 2024-09-01 RX ADMIN — ENOXAPARIN SODIUM 30 MG: 100 INJECTION SUBCUTANEOUS at 20:56

## 2024-09-01 RX ADMIN — INSULIN LISPRO 4 UNITS: 100 INJECTION, SOLUTION INTRAVENOUS; SUBCUTANEOUS at 13:16

## 2024-09-01 RX ADMIN — CETIRIZINE HYDROCHLORIDE 10 MG: 10 TABLET, FILM COATED ORAL at 09:08

## 2024-09-01 RX ADMIN — ATORVASTATIN CALCIUM 20 MG: 20 TABLET, FILM COATED ORAL at 20:56

## 2024-09-01 RX ADMIN — ENOXAPARIN SODIUM 30 MG: 100 INJECTION SUBCUTANEOUS at 09:07

## 2024-09-01 RX ADMIN — SENNOSIDES AND DOCUSATE SODIUM 2 TABLET: 50; 8.6 TABLET ORAL at 09:08

## 2024-09-01 RX ADMIN — SODIUM CHLORIDE, PRESERVATIVE FREE 10 ML: 5 INJECTION INTRAVENOUS at 20:57

## 2024-09-01 RX ADMIN — VALSARTAN 80 MG: 80 TABLET, FILM COATED ORAL at 09:08

## 2024-09-01 RX ADMIN — INSULIN GLARGINE 36 UNITS: 100 INJECTION, SOLUTION SUBCUTANEOUS at 20:55

## 2024-09-01 RX ADMIN — TAMSULOSIN HYDROCHLORIDE 0.4 MG: 0.4 CAPSULE ORAL at 09:07

## 2024-09-01 RX ADMIN — Medication 5 MG: at 21:04

## 2024-09-01 RX ADMIN — METOCLOPRAMIDE 10 MG: 5 INJECTION, SOLUTION INTRAMUSCULAR; INTRAVENOUS at 20:55

## 2024-09-01 RX ADMIN — METOCLOPRAMIDE 10 MG: 5 INJECTION, SOLUTION INTRAMUSCULAR; INTRAVENOUS at 05:33

## 2024-09-01 RX ADMIN — FINASTERIDE 5 MG: 5 TABLET, FILM COATED ORAL at 09:08

## 2024-09-01 RX ADMIN — HYDROCHLOROTHIAZIDE 12.5 MG: 12.5 TABLET ORAL at 09:08

## 2024-09-01 RX ADMIN — INSULIN LISPRO 2 UNITS: 100 INJECTION, SOLUTION INTRAVENOUS; SUBCUTANEOUS at 10:33

## 2024-09-01 RX ADMIN — METOCLOPRAMIDE 10 MG: 5 INJECTION, SOLUTION INTRAMUSCULAR; INTRAVENOUS at 12:10

## 2024-09-01 ASSESSMENT — PAIN SCALES - GENERAL: PAINLEVEL_OUTOF10: 0

## 2024-09-01 ASSESSMENT — PAIN SCALES - WONG BAKER: WONGBAKER_NUMERICALRESPONSE: NO HURT

## 2024-09-01 NOTE — PLAN OF CARE
Problem: Discharge Planning  Goal: Discharge to home or other facility with appropriate resources  9/1/2024 1016 by Janes Alvarez RN  Outcome: Progressing  9/1/2024 0029 by Gino Graves RN  Outcome: Progressing     Problem: Safety - Adult  Goal: Free from fall injury  9/1/2024 1016 by Janes Alvarez RN  Outcome: Progressing  9/1/2024 0029 by Gino Graves RN  Outcome: Progressing     Problem: Pain  Goal: Verbalizes/displays adequate comfort level or baseline comfort level  9/1/2024 1016 by Janes Alvarez RN  Outcome: Progressing  9/1/2024 0029 by Gino Graves RN  Outcome: Progressing     Problem: Skin/Tissue Integrity  Goal: Absence of new skin breakdown  Description: 1.  Monitor for areas of redness and/or skin breakdown  2.  Assess vascular access sites hourly  3.  Every 4-6 hours minimum:  Change oxygen saturation probe site  4.  Every 4-6 hours:  If on nasal continuous positive airway pressure, respiratory therapy assess nares and determine need for appliance change or resting period.  9/1/2024 1016 by Janes Alvarez RN  Outcome: Progressing  9/1/2024 0029 by Gino Graves RN  Outcome: Progressing     Problem: ABCDS Injury Assessment  Goal: Absence of physical injury  9/1/2024 1016 by Janes Alvarez RN  Outcome: Progressing  9/1/2024 0029 by Gino Graves RN  Outcome: Progressing     Problem: Neurosensory - Adult  Goal: Achieves stable or improved neurological status  Outcome: Progressing  Goal: Absence of seizures  Outcome: Progressing  Goal: Remains free of injury related to seizures activity  Outcome: Progressing  Goal: Achieves maximal functionality and self care  Outcome: Progressing     Problem: Respiratory - Adult  Goal: Achieves optimal ventilation and oxygenation  9/1/2024 1016 by Janes Alvarez RN  Outcome: Progressing  9/1/2024 0029 by Gino Graves RN  Outcome: Progressing     Problem: Cardiovascular - Adult  Goal: Maintains optimal cardiac output and hemodynamic

## 2024-09-01 NOTE — PROGRESS NOTES
[facility]   INTERNAL MEDICINE RESIDENCY FACULTY    Attending Physician Statement  I have discussed the case, including pertinent history (medical, surgical, family and social) and exam findings with the resident and the team.   agree with the assessment, plan and orders as documented by the resident.      IM following for DM management.     He is being weaned off TPN as TF are now stable.    Will continue Lantus and wear down short acting insulin and reassess regularly.     I have reviewed my findings and recommendations with Michael Garcia.  Remainder of medical problems as per resident note.      Mukund Au MD, M.D., [facility]  Internal Medicine Residency Faculty

## 2024-09-01 NOTE — PATIENT CARE CONFERENCE
P Quality Flow/Interdisciplinary Rounds Progress Note        Quality Flow Rounds held on September 1, 2024    Disciplines Attending:  Bedside Nurse and Nursing Unit Leadership    Michael Garcia was admitted on 8/14/2024  5:28 AM    Anticipated Discharge Date:       Disposition:    Mike Score:  Mike Scale Score: 17    Readmission Risk              Risk of Unplanned Readmission:  36           Discussed patient goal for the day, patient clinical progression, and barriers to discharge.  The following Goal(s) of the Day/Commitment(s) have been identified:  Labs - Report Results      India Wen RN  September 1, 2024

## 2024-09-01 NOTE — PROGRESS NOTES
HEPATOBILIARY AND PANCREATIC SURGERY  DAILY PROGRESS NOTE  9/1/2024        Subjective:  Patient looks better today, tolerating tube feeds almost to goal. No n/v. Passing flatus and states he had a small BM this morning.     Objective:    /75   Pulse 93   Temp 98.2 °F (36.8 °C) (Temporal)   Resp 22   Ht 1.88 m (6' 2\")   Wt 100.8 kg (222 lb 2 oz)   SpO2 91%   BMI 28.52 kg/m²     GENERAL:  Lying in bed. No acute distress   HEAD: No gross abnormalities   EYES: scleral icterus   LUNGS:  No increased work of breathing on 2L NC  CARDIOVASCULAR:  RR, normotensive   ABDOMEN:  Soft, non-tender,mild distension, R drain site stitched,  Prior L SHARON site stitched   EXTREMITIES: mild LE edema  SKIN: Warm and dry    Assessment/Plan:  79 y.o. male with pancreatic head adenocarcinoma with duodenal obstruction s/p whipple procedure w portal vein reconstruction 8/14 - T3N2 (Stage III), delayed gastric emptying requiring parenteral nutrition s/p corepak placement 8/29    Plan  - ambulate, OOB  -continue tube feeds to goal  - follow labs  - PTOT  - placement       Electronically signed by Ana Zapata MD on 9/1/2024 at     Attending Physician Statement:    Chief Complaint: S/p whipple with PVR    I have examined the patient and performed the key aspects of physical exam, reviewed the record (including all pertinent and new radiology images and laboratory findings), and discussed the case with the surgical team.  I agree with the assessment and plan with the following additions, corrections, and changes. 14pt review of symptoms completed and negative except as mentioned.    Doing well. Tube feeds via corepak at goal. Will need set up for home. Possible D/c in next 24- 48 hrs.     Yary Gardiner MD  09/01/24  9:44 AM

## 2024-09-01 NOTE — PLAN OF CARE
Problem: Discharge Planning  Goal: Discharge to home or other facility with appropriate resources  Outcome: Progressing     Problem: Safety - Adult  Goal: Free from fall injury  Outcome: Progressing     Problem: Pain  Goal: Verbalizes/displays adequate comfort level or baseline comfort level  Outcome: Progressing     Problem: Skin/Tissue Integrity  Goal: Absence of new skin breakdown  Description: 1.  Monitor for areas of redness and/or skin breakdown  2.  Assess vascular access sites hourly  3.  Every 4-6 hours minimum:  Change oxygen saturation probe site  4.  Every 4-6 hours:  If on nasal continuous positive airway pressure, respiratory therapy assess nares and determine need for appliance change or resting period.  Outcome: Progressing     Problem: ABCDS Injury Assessment  Goal: Absence of physical injury  Outcome: Progressing     Problem: Respiratory - Adult  Goal: Achieves optimal ventilation and oxygenation  Outcome: Progressing     Problem: Cardiovascular - Adult  Goal: Maintains optimal cardiac output and hemodynamic stability  Outcome: Progressing     Problem: Skin/Tissue Integrity - Adult  Goal: Skin integrity remains intact  Outcome: Progressing     Problem: Musculoskeletal - Adult  Goal: Return mobility to safest level of function  Outcome: Progressing     Problem: Infection - Adult  Goal: Absence of infection at discharge  Outcome: Progressing     Problem: Metabolic/Fluid and Electrolytes - Adult  Goal: Electrolytes maintained within normal limits  Outcome: Progressing

## 2024-09-01 NOTE — PROGRESS NOTES
[facility]  Internal Medicine Residency Program  Internal Medicine Consult- Progress Note    Patient:  Michael Garcia 79 y.o. male MRN: [id number]     Date of Service: 9/1/2024    Hospital Day: 19      Chief complaint: had no chief complaint listed for this encounter.    Reason for Consult: Diabetes management   [facility] Care Physician: Moshe Moran DO    Subjective     Overnight events: No acute events overnight.     Patient seen and examined at bedside this morning.  Patient was doing well sitting up at the chair at bedside.  He had no complaints at this time.         Objective     Physical Exam:  Vitals: /75   Pulse 93   Temp 98.2 °F (36.8 °C) (Temporal)   Resp 22   Ht 1.88 m (6' 2\")   Wt 100.8 kg (222 lb 2 oz)   SpO2 91%   BMI 28.52 kg/m²     I & O - 24hr:   Intake/Output Summary (Last 24 hours) at 9/1/2024 1031  Last data filed at 9/1/2024 0927  Gross per 24 hour   Intake 1226 ml   Output 1190 ml   Net 36 ml      General Appearance: alert, appears stated age, and cooperative sitting in bed on room air with NG in place  HEENT:  Head: Normal, normocephalic, atraumatic.  Neck: no JVD and supple, symmetrical, trachea midline  Lung: clear to auscultation bilaterally   Heart: regular rate and rhythm  Abdomen: soft, non-tender; bowel sounds normal; no masses,  no organomegaly  Extremities:   Trace lower extremity edema  Musculokeletal: No joint swelling, no muscle tenderness. ROM normal in all joints of extremities.   Neurologic: Mental status: Alert, oriented, thought content appropriate    Subject  Pertinent Information & Imaging Studies, Consults   tasneem  CBC with Differential:    Lab Results   Component Value Date/Time    WBC 6.3 09/01/2024 06:00 AM    RBC 2.31 09/01/2024 06:00 AM    HGB 7.3 09/01/2024 06:00 AM    HCT 22.7 09/01/2024 06:00 AM     09/01/2024 06:00 AM    MCV 98.3 09/01/2024 06:00 AM    MCH 31.6 09/01/2024 06:00 AM    MCHC 32.2 09/01/2024 06:00 AM    RDW  IntraVENous, 2 times per day    sennosides-docusate sodium, 2 tablet, Oral, Daily    metoclopramide, 10 mg, IntraVENous, Q6H    enoxaparin, 30 mg, SubCUTAneous, BID    melatonin, 5 mg, Oral, Nightly    cetirizine, 10 mg, Oral, Daily    finasteride, 5 mg, Oral, Daily    metoprolol succinate, 25 mg, Oral, Daily    montelukast, 10 mg, Oral, Nightly    atorvastatin, 20 mg, Oral, Nightly    tamsulosin, 0.4 mg, Oral, Daily    valsartan, 80 mg, Oral, Daily **AND** hydroCHLOROthiazide, 12.5 mg, Oral, Daily    sodium chloride flush, 5-40 mL, IntraVENous, 2 times per day    [Held by provider] gabapentin, 200 mg, Oral, TID   PRN: bisacodyl, ipratropium 0.5 mg-albuterol 2.5 mg, HYDROmorphone, sodium chloride, acetaminophen, sodium chloride, sodium chloride flush, sodium chloride, sodium phosphate 16.17 mmol in sodium chloride 0.9 % 250 mL IVPB **OR** sodium phosphate 32.31 mmol in sodium chloride 0.9 % 250 mL IVPB, hydrALAZINE, labetalol, sodium chloride flush, sodium chloride flush, sodium chloride, ondansetron **OR** ondansetron, oxyCODONE **OR** oxyCODONE, glucose, dextrose bolus **OR** dextrose bolus, glucagon (rDNA), dextrose    Infusions:   sodium chloride    sodium chloride    sodium chloride    sodium chloride    dextrose  Diet: Diet NPO Exceptions are: Sips of Water with Meds  ADULT TUBE FEEDING; Nasojejunal; Immune Enhancing; Continuous; 5; Yes; 10; Q 4 hours; 60; 30; Q 3 hours    Resident's Assessment and Plan     Michael Garcia is a 79 y.o. male with  has a past medical history of Deaf, bilateral, Diabetes (HCC), Hypertension, Osteoarthritis, Primary osteoarthritis of left knee, and Prostate cancer (HCC).    Came with CC: had no chief complaint listed for this encounter.    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

## 2024-09-02 LAB
ALBUMIN SERPL-MCNC: 2.6 G/DL (ref 3.5–5.2)
ALBUMIN SERPL-MCNC: 2.6 G/DL (ref 3.5–5.2)
ALP SERPL-CCNC: 250 U/L (ref 40–129)
ALP SERPL-CCNC: 253 U/L (ref 40–129)
ALT SERPL-CCNC: 31 U/L (ref 0–40)
ALT SERPL-CCNC: 31 U/L (ref 0–40)
ANION GAP SERPL CALCULATED.3IONS-SCNC: 12 MMOL/L (ref 7–16)
AST SERPL-CCNC: 28 U/L (ref 0–39)
AST SERPL-CCNC: 29 U/L (ref 0–39)
BASOPHILS # BLD: 0.06 K/UL (ref 0–0.2)
BASOPHILS NFR BLD: 1 % (ref 0–2)
BILIRUB DIRECT SERPL-MCNC: 1.2 MG/DL (ref 0–0.3)
BILIRUB INDIRECT SERPL-MCNC: 0.7 MG/DL (ref 0–1)
BILIRUB SERPL-MCNC: 1.9 MG/DL (ref 0–1.2)
BILIRUB SERPL-MCNC: 1.9 MG/DL (ref 0–1.2)
BUN SERPL-MCNC: 16 MG/DL (ref 6–23)
CA-I BLD-SCNC: 1.15 MMOL/L (ref 1.15–1.33)
CALCIUM SERPL-MCNC: 8 MG/DL (ref 8.6–10.2)
CHLORIDE SERPL-SCNC: 102 MMOL/L (ref 98–107)
CO2 SERPL-SCNC: 24 MMOL/L (ref 22–29)
CREAT SERPL-MCNC: 0.6 MG/DL (ref 0.7–1.2)
EOSINOPHIL # BLD: 0.34 K/UL (ref 0.05–0.5)
EOSINOPHILS RELATIVE PERCENT: 5 % (ref 0–6)
ERYTHROCYTE [DISTWIDTH] IN BLOOD BY AUTOMATED COUNT: 14.9 % (ref 11.5–15)
GFR, ESTIMATED: >90 ML/MIN/1.73M2
GLUCOSE BLD-MCNC: 198 MG/DL (ref 74–99)
GLUCOSE BLD-MCNC: 236 MG/DL (ref 74–99)
GLUCOSE BLD-MCNC: 240 MG/DL (ref 74–99)
GLUCOSE BLD-MCNC: 292 MG/DL (ref 74–99)
GLUCOSE SERPL-MCNC: 227 MG/DL (ref 74–99)
HCT VFR BLD AUTO: 25.2 % (ref 37–54)
HGB BLD-MCNC: 7.9 G/DL (ref 12.5–16.5)
LYMPHOCYTES NFR BLD: 0.17 K/UL (ref 1.5–4)
LYMPHOCYTES RELATIVE PERCENT: 3 % (ref 20–42)
MAGNESIUM SERPL-MCNC: 2.1 MG/DL (ref 1.6–2.6)
MCH RBC QN AUTO: 30 PG (ref 26–35)
MCHC RBC AUTO-ENTMCNC: 31.3 G/DL (ref 32–34.5)
MCV RBC AUTO: 95.8 FL (ref 80–99.9)
MONOCYTES NFR BLD: 0.51 K/UL (ref 0.1–0.95)
MONOCYTES NFR BLD: 8 % (ref 2–12)
NEUTROPHILS NFR BLD: 84 % (ref 43–80)
NEUTS SEG NFR BLD: 5.43 K/UL (ref 1.8–7.3)
PHOSPHATE SERPL-MCNC: 2.6 MG/DL (ref 2.5–4.5)
PLATELET # BLD AUTO: 324 K/UL (ref 130–450)
PMV BLD AUTO: 10.7 FL (ref 7–12)
POTASSIUM SERPL-SCNC: 3.8 MMOL/L (ref 3.5–5)
PROT SERPL-MCNC: 5.9 G/DL (ref 6.4–8.3)
PROT SERPL-MCNC: 5.9 G/DL (ref 6.4–8.3)
RBC # BLD AUTO: 2.63 M/UL (ref 3.8–5.8)
RBC # BLD: ABNORMAL 10*6/UL
SODIUM SERPL-SCNC: 138 MMOL/L (ref 132–146)
WBC OTHER # BLD: 6.5 K/UL (ref 4.5–11.5)

## 2024-09-02 PROCEDURE — 99231 SBSQ HOSP IP/OBS SF/LOW 25: CPT | Performed by: INTERNAL MEDICINE

## 2024-09-02 PROCEDURE — 82330 ASSAY OF CALCIUM: CPT

## 2024-09-02 PROCEDURE — 2580000003 HC RX 258: Performed by: STUDENT IN AN ORGANIZED HEALTH CARE EDUCATION/TRAINING PROGRAM

## 2024-09-02 PROCEDURE — 2140000000 HC CCU INTERMEDIATE R&B

## 2024-09-02 PROCEDURE — 6370000000 HC RX 637 (ALT 250 FOR IP)

## 2024-09-02 PROCEDURE — 6370000000 HC RX 637 (ALT 250 FOR IP): Performed by: TRANSPLANT SURGERY

## 2024-09-02 PROCEDURE — 80053 COMPREHEN METABOLIC PANEL: CPT

## 2024-09-02 PROCEDURE — 6360000002 HC RX W HCPCS: Performed by: TRANSPLANT SURGERY

## 2024-09-02 PROCEDURE — 6370000000 HC RX 637 (ALT 250 FOR IP): Performed by: STUDENT IN AN ORGANIZED HEALTH CARE EDUCATION/TRAINING PROGRAM

## 2024-09-02 PROCEDURE — 83735 ASSAY OF MAGNESIUM: CPT

## 2024-09-02 PROCEDURE — 80076 HEPATIC FUNCTION PANEL: CPT

## 2024-09-02 PROCEDURE — 2580000003 HC RX 258: Performed by: TRANSPLANT SURGERY

## 2024-09-02 PROCEDURE — 84100 ASSAY OF PHOSPHORUS: CPT

## 2024-09-02 PROCEDURE — 2500000003 HC RX 250 WO HCPCS: Performed by: STUDENT IN AN ORGANIZED HEALTH CARE EDUCATION/TRAINING PROGRAM

## 2024-09-02 PROCEDURE — 82962 GLUCOSE BLOOD TEST: CPT

## 2024-09-02 PROCEDURE — 85025 COMPLETE CBC W/AUTO DIFF WBC: CPT

## 2024-09-02 RX ORDER — INSULIN GLARGINE 100 [IU]/ML
38 INJECTION, SOLUTION SUBCUTANEOUS NIGHTLY
Status: DISCONTINUED | OUTPATIENT
Start: 2024-09-02 | End: 2024-09-07 | Stop reason: HOSPADM

## 2024-09-02 RX ORDER — BISACODYL 10 MG
10 SUPPOSITORY, RECTAL RECTAL DAILY
Status: DISCONTINUED | OUTPATIENT
Start: 2024-09-02 | End: 2024-09-07 | Stop reason: HOSPADM

## 2024-09-02 RX ADMIN — INSULIN LISPRO 4 UNITS: 100 INJECTION, SOLUTION INTRAVENOUS; SUBCUTANEOUS at 21:52

## 2024-09-02 RX ADMIN — CALCIUM CARBONATE-VITAMIN D TAB 500 MG-200 UNIT 1 TABLET: 500-200 TAB at 08:51

## 2024-09-02 RX ADMIN — METOCLOPRAMIDE 10 MG: 5 INJECTION, SOLUTION INTRAMUSCULAR; INTRAVENOUS at 18:25

## 2024-09-02 RX ADMIN — ENOXAPARIN SODIUM 30 MG: 100 INJECTION SUBCUTANEOUS at 08:50

## 2024-09-02 RX ADMIN — INSULIN LISPRO 8 UNITS: 100 INJECTION, SOLUTION INTRAVENOUS; SUBCUTANEOUS at 13:34

## 2024-09-02 RX ADMIN — POTASSIUM PHOSPHATE, MONOBASIC AND POTASSIUM PHOSPHATE, DIBASIC 30 MMOL: 224; 236 INJECTION, SOLUTION, CONCENTRATE INTRAVENOUS at 09:10

## 2024-09-02 RX ADMIN — CETIRIZINE HYDROCHLORIDE 10 MG: 10 TABLET, FILM COATED ORAL at 08:52

## 2024-09-02 RX ADMIN — METOCLOPRAMIDE 10 MG: 5 INJECTION, SOLUTION INTRAMUSCULAR; INTRAVENOUS at 06:36

## 2024-09-02 RX ADMIN — VALSARTAN 80 MG: 80 TABLET, FILM COATED ORAL at 08:52

## 2024-09-02 RX ADMIN — HYDROCHLOROTHIAZIDE 12.5 MG: 12.5 TABLET ORAL at 08:52

## 2024-09-02 RX ADMIN — INSULIN LISPRO 4 UNITS: 100 INJECTION, SOLUTION INTRAVENOUS; SUBCUTANEOUS at 08:50

## 2024-09-02 RX ADMIN — METOCLOPRAMIDE 10 MG: 5 INJECTION, SOLUTION INTRAMUSCULAR; INTRAVENOUS at 02:27

## 2024-09-02 RX ADMIN — INSULIN GLARGINE 38 UNITS: 100 INJECTION, SOLUTION SUBCUTANEOUS at 21:52

## 2024-09-02 RX ADMIN — METOPROLOL SUCCINATE 25 MG: 25 TABLET, EXTENDED RELEASE ORAL at 08:51

## 2024-09-02 RX ADMIN — SODIUM CHLORIDE, PRESERVATIVE FREE 10 ML: 5 INJECTION INTRAVENOUS at 08:53

## 2024-09-02 RX ADMIN — SENNOSIDES AND DOCUSATE SODIUM 2 TABLET: 50; 8.6 TABLET ORAL at 08:51

## 2024-09-02 RX ADMIN — FINASTERIDE 5 MG: 5 TABLET, FILM COATED ORAL at 08:52

## 2024-09-02 RX ADMIN — SODIUM CHLORIDE, PRESERVATIVE FREE 10 ML: 5 INJECTION INTRAVENOUS at 21:53

## 2024-09-02 RX ADMIN — SODIUM CHLORIDE, PRESERVATIVE FREE 10 ML: 5 INJECTION INTRAVENOUS at 21:52

## 2024-09-02 RX ADMIN — METOCLOPRAMIDE 10 MG: 5 INJECTION, SOLUTION INTRAMUSCULAR; INTRAVENOUS at 12:13

## 2024-09-02 RX ADMIN — ENOXAPARIN SODIUM 30 MG: 100 INJECTION SUBCUTANEOUS at 21:51

## 2024-09-02 RX ADMIN — BISACODYL 10 MG: 10 SUPPOSITORY RECTAL at 08:52

## 2024-09-02 RX ADMIN — TAMSULOSIN HYDROCHLORIDE 0.4 MG: 0.4 CAPSULE ORAL at 08:52

## 2024-09-02 NOTE — PLAN OF CARE
Problem: Discharge Planning  Goal: Discharge to home or other facility with appropriate resources  9/1/2024 2335 by Ana Rosa Guzman RN  Outcome: Progressing  9/1/2024 1016 by Janes Alvarez RN  Outcome: Progressing     Problem: Safety - Adult  Goal: Free from fall injury  9/1/2024 2335 by Ana Rosa Guzman RN  Outcome: Progressing  9/1/2024 1016 by Janes Alvarez RN  Outcome: Progressing     Problem: Pain  Goal: Verbalizes/displays adequate comfort level or baseline comfort level  9/1/2024 2335 by Ana Rosa Guzman RN  Outcome: Progressing  9/1/2024 1016 by Janes Alvarez RN  Outcome: Progressing     Problem: Skin/Tissue Integrity  Goal: Absence of new skin breakdown  Description: 1.  Monitor for areas of redness and/or skin breakdown  2.  Assess vascular access sites hourly  3.  Every 4-6 hours minimum:  Change oxygen saturation probe site  4.  Every 4-6 hours:  If on nasal continuous positive airway pressure, respiratory therapy assess nares and determine need for appliance change or resting period.  9/1/2024 2335 by Ana Rosa Guzman RN  Outcome: Progressing  9/1/2024 1016 by Janes Alvarez RN  Outcome: Progressing     Problem: ABCDS Injury Assessment  Goal: Absence of physical injury  9/1/2024 2335 by Ana Rosa Guzman RN  Outcome: Progressing  9/1/2024 1016 by Janes Alvarez RN  Outcome: Progressing     Problem: Neurosensory - Adult  Goal: Achieves stable or improved neurological status  9/1/2024 2335 by Ana Rosa Guzman RN  Outcome: Progressing  9/1/2024 1016 by Janes Alvarez RN  Outcome: Progressing  Goal: Absence of seizures  9/1/2024 2335 by Ana Rosa Guzman RN  Outcome: Progressing  9/1/2024 1016 by Janes Alvarez RN  Outcome: Progressing  Goal: Remains free of injury related to seizures activity  9/1/2024 2335 by Ana Rosa Guzman RN  Outcome: Progressing  9/1/2024 1016 by Janes Alvarez RN  Outcome: Progressing  Goal: Achieves maximal functionality and self care  9/1/2024 2335 by Ana Rosa Guzman RN  Outcome:  Progressing  9/1/2024 1016 by Janes Alvarez RN  Outcome: Progressing     Problem: Respiratory - Adult  Goal: Achieves optimal ventilation and oxygenation  9/1/2024 2335 by Ana Rosa Guzman RN  Outcome: Progressing  9/1/2024 1016 by Janes Alvarez RN  Outcome: Progressing     Problem: Cardiovascular - Adult  Goal: Maintains optimal cardiac output and hemodynamic stability  9/1/2024 2335 by Ana Rosa Guzman RN  Outcome: Progressing  9/1/2024 1016 by Janes Alvarez RN  Outcome: Progressing  Goal: Absence of cardiac dysrhythmias or at baseline  9/1/2024 2335 by Ana Rosa Guzman RN  Outcome: Progressing  9/1/2024 1016 by Janes Alvarez RN  Outcome: Progressing     Problem: Skin/Tissue Integrity - Adult  Goal: Skin integrity remains intact  9/1/2024 2335 by Ana Rosa Guzman RN  Outcome: Progressing  9/1/2024 1016 by Janes Alvarez RN  Outcome: Progressing  Goal: Incisions, wounds, or drain sites healing without S/S of infection  9/1/2024 2335 by Ana Rosa Guzman RN  Outcome: Progressing  9/1/2024 1016 by Janes Alvarez RN  Outcome: Progressing     Problem: Musculoskeletal - Adult  Goal: Return mobility to safest level of function  9/1/2024 2335 by Ana Rosa Guzman RN  Outcome: Progressing  9/1/2024 1016 by Janes Alvarez RN  Outcome: Progressing     Problem: Gastrointestinal - Adult  Goal: Minimal or absence of nausea and vomiting  9/1/2024 2335 by Ana Rosa Guzman RN  Outcome: Progressing  9/1/2024 1016 by Janes Alvarez RN  Outcome: Progressing  Goal: Maintains or returns to baseline bowel function  9/1/2024 2335 by Ana Rosa Guzman RN  Outcome: Progressing  9/1/2024 1016 by Janes Alvarez RN  Outcome: Progressing     Problem: Genitourinary - Adult  Goal: Absence of urinary retention  9/1/2024 2335 by Ana Rosa Guzman RN  Outcome: Progressing  9/1/2024 1016 by Janes Alvarez RN  Outcome: Progressing  Goal: Urinary catheter remains patent  9/1/2024 2335 by Ana Rosa Guzman RN  Outcome: Progressing  9/1/2024 1016 by Janes Alvarez,

## 2024-09-02 NOTE — PROGRESS NOTES
Notified [facility] via [employer] of pt pain in abdomen. Updated that the pt hasn't had a BM in a few days per juliane. Pt abdomen tender and all bowel sounds present. Pt refusing pain medications at this time.      Spoke to fiance and pt, pt continues to not want pain medicine for abdomen pain.Ana Rosa Guzman RN

## 2024-09-02 NOTE — PROGRESS NOTES
HEPATOBILIARY AND PANCREATIC SURGERY  DAILY PROGRESS NOTE  9/2/2024        Subjective:  No issues overnight. Feeling well today. Tolerating tube feeds at goal, no nausea/ vomiting. Denies pain. Passing gas, having BM. Per nursing, pt CORPACK came out after pt sneezed. Will have to be replaced today.    Objective:    /72   Pulse (!) 108   Temp 97.6 °F (36.4 °C) (Temporal)   Resp 20   Ht 1.88 m (6' 2\")   Wt 102.6 kg (226 lb 3.2 oz)   SpO2 92%   BMI 29.04 kg/m²     GENERAL:  Lying in bed. No acute distress   HEAD: No gross abnormalities   EYES: scleral icterus   LUNGS:  No increased work of breathing on 2L NC  CARDIOVASCULAR:  RR, normotensive   ABDOMEN:  Soft, non-tender,mild distension, R/L drain site stitched.  EXTREMITIES: mild LE edema  SKIN: Warm and dry    Assessment/Plan:  79 y.o. male with pancreatic head adenocarcinoma with duodenal obstruction s/p whipple procedure w portal vein reconstruction 8/14 - T3N2 (Stage III), delayed gastric emptying requiring parenteral nutrition s/p corepak placement 8/29    Plan  -vascular access team consulted for Corpack re-placement  - ambulate, OOB  - continue tube feeds, will need set up for home  - follow labs  - PT/OT  - plan for placement at home w/ home health care  -talked to case management about pt having access to tube feeds at home  - possible discharge tomorrow    Electronically signed by Dedrick Larry DO on 9/3/2024 at 8:11 AM

## 2024-09-02 NOTE — PROGRESS NOTES
[facility]  Internal Medicine Residency Program  Internal Medicine Consult- Progress Note    Patient:  Michael Garcia 79 y.o. male MRN: [id number]     Date of Service: 9/1/2024    Hospital Day: 19      Chief complaint: had no chief complaint listed for this encounter.    Reason for Consult: diabetes management  Primacy Care Physician: Moshe Moran DO    Subjective     Overnight events: mild abdominal pain overnight    Patient was seen and examined at bedside in the presence of his girlfriend. He denied any concerns, denied chest pain, SOB, N/V/D. Passing stool and gas. Tube feeds running. BG in the 200s.    Objective     Physical Exam:  Vitals: /78   Pulse (!) 109   Temp 97.5 °F (36.4 °C) (Temporal)   Resp 20   Ht 1.88 m (6' 2\")   Wt 100.8 kg (222 lb 2 oz)   SpO2 93%   BMI 28.52 kg/m²     I & O - 24hr:   Intake/Output Summary (Last 24 hours) at 9/1/2024 2159  Last data filed at 9/1/2024 1913  Gross per 24 hour   Intake 1529 ml   Output 650 ml   Net 879 ml      General Appearance: alert, appears stated age, and cooperative  HEENT:  Head: Normal, normocephalic, atraumatic.  Lung: clear to auscultation bilaterally  Heart: regular rate and rhythm, S1, S2 normal, no murmur, click, rub or gallop  Abdomen:  soft, mild tenderness around incision, hyperactive bowel sounds, midline lower abdominal incision covered with dressing.  Extremities:  extremities normal, atraumatic, no cyanosis or edema  Musculokeletal: No joint swelling, no muscle tenderness. ROM normal in all joints of extremities.   Neurologic: Mental status: Alert, oriented, thought content appropriate  Subject  Pertinent Information & Imaging Studies, Consults   tasneem  CBC:   Lab Results   Component Value Date/Time    WBC 6.5 09/02/2024 05:50 AM    RBC 2.63 09/02/2024 05:50 AM    HGB 7.9 09/02/2024 05:50 AM    HCT 25.2 09/02/2024 05:50 AM    MCV 95.8 09/02/2024 05:50 AM    MCH 30.0 09/02/2024 05:50 AM    MCHC 31.3  sodium, 2 tablet, Oral, Daily    metoclopramide, 10 mg, IntraVENous, Q6H    enoxaparin, 30 mg, SubCUTAneous, BID    melatonin, 5 mg, Oral, Nightly    cetirizine, 10 mg, Oral, Daily    finasteride, 5 mg, Oral, Daily    metoprolol succinate, 25 mg, Oral, Daily    montelukast, 10 mg, Oral, Nightly    atorvastatin, 20 mg, Oral, Nightly    tamsulosin, 0.4 mg, Oral, Daily    valsartan, 80 mg, Oral, Daily **AND** hydroCHLOROthiazide, 12.5 mg, Oral, Daily    sodium chloride flush, 5-40 mL, IntraVENous, 2 times per day    [Held by provider] gabapentin, 200 mg, Oral, TID   PRN: bisacodyl, ipratropium 0.5 mg-albuterol 2.5 mg, HYDROmorphone, sodium chloride, acetaminophen, sodium chloride, sodium chloride flush, sodium chloride, sodium phosphate 16.17 mmol in sodium chloride 0.9 % 250 mL IVPB **OR** sodium phosphate 32.31 mmol in sodium chloride 0.9 % 250 mL IVPB, hydrALAZINE, labetalol, sodium chloride flush, sodium chloride flush, sodium chloride, ondansetron **OR** ondansetron, oxyCODONE **OR** oxyCODONE, glucose, dextrose bolus **OR** dextrose bolus, glucagon (rDNA), dextrose    Infusions:   sodium chloride    sodium chloride    sodium chloride    sodium chloride    dextrose  Diet: Diet NPO Exceptions are: Sips of Water with Meds  ADULT TUBE FEEDING; Nasojejunal; Immune Enhancing; Continuous; 5; Yes; 10; Q 4 hours; 60; 30; Q 3 hours    Resident's Assessment and Plan     Michael Garcia is a 79 y.o. male who  has a past medical history of Deaf, bilateral, Diabetes (HCC), Hypertension, Osteoarthritis, Primary osteoarthritis of left knee, and Prostate cancer (HCC).    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

## 2024-09-02 NOTE — PATIENT CARE CONFERENCE
P Quality Flow/Interdisciplinary Rounds Progress Note        Quality Flow Rounds held on September 2, 2024    Disciplines Attending:  Bedside Nurse, , , and Nursing Unit Leadership    Michael Garcia was admitted on 8/14/2024  5:28 AM    Anticipated Discharge Date:       Disposition:    Mike Score:  Mike Scale Score: 19    Readmission Risk              Risk of Unplanned Readmission:  37           Discussed patient goal for the day, patient clinical progression, and barriers to discharge.  The following Goal(s) of the Day/Commitment(s) have been identified:  downgrade/discharge planning       Adriana Sandoval RN  September 2, 2024

## 2024-09-02 NOTE — PROGRESS NOTES
[facility]   INTERNAL MEDICINE RESIDENCY FACULTY    Attending Physician Statement  I have discussed the case, including pertinent history (medical, surgical, family and social) and exam findings with the resident and the team.  I have seen and examined the patient and the key elements of the encounter have been performed by me.  I agree with the assessment, plan and orders as documented by the resident.      The patient was seen earlier by me on rounds with the team.    I spoke with patient and wife at bedside. He is doing well - TF running, TPN stopped - on lantus and with somewhat higher BS will increase lantus - he is on SSI but not scheduled short acting - will go to SNF when he leaves here soon, as per primary service.     All questions answered.     I have reviewed my findings and recommendations with Michael Garcia.  Remainder of medical problems as per resident note.      Mukund Au MD, M.D., [facility]  Internal Medicine Residency Faculty

## 2024-09-02 NOTE — PLAN OF CARE
Problem: Discharge Planning  Goal: Discharge to home or other facility with appropriate resources  9/2/2024 1012 by Janes Alvarez RN  Outcome: Progressing  9/1/2024 2335 by Ana Rosa Guzman RN  Outcome: Progressing     Problem: Safety - Adult  Goal: Free from fall injury  9/2/2024 1012 by Janes Alvarez RN  Outcome: Progressing  9/1/2024 2335 by Ana Rosa Guzman RN  Outcome: Progressing     Problem: Pain  Goal: Verbalizes/displays adequate comfort level or baseline comfort level  9/2/2024 1012 by Janes Alvarez RN  Outcome: Progressing  9/1/2024 2335 by Ana Rosa Guzman RN  Outcome: Progressing     Problem: Skin/Tissue Integrity  Goal: Absence of new skin breakdown  Description: 1.  Monitor for areas of redness and/or skin breakdown  2.  Assess vascular access sites hourly  3.  Every 4-6 hours minimum:  Change oxygen saturation probe site  4.  Every 4-6 hours:  If on nasal continuous positive airway pressure, respiratory therapy assess nares and determine need for appliance change or resting period.  9/2/2024 1012 by Janes Alvarez RN  Outcome: Progressing  9/1/2024 2335 by Ana Rosa Guzman RN  Outcome: Progressing     Problem: ABCDS Injury Assessment  Goal: Absence of physical injury  9/2/2024 1012 by Janes Alvarez RN  Outcome: Progressing  9/1/2024 2335 by Ana Rosa Guzman RN  Outcome: Progressing     Problem: Neurosensory - Adult  Goal: Achieves stable or improved neurological status  9/2/2024 1012 by Janes Alvarez RN  Outcome: Progressing  9/1/2024 2335 by Ana Rosa Guzman RN  Outcome: Progressing  Goal: Absence of seizures  9/2/2024 1012 by Janes Alvarez RN  Outcome: Progressing  9/1/2024 2335 by Ana Rosa Guzman RN  Outcome: Progressing  Goal: Remains free of injury related to seizures activity  9/2/2024 1012 by Janes Alvarez RN  Outcome: Progressing  9/1/2024 2335 by Ana Rosa Guzman RN  Outcome: Progressing  Goal: Achieves maximal functionality and self care  9/2/2024 1012 by Janes Alvarez RN  Outcome:

## 2024-09-03 ENCOUNTER — APPOINTMENT (OUTPATIENT)
Dept: GENERAL RADIOLOGY | Age: 79
End: 2024-09-03
Attending: TRANSPLANT SURGERY
Payer: MEDICARE

## 2024-09-03 ENCOUNTER — ANESTHESIA (OUTPATIENT)
Dept: ENDOSCOPY | Age: 79
End: 2024-09-03
Payer: MEDICARE

## 2024-09-03 LAB
ALBUMIN SERPL-MCNC: 2.3 G/DL (ref 3.5–5.2)
ALP SERPL-CCNC: 235 U/L (ref 40–129)
ALT SERPL-CCNC: 34 U/L (ref 0–40)
ANION GAP SERPL CALCULATED.3IONS-SCNC: 12 MMOL/L (ref 7–16)
AST SERPL-CCNC: 34 U/L (ref 0–39)
BASOPHILS # BLD: 0.02 K/UL (ref 0–0.2)
BASOPHILS NFR BLD: 0 % (ref 0–2)
BILIRUB SERPL-MCNC: 2.4 MG/DL (ref 0–1.2)
BUN SERPL-MCNC: 16 MG/DL (ref 6–23)
CA-I BLD-SCNC: 1.14 MMOL/L (ref 1.15–1.33)
CALCIUM SERPL-MCNC: 7.9 MG/DL (ref 8.6–10.2)
CHLORIDE SERPL-SCNC: 103 MMOL/L (ref 98–107)
CO2 SERPL-SCNC: 24 MMOL/L (ref 22–29)
CREAT SERPL-MCNC: 0.6 MG/DL (ref 0.7–1.2)
EOSINOPHIL # BLD: 0.26 K/UL (ref 0.05–0.5)
EOSINOPHILS RELATIVE PERCENT: 3 % (ref 0–6)
ERYTHROCYTE [DISTWIDTH] IN BLOOD BY AUTOMATED COUNT: 15.5 % (ref 11.5–15)
GFR, ESTIMATED: >90 ML/MIN/1.73M2
GLUCOSE BLD-MCNC: 130 MG/DL (ref 74–99)
GLUCOSE BLD-MCNC: 146 MG/DL (ref 74–99)
GLUCOSE BLD-MCNC: 156 MG/DL (ref 74–99)
GLUCOSE BLD-MCNC: 158 MG/DL (ref 74–99)
GLUCOSE BLD-MCNC: 177 MG/DL (ref 74–99)
GLUCOSE BLD-MCNC: 196 MG/DL (ref 74–99)
GLUCOSE BLD-MCNC: 205 MG/DL (ref 74–99)
GLUCOSE BLD-MCNC: 92 MG/DL (ref 74–99)
GLUCOSE SERPL-MCNC: 132 MG/DL (ref 74–99)
HCT VFR BLD AUTO: 23.7 % (ref 37–54)
HGB BLD-MCNC: 7.3 G/DL (ref 12.5–16.5)
IMM GRANULOCYTES # BLD AUTO: 0.12 K/UL (ref 0–0.58)
IMM GRANULOCYTES NFR BLD: 2 % (ref 0–5)
LYMPHOCYTES NFR BLD: 0.69 K/UL (ref 1.5–4)
LYMPHOCYTES RELATIVE PERCENT: 9 % (ref 20–42)
MAGNESIUM SERPL-MCNC: 2 MG/DL (ref 1.6–2.6)
MCH RBC QN AUTO: 29.9 PG (ref 26–35)
MCHC RBC AUTO-ENTMCNC: 30.8 G/DL (ref 32–34.5)
MCV RBC AUTO: 97.1 FL (ref 80–99.9)
MONOCYTES NFR BLD: 1.03 K/UL (ref 0.1–0.95)
MONOCYTES NFR BLD: 13 % (ref 2–12)
NEUTROPHILS NFR BLD: 73 % (ref 43–80)
NEUTS SEG NFR BLD: 5.78 K/UL (ref 1.8–7.3)
PHOSPHATE SERPL-MCNC: 2.8 MG/DL (ref 2.5–4.5)
PLATELET # BLD AUTO: 351 K/UL (ref 130–450)
PMV BLD AUTO: 10.5 FL (ref 7–12)
POTASSIUM SERPL-SCNC: 3.7 MMOL/L (ref 3.5–5)
PROT SERPL-MCNC: 5.7 G/DL (ref 6.4–8.3)
RBC # BLD AUTO: 2.44 M/UL (ref 3.8–5.8)
SODIUM SERPL-SCNC: 139 MMOL/L (ref 132–146)
WBC OTHER # BLD: 7.9 K/UL (ref 4.5–11.5)

## 2024-09-03 PROCEDURE — 97530 THERAPEUTIC ACTIVITIES: CPT

## 2024-09-03 PROCEDURE — 99231 SBSQ HOSP IP/OBS SF/LOW 25: CPT | Performed by: INTERNAL MEDICINE

## 2024-09-03 PROCEDURE — 2580000003 HC RX 258

## 2024-09-03 PROCEDURE — 2580000003 HC RX 258: Performed by: STUDENT IN AN ORGANIZED HEALTH CARE EDUCATION/TRAINING PROGRAM

## 2024-09-03 PROCEDURE — 6370000000 HC RX 637 (ALT 250 FOR IP): Performed by: STUDENT IN AN ORGANIZED HEALTH CARE EDUCATION/TRAINING PROGRAM

## 2024-09-03 PROCEDURE — 99222 1ST HOSP IP/OBS MODERATE 55: CPT | Performed by: STUDENT IN AN ORGANIZED HEALTH CARE EDUCATION/TRAINING PROGRAM

## 2024-09-03 PROCEDURE — 2580000003 HC RX 258: Performed by: TRANSPLANT SURGERY

## 2024-09-03 PROCEDURE — 6360000002 HC RX W HCPCS: Performed by: TRANSPLANT SURGERY

## 2024-09-03 PROCEDURE — 84100 ASSAY OF PHOSPHORUS: CPT

## 2024-09-03 PROCEDURE — 97535 SELF CARE MNGMENT TRAINING: CPT

## 2024-09-03 PROCEDURE — 2500000003 HC RX 250 WO HCPCS

## 2024-09-03 PROCEDURE — 80053 COMPREHEN METABOLIC PANEL: CPT

## 2024-09-03 PROCEDURE — 6370000000 HC RX 637 (ALT 250 FOR IP)

## 2024-09-03 PROCEDURE — 82962 GLUCOSE BLOOD TEST: CPT

## 2024-09-03 PROCEDURE — 6360000002 HC RX W HCPCS

## 2024-09-03 PROCEDURE — 74018 RADEX ABDOMEN 1 VIEW: CPT

## 2024-09-03 PROCEDURE — 85025 COMPLETE CBC W/AUTO DIFF WBC: CPT

## 2024-09-03 PROCEDURE — 2140000000 HC CCU INTERMEDIATE R&B

## 2024-09-03 PROCEDURE — 82330 ASSAY OF CALCIUM: CPT

## 2024-09-03 PROCEDURE — 83735 ASSAY OF MAGNESIUM: CPT

## 2024-09-03 RX ORDER — INSULIN LISPRO 100 [IU]/ML
0-16 INJECTION, SOLUTION INTRAVENOUS; SUBCUTANEOUS EVERY 4 HOURS
Status: DISCONTINUED | OUTPATIENT
Start: 2024-09-03 | End: 2024-09-06 | Stop reason: SDUPTHER

## 2024-09-03 RX ORDER — POTASSIUM CHLORIDE 7.45 MG/ML
10 INJECTION INTRAVENOUS
Status: COMPLETED | OUTPATIENT
Start: 2024-09-03 | End: 2024-09-03

## 2024-09-03 RX ORDER — INSULIN GLARGINE 100 [IU]/ML
19 INJECTION, SOLUTION SUBCUTANEOUS ONCE
Status: COMPLETED | OUTPATIENT
Start: 2024-09-04 | End: 2024-09-03

## 2024-09-03 RX ORDER — INSULIN GLARGINE 100 [IU]/ML
19 INJECTION, SOLUTION SUBCUTANEOUS NIGHTLY
Status: DISCONTINUED | OUTPATIENT
Start: 2024-09-04 | End: 2024-09-03

## 2024-09-03 RX ADMIN — ENOXAPARIN SODIUM 30 MG: 100 INJECTION SUBCUTANEOUS at 09:55

## 2024-09-03 RX ADMIN — SODIUM CHLORIDE, PRESERVATIVE FREE 10 ML: 5 INJECTION INTRAVENOUS at 22:58

## 2024-09-03 RX ADMIN — METOCLOPRAMIDE 10 MG: 5 INJECTION, SOLUTION INTRAMUSCULAR; INTRAVENOUS at 13:53

## 2024-09-03 RX ADMIN — ENOXAPARIN SODIUM 30 MG: 100 INJECTION SUBCUTANEOUS at 22:58

## 2024-09-03 RX ADMIN — INSULIN GLARGINE 19 UNITS: 100 INJECTION, SOLUTION SUBCUTANEOUS at 23:59

## 2024-09-03 RX ADMIN — HYDROMORPHONE HYDROCHLORIDE 0.5 MG: 1 INJECTION, SOLUTION INTRAMUSCULAR; INTRAVENOUS; SUBCUTANEOUS at 00:53

## 2024-09-03 RX ADMIN — SODIUM CHLORIDE, PRESERVATIVE FREE 10 ML: 5 INJECTION INTRAVENOUS at 09:56

## 2024-09-03 RX ADMIN — SODIUM PHOSPHATE, MONOBASIC, MONOHYDRATE AND SODIUM PHOSPHATE, DIBASIC, ANHYDROUS 30 MMOL: 142; 276 INJECTION, SOLUTION INTRAVENOUS at 10:48

## 2024-09-03 RX ADMIN — POTASSIUM CHLORIDE 10 MEQ: 7.46 INJECTION, SOLUTION INTRAVENOUS at 10:06

## 2024-09-03 RX ADMIN — BISACODYL 10 MG: 10 SUPPOSITORY RECTAL at 09:55

## 2024-09-03 RX ADMIN — METOCLOPRAMIDE 10 MG: 5 INJECTION, SOLUTION INTRAMUSCULAR; INTRAVENOUS at 00:53

## 2024-09-03 RX ADMIN — POTASSIUM CHLORIDE 10 MEQ: 7.46 INJECTION, SOLUTION INTRAVENOUS at 11:33

## 2024-09-03 RX ADMIN — METOCLOPRAMIDE 10 MG: 5 INJECTION, SOLUTION INTRAMUSCULAR; INTRAVENOUS at 06:03

## 2024-09-03 RX ADMIN — SODIUM CHLORIDE, PRESERVATIVE FREE 10 ML: 5 INJECTION INTRAVENOUS at 09:50

## 2024-09-03 RX ADMIN — INSULIN LISPRO 4 UNITS: 100 INJECTION, SOLUTION INTRAVENOUS; SUBCUTANEOUS at 01:06

## 2024-09-03 RX ADMIN — METOCLOPRAMIDE 10 MG: 5 INJECTION, SOLUTION INTRAMUSCULAR; INTRAVENOUS at 18:52

## 2024-09-03 RX ADMIN — POTASSIUM CHLORIDE 10 MEQ: 7.46 INJECTION, SOLUTION INTRAVENOUS at 09:00

## 2024-09-03 ASSESSMENT — PAIN DESCRIPTION - ORIENTATION: ORIENTATION: LOWER;INNER

## 2024-09-03 ASSESSMENT — PAIN - FUNCTIONAL ASSESSMENT: PAIN_FUNCTIONAL_ASSESSMENT: ACTIVITIES ARE NOT PREVENTED

## 2024-09-03 ASSESSMENT — PAIN DESCRIPTION - LOCATION: LOCATION: ABDOMEN

## 2024-09-03 ASSESSMENT — PAIN DESCRIPTION - DESCRIPTORS: DESCRIPTORS: SORE;ACHING;DISCOMFORT

## 2024-09-03 ASSESSMENT — PAIN SCALES - GENERAL
PAINLEVEL_OUTOF10: 2
PAINLEVEL_OUTOF10: 6

## 2024-09-03 NOTE — PROGRESS NOTES
Comprehensive Nutrition Assessment    Type and Reason for Visit:  Reassess    Nutrition Recommendations/Plan:     Pending corpak replacement  Recommend peptide based formula for optimal GI tolerance d/t noted diarrhea/hyperglycemia on immune enhancing formula     Recommend: Peptide based (Vital AF 1.2) @ 65 ml/hr + 1 protein modular daily   Flush 150 ml water q 4 hrs   Will provide 1560 ml tv, 1872 kcals, 117 gm pro (1976 kcals, 143 gm pro w/ mod), 1265 ml free water, 2165 ml total fluids    Regimen meets ~90% estimated calories & 100% estimated protein needs   Monitor bili (trending up)      Malnutrition Assessment:  Malnutrition Status:  Moderate malnutrition (08/17/24 1118)    Context:  Acute Illness     Findings of the 6 clinical characteristics of malnutrition:  Energy Intake:  50% or less of estimated energy requirements for 5 or more days  Weight Loss:   (mild wt loss - 3.2% x 1 month)     Body Fat Loss:  Mild body fat loss Orbital   Muscle Mass Loss:  Mild muscle mass loss Temples (temporalis), Clavicles (pectoralis & deltoids)  Fluid Accumulation:  No significant fluid accumulation     Strength:  Not Performed    Nutrition Assessment:    Pt remains at risk now w/ corpak out after vomiting (pending replacement). TPN was d/c'd. Pt admit w/ abd pain /pancreatic adenocarcinoma w/ obstructive jaundice s/p whipple 8/14.  PMhx DM, prostate CA, deafness. Pt meets criteria for moderate malnutrition. Pt s/p EGD w/ corpak placement 8/29 & noted no appropriate window for PEG-J per gastroenterology. Noted plans to start chemo in the next few weeks. Will provide updated TF recs for optimal GI tolerance as pt noted w/ diarrhea/hyperglycemia on immune enhancing formula. Will monitor.    Nutrition Related Findings:    A&Ox4, deaf, active BS, emesis/corpak out, trace edema, -I/Os, jaundice (bili 2.4), hyperglycemia Wound Type: Surgical Incision       Current Nutrition Intake & Therapies:    Average Meal Intake:

## 2024-09-03 NOTE — PLAN OF CARE
CorePak removed during vomiting episode.    Problem: Gastrointestinal - Adult  Goal: Minimal or absence of nausea and vomiting  Outcome: Not Progressing     Problem: Nutrition Deficit:  Goal: Optimize nutritional status  Outcome: Not Progressing          Problem: Discharge Planning  Goal: Discharge to home or other facility with appropriate resources  Outcome: Progressing     Problem: Safety - Adult  Goal: Free from fall injury  Outcome: Progressing     Problem: Pain  Goal: Verbalizes/displays adequate comfort level or baseline comfort level  Outcome: Progressing     Problem: Skin/Tissue Integrity  Goal: Absence of new skin breakdown  Description: 1.  Monitor for areas of redness and/or skin breakdown  2.  Assess vascular access sites hourly  3.  Every 4-6 hours minimum:  Change oxygen saturation probe site  4.  Every 4-6 hours:  If on nasal continuous positive airway pressure, respiratory therapy assess nares and determine need for appliance change or resting period.  Outcome: Progressing     Problem: ABCDS Injury Assessment  Goal: Absence of physical injury  Outcome: Progressing     Problem: Neurosensory - Adult  Goal: Achieves stable or improved neurological status  Outcome: Progressing  Goal: Absence of seizures  Outcome: Progressing  Goal: Remains free of injury related to seizures activity  Outcome: Progressing  Goal: Achieves maximal functionality and self care  Outcome: Progressing     Problem: Respiratory - Adult  Goal: Achieves optimal ventilation and oxygenation  Outcome: Progressing     Problem: Cardiovascular - Adult  Goal: Maintains optimal cardiac output and hemodynamic stability  Outcome: Progressing  Goal: Absence of cardiac dysrhythmias or at baseline  Outcome: Progressing     Problem: Skin/Tissue Integrity - Adult  Goal: Skin integrity remains intact  Outcome: Progressing  Goal: Incisions, wounds, or drain sites healing without S/S of infection  Outcome: Progressing     Problem: Musculoskeletal -

## 2024-09-03 NOTE — PROGRESS NOTES
Occupational Therapy  OCCUPATIONAL THERAPY TREATMENT NOTE    [facility]  OT BEDSIDE TREATMENT NOTE      Date:9/3/2024  Patient Name: Michael Garcia  MRN: [id number]  : 1945  Room: [address] [place] [address]A       Per OT Eval:    Evaluating OT: Prabhu Dia OTR/L; [id number]      Referring Provider: Demetrius Santoyo III, MD    Specific Provider Orders/Date: OT eval and treat (08/15/24 1200 )     Diagnosis: Adenocarcinoma of pancreas (HCC) [C25.9]  Adenocarcinoma of head of pancreas (HCC) [C25.0]      Surgery/Procedures:   24  1.  Pancreaticoduodenectomy with placement of an 8f and 5F biliary stent   2.  omental pedicle flap  3.  Placement of SHARON drains  2  4.  Extended lymphadenectomy  5.  Intraoperative ultrasound for anatomy   6.  Portal vein reconstruction  7.  Removal of PTHC      Pertinent Medical History:    Past Medical History        Past Medical History:   Diagnosis Date    Deaf, bilateral      Diabetes (HCC)      Hypertension      Osteoarthritis      Primary osteoarthritis of left knee 10/18/2017    Prostate cancer (HCC) 2024            *Precautions:  Fall Risk, +alarms, abdominal precautions, deaf - ASL (able to read lips),    Session ID: [id number]      Assessment of current deficits   [x] Functional mobility          [x]ADLs           [x] Strength                  []Cognition   [x] Functional transfers        [x] IADLs         [x] Safety Awareness   [x]Endurance   [x] Fine Coordination           [x] ROM           [] Vision/perception    []Sensation     []Gross Motor Coordination [x] Balance    [] Delirium                  []Motor Control     [] Communication     OT PLAN OF CARE   OT POC based on physician orders, patient diagnosis and results of clinical assessment.        Frequency/Duration: 1-3 days/wk for 1-2 weeks PRN    Specific OT Treatment Interventions to include:   * Instruction/training on adapted ADL techniques and AE recommendations to increase functional    UE Assessment:  Hand Dominance: Right [x]  Left []       ROM Strength STM goal: PRN   RUE  WFL 4/5  : Fair  FMC: Fair  GMC: Fair Improve overall RUE strength WFL for participation in functional tasks         LUE WFL 4/5  : Fair  FMC: Fair  GMC: Fair Improve overall LUE strength WFL for participation in functional tasks                  Comments/Treatment/Education: Upon arrival, pt supine in bed, agreeable to therapy, speaking with nursing okaying pt to be seen this session. Pt was educated on role of OT, goals to be reached, importance of OOB actvity, safety and hand placement with transfers, safety/balance and walker management with functional mobility and compensatory strategies to assist with ADL tasks. Rest  breaks provided throughout session as needed. At end of session, pt seated upright in recliner chair, all tubes and lines intact, call light within reach.     Pt has made fair progress towards set goals.   Continue with current plan of care      Treatment Time In: 12:55pm           Treatment Time Out: 1:20pm               Treatment Charges: Mins Units   Ther Ex  [id number]     Manual Therapy 53981     Thera Activities 23584 10 1   ADL/Home Mgt 20812 15 1   Neuro Re-ed 67026     Group Therapy      Orthotic manage/training  91745     Non-Billable Time     Total Timed Treatment 25 2       Marilyn Gleason RAIN/L [id number]

## 2024-09-03 NOTE — PROGRESS NOTES
[facility]  Internal Medicine Residency Program  Internal Medicine Consult- Progress Note    Patient:  Michael Garcia 79 y.o. male MRN: [id number]     Date of Service: 9/3/2024    Hospital Day: 21      Chief complaint: had no chief complaint listed for this encounter.    Reason for Consult: Diabetes management   Primacy Care Physician: Moshe Moran DO    Subjective     Overnight events: No acute events overnight    Patient seen and examined at bedside today.  Patient still does not have an NG tube in place.  Patient had no other complaints at this time.        Objective     Physical Exam:  Vitals: /68   Pulse (!) 105   Temp (!) 96.3 °F (35.7 °C) (Temporal)   Resp 20   Ht 1.88 m (6' 2\")   Wt 99.4 kg (219 lb 2.2 oz)   SpO2 93%   BMI 28.14 kg/m²     I & O - 24hr:   Intake/Output Summary (Last 24 hours) at 9/3/2024 0904  Last data filed at 9/3/2024 0038  Gross per 24 hour   Intake 982 ml   Output 875 ml   Net 107 ml      General Appearance: alert, appears stated age, and cooperative sitting in bed on room air  HEENT:  Head: Normal, normocephalic, atraumatic.  Neck: no JVD and supple, symmetrical, trachea midline  Lung: clear to auscultation bilaterally   Heart: regular rate and rhythm  Abdomen: soft, non-tender; bowel sounds normal; no masses,  no organomegaly  Extremities:   Trace lower extremity edema  Musculokeletal: No joint swelling, no muscle tenderness. ROM normal in all joints of extremities.   Neurologic: Mental status: Alert, oriented, thought content appropriate    Subject  Pertinent Information & Imaging Studies, Consults   tasneem  CBC with Differential:    Lab Results   Component Value Date/Time    WBC 7.9 09/03/2024 06:53 AM    RBC 2.44 09/03/2024 06:53 AM    HGB 7.3 09/03/2024 06:53 AM    HCT 23.7 09/03/2024 06:53 AM     09/03/2024 06:53 AM    MCV 97.1 09/03/2024 06:53 AM    MCH 29.9 09/03/2024 06:53 AM    MCHC 30.8 09/03/2024 06:53 AM    RDW 15.5 09/03/2024

## 2024-09-03 NOTE — PATIENT CARE CONFERENCE
P Quality Flow/Interdisciplinary Rounds Progress Note        Quality Flow Rounds held on September 3, 2024    Disciplines Attending:  Bedside Nurse, , , and Nursing Unit Leadership    Michael Garcia was admitted on 8/14/2024  5:28 AM    Anticipated Discharge Date:       Disposition:    Mike Score:  Mike Scale Score: 19    Readmission Risk              Risk of Unplanned Readmission:  37           Discussed patient goal for the day, patient clinical progression, and barriers to discharge.  The following Goal(s) of the Day/Commitment(s) have been identified:  downgrade/discharge plan      Adriana Sandoval RN  September 3, 2024

## 2024-09-03 NOTE — PROGRESS NOTES
Nurse's aide found patient covered in orange gastric contents and called for assistance. Found tip of CorPak outside of patient and followed tube to oral cavity. Nasal bridal was still in place at 85cm. Tube still wrapped around the back of throat. Patient stated that he vomited shortly after taking his oral medication. Stopped tube feed. He is not complaining of nausea or pain right now. No signs of aspiration. Contacted Dr. Cramer about CorePak being out of place. She said to remove tube but do not replace with NG tube.     Adilson Antonio RN

## 2024-09-03 NOTE — PROGRESS NOTES
Attempted small bowel feeding tube.  The tube was placed to the GE junction. The pt then had emesis of a tan/brown stool like consistency approx 750 cc. The procedure was stopped. RN called to room. The pt. Is in no distress at this time. Respirations easy. Comfort measures given.

## 2024-09-03 NOTE — HOME CARE
MEDICARE WILL ONLY COVERAGE STANDARD FORMULA AND BOLUS FEEDS FOR KELSEY LUCY.      PER [facility]     GUSTAVO CUNNINGHAM LPN   [facility]   [phone]

## 2024-09-03 NOTE — PROGRESS NOTES
Physical Therapy  Treatment Note    Name: Michael Garcia  : 1945  MRN: [id number]      Date of Service: 9/3/2024    Evaluating PT:  Gino Camejo, PT, DPT [id number]    Room #:  6515/6515-A  Diagnosis:  Adenocarcinoma of pancreas (HCC) [C25.9]  Adenocarcinoma of head of pancreas (HCC) [C25.0]  PMHx/PSHx:    Past Medical History:   Diagnosis Date    Deaf, bilateral     Diabetes (HCC)     Hypertension     Osteoarthritis     Primary osteoarthritis of left knee 10/18/2017    Prostate cancer (HCC) 2024     Procedure/Surgery:   whipple  Precautions:  Falls, Deaf - ASL interpretor,  , Equipment Needs: TBD     SUBJECTIVE:    Pt lives with girlfriend in a 2 story home, 1st floor setup, with 3 step(s) to enter and 1 rail(s).      Pt ambulated without device and was independent PTA.    OBJECTIVE:   Initial Evaluation  Date: 8/15/24 Treatment Date: 24 Short Term/ Long Term   Goals   AM-PAC 6 Clicks 11/24 15/24    Was pt agreeable to Eval/treatment? Yes Yes    Does pt have pain? 5/10 abdominal pain No complaints of pain    Bed Mobility  Rolling: NT  Supine to sit: ModA with HOB elevated  Sit to supine: NT  Scooting: MaxA Rolling: NT  Supine to sit: ModA  Sit to supine: NT  Scooting: NT Mod Independent    Transfers Sit to stand: ModA  Stand to sit: ModA  Stand pivot: ModA no device Sit to stand: Garland  Stand to sit: Garland  Stand pivot: Garland with FWW Mod Independent with AAD   Ambulation   A few steps to chair with ModA no device 10 feet with FWW with Garland (performed 2x)    60 feet with FWW with Garland >400 feet with Mod Independent with AAD   Stair negotiation: ascended and descended NT NT >4 steps with 1 rail Mod Independent   ROM BUE:  Defer to OT note  BLE:  WFL NT    Strength BUE:  Defer to OT note  BLE:  4/5 NT Increase by 1/3 MMT grade   Balance Sitting EOB:  Garland   Dynamic Standing:  ModA no device Sitting EOB: CGA  Dynamic Standing: Garland with FWW Sitting EOB:  Independent  Dynamic Standing:  Mod

## 2024-09-03 NOTE — PLAN OF CARE
Problem: Discharge Planning  Goal: Discharge to home or other facility with appropriate resources  9/3/2024 0747 by Candi Almendarez RN  Outcome: Progressing     Problem: Safety - Adult  Goal: Free from fall injury  9/3/2024 0747 by Candi Almendarez RN  Outcome: Progressing     Problem: Pain  Goal: Verbalizes/displays adequate comfort level or baseline comfort level  9/3/2024 0747 by Candi Almendarez RN  Outcome: Progressing     Problem: Skin/Tissue Integrity  Goal: Absence of new skin breakdown  Description: 1.  Monitor for areas of redness and/or skin breakdown  2.  Assess vascular access sites hourly  3.  Every 4-6 hours minimum:  Change oxygen saturation probe site  4.  Every 4-6 hours:  If on nasal continuous positive airway pressure, respiratory therapy assess nares and determine need for appliance change or resting period.  9/3/2024 0747 by Candi Almendarez RN  Outcome: Progressing     Problem: ABCDS Injury Assessment  Goal: Absence of physical injury  9/3/2024 0747 by Candi Almendarez, RN  Outcome: Progressing

## 2024-09-03 NOTE — PROGRESS NOTES
Attempted placement of Corpack at bedside under x-ray guidance.Two separate attempts were made in which the tube was attempted to be advanced  past the GJ junction.  Given the small diameter of efferent limb in this pt s/p whipple we were unsuccessful in its placement. Both attempts resulted in the curving of the Corpack within the stomach. Pt tolerated the procedure well with minimum discomfort or distress. Will consult GI for endoscopic placement of Corpack.     Electronically signed by Dedrick Larry DO on 9/3/2024 at 5:41 PM

## 2024-09-03 NOTE — PROGRESS NOTES
Was paged that patient's corpak was displaced and coming out of patient's mouth. Unsure of how this happened. Nursing staff also denies knowledge of how this could have happened.    Since corpak has been dislodged, advised nursing to removed and NOT PLACE A NEW NG TUBE.    Pt will need a new corpak placed in morning.   - NPO at this time    Electronically signed by Sylvia Cramer DO on 9/2/2024 at 10:43 PM

## 2024-09-03 NOTE — PROGRESS NOTES
CorePak removed by Gino PAREKH per orders by Dr. Cramer. Patient still denies any nausea or pain and does not seem to be in distress. Cleaned patient and chair. Placed patient in bed per patient's request.    Adilson Antonio RN

## 2024-09-03 NOTE — PROGRESS NOTES
[facility]  Internal Medicine Residency / House Medicine Service    Attending Physician Statement  I have discussed the case, including pertinent history and exam findings with the resident and the team.  I have seen and examined the patient and the key elements of the encounter have been performed by me.  I agree with the assessment, plan and orders as documented by the resident.      Hx of Whipple's  Now without core pack  Will need to be fed preferably orally  Pancreatic enzyme replacement per tube will be impossible   Elemental feeding may be very difficult  Consider taking enzymes po  Plan; Discuss with surgery            Follow BS carefully , now without feedings  Remainder of medical problems as per resident note.      Elliot Jones MD FRCP [facility]  Internal Medicine Residency Faculty

## 2024-09-03 NOTE — PROGRESS NOTES
HEPATOBILIARY AND PANCREATIC SURGERY  DAILY PROGRESS NOTE  9/3/2024        Subjective:  Somehow his feeding tube became dislodged. Still with low grade tachycardia.        Objective:    /68   Pulse (!) 105   Temp (!) 96.3 °F (35.7 °C) (Temporal)   Resp 20   Ht 1.88 m (6' 2\")   Wt 99.4 kg (219 lb 2.2 oz)   SpO2 93%   BMI 28.14 kg/m²     GENERAL:  Lying in bed. No acute distress   HEAD: No gross abnormalities   EYES: scleral icterus   LUNGS:  No increased work of breathing on 2L NC  CARDIOVASCULAR:  RR, normotensive   ABDOMEN:  Soft, non-tender,mild distension, R/L drain site stitched.  EXTREMITIES: mild LE edema  SKIN: Warm and dry    Assessment/Plan:  79 y.o. male with pancreatic head adenocarcinoma with duodenal obstruction s/p whipple procedure w portal vein reconstruction 8/14 - T3N2 (Stage III), delayed gastric emptying requiring parenteral nutrition s/p corepak placement 8/29    Plan  -vascular access team consulted for Corpack re-placement  - ambulate, OOB  - continue tube feeds, will need set up for home  - follow labs and replace electrolytes  - PT/OT  -I would rather see patient go home than to a facility as he will need to start chemotherapy in the next 2-3 weeks.    Electronically signed by Demetrius Santoyo MD on 9/3/2024 at 9:30 AM

## 2024-09-03 NOTE — CARE COORDINATION
Transition of care update: S/P whipple procedure with portal vein reconstruction on 08/14. Hgb 7.3 and other labs noted. Vascular access team consulted for Corpak re-placement. Plan is [facility]) when medically ready. Need documentation from physician regarding 30 day for Corpak. Last PT update on 08/30 am-pac 11/24 and ambulated 50ft with WW with Reuben. OT update on 08/30 am-pac 12/24. Asked for updated therapy notes. Also, [facility] following pt and they are aware pt has Corpak with tube feeds. Cm/sw will follow.     1115  Received a call from Dr Larry and he was asking about ordering tube feeds for home. Notified him of last therapy updates and pt/ot to work with pt today to see if there is an improvement in therapy scores.     1141  Updated Kim with [facility] on pt.       1215  Spoke with Colby with [facility] and they are unable to provide tube feed to pt. Referral was given to Marianne with  [facility] for tube feeds.     1545  PT updated from today am-pac 15/24 and 10 ft with FWW with Amanda and 60ft with FWW with Amanda. OT update from today am-pac 14/24. Attempted to meet with pt in room. Up in chair and asleep. Pt needs corpak re-inserted. Called pt's fiance, Razia, to further discuss dc planning. Razia said she will not be able to meet pt's needs at home and will not be able to stay with pt 24/7 due to her work schedule. She said there is no one else to stay with pt. She verbalized again \"absolutely under no circumstances can he (the pt) return home.\" Plan is [facility]. Messaged Dr Larry to update him.

## 2024-09-04 ENCOUNTER — ANESTHESIA EVENT (OUTPATIENT)
Dept: ENDOSCOPY | Age: 79
End: 2024-09-04
Payer: MEDICARE

## 2024-09-04 ENCOUNTER — APPOINTMENT (OUTPATIENT)
Dept: GENERAL RADIOLOGY | Age: 79
End: 2024-09-04
Attending: TRANSPLANT SURGERY
Payer: MEDICARE

## 2024-09-04 PROBLEM — E46 MALNUTRITION (HCC): Status: ACTIVE | Noted: 2024-08-05

## 2024-09-04 LAB
ALBUMIN SERPL-MCNC: 2.4 G/DL (ref 3.5–5.2)
ALBUMIN SERPL-MCNC: 2.5 G/DL (ref 3.5–5.2)
ALP SERPL-CCNC: 213 U/L (ref 40–129)
ALP SERPL-CCNC: 241 U/L (ref 40–129)
ALT SERPL-CCNC: 30 U/L (ref 0–40)
ALT SERPL-CCNC: 31 U/L (ref 0–40)
ANION GAP SERPL CALCULATED.3IONS-SCNC: 10 MMOL/L (ref 7–16)
ANION GAP SERPL CALCULATED.3IONS-SCNC: 12 MMOL/L (ref 7–16)
AST SERPL-CCNC: 39 U/L (ref 0–39)
AST SERPL-CCNC: 42 U/L (ref 0–39)
BASOPHILS # BLD: 0.04 K/UL (ref 0–0.2)
BASOPHILS NFR BLD: 0 % (ref 0–2)
BILIRUB DIRECT SERPL-MCNC: 1.5 MG/DL (ref 0–0.3)
BILIRUB INDIRECT SERPL-MCNC: 2.7 MG/DL (ref 0–1)
BILIRUB SERPL-MCNC: 3.9 MG/DL (ref 0–1.2)
BILIRUB SERPL-MCNC: 4.2 MG/DL (ref 0–1.2)
BUN SERPL-MCNC: 20 MG/DL (ref 6–23)
BUN SERPL-MCNC: 20 MG/DL (ref 6–23)
CA-I BLD-SCNC: 1.15 MMOL/L (ref 1.15–1.33)
CALCIUM SERPL-MCNC: 8.1 MG/DL (ref 8.6–10.2)
CALCIUM SERPL-MCNC: 8.1 MG/DL (ref 8.6–10.2)
CHLORIDE SERPL-SCNC: 105 MMOL/L (ref 98–107)
CHLORIDE SERPL-SCNC: 106 MMOL/L (ref 98–107)
CO2 SERPL-SCNC: 23 MMOL/L (ref 22–29)
CO2 SERPL-SCNC: 24 MMOL/L (ref 22–29)
CREAT SERPL-MCNC: 0.6 MG/DL (ref 0.7–1.2)
CREAT SERPL-MCNC: 0.6 MG/DL (ref 0.7–1.2)
EOSINOPHIL # BLD: 0.31 K/UL (ref 0.05–0.5)
EOSINOPHILS RELATIVE PERCENT: 3 % (ref 0–6)
ERYTHROCYTE [DISTWIDTH] IN BLOOD BY AUTOMATED COUNT: 17 % (ref 11.5–15)
GFR, ESTIMATED: >90 ML/MIN/1.73M2
GFR, ESTIMATED: >90 ML/MIN/1.73M2
GLUCOSE BLD-MCNC: 173 MG/DL (ref 74–99)
GLUCOSE BLD-MCNC: 179 MG/DL (ref 74–99)
GLUCOSE BLD-MCNC: 181 MG/DL (ref 74–99)
GLUCOSE BLD-MCNC: 201 MG/DL (ref 74–99)
GLUCOSE BLD-MCNC: 226 MG/DL (ref 74–99)
GLUCOSE BLD-MCNC: 229 MG/DL (ref 74–99)
GLUCOSE SERPL-MCNC: 175 MG/DL (ref 74–99)
GLUCOSE SERPL-MCNC: 178 MG/DL (ref 74–99)
HCT VFR BLD AUTO: 22 % (ref 37–54)
HCT VFR BLD AUTO: 25.3 % (ref 37–54)
HGB BLD-MCNC: 6.5 G/DL (ref 12.5–16.5)
HGB BLD-MCNC: 7.8 G/DL (ref 12.5–16.5)
IMM GRANULOCYTES # BLD AUTO: 0.35 K/UL (ref 0–0.58)
IMM GRANULOCYTES NFR BLD: 4 % (ref 0–5)
LYMPHOCYTES NFR BLD: 0.83 K/UL (ref 1.5–4)
LYMPHOCYTES RELATIVE PERCENT: 9 % (ref 20–42)
MAGNESIUM SERPL-MCNC: 1.9 MG/DL (ref 1.6–2.6)
MAGNESIUM SERPL-MCNC: 1.9 MG/DL (ref 1.6–2.6)
MCH RBC QN AUTO: 29.5 PG (ref 26–35)
MCHC RBC AUTO-ENTMCNC: 29.5 G/DL (ref 32–34.5)
MCV RBC AUTO: 100 FL (ref 80–99.9)
MONOCYTES NFR BLD: 0.9 K/UL (ref 0.1–0.95)
MONOCYTES NFR BLD: 10 % (ref 2–12)
NEUTROPHILS NFR BLD: 74 % (ref 43–80)
NEUTS SEG NFR BLD: 6.91 K/UL (ref 1.8–7.3)
PHOSPHATE SERPL-MCNC: 3.2 MG/DL (ref 2.5–4.5)
PHOSPHATE SERPL-MCNC: 3.2 MG/DL (ref 2.5–4.5)
PLATELET # BLD AUTO: 408 K/UL (ref 130–450)
PMV BLD AUTO: 10.2 FL (ref 7–12)
POTASSIUM SERPL-SCNC: 3.8 MMOL/L (ref 3.5–5)
POTASSIUM SERPL-SCNC: 3.9 MMOL/L (ref 3.5–5)
PROT SERPL-MCNC: 5.4 G/DL (ref 6.4–8.3)
PROT SERPL-MCNC: 5.7 G/DL (ref 6.4–8.3)
RBC # BLD AUTO: 2.2 M/UL (ref 3.8–5.8)
SODIUM SERPL-SCNC: 140 MMOL/L (ref 132–146)
SODIUM SERPL-SCNC: 140 MMOL/L (ref 132–146)
WBC OTHER # BLD: 9.3 K/UL (ref 4.5–11.5)

## 2024-09-04 PROCEDURE — 6360000002 HC RX W HCPCS: Performed by: TRANSPLANT SURGERY

## 2024-09-04 PROCEDURE — 6360000002 HC RX W HCPCS: Performed by: NURSE ANESTHETIST, CERTIFIED REGISTERED

## 2024-09-04 PROCEDURE — 82962 GLUCOSE BLOOD TEST: CPT

## 2024-09-04 PROCEDURE — 2140000000 HC CCU INTERMEDIATE R&B

## 2024-09-04 PROCEDURE — 80053 COMPREHEN METABOLIC PANEL: CPT

## 2024-09-04 PROCEDURE — 2700000000 HC OXYGEN THERAPY PER DAY

## 2024-09-04 PROCEDURE — 86900 BLOOD TYPING SEROLOGIC ABO: CPT

## 2024-09-04 PROCEDURE — 86901 BLOOD TYPING SEROLOGIC RH(D): CPT

## 2024-09-04 PROCEDURE — 2580000003 HC RX 258: Performed by: NURSE ANESTHETIST, CERTIFIED REGISTERED

## 2024-09-04 PROCEDURE — 82248 BILIRUBIN DIRECT: CPT

## 2024-09-04 PROCEDURE — 2709999900 HC NON-CHARGEABLE SUPPLY: Performed by: INTERNAL MEDICINE

## 2024-09-04 PROCEDURE — 3609013300 HC EGD TUBE PLACEMENT: Performed by: INTERNAL MEDICINE

## 2024-09-04 PROCEDURE — 74018 RADEX ABDOMEN 1 VIEW: CPT

## 2024-09-04 PROCEDURE — P9016 RBC LEUKOCYTES REDUCED: HCPCS

## 2024-09-04 PROCEDURE — 82330 ASSAY OF CALCIUM: CPT

## 2024-09-04 PROCEDURE — 7100000001 HC PACU RECOVERY - ADDTL 15 MIN: Performed by: INTERNAL MEDICINE

## 2024-09-04 PROCEDURE — 6370000000 HC RX 637 (ALT 250 FOR IP): Performed by: INTERNAL MEDICINE

## 2024-09-04 PROCEDURE — 3E0G76Z INTRODUCTION OF NUTRITIONAL SUBSTANCE INTO UPPER GI, VIA NATURAL OR ARTIFICIAL OPENING: ICD-10-PCS | Performed by: INTERNAL MEDICINE

## 2024-09-04 PROCEDURE — 6370000000 HC RX 637 (ALT 250 FOR IP): Performed by: STUDENT IN AN ORGANIZED HEALTH CARE EDUCATION/TRAINING PROGRAM

## 2024-09-04 PROCEDURE — 86850 RBC ANTIBODY SCREEN: CPT

## 2024-09-04 PROCEDURE — 85018 HEMOGLOBIN: CPT

## 2024-09-04 PROCEDURE — 3700000000 HC ANESTHESIA ATTENDED CARE: Performed by: INTERNAL MEDICINE

## 2024-09-04 PROCEDURE — 2580000003 HC RX 258: Performed by: TRANSPLANT SURGERY

## 2024-09-04 PROCEDURE — 85025 COMPLETE CBC W/AUTO DIFF WBC: CPT

## 2024-09-04 PROCEDURE — 2720000010 HC SURG SUPPLY STERILE: Performed by: INTERNAL MEDICINE

## 2024-09-04 PROCEDURE — 6370000000 HC RX 637 (ALT 250 FOR IP)

## 2024-09-04 PROCEDURE — 2580000003 HC RX 258: Performed by: STUDENT IN AN ORGANIZED HEALTH CARE EDUCATION/TRAINING PROGRAM

## 2024-09-04 PROCEDURE — 2500000003 HC RX 250 WO HCPCS: Performed by: NURSE ANESTHETIST, CERTIFIED REGISTERED

## 2024-09-04 PROCEDURE — 0DH68UZ INSERTION OF FEEDING DEVICE INTO STOMACH, VIA NATURAL OR ARTIFICIAL OPENING ENDOSCOPIC: ICD-10-PCS | Performed by: INTERNAL MEDICINE

## 2024-09-04 PROCEDURE — 2500000003 HC RX 250 WO HCPCS

## 2024-09-04 PROCEDURE — 85014 HEMATOCRIT: CPT

## 2024-09-04 PROCEDURE — 83735 ASSAY OF MAGNESIUM: CPT

## 2024-09-04 PROCEDURE — 6360000002 HC RX W HCPCS

## 2024-09-04 PROCEDURE — 84100 ASSAY OF PHOSPHORUS: CPT

## 2024-09-04 PROCEDURE — 86923 COMPATIBILITY TEST ELECTRIC: CPT

## 2024-09-04 PROCEDURE — 36430 TRANSFUSION BLD/BLD COMPNT: CPT

## 2024-09-04 PROCEDURE — 3700000001 HC ADD 15 MINUTES (ANESTHESIA): Performed by: INTERNAL MEDICINE

## 2024-09-04 PROCEDURE — 2580000003 HC RX 258

## 2024-09-04 PROCEDURE — 7100000000 HC PACU RECOVERY - FIRST 15 MIN: Performed by: INTERNAL MEDICINE

## 2024-09-04 PROCEDURE — 99222 1ST HOSP IP/OBS MODERATE 55: CPT | Performed by: NURSE PRACTITIONER

## 2024-09-04 DEVICE — CLIP
Type: IMPLANTABLE DEVICE | Status: FUNCTIONAL
Brand: MANTIS™ CLIP

## 2024-09-04 RX ORDER — SIMETHICONE 40MG/0.6ML
SUSPENSION, DROPS(FINAL DOSAGE FORM)(ML) ORAL PRN
Status: DISCONTINUED | OUTPATIENT
Start: 2024-09-04 | End: 2024-09-04 | Stop reason: ALTCHOICE

## 2024-09-04 RX ORDER — POTASSIUM CHLORIDE 7.45 MG/ML
10 INJECTION INTRAVENOUS
Status: ACTIVE | OUTPATIENT
Start: 2024-09-04 | End: 2024-09-04

## 2024-09-04 RX ORDER — PROPOFOL 10 MG/ML
INJECTION, EMULSION INTRAVENOUS PRN
Status: DISCONTINUED | OUTPATIENT
Start: 2024-09-04 | End: 2024-09-04 | Stop reason: SDUPTHER

## 2024-09-04 RX ORDER — SODIUM CHLORIDE 9 MG/ML
INJECTION, SOLUTION INTRAVENOUS PRN
Status: DISCONTINUED | OUTPATIENT
Start: 2024-09-04 | End: 2024-09-07 | Stop reason: HOSPADM

## 2024-09-04 RX ORDER — POTASSIUM CHLORIDE 7.45 MG/ML
10 INJECTION INTRAVENOUS
Status: COMPLETED | OUTPATIENT
Start: 2024-09-04 | End: 2024-09-04

## 2024-09-04 RX ORDER — LIDOCAINE HYDROCHLORIDE 10 MG/ML
INJECTION, SOLUTION EPIDURAL; INFILTRATION; INTRACAUDAL; PERINEURAL PRN
Status: DISCONTINUED | OUTPATIENT
Start: 2024-09-04 | End: 2024-09-04 | Stop reason: SDUPTHER

## 2024-09-04 RX ORDER — SODIUM CHLORIDE 9 MG/ML
INJECTION, SOLUTION INTRAVENOUS CONTINUOUS PRN
Status: DISCONTINUED | OUTPATIENT
Start: 2024-09-04 | End: 2024-09-04 | Stop reason: SDUPTHER

## 2024-09-04 RX ORDER — PHENYLEPHRINE HCL IN 0.9% NACL 1 MG/10 ML
SYRINGE (ML) INTRAVENOUS PRN
Status: DISCONTINUED | OUTPATIENT
Start: 2024-09-04 | End: 2024-09-04 | Stop reason: SDUPTHER

## 2024-09-04 RX ADMIN — SODIUM CHLORIDE, PRESERVATIVE FREE 10 ML: 5 INJECTION INTRAVENOUS at 21:51

## 2024-09-04 RX ADMIN — Medication 200 MCG: at 12:28

## 2024-09-04 RX ADMIN — Medication 5 MG: at 21:51

## 2024-09-04 RX ADMIN — Medication 100 MCG: at 12:21

## 2024-09-04 RX ADMIN — BISACODYL 10 MG: 10 SUPPOSITORY RECTAL at 10:51

## 2024-09-04 RX ADMIN — INSULIN GLARGINE 38 UNITS: 100 INJECTION, SOLUTION SUBCUTANEOUS at 21:52

## 2024-09-04 RX ADMIN — Medication 100 MCG: at 12:15

## 2024-09-04 RX ADMIN — ENOXAPARIN SODIUM 30 MG: 100 INJECTION SUBCUTANEOUS at 21:51

## 2024-09-04 RX ADMIN — Medication 100 MCG: at 12:14

## 2024-09-04 RX ADMIN — SODIUM CHLORIDE, PRESERVATIVE FREE 10 ML: 5 INJECTION INTRAVENOUS at 10:51

## 2024-09-04 RX ADMIN — METOCLOPRAMIDE 10 MG: 5 INJECTION, SOLUTION INTRAMUSCULAR; INTRAVENOUS at 03:39

## 2024-09-04 RX ADMIN — METOCLOPRAMIDE 10 MG: 5 INJECTION, SOLUTION INTRAMUSCULAR; INTRAVENOUS at 18:58

## 2024-09-04 RX ADMIN — SODIUM CHLORIDE, PRESERVATIVE FREE 10 ML: 5 INJECTION INTRAVENOUS at 21:52

## 2024-09-04 RX ADMIN — MONTELUKAST 10 MG: 10 TABLET, FILM COATED ORAL at 21:51

## 2024-09-04 RX ADMIN — INSULIN LISPRO 4 UNITS: 100 INJECTION, SOLUTION INTRAVENOUS; SUBCUTANEOUS at 14:18

## 2024-09-04 RX ADMIN — SODIUM CHLORIDE: 9 INJECTION, SOLUTION INTRAVENOUS at 12:03

## 2024-09-04 RX ADMIN — Medication 200 MCG: at 12:25

## 2024-09-04 RX ADMIN — SODIUM PHOSPHATE, MONOBASIC, MONOHYDRATE AND SODIUM PHOSPHATE, DIBASIC, ANHYDROUS 20 MMOL: 142; 276 INJECTION, SOLUTION INTRAVENOUS at 14:13

## 2024-09-04 RX ADMIN — POTASSIUM CHLORIDE 10 MEQ: 7.46 INJECTION, SOLUTION INTRAVENOUS at 17:15

## 2024-09-04 RX ADMIN — INSULIN LISPRO 4 UNITS: 100 INJECTION, SOLUTION INTRAVENOUS; SUBCUTANEOUS at 21:52

## 2024-09-04 RX ADMIN — POTASSIUM CHLORIDE 10 MEQ: 7.46 INJECTION, SOLUTION INTRAVENOUS at 18:59

## 2024-09-04 RX ADMIN — PROPOFOL 450 MG: 10 INJECTION, EMULSION INTRAVENOUS at 12:05

## 2024-09-04 RX ADMIN — Medication 200 MCG: at 12:31

## 2024-09-04 RX ADMIN — ATORVASTATIN CALCIUM 20 MG: 20 TABLET, FILM COATED ORAL at 21:51

## 2024-09-04 RX ADMIN — METOCLOPRAMIDE 10 MG: 5 INJECTION, SOLUTION INTRAMUSCULAR; INTRAVENOUS at 14:06

## 2024-09-04 RX ADMIN — METOCLOPRAMIDE 10 MG: 5 INJECTION, SOLUTION INTRAMUSCULAR; INTRAVENOUS at 05:33

## 2024-09-04 RX ADMIN — LIDOCAINE HYDROCHLORIDE 100 MG: 10 INJECTION, SOLUTION EPIDURAL; INFILTRATION; INTRACAUDAL; PERINEURAL at 12:05

## 2024-09-04 ASSESSMENT — LIFESTYLE VARIABLES: SMOKING_STATUS: 0

## 2024-09-04 ASSESSMENT — ENCOUNTER SYMPTOMS: SHORTNESS OF BREATH: 1

## 2024-09-04 ASSESSMENT — PAIN - FUNCTIONAL ASSESSMENT: PAIN_FUNCTIONAL_ASSESSMENT: NONE - DENIES PAIN

## 2024-09-04 NOTE — PROGRESS NOTES
Sent message to Dr. Cramer regarding order for 38 units of Lantus but patient is NPO and not on Tube Feed. His last BG was 196.    Adilson Antonio RN

## 2024-09-04 NOTE — ANESTHESIA PRE PROCEDURE
Department of Anesthesiology  Preprocedure Note       Name:  Michael Garcia   Age:  79 y.o.  :  1945                                          MRN:  [id number]         Date:  2024      Surgeon: Surgeon(s):  Demetrius Carlos DO    Procedure: Procedure(s):  ESOPHAGOGASTRODUODENOSCOPY    Medications prior to admission:   Prior to Admission medications    Medication Sig Start Date End Date Taking? Authorizing Provider   sennosides-docusate sodium (SENOKOT-S) 8.6-50 MG tablet Take 2 tablets by mouth daily for 21 days 24 Yes Demetrius Santoyo III, MD   lipase-protease-amylase (CREON) 59208-966303 units CPEP delayed release capsule Take 2 capsules by mouth 3 times daily (with meals) 24  Yes Demetrius Santoyo III, MD   metoclopramide (REGLAN) 5 MG tablet Take 1 tablet by mouth 3 times daily 24 Yes Demetrius Santoyo III, MD   pantoprazole (PROTONIX) 40 MG tablet Take 1 tablet by mouth 2 times daily 24 Yes Demetrius Santoyo III, MD   metoprolol succinate (TOPROL XL) 25 MG extended release tablet TAKE 1 TABLET BY MOUTH DAILY 1/3/24   Elana Michael MD   montelukast (SINGULAIR) 10 MG tablet Take 1 tablet by mouth nightly    Elliott Thakkar MD   empagliflozin (JARDIANCE) 25 MG tablet Take 1 tablet by mouth daily    Elliott Thakkar MD   cetirizine (ZYRTEC) 10 MG tablet Take 1 tablet by mouth daily    Elliott Thakkar MD   tamsulosin (FLOMAX) 0.4 MG capsule Take 1 capsule by mouth daily Pt taking 2 capsules at 0.4mg in the morning.    Elliott Thakkar MD   glipiZIDE (GLUCOTROL) 5 MG tablet Take 3 tablets by mouth daily Pt taking 10mg twice a day.    Elliott Thakkar MD   finasteride (PROSCAR) 5 MG tablet Take 1 tablet by mouth daily    Elliott Thakkar MD   valsartan-hydrochlorothiazide (DIOVAN-HCT) 80-12.5 MG per tablet  16   Elliott Thakkar MD   metFORMIN (GLUCOPHAGE) 500 MG tablet  16

## 2024-09-04 NOTE — PLAN OF CARE
NPO and not on tube feed.  Problem: Discharge Planning  Goal: Discharge to home or other facility with appropriate resources  Outcome: Progressing     Problem: Safety - Adult  Goal: Free from fall injury  Outcome: Progressing     Problem: Pain  Goal: Verbalizes/displays adequate comfort level or baseline comfort level  Outcome: Progressing     Problem: Skin/Tissue Integrity  Goal: Absence of new skin breakdown  Description: 1.  Monitor for areas of redness and/or skin breakdown  2.  Assess vascular access sites hourly  3.  Every 4-6 hours minimum:  Change oxygen saturation probe site  4.  Every 4-6 hours:  If on nasal continuous positive airway pressure, respiratory therapy assess nares and determine need for appliance change or resting period.  Outcome: Progressing     Problem: ABCDS Injury Assessment  Goal: Absence of physical injury  Outcome: Progressing     Problem: Neurosensory - Adult  Goal: Achieves stable or improved neurological status  Outcome: Progressing  Goal: Absence of seizures  Outcome: Progressing  Goal: Remains free of injury related to seizures activity  Outcome: Progressing  Goal: Achieves maximal functionality and self care  Outcome: Progressing     Problem: Respiratory - Adult  Goal: Achieves optimal ventilation and oxygenation  Outcome: Progressing     Problem: Cardiovascular - Adult  Goal: Maintains optimal cardiac output and hemodynamic stability  Outcome: Progressing  Goal: Absence of cardiac dysrhythmias or at baseline  Outcome: Progressing     Problem: Skin/Tissue Integrity - Adult  Goal: Skin integrity remains intact  Outcome: Progressing  Goal: Incisions, wounds, or drain sites healing without S/S of infection  Outcome: Progressing     Problem: Musculoskeletal - Adult  Goal: Return mobility to safest level of function  Outcome: Progressing     Problem: Gastrointestinal - Adult  Goal: Minimal or absence of nausea and vomiting  Outcome: Progressing  Goal: Maintains or returns to baseline

## 2024-09-04 NOTE — PROGRESS NOTES
HEPATOBILIARY AND PANCREATIC SURGERY  DAILY PROGRESS NOTE  9/4/2024        Subjective:  Resting comfortably. On room air. Transfused one unit this AM to maintain Hb above 7.0.  HR in 100s. Had one bowel movement yesterday. Plan for Corpack placement today.    Objective:    BP 98/61   Pulse (!) 116   Temp 98.4 °F (36.9 °C) (Temporal)   Resp 14   Ht 1.88 m (6' 2\")   Wt 98.7 kg (217 lb 9.5 oz)   SpO2 93%   BMI 27.94 kg/m²     GENERAL:  Lying in bed. No acute distress   HEAD: No gross abnormalities   EYES: scleral icterus   LUNGS:  No increased work of breathing on 2L NC  CARDIOVASCULAR:  RR, normotensive   ABDOMEN:  Soft, non-tender,mild distension, R/L drain site stitched.  EXTREMITIES: mild LE edema  SKIN: Warm and dry    Assessment/Plan:  79 y.o. male with pancreatic head adenocarcinoma with duodenal obstruction s/p whipple procedure w portal vein reconstruction 8/14 - T3N2 (Stage III), delayed gastric emptying requiring parenteral nutrition s/p corepak placement 8/29    Plan  -plan for Corpack w/ bridle placement with  today  - ambulate, OOB  - can resume tube feeds after Corpack placement  - follow labs and replace electrolytes  - PT/OT  -Placement vs. Home w/ HHC, will discuss w/ case management  -Plan for outpatient chemo to be set up    Electronically signed by Dedrick Larry DO on 9/4/2024 at 8:27 AM

## 2024-09-04 NOTE — CONSULTS
Gastroenterology, Hepatology, &  Advanced Endoscopy    Consult Note      Reason for Consult: Placement of Corpak under endoscopic guidance.    HPI:   Michael Garcia is a 79 y.o. male w/ PMH of  has a past medical history of Deaf, bilateral, Diabetes (HCC), Hypertension, Osteoarthritis, Primary osteoarthritis of left knee, and Prostate cancer (HCC). who presents to the ED with pancreatic head adenocarcinoma with duodenal obstruction s/p whipple procedure w portal vein reconstruction 8/14 - T3N2 (Stage III), delayed gastric emptying requiring parenteral nutrition s/p corepak placement 8/29/24.      Endoscopic nasoduodenal tube placement with Dr. Carlos 8/29/24.  -Endoscope was advanced easily through the mouth to both afferent and efferent limbs of the small bowel. A 10F corpak was advanced into the efferent limb of small bowel with assistance of the endoscope and rat tooth forceps. The corpak was then secured using a nasal bridge/bridel device.   - Whipple anatomy, no appreciable window of PEG-J placement  - Placement of Corpak secured by Bridle     - Attempted by General Surgery of placement of Corpack at bedside under x-ray guidance.Two separate attempts were made in which the tube was attempted to be advanced past the GJ junction. Given the small diameter of efferent limb in this pt s/p whipple they were unsuccessful in its placement. Both attempts resulted in the curving of the Corpack within the stomach.    GI consulted for endoscopic placement of Corpack.       Recent Labs     09/02/24  0530 09/02/24  0550 09/03/24  0653 09/04/24  0530   ALT 31 31 34 30   AST 28 29 34 39   ALKPHOS 253* 250* 235* 213*   BILITOT 1.9* 1.9* 2.4* 3.9*   BILIDIR 1.2*  --   --   --      Lab Results   Component Value Date    WBC 9.3 09/04/2024    HGB 6.5 (LL) 09/04/2024    HCT 22.0 (L) 09/04/2024     09/04/2024     09/04/2024    K 3.9 09/04/2024     09/04/2024    CREATININE 0.6 (L) 09/04/2024    BUN 20    diagnostic evidence, and/or other laboratory parameters. The   change of CgA concentration over time provides diagnostic   information whether a tumor progression has occurred.  An increase of CgA serum concentrations of more than 50% to a   value of greater than 100 ng/ml between consecutive monitoring   visits defines a positive test result, representing a higher   probability that a tumor progression has occurred.  A change of CgA serum concentrations of equal or less than 50%   increase between monitoring visits or to a value of 100 ng/ml or   less defines a negative test result, representing a lower   probability that a tumor progression has occurred.  Nontumor related elevations of Chromogranin A can be observed in   gastrointestinal, cardiovascular, and renal disorders, cancers   other than neuroendocrine tumors, as well as with proton pump   inhibitor (PPI) therapy. It is recommended to stop PPI treatment   for at least 14 days prior to testing.  Performed By: [facility]  [address] [place] [id number]  : Shon Thapa MD, PhD  CLIA Number: [id number]             CT Result (most recent):  CT ABDOMEN PELVIS W IV CONTRAST 08/24/2024    Narrative  EXAMINATION:  CT OF THE ABDOMEN AND PELVIS WITH CONTRAST 8/24/2024 11:58 am    TECHNIQUE:  CT of the abdomen and pelvis was performed with the administration of  intravenous contrast. Multiplanar reformatted images are provided for review.  Automated exposure control, iterative reconstruction, and/or weight based  adjustment of the mA/kV was utilized to reduce the radiation dose to as low  as reasonably achievable.    COMPARISON:  The previous study performed 08/18/2024.    HISTORY:  ORDERING SYSTEM PROVIDED HISTORY: post op whipple evaluate for delayed  gastric emptying  TECHNOLOGIST PROVIDED HISTORY:  Reason for exam:->post op whipple evaluate for delayed gastric emptying  Additional Contrast?->Oral  What reading provider

## 2024-09-04 NOTE — PROGRESS NOTES
RN notified endo via phone that pt hgb 6.5, 1 unit of PRBC's orders and not sent from blood bank yet, RN notified Dr. Harden as well. Endo stated ok to hang prior to procedure and will come get once it is hung.     1045   Blood administration started    1100  RN stayed in room first 15 minutes of blood admin. No signs of reaction. VSS. RN notified endo that they can come get pt.

## 2024-09-04 NOTE — CARE COORDINATION
Transition of care update: S/P whipple procedure with portal vein reconstruction on 08/14. Pt went for endoscopic nasoduodenal tube placement. Hgb 6.5 and other labs noted. Transfuse 1 unit prbcs. Spoke with Dr Larry regarding discharge planning. He is aware pt's fiance, Razia, is unable to be with pt 24/7 and provide the support pt will need after discharge. Plan is [facility]). They can accept the Corpak with tube feeds and requested a 30 day plan for Corpak. Pt will need to be out of [place] before he starts chemo. Called Jane with [facility] and updated her on pt. Cm/sw will follow.

## 2024-09-04 NOTE — PLAN OF CARE
Problem: Discharge Planning  Goal: Discharge to home or other facility with appropriate resources  9/4/2024 0736 by Candi Almendarez RN  Outcome: Progressing     Problem: Safety - Adult  Goal: Free from fall injury  9/4/2024 0736 by Candi Almendarez RN  Outcome: Progressing     Problem: Pain  Goal: Verbalizes/displays adequate comfort level or baseline comfort level  9/4/2024 0736 by Candi Almendarez RN  Outcome: Progressing     Problem: Skin/Tissue Integrity  Goal: Absence of new skin breakdown  Description: 1.  Monitor for areas of redness and/or skin breakdown  2.  Assess vascular access sites hourly  3.  Every 4-6 hours minimum:  Change oxygen saturation probe site  4.  Every 4-6 hours:  If on nasal continuous positive airway pressure, respiratory therapy assess nares and determine need for appliance change or resting period.  9/4/2024 0736 by Candi Almendarez RN  Outcome: Progressing     Problem: ABCDS Injury Assessment  Goal: Absence of physical injury  9/4/2024 0736 by Candi Almendarez RN  Outcome: Progressing     Problem: Nutrition Deficit:  Goal: Optimize nutritional status  9/4/2024 0121 by Adilson Antonio, RN  Outcome: Not Progressing  Flowsheets (Taken 9/3/2024 1219 by Diana Young, MS, RD, LD)  Nutrient intake appropriate for improving, restoring, or maintaining nutritional needs: Recommend, monitor, and adjust tube feedings and TPN/PPN based on assessed needs

## 2024-09-04 NOTE — PROGRESS NOTES
[facility]  Internal Medicine Residency / House Medicine Service    Attending Physician Statement  I have discussed the case, including pertinent history and exam findings with the resident and the team.  I have seen and examined the patient and the key elements of the encounter have been performed by me.  I agree with the assessment, plan and orders as documented by the resident.        S/P Whipple's and difficult BS control  BS improving   Logistical issues continue for achieving enteral feedings  VS stable   Continues on Reglan 10 mg qid for gastroparesis   May need to reconsider TPN until feedings achieved  Remainder of medical problems as per resident note.      Elliot Jones MD FRCP [place]  Internal Medicine Residency Faculty

## 2024-09-04 NOTE — PROGRESS NOTES
[facility]  Internal Medicine Residency Program  Internal Medicine Consult- Progress Note    Patient:  Michael Garcia 79 y.o. male MRN: [id number]     Date of Service: 9/4/2024    Hospital Day: 22      Chief complaint: had no chief complaint listed for this encounter.    Reason for Consult: Diabetes management   Primacy Care Physician: Moshe Moran DO    Subjective     Overnight events: No acute events overnight.    Patient seen and examined at bedside today.  Patient stated he was feeling a little bit better than yesterday. Also mentioned he vomited 5 times yesterday after the re attempt of Corpak insertion. No other complaints at this time.          Objective     Physical Exam:  Vitals: BP 98/61   Pulse (!) 116   Temp 98.4 °F (36.9 °C) (Temporal)   Resp 14   Ht 1.88 m (6' 2\")   Wt 98.7 kg (217 lb 9.5 oz)   SpO2 93%   BMI 27.94 kg/m²     I & O - 24hr:   Intake/Output Summary (Last 24 hours) at 9/4/2024 0744  Last data filed at 9/3/2024 2200  Gross per 24 hour   Intake --   Output 550 ml   Net -550 ml      General Appearance: alert, appears stated age, and cooperative laying down in bed on room air; mild jaundice noted   HEENT:  Head: Normal, normocephalic, atraumatic. Scleral icterus   Neck: no JVD and supple, symmetrical, trachea midline  Lung: clear to auscultation bilaterally   Heart: regular rate and rhythm  Abdomen: soft, non-tender; bowel sounds normal; no masses,  no organomegaly  Extremities:   Trace lower extremity edema  Musculokeletal: No joint swelling, no muscle tenderness. ROM normal in all joints of extremities.   Neurologic: Mental status: Alert, oriented, thought content appropriate    Subject  Pertinent Information & Imaging Studies, Consults   tasneem  CBC with Differential:    Lab Results   Component Value Date/Time    WBC 9.3 09/04/2024 05:30 AM    RBC 2.20 09/04/2024 05:30 AM    HGB 6.5 09/04/2024 05:30 AM    HCT 22.0 09/04/2024 05:30 AM     09/04/2024  Daily    insulin glargine, 38 Units, SubCUTAneous, Nightly    oyster shell calcium w/D, 1 tablet, Oral, Daily    sodium chloride flush, 5-40 mL, IntraVENous, 2 times per day    sennosides-docusate sodium, 2 tablet, Oral, Daily    metoclopramide, 10 mg, IntraVENous, Q6H    enoxaparin, 30 mg, SubCUTAneous, BID    melatonin, 5 mg, Oral, Nightly    cetirizine, 10 mg, Oral, Daily    finasteride, 5 mg, Oral, Daily    metoprolol succinate, 25 mg, Oral, Daily    montelukast, 10 mg, Oral, Nightly    atorvastatin, 20 mg, Oral, Nightly    tamsulosin, 0.4 mg, Oral, Daily    valsartan, 80 mg, Oral, Daily **AND** hydroCHLOROthiazide, 12.5 mg, Oral, Daily    sodium chloride flush, 5-40 mL, IntraVENous, 2 times per day    [Held by provider] gabapentin, 200 mg, Oral, TID   PRN: sodium chloride, ipratropium 0.5 mg-albuterol 2.5 mg, HYDROmorphone, sodium chloride, acetaminophen, sodium chloride, sodium chloride flush, sodium chloride, sodium phosphate 16.17 mmol in sodium chloride 0.9 % 250 mL IVPB **OR** sodium phosphate 32.31 mmol in sodium chloride 0.9 % 250 mL IVPB, hydrALAZINE, labetalol, sodium chloride flush, sodium chloride flush, sodium chloride, ondansetron **OR** ondansetron, oxyCODONE **OR** oxyCODONE, glucose, dextrose bolus **OR** dextrose bolus, glucagon (rDNA), dextrose    Infusions:   sodium chloride    sodium chloride    sodium chloride    sodium chloride    sodium chloride    dextrose  Diet: Diet NPO Exceptions are: Sips of Water with Meds    Resident's Assessment and Plan     Michael Garcia is a 79 y.o. male with  has a past medical history of Deaf, bilateral, Diabetes (HCC), Hypertension, Osteoarthritis, Primary osteoarthritis of left knee, and Prostate cancer (HCC).    Came with CC: had no chief complaint listed for this encounter.    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

## 2024-09-04 NOTE — PROCEDURES
Procedure:  Endoscopic nasoduodenal tube placement     Indication:    Protein calorie malnutrition  Nausea/bloating post Whipple procedure       Consent: Informed consent was obtained from the patient including and not limited to risk of perforation, aspiration of gastric contents or teeth, bleeding, infection, dental breakage, ileus, need for surgery, or worst case death.     Sedation  MAC     Endoscope was advanced easily through the mouth to both afferent and efferent limbs of the small bowel. A 12F corpak was advanced into the efferent limb of small bowel with assistance of the endoscope and rat tooth forceps, and hemostatic clip with suture The corpak was then secured using a nasal bridge/bridel device.                             Esophagus:     Mucosa is normal.       Stomach:         Moderate amount of gastric contents and food debris in the stomach  Gastric body is normal. Anatomotic site appeared healthy, no ulcers/leaks    Small bowel:    Healthy appearing afferent and efferent limbs     IMPRESSION AND PLAN:   1.  Whipple anatomy  2.  Placement of 12F Corpak secured with corresponding 12F Bridle    Abdominal x-ray to be performed to confirm placement than will be ok to use.   If any epistasis secondary to placement ok to use nasonex to halt bleeding.

## 2024-09-04 NOTE — ANESTHESIA POSTPROCEDURE EVALUATION
Department of Anesthesiology  Postprocedure Note    Patient: Michael Garcia  MRN: [id number]  YOB: 1945  Date of evaluation: 9/4/2024    Procedure Summary       Date: 09/04/24 Room / Location: [place] [address] / [facility]    Anesthesia Start: 1141 Anesthesia Stop: 1242    Procedure: ESOPHAGOGASTRODUODENOSCOPY TUBE INSERTION Diagnosis:       Malnutrition (HCC)      (Malnutrition (HCC) [E46])    Surgeons: Demetrius Carlos DO Responsible Provider: Emma Quarles MD    Anesthesia Type: MAC ASA Status: 4            Anesthesia Type: No value filed.    Lindsey Phase I: Lindsey Score: 10    Lindsey Phase II:      Anesthesia Post Evaluation    Patient location during evaluation: PACU  Patient participation: complete - patient participated  Level of consciousness: awake  Pain score: 0  Airway patency: patent  Nausea & Vomiting: no nausea  Cardiovascular status: hemodynamically stable  Respiratory status: acceptable  Hydration status: stable    No notable events documented.

## 2024-09-04 NOTE — PROGRESS NOTES
RN notified Dr. Harden via [employer], re; Sheela, just updating you that pt hgb this AM was 6.5, pt will be receiving 1 unit of PRBC's once T&S is complete and once blood bank has unit ready. I saw there was no schedule for surgery today, just giving you an update. Thank you.

## 2024-09-04 NOTE — PROGRESS NOTES
Sent message to Dr. Danielson regarding giving patient 38 units of Lantus. Dr. Cramer requested message be forwarded to Geriatric Medicine. Patient is currently NPO and not on tube feed.    Adilson Antonio RN

## 2024-09-04 NOTE — PROGRESS NOTES
P Quality Flow/Interdisciplinary Rounds Progress Note        Quality Flow Rounds held on September 4, 2024    Disciplines Attending:  Bedside Nurse, , , and Nursing Unit Leadership    Michael Garcia was admitted on 8/14/2024  5:28 AM    Anticipated Discharge Date:       Disposition:    Mike Score:  Mike Scale Score: 18    Readmission Risk              Risk of Unplanned Readmission:  38           Discussed patient goal for the day, patient clinical progression, and barriers to discharge.  The following Goal(s) of the Day/Commitment(s) have been identified:  Diagnostics - Report Results and Labs - Report Results      Ana Newton RN  September 4, 2024

## 2024-09-05 PROBLEM — Z46.59 ENCOUNTER FOR NASOGASTRIC (NG) TUBE PLACEMENT: Status: ACTIVE | Noted: 2024-09-05

## 2024-09-05 LAB
ABO/RH: NORMAL
ANION GAP SERPL CALCULATED.3IONS-SCNC: 9 MMOL/L (ref 7–16)
ANTIBODY SCREEN: NEGATIVE
ARM BAND NUMBER: NORMAL
BLOOD BANK BLOOD PRODUCT EXPIRATION DATE: NORMAL
BLOOD BANK DISPENSE STATUS: NORMAL
BLOOD BANK ISBT PRODUCT BLOOD TYPE: 6200
BLOOD BANK PRODUCT CODE: NORMAL
BLOOD BANK SAMPLE EXPIRATION: NORMAL
BLOOD BANK UNIT TYPE AND RH: NORMAL
BPU ID: NORMAL
BUN SERPL-MCNC: 23 MG/DL (ref 6–23)
CA-I BLD-SCNC: 1.14 MMOL/L (ref 1.15–1.33)
CALCIUM SERPL-MCNC: 8.1 MG/DL (ref 8.6–10.2)
CHLORIDE SERPL-SCNC: 108 MMOL/L (ref 98–107)
CO2 SERPL-SCNC: 24 MMOL/L (ref 22–29)
COMPONENT: NORMAL
CREAT SERPL-MCNC: 0.6 MG/DL (ref 0.7–1.2)
CROSSMATCH RESULT: NORMAL
GFR, ESTIMATED: >90 ML/MIN/1.73M2
GLUCOSE BLD-MCNC: 213 MG/DL (ref 74–99)
GLUCOSE BLD-MCNC: 231 MG/DL (ref 74–99)
GLUCOSE BLD-MCNC: 246 MG/DL (ref 74–99)
GLUCOSE BLD-MCNC: 246 MG/DL (ref 74–99)
GLUCOSE BLD-MCNC: 267 MG/DL (ref 74–99)
GLUCOSE BLD-MCNC: 314 MG/DL (ref 74–99)
GLUCOSE SERPL-MCNC: 253 MG/DL (ref 74–99)
MAGNESIUM SERPL-MCNC: 2 MG/DL (ref 1.6–2.6)
PHOSPHATE SERPL-MCNC: 2.4 MG/DL (ref 2.5–4.5)
POTASSIUM SERPL-SCNC: 3.7 MMOL/L (ref 3.5–5)
SODIUM SERPL-SCNC: 141 MMOL/L (ref 132–146)
TRANSFUSION STATUS: NORMAL
UNIT DIVISION: 0
UNIT ISSUE DATE/TIME: NORMAL

## 2024-09-05 PROCEDURE — 82962 GLUCOSE BLOOD TEST: CPT

## 2024-09-05 PROCEDURE — 2580000003 HC RX 258: Performed by: RADIOLOGY

## 2024-09-05 PROCEDURE — 82330 ASSAY OF CALCIUM: CPT

## 2024-09-05 PROCEDURE — 80048 BASIC METABOLIC PNL TOTAL CA: CPT

## 2024-09-05 PROCEDURE — 2700000000 HC OXYGEN THERAPY PER DAY

## 2024-09-05 PROCEDURE — 2580000003 HC RX 258

## 2024-09-05 PROCEDURE — 83735 ASSAY OF MAGNESIUM: CPT

## 2024-09-05 PROCEDURE — 6360000002 HC RX W HCPCS

## 2024-09-05 PROCEDURE — 6360000002 HC RX W HCPCS: Performed by: TRANSPLANT SURGERY

## 2024-09-05 PROCEDURE — 99231 SBSQ HOSP IP/OBS SF/LOW 25: CPT | Performed by: INTERNAL MEDICINE

## 2024-09-05 PROCEDURE — 6370000000 HC RX 637 (ALT 250 FOR IP)

## 2024-09-05 PROCEDURE — 6370000000 HC RX 637 (ALT 250 FOR IP): Performed by: STUDENT IN AN ORGANIZED HEALTH CARE EDUCATION/TRAINING PROGRAM

## 2024-09-05 PROCEDURE — 2580000003 HC RX 258: Performed by: STUDENT IN AN ORGANIZED HEALTH CARE EDUCATION/TRAINING PROGRAM

## 2024-09-05 PROCEDURE — 84100 ASSAY OF PHOSPHORUS: CPT

## 2024-09-05 PROCEDURE — 2140000000 HC CCU INTERMEDIATE R&B

## 2024-09-05 PROCEDURE — 6370000000 HC RX 637 (ALT 250 FOR IP): Performed by: TRANSPLANT SURGERY

## 2024-09-05 PROCEDURE — 2500000003 HC RX 250 WO HCPCS

## 2024-09-05 PROCEDURE — 2580000003 HC RX 258: Performed by: TRANSPLANT SURGERY

## 2024-09-05 RX ORDER — POTASSIUM CHLORIDE 7.45 MG/ML
10 INJECTION INTRAVENOUS
Status: COMPLETED | OUTPATIENT
Start: 2024-09-05 | End: 2024-09-05

## 2024-09-05 RX ADMIN — ENOXAPARIN SODIUM 30 MG: 100 INJECTION SUBCUTANEOUS at 22:19

## 2024-09-05 RX ADMIN — SODIUM PHOSPHATE, MONOBASIC, MONOHYDRATE AND SODIUM PHOSPHATE, DIBASIC, ANHYDROUS 30 MMOL: 142; 276 INJECTION, SOLUTION INTRAVENOUS at 11:42

## 2024-09-05 RX ADMIN — CETIRIZINE HYDROCHLORIDE 10 MG: 10 TABLET, FILM COATED ORAL at 09:50

## 2024-09-05 RX ADMIN — SENNOSIDES AND DOCUSATE SODIUM 2 TABLET: 50; 8.6 TABLET ORAL at 09:47

## 2024-09-05 RX ADMIN — METOCLOPRAMIDE 10 MG: 5 INJECTION, SOLUTION INTRAMUSCULAR; INTRAVENOUS at 18:49

## 2024-09-05 RX ADMIN — MONTELUKAST 10 MG: 10 TABLET, FILM COATED ORAL at 22:18

## 2024-09-05 RX ADMIN — TAMSULOSIN HYDROCHLORIDE 0.4 MG: 0.4 CAPSULE ORAL at 09:54

## 2024-09-05 RX ADMIN — INSULIN GLARGINE 38 UNITS: 100 INJECTION, SOLUTION SUBCUTANEOUS at 22:18

## 2024-09-05 RX ADMIN — INSULIN LISPRO 4 UNITS: 100 INJECTION, SOLUTION INTRAVENOUS; SUBCUTANEOUS at 06:14

## 2024-09-05 RX ADMIN — ATORVASTATIN CALCIUM 20 MG: 20 TABLET, FILM COATED ORAL at 22:18

## 2024-09-05 RX ADMIN — INSULIN LISPRO 8 UNITS: 100 INJECTION, SOLUTION INTRAVENOUS; SUBCUTANEOUS at 18:48

## 2024-09-05 RX ADMIN — INSULIN LISPRO 4 UNITS: 100 INJECTION, SOLUTION INTRAVENOUS; SUBCUTANEOUS at 22:18

## 2024-09-05 RX ADMIN — Medication 5 MG: at 22:18

## 2024-09-05 RX ADMIN — VALSARTAN 80 MG: 80 TABLET, FILM COATED ORAL at 09:50

## 2024-09-05 RX ADMIN — POTASSIUM CHLORIDE 10 MEQ: 7.46 INJECTION, SOLUTION INTRAVENOUS at 12:50

## 2024-09-05 RX ADMIN — METOCLOPRAMIDE 10 MG: 5 INJECTION, SOLUTION INTRAMUSCULAR; INTRAVENOUS at 02:02

## 2024-09-05 RX ADMIN — METOCLOPRAMIDE 10 MG: 5 INJECTION, SOLUTION INTRAMUSCULAR; INTRAVENOUS at 06:03

## 2024-09-05 RX ADMIN — HYDROCHLOROTHIAZIDE 12.5 MG: 12.5 TABLET ORAL at 09:54

## 2024-09-05 RX ADMIN — POTASSIUM CHLORIDE 10 MEQ: 7.46 INJECTION, SOLUTION INTRAVENOUS at 14:54

## 2024-09-05 RX ADMIN — METOPROLOL SUCCINATE 25 MG: 25 TABLET, EXTENDED RELEASE ORAL at 09:49

## 2024-09-05 RX ADMIN — SODIUM CHLORIDE, PRESERVATIVE FREE 10 ML: 5 INJECTION INTRAVENOUS at 09:52

## 2024-09-05 RX ADMIN — SODIUM CHLORIDE, PRESERVATIVE FREE 10 ML: 5 INJECTION INTRAVENOUS at 09:46

## 2024-09-05 RX ADMIN — INSULIN LISPRO 4 UNITS: 100 INJECTION, SOLUTION INTRAVENOUS; SUBCUTANEOUS at 14:51

## 2024-09-05 RX ADMIN — METOCLOPRAMIDE 10 MG: 5 INJECTION, SOLUTION INTRAMUSCULAR; INTRAVENOUS at 12:52

## 2024-09-05 RX ADMIN — INSULIN LISPRO 4 UNITS: 100 INJECTION, SOLUTION INTRAVENOUS; SUBCUTANEOUS at 02:07

## 2024-09-05 RX ADMIN — SODIUM CHLORIDE, PRESERVATIVE FREE 10 ML: 5 INJECTION INTRAVENOUS at 22:19

## 2024-09-05 RX ADMIN — INSULIN LISPRO 12 UNITS: 100 INJECTION, SOLUTION INTRAVENOUS; SUBCUTANEOUS at 10:11

## 2024-09-05 RX ADMIN — POTASSIUM CHLORIDE 10 MEQ: 7.46 INJECTION, SOLUTION INTRAVENOUS at 13:12

## 2024-09-05 RX ADMIN — Medication 10 ML: at 18:49

## 2024-09-05 RX ADMIN — FINASTERIDE 5 MG: 5 TABLET, FILM COATED ORAL at 09:51

## 2024-09-05 RX ADMIN — ENOXAPARIN SODIUM 30 MG: 100 INJECTION SUBCUTANEOUS at 09:46

## 2024-09-05 ASSESSMENT — PAIN SCALES - GENERAL
PAINLEVEL_OUTOF10: 0

## 2024-09-05 NOTE — CARE COORDINATION
Transition of care update: Corpak with tube feeds. K 3.7, phos 2.4 and other labs noted. Orders and chart reviewed. Spoke with Dr Santoyo and he is aware pt is unable to go home due to pt's fipriscila is unable to be with pt 24/7 and provide the support pt will need. Discharge plan is [facility] for short term rehab. [facility] is able to manage the Corpak and tube feeds. Once tomas stay is complete pt will be able to start chemo. Also, plan is for pt to follow up with Dr Santoyo in the office in 2 weeks to see if Corpak can be d/c'd. Spoke with pt's Razia cardozo, via phone call and updated her on my conversation with Dr Santoyo. Razia will work on getting house in order for pt to come home in a few weeks from [facility]. Updated Jane with [facility]. Jane is aware I had [facility] and [facility](for tube feeds) following pt.     1510  Met with pt in room. Communicated with him via video  -American Sign Language.  name:Rosie.  ID: [id number]. Went over discharge planning in great detail. Pt is agreeable to [facility] for short term rehab. Notified pt's nurse and pt's Razia cardozo.

## 2024-09-05 NOTE — PROGRESS NOTES
[facility]  Internal Medicine Residency / House Medicine Service    Attending Physician Statement  I have discussed the case, including pertinent history and exam findings with the resident and the team.  I have seen and examined the patient and the key elements of the encounter have been performed by me.  I agree with the assessment, plan and orders as documented by the resident.      More alert and pleasant mood  Enteral feedings with Glucerna continue at 60  Not delivering pancreatic enzyme presently  Speak with Nutrition regarding advisability   of dissolved enzyme granules with HCO3 through tube as well  BS control to be improved  on present regime  Plan; Review meds            Follow with Surgery    Remainder of medical problems as per resident note.      Elliot Jones MD FRCP [facility]  Internal Medicine Residency Faculty

## 2024-09-05 NOTE — PLAN OF CARE
PATIENT HOPES TO GO HOME BUT WILL TALK WITH FAMILY ON PLAN WILL NEED TO CONTINUE WITH TUBE FEEDING TOLD MAY NEED MORE ASSISTANCE AND REHAB WITH SKILLED NURSING.

## 2024-09-05 NOTE — PROGRESS NOTES
HEPATOBILIARY AND PANCREATIC SURGERY  DAILY PROGRESS NOTE  9/5/2024        Subjective:  Resting comfortably. On room air. Hb stable 7.8. HR in 100s. Corpack in place today. Pt was was asking about its removal. Tolerating tube feeds at goal.    Objective:    /62   Pulse 98   Temp 98.3 °F (36.8 °C) (Temporal)   Resp 17   Ht 1.88 m (6' 2\")   Wt 96.1 kg (211 lb 12.8 oz)   SpO2 95%   BMI 27.19 kg/m²     GENERAL:  Lying in bed. No acute distress   HEAD: No gross abnormalities   EYES: scleral icterus   LUNGS:  No increased work of breathing on 2L NC  CARDIOVASCULAR:  RR, normotensive   ABDOMEN:  Soft, non-tender,mild distension, R/L drain site stitched.  EXTREMITIES: mild LE edema  SKIN: Warm and dry    Assessment/Plan:  79 y.o. male with pancreatic head adenocarcinoma with duodenal obstruction s/p whipple procedure w portal vein reconstruction 8/14 - T3N2 (Stage III), delayed gastric emptying requiring parenteral nutrition s/p corepak placement 8/29    Plan  - ambulate, OOB  - Continue tube feeds thru Corpack  - follow labs and replace electrolytes  - PT/OT  -Plan for placement at [facility]  -Recommend 1 week F/U with     Electronically signed by Dedrick Larry DO on 9/5/2024 at 11:53 AM    Patient states that he is drinking clears without any issues.  Continue enteral feeds  Continue electrolyte replacement  We will plan to remove PICC prior to discharge  Okay for patient to have Ensure clears    Electronically signed by Demetrius Santoyo MD on 9/5/2024 at 1:15 PM

## 2024-09-05 NOTE — PLAN OF CARE
Problem: Discharge Planning  Goal: Discharge to home or other facility with appropriate resources  Outcome: Progressing     Problem: Safety - Adult  Goal: Free from fall injury  Outcome: Progressing     Problem: Pain  Goal: Verbalizes/displays adequate comfort level or baseline comfort level  Outcome: Progressing     Problem: Skin/Tissue Integrity  Goal: Absence of new skin breakdown  Description: 1.  Monitor for areas of redness and/or skin breakdown  2.  Assess vascular access sites hourly  3.  Every 4-6 hours minimum:  Change oxygen saturation probe site  4.  Every 4-6 hours:  If on nasal continuous positive airway pressure, respiratory therapy assess nares and determine need for appliance change or resting period.  Outcome: Progressing     Problem: ABCDS Injury Assessment  Goal: Absence of physical injury  Outcome: Progressing     Problem: Neurosensory - Adult  Goal: Achieves stable or improved neurological status  Outcome: Progressing     Problem: Respiratory - Adult  Goal: Achieves optimal ventilation and oxygenation  Outcome: Progressing     Problem: Cardiovascular - Adult  Goal: Maintains optimal cardiac output and hemodynamic stability  Outcome: Progressing     Problem: Skin/Tissue Integrity - Adult  Goal: Skin integrity remains intact  Outcome: Progressing     Problem: Musculoskeletal - Adult  Goal: Return mobility to safest level of function  Outcome: Progressing     Problem: Infection - Adult  Goal: Absence of infection at discharge  Outcome: Progressing     Problem: Metabolic/Fluid and Electrolytes - Adult  Goal: Electrolytes maintained within normal limits  Outcome: Progressing     Problem: Hematologic - Adult  Goal: Maintains hematologic stability  Outcome: Progressing     Problem: Chronic Conditions and Co-morbidities  Goal: Patient's chronic conditions and co-morbidity symptoms are monitored and maintained or improved  Outcome: Progressing     Problem: Nutrition Deficit:  Goal: Optimize  nutritional status  Outcome: Progressing

## 2024-09-05 NOTE — PROGRESS NOTES
Message sent to [facility] re: possible removal of PICC and placement of peripheral IV as central line is no longer indicated for use.

## 2024-09-06 VITALS
HEIGHT: 74 IN | OXYGEN SATURATION: 98 % | TEMPERATURE: 98.7 F | SYSTOLIC BLOOD PRESSURE: 117 MMHG | HEART RATE: 97 BPM | WEIGHT: 211.8 LBS | RESPIRATION RATE: 20 BRPM | DIASTOLIC BLOOD PRESSURE: 72 MMHG | BODY MASS INDEX: 27.18 KG/M2

## 2024-09-06 LAB
ALBUMIN SERPL-MCNC: 2.5 G/DL (ref 3.5–5.2)
ALP SERPL-CCNC: 212 U/L (ref 40–129)
ALT SERPL-CCNC: 24 U/L (ref 0–40)
ANION GAP SERPL CALCULATED.3IONS-SCNC: 11 MMOL/L (ref 7–16)
AST SERPL-CCNC: 26 U/L (ref 0–39)
BILIRUB DIRECT SERPL-MCNC: 1.2 MG/DL (ref 0–0.3)
BILIRUB INDIRECT SERPL-MCNC: 1.2 MG/DL (ref 0–1)
BILIRUB SERPL-MCNC: 2.4 MG/DL (ref 0–1.2)
BUN SERPL-MCNC: 17 MG/DL (ref 6–23)
CA-I BLD-SCNC: 1.13 MMOL/L (ref 1.15–1.33)
CALCIUM SERPL-MCNC: 8.1 MG/DL (ref 8.6–10.2)
CHLORIDE SERPL-SCNC: 105 MMOL/L (ref 98–107)
CO2 SERPL-SCNC: 22 MMOL/L (ref 22–29)
CREAT SERPL-MCNC: 0.6 MG/DL (ref 0.7–1.2)
ERYTHROCYTE [DISTWIDTH] IN BLOOD BY AUTOMATED COUNT: 18.8 % (ref 11.5–15)
GFR, ESTIMATED: >90 ML/MIN/1.73M2
GLUCOSE BLD-MCNC: 211 MG/DL (ref 74–99)
GLUCOSE BLD-MCNC: 221 MG/DL (ref 74–99)
GLUCOSE BLD-MCNC: 225 MG/DL (ref 74–99)
GLUCOSE BLD-MCNC: 229 MG/DL (ref 74–99)
GLUCOSE BLD-MCNC: 258 MG/DL (ref 74–99)
GLUCOSE BLD-MCNC: 259 MG/DL (ref 74–99)
GLUCOSE SERPL-MCNC: 217 MG/DL (ref 74–99)
HCT VFR BLD AUTO: 25.1 % (ref 37–54)
HGB BLD-MCNC: 7.5 G/DL (ref 12.5–16.5)
MAGNESIUM SERPL-MCNC: 1.7 MG/DL (ref 1.6–2.6)
MCH RBC QN AUTO: 30.5 PG (ref 26–35)
MCHC RBC AUTO-ENTMCNC: 29.9 G/DL (ref 32–34.5)
MCV RBC AUTO: 102 FL (ref 80–99.9)
PHOSPHATE SERPL-MCNC: 2.5 MG/DL (ref 2.5–4.5)
PLATELET # BLD AUTO: 316 K/UL (ref 130–450)
PMV BLD AUTO: 10.6 FL (ref 7–12)
POTASSIUM SERPL-SCNC: 3.8 MMOL/L (ref 3.5–5)
PROT SERPL-MCNC: 5.8 G/DL (ref 6.4–8.3)
RBC # BLD AUTO: 2.46 M/UL (ref 3.8–5.8)
SODIUM SERPL-SCNC: 138 MMOL/L (ref 132–146)
WBC OTHER # BLD: 8.2 K/UL (ref 4.5–11.5)

## 2024-09-06 PROCEDURE — 2580000003 HC RX 258: Performed by: TRANSPLANT SURGERY

## 2024-09-06 PROCEDURE — 6370000000 HC RX 637 (ALT 250 FOR IP)

## 2024-09-06 PROCEDURE — 82330 ASSAY OF CALCIUM: CPT

## 2024-09-06 PROCEDURE — 82248 BILIRUBIN DIRECT: CPT

## 2024-09-06 PROCEDURE — 84100 ASSAY OF PHOSPHORUS: CPT

## 2024-09-06 PROCEDURE — 80053 COMPREHEN METABOLIC PANEL: CPT

## 2024-09-06 PROCEDURE — 6360000002 HC RX W HCPCS: Performed by: TRANSPLANT SURGERY

## 2024-09-06 PROCEDURE — 2580000003 HC RX 258: Performed by: RADIOLOGY

## 2024-09-06 PROCEDURE — 2500000003 HC RX 250 WO HCPCS

## 2024-09-06 PROCEDURE — 6360000002 HC RX W HCPCS

## 2024-09-06 PROCEDURE — 2580000003 HC RX 258

## 2024-09-06 PROCEDURE — 99231 SBSQ HOSP IP/OBS SF/LOW 25: CPT | Performed by: INTERNAL MEDICINE

## 2024-09-06 PROCEDURE — 83735 ASSAY OF MAGNESIUM: CPT

## 2024-09-06 PROCEDURE — 36415 COLL VENOUS BLD VENIPUNCTURE: CPT

## 2024-09-06 PROCEDURE — 2580000003 HC RX 258: Performed by: STUDENT IN AN ORGANIZED HEALTH CARE EDUCATION/TRAINING PROGRAM

## 2024-09-06 PROCEDURE — 2700000000 HC OXYGEN THERAPY PER DAY

## 2024-09-06 PROCEDURE — 82962 GLUCOSE BLOOD TEST: CPT

## 2024-09-06 PROCEDURE — 85027 COMPLETE CBC AUTOMATED: CPT

## 2024-09-06 PROCEDURE — 6370000000 HC RX 637 (ALT 250 FOR IP): Performed by: TRANSPLANT SURGERY

## 2024-09-06 PROCEDURE — 6370000000 HC RX 637 (ALT 250 FOR IP): Performed by: STUDENT IN AN ORGANIZED HEALTH CARE EDUCATION/TRAINING PROGRAM

## 2024-09-06 RX ORDER — INSULIN LISPRO 100 [IU]/ML
0-18 INJECTION, SOLUTION INTRAVENOUS; SUBCUTANEOUS EVERY 4 HOURS
DISCHARGE
Start: 2024-09-06

## 2024-09-06 RX ORDER — GLUCAGON 1 MG/ML
1 KIT INJECTION PRN
DISCHARGE
Start: 2024-09-06

## 2024-09-06 RX ORDER — MAGNESIUM SULFATE IN WATER 40 MG/ML
2000 INJECTION, SOLUTION INTRAVENOUS ONCE
Status: COMPLETED | OUTPATIENT
Start: 2024-09-06 | End: 2024-09-06

## 2024-09-06 RX ORDER — HYDROCHLOROTHIAZIDE 12.5 MG/1
12.5 TABLET ORAL DAILY
DISCHARGE
Start: 2024-09-07

## 2024-09-06 RX ORDER — VALSARTAN 80 MG/1
80 TABLET ORAL DAILY
DISCHARGE
Start: 2024-09-07 | End: 2024-09-13 | Stop reason: ALTCHOICE

## 2024-09-06 RX ORDER — INSULIN GLARGINE 100 [IU]/ML
38 INJECTION, SOLUTION SUBCUTANEOUS NIGHTLY
DISCHARGE
Start: 2024-09-06

## 2024-09-06 RX ORDER — PANTOPRAZOLE SODIUM 40 MG/1
40 TABLET, DELAYED RELEASE ORAL 2 TIMES DAILY
DISCHARGE
Start: 2024-09-06 | End: 2024-12-05

## 2024-09-06 RX ORDER — MECOBALAMIN 5000 MCG
5 TABLET,DISINTEGRATING ORAL NIGHTLY
DISCHARGE
Start: 2024-09-06

## 2024-09-06 RX ORDER — IPRATROPIUM BROMIDE AND ALBUTEROL SULFATE 2.5; .5 MG/3ML; MG/3ML
3 SOLUTION RESPIRATORY (INHALATION) PRN
DISCHARGE
Start: 2024-09-06

## 2024-09-06 RX ORDER — METOCLOPRAMIDE HYDROCHLORIDE 5 MG/ML
10 INJECTION INTRAMUSCULAR; INTRAVENOUS EVERY 6 HOURS
DISCHARGE
Start: 2024-09-06 | End: 2024-09-13 | Stop reason: ALTCHOICE

## 2024-09-06 RX ORDER — INSULIN LISPRO 100 [IU]/ML
0-18 INJECTION, SOLUTION INTRAVENOUS; SUBCUTANEOUS EVERY 4 HOURS
Status: DISCONTINUED | OUTPATIENT
Start: 2024-09-06 | End: 2024-09-07 | Stop reason: HOSPADM

## 2024-09-06 RX ORDER — ONDANSETRON 4 MG/1
4 TABLET, ORALLY DISINTEGRATING ORAL EVERY 8 HOURS PRN
DISCHARGE
Start: 2024-09-06

## 2024-09-06 RX ORDER — POTASSIUM CHLORIDE 7.45 MG/ML
10 INJECTION INTRAVENOUS
Status: COMPLETED | OUTPATIENT
Start: 2024-09-06 | End: 2024-09-06

## 2024-09-06 RX ORDER — BISACODYL 10 MG
10 SUPPOSITORY, RECTAL RECTAL DAILY
DISCHARGE
Start: 2024-09-07 | End: 2024-10-07

## 2024-09-06 RX ADMIN — METOCLOPRAMIDE 10 MG: 5 INJECTION, SOLUTION INTRAMUSCULAR; INTRAVENOUS at 13:34

## 2024-09-06 RX ADMIN — ATORVASTATIN CALCIUM 20 MG: 20 TABLET, FILM COATED ORAL at 20:53

## 2024-09-06 RX ADMIN — ENOXAPARIN SODIUM 30 MG: 100 INJECTION SUBCUTANEOUS at 20:52

## 2024-09-06 RX ADMIN — METOCLOPRAMIDE 10 MG: 5 INJECTION, SOLUTION INTRAMUSCULAR; INTRAVENOUS at 17:44

## 2024-09-06 RX ADMIN — Medication 10 ML: at 13:34

## 2024-09-06 RX ADMIN — INSULIN LISPRO 9 UNITS: 100 INJECTION, SOLUTION INTRAVENOUS; SUBCUTANEOUS at 09:35

## 2024-09-06 RX ADMIN — SODIUM CHLORIDE, PRESERVATIVE FREE 10 ML: 5 INJECTION INTRAVENOUS at 20:54

## 2024-09-06 RX ADMIN — POTASSIUM CHLORIDE 10 MEQ: 7.46 INJECTION, SOLUTION INTRAVENOUS at 12:35

## 2024-09-06 RX ADMIN — Medication 10 ML: at 17:44

## 2024-09-06 RX ADMIN — METOPROLOL SUCCINATE 25 MG: 25 TABLET, EXTENDED RELEASE ORAL at 09:38

## 2024-09-06 RX ADMIN — HYDROCHLOROTHIAZIDE 12.5 MG: 12.5 TABLET ORAL at 09:38

## 2024-09-06 RX ADMIN — VALSARTAN 80 MG: 80 TABLET, FILM COATED ORAL at 09:38

## 2024-09-06 RX ADMIN — SODIUM CHLORIDE, PRESERVATIVE FREE 10 ML: 5 INJECTION INTRAVENOUS at 09:42

## 2024-09-06 RX ADMIN — Medication 5 MG: at 20:53

## 2024-09-06 RX ADMIN — CETIRIZINE HYDROCHLORIDE 10 MG: 10 TABLET, FILM COATED ORAL at 09:39

## 2024-09-06 RX ADMIN — SODIUM CHLORIDE, PRESERVATIVE FREE 10 ML: 5 INJECTION INTRAVENOUS at 09:37

## 2024-09-06 RX ADMIN — POTASSIUM CHLORIDE 10 MEQ: 7.46 INJECTION, SOLUTION INTRAVENOUS at 11:13

## 2024-09-06 RX ADMIN — METOCLOPRAMIDE 10 MG: 5 INJECTION, SOLUTION INTRAMUSCULAR; INTRAVENOUS at 06:02

## 2024-09-06 RX ADMIN — SODIUM PHOSPHATE, MONOBASIC, MONOHYDRATE AND SODIUM PHOSPHATE, DIBASIC, ANHYDROUS 30 MMOL: 142; 276 INJECTION, SOLUTION INTRAVENOUS at 14:24

## 2024-09-06 RX ADMIN — METOCLOPRAMIDE 10 MG: 5 INJECTION, SOLUTION INTRAMUSCULAR; INTRAVENOUS at 02:30

## 2024-09-06 RX ADMIN — MONTELUKAST 10 MG: 10 TABLET, FILM COATED ORAL at 20:53

## 2024-09-06 RX ADMIN — MAGNESIUM SULFATE HEPTAHYDRATE 2000 MG: 40 INJECTION, SOLUTION INTRAVENOUS at 11:13

## 2024-09-06 RX ADMIN — FINASTERIDE 5 MG: 5 TABLET, FILM COATED ORAL at 09:39

## 2024-09-06 RX ADMIN — INSULIN LISPRO 6 UNITS: 100 INJECTION, SOLUTION INTRAVENOUS; SUBCUTANEOUS at 17:45

## 2024-09-06 RX ADMIN — INSULIN LISPRO 4 UNITS: 100 INJECTION, SOLUTION INTRAVENOUS; SUBCUTANEOUS at 02:30

## 2024-09-06 RX ADMIN — INSULIN LISPRO 9 UNITS: 100 INJECTION, SOLUTION INTRAVENOUS; SUBCUTANEOUS at 13:34

## 2024-09-06 RX ADMIN — INSULIN GLARGINE 38 UNITS: 100 INJECTION, SOLUTION SUBCUTANEOUS at 20:54

## 2024-09-06 RX ADMIN — INSULIN LISPRO 4 UNITS: 100 INJECTION, SOLUTION INTRAVENOUS; SUBCUTANEOUS at 06:06

## 2024-09-06 RX ADMIN — TAMSULOSIN HYDROCHLORIDE 0.4 MG: 0.4 CAPSULE ORAL at 09:39

## 2024-09-06 RX ADMIN — SODIUM CHLORIDE: 9 INJECTION, SOLUTION INTRAVENOUS at 11:12

## 2024-09-06 RX ADMIN — ENOXAPARIN SODIUM 30 MG: 100 INJECTION SUBCUTANEOUS at 09:35

## 2024-09-06 ASSESSMENT — PAIN SCALES - GENERAL
PAINLEVEL_OUTOF10: 0
PAINLEVEL_OUTOF10: 0

## 2024-09-06 NOTE — PROGRESS NOTES
HEPATOBILIARY AND PANCREATIC SURGERY  DAILY PROGRESS NOTE  9/6/2024        Subjective:  Resting comfortably. Denies nausea/ vomiting or pain. On 2L NC sat 99%. Hb stable 7.5. HR in 90s. Corpack in place today.Tolerating tube feeds at goal. Glucose b/w 200-300.    Objective:    /67   Pulse 99   Temp 99.1 °F (37.3 °C) (Temporal)   Resp 20   Ht 1.88 m (6' 2\")   Wt 96.1 kg (211 lb 12.8 oz)   SpO2 99%   BMI 27.19 kg/m²     GENERAL:  Lying in bed. No acute distress   HEAD: No gross abnormalities   EYES: scleral icterus   LUNGS:  No increased work of breathing on 2L NC  CARDIOVASCULAR:  RR, normotensive   ABDOMEN:  Soft, non-tender,mild distension, R/L drain site stitched.  EXTREMITIES: mild LE edema  SKIN: Warm and dry    Assessment/Plan:  79 y.o. male with pancreatic head adenocarcinoma with duodenal obstruction s/p whipple procedure w portal vein reconstruction 8/14 - T3N2 (Stage III), delayed gastric emptying requiring parenteral nutrition s/p corepak placement 8/29    Plan  - ambulate, OOB  - Continue tube feeds thru Corpack  - follow labs and replace electrolytes  - PT/OT  -Okay for patient to have Ensure clears  -Plan for placement at [facility]  -Recommend 1 week F/U with     Electronically signed by Dedrick Larry DO on 9/6/2024 at 8:01 AM      Okay for patient to be discharged today  Okay for patient to have ice chips, sips of clears, and clear supplements, sips with medications  Absolutely no medications through the jejunal Corpak  Okay to continue tube feeds  If the Corpak is disconnected for any reason it will need to be flushed with 50 mL of water  No creon until off of tube feedings    Electronically signed by Demetrius Santoyo MD on 9/6/2024 at 12:44 PM

## 2024-09-06 NOTE — PROGRESS NOTES
[facility]  Internal Medicine Residency Program  Internal Medicine Consult - Progress Note    Patient:  Michael Garcia 79 y.o. male MRN: [id number]     Date of Service: 9/6/2024    Hospital Day: 24      Chief complaint: had no chief complaint listed for this encounter.    Reason for Consult: Diabetes management  Primacy Care Physician: Moshe Moran DO    Subjective     Overnight events: none    Patient was seen and examined at bedside. He had no complaints. He denied chest pain, abdominal pain or SOB.    Objective     Physical Exam:  Vitals: BP (!) 105/58   Pulse (!) 105   Temp 97.5 °F (36.4 °C) (Temporal)   Resp 21   Ht 1.88 m (6' 2\")   Wt 96.1 kg (211 lb 12.8 oz)   SpO2 99%   BMI 27.19 kg/m²     I & O - 24hr:   Intake/Output Summary (Last 24 hours) at 9/6/2024 1335  Last data filed at 9/6/2024 1000  Gross per 24 hour   Intake 1536.36 ml   Output 1400 ml   Net 136.36 ml     General Appearance: alert, appears stated age, and cooperative  HEENT:  Head: Normal, normocephalic, atraumatic.  Lung: clear to auscultation bilaterally  Heart: regular rate and rhythm, S1, S2 normal, no murmur, click, rub or gallop  Abdomen:  normal BS  Extremities:  extremities normal, atraumatic, no cyanosis or edema  Neurologic: Mental status: Alert, oriented, thought content appropriate    Pertinent Information & Imaging Studies, Consults   CBC:   Lab Results   Component Value Date/Time    WBC 8.2 09/06/2024 06:29 AM    RBC 2.46 09/06/2024 06:29 AM    HGB 7.5 09/06/2024 06:29 AM    HCT 25.1 09/06/2024 06:29 AM    .0 09/06/2024 06:29 AM    MCH 30.5 09/06/2024 06:29 AM    MCHC 29.9 09/06/2024 06:29 AM    RDW 18.8 09/06/2024 06:29 AM     09/06/2024 06:29 AM    MPV 10.6 09/06/2024 06:29 AM     BMP:    Lab Results   Component Value Date/Time     09/06/2024 06:29 AM    K 3.8 09/06/2024 06:29 AM    K 4.2 03/28/2021 06:18 AM     09/06/2024 06:29 AM    CO2 22 09/06/2024 06:29 AM    BUN  nightly  All other per primary team    Osorio Lyon MD, PGY-2  Attending physician: Dr. Jones

## 2024-09-06 NOTE — PLAN OF CARE
Problem: Discharge Planning  Goal: Discharge to home or other facility with appropriate resources  9/6/2024 0138 by Augusta Munoz RN  Outcome: Progressing     Problem: Safety - Adult  Goal: Free from fall injury  9/6/2024 0138 by Augusta Munoz RN  Outcome: Progressing     Problem: Pain  Goal: Verbalizes/displays adequate comfort level or baseline comfort level  9/6/2024 0138 by Augusta Munoz RN  Outcome: Progressing     Problem: Skin/Tissue Integrity  Goal: Absence of new skin breakdown  Description: 1.  Monitor for areas of redness and/or skin breakdown  2.  Assess vascular access sites hourly  3.  Every 4-6 hours minimum:  Change oxygen saturation probe site  4.  Every 4-6 hours:  If on nasal continuous positive airway pressure, respiratory therapy assess nares and determine need for appliance change or resting period.  9/6/2024 0138 by Augusta Munoz RN  Outcome: Progressing     Problem: ABCDS Injury Assessment  Goal: Absence of physical injury  Outcome: Progressing     Problem: Neurosensory - Adult  Goal: Achieves stable or improved neurological status  Outcome: Progressing     Problem: Respiratory - Adult  Goal: Achieves optimal ventilation and oxygenation  Outcome: Progressing     Problem: Cardiovascular - Adult  Goal: Maintains optimal cardiac output and hemodynamic stability  Outcome: Progressing     Problem: Cardiovascular - Adult  Goal: Absence of cardiac dysrhythmias or at baseline  Outcome: Progressing     Problem: Skin/Tissue Integrity - Adult  Goal: Skin integrity remains intact  Outcome: Progressing     Problem: Skin/Tissue Integrity - Adult  Goal: Incisions, wounds, or drain sites healing without S/S of infection  Outcome: Progressing     Problem: Musculoskeletal - Adult  Goal: Return mobility to safest level of function  Outcome: Progressing     Problem: Gastrointestinal - Adult  Goal: Minimal or absence of nausea and vomiting  Outcome: Progressing     Problem: Gastrointestinal - Adult  Goal: Maintains

## 2024-09-06 NOTE — DISCHARGE INSTR - DIET

## 2024-09-06 NOTE — PATIENT CARE CONFERENCE
P Quality Flow/Interdisciplinary Rounds Progress Note        Quality Flow Rounds held on September 5, 2024    Disciplines Attending:  Bedside Nurse, , , and Nursing Unit Leadership    Michael Garcia was admitted on 8/14/2024  5:28 AM    Anticipated Discharge Date:       Disposition:    Mike Score:  Mike Scale Score: 19    Readmission Risk              Risk of Unplanned Readmission:  38           Discussed patient goal for the day, patient clinical progression, and barriers to discharge.  The following Goal(s) of the Day/Commitment(s) have been identified:  Labs - Report Results and be able to start to drink liquids      Yana De La Garza RN  September 5, 2024

## 2024-09-06 NOTE — PROGRESS NOTES
[facility]  Internal Medicine Residency / House Medicine Service    Attending Physician Statement  I have discussed the case, including pertinent history and exam findings with the resident and the team.  I have seen and examined the patient and the key elements of the encounter have been performed by me.  I agree with the assessment, plan and orders as documented by the resident.      Hx of Whipple\"s  Up in chair   On Glucerna continuous feedings per Core pack  Dissolved pancreatic enzyme to be introduced  Continuing insulin adjustments      Remainder of medical problems as per resident note.      Elliot Jones MD FRCP [facility]  Internal Medicine Residency Faculty

## 2024-09-06 NOTE — PROGRESS NOTES
Nurse handoff called to Mirna at [facility]. All questions answered.    Electronically signed by Matilda Saldivar RN on 9/6/2024 at 5:11 PM

## 2024-09-06 NOTE — PROGRESS NOTES
Message sent to [facility] Dr. Larry re: Dietary reports a national shortage of Peptide Based High Protein tube feeds. Sent previous dietitian recommendations. Received response from Dr. Larry: \"will check with attending.\"    Electronically signed by Matilda Saldivar RN on 9/6/2024 at 2:12 PM

## 2024-09-06 NOTE — CARE COORDINATION
Transition of care update: Corpak with tube feeds. Tube feeds are now peptide based high protein. Hgb 7.5 and other labs noted. Electrolytes replaced as ordered. Ok for Ensure clears. Plan is [facility]) and pt is agreeable.  No pre cert is needed. EXEMPT was completed on 08/16. [facility]ulette form and transport envelope is with pt's soft chart. Called and updated Jane with [facility]. She is aware of new tube feed order. Jane said they can accept pt over the weekend and [facility] will need to have 3 bottles of tube feed come with pt.  Pt's nurse was updated and notified 3 bottles of tube feed are needed at [facility]. Cm/sw will follow.       1235  Spoke with Dr Santoyo and pt is ok for discharge. Notified Jane with [facility] and they can accept pt today but will not have pt's tube feed in until Monday and is asking for 4 bottles of tube. Met with pt and Razia in room. Razia will provide transportation. Pt nurse said electrolyte replacement may take up to 8pm to complete infusions. Messaged Jane with [facility] with transport time.     1250  Spoke with dietician and he will deliver requested tube feeds to the floor.     1315  Spoke with dietician and pt is to be on Peptide based (Vital AF 1.2) @ 65 ml/hr + 1 protein modular daily   Flush 150 ml water q 4 hrs.   Pt's nurse is messaging [facility] surgery to get order.   4 bottles of tube feeds were delivered to pt's room.

## 2024-09-06 NOTE — PATIENT CARE CONFERENCE
P Quality Flow/Interdisciplinary Rounds Progress Note        Quality Flow Rounds held on September 6, 2024    Disciplines Attending:  Bedside Nurse, , , and Nursing Unit Leadership    Michael Garcia was admitted on 8/14/2024  5:28 AM    Anticipated Discharge Date:       Disposition:    Mike Score:  Mike Scale Score: 19    Readmission Risk              Risk of Unplanned Readmission:  38           Discussed patient goal for the day, patient clinical progression, and barriers to discharge.  The following Goal(s) of the Day/Commitment(s) have been identified:  Diet Progression  tolerate supplements and take oral medication      Yana De La Garza RN  September 6, 2024

## 2024-09-07 LAB
MICROORGANISM SPEC CULT: NORMAL
MICROORGANISM/AGENT SPEC: NORMAL
SPECIMEN DESCRIPTION: NORMAL

## 2024-09-07 NOTE — PROGRESS NOTES
Patient IV and heart monitor removed for DC. All documented belongings packed up and sent with patient. X2 RN transported patient to significant other's car.

## 2024-09-07 NOTE — PLAN OF CARE
Problem: Discharge Planning  Goal: Discharge to home or other facility with appropriate resources  Outcome: Adequate for Discharge     Problem: Safety - Adult  Goal: Free from fall injury  9/6/2024 2201 by Joann Sarkar RN  Outcome: Adequate for Discharge  9/6/2024 1606 by Matilda Saldivar RN  Outcome: Progressing     Problem: Pain  Goal: Verbalizes/displays adequate comfort level or baseline comfort level  9/6/2024 2201 by Joann Sarkar RN  Outcome: Adequate for Discharge  9/6/2024 1606 by Matilda Saldivar RN  Outcome: Progressing  Flowsheets (Taken 9/6/2024 0814)  Verbalizes/displays adequate comfort level or baseline comfort level:   Encourage patient to monitor pain and request assistance   Assess pain using appropriate pain scale   Administer analgesics based on type and severity of pain and evaluate response   Implement non-pharmacological measures as appropriate and evaluate response   Consider cultural and social influences on pain and pain management     Problem: Skin/Tissue Integrity  Goal: Absence of new skin breakdown  Description: 1.  Monitor for areas of redness and/or skin breakdown  2.  Assess vascular access sites hourly  3.  Every 4-6 hours minimum:  Change oxygen saturation probe site  4.  Every 4-6 hours:  If on nasal continuous positive airway pressure, respiratory therapy assess nares and determine need for appliance change or resting period.  Outcome: Adequate for Discharge     Problem: ABCDS Injury Assessment  Goal: Absence of physical injury  Outcome: Adequate for Discharge     Problem: Neurosensory - Adult  Goal: Achieves stable or improved neurological status  Outcome: Adequate for Discharge  Goal: Absence of seizures  Outcome: Adequate for Discharge  Goal: Remains free of injury related to seizures activity  Outcome: Adequate for Discharge  Goal: Achieves maximal functionality and self care  Outcome: Adequate for Discharge     Problem: Respiratory - Adult  Goal: Achieves optimal  ventilation and oxygenation  Outcome: Adequate for Discharge     Problem: Cardiovascular - Adult  Goal: Maintains optimal cardiac output and hemodynamic stability  Outcome: Adequate for Discharge  Goal: Absence of cardiac dysrhythmias or at baseline  Outcome: Adequate for Discharge     Problem: Skin/Tissue Integrity - Adult  Goal: Skin integrity remains intact  Outcome: Adequate for Discharge  Goal: Incisions, wounds, or drain sites healing without S/S of infection  Outcome: Adequate for Discharge     Problem: Musculoskeletal - Adult  Goal: Return mobility to safest level of function  Outcome: Adequate for Discharge     Problem: Gastrointestinal - Adult  Goal: Minimal or absence of nausea and vomiting  Outcome: Adequate for Discharge  Goal: Maintains or returns to baseline bowel function  Outcome: Adequate for Discharge     Problem: Infection - Adult  Goal: Absence of infection at discharge  Outcome: Adequate for Discharge  Goal: Absence of infection during hospitalization  Outcome: Adequate for Discharge  Goal: Absence of fever/infection during anticipated neutropenic period  Outcome: Adequate for Discharge     Problem: Metabolic/Fluid and Electrolytes - Adult  Goal: Electrolytes maintained within normal limits  Outcome: Adequate for Discharge  Goal: Hemodynamic stability and optimal renal function maintained  Outcome: Adequate for Discharge  Goal: Glucose maintained within prescribed range  Outcome: Adequate for Discharge     Problem: Hematologic - Adult  Goal: Maintains hematologic stability  Outcome: Adequate for Discharge     Problem: Chronic Conditions and Co-morbidities  Goal: Patient's chronic conditions and co-morbidity symptoms are monitored and maintained or improved  Outcome: Adequate for Discharge     Problem: Nutrition Deficit:  Goal: Optimize nutritional status  Outcome: Adequate for Discharge

## 2024-09-09 ENCOUNTER — TELEPHONE (OUTPATIENT)
Dept: HEMATOLOGY | Age: 79
End: 2024-09-09

## 2024-09-12 ENCOUNTER — OFFICE VISIT (OUTPATIENT)
Dept: HEMATOLOGY | Age: 79
End: 2024-09-12
Payer: MEDICARE

## 2024-09-12 VITALS
WEIGHT: 200.1 LBS | TEMPERATURE: 97.7 F | BODY MASS INDEX: 25.68 KG/M2 | HEART RATE: 122 BPM | SYSTOLIC BLOOD PRESSURE: 100 MMHG | DIASTOLIC BLOOD PRESSURE: 59 MMHG | RESPIRATION RATE: 15 BRPM | HEIGHT: 74 IN | OXYGEN SATURATION: 97 %

## 2024-09-12 DIAGNOSIS — C25.9 ADENOCARCINOMA OF PANCREAS (HCC): Primary | ICD-10-CM

## 2024-09-12 PROCEDURE — 99212 OFFICE O/P EST SF 10 MIN: CPT | Performed by: TRANSPLANT SURGERY

## 2024-09-12 PROCEDURE — 99024 POSTOP FOLLOW-UP VISIT: CPT | Performed by: TRANSPLANT SURGERY

## 2024-09-12 RX ORDER — OXYCODONE AND ACETAMINOPHEN 5; 325 MG/1; MG/1
1 TABLET ORAL EVERY 6 HOURS PRN
COMMUNITY

## 2024-09-12 RX ORDER — LOSARTAN POTASSIUM 50 MG/1
50 TABLET ORAL DAILY
COMMUNITY

## 2024-09-13 ENCOUNTER — TELEPHONE (OUTPATIENT)
Dept: HEMATOLOGY | Age: 79
End: 2024-09-13

## 2024-09-13 RX ORDER — SODIUM CHLORIDE 0.9 % (FLUSH) 0.9 %
5-40 SYRINGE (ML) INJECTION PRN
Status: CANCELLED | OUTPATIENT
Start: 2024-09-13

## 2024-09-13 RX ORDER — SODIUM CHLORIDE 0.9 % (FLUSH) 0.9 %
5-40 SYRINGE (ML) INJECTION EVERY 12 HOURS SCHEDULED
Status: CANCELLED | OUTPATIENT
Start: 2024-09-13

## 2024-09-13 RX ORDER — SODIUM CHLORIDE 9 MG/ML
INJECTION, SOLUTION INTRAVENOUS PRN
Status: CANCELLED | OUTPATIENT
Start: 2024-09-13

## 2024-09-16 ENCOUNTER — TELEPHONE (OUTPATIENT)
Dept: HEMATOLOGY | Age: 79
End: 2024-09-16

## 2024-09-16 LAB — SURGICAL PATHOLOGY REPORT: NORMAL

## 2024-09-17 ENCOUNTER — ANESTHESIA EVENT (OUTPATIENT)
Dept: OPERATING ROOM | Age: 79
End: 2024-09-17
Payer: MEDICARE

## 2024-09-17 ENCOUNTER — ANESTHESIA (OUTPATIENT)
Dept: OPERATING ROOM | Age: 79
End: 2024-09-17
Payer: MEDICARE

## 2024-09-17 ENCOUNTER — APPOINTMENT (OUTPATIENT)
Dept: GENERAL RADIOLOGY | Age: 79
End: 2024-09-17
Attending: TRANSPLANT SURGERY
Payer: MEDICARE

## 2024-09-17 ENCOUNTER — HOSPITAL ENCOUNTER (OUTPATIENT)
Age: 79
Setting detail: OUTPATIENT SURGERY
Discharge: OTHER INSTITUTION | End: 2024-09-17
Attending: TRANSPLANT SURGERY | Admitting: TRANSPLANT SURGERY
Payer: MEDICARE

## 2024-09-17 VITALS
SYSTOLIC BLOOD PRESSURE: 98 MMHG | BODY MASS INDEX: 25.67 KG/M2 | TEMPERATURE: 96.9 F | WEIGHT: 200 LBS | DIASTOLIC BLOOD PRESSURE: 64 MMHG | RESPIRATION RATE: 16 BRPM | HEIGHT: 74 IN | OXYGEN SATURATION: 96 % | HEART RATE: 86 BPM

## 2024-09-17 DIAGNOSIS — Z01.818 PRE-OP TESTING: Primary | ICD-10-CM

## 2024-09-17 PROBLEM — C25.9 PANCREATIC CANCER (HCC): Status: RESOLVED | Noted: 2024-08-05 | Resolved: 2024-09-17

## 2024-09-17 PROBLEM — C25.9: Status: ACTIVE | Noted: 2024-09-17

## 2024-09-17 LAB — GLUCOSE BLD-MCNC: 196 MG/DL (ref 74–99)

## 2024-09-17 PROCEDURE — 82962 GLUCOSE BLOOD TEST: CPT

## 2024-09-17 PROCEDURE — 77001 FLUOROGUIDE FOR VEIN DEVICE: CPT | Performed by: TRANSPLANT SURGERY

## 2024-09-17 PROCEDURE — 2580000003 HC RX 258: Performed by: CLINICAL NURSE SPECIALIST

## 2024-09-17 PROCEDURE — 6360000002 HC RX W HCPCS

## 2024-09-17 PROCEDURE — 2709999900 HC NON-CHARGEABLE SUPPLY: Performed by: TRANSPLANT SURGERY

## 2024-09-17 PROCEDURE — 3700000000 HC ANESTHESIA ATTENDED CARE: Performed by: TRANSPLANT SURGERY

## 2024-09-17 PROCEDURE — 7100000010 HC PHASE II RECOVERY - FIRST 15 MIN: Performed by: TRANSPLANT SURGERY

## 2024-09-17 PROCEDURE — 6360000002 HC RX W HCPCS: Performed by: CLINICAL NURSE SPECIALIST

## 2024-09-17 PROCEDURE — 76937 US GUIDE VASCULAR ACCESS: CPT | Performed by: TRANSPLANT SURGERY

## 2024-09-17 PROCEDURE — A4217 STERILE WATER/SALINE, 500 ML: HCPCS | Performed by: TRANSPLANT SURGERY

## 2024-09-17 PROCEDURE — 7100000011 HC PHASE II RECOVERY - ADDTL 15 MIN: Performed by: TRANSPLANT SURGERY

## 2024-09-17 PROCEDURE — 3700000001 HC ADD 15 MINUTES (ANESTHESIA): Performed by: TRANSPLANT SURGERY

## 2024-09-17 PROCEDURE — 36561 INSERT TUNNELED CV CATH: CPT | Performed by: TRANSPLANT SURGERY

## 2024-09-17 PROCEDURE — 6360000002 HC RX W HCPCS: Performed by: TRANSPLANT SURGERY

## 2024-09-17 PROCEDURE — C1788 PORT, INDWELLING, IMP: HCPCS | Performed by: TRANSPLANT SURGERY

## 2024-09-17 PROCEDURE — 2580000003 HC RX 258: Performed by: TRANSPLANT SURGERY

## 2024-09-17 PROCEDURE — 2580000003 HC RX 258

## 2024-09-17 PROCEDURE — 3600000003 HC SURGERY LEVEL 3 BASE: Performed by: TRANSPLANT SURGERY

## 2024-09-17 PROCEDURE — 71045 X-RAY EXAM CHEST 1 VIEW: CPT

## 2024-09-17 PROCEDURE — 3600000013 HC SURGERY LEVEL 3 ADDTL 15MIN: Performed by: TRANSPLANT SURGERY

## 2024-09-17 DEVICE — VACCESS CT POWER-INJECTABLE IMPLANTABLE PORT (WITH SUTURE PLUGS) (8F)
Type: IMPLANTABLE DEVICE | Site: CHEST | Status: FUNCTIONAL
Brand: VACCESS

## 2024-09-17 RX ORDER — AMOXICILLIN 250 MG
2 CAPSULE ORAL DAILY
COMMUNITY

## 2024-09-17 RX ORDER — HEPARIN 100 UNIT/ML
SYRINGE INTRAVENOUS PRN
Status: DISCONTINUED | OUTPATIENT
Start: 2024-09-17 | End: 2024-09-17 | Stop reason: ALTCHOICE

## 2024-09-17 RX ORDER — ONDANSETRON 2 MG/ML
INJECTION INTRAMUSCULAR; INTRAVENOUS
Status: DISCONTINUED | OUTPATIENT
Start: 2024-09-17 | End: 2024-09-17 | Stop reason: SDUPTHER

## 2024-09-17 RX ORDER — SODIUM CHLORIDE 9 MG/ML
INJECTION, SOLUTION INTRAVENOUS PRN
Status: DISCONTINUED | OUTPATIENT
Start: 2024-09-17 | End: 2024-09-17 | Stop reason: HOSPADM

## 2024-09-17 RX ORDER — SODIUM CHLORIDE 9 MG/ML
INJECTION, SOLUTION INTRAVENOUS
Status: DISCONTINUED | OUTPATIENT
Start: 2024-09-17 | End: 2024-09-17 | Stop reason: SDUPTHER

## 2024-09-17 RX ORDER — BUPIVACAINE HYDROCHLORIDE 5 MG/ML
INJECTION, SOLUTION EPIDURAL; INTRACAUDAL PRN
Status: DISCONTINUED | OUTPATIENT
Start: 2024-09-17 | End: 2024-09-17 | Stop reason: ALTCHOICE

## 2024-09-17 RX ORDER — PHENYLEPHRINE HCL IN 0.9% NACL 1 MG/10 ML
SYRINGE (ML) INTRAVENOUS
Status: DISCONTINUED | OUTPATIENT
Start: 2024-09-17 | End: 2024-09-17 | Stop reason: SDUPTHER

## 2024-09-17 RX ORDER — SODIUM CHLORIDE 0.9 % (FLUSH) 0.9 %
5-40 SYRINGE (ML) INJECTION PRN
Status: DISCONTINUED | OUTPATIENT
Start: 2024-09-17 | End: 2024-09-17 | Stop reason: HOSPADM

## 2024-09-17 RX ORDER — FENTANYL CITRATE 50 UG/ML
INJECTION, SOLUTION INTRAMUSCULAR; INTRAVENOUS
Status: DISCONTINUED | OUTPATIENT
Start: 2024-09-17 | End: 2024-09-17 | Stop reason: SDUPTHER

## 2024-09-17 RX ORDER — LIDOCAINE HYDROCHLORIDE 20 MG/ML
INJECTION, SOLUTION INTRAVENOUS
Status: DISCONTINUED | OUTPATIENT
Start: 2024-09-17 | End: 2024-09-17 | Stop reason: SDUPTHER

## 2024-09-17 RX ORDER — SODIUM CHLORIDE 0.9 % (FLUSH) 0.9 %
5-40 SYRINGE (ML) INJECTION EVERY 12 HOURS SCHEDULED
Status: DISCONTINUED | OUTPATIENT
Start: 2024-09-17 | End: 2024-09-17 | Stop reason: HOSPADM

## 2024-09-17 RX ORDER — MIDAZOLAM HYDROCHLORIDE 1 MG/ML
INJECTION INTRAMUSCULAR; INTRAVENOUS
Status: DISCONTINUED | OUTPATIENT
Start: 2024-09-17 | End: 2024-09-17 | Stop reason: SDUPTHER

## 2024-09-17 RX ORDER — PROPOFOL 10 MG/ML
INJECTION, EMULSION INTRAVENOUS
Status: DISCONTINUED | OUTPATIENT
Start: 2024-09-17 | End: 2024-09-17 | Stop reason: SDUPTHER

## 2024-09-17 RX ADMIN — PROPOFOL 150 MCG/KG/MIN: 10 INJECTION, EMULSION INTRAVENOUS at 09:23

## 2024-09-17 RX ADMIN — FENTANYL CITRATE 50 MCG: 50 INJECTION, SOLUTION INTRAMUSCULAR; INTRAVENOUS at 09:23

## 2024-09-17 RX ADMIN — CEFAZOLIN 2000 MG: 2 INJECTION, POWDER, FOR SOLUTION INTRAMUSCULAR; INTRAVENOUS at 09:24

## 2024-09-17 RX ADMIN — Medication 100 MCG: at 09:27

## 2024-09-17 RX ADMIN — SODIUM CHLORIDE: 9 INJECTION, SOLUTION INTRAVENOUS at 09:06

## 2024-09-17 RX ADMIN — SODIUM CHLORIDE: 9 INJECTION, SOLUTION INTRAVENOUS at 09:05

## 2024-09-17 RX ADMIN — Medication 100 MCG: at 09:44

## 2024-09-17 RX ADMIN — Medication 200 MCG: at 09:39

## 2024-09-17 RX ADMIN — LIDOCAINE HYDROCHLORIDE 30 MG: 20 INJECTION, SOLUTION INTRAVENOUS at 09:23

## 2024-09-17 RX ADMIN — Medication 200 MCG: at 09:43

## 2024-09-17 RX ADMIN — Medication 100 MCG: at 09:29

## 2024-09-17 RX ADMIN — Medication 100 MCG: at 09:49

## 2024-09-17 RX ADMIN — Medication 200 MCG: at 09:33

## 2024-09-17 RX ADMIN — ONDANSETRON 4 MG: 2 INJECTION INTRAMUSCULAR; INTRAVENOUS at 09:24

## 2024-09-17 RX ADMIN — Medication 100 MCG: at 09:31

## 2024-09-17 RX ADMIN — MIDAZOLAM 2 MG: 1 INJECTION INTRAMUSCULAR; INTRAVENOUS at 09:09

## 2024-09-17 ASSESSMENT — PAIN - FUNCTIONAL ASSESSMENT: PAIN_FUNCTIONAL_ASSESSMENT: 0-10

## 2024-09-17 ASSESSMENT — PAIN DESCRIPTION - PAIN TYPE
TYPE: CHRONIC PAIN
TYPE: CHRONIC PAIN

## 2024-09-17 ASSESSMENT — PAIN DESCRIPTION - LOCATION
LOCATION: BACK
LOCATION: BACK

## 2024-09-17 ASSESSMENT — PAIN DESCRIPTION - ORIENTATION
ORIENTATION: LOWER
ORIENTATION: LOWER

## 2024-09-17 ASSESSMENT — PAIN SCALES - GENERAL
PAINLEVEL_OUTOF10: 6
PAINLEVEL_OUTOF10: 6

## 2024-09-17 ASSESSMENT — PAIN DESCRIPTION - FREQUENCY
FREQUENCY: CONTINUOUS
FREQUENCY: CONTINUOUS

## 2024-09-17 ASSESSMENT — PAIN DESCRIPTION - DESCRIPTORS
DESCRIPTORS: DISCOMFORT;ACHING
DESCRIPTORS: ACHING
DESCRIPTORS: DISCOMFORT;ACHING

## 2024-09-17 ASSESSMENT — PAIN DESCRIPTION - ONSET
ONSET: ON-GOING
ONSET: ON-GOING

## 2024-09-19 ENCOUNTER — OFFICE VISIT (OUTPATIENT)
Dept: HEMATOLOGY | Age: 79
End: 2024-09-19
Payer: MEDICARE

## 2024-09-19 VITALS
HEIGHT: 74 IN | SYSTOLIC BLOOD PRESSURE: 94 MMHG | TEMPERATURE: 97.3 F | WEIGHT: 197 LBS | BODY MASS INDEX: 25.28 KG/M2 | HEART RATE: 114 BPM | DIASTOLIC BLOOD PRESSURE: 60 MMHG | OXYGEN SATURATION: 97 % | RESPIRATION RATE: 15 BRPM

## 2024-09-19 DIAGNOSIS — C25.9 ADENOCARCINOMA OF PANCREAS (HCC): Primary | ICD-10-CM

## 2024-09-19 PROCEDURE — 99024 POSTOP FOLLOW-UP VISIT: CPT | Performed by: TRANSPLANT SURGERY

## 2024-09-19 PROCEDURE — 99212 OFFICE O/P EST SF 10 MIN: CPT | Performed by: TRANSPLANT SURGERY

## 2024-10-10 ENCOUNTER — OFFICE VISIT (OUTPATIENT)
Dept: HEMATOLOGY | Age: 79
End: 2024-10-10
Payer: MEDICARE

## 2024-10-10 ENCOUNTER — TELEPHONE (OUTPATIENT)
Dept: HEMATOLOGY | Age: 79
End: 2024-10-10

## 2024-10-10 VITALS
WEIGHT: 189 LBS | OXYGEN SATURATION: 100 % | BODY MASS INDEX: 24.26 KG/M2 | DIASTOLIC BLOOD PRESSURE: 71 MMHG | HEART RATE: 97 BPM | SYSTOLIC BLOOD PRESSURE: 107 MMHG | TEMPERATURE: 97.7 F | RESPIRATION RATE: 15 BRPM | HEIGHT: 74 IN

## 2024-10-10 DIAGNOSIS — C25.9 ADENOCARCINOMA OF PANCREAS, STAGE 3 (HCC): Primary | ICD-10-CM

## 2024-10-10 PROCEDURE — 99212 OFFICE O/P EST SF 10 MIN: CPT | Performed by: TRANSPLANT SURGERY

## 2024-10-10 RX ORDER — PANCRELIPASE 36000; 180000; 114000 [USP'U]/1; [USP'U]/1; [USP'U]/1
2 CAPSULE, DELAYED RELEASE PELLETS ORAL
Qty: 529.41 CAPSULE | Refills: 5 | Status: SHIPPED | OUTPATIENT
Start: 2024-10-10

## 2024-10-10 NOTE — TELEPHONE ENCOUNTER
The following was called in for the patient using the [employer] voucher    Creon 36,000 unit capsules  Take 2 capsules by mouth 3 times daily with meals  #100  0 refills    [id number]  RxPCN-[id number]  RxGrp-[id number]  RxID-[id number]  Suf-01    Electronically signed by Gale Cazares MA on 10/10/2024 at 2:16 PM

## 2024-10-10 NOTE — PATIENT INSTRUCTIONS
Staff members for Dr Santoyo, Dr Gardiner, and Luis E Arreola NP can be reached directly at [phone]

## 2024-10-10 NOTE — PROGRESS NOTES
Hepatobiliary and Pancreatic Surgery Progress Note    CC: Follow-up from surgery    Subjective: Patient states that he is doing well however he over ate and had emesis.  He also states that he is noting that there is oil in his stools.  He is having 1-2 bowel movements per day.  Overall though he is feeling much better.  He is getting around better.  Physical therapy and Occupational Therapy have stopped.    OBJECTIVE      Physical    /71 (Site: Right Upper Arm, Position: Sitting, Cuff Size: Large Adult)   Pulse 97   Temp 97.7 °F (36.5 °C)   Resp 15   Ht 1.88 m (6' 2\")   Wt 85.7 kg (189 lb)   SpO2 100%   BMI 24.27 kg/m²       General appearance: appears in no acute distress  Lungs:respiratory effort normal without accessory numbers  Heart: no pedal edema  Abdomen: soft, nondistended, nontympanic, no guarding, no peritoneal signs, normoactive bowel sounds, incisions healing well  Extremities: ROM normal    ASSESSMENT: Stage III pancreatic adenocarcinoma status post Whipple procedure with portal vein reconstruction due to duodenal obstruction 8/14/24, resolved delayed gastric emptying, exocrine pancreatic insufficiency    PLAN:    - we discussed the need for adjuvant chemotherapy  - he is seeing Dr. Dr. Burk who is planning on starting [place]/Abraxane  - okay to resume all activities  - he is drinking shakes about 2 a day plus eating.  -He continues to do well and looks like a typical postop Whipple patient  -Start Creon 36,000 units with meals  -I will see him back in 2 weeks, may need to adjust Creon dosing    Thank you Dr. Urias for the consultation and allowing me to take part in Mr. Garcia's care.    Please send a copy of my note to 'sanford Uiras and YING Burk    Electronically signed by Demetrius Santoyo MD on 10/10/2024 at 1:53 PM

## 2024-10-24 ENCOUNTER — OFFICE VISIT (OUTPATIENT)
Dept: HEMATOLOGY | Age: 79
End: 2024-10-24
Payer: MEDICARE

## 2024-10-24 VITALS
HEIGHT: 74 IN | DIASTOLIC BLOOD PRESSURE: 65 MMHG | SYSTOLIC BLOOD PRESSURE: 106 MMHG | RESPIRATION RATE: 15 BRPM | HEART RATE: 92 BPM | BODY MASS INDEX: 23.5 KG/M2 | OXYGEN SATURATION: 95 % | WEIGHT: 183.1 LBS | TEMPERATURE: 97.8 F

## 2024-10-24 DIAGNOSIS — C25.9 ADENOCARCINOMA OF PANCREAS (HCC): Primary | ICD-10-CM

## 2024-10-24 PROCEDURE — 99024 POSTOP FOLLOW-UP VISIT: CPT | Performed by: TRANSPLANT SURGERY

## 2024-10-24 PROCEDURE — 99212 OFFICE O/P EST SF 10 MIN: CPT | Performed by: TRANSPLANT SURGERY

## 2024-10-24 NOTE — PROGRESS NOTES
Hepatobiliary and Pancreatic Surgery Progress Note    CC: Follow-up from surgery    Subjective: Patient states that he is doing well and is having 1-2 bowel movements per day.  He has finished occupational and physical therapy.  He has started gem Abraxane.  He does feel bad a few days after chemotherapy.  He is taking stool softeners and also taking the creon.  He is having his ups and downs since starting chemotherapy.  He starts to feel fine and then chemotherapy starts again.    OBJECTIVE      Physical    /65   Pulse 92   Temp 97.8 °F (36.6 °C) (Temporal)   Resp 15   Ht 1.88 m (6' 2\")   Wt 83.1 kg (183 lb 1.6 oz)   SpO2 95%   BMI 23.51 kg/m²       General appearance: appears in no acute distress  Lungs:respiratory effort normal without accessory numbers  Heart: no pedal edema  Abdomen: soft, nondistended, nontympanic, no guarding, no peritoneal signs, normoactive bowel sounds, incisions healing well  Extremities: ROM normal    ASSESSMENT: Stage III pancreatic adenocarcinoma status post Whipple procedure with portal vein reconstruction due to duodenal obstruction 8/14/24, resolved delayed gastric emptying, exocrine pancreatic insufficiency    PLAN:    -Continue Creon  -Continue gemcitabine and Abraxane  - will [facility] to [facility] for [facility]    Thank you Dr. Urias for the consultation and allowing me to take part in Mr. Garcia's care.    Please send a copy of my note to Dr.'s ISSAC Urias and YING Burk    Electronically signed by Demetrius Santoyo MD on 10/24/2024 at 3:34 PM

## 2024-10-28 ENCOUNTER — TELEPHONE (OUTPATIENT)
Dept: HEMATOLOGY | Age: 79
End: 2024-10-28

## 2024-10-28 DIAGNOSIS — C25.9 ADENOCARCINOMA OF PANCREAS (HCC): Primary | ICD-10-CM

## 2024-10-28 NOTE — TELEPHONE ENCOUNTER
I called [facility] physical therapy department and spoke to Crystal and she stated they are currently out of the pamphlets for the free therapy for cancer patients.  She asked me to fax a referral to the PT department and they will reach out to the patient and get him scheduled for the program.  I called and spoke to Razia and I made her aware that I sent a referral to [facility] and they will be reaching out to get patient scheduled for  his physical therapy.  She verbalized understanding.    Electronically signed by Amber Schmitz RN on 10/28/2024 at 3:34 PM      Referral faxed over to [phone].

## 2024-11-04 RX ORDER — DOCUSATE SODIUM 50MG AND SENNOSIDES 8.6MG 8.6; 5 MG/1; MG/1
TABLET, FILM COATED ORAL
Qty: 42 TABLET | Refills: 0 | OUTPATIENT
Start: 2024-11-04

## 2024-11-27 NOTE — ANESTHESIA POSTPROCEDURE EVALUATION
Department of Anesthesiology  Postprocedure Note    Patient: Michael Garcia  MRN: [id number]  YOB: 1945  Date of evaluation: 8/14/2024    Procedure Summary       Date: 08/14/24 Room / Location: [address]    Anesthesia Start: 0722 Anesthesia Stop: 1520    Procedure: WHIPPLE with portal vein reconstruction, intraoperative US (Abdomen) Diagnosis:       Adenocarcinoma of pancreas (HCC)      (Adenocarcinoma of pancreas (HCC) [C25.9])    Surgeons: Demetrius Santoyo III, MD Responsible Provider: Lien Adrian MD    Anesthesia Type: General ASA Status: 4            Anesthesia Type: General    Lindsey Phase I: Lindsey Score: 8    Lindsey Phase II:      Anesthesia Post Evaluation    Patient location during evaluation: ICU  Patient participation: complete - patient participated  Level of consciousness: awake  Airway patency: patent  Nausea & Vomiting: no nausea and no vomiting  Cardiovascular status: hemodynamically stable  Respiratory status: acceptable  Hydration status: euvolemic  Pain management: adequate        No notable events documented.   no

## 2024-12-31 ENCOUNTER — APPOINTMENT (OUTPATIENT)
Dept: CT IMAGING | Age: 79
DRG: 314 | End: 2024-12-31
Payer: MEDICARE

## 2024-12-31 ENCOUNTER — APPOINTMENT (OUTPATIENT)
Dept: GENERAL RADIOLOGY | Age: 79
DRG: 314 | End: 2024-12-31
Payer: MEDICARE

## 2024-12-31 ENCOUNTER — HOSPITAL ENCOUNTER (INPATIENT)
Age: 79
LOS: 13 days | Discharge: NURSING, REHAB OR LONG-TERM CARE | DRG: 314 | End: 2025-01-13
Attending: STUDENT IN AN ORGANIZED HEALTH CARE EDUCATION/TRAINING PROGRAM | Admitting: INTERNAL MEDICINE
Payer: MEDICARE

## 2024-12-31 DIAGNOSIS — R00.0 TACHYCARDIA: ICD-10-CM

## 2024-12-31 DIAGNOSIS — R78.81 BACTEREMIA: ICD-10-CM

## 2024-12-31 DIAGNOSIS — U07.1 COVID-19: Primary | ICD-10-CM

## 2024-12-31 DIAGNOSIS — K76.9 LESION OF LIVER: ICD-10-CM

## 2024-12-31 DIAGNOSIS — R60.0 BILATERAL LOWER EXTREMITY EDEMA: ICD-10-CM

## 2024-12-31 DIAGNOSIS — N50.89 SCROTAL SWELLING: ICD-10-CM

## 2024-12-31 DIAGNOSIS — A41.9 SEPTICEMIA (HCC): ICD-10-CM

## 2024-12-31 LAB
ALBUMIN SERPL-MCNC: 2.7 G/DL (ref 3.5–5.2)
ALP SERPL-CCNC: 159 U/L (ref 40–129)
ALT SERPL-CCNC: 15 U/L (ref 0–40)
AMMONIA PLAS-SCNC: 18 UMOL/L (ref 16–60)
ANION GAP SERPL CALCULATED.3IONS-SCNC: 12 MMOL/L (ref 7–16)
AST SERPL-CCNC: 26 U/L (ref 0–39)
B PARAP IS1001 DNA NPH QL NAA+NON-PROBE: NOT DETECTED
B PERT DNA SPEC QL NAA+PROBE: NOT DETECTED
BASOPHILS # BLD: 0 K/UL (ref 0–0.2)
BASOPHILS NFR BLD: 0 % (ref 0–2)
BILIRUB SERPL-MCNC: 1.7 MG/DL (ref 0–1.2)
BILIRUB UR QL STRIP: NEGATIVE
BNP SERPL-MCNC: 293 PG/ML (ref 0–450)
BUN SERPL-MCNC: 8 MG/DL (ref 6–23)
C PNEUM DNA NPH QL NAA+NON-PROBE: NOT DETECTED
CALCIUM SERPL-MCNC: 8.1 MG/DL (ref 8.6–10.2)
CHLORIDE SERPL-SCNC: 101 MMOL/L (ref 98–107)
CLARITY UR: CLEAR
CO2 SERPL-SCNC: 23 MMOL/L (ref 22–29)
COLOR UR: YELLOW
CREAT SERPL-MCNC: 0.5 MG/DL (ref 0.7–1.2)
EOSINOPHIL # BLD: 0 K/UL (ref 0.05–0.5)
EOSINOPHILS RELATIVE PERCENT: 0 % (ref 0–6)
ERYTHROCYTE [DISTWIDTH] IN BLOOD BY AUTOMATED COUNT: 14.9 % (ref 11.5–15)
FLUAV RNA NPH QL NAA+NON-PROBE: NOT DETECTED
FLUBV RNA NPH QL NAA+NON-PROBE: NOT DETECTED
GFR, ESTIMATED: >90 ML/MIN/1.73M2
GLUCOSE BLD-MCNC: 127 MG/DL
GLUCOSE BLD-MCNC: 127 MG/DL (ref 74–99)
GLUCOSE SERPL-MCNC: 141 MG/DL (ref 74–99)
GLUCOSE UR STRIP-MCNC: >=1000 MG/DL
HADV DNA NPH QL NAA+NON-PROBE: NOT DETECTED
HCOV 229E RNA NPH QL NAA+NON-PROBE: NOT DETECTED
HCOV HKU1 RNA NPH QL NAA+NON-PROBE: NOT DETECTED
HCOV NL63 RNA NPH QL NAA+NON-PROBE: NOT DETECTED
HCOV OC43 RNA NPH QL NAA+NON-PROBE: NOT DETECTED
HCT VFR BLD AUTO: 28.8 % (ref 37–54)
HGB BLD-MCNC: 9.4 G/DL (ref 12.5–16.5)
HGB UR QL STRIP.AUTO: NEGATIVE
HMPV RNA NPH QL NAA+NON-PROBE: NOT DETECTED
HPIV1 RNA NPH QL NAA+NON-PROBE: NOT DETECTED
HPIV2 RNA NPH QL NAA+NON-PROBE: NOT DETECTED
HPIV3 RNA NPH QL NAA+NON-PROBE: NOT DETECTED
HPIV4 RNA NPH QL NAA+NON-PROBE: NOT DETECTED
KETONES UR STRIP-MCNC: 15 MG/DL
LACTATE BLDV-SCNC: 1.5 MMOL/L (ref 0.5–1.9)
LACTATE BLDV-SCNC: 2 MMOL/L (ref 0.5–1.9)
LEUKOCYTE ESTERASE UR QL STRIP: NEGATIVE
LYMPHOCYTES NFR BLD: 0.06 K/UL (ref 1.5–4)
LYMPHOCYTES RELATIVE PERCENT: 4 % (ref 20–42)
M PNEUMO DNA NPH QL NAA+NON-PROBE: NOT DETECTED
MAGNESIUM SERPL-MCNC: 1.6 MG/DL (ref 1.6–2.6)
MCH RBC QN AUTO: 32.8 PG (ref 26–35)
MCHC RBC AUTO-ENTMCNC: 32.6 G/DL (ref 32–34.5)
MCV RBC AUTO: 100.3 FL (ref 80–99.9)
MONOCYTES NFR BLD: 0.05 K/UL (ref 0.1–0.95)
MONOCYTES NFR BLD: 3 % (ref 2–12)
NEUTROPHILS NFR BLD: 94 % (ref 43–80)
NEUTS SEG NFR BLD: 1.69 K/UL (ref 1.8–7.3)
NITRITE UR QL STRIP: NEGATIVE
PH UR STRIP: 6 [PH] (ref 5–9)
PLATELET # BLD AUTO: 225 K/UL (ref 130–450)
PMV BLD AUTO: 9.5 FL (ref 7–12)
POTASSIUM SERPL-SCNC: 4 MMOL/L (ref 3.5–5)
PROCALCITONIN SERPL-MCNC: 3.8 NG/ML (ref 0–0.08)
PROT SERPL-MCNC: 5.6 G/DL (ref 6.4–8.3)
PROT UR STRIP-MCNC: ABNORMAL MG/DL
RBC # BLD AUTO: 2.87 M/UL (ref 3.8–5.8)
RBC # BLD: ABNORMAL 10*6/UL
RBC #/AREA URNS HPF: ABNORMAL /HPF
RSV RNA NPH QL NAA+NON-PROBE: NOT DETECTED
RV+EV RNA NPH QL NAA+NON-PROBE: NOT DETECTED
SARS-COV-2 RNA NPH QL NAA+NON-PROBE: DETECTED
SODIUM SERPL-SCNC: 136 MMOL/L (ref 132–146)
SP GR UR STRIP: 1.01 (ref 1–1.03)
SPECIMEN DESCRIPTION: ABNORMAL
TROPONIN I SERPL HS-MCNC: 23 NG/L (ref 0–11)
TROPONIN I SERPL HS-MCNC: 23 NG/L (ref 0–11)
UROBILINOGEN UR STRIP-ACNC: 1 EU/DL (ref 0–1)
WBC #/AREA URNS HPF: ABNORMAL /HPF
WBC OTHER # BLD: 1.8 K/UL (ref 4.5–11.5)

## 2024-12-31 PROCEDURE — 6360000004 HC RX CONTRAST MEDICATION: Performed by: RADIOLOGY

## 2024-12-31 PROCEDURE — 2060000000 HC ICU INTERMEDIATE R&B

## 2024-12-31 PROCEDURE — 71045 X-RAY EXAM CHEST 1 VIEW: CPT

## 2024-12-31 PROCEDURE — 2580000003 HC RX 258: Performed by: INTERNAL MEDICINE

## 2024-12-31 PROCEDURE — 87040 BLOOD CULTURE FOR BACTERIA: CPT

## 2024-12-31 PROCEDURE — 85025 COMPLETE CBC W/AUTO DIFF WBC: CPT

## 2024-12-31 PROCEDURE — 83605 ASSAY OF LACTIC ACID: CPT

## 2024-12-31 PROCEDURE — 83735 ASSAY OF MAGNESIUM: CPT

## 2024-12-31 PROCEDURE — 87077 CULTURE AEROBIC IDENTIFY: CPT

## 2024-12-31 PROCEDURE — 96365 THER/PROPH/DIAG IV INF INIT: CPT

## 2024-12-31 PROCEDURE — 83880 ASSAY OF NATRIURETIC PEPTIDE: CPT

## 2024-12-31 PROCEDURE — 6360000002 HC RX W HCPCS: Performed by: INTERNAL MEDICINE

## 2024-12-31 PROCEDURE — 84484 ASSAY OF TROPONIN QUANT: CPT

## 2024-12-31 PROCEDURE — 6370000000 HC RX 637 (ALT 250 FOR IP): Performed by: INTERNAL MEDICINE

## 2024-12-31 PROCEDURE — 80053 COMPREHEN METABOLIC PANEL: CPT

## 2024-12-31 PROCEDURE — 2500000003 HC RX 250 WO HCPCS: Performed by: INTERNAL MEDICINE

## 2024-12-31 PROCEDURE — 96366 THER/PROPH/DIAG IV INF ADDON: CPT

## 2024-12-31 PROCEDURE — 2580000003 HC RX 258: Performed by: STUDENT IN AN ORGANIZED HEALTH CARE EDUCATION/TRAINING PROGRAM

## 2024-12-31 PROCEDURE — 82140 ASSAY OF AMMONIA: CPT

## 2024-12-31 PROCEDURE — 87899 AGENT NOS ASSAY W/OPTIC: CPT

## 2024-12-31 PROCEDURE — 82962 GLUCOSE BLOOD TEST: CPT

## 2024-12-31 PROCEDURE — 2580000003 HC RX 258

## 2024-12-31 PROCEDURE — XW033E5 INTRODUCTION OF REMDESIVIR ANTI-INFECTIVE INTO PERIPHERAL VEIN, PERCUTANEOUS APPROACH, NEW TECHNOLOGY GROUP 5: ICD-10-PCS | Performed by: INTERNAL MEDICINE

## 2024-12-31 PROCEDURE — 96367 TX/PROPH/DG ADDL SEQ IV INF: CPT

## 2024-12-31 PROCEDURE — 96368 THER/DIAG CONCURRENT INF: CPT

## 2024-12-31 PROCEDURE — 74177 CT ABD & PELVIS W/CONTRAST: CPT

## 2024-12-31 PROCEDURE — 87150 DNA/RNA AMPLIFIED PROBE: CPT

## 2024-12-31 PROCEDURE — 0202U NFCT DS 22 TRGT SARS-COV-2: CPT

## 2024-12-31 PROCEDURE — 87449 NOS EACH ORGANISM AG IA: CPT

## 2024-12-31 PROCEDURE — 87081 CULTURE SCREEN ONLY: CPT

## 2024-12-31 PROCEDURE — 99285 EMERGENCY DEPT VISIT HI MDM: CPT

## 2024-12-31 PROCEDURE — 93005 ELECTROCARDIOGRAM TRACING: CPT

## 2024-12-31 PROCEDURE — 99222 1ST HOSP IP/OBS MODERATE 55: CPT | Performed by: INTERNAL MEDICINE

## 2024-12-31 PROCEDURE — 6360000002 HC RX W HCPCS

## 2024-12-31 PROCEDURE — 81001 URINALYSIS AUTO W/SCOPE: CPT

## 2024-12-31 PROCEDURE — 6360000002 HC RX W HCPCS: Performed by: STUDENT IN AN ORGANIZED HEALTH CARE EDUCATION/TRAINING PROGRAM

## 2024-12-31 PROCEDURE — 84145 PROCALCITONIN (PCT): CPT

## 2024-12-31 RX ORDER — DEXTROSE MONOHYDRATE 100 MG/ML
INJECTION, SOLUTION INTRAVENOUS CONTINUOUS PRN
Status: DISCONTINUED | OUTPATIENT
Start: 2024-12-31 | End: 2025-01-13 | Stop reason: HOSPADM

## 2024-12-31 RX ORDER — GLIPIZIDE 5 MG/1
10 TABLET ORAL
Status: DISCONTINUED | OUTPATIENT
Start: 2024-12-31 | End: 2025-01-06

## 2024-12-31 RX ORDER — ONDANSETRON 2 MG/ML
4 INJECTION INTRAMUSCULAR; INTRAVENOUS EVERY 6 HOURS PRN
Status: DISCONTINUED | OUTPATIENT
Start: 2024-12-31 | End: 2025-01-13 | Stop reason: HOSPADM

## 2024-12-31 RX ORDER — DOXYCYCLINE 100 MG/1
100 CAPSULE ORAL EVERY 12 HOURS SCHEDULED
Status: DISCONTINUED | OUTPATIENT
Start: 2024-12-31 | End: 2025-01-01 | Stop reason: ALTCHOICE

## 2024-12-31 RX ORDER — 0.9 % SODIUM CHLORIDE 0.9 %
30 INTRAVENOUS SOLUTION INTRAVENOUS PRN
Status: DISCONTINUED | OUTPATIENT
Start: 2024-12-31 | End: 2025-01-06

## 2024-12-31 RX ORDER — ACETAMINOPHEN 650 MG/1
650 SUPPOSITORY RECTAL EVERY 6 HOURS PRN
Status: DISCONTINUED | OUTPATIENT
Start: 2024-12-31 | End: 2025-01-13 | Stop reason: HOSPADM

## 2024-12-31 RX ORDER — HYDRALAZINE HYDROCHLORIDE 20 MG/ML
10 INJECTION INTRAMUSCULAR; INTRAVENOUS EVERY 6 HOURS PRN
Status: DISCONTINUED | OUTPATIENT
Start: 2024-12-31 | End: 2025-01-13 | Stop reason: HOSPADM

## 2024-12-31 RX ORDER — SODIUM CHLORIDE 0.9 % (FLUSH) 0.9 %
5-40 SYRINGE (ML) INJECTION PRN
Status: DISCONTINUED | OUTPATIENT
Start: 2024-12-31 | End: 2025-01-13 | Stop reason: HOSPADM

## 2024-12-31 RX ORDER — ASPIRIN 81 MG/1
81 TABLET ORAL DAILY
Status: ON HOLD | COMMUNITY
End: 2025-01-13 | Stop reason: HOSPADM

## 2024-12-31 RX ORDER — CALCIUM CARBONATE 500 MG/1
500 TABLET, CHEWABLE ORAL 3 TIMES DAILY PRN
Status: DISCONTINUED | OUTPATIENT
Start: 2024-12-31 | End: 2025-01-13 | Stop reason: HOSPADM

## 2024-12-31 RX ORDER — SODIUM CHLORIDE 9 MG/ML
INJECTION, SOLUTION INTRAVENOUS PRN
Status: DISCONTINUED | OUTPATIENT
Start: 2024-12-31 | End: 2025-01-06

## 2024-12-31 RX ORDER — IOPAMIDOL 755 MG/ML
75 INJECTION, SOLUTION INTRAVASCULAR
Status: COMPLETED | OUTPATIENT
Start: 2024-12-31 | End: 2024-12-31

## 2024-12-31 RX ORDER — TAMSULOSIN HYDROCHLORIDE 0.4 MG/1
0.4 CAPSULE ORAL DAILY
Status: DISCONTINUED | OUTPATIENT
Start: 2025-01-01 | End: 2025-01-13 | Stop reason: HOSPADM

## 2024-12-31 RX ORDER — ONDANSETRON 4 MG/1
4 TABLET, ORALLY DISINTEGRATING ORAL EVERY 8 HOURS PRN
Status: DISCONTINUED | OUTPATIENT
Start: 2024-12-31 | End: 2025-01-13 | Stop reason: HOSPADM

## 2024-12-31 RX ORDER — METOPROLOL SUCCINATE 50 MG/1
25 TABLET, EXTENDED RELEASE ORAL DAILY
Status: DISCONTINUED | OUTPATIENT
Start: 2025-01-01 | End: 2025-01-07

## 2024-12-31 RX ORDER — MONTELUKAST SODIUM 10 MG/1
10 TABLET ORAL NIGHTLY
Status: DISCONTINUED | OUTPATIENT
Start: 2024-12-31 | End: 2025-01-13 | Stop reason: HOSPADM

## 2024-12-31 RX ORDER — BENZONATATE 100 MG/1
100 CAPSULE ORAL 3 TIMES DAILY PRN
Status: DISCONTINUED | OUTPATIENT
Start: 2024-12-31 | End: 2025-01-13 | Stop reason: HOSPADM

## 2024-12-31 RX ORDER — POLYVINYL ALCOHOL 14 MG/ML
1 SOLUTION/ DROPS OPHTHALMIC PRN
Status: DISCONTINUED | OUTPATIENT
Start: 2024-12-31 | End: 2025-01-13 | Stop reason: HOSPADM

## 2024-12-31 RX ORDER — POLYETHYLENE GLYCOL 3350 17 G/17G
17 POWDER, FOR SOLUTION ORAL DAILY PRN
Status: DISCONTINUED | OUTPATIENT
Start: 2024-12-31 | End: 2025-01-11

## 2024-12-31 RX ORDER — ACETAMINOPHEN 325 MG/1
650 TABLET ORAL EVERY 6 HOURS PRN
Status: DISCONTINUED | OUTPATIENT
Start: 2024-12-31 | End: 2025-01-13 | Stop reason: HOSPADM

## 2024-12-31 RX ORDER — POTASSIUM CHLORIDE 1500 MG/1
40 TABLET, EXTENDED RELEASE ORAL PRN
Status: DISCONTINUED | OUTPATIENT
Start: 2024-12-31 | End: 2025-01-13 | Stop reason: HOSPADM

## 2024-12-31 RX ORDER — SODIUM CHLORIDE 9 MG/ML
INJECTION, SOLUTION INTRAVENOUS CONTINUOUS
Status: DISCONTINUED | OUTPATIENT
Start: 2024-12-31 | End: 2025-01-02

## 2024-12-31 RX ORDER — FINASTERIDE 5 MG/1
5 TABLET, FILM COATED ORAL DAILY
Status: DISCONTINUED | OUTPATIENT
Start: 2025-01-01 | End: 2025-01-13 | Stop reason: HOSPADM

## 2024-12-31 RX ORDER — 0.9 % SODIUM CHLORIDE 0.9 %
30 INTRAVENOUS SOLUTION INTRAVENOUS ONCE
Status: COMPLETED | OUTPATIENT
Start: 2024-12-31 | End: 2024-12-31

## 2024-12-31 RX ORDER — GLUCAGON 1 MG/ML
1 KIT INJECTION PRN
Status: DISCONTINUED | OUTPATIENT
Start: 2024-12-31 | End: 2025-01-13 | Stop reason: HOSPADM

## 2024-12-31 RX ORDER — SODIUM CHLORIDE 0.9 % (FLUSH) 0.9 %
10 SYRINGE (ML) INJECTION
Status: ACTIVE | OUTPATIENT
Start: 2024-12-31 | End: 2025-01-01

## 2024-12-31 RX ORDER — ENOXAPARIN SODIUM 100 MG/ML
40 INJECTION SUBCUTANEOUS DAILY
Status: DISCONTINUED | OUTPATIENT
Start: 2025-01-01 | End: 2025-01-02

## 2024-12-31 RX ORDER — LOSARTAN POTASSIUM 50 MG/1
50 TABLET ORAL DAILY
Status: DISCONTINUED | OUTPATIENT
Start: 2024-12-31 | End: 2024-12-31

## 2024-12-31 RX ORDER — SENNA AND DOCUSATE SODIUM 50; 8.6 MG/1; MG/1
2 TABLET, FILM COATED ORAL DAILY
Status: DISCONTINUED | OUTPATIENT
Start: 2025-01-01 | End: 2025-01-13 | Stop reason: HOSPADM

## 2024-12-31 RX ORDER — SODIUM CHLORIDE 0.9 % (FLUSH) 0.9 %
5-40 SYRINGE (ML) INJECTION EVERY 12 HOURS SCHEDULED
Status: DISCONTINUED | OUTPATIENT
Start: 2024-12-31 | End: 2025-01-08 | Stop reason: SDUPTHER

## 2024-12-31 RX ORDER — POTASSIUM CHLORIDE 7.45 MG/ML
10 INJECTION INTRAVENOUS PRN
Status: DISCONTINUED | OUTPATIENT
Start: 2024-12-31 | End: 2025-01-13 | Stop reason: HOSPADM

## 2024-12-31 RX ORDER — SODIUM CHLORIDE 0.9 % (FLUSH) 0.9 %
5-40 SYRINGE (ML) INJECTION PRN
Status: DISCONTINUED | OUTPATIENT
Start: 2024-12-31 | End: 2025-01-06

## 2024-12-31 RX ORDER — MAGNESIUM SULFATE IN WATER 40 MG/ML
2000 INJECTION, SOLUTION INTRAVENOUS PRN
Status: DISCONTINUED | OUTPATIENT
Start: 2024-12-31 | End: 2025-01-13 | Stop reason: HOSPADM

## 2024-12-31 RX ORDER — SODIUM CHLORIDE 9 MG/ML
INJECTION, SOLUTION INTRAVENOUS PRN
Status: DISCONTINUED | OUTPATIENT
Start: 2024-12-31 | End: 2025-01-08 | Stop reason: SDUPTHER

## 2024-12-31 RX ORDER — SODIUM CHLORIDE 0.9 % (FLUSH) 0.9 %
5-40 SYRINGE (ML) INJECTION EVERY 12 HOURS SCHEDULED
Status: DISCONTINUED | OUTPATIENT
Start: 2024-12-31 | End: 2025-01-06

## 2024-12-31 RX ADMIN — SODIUM CHLORIDE: 9 INJECTION, SOLUTION INTRAVENOUS at 16:00

## 2024-12-31 RX ADMIN — SODIUM CHLORIDE 2493 ML: 9 INJECTION, SOLUTION INTRAVENOUS at 13:29

## 2024-12-31 RX ADMIN — IOPAMIDOL 75 ML: 755 INJECTION, SOLUTION INTRAVENOUS at 16:46

## 2024-12-31 RX ADMIN — REMDESIVIR 200 MG: 100 INJECTION, POWDER, LYOPHILIZED, FOR SOLUTION INTRAVENOUS at 17:52

## 2024-12-31 RX ADMIN — PIPERACILLIN AND TAZOBACTAM 4500 MG: 4; .5 INJECTION, POWDER, LYOPHILIZED, FOR SOLUTION INTRAVENOUS at 13:37

## 2024-12-31 RX ADMIN — WATER 2000 MG: 1 INJECTION INTRAMUSCULAR; INTRAVENOUS; SUBCUTANEOUS at 22:36

## 2024-12-31 RX ADMIN — VANCOMYCIN HYDROCHLORIDE 1750 MG: 10 INJECTION, POWDER, LYOPHILIZED, FOR SOLUTION INTRAVENOUS at 13:32

## 2024-12-31 RX ADMIN — DOXYCYCLINE HYCLATE 100 MG: 100 CAPSULE ORAL at 22:36

## 2024-12-31 ASSESSMENT — LIFESTYLE VARIABLES: HOW OFTEN DO YOU HAVE A DRINK CONTAINING ALCOHOL: NEVER

## 2024-12-31 NOTE — ED PROVIDER NOTES
[facility] EMERGENCY DEPARTMENT  EMERGENCY DEPARTMENT ENCOUNTER    Pt Name: Michael Garcia  MRN: [id number]  Birthdate 1945  Date of evaluation: 12/31/2024  Provider: Arley Seay MD  PCP: Moshe Moran DO  Note Started: 8:41 PM EST 12/31/24    HPI     I personally saw Michael Garcia and made/approved the management plan and take responsibility for the patient management.  This will serve as my supervisory note and shared attestation.  I did perform a substantiative portion of the visit including all aspects of the medical decision making.    In brief, Michael Garcia is a 79 y.o. that presents to the emergency department patient presents for fatigue.  Patient does have history of adenocarcinoma of cancer.  Patient is deaf.    Nursing Notes were all reviewed and agreed with or any disagreements were addressed in the HPI.              MDM  Number of Diagnoses or Management Options     Amount and/or Complexity of Data Reviewed  Decide to obtain previous medical records or to obtain history from someone other than the patient: yes         All diagnostic, treatment, and disposition decisions were made by myself in conjunction with the resident.  I also supervised key portions of any procedures performed by the resident.  For all further details of the patient's emergency department visit, please see their documentation.    ED Course as of 12/31/24 2041   Tue Dec 31, 2024   1245 EKG read and interpreted by me rate 131 bpm normal axis.  Irregularly irregular rhythm.  Nonspecific T wave abnormality.  Compared to prior EKG on August 23, 2024 with significant changes [JM]      ED Course User Index  [JM] Arley Seay MD      Patient is a 79 y.o. male presenting with patient did have labs drawn.  Patient's, positive.  Patient's blood count significantly elevated.  Patient to get a CBC with a white count of 1.8.  Lactic acid is negative.  UA is negative.  Patient

## 2024-12-31 NOTE — H&P
12:12 PM   Result Value Ref Range    Troponin, High Sensitivity 23 (H) 0 - 11 ng/L   Brain Natriuretic Peptide    Collection Time: 12/31/24 12:12 PM   Result Value Ref Range    NT Pro- 0 - 450 pg/mL   Magnesium    Collection Time: 12/31/24 12:12 PM   Result Value Ref Range    Magnesium 1.6 1.6 - 2.6 mg/dL   Procalcitonin    Collection Time: 12/31/24 12:12 PM   Result Value Ref Range    Procalcitonin 3.80 (H) 0.00 - 0.08 ng/mL   Ammonia    Collection Time: 12/31/24 12:12 PM   Result Value Ref Range    Ammonia 18 16.0 - 60.0 umol/L   Respiratory Panel, Molecular, with COVID-19 (Restricted: peds pts or suitable admitted adults)    Collection Time: 12/31/24 12:15 PM    Specimen: Nasopharyngeal Swab   Result Value Ref Range    Specimen Description .NASOPHARYNGEAL SWAB     Adenovirus PCR Not Detected Not Detected    Coronavirus 229E PCR Not Detected Not Detected    Coronavirus HKU1 PCR Not Detected Not Detected    Coronavirus NL63 PCR Not Detected Not Detected    Coronavirus OC43 PCR Not Detected Not Detected    SARS-CoV-2, PCR DETECTED (A) Not Detected    Human Metapneumovirus PCR Not Detected Not Detected    Rhino/Enterovirus PCR Not Detected Not Detected    Influenza A by PCR Not Detected Not Detected    Influenza B by PCR Not Detected Not Detected    Parainfluenza 1 PCR Not Detected Not Detected    Parainfluenza 2 PCR Not Detected Not Detected    Parainfluenza 3 PCR Not Detected Not Detected    Parainfluenza 4 PCR Not Detected Not Detected    Resp Syncytial Virus PCR Not Detected Not Detected    Bordetella parapertussis by PCR Not Detected Not Detected    B Pertussis by PCR Not Detected Not Detected    Chlamydia pneumoniae By PCR Not Detected Not Detected    Mycoplasma pneumo by PCR Not Detected Not Detected   Lactate, Sepsis    Collection Time: 12/31/24  2:15 PM   Result Value Ref Range    Lactic Acid, Sepsis 1.5 0.5 - 1.9 mmol/L   Troponin    Collection Time: 12/31/24  2:15 PM   Result Value Ref Range

## 2024-12-31 NOTE — ED PROVIDER NOTES
[facility] EMERGENCY DEPARTMENT  EMERGENCY DEPARTMENT ENCOUNTER        Pt Name: Michael Garcia  MRN: [id number]  Birthdate 1945  Date of evaluation: 12/31/2024  Provider: Wil Ballesteros MD  Attending Provider: Cammie Alberts DO  PCP: Moshe Moran DO  Note Started: 11:59 AM EST 12/31/24    CHIEF COMPLAINT       Chief Complaint   Patient presents with    Fatigue     Pt has stage 3 pancreatic cancer and has been having cold and flu symptoms. Family called [facility] and were told to just watch him at home but pt has gotten increasingly weak and is now unable to ambulate.        HISTORY OF PRESENT ILLNESS: 1 or more Elements   History From: The patient        Michael Garcia is a 79 y.o. male with a past medical history of diabetes, hypertension, pancreatic adenocarcinoma status post Whipple presenting with fatigue.  Patient is reportedly increasingly weak and is to the point where he is unable to ambulate at home.  They are concerned for possible infection.  They called the [facility].  Patient also reportedly having cold and flulike symptoms including cough and nasal congestion.    Nursing Notes were all reviewed and agreed with or any disagreements were addressed in the HPI.      REVIEW OF EXTERNAL NOTE :           PDMP Monitoring:    Last PDMP [place] as Reviewed:  Review User Review Instant Review Result          Last Controlled Substance Monitoring Documentation      Flowsheet Row ED from 9/1/2022 in [facility] Emergency Department   Comments --  [see note] filed at 09/01/2022 1805          Urine Drug Screenings (1 yr)    No resulted procedures found.       Medication Contract and Consent for Opioid Use Documents Filed        No documents found                      REVIEW OF SYSTEMS :           Positives and Pertinent negatives as per HPI.     SURGICAL HISTORY     Past Surgical History:   Procedure Laterality Date

## 2025-01-01 LAB
ALBUMIN SERPL-MCNC: 2 G/DL (ref 3.5–5.2)
ALP SERPL-CCNC: 109 U/L (ref 40–129)
ALT SERPL-CCNC: 14 U/L (ref 0–40)
ANION GAP SERPL CALCULATED.3IONS-SCNC: 8 MMOL/L (ref 7–16)
AST SERPL-CCNC: 27 U/L (ref 0–39)
BILIRUB SERPL-MCNC: 0.6 MG/DL (ref 0–1.2)
BUN SERPL-MCNC: 9 MG/DL (ref 6–23)
CALCIUM SERPL-MCNC: 7 MG/DL (ref 8.6–10.2)
CHLORIDE SERPL-SCNC: 110 MMOL/L (ref 98–107)
CHOLEST SERPL-MCNC: 56 MG/DL
CO2 SERPL-SCNC: 21 MMOL/L (ref 22–29)
CREAT SERPL-MCNC: 0.5 MG/DL (ref 0.7–1.2)
ERYTHROCYTE [DISTWIDTH] IN BLOOD BY AUTOMATED COUNT: 15.7 % (ref 11.5–15)
GFR, ESTIMATED: >90 ML/MIN/1.73M2
GLUCOSE SERPL-MCNC: 83 MG/DL (ref 74–99)
HBA1C MFR BLD: 5 % (ref 4–5.6)
HCT VFR BLD AUTO: 23.6 % (ref 37–54)
HDLC SERPL-MCNC: 17 MG/DL
HGB BLD-MCNC: 7.4 G/DL (ref 12.5–16.5)
L PNEUMO1 AG UR QL IA.RAPID: NEGATIVE
LDLC SERPL CALC-MCNC: 25 MG/DL
MAGNESIUM SERPL-MCNC: 1.6 MG/DL (ref 1.6–2.6)
MCH RBC QN AUTO: 32.5 PG (ref 26–35)
MCHC RBC AUTO-ENTMCNC: 31.4 G/DL (ref 32–34.5)
MCV RBC AUTO: 103.5 FL (ref 80–99.9)
PHOSPHATE SERPL-MCNC: 2.8 MG/DL (ref 2.5–4.5)
PLATELET # BLD AUTO: 152 K/UL (ref 130–450)
PMV BLD AUTO: 10.7 FL (ref 7–12)
POTASSIUM SERPL-SCNC: 3.5 MMOL/L (ref 3.5–5)
PROT SERPL-MCNC: 4.3 G/DL (ref 6.4–8.3)
RBC # BLD AUTO: 2.28 M/UL (ref 3.8–5.8)
S PNEUM AG SPEC QL: NEGATIVE
SODIUM SERPL-SCNC: 139 MMOL/L (ref 132–146)
SPECIMEN SOURCE: NORMAL
TRIGL SERPL-MCNC: 71 MG/DL
TSH SERPL DL<=0.05 MIU/L-ACNC: 1.87 UIU/ML (ref 0.27–4.2)
VLDLC SERPL CALC-MCNC: 14 MG/DL
WBC OTHER # BLD: 4.8 K/UL (ref 4.5–11.5)

## 2025-01-01 PROCEDURE — 86850 RBC ANTIBODY SCREEN: CPT

## 2025-01-01 PROCEDURE — 6370000000 HC RX 637 (ALT 250 FOR IP): Performed by: INTERNAL MEDICINE

## 2025-01-01 PROCEDURE — 2580000003 HC RX 258

## 2025-01-01 PROCEDURE — 2060000000 HC ICU INTERMEDIATE R&B

## 2025-01-01 PROCEDURE — 2500000003 HC RX 250 WO HCPCS

## 2025-01-01 PROCEDURE — 2580000003 HC RX 258: Performed by: INTERNAL MEDICINE

## 2025-01-01 PROCEDURE — 2500000003 HC RX 250 WO HCPCS: Performed by: INTERNAL MEDICINE

## 2025-01-01 PROCEDURE — 87040 BLOOD CULTURE FOR BACTERIA: CPT

## 2025-01-01 PROCEDURE — 80053 COMPREHEN METABOLIC PANEL: CPT

## 2025-01-01 PROCEDURE — 84443 ASSAY THYROID STIM HORMONE: CPT

## 2025-01-01 PROCEDURE — 84100 ASSAY OF PHOSPHORUS: CPT

## 2025-01-01 PROCEDURE — 6360000002 HC RX W HCPCS: Performed by: INTERNAL MEDICINE

## 2025-01-01 PROCEDURE — 86923 COMPATIBILITY TEST ELECTRIC: CPT

## 2025-01-01 PROCEDURE — 80061 LIPID PANEL: CPT

## 2025-01-01 PROCEDURE — 86901 BLOOD TYPING SEROLOGIC RH(D): CPT

## 2025-01-01 PROCEDURE — 86900 BLOOD TYPING SEROLOGIC ABO: CPT

## 2025-01-01 PROCEDURE — 99232 SBSQ HOSP IP/OBS MODERATE 35: CPT | Performed by: STUDENT IN AN ORGANIZED HEALTH CARE EDUCATION/TRAINING PROGRAM

## 2025-01-01 PROCEDURE — 93005 ELECTROCARDIOGRAM TRACING: CPT

## 2025-01-01 PROCEDURE — 83036 HEMOGLOBIN GLYCOSYLATED A1C: CPT

## 2025-01-01 PROCEDURE — 85027 COMPLETE CBC AUTOMATED: CPT

## 2025-01-01 PROCEDURE — 83735 ASSAY OF MAGNESIUM: CPT

## 2025-01-01 PROCEDURE — 36415 COLL VENOUS BLD VENIPUNCTURE: CPT

## 2025-01-01 RX ORDER — SODIUM CHLORIDE, SODIUM LACTATE, POTASSIUM CHLORIDE, CALCIUM CHLORIDE 600; 310; 30; 20 MG/100ML; MG/100ML; MG/100ML; MG/100ML
INJECTION, SOLUTION INTRAVENOUS CONTINUOUS
Status: DISCONTINUED | OUTPATIENT
Start: 2025-01-02 | End: 2025-01-02

## 2025-01-01 RX ORDER — 0.9 % SODIUM CHLORIDE 0.9 %
500 INTRAVENOUS SOLUTION INTRAVENOUS ONCE
Status: COMPLETED | OUTPATIENT
Start: 2025-01-01 | End: 2025-01-01

## 2025-01-01 RX ADMIN — REMDESIVIR 100 MG: 100 INJECTION, POWDER, LYOPHILIZED, FOR SOLUTION INTRAVENOUS at 17:38

## 2025-01-01 RX ADMIN — AMPICILLIN SODIUM 2000 MG: 2 INJECTION, POWDER, FOR SOLUTION INTRAMUSCULAR; INTRAVENOUS at 21:55

## 2025-01-01 RX ADMIN — SODIUM CHLORIDE, PRESERVATIVE FREE 10 ML: 5 INJECTION INTRAVENOUS at 02:18

## 2025-01-01 RX ADMIN — GLIPIZIDE 10 MG: 5 TABLET ORAL at 12:04

## 2025-01-01 RX ADMIN — AMPICILLIN SODIUM 2000 MG: 2 INJECTION, POWDER, FOR SOLUTION INTRAMUSCULAR; INTRAVENOUS at 02:15

## 2025-01-01 RX ADMIN — SENNOSIDES AND DOCUSATE SODIUM 2 TABLET: 8.6; 5 TABLET ORAL at 12:05

## 2025-01-01 RX ADMIN — AMPICILLIN SODIUM 2000 MG: 2 INJECTION, POWDER, FOR SOLUTION INTRAMUSCULAR; INTRAVENOUS at 12:04

## 2025-01-01 RX ADMIN — AMPICILLIN SODIUM 2000 MG: 2 INJECTION, POWDER, FOR SOLUTION INTRAMUSCULAR; INTRAVENOUS at 08:59

## 2025-01-01 RX ADMIN — GLIPIZIDE 10 MG: 5 TABLET ORAL at 16:06

## 2025-01-01 RX ADMIN — WATER 2000 MG: 1 INJECTION INTRAMUSCULAR; INTRAVENOUS; SUBCUTANEOUS at 12:08

## 2025-01-01 RX ADMIN — MONTELUKAST SODIUM 10 MG: 10 TABLET, FILM COATED ORAL at 21:52

## 2025-01-01 RX ADMIN — TAMSULOSIN HYDROCHLORIDE 0.4 MG: 0.4 CAPSULE ORAL at 12:05

## 2025-01-01 RX ADMIN — ENOXAPARIN SODIUM 40 MG: 100 INJECTION SUBCUTANEOUS at 12:05

## 2025-01-01 RX ADMIN — ONDANSETRON 4 MG: 2 INJECTION INTRAMUSCULAR; INTRAVENOUS at 12:05

## 2025-01-01 RX ADMIN — SODIUM CHLORIDE 500 ML: 9 INJECTION, SOLUTION INTRAVENOUS at 06:49

## 2025-01-01 RX ADMIN — AMPICILLIN SODIUM 2000 MG: 2 INJECTION, POWDER, FOR SOLUTION INTRAMUSCULAR; INTRAVENOUS at 16:05

## 2025-01-02 ENCOUNTER — ANESTHESIA (OUTPATIENT)
Dept: OPERATING ROOM | Age: 80
End: 2025-01-02
Payer: MEDICARE

## 2025-01-02 ENCOUNTER — APPOINTMENT (OUTPATIENT)
Dept: ULTRASOUND IMAGING | Age: 80
DRG: 314 | End: 2025-01-02
Payer: MEDICARE

## 2025-01-02 ENCOUNTER — ANESTHESIA EVENT (OUTPATIENT)
Dept: OPERATING ROOM | Age: 80
End: 2025-01-02
Payer: MEDICARE

## 2025-01-02 PROBLEM — R78.81 BACTEREMIA: Status: ACTIVE | Noted: 2025-01-02

## 2025-01-02 PROBLEM — T80.212A PORT OR RESERVOIR INFECTION: Status: ACTIVE | Noted: 2025-01-02

## 2025-01-02 LAB
ALT SERPL-CCNC: 13 U/L (ref 0–40)
ANION GAP SERPL CALCULATED.3IONS-SCNC: 15 MMOL/L (ref 7–16)
AST SERPL-CCNC: 19 U/L (ref 0–39)
ATYPICAL LYMPHOCYTE ABSOLUTE COUNT: 0.05 K/UL (ref 0–0.46)
ATYPICAL LYMPHOCYTES: 1 % (ref 0–4)
BASOPHILS # BLD: 0 K/UL (ref 0–0.2)
BASOPHILS NFR BLD: 0 % (ref 0–2)
BUN SERPL-MCNC: 9 MG/DL (ref 6–23)
CALCIUM SERPL-MCNC: 7.8 MG/DL (ref 8.6–10.2)
CHLORIDE SERPL-SCNC: 107 MMOL/L (ref 98–107)
CO2 SERPL-SCNC: 17 MMOL/L (ref 22–29)
CREAT SERPL-MCNC: 0.4 MG/DL (ref 0.7–1.2)
EKG ATRIAL RATE: 100 BPM
EKG P AXIS: 72 DEGREES
EKG P-R INTERVAL: 192 MS
EKG Q-T INTERVAL: 376 MS
EKG QRS DURATION: 84 MS
EKG QTC CALCULATION (BAZETT): 485 MS
EKG R AXIS: 79 DEGREES
EKG T AXIS: -12 DEGREES
EKG VENTRICULAR RATE: 100 BPM
EOSINOPHIL # BLD: 0.05 K/UL (ref 0.05–0.5)
EOSINOPHILS RELATIVE PERCENT: 1 % (ref 0–6)
ERYTHROCYTE [DISTWIDTH] IN BLOOD BY AUTOMATED COUNT: 15.6 % (ref 11.5–15)
ERYTHROCYTE [DISTWIDTH] IN BLOOD BY AUTOMATED COUNT: 15.9 % (ref 11.5–15)
GFR, ESTIMATED: >90 ML/MIN/1.73M2
GLUCOSE BLD-MCNC: 147 MG/DL (ref 74–99)
GLUCOSE SERPL-MCNC: 103 MG/DL (ref 74–99)
HCT VFR BLD AUTO: 25.8 % (ref 37–54)
HCT VFR BLD AUTO: 31.4 % (ref 37–54)
HGB BLD-MCNC: 8.4 G/DL (ref 12.5–16.5)
HGB BLD-MCNC: 9.9 G/DL (ref 12.5–16.5)
LYMPHOCYTES NFR BLD: 0.09 K/UL (ref 1.5–4)
LYMPHOCYTES RELATIVE PERCENT: 2 % (ref 20–42)
MAGNESIUM SERPL-MCNC: 1.7 MG/DL (ref 1.6–2.6)
MCH RBC QN AUTO: 32.6 PG (ref 26–35)
MCH RBC QN AUTO: 32.9 PG (ref 26–35)
MCHC RBC AUTO-ENTMCNC: 31.5 G/DL (ref 32–34.5)
MCHC RBC AUTO-ENTMCNC: 32.6 G/DL (ref 32–34.5)
MCV RBC AUTO: 101.2 FL (ref 80–99.9)
MCV RBC AUTO: 103.3 FL (ref 80–99.9)
MICROORGANISM SPEC CULT: NORMAL
MONOCYTES NFR BLD: 0.19 K/UL (ref 0.1–0.95)
MONOCYTES NFR BLD: 4 % (ref 2–12)
NEUTROPHILS NFR BLD: 93 % (ref 43–80)
NEUTS SEG NFR BLD: 5.02 K/UL (ref 1.8–7.3)
PARTIAL THROMBOPLASTIN TIME: 32.4 SEC (ref 24.5–35.1)
PLATELET # BLD AUTO: 171 K/UL (ref 130–450)
PLATELET # BLD AUTO: 179 K/UL (ref 130–450)
PMV BLD AUTO: 10.3 FL (ref 7–12)
PMV BLD AUTO: 10.8 FL (ref 7–12)
POTASSIUM SERPL-SCNC: 3.7 MMOL/L (ref 3.5–5)
RBC # BLD AUTO: 2.55 M/UL (ref 3.8–5.8)
RBC # BLD AUTO: 3.04 M/UL (ref 3.8–5.8)
RBC # BLD: ABNORMAL 10*6/UL
SODIUM SERPL-SCNC: 139 MMOL/L (ref 132–146)
SPECIMEN DESCRIPTION: NORMAL
WBC OTHER # BLD: 5.4 K/UL (ref 4.5–11.5)
WBC OTHER # BLD: 8.6 K/UL (ref 4.5–11.5)

## 2025-01-02 PROCEDURE — 5A0935A ASSISTANCE WITH RESPIRATORY VENTILATION, LESS THAN 24 CONSECUTIVE HOURS, HIGH NASAL FLOW/VELOCITY: ICD-10-PCS | Performed by: INTERNAL MEDICINE

## 2025-01-02 PROCEDURE — 2060000000 HC ICU INTERMEDIATE R&B

## 2025-01-02 PROCEDURE — 2500000003 HC RX 250 WO HCPCS

## 2025-01-02 PROCEDURE — 2580000003 HC RX 258: Performed by: INTERNAL MEDICINE

## 2025-01-02 PROCEDURE — 85027 COMPLETE CBC AUTOMATED: CPT

## 2025-01-02 PROCEDURE — 36415 COLL VENOUS BLD VENIPUNCTURE: CPT

## 2025-01-02 PROCEDURE — 84450 TRANSFERASE (AST) (SGOT): CPT

## 2025-01-02 PROCEDURE — 84460 ALANINE AMINO (ALT) (SGPT): CPT

## 2025-01-02 PROCEDURE — 99222 1ST HOSP IP/OBS MODERATE 55: CPT | Performed by: STUDENT IN AN ORGANIZED HEALTH CARE EDUCATION/TRAINING PROGRAM

## 2025-01-02 PROCEDURE — 2580000003 HC RX 258

## 2025-01-02 PROCEDURE — 93970 EXTREMITY STUDY: CPT

## 2025-01-02 PROCEDURE — 2500000003 HC RX 250 WO HCPCS: Performed by: INTERNAL MEDICINE

## 2025-01-02 PROCEDURE — 6360000002 HC RX W HCPCS: Performed by: INTERNAL MEDICINE

## 2025-01-02 PROCEDURE — 85025 COMPLETE CBC W/AUTO DIFF WBC: CPT

## 2025-01-02 PROCEDURE — 97166 OT EVAL MOD COMPLEX 45 MIN: CPT

## 2025-01-02 PROCEDURE — 99223 1ST HOSP IP/OBS HIGH 75: CPT | Performed by: INTERNAL MEDICINE

## 2025-01-02 PROCEDURE — 97161 PT EVAL LOW COMPLEX 20 MIN: CPT

## 2025-01-02 PROCEDURE — 6370000000 HC RX 637 (ALT 250 FOR IP): Performed by: INTERNAL MEDICINE

## 2025-01-02 PROCEDURE — 97535 SELF CARE MNGMENT TRAINING: CPT

## 2025-01-02 PROCEDURE — 6370000000 HC RX 637 (ALT 250 FOR IP): Performed by: CLINICAL NURSE SPECIALIST

## 2025-01-02 PROCEDURE — 93010 ELECTROCARDIOGRAM REPORT: CPT | Performed by: INTERNAL MEDICINE

## 2025-01-02 PROCEDURE — 85730 THROMBOPLASTIN TIME PARTIAL: CPT

## 2025-01-02 PROCEDURE — 99233 SBSQ HOSP IP/OBS HIGH 50: CPT | Performed by: INTERNAL MEDICINE

## 2025-01-02 PROCEDURE — 97530 THERAPEUTIC ACTIVITIES: CPT

## 2025-01-02 PROCEDURE — 6360000002 HC RX W HCPCS: Performed by: CLINICAL NURSE SPECIALIST

## 2025-01-02 PROCEDURE — 83735 ASSAY OF MAGNESIUM: CPT

## 2025-01-02 PROCEDURE — APPSS60 APP SPLIT SHARED TIME 46-60 MINUTES: Performed by: CLINICAL NURSE SPECIALIST

## 2025-01-02 PROCEDURE — 80048 BASIC METABOLIC PNL TOTAL CA: CPT

## 2025-01-02 PROCEDURE — 82962 GLUCOSE BLOOD TEST: CPT

## 2025-01-02 RX ORDER — HEPARIN SODIUM 10000 [USP'U]/100ML
5-30 INJECTION, SOLUTION INTRAVENOUS CONTINUOUS
Status: DISCONTINUED | OUTPATIENT
Start: 2025-01-02 | End: 2025-01-07

## 2025-01-02 RX ORDER — SODIUM CHLORIDE, SODIUM LACTATE, POTASSIUM CHLORIDE, CALCIUM CHLORIDE 600; 310; 30; 20 MG/100ML; MG/100ML; MG/100ML; MG/100ML
INJECTION, SOLUTION INTRAVENOUS CONTINUOUS
Status: DISCONTINUED | OUTPATIENT
Start: 2025-01-03 | End: 2025-01-03

## 2025-01-02 RX ORDER — HEPARIN SODIUM 1000 [USP'U]/ML
80 INJECTION, SOLUTION INTRAVENOUS; SUBCUTANEOUS PRN
Status: DISCONTINUED | OUTPATIENT
Start: 2025-01-02 | End: 2025-01-07 | Stop reason: ALTCHOICE

## 2025-01-02 RX ORDER — METOPROLOL TARTRATE 1 MG/ML
INJECTION, SOLUTION INTRAVENOUS
Status: COMPLETED
Start: 2025-01-02 | End: 2025-01-02

## 2025-01-02 RX ORDER — METOPROLOL TARTRATE 1 MG/ML
5 INJECTION, SOLUTION INTRAVENOUS ONCE
Status: COMPLETED | OUTPATIENT
Start: 2025-01-02 | End: 2025-01-02

## 2025-01-02 RX ORDER — MAGNESIUM SULFATE IN WATER 40 MG/ML
2000 INJECTION, SOLUTION INTRAVENOUS ONCE
Status: COMPLETED | OUTPATIENT
Start: 2025-01-02 | End: 2025-01-02

## 2025-01-02 RX ORDER — HEPARIN SODIUM 1000 [USP'U]/ML
40 INJECTION, SOLUTION INTRAVENOUS; SUBCUTANEOUS PRN
Status: DISCONTINUED | OUTPATIENT
Start: 2025-01-02 | End: 2025-01-07 | Stop reason: ALTCHOICE

## 2025-01-02 RX ORDER — HEPARIN SODIUM 1000 [USP'U]/ML
80 INJECTION, SOLUTION INTRAVENOUS; SUBCUTANEOUS ONCE
Status: COMPLETED | OUTPATIENT
Start: 2025-01-02 | End: 2025-01-02

## 2025-01-02 RX ADMIN — EMPAGLIFLOZIN 10 MG: 10 TABLET, FILM COATED ORAL at 09:50

## 2025-01-02 RX ADMIN — POTASSIUM BICARBONATE 20 MEQ: 782 TABLET, EFFERVESCENT ORAL at 13:00

## 2025-01-02 RX ADMIN — WATER 2000 MG: 1 INJECTION INTRAMUSCULAR; INTRAVENOUS; SUBCUTANEOUS at 00:47

## 2025-01-02 RX ADMIN — REMDESIVIR 100 MG: 100 INJECTION, POWDER, LYOPHILIZED, FOR SOLUTION INTRAVENOUS at 17:36

## 2025-01-02 RX ADMIN — METOPROLOL TARTRATE 5 MG: 1 INJECTION, SOLUTION INTRAVENOUS at 10:12

## 2025-01-02 RX ADMIN — AMPICILLIN SODIUM 2000 MG: 2 INJECTION, POWDER, FOR SOLUTION INTRAMUSCULAR; INTRAVENOUS at 17:30

## 2025-01-02 RX ADMIN — SODIUM CHLORIDE, POTASSIUM CHLORIDE, SODIUM LACTATE AND CALCIUM CHLORIDE: 600; 310; 30; 20 INJECTION, SOLUTION INTRAVENOUS at 00:55

## 2025-01-02 RX ADMIN — BENZONATATE 100 MG: 100 CAPSULE ORAL at 19:48

## 2025-01-02 RX ADMIN — AMPICILLIN SODIUM 2000 MG: 2 INJECTION, POWDER, FOR SOLUTION INTRAMUSCULAR; INTRAVENOUS at 12:04

## 2025-01-02 RX ADMIN — HEPARIN SODIUM 6600 UNITS: 1000 INJECTION INTRAVENOUS; SUBCUTANEOUS at 18:18

## 2025-01-02 RX ADMIN — ONDANSETRON 4 MG: 2 INJECTION INTRAMUSCULAR; INTRAVENOUS at 10:19

## 2025-01-02 RX ADMIN — GLIPIZIDE 10 MG: 5 TABLET ORAL at 17:30

## 2025-01-02 RX ADMIN — MAGNESIUM SULFATE IN WATER 2000 MG: 40 INJECTION, SOLUTION INTRAVENOUS at 13:00

## 2025-01-02 RX ADMIN — METOROPROLOL TARTRATE 5 MG: 5 INJECTION, SOLUTION INTRAVENOUS at 10:12

## 2025-01-02 RX ADMIN — AMPICILLIN SODIUM 2000 MG: 2 INJECTION, POWDER, FOR SOLUTION INTRAMUSCULAR; INTRAVENOUS at 03:19

## 2025-01-02 RX ADMIN — AMPICILLIN SODIUM 2000 MG: 2 INJECTION, POWDER, FOR SOLUTION INTRAMUSCULAR; INTRAVENOUS at 22:20

## 2025-01-02 RX ADMIN — WATER 2000 MG: 1 INJECTION INTRAMUSCULAR; INTRAVENOUS; SUBCUTANEOUS at 12:04

## 2025-01-02 RX ADMIN — HEPARIN SODIUM 18 UNITS/KG/HR: 10000 INJECTION, SOLUTION INTRAVENOUS at 18:21

## 2025-01-02 RX ADMIN — AMPICILLIN SODIUM 2000 MG: 2 INJECTION, POWDER, FOR SOLUTION INTRAMUSCULAR; INTRAVENOUS at 06:20

## 2025-01-02 RX ADMIN — TAMSULOSIN HYDROCHLORIDE 0.4 MG: 0.4 CAPSULE ORAL at 09:49

## 2025-01-02 RX ADMIN — METOPROLOL SUCCINATE 25 MG: 25 TABLET, EXTENDED RELEASE ORAL at 09:49

## 2025-01-02 RX ADMIN — MONTELUKAST SODIUM 10 MG: 10 TABLET, FILM COATED ORAL at 19:48

## 2025-01-02 ASSESSMENT — PAIN SCALES - GENERAL: PAINLEVEL_OUTOF10: 0

## 2025-01-02 NOTE — ANESTHESIA PRE PROCEDURE
Department of Anesthesiology  Preprocedure Note       Name:  Michael Garcia   Age:  79 y.o.  :  1945                                          MRN:  [id number]         Date:  1/3/2025      Surgeon: Surgeon(s):  Yary Gardiner MD    Procedure: Procedure(s):  PORT REMOVAL    Medications prior to admission:   Prior to Admission medications    Medication Sig Start Date End Date Taking? Authorizing Provider   aspirin 81 MG EC tablet Take 1 tablet by mouth daily   Yes Elliott Thakkar MD   lipase-protease-amylase (CREON) 67823-036563 units CPEP delayed release capsule Take 2 capsules by mouth 3 times daily (with meals) 10/10/24  Yes Demetrius Santoyo III, MD   senna-docusate (PERICOLACE) 8.6-50 MG per tablet Take 2 tablets by mouth daily   Yes Elliott Thakkar MD   glucose 4 g chewable tablet Take 4 tablets by mouth as needed for Low blood sugar 24  Yes Sarah Guadalupe MD   glucagon 1 MG SOLR injection Inject 1 mg into the skin as needed for Low blood sugar (Blood glucose LESS THAN 70 mg/dL and patient NOT ALERT or NPO and does not have IV access.) 24  Yes Sarah Guadalupe MD   ondansetron (ZOFRAN-ODT) 4 MG disintegrating tablet Take 1 tablet by mouth every 8 hours as needed for Nausea or Vomiting 24  Yes Sarah Guadalupe MD   pantoprazole (PROTONIX) 40 MG tablet Take 1 tablet by mouth 2 times daily 24 Yes Sarah Guadalupe MD   empagliflozin (JARDIANCE) 25 MG tablet Take 1 tablet by mouth daily   Yes Elliott Thakkar MD   tamsulosin (FLOMAX) 0.4 MG capsule Take 1 capsule by mouth daily Pt taking 2 capsules at 0.4mg in the morning.   Yes Elliott Thakkar MD   glipiZIDE (GLUCOTROL) 5 MG tablet Take 2 tablets by mouth 2 times daily (before meals) Pt taking 10mg twice a day.   Yes Elliott Thakkar MD   finasteride (PROSCAR) 5 MG tablet Take 1 tablet by mouth daily   Yes Elliott Thakkar MD   Calcium Carbonate-Vitamin D (OYSTER SHELL

## 2025-01-02 NOTE — ACP (ADVANCE CARE PLANNING)
Advance Care Planning   Healthcare Decision Maker:    Primary Decision Maker: Razia Tim - [facility] - [phone]    Click here to complete Healthcare Decision Makers including selection of the Healthcare Decision Maker Relationship (ie \"Primary\").

## 2025-01-03 ENCOUNTER — APPOINTMENT (OUTPATIENT)
Dept: GENERAL RADIOLOGY | Age: 80
DRG: 314 | End: 2025-01-03
Payer: MEDICARE

## 2025-01-03 LAB
ALT SERPL-CCNC: 10 U/L (ref 0–40)
ANION GAP SERPL CALCULATED.3IONS-SCNC: 11 MMOL/L (ref 7–16)
ANION GAP SERPL CALCULATED.3IONS-SCNC: 9 MMOL/L (ref 7–16)
AST SERPL-CCNC: 17 U/L (ref 0–39)
B.E.: -1.7 MMOL/L (ref -3–3)
BASOPHILS # BLD: 0.01 K/UL (ref 0–0.2)
BASOPHILS NFR BLD: 0 % (ref 0–2)
BUN SERPL-MCNC: 6 MG/DL (ref 6–23)
BUN SERPL-MCNC: 6 MG/DL (ref 6–23)
CALCIUM SERPL-MCNC: 7.2 MG/DL (ref 8.6–10.2)
CALCIUM SERPL-MCNC: 7.5 MG/DL (ref 8.6–10.2)
CHLORIDE SERPL-SCNC: 105 MMOL/L (ref 98–107)
CHLORIDE SERPL-SCNC: 107 MMOL/L (ref 98–107)
CO2 SERPL-SCNC: 21 MMOL/L (ref 22–29)
CO2 SERPL-SCNC: 21 MMOL/L (ref 22–29)
COHB: 1.1 % (ref 0–1.5)
CREAT SERPL-MCNC: 0.5 MG/DL (ref 0.7–1.2)
CREAT SERPL-MCNC: 0.5 MG/DL (ref 0.7–1.2)
CRITICAL: ABNORMAL
D-DIMER QUANTITATIVE: 1076 NG/ML DDU (ref 0–230)
DATE ANALYZED: ABNORMAL
DATE OF COLLECTION: ABNORMAL
EKG ATRIAL RATE: 123 BPM
EKG ATRIAL RATE: 138 BPM
EKG P AXIS: 68 DEGREES
EKG P-R INTERVAL: 158 MS
EKG Q-T INTERVAL: 376 MS
EKG Q-T INTERVAL: 400 MS
EKG QRS DURATION: 80 MS
EKG QRS DURATION: 82 MS
EKG QTC CALCULATION (BAZETT): 555 MS
EKG QTC CALCULATION (BAZETT): 572 MS
EKG R AXIS: 18 DEGREES
EKG R AXIS: 51 DEGREES
EKG T AXIS: 105 DEGREES
EKG T AXIS: 143 DEGREES
EKG VENTRICULAR RATE: 123 BPM
EKG VENTRICULAR RATE: 131 BPM
EOSINOPHIL # BLD: 0 K/UL (ref 0.05–0.5)
EOSINOPHILS RELATIVE PERCENT: 0 % (ref 0–6)
ERYTHROCYTE [DISTWIDTH] IN BLOOD BY AUTOMATED COUNT: 16.4 % (ref 11.5–15)
ERYTHROCYTE [SEDIMENTATION RATE] IN BLOOD BY WESTERGREN METHOD: 27 MM/HR (ref 0–15)
FERRITIN SERPL-MCNC: 621 NG/ML
FIBRINOGEN PPP-MCNC: 307 MG/DL (ref 200–400)
GFR, ESTIMATED: >90 ML/MIN/1.73M2
GFR, ESTIMATED: >90 ML/MIN/1.73M2
GLUCOSE BLD-MCNC: 100 MG/DL (ref 74–99)
GLUCOSE BLD-MCNC: 120 MG/DL (ref 74–99)
GLUCOSE BLD-MCNC: 130 MG/DL (ref 74–99)
GLUCOSE SERPL-MCNC: 100 MG/DL (ref 74–99)
GLUCOSE SERPL-MCNC: 95 MG/DL (ref 74–99)
HCO3: 21.2 MMOL/L (ref 22–26)
HCT VFR BLD AUTO: 21.9 % (ref 37–54)
HCT VFR BLD AUTO: 24.5 % (ref 37–54)
HGB BLD-MCNC: 6.8 G/DL (ref 12.5–16.5)
HGB BLD-MCNC: 7.7 G/DL (ref 12.5–16.5)
HHB: 8 % (ref 0–5)
IMM GRANULOCYTES # BLD AUTO: 0.12 K/UL (ref 0–0.58)
IMM GRANULOCYTES NFR BLD: 1 % (ref 0–5)
INR PPP: 1.2
LAB: ABNORMAL
LACTATE BLDV-SCNC: 1.3 MMOL/L (ref 0.5–2.2)
LACTATE BLDV-SCNC: 1.7 MMOL/L (ref 0.5–2.2)
LYMPHOCYTES NFR BLD: 0.84 K/UL (ref 1.5–4)
LYMPHOCYTES RELATIVE PERCENT: 7 % (ref 20–42)
Lab: 2305
MAGNESIUM SERPL-MCNC: 1.9 MG/DL (ref 1.6–2.6)
MCH RBC QN AUTO: 32.2 PG (ref 26–35)
MCHC RBC AUTO-ENTMCNC: 31.1 G/DL (ref 32–34.5)
MCV RBC AUTO: 103.8 FL (ref 80–99.9)
METHB: 0.3 % (ref 0–1.5)
MODE: ABNORMAL
MONOCYTES NFR BLD: 1.19 K/UL (ref 0.1–0.95)
MONOCYTES NFR BLD: 10 % (ref 2–12)
NEUTROPHILS NFR BLD: 83 % (ref 43–80)
NEUTS SEG NFR BLD: 10.39 K/UL (ref 1.8–7.3)
O2 SATURATION: 91.9 % (ref 92–98.5)
O2HB: 90.6 % (ref 94–97)
OPERATOR ID: 7490
PARTIAL THROMBOPLASTIN TIME: 31.2 SEC (ref 24.5–35.1)
PARTIAL THROMBOPLASTIN TIME: 69.5 SEC (ref 24.5–35.1)
PATIENT TEMP: 37 C
PCO2: 29.2 MMHG (ref 35–45)
PH BLOOD GAS: 7.48 (ref 7.35–7.45)
PHOSPHATE SERPL-MCNC: 1.8 MG/DL (ref 2.5–4.5)
PLATELET # BLD AUTO: 175 K/UL (ref 130–450)
PMV BLD AUTO: 10.7 FL (ref 7–12)
PO2: 60.7 MMHG (ref 75–100)
POTASSIUM SERPL-SCNC: 2.8 MMOL/L (ref 3.5–5)
POTASSIUM SERPL-SCNC: 3.3 MMOL/L (ref 3.5–5)
PROCALCITONIN SERPL-MCNC: 13.74 NG/ML (ref 0–0.08)
PROTHROMBIN TIME: 13.2 SEC (ref 9.3–12.4)
RBC # BLD AUTO: 2.11 M/UL (ref 3.8–5.8)
RBC # BLD: ABNORMAL 10*6/UL
SODIUM SERPL-SCNC: 135 MMOL/L (ref 132–146)
SODIUM SERPL-SCNC: 139 MMOL/L (ref 132–146)
SOURCE, BLOOD GAS: ABNORMAL
THB: 9.9 G/DL (ref 11.5–16.5)
TIME ANALYZED: 2309
TROPONIN I SERPL HS-MCNC: 11 NG/L (ref 0–11)
TSH SERPL DL<=0.05 MIU/L-ACNC: 1.1 UIU/ML (ref 0.27–4.2)
WBC OTHER # BLD: 12.6 K/UL (ref 4.5–11.5)

## 2025-01-03 PROCEDURE — 2580000003 HC RX 258: Performed by: INTERNAL MEDICINE

## 2025-01-03 PROCEDURE — 3600000013 HC SURGERY LEVEL 3 ADDTL 15MIN: Performed by: STUDENT IN AN ORGANIZED HEALTH CARE EDUCATION/TRAINING PROGRAM

## 2025-01-03 PROCEDURE — 2500000003 HC RX 250 WO HCPCS

## 2025-01-03 PROCEDURE — 83605 ASSAY OF LACTIC ACID: CPT

## 2025-01-03 PROCEDURE — 6360000002 HC RX W HCPCS: Performed by: ANESTHESIOLOGY

## 2025-01-03 PROCEDURE — 36415 COLL VENOUS BLD VENIPUNCTURE: CPT

## 2025-01-03 PROCEDURE — 3600000003 HC SURGERY LEVEL 3 BASE: Performed by: STUDENT IN AN ORGANIZED HEALTH CARE EDUCATION/TRAINING PROGRAM

## 2025-01-03 PROCEDURE — 86140 C-REACTIVE PROTEIN: CPT

## 2025-01-03 PROCEDURE — 6360000002 HC RX W HCPCS

## 2025-01-03 PROCEDURE — 87040 BLOOD CULTURE FOR BACTERIA: CPT

## 2025-01-03 PROCEDURE — 87102 FUNGUS ISOLATION CULTURE: CPT

## 2025-01-03 PROCEDURE — 82728 ASSAY OF FERRITIN: CPT

## 2025-01-03 PROCEDURE — 85018 HEMOGLOBIN: CPT

## 2025-01-03 PROCEDURE — 87205 SMEAR GRAM STAIN: CPT

## 2025-01-03 PROCEDURE — 85014 HEMATOCRIT: CPT

## 2025-01-03 PROCEDURE — 6370000000 HC RX 637 (ALT 250 FOR IP)

## 2025-01-03 PROCEDURE — 3700000000 HC ANESTHESIA ATTENDED CARE: Performed by: STUDENT IN AN ORGANIZED HEALTH CARE EDUCATION/TRAINING PROGRAM

## 2025-01-03 PROCEDURE — P9016 RBC LEUKOCYTES REDUCED: HCPCS

## 2025-01-03 PROCEDURE — 0JPV0WZ REMOVAL OF TOTALLY IMPLANTABLE VASCULAR ACCESS DEVICE FROM UPPER EXTREMITY SUBCUTANEOUS TISSUE AND FASCIA, OPEN APPROACH: ICD-10-PCS | Performed by: STUDENT IN AN ORGANIZED HEALTH CARE EDUCATION/TRAINING PROGRAM

## 2025-01-03 PROCEDURE — 84460 ALANINE AMINO (ALT) (SGPT): CPT

## 2025-01-03 PROCEDURE — 2580000003 HC RX 258

## 2025-01-03 PROCEDURE — 87077 CULTURE AEROBIC IDENTIFY: CPT

## 2025-01-03 PROCEDURE — 6360000002 HC RX W HCPCS: Performed by: INTERNAL MEDICINE

## 2025-01-03 PROCEDURE — 7100000001 HC PACU RECOVERY - ADDTL 15 MIN: Performed by: STUDENT IN AN ORGANIZED HEALTH CARE EDUCATION/TRAINING PROGRAM

## 2025-01-03 PROCEDURE — 7100000000 HC PACU RECOVERY - FIRST 15 MIN: Performed by: STUDENT IN AN ORGANIZED HEALTH CARE EDUCATION/TRAINING PROGRAM

## 2025-01-03 PROCEDURE — 84145 PROCALCITONIN (PCT): CPT

## 2025-01-03 PROCEDURE — 2500000003 HC RX 250 WO HCPCS: Performed by: INTERNAL MEDICINE

## 2025-01-03 PROCEDURE — 84443 ASSAY THYROID STIM HORMONE: CPT

## 2025-01-03 PROCEDURE — 84450 TRANSFERASE (AST) (SGOT): CPT

## 2025-01-03 PROCEDURE — 2709999900 HC NON-CHARGEABLE SUPPLY: Performed by: STUDENT IN AN ORGANIZED HEALTH CARE EDUCATION/TRAINING PROGRAM

## 2025-01-03 PROCEDURE — 85379 FIBRIN DEGRADATION QUANT: CPT

## 2025-01-03 PROCEDURE — 71045 X-RAY EXAM CHEST 1 VIEW: CPT

## 2025-01-03 PROCEDURE — 99291 CRITICAL CARE FIRST HOUR: CPT | Performed by: INTERNAL MEDICINE

## 2025-01-03 PROCEDURE — 85384 FIBRINOGEN ACTIVITY: CPT

## 2025-01-03 PROCEDURE — 85730 THROMBOPLASTIN TIME PARTIAL: CPT

## 2025-01-03 PROCEDURE — 36430 TRANSFUSION BLD/BLD COMPNT: CPT

## 2025-01-03 PROCEDURE — 84100 ASSAY OF PHOSPHORUS: CPT

## 2025-01-03 PROCEDURE — 85652 RBC SED RATE AUTOMATED: CPT

## 2025-01-03 PROCEDURE — 84484 ASSAY OF TROPONIN QUANT: CPT

## 2025-01-03 PROCEDURE — 82805 BLOOD GASES W/O2 SATURATION: CPT

## 2025-01-03 PROCEDURE — 6360000002 HC RX W HCPCS: Performed by: STUDENT IN AN ORGANIZED HEALTH CARE EDUCATION/TRAINING PROGRAM

## 2025-01-03 PROCEDURE — 2000000000 HC ICU R&B

## 2025-01-03 PROCEDURE — 82962 GLUCOSE BLOOD TEST: CPT

## 2025-01-03 PROCEDURE — 85610 PROTHROMBIN TIME: CPT

## 2025-01-03 PROCEDURE — 80048 BASIC METABOLIC PNL TOTAL CA: CPT

## 2025-01-03 PROCEDURE — 93005 ELECTROCARDIOGRAM TRACING: CPT | Performed by: INTERNAL MEDICINE

## 2025-01-03 PROCEDURE — 36590 REMOVAL TUNNELED CV CATH: CPT

## 2025-01-03 PROCEDURE — 02HV33Z INSERTION OF INFUSION DEVICE INTO SUPERIOR VENA CAVA, PERCUTANEOUS APPROACH: ICD-10-PCS | Performed by: INTERNAL MEDICINE

## 2025-01-03 PROCEDURE — P9041 ALBUMIN (HUMAN),5%, 50ML: HCPCS | Performed by: ANESTHESIOLOGY

## 2025-01-03 PROCEDURE — 2580000003 HC RX 258: Performed by: ANESTHESIOLOGY

## 2025-01-03 PROCEDURE — 87070 CULTURE OTHR SPECIMN AEROBIC: CPT

## 2025-01-03 PROCEDURE — 87075 CULTR BACTERIA EXCEPT BLOOD: CPT

## 2025-01-03 PROCEDURE — 93010 ELECTROCARDIOGRAM REPORT: CPT | Performed by: INTERNAL MEDICINE

## 2025-01-03 PROCEDURE — 85025 COMPLETE CBC W/AUTO DIFF WBC: CPT

## 2025-01-03 PROCEDURE — 3700000001 HC ADD 15 MINUTES (ANESTHESIA): Performed by: STUDENT IN AN ORGANIZED HEALTH CARE EDUCATION/TRAINING PROGRAM

## 2025-01-03 PROCEDURE — 83735 ASSAY OF MAGNESIUM: CPT

## 2025-01-03 PROCEDURE — 30233N1 TRANSFUSION OF NONAUTOLOGOUS RED BLOOD CELLS INTO PERIPHERAL VEIN, PERCUTANEOUS APPROACH: ICD-10-PCS | Performed by: INTERNAL MEDICINE

## 2025-01-03 RX ORDER — METOPROLOL TARTRATE 1 MG/ML
5 INJECTION, SOLUTION INTRAVENOUS ONCE
Status: COMPLETED | OUTPATIENT
Start: 2025-01-03 | End: 2025-01-03

## 2025-01-03 RX ORDER — ALBUMIN HUMAN 50 G/1000ML
25 SOLUTION INTRAVENOUS ONCE
Status: COMPLETED | OUTPATIENT
Start: 2025-01-03 | End: 2025-01-03

## 2025-01-03 RX ORDER — LIDOCAINE HYDROCHLORIDE 20 MG/ML
INJECTION, SOLUTION INTRAVENOUS
Status: DISCONTINUED | OUTPATIENT
Start: 2025-01-03 | End: 2025-01-03 | Stop reason: SDUPTHER

## 2025-01-03 RX ORDER — PROPOFOL 10 MG/ML
INJECTION, EMULSION INTRAVENOUS
Status: DISCONTINUED | OUTPATIENT
Start: 2025-01-03 | End: 2025-01-03 | Stop reason: SDUPTHER

## 2025-01-03 RX ORDER — SODIUM CHLORIDE 9 MG/ML
INJECTION, SOLUTION INTRAVENOUS PRN
Status: DISCONTINUED | OUTPATIENT
Start: 2025-01-03 | End: 2025-01-06

## 2025-01-03 RX ORDER — MAGNESIUM SULFATE IN WATER 40 MG/ML
2000 INJECTION, SOLUTION INTRAVENOUS ONCE
Status: COMPLETED | OUTPATIENT
Start: 2025-01-03 | End: 2025-01-03

## 2025-01-03 RX ORDER — ADENOSINE 3 MG/ML
6 INJECTION, SOLUTION INTRAVENOUS ONCE
Status: COMPLETED | OUTPATIENT
Start: 2025-01-03 | End: 2025-01-03

## 2025-01-03 RX ORDER — 0.9 % SODIUM CHLORIDE 0.9 %
1000 INTRAVENOUS SOLUTION INTRAVENOUS ONCE
Status: COMPLETED | OUTPATIENT
Start: 2025-01-03 | End: 2025-01-03

## 2025-01-03 RX ORDER — BUPIVACAINE HYDROCHLORIDE 5 MG/ML
INJECTION, SOLUTION EPIDURAL; INTRACAUDAL PRN
Status: DISCONTINUED | OUTPATIENT
Start: 2025-01-03 | End: 2025-01-03 | Stop reason: ALTCHOICE

## 2025-01-03 RX ORDER — ALBUMIN (HUMAN) 12.5 G/50ML
25 SOLUTION INTRAVENOUS ONCE
Status: DISCONTINUED | OUTPATIENT
Start: 2025-01-03 | End: 2025-01-03

## 2025-01-03 RX ORDER — 0.9 % SODIUM CHLORIDE 0.9 %
500 INTRAVENOUS SOLUTION INTRAVENOUS ONCE
Status: COMPLETED | OUTPATIENT
Start: 2025-01-03 | End: 2025-01-04

## 2025-01-03 RX ORDER — PHENYLEPHRINE HCL IN 0.9% NACL 1 MG/10 ML
SYRINGE (ML) INTRAVENOUS
Status: DISCONTINUED | OUTPATIENT
Start: 2025-01-03 | End: 2025-01-03 | Stop reason: SDUPTHER

## 2025-01-03 RX ORDER — ALBUMIN HUMAN 50 G/1000ML
12.5 SOLUTION INTRAVENOUS ONCE
Status: DISCONTINUED | OUTPATIENT
Start: 2025-01-03 | End: 2025-01-03

## 2025-01-03 RX ORDER — FENTANYL CITRATE 50 UG/ML
INJECTION, SOLUTION INTRAMUSCULAR; INTRAVENOUS
Status: DISCONTINUED | OUTPATIENT
Start: 2025-01-03 | End: 2025-01-03 | Stop reason: SDUPTHER

## 2025-01-03 RX ORDER — ESMOLOL HYDROCHLORIDE 10 MG/ML
INJECTION INTRAVENOUS
Status: DISCONTINUED | OUTPATIENT
Start: 2025-01-03 | End: 2025-01-03 | Stop reason: SDUPTHER

## 2025-01-03 RX ORDER — ADENOSINE 3 MG/ML
12 INJECTION, SOLUTION INTRAVENOUS ONCE
Status: COMPLETED | OUTPATIENT
Start: 2025-01-03 | End: 2025-01-03

## 2025-01-03 RX ADMIN — SODIUM CHLORIDE, PRESERVATIVE FREE 10 ML: 5 INJECTION INTRAVENOUS at 21:03

## 2025-01-03 RX ADMIN — GLIPIZIDE 10 MG: 5 TABLET ORAL at 16:59

## 2025-01-03 RX ADMIN — FENTANYL CITRATE 25 MCG: 50 INJECTION, SOLUTION INTRAMUSCULAR; INTRAVENOUS at 07:32

## 2025-01-03 RX ADMIN — MONTELUKAST SODIUM 10 MG: 10 TABLET, FILM COATED ORAL at 21:03

## 2025-01-03 RX ADMIN — MAGNESIUM SULFATE HEPTAHYDRATE 2000 MG: 40 INJECTION, SOLUTION INTRAVENOUS at 12:29

## 2025-01-03 RX ADMIN — PROPOFOL 80 MCG/KG/MIN: 10 INJECTION, EMULSION INTRAVENOUS at 07:14

## 2025-01-03 RX ADMIN — ESMOLOL HYDROCHLORIDE 30 MG: 10 INJECTION, SOLUTION INTRAVENOUS at 07:34

## 2025-01-03 RX ADMIN — MAGNESIUM SULFATE HEPTAHYDRATE 2000 MG: 40 INJECTION, SOLUTION INTRAVENOUS at 12:36

## 2025-01-03 RX ADMIN — MAGNESIUM SULFATE HEPTAHYDRATE 2000 MG: 40 INJECTION, SOLUTION INTRAVENOUS at 06:53

## 2025-01-03 RX ADMIN — SODIUM CHLORIDE 500 ML: 9 INJECTION, SOLUTION INTRAVENOUS at 23:17

## 2025-01-03 RX ADMIN — ADENOSINE 6 MG: 3 INJECTION, SOLUTION INTRAVENOUS at 23:19

## 2025-01-03 RX ADMIN — AMPICILLIN SODIUM 2000 MG: 2 INJECTION, POWDER, FOR SOLUTION INTRAMUSCULAR; INTRAVENOUS at 01:52

## 2025-01-03 RX ADMIN — SODIUM CHLORIDE: 9 INJECTION, SOLUTION INTRAVENOUS at 07:10

## 2025-01-03 RX ADMIN — ONDANSETRON 4 MG: 2 INJECTION INTRAMUSCULAR; INTRAVENOUS at 21:24

## 2025-01-03 RX ADMIN — AMPICILLIN SODIUM 2000 MG: 2 INJECTION, POWDER, FOR SOLUTION INTRAMUSCULAR; INTRAVENOUS at 15:45

## 2025-01-03 RX ADMIN — POTASSIUM BICARBONATE 40 MEQ: 782 TABLET, EFFERVESCENT ORAL at 12:27

## 2025-01-03 RX ADMIN — ALBUMIN (HUMAN) 25 G: 12.5 INJECTION, SOLUTION INTRAVENOUS at 08:15

## 2025-01-03 RX ADMIN — AMPICILLIN SODIUM 2000 MG: 2 INJECTION, POWDER, FOR SOLUTION INTRAMUSCULAR; INTRAVENOUS at 11:24

## 2025-01-03 RX ADMIN — Medication 100 MCG: at 07:23

## 2025-01-03 RX ADMIN — REMDESIVIR 100 MG: 100 INJECTION, POWDER, LYOPHILIZED, FOR SOLUTION INTRAVENOUS at 16:58

## 2025-01-03 RX ADMIN — POTASSIUM BICARBONATE 40 MEQ: 782 TABLET, EFFERVESCENT ORAL at 21:02

## 2025-01-03 RX ADMIN — ADENOSINE 6 MG: 3 INJECTION, SOLUTION INTRAVENOUS at 23:17

## 2025-01-03 RX ADMIN — METOROPROLOL TARTRATE 5 MG: 5 INJECTION, SOLUTION INTRAVENOUS at 02:28

## 2025-01-03 RX ADMIN — WATER 2000 MG: 1 INJECTION INTRAMUSCULAR; INTRAVENOUS; SUBCUTANEOUS at 23:17

## 2025-01-03 RX ADMIN — WATER 2000 MG: 1 INJECTION INTRAMUSCULAR; INTRAVENOUS; SUBCUTANEOUS at 11:25

## 2025-01-03 RX ADMIN — AMPICILLIN SODIUM 2000 MG: 2 INJECTION, POWDER, FOR SOLUTION INTRAMUSCULAR; INTRAVENOUS at 21:28

## 2025-01-03 RX ADMIN — LIDOCAINE HYDROCHLORIDE 60 MG: 20 INJECTION, SOLUTION INTRAVENOUS at 07:17

## 2025-01-03 RX ADMIN — SODIUM CHLORIDE 1000 ML: 9 INJECTION, SOLUTION INTRAVENOUS at 12:00

## 2025-01-03 RX ADMIN — FENTANYL CITRATE 25 MCG: 50 INJECTION, SOLUTION INTRAMUSCULAR; INTRAVENOUS at 07:25

## 2025-01-03 RX ADMIN — WATER 2000 MG: 1 INJECTION INTRAMUSCULAR; INTRAVENOUS; SUBCUTANEOUS at 00:02

## 2025-01-03 RX ADMIN — METOROPROLOL TARTRATE 5 MG: 5 INJECTION, SOLUTION INTRAVENOUS at 23:17

## 2025-01-03 RX ADMIN — SODIUM CHLORIDE, POTASSIUM CHLORIDE, SODIUM LACTATE AND CALCIUM CHLORIDE: 600; 310; 30; 20 INJECTION, SOLUTION INTRAVENOUS at 00:05

## 2025-01-03 RX ADMIN — POTASSIUM PHOSPHATE, MONOBASIC AND POTASSIUM PHOSPHATE, DIBASIC 15 MMOL: 224; 236 INJECTION, SOLUTION, CONCENTRATE INTRAVENOUS at 15:35

## 2025-01-03 RX ADMIN — PHENYLEPHRINE HYDROCHLORIDE 33.33 MCG/MIN: 10 INJECTION INTRAVENOUS at 09:00

## 2025-01-03 RX ADMIN — AMPICILLIN SODIUM 2000 MG: 2 INJECTION, POWDER, FOR SOLUTION INTRAMUSCULAR; INTRAVENOUS at 06:28

## 2025-01-03 RX ADMIN — Medication 100 MCG: at 07:29

## 2025-01-03 ASSESSMENT — PAIN SCALES - GENERAL
PAINLEVEL_OUTOF10: 0

## 2025-01-03 ASSESSMENT — PAIN - FUNCTIONAL ASSESSMENT: PAIN_FUNCTIONAL_ASSESSMENT: NONE - DENIES PAIN

## 2025-01-03 NOTE — ANESTHESIA POSTPROCEDURE EVALUATION
Department of Anesthesiology  Postprocedure Note    Patient: Michael Garcia  MRN: [id number]  YOB: 1945  Date of evaluation: 1/3/2025    Procedure Summary       Date: 01/03/25 Room / Location: [address]    Anesthesia Start: 0707 Anesthesia Stop: 0748    Procedure: PORT REMOVAL (Right: Chest) Diagnosis:       Bacteremia      (Bacteremia [R78.81])    Surgeons: Yary Gardiner MD Responsible Provider: Tona Hurley MD    Anesthesia Type: MAC ASA Status: 4            Anesthesia Type: No value filed.    Lindsey Phase I: Lindsey Score: 9    Lindsey Phase II:      Anesthesia Post Evaluation    Patient location during evaluation: PACU  Patient participation: complete - patient participated  Level of consciousness: awake and alert  Airway patency: patent  Nausea & Vomiting: no nausea and no vomiting  Cardiovascular status: hypotensive and vasoactive/inotropes  Respiratory status: acceptable and spontaneous ventilation  Hydration status: euvolemic  Comments: BP remains marginal after albumin administration.  Patient in no distress.  Added phenylephrine infusion and will transfer to MICU presumed sepsis.  Dr. Jung notified.  Multimodal analgesia pain management approach  Pain management: adequate    No notable events documented.

## 2025-01-03 NOTE — OP NOTE
Operative Note      Patient: Michael Garcia  YOB: 1945  MRN: [id number]    Date of Procedure: 1/3/2025    Pre-Op Diagnosis Codes:      * Bacteremia [R78.81], infected mediport    Post-Op Diagnosis: Same       Procedure(s):  PORT REMOVAL    Surgeon(s):  Yary Gardiner MD    Assistant:   Resident: Eliza Dietrich DO    Anesthesia: General    Estimated Blood Loss (mL): Minimal    Complications: None    Specimens:   ID Type Source Tests Collected by Time Destination   1 : CATHETER  TIP AND PORT Tissue Tissue CULTURE, ANAEROBIC, CULTURE, FUNGUS, GRAM STAIN, CULTURE, SURGICAL Yary Gardiner MD 1/3/2025 0732        Implants:  * No implants in log *      Drains: * No LDAs found *    Findings:  Infection Present At Time Of Surgery (PATOS) (choose all levels that have infection present):  No infection present  Other Findings: Mediport from Right IJ removed with catheter completely in tact, tip of catheter sent for culture     Detailed Description of Procedure:   Patient was brought to the operating room.  Patient was positioned supine on the operating table.  Patient had anesthesia with monitored anesthesia care administered via the anesthesia record.  Patient had antibiotic coverage administered with Unasyn during transfer to the the OR.  The patient's right chest was then prepped and draped in the usual sterile fashion with chlorhexidine prep.  Appropriate amount of time was allowed for prep to dry prior to draping.    The previous right chest Mediport incision was then identified.  Local anesthesia with 0.5% Marcaine without epinephrine was achieved.  A 15 blade was used to make an incision through this prior scar the level of dermis to the subcutaneous tissue.  Dissection was then continued with electrocautery down to the level of the hub/catheter.  The hub of the Mediport was then grasped with a hemostat.  The capsule surrounding the Mediport reservoir was then incised with electrocautery.  Prolene

## 2025-01-04 ENCOUNTER — APPOINTMENT (OUTPATIENT)
Dept: CT IMAGING | Age: 80
DRG: 314 | End: 2025-01-04
Payer: MEDICARE

## 2025-01-04 PROBLEM — E43 SEVERE PROTEIN-CALORIE MALNUTRITION (HCC): Status: ACTIVE | Noted: 2024-08-05

## 2025-01-04 LAB
ABO/RH: NORMAL
ALT SERPL-CCNC: 12 U/L (ref 0–40)
ANION GAP SERPL CALCULATED.3IONS-SCNC: 10 MMOL/L (ref 7–16)
ANION GAP SERPL CALCULATED.3IONS-SCNC: 8 MMOL/L (ref 7–16)
ANTIBODY SCREEN: NEGATIVE
ARM BAND NUMBER: NORMAL
AST SERPL-CCNC: 19 U/L (ref 0–39)
BASOPHILS # BLD: 0 K/UL (ref 0–0.2)
BASOPHILS # BLD: 0 K/UL (ref 0–0.2)
BASOPHILS NFR BLD: 0 % (ref 0–2)
BASOPHILS NFR BLD: 0 % (ref 0–2)
BLOOD BANK BLOOD PRODUCT EXPIRATION DATE: NORMAL
BLOOD BANK DISPENSE STATUS: NORMAL
BLOOD BANK ISBT PRODUCT BLOOD TYPE: 6200
BLOOD BANK PRODUCT CODE: NORMAL
BLOOD BANK SAMPLE EXPIRATION: NORMAL
BLOOD BANK UNIT TYPE AND RH: NORMAL
BPU ID: NORMAL
BUN SERPL-MCNC: 6 MG/DL (ref 6–23)
BUN SERPL-MCNC: 7 MG/DL (ref 6–23)
CALCIUM SERPL-MCNC: 7.4 MG/DL (ref 8.6–10.2)
CALCIUM SERPL-MCNC: 7.9 MG/DL (ref 8.6–10.2)
CHLORIDE SERPL-SCNC: 105 MMOL/L (ref 98–107)
CHLORIDE SERPL-SCNC: 106 MMOL/L (ref 98–107)
CO2 SERPL-SCNC: 21 MMOL/L (ref 22–29)
CO2 SERPL-SCNC: 22 MMOL/L (ref 22–29)
COMPONENT: NORMAL
CREAT SERPL-MCNC: 0.5 MG/DL (ref 0.7–1.2)
CREAT SERPL-MCNC: 0.6 MG/DL (ref 0.7–1.2)
CROSSMATCH RESULT: NORMAL
CRP SERPL HS-MCNC: 83 MG/L (ref 0–5)
EOSINOPHIL # BLD: 0 K/UL (ref 0.05–0.5)
EOSINOPHIL # BLD: 0 K/UL (ref 0.05–0.5)
EOSINOPHILS RELATIVE PERCENT: 0 % (ref 0–6)
EOSINOPHILS RELATIVE PERCENT: 0 % (ref 0–6)
ERYTHROCYTE [DISTWIDTH] IN BLOOD BY AUTOMATED COUNT: 18.9 % (ref 11.5–15)
ERYTHROCYTE [DISTWIDTH] IN BLOOD BY AUTOMATED COUNT: 19 % (ref 11.5–15)
FOLATE SERPL-MCNC: 6.1 NG/ML (ref 4.8–24.2)
GFR, ESTIMATED: >90 ML/MIN/1.73M2
GFR, ESTIMATED: >90 ML/MIN/1.73M2
GLUCOSE BLD-MCNC: 131 MG/DL (ref 74–99)
GLUCOSE SERPL-MCNC: 130 MG/DL (ref 74–99)
GLUCOSE SERPL-MCNC: 139 MG/DL (ref 74–99)
HCT VFR BLD AUTO: 25.5 % (ref 37–54)
HCT VFR BLD AUTO: 30.9 % (ref 37–54)
HGB BLD-MCNC: 10 G/DL (ref 12.5–16.5)
HGB BLD-MCNC: 8.3 G/DL (ref 12.5–16.5)
LYMPHOCYTES NFR BLD: 0.16 K/UL (ref 1.5–4)
LYMPHOCYTES NFR BLD: 0.26 K/UL (ref 1.5–4)
LYMPHOCYTES RELATIVE PERCENT: 1 % (ref 20–42)
LYMPHOCYTES RELATIVE PERCENT: 6 % (ref 20–42)
MAGNESIUM SERPL-MCNC: 1.7 MG/DL (ref 1.6–2.6)
MAGNESIUM SERPL-MCNC: 2.1 MG/DL (ref 1.6–2.6)
MCH RBC QN AUTO: 31.5 PG (ref 26–35)
MCH RBC QN AUTO: 31.7 PG (ref 26–35)
MCHC RBC AUTO-ENTMCNC: 32.4 G/DL (ref 32–34.5)
MCHC RBC AUTO-ENTMCNC: 32.5 G/DL (ref 32–34.5)
MCV RBC AUTO: 97.3 FL (ref 80–99.9)
MCV RBC AUTO: 97.5 FL (ref 80–99.9)
MICROORGANISM SPEC CULT: ABNORMAL
MICROORGANISM/AGENT SPEC: ABNORMAL
MONOCYTES NFR BLD: 0.07 K/UL (ref 0.1–0.95)
MONOCYTES NFR BLD: 0.8 K/UL (ref 0.1–0.95)
MONOCYTES NFR BLD: 2 % (ref 2–12)
MONOCYTES NFR BLD: 4 % (ref 2–12)
MYELOCYTES ABSOLUTE COUNT: 0.04 K/UL
MYELOCYTES ABSOLUTE COUNT: 0.16 K/UL
MYELOCYTES: 1 %
MYELOCYTES: 1 %
NEUTROPHILS NFR BLD: 91 % (ref 43–80)
NEUTROPHILS NFR BLD: 94 % (ref 43–80)
NEUTS SEG NFR BLD: 17.08 K/UL (ref 1.8–7.3)
NEUTS SEG NFR BLD: 3.83 K/UL (ref 1.8–7.3)
NUCLEATED RED BLOOD CELLS: 1 PER 100 WBC
PARTIAL THROMBOPLASTIN TIME: 25 SEC (ref 24.5–35.1)
PARTIAL THROMBOPLASTIN TIME: 32.6 SEC (ref 24.5–35.1)
PHOSPHATE SERPL-MCNC: 1.9 MG/DL (ref 2.5–4.5)
PHOSPHATE SERPL-MCNC: 2.5 MG/DL (ref 2.5–4.5)
PLATELET # BLD AUTO: 180 K/UL (ref 130–450)
PLATELET # BLD AUTO: 233 K/UL (ref 130–450)
PMV BLD AUTO: 10.7 FL (ref 7–12)
PMV BLD AUTO: 10.8 FL (ref 7–12)
POTASSIUM SERPL-SCNC: 3.6 MMOL/L (ref 3.5–5)
POTASSIUM SERPL-SCNC: 4.4 MMOL/L (ref 3.5–5)
RBC # BLD AUTO: 2.62 M/UL (ref 3.8–5.8)
RBC # BLD AUTO: 3.17 M/UL (ref 3.8–5.8)
RBC # BLD: ABNORMAL 10*6/UL
SERVICE CMNT-IMP: ABNORMAL
SODIUM SERPL-SCNC: 136 MMOL/L (ref 132–146)
SODIUM SERPL-SCNC: 136 MMOL/L (ref 132–146)
SPECIMEN DESCRIPTION: ABNORMAL
TRANSFUSION STATUS: NORMAL
UNIT DIVISION: 0
UNIT ISSUE DATE/TIME: NORMAL
VIT B12 SERPL-MCNC: 1195 PG/ML (ref 211–946)
WBC # BLD: ABNORMAL 10*3/UL
WBC OTHER # BLD: 18.2 K/UL (ref 4.5–11.5)
WBC OTHER # BLD: 4.2 K/UL (ref 4.5–11.5)

## 2025-01-04 PROCEDURE — 2580000003 HC RX 258

## 2025-01-04 PROCEDURE — 71275 CT ANGIOGRAPHY CHEST: CPT

## 2025-01-04 PROCEDURE — 82746 ASSAY OF FOLIC ACID SERUM: CPT

## 2025-01-04 PROCEDURE — 2500000003 HC RX 250 WO HCPCS

## 2025-01-04 PROCEDURE — 6370000000 HC RX 637 (ALT 250 FOR IP)

## 2025-01-04 PROCEDURE — 84460 ALANINE AMINO (ALT) (SGPT): CPT

## 2025-01-04 PROCEDURE — 99233 SBSQ HOSP IP/OBS HIGH 50: CPT | Performed by: INTERNAL MEDICINE

## 2025-01-04 PROCEDURE — 6360000002 HC RX W HCPCS: Performed by: ANESTHESIOLOGY

## 2025-01-04 PROCEDURE — 93005 ELECTROCARDIOGRAM TRACING: CPT

## 2025-01-04 PROCEDURE — 80048 BASIC METABOLIC PNL TOTAL CA: CPT

## 2025-01-04 PROCEDURE — 2580000003 HC RX 258: Performed by: ANESTHESIOLOGY

## 2025-01-04 PROCEDURE — 99291 CRITICAL CARE FIRST HOUR: CPT | Performed by: INTERNAL MEDICINE

## 2025-01-04 PROCEDURE — 82962 GLUCOSE BLOOD TEST: CPT

## 2025-01-04 PROCEDURE — 6360000004 HC RX CONTRAST MEDICATION: Performed by: RADIOLOGY

## 2025-01-04 PROCEDURE — 99222 1ST HOSP IP/OBS MODERATE 55: CPT | Performed by: SURGERY

## 2025-01-04 PROCEDURE — 85730 THROMBOPLASTIN TIME PARTIAL: CPT

## 2025-01-04 PROCEDURE — 6360000002 HC RX W HCPCS

## 2025-01-04 PROCEDURE — 2700000000 HC OXYGEN THERAPY PER DAY

## 2025-01-04 PROCEDURE — 83735 ASSAY OF MAGNESIUM: CPT

## 2025-01-04 PROCEDURE — 2000000000 HC ICU R&B

## 2025-01-04 PROCEDURE — 84100 ASSAY OF PHOSPHORUS: CPT

## 2025-01-04 PROCEDURE — 85025 COMPLETE CBC W/AUTO DIFF WBC: CPT

## 2025-01-04 PROCEDURE — 82607 VITAMIN B-12: CPT

## 2025-01-04 PROCEDURE — 84450 TRANSFERASE (AST) (SGOT): CPT

## 2025-01-04 RX ORDER — IOPAMIDOL 755 MG/ML
75 INJECTION, SOLUTION INTRAVASCULAR
Status: COMPLETED | OUTPATIENT
Start: 2025-01-04 | End: 2025-01-04

## 2025-01-04 RX ORDER — MAGNESIUM SULFATE IN WATER 40 MG/ML
2000 INJECTION, SOLUTION INTRAVENOUS ONCE
Status: COMPLETED | OUTPATIENT
Start: 2025-01-04 | End: 2025-01-04

## 2025-01-04 RX ORDER — ADENOSINE 3 MG/ML
6 INJECTION, SOLUTION INTRAVENOUS ONCE
Status: DISCONTINUED | OUTPATIENT
Start: 2025-01-05 | End: 2025-01-04

## 2025-01-04 RX ORDER — POTASSIUM CHLORIDE 1500 MG/1
20 TABLET, EXTENDED RELEASE ORAL ONCE
Status: COMPLETED | OUTPATIENT
Start: 2025-01-04 | End: 2025-01-04

## 2025-01-04 RX ORDER — METOPROLOL TARTRATE 1 MG/ML
5 INJECTION, SOLUTION INTRAVENOUS ONCE
Status: COMPLETED | OUTPATIENT
Start: 2025-01-04 | End: 2025-01-04

## 2025-01-04 RX ORDER — 0.9 % SODIUM CHLORIDE 0.9 %
500 INTRAVENOUS SOLUTION INTRAVENOUS ONCE
Status: COMPLETED | OUTPATIENT
Start: 2025-01-04 | End: 2025-01-05

## 2025-01-04 RX ADMIN — MAGNESIUM SULFATE HEPTAHYDRATE 2000 MG: 40 INJECTION, SOLUTION INTRAVENOUS at 06:27

## 2025-01-04 RX ADMIN — SODIUM CHLORIDE, PRESERVATIVE FREE 10 ML: 5 INJECTION INTRAVENOUS at 21:07

## 2025-01-04 RX ADMIN — SODIUM CHLORIDE, PRESERVATIVE FREE 10 ML: 5 INJECTION INTRAVENOUS at 22:11

## 2025-01-04 RX ADMIN — GLIPIZIDE 10 MG: 5 TABLET ORAL at 06:19

## 2025-01-04 RX ADMIN — AMPICILLIN SODIUM 2000 MG: 2 INJECTION, POWDER, FOR SOLUTION INTRAMUSCULAR; INTRAVENOUS at 22:23

## 2025-01-04 RX ADMIN — WATER 2000 MG: 1 INJECTION INTRAMUSCULAR; INTRAVENOUS; SUBCUTANEOUS at 10:59

## 2025-01-04 RX ADMIN — AMPICILLIN SODIUM 2000 MG: 2 INJECTION, POWDER, FOR SOLUTION INTRAMUSCULAR; INTRAVENOUS at 16:42

## 2025-01-04 RX ADMIN — SODIUM CHLORIDE 500 ML: 9 INJECTION, SOLUTION INTRAVENOUS at 23:25

## 2025-01-04 RX ADMIN — POTASSIUM PHOSPHATE, MONOBASIC AND POTASSIUM PHOSPHATE, DIBASIC 20 MMOL: 224; 236 INJECTION, SOLUTION, CONCENTRATE INTRAVENOUS at 07:00

## 2025-01-04 RX ADMIN — METOROPROLOL TARTRATE 5 MG: 5 INJECTION, SOLUTION INTRAVENOUS at 22:11

## 2025-01-04 RX ADMIN — AMPICILLIN SODIUM 2000 MG: 2 INJECTION, POWDER, FOR SOLUTION INTRAMUSCULAR; INTRAVENOUS at 11:07

## 2025-01-04 RX ADMIN — FINASTERIDE 5 MG: 5 TABLET, FILM COATED ORAL at 10:59

## 2025-01-04 RX ADMIN — Medication 3 MG: at 21:40

## 2025-01-04 RX ADMIN — IOPAMIDOL 75 ML: 755 INJECTION, SOLUTION INTRAVENOUS at 09:56

## 2025-01-04 RX ADMIN — SODIUM CHLORIDE, PRESERVATIVE FREE 10 ML: 5 INJECTION INTRAVENOUS at 10:59

## 2025-01-04 RX ADMIN — ACETAMINOPHEN 650 MG: 325 TABLET ORAL at 21:40

## 2025-01-04 RX ADMIN — REMDESIVIR 100 MG: 100 INJECTION, POWDER, LYOPHILIZED, FOR SOLUTION INTRAVENOUS at 17:56

## 2025-01-04 RX ADMIN — BENZONATATE 100 MG: 100 CAPSULE ORAL at 21:40

## 2025-01-04 RX ADMIN — PHENYLEPHRINE HYDROCHLORIDE 30 MCG/MIN: 10 INJECTION INTRAVENOUS at 01:06

## 2025-01-04 RX ADMIN — EMPAGLIFLOZIN 10 MG: 10 TABLET, FILM COATED ORAL at 10:59

## 2025-01-04 RX ADMIN — AMPICILLIN SODIUM 2000 MG: 2 INJECTION, POWDER, FOR SOLUTION INTRAMUSCULAR; INTRAVENOUS at 02:08

## 2025-01-04 RX ADMIN — MONTELUKAST SODIUM 10 MG: 10 TABLET, FILM COATED ORAL at 21:07

## 2025-01-04 RX ADMIN — TAMSULOSIN HYDROCHLORIDE 0.4 MG: 0.4 CAPSULE ORAL at 11:11

## 2025-01-04 RX ADMIN — AMPICILLIN SODIUM 2000 MG: 2 INJECTION, POWDER, FOR SOLUTION INTRAMUSCULAR; INTRAVENOUS at 06:23

## 2025-01-04 RX ADMIN — SENNOSIDES AND DOCUSATE SODIUM 2 TABLET: 8.6; 5 TABLET ORAL at 10:58

## 2025-01-04 RX ADMIN — PHENYLEPHRINE HYDROCHLORIDE 50 MCG/MIN: 10 INJECTION INTRAVENOUS at 06:53

## 2025-01-04 RX ADMIN — POTASSIUM CHLORIDE 20 MEQ: 1500 TABLET, EXTENDED RELEASE ORAL at 06:20

## 2025-01-04 ASSESSMENT — PAIN DESCRIPTION - DESCRIPTORS
DESCRIPTORS: SHARP
DESCRIPTORS: SHARP

## 2025-01-04 ASSESSMENT — PAIN DESCRIPTION - FREQUENCY: FREQUENCY: INTERMITTENT

## 2025-01-04 ASSESSMENT — PAIN SCALES - GENERAL
PAINLEVEL_OUTOF10: 3
PAINLEVEL_OUTOF10: 0
PAINLEVEL_OUTOF10: 0
PAINLEVEL_OUTOF10: 2

## 2025-01-04 ASSESSMENT — PAIN DESCRIPTION - LOCATION
LOCATION: ABDOMEN
LOCATION: ABDOMEN

## 2025-01-04 ASSESSMENT — PAIN DESCRIPTION - PAIN TYPE: TYPE: ACUTE PAIN

## 2025-01-05 LAB
ACB COMPLEX DNA BLD POS QL NAA+NON-PROBE: NOT DETECTED
ANION GAP SERPL CALCULATED.3IONS-SCNC: 13 MMOL/L (ref 7–16)
B FRAGILIS DNA BLD POS QL NAA+NON-PROBE: NOT DETECTED
BASOPHILS # BLD: 0 K/UL (ref 0–0.2)
BASOPHILS NFR BLD: 0 % (ref 0–2)
BIOFIRE TEST COMMENT: ABNORMAL
BLACTX-M ISLT/SPM QL: NOT DETECTED
BLAIMP ISLT/SPM QL: NOT DETECTED
BLAKPC ISLT/SPM QL: NOT DETECTED
BLAOXA-48-LIKE ISLT/SPM QL: NOT DETECTED
BLAVIM ISLT/SPM QL: NOT DETECTED
BUN SERPL-MCNC: 7 MG/DL (ref 6–23)
C ALBICANS DNA BLD POS QL NAA+NON-PROBE: NOT DETECTED
C AURIS DNA BLD POS QL NAA+NON-PROBE: NOT DETECTED
C GATTII+NEOFOR DNA BLD POS QL NAA+N-PRB: NOT DETECTED
C GLABRATA DNA BLD POS QL NAA+NON-PROBE: NOT DETECTED
C KRUSEI DNA BLD POS QL NAA+NON-PROBE: NOT DETECTED
C PARAP DNA BLD POS QL NAA+NON-PROBE: NOT DETECTED
C TROPICLS DNA BLD POS QL NAA+NON-PROBE: NOT DETECTED
CALCIUM SERPL-MCNC: 7.5 MG/DL (ref 8.6–10.2)
CHLORIDE SERPL-SCNC: 103 MMOL/L (ref 98–107)
CO2 SERPL-SCNC: 22 MMOL/L (ref 22–29)
COLISTIN RES MCR-1 ISLT/SPM QL: NOT DETECTED
CREAT SERPL-MCNC: 0.5 MG/DL (ref 0.7–1.2)
E CLOAC COMP DNA BLD POS NAA+NON-PROBE: NOT DETECTED
E COLI DNA BLD POS QL NAA+NON-PROBE: DETECTED
E FAECALIS DNA BLD POS QL NAA+NON-PROBE: DETECTED
E FAECIUM DNA BLD POS QL NAA+NON-PROBE: NOT DETECTED
ENTEROBACTERALES DNA BLD POS NAA+N-PRB: DETECTED
EOSINOPHIL # BLD: 0 K/UL (ref 0.05–0.5)
EOSINOPHILS RELATIVE PERCENT: 0 % (ref 0–6)
ERYTHROCYTE [DISTWIDTH] IN BLOOD BY AUTOMATED COUNT: 19.1 % (ref 11.5–15)
GFR, ESTIMATED: >90 ML/MIN/1.73M2
GLUCOSE BLD-MCNC: 137 MG/DL (ref 74–99)
GLUCOSE SERPL-MCNC: 150 MG/DL (ref 74–99)
GP B STREP DNA BLD POS QL NAA+NON-PROBE: NOT DETECTED
HAEM INFLU DNA BLD POS QL NAA+NON-PROBE: NOT DETECTED
HCT VFR BLD AUTO: 24.6 % (ref 37–54)
HGB BLD-MCNC: 7.9 G/DL (ref 12.5–16.5)
K OXYTOCA DNA BLD POS QL NAA+NON-PROBE: NOT DETECTED
KLEBSIELLA SP DNA BLD POS QL NAA+NON-PRB: DETECTED
KLEBSIELLA SP DNA BLD POS QL NAA+NON-PRB: NOT DETECTED
L MONOCYTOG DNA BLD POS QL NAA+NON-PROBE: NOT DETECTED
LYMPHOCYTES NFR BLD: 0.62 K/UL (ref 1.5–4)
LYMPHOCYTES RELATIVE PERCENT: 3 % (ref 20–42)
MAGNESIUM SERPL-MCNC: 2 MG/DL (ref 1.6–2.6)
MCH RBC QN AUTO: 31.5 PG (ref 26–35)
MCHC RBC AUTO-ENTMCNC: 32.1 G/DL (ref 32–34.5)
MCV RBC AUTO: 98 FL (ref 80–99.9)
MICROORGANISM SPEC CULT: ABNORMAL
MICROORGANISM/AGENT SPEC: ABNORMAL
MONOCYTES NFR BLD: 1.44 K/UL (ref 0.1–0.95)
MONOCYTES NFR BLD: 6 % (ref 2–12)
N MEN DNA BLD POS QL NAA+NON-PROBE: NOT DETECTED
NEUTROPHILS NFR BLD: 91 % (ref 43–80)
NEUTS SEG NFR BLD: 21.64 K/UL (ref 1.8–7.3)
P AERUGINOSA DNA BLD POS NAA+NON-PROBE: NOT DETECTED
PARTIAL THROMBOPLASTIN TIME: 44.2 SEC (ref 24.5–35.1)
PARTIAL THROMBOPLASTIN TIME: 85.8 SEC (ref 24.5–35.1)
PARTIAL THROMBOPLASTIN TIME: 96.6 SEC (ref 24.5–35.1)
PHOSPHATE SERPL-MCNC: 3.3 MG/DL (ref 2.5–4.5)
PLATELET # BLD AUTO: 247 K/UL (ref 130–450)
PMV BLD AUTO: 10.8 FL (ref 7–12)
POTASSIUM SERPL-SCNC: 3.8 MMOL/L (ref 3.5–5)
PROCALCITONIN SERPL-MCNC: 16.76 NG/ML (ref 0–0.08)
PROTEUS SP DNA BLD POS QL NAA+NON-PROBE: NOT DETECTED
RBC # BLD AUTO: 2.51 M/UL (ref 3.8–5.8)
RBC # BLD: ABNORMAL 10*6/UL
RESISTANT GENE NDM BY PCR: NOT DETECTED
S AUREUS DNA BLD POS QL NAA+NON-PROBE: NOT DETECTED
S AUREUS+CONS DNA BLD POS NAA+NON-PROBE: NOT DETECTED
S EPIDERMIDIS DNA BLD POS QL NAA+NON-PRB: NOT DETECTED
S LUGDUNENSIS DNA BLD POS QL NAA+NON-PRB: NOT DETECTED
S MALTOPHILIA DNA BLD POS QL NAA+NON-PRB: NOT DETECTED
S MARCESCENS DNA BLD POS NAA+NON-PROBE: NOT DETECTED
S PNEUM DNA BLD POS QL NAA+NON-PROBE: NOT DETECTED
S PYO DNA BLD POS QL NAA+NON-PROBE: NOT DETECTED
SALMONELLA DNA BLD POS QL NAA+NON-PROBE: NOT DETECTED
SERVICE CMNT-IMP: ABNORMAL
SODIUM SERPL-SCNC: 138 MMOL/L (ref 132–146)
SPECIMEN DESCRIPTION: ABNORMAL
STREPTOCOCCUS DNA BLD POS NAA+NON-PROBE: DETECTED
VANA+VANB ISLT/SPM QL: NOT DETECTED
WBC # BLD: ABNORMAL 10*3/UL
WBC OTHER # BLD: 23.7 K/UL (ref 4.5–11.5)

## 2025-01-05 PROCEDURE — 85730 THROMBOPLASTIN TIME PARTIAL: CPT

## 2025-01-05 PROCEDURE — 6360000002 HC RX W HCPCS

## 2025-01-05 PROCEDURE — 2580000003 HC RX 258: Performed by: ANESTHESIOLOGY

## 2025-01-05 PROCEDURE — 2500000003 HC RX 250 WO HCPCS

## 2025-01-05 PROCEDURE — 2000000000 HC ICU R&B

## 2025-01-05 PROCEDURE — 6370000000 HC RX 637 (ALT 250 FOR IP)

## 2025-01-05 PROCEDURE — 2700000000 HC OXYGEN THERAPY PER DAY

## 2025-01-05 PROCEDURE — 99291 CRITICAL CARE FIRST HOUR: CPT | Performed by: INTERNAL MEDICINE

## 2025-01-05 PROCEDURE — 84100 ASSAY OF PHOSPHORUS: CPT

## 2025-01-05 PROCEDURE — 80048 BASIC METABOLIC PNL TOTAL CA: CPT

## 2025-01-05 PROCEDURE — 82962 GLUCOSE BLOOD TEST: CPT

## 2025-01-05 PROCEDURE — 84145 PROCALCITONIN (PCT): CPT

## 2025-01-05 PROCEDURE — 2580000003 HC RX 258

## 2025-01-05 PROCEDURE — 83735 ASSAY OF MAGNESIUM: CPT

## 2025-01-05 PROCEDURE — 85025 COMPLETE CBC W/AUTO DIFF WBC: CPT

## 2025-01-05 PROCEDURE — 6360000002 HC RX W HCPCS: Performed by: ANESTHESIOLOGY

## 2025-01-05 PROCEDURE — 99232 SBSQ HOSP IP/OBS MODERATE 35: CPT | Performed by: INTERNAL MEDICINE

## 2025-01-05 RX ORDER — METOPROLOL TARTRATE 1 MG/ML
5 INJECTION, SOLUTION INTRAVENOUS EVERY 6 HOURS
Status: DISCONTINUED | OUTPATIENT
Start: 2025-01-05 | End: 2025-01-07

## 2025-01-05 RX ADMIN — METOPROLOL TARTRATE 5 MG: 1 INJECTION, SOLUTION INTRAVENOUS at 18:35

## 2025-01-05 RX ADMIN — AMPICILLIN SODIUM 2000 MG: 2 INJECTION, POWDER, FOR SOLUTION INTRAMUSCULAR; INTRAVENOUS at 06:19

## 2025-01-05 RX ADMIN — WATER 2000 MG: 1 INJECTION INTRAMUSCULAR; INTRAVENOUS; SUBCUTANEOUS at 00:40

## 2025-01-05 RX ADMIN — AMPICILLIN SODIUM 2000 MG: 2 INJECTION, POWDER, FOR SOLUTION INTRAMUSCULAR; INTRAVENOUS at 09:24

## 2025-01-05 RX ADMIN — WATER 2000 MG: 1 INJECTION INTRAMUSCULAR; INTRAVENOUS; SUBCUTANEOUS at 23:03

## 2025-01-05 RX ADMIN — HEPARIN SODIUM 18 UNITS/KG/HR: 10000 INJECTION, SOLUTION INTRAVENOUS at 10:29

## 2025-01-05 RX ADMIN — AMPICILLIN SODIUM 2000 MG: 2 INJECTION, POWDER, FOR SOLUTION INTRAMUSCULAR; INTRAVENOUS at 22:23

## 2025-01-05 RX ADMIN — AMPICILLIN SODIUM 2000 MG: 2 INJECTION, POWDER, FOR SOLUTION INTRAMUSCULAR; INTRAVENOUS at 14:53

## 2025-01-05 RX ADMIN — SODIUM CHLORIDE, PRESERVATIVE FREE 10 ML: 5 INJECTION INTRAVENOUS at 22:20

## 2025-01-05 RX ADMIN — EMPAGLIFLOZIN 10 MG: 10 TABLET, FILM COATED ORAL at 09:13

## 2025-01-05 RX ADMIN — AMPICILLIN SODIUM 2000 MG: 2 INJECTION, POWDER, FOR SOLUTION INTRAMUSCULAR; INTRAVENOUS at 18:37

## 2025-01-05 RX ADMIN — METOPROLOL TARTRATE 5 MG: 1 INJECTION, SOLUTION INTRAVENOUS at 14:51

## 2025-01-05 RX ADMIN — SODIUM CHLORIDE, PRESERVATIVE FREE 10 ML: 5 INJECTION INTRAVENOUS at 00:43

## 2025-01-05 RX ADMIN — METOPROLOL TARTRATE 5 MG: 1 INJECTION, SOLUTION INTRAVENOUS at 02:27

## 2025-01-05 RX ADMIN — GLIPIZIDE 10 MG: 5 TABLET ORAL at 09:12

## 2025-01-05 RX ADMIN — SODIUM CHLORIDE, PRESERVATIVE FREE 10 ML: 5 INJECTION INTRAVENOUS at 22:23

## 2025-01-05 RX ADMIN — SODIUM CHLORIDE, PRESERVATIVE FREE 10 ML: 5 INJECTION INTRAVENOUS at 02:31

## 2025-01-05 RX ADMIN — HEPARIN SODIUM 3300 UNITS: 1000 INJECTION INTRAVENOUS; SUBCUTANEOUS at 00:41

## 2025-01-05 RX ADMIN — AMPICILLIN SODIUM 2000 MG: 2 INJECTION, POWDER, FOR SOLUTION INTRAMUSCULAR; INTRAVENOUS at 03:02

## 2025-01-05 RX ADMIN — METOPROLOL TARTRATE 5 MG: 1 INJECTION, SOLUTION INTRAVENOUS at 09:12

## 2025-01-05 RX ADMIN — SENNOSIDES AND DOCUSATE SODIUM 2 TABLET: 8.6; 5 TABLET ORAL at 09:12

## 2025-01-05 RX ADMIN — WATER 2000 MG: 1 INJECTION INTRAMUSCULAR; INTRAVENOUS; SUBCUTANEOUS at 10:29

## 2025-01-05 RX ADMIN — TAMSULOSIN HYDROCHLORIDE 0.4 MG: 0.4 CAPSULE ORAL at 09:18

## 2025-01-05 RX ADMIN — SODIUM PHOSPHATE, MONOBASIC, MONOHYDRATE AND SODIUM PHOSPHATE, DIBASIC, ANHYDROUS 15 MMOL: 142; 276 INJECTION, SOLUTION INTRAVENOUS at 00:49

## 2025-01-05 RX ADMIN — MONTELUKAST SODIUM 10 MG: 10 TABLET, FILM COATED ORAL at 22:20

## 2025-01-05 RX ADMIN — GLIPIZIDE 10 MG: 5 TABLET ORAL at 06:55

## 2025-01-05 RX ADMIN — PHENYLEPHRINE HYDROCHLORIDE 70 MCG/MIN: 10 INJECTION INTRAVENOUS at 18:32

## 2025-01-05 RX ADMIN — FINASTERIDE 5 MG: 5 TABLET, FILM COATED ORAL at 09:12

## 2025-01-05 ASSESSMENT — PAIN SCALES - GENERAL
PAINLEVEL_OUTOF10: 0
PAINLEVEL_OUTOF10: 0

## 2025-01-06 LAB
ALBUMIN SERPL-MCNC: 1.9 G/DL (ref 3.5–5.2)
ALP SERPL-CCNC: 101 U/L (ref 40–129)
ALT SERPL-CCNC: 11 U/L (ref 0–40)
ANION GAP SERPL CALCULATED.3IONS-SCNC: 5 MMOL/L (ref 7–16)
AST SERPL-CCNC: 18 U/L (ref 0–39)
BASOPHILS # BLD: 0 K/UL (ref 0–0.2)
BASOPHILS # BLD: 0.01 K/UL (ref 0–0.2)
BASOPHILS NFR BLD: 0 % (ref 0–2)
BASOPHILS NFR BLD: 0 % (ref 0–2)
BILIRUB DIRECT SERPL-MCNC: <0.2 MG/DL (ref 0–0.3)
BILIRUB INDIRECT SERPL-MCNC: ABNORMAL MG/DL (ref 0–1)
BILIRUB SERPL-MCNC: 0.3 MG/DL (ref 0–1.2)
BUN SERPL-MCNC: 6 MG/DL (ref 6–23)
CALCIUM SERPL-MCNC: 7.4 MG/DL (ref 8.6–10.2)
CHLORIDE SERPL-SCNC: 105 MMOL/L (ref 98–107)
CO2 SERPL-SCNC: 24 MMOL/L (ref 22–29)
CREAT SERPL-MCNC: 0.5 MG/DL (ref 0.7–1.2)
EKG ATRIAL RATE: 154 BPM
EKG ATRIAL RATE: 156 BPM
EKG P AXIS: 74 DEGREES
EKG P-R INTERVAL: 134 MS
EKG Q-T INTERVAL: 236 MS
EKG Q-T INTERVAL: 294 MS
EKG QRS DURATION: 72 MS
EKG QRS DURATION: 84 MS
EKG QTC CALCULATION (BAZETT): 377 MS
EKG QTC CALCULATION (BAZETT): 473 MS
EKG R AXIS: -2 DEGREES
EKG R AXIS: 24 DEGREES
EKG T AXIS: 25 DEGREES
EKG T AXIS: 57 DEGREES
EKG VENTRICULAR RATE: 154 BPM
EKG VENTRICULAR RATE: 156 BPM
EOSINOPHIL # BLD: 0 K/UL (ref 0.05–0.5)
EOSINOPHIL # BLD: 0.04 K/UL (ref 0.05–0.5)
EOSINOPHILS RELATIVE PERCENT: 0 % (ref 0–6)
EOSINOPHILS RELATIVE PERCENT: 0 % (ref 0–6)
ERYTHROCYTE [DISTWIDTH] IN BLOOD BY AUTOMATED COUNT: 18.7 % (ref 11.5–15)
ERYTHROCYTE [DISTWIDTH] IN BLOOD BY AUTOMATED COUNT: 19 % (ref 11.5–15)
GFR, ESTIMATED: >90 ML/MIN/1.73M2
GLUCOSE BLD-MCNC: 104 MG/DL (ref 74–99)
GLUCOSE SERPL-MCNC: 148 MG/DL (ref 74–99)
HAPTOGLOB SERPL-MCNC: 250 MG/DL (ref 30–200)
HCT VFR BLD AUTO: 22 % (ref 37–54)
HCT VFR BLD AUTO: 23.1 % (ref 37–54)
HGB BLD-MCNC: 7 G/DL (ref 12.5–16.5)
HGB BLD-MCNC: 7.4 G/DL (ref 12.5–16.5)
IMM GRANULOCYTES # BLD AUTO: 0.25 K/UL (ref 0–0.58)
IMM GRANULOCYTES NFR BLD: 2 % (ref 0–5)
IRON SATN MFR SERPL: 48 % (ref 20–55)
IRON SERPL-MCNC: 55 UG/DL (ref 59–158)
LDH SERPL-CCNC: 198 U/L (ref 135–225)
LYMPHOCYTES NFR BLD: 0.33 K/UL (ref 1.5–4)
LYMPHOCYTES NFR BLD: 0.5 K/UL (ref 1.5–4)
LYMPHOCYTES RELATIVE PERCENT: 3 % (ref 20–42)
LYMPHOCYTES RELATIVE PERCENT: 4 % (ref 20–42)
MAGNESIUM SERPL-MCNC: 1.9 MG/DL (ref 1.6–2.6)
MCH RBC QN AUTO: 31.8 PG (ref 26–35)
MCH RBC QN AUTO: 31.9 PG (ref 26–35)
MCHC RBC AUTO-ENTMCNC: 31.8 G/DL (ref 32–34.5)
MCHC RBC AUTO-ENTMCNC: 32 G/DL (ref 32–34.5)
MCV RBC AUTO: 100 FL (ref 80–99.9)
MCV RBC AUTO: 99.6 FL (ref 80–99.9)
MICROORGANISM SPEC CULT: NORMAL
MONOCYTES NFR BLD: 0.33 K/UL (ref 0.1–0.95)
MONOCYTES NFR BLD: 1.07 K/UL (ref 0.1–0.95)
MONOCYTES NFR BLD: 3 % (ref 2–12)
MONOCYTES NFR BLD: 8 % (ref 2–12)
NEUTROPHILS NFR BLD: 86 % (ref 43–80)
NEUTROPHILS NFR BLD: 95 % (ref 43–80)
NEUTS SEG NFR BLD: 11.31 K/UL (ref 1.8–7.3)
NEUTS SEG NFR BLD: 11.94 K/UL (ref 1.8–7.3)
PARTIAL THROMBOPLASTIN TIME: 59.4 SEC (ref 24.5–35.1)
PARTIAL THROMBOPLASTIN TIME: 75.3 SEC (ref 24.5–35.1)
PHOSPHATE SERPL-MCNC: 2.7 MG/DL (ref 2.5–4.5)
PLATELET # BLD AUTO: 225 K/UL (ref 130–450)
PLATELET # BLD AUTO: 325 K/UL (ref 130–450)
PMV BLD AUTO: 10.4 FL (ref 7–12)
PMV BLD AUTO: 10.8 FL (ref 7–12)
POTASSIUM SERPL-SCNC: 3.4 MMOL/L (ref 3.5–5)
PROT SERPL-MCNC: 4.5 G/DL (ref 6.4–8.3)
RBC # BLD AUTO: 2.2 M/UL (ref 3.8–5.8)
RBC # BLD AUTO: 2.32 M/UL (ref 3.8–5.8)
RBC # BLD: ABNORMAL 10*6/UL
SERVICE CMNT-IMP: NORMAL
SODIUM SERPL-SCNC: 134 MMOL/L (ref 132–146)
SPECIMEN DESCRIPTION: NORMAL
TIBC SERPL-MCNC: 115 UG/DL (ref 250–450)
WBC OTHER # BLD: 12.6 K/UL (ref 4.5–11.5)
WBC OTHER # BLD: 13.2 K/UL (ref 4.5–11.5)

## 2025-01-06 PROCEDURE — 6360000002 HC RX W HCPCS

## 2025-01-06 PROCEDURE — 6370000000 HC RX 637 (ALT 250 FOR IP)

## 2025-01-06 PROCEDURE — 2060000000 HC ICU INTERMEDIATE R&B

## 2025-01-06 PROCEDURE — 83615 LACTATE (LD) (LDH) ENZYME: CPT

## 2025-01-06 PROCEDURE — 2500000003 HC RX 250 WO HCPCS

## 2025-01-06 PROCEDURE — 2580000003 HC RX 258

## 2025-01-06 PROCEDURE — 80053 COMPREHEN METABOLIC PANEL: CPT

## 2025-01-06 PROCEDURE — 99232 SBSQ HOSP IP/OBS MODERATE 35: CPT | Performed by: INTERNAL MEDICINE

## 2025-01-06 PROCEDURE — 83010 ASSAY OF HAPTOGLOBIN QUANT: CPT

## 2025-01-06 PROCEDURE — 93005 ELECTROCARDIOGRAM TRACING: CPT

## 2025-01-06 PROCEDURE — 93010 ELECTROCARDIOGRAM REPORT: CPT | Performed by: INTERNAL MEDICINE

## 2025-01-06 PROCEDURE — 85025 COMPLETE CBC W/AUTO DIFF WBC: CPT

## 2025-01-06 PROCEDURE — 84100 ASSAY OF PHOSPHORUS: CPT

## 2025-01-06 PROCEDURE — 2700000000 HC OXYGEN THERAPY PER DAY

## 2025-01-06 PROCEDURE — 85730 THROMBOPLASTIN TIME PARTIAL: CPT

## 2025-01-06 PROCEDURE — 83550 IRON BINDING TEST: CPT

## 2025-01-06 PROCEDURE — 83540 ASSAY OF IRON: CPT

## 2025-01-06 PROCEDURE — 82248 BILIRUBIN DIRECT: CPT

## 2025-01-06 PROCEDURE — 83735 ASSAY OF MAGNESIUM: CPT

## 2025-01-06 PROCEDURE — 82962 GLUCOSE BLOOD TEST: CPT

## 2025-01-06 PROCEDURE — 99233 SBSQ HOSP IP/OBS HIGH 50: CPT | Performed by: INTERNAL MEDICINE

## 2025-01-06 RX ORDER — INSULIN LISPRO 100 [IU]/ML
0-4 INJECTION, SOLUTION INTRAVENOUS; SUBCUTANEOUS
Status: DISCONTINUED | OUTPATIENT
Start: 2025-01-06 | End: 2025-01-13 | Stop reason: HOSPADM

## 2025-01-06 RX ORDER — PROMETHAZINE HYDROCHLORIDE 25 MG/1
25 TABLET ORAL EVERY 6 HOURS PRN
Status: DISCONTINUED | OUTPATIENT
Start: 2025-01-06 | End: 2025-01-06

## 2025-01-06 RX ORDER — PROMETHAZINE HYDROCHLORIDE 25 MG/ML
6.25 INJECTION, SOLUTION INTRAMUSCULAR; INTRAVENOUS EVERY 6 HOURS PRN
Status: DISCONTINUED | OUTPATIENT
Start: 2025-01-06 | End: 2025-01-11

## 2025-01-06 RX ORDER — POTASSIUM CHLORIDE 1500 MG/1
40 TABLET, EXTENDED RELEASE ORAL ONCE
Status: COMPLETED | OUTPATIENT
Start: 2025-01-06 | End: 2025-01-06

## 2025-01-06 RX ADMIN — SODIUM CHLORIDE, PRESERVATIVE FREE 10 ML: 5 INJECTION INTRAVENOUS at 20:54

## 2025-01-06 RX ADMIN — TAMSULOSIN HYDROCHLORIDE 0.4 MG: 0.4 CAPSULE ORAL at 10:11

## 2025-01-06 RX ADMIN — HEPARIN SODIUM 16 UNITS/KG/HR: 10000 INJECTION, SOLUTION INTRAVENOUS at 05:59

## 2025-01-06 RX ADMIN — PANCRELIPASE LIPASE, PANCRELIPASE PROTEASE, PANCRELIPASE AMYLASE 30000 UNITS: 15000; 47000; 63000 CAPSULE, DELAYED RELEASE ORAL at 14:10

## 2025-01-06 RX ADMIN — WATER 2000 MG: 1 INJECTION INTRAMUSCULAR; INTRAVENOUS; SUBCUTANEOUS at 23:33

## 2025-01-06 RX ADMIN — SODIUM CHLORIDE, PRESERVATIVE FREE 40 MG: 5 INJECTION INTRAVENOUS at 14:11

## 2025-01-06 RX ADMIN — POTASSIUM CHLORIDE 40 MEQ: 1500 TABLET, EXTENDED RELEASE ORAL at 06:52

## 2025-01-06 RX ADMIN — GLIPIZIDE 10 MG: 5 TABLET ORAL at 06:30

## 2025-01-06 RX ADMIN — EMPAGLIFLOZIN 10 MG: 10 TABLET, FILM COATED ORAL at 10:10

## 2025-01-06 RX ADMIN — AMPICILLIN SODIUM 2000 MG: 2 INJECTION, POWDER, FOR SOLUTION INTRAMUSCULAR; INTRAVENOUS at 21:06

## 2025-01-06 RX ADMIN — METOPROLOL TARTRATE 5 MG: 1 INJECTION, SOLUTION INTRAVENOUS at 18:12

## 2025-01-06 RX ADMIN — SENNOSIDES AND DOCUSATE SODIUM 2 TABLET: 8.6; 5 TABLET ORAL at 10:11

## 2025-01-06 RX ADMIN — AMPICILLIN SODIUM 2000 MG: 2 INJECTION, POWDER, FOR SOLUTION INTRAMUSCULAR; INTRAVENOUS at 18:15

## 2025-01-06 RX ADMIN — METOPROLOL TARTRATE 5 MG: 1 INJECTION, SOLUTION INTRAVENOUS at 14:11

## 2025-01-06 RX ADMIN — ONDANSETRON 4 MG: 2 INJECTION INTRAMUSCULAR; INTRAVENOUS at 14:58

## 2025-01-06 RX ADMIN — MONTELUKAST SODIUM 10 MG: 10 TABLET, FILM COATED ORAL at 20:59

## 2025-01-06 RX ADMIN — METOPROLOL TARTRATE 5 MG: 1 INJECTION, SOLUTION INTRAVENOUS at 01:04

## 2025-01-06 RX ADMIN — AMPICILLIN SODIUM 2000 MG: 2 INJECTION, POWDER, FOR SOLUTION INTRAMUSCULAR; INTRAVENOUS at 02:14

## 2025-01-06 RX ADMIN — AMPICILLIN SODIUM 2000 MG: 2 INJECTION, POWDER, FOR SOLUTION INTRAMUSCULAR; INTRAVENOUS at 10:11

## 2025-01-06 RX ADMIN — FINASTERIDE 5 MG: 5 TABLET, FILM COATED ORAL at 10:10

## 2025-01-06 RX ADMIN — METOPROLOL TARTRATE 5 MG: 1 INJECTION, SOLUTION INTRAVENOUS at 23:33

## 2025-01-06 RX ADMIN — AMPICILLIN SODIUM 2000 MG: 2 INJECTION, POWDER, FOR SOLUTION INTRAMUSCULAR; INTRAVENOUS at 05:45

## 2025-01-06 RX ADMIN — PANCRELIPASE LIPASE, PANCRELIPASE PROTEASE, PANCRELIPASE AMYLASE 40000 UNITS: 20000; 63000; 84000 CAPSULE, DELAYED RELEASE ORAL at 14:10

## 2025-01-06 RX ADMIN — METOPROLOL TARTRATE 5 MG: 1 INJECTION, SOLUTION INTRAVENOUS at 06:31

## 2025-01-06 RX ADMIN — ACETAMINOPHEN 650 MG: 325 TABLET ORAL at 17:10

## 2025-01-06 RX ADMIN — WATER 2000 MG: 1 INJECTION INTRAMUSCULAR; INTRAVENOUS; SUBCUTANEOUS at 10:10

## 2025-01-06 RX ADMIN — AMPICILLIN SODIUM 2000 MG: 2 INJECTION, POWDER, FOR SOLUTION INTRAMUSCULAR; INTRAVENOUS at 14:14

## 2025-01-06 ASSESSMENT — PAIN SCALES - GENERAL
PAINLEVEL_OUTOF10: 0
PAINLEVEL_OUTOF10: 0

## 2025-01-07 ENCOUNTER — ANESTHESIA (OUTPATIENT)
Age: 80
End: 2025-01-07
Payer: MEDICARE

## 2025-01-07 ENCOUNTER — ANESTHESIA EVENT (OUTPATIENT)
Age: 80
End: 2025-01-07
Payer: MEDICARE

## 2025-01-07 ENCOUNTER — HOSPITAL ENCOUNTER (OUTPATIENT)
Age: 80
Discharge: HOME | End: 2025-01-09
Payer: MEDICARE

## 2025-01-07 VITALS
OXYGEN SATURATION: 98 % | RESPIRATION RATE: 16 BRPM | HEART RATE: 101 BPM | SYSTOLIC BLOOD PRESSURE: 123 MMHG | DIASTOLIC BLOOD PRESSURE: 79 MMHG

## 2025-01-07 LAB
ANION GAP SERPL CALCULATED.3IONS-SCNC: 10 MMOL/L (ref 7–16)
ANION GAP SERPL CALCULATED.3IONS-SCNC: 8 MMOL/L (ref 7–16)
BASOPHILS # BLD: 0.03 K/UL (ref 0–0.2)
BASOPHILS NFR BLD: 0 % (ref 0–2)
BUN SERPL-MCNC: 4 MG/DL (ref 6–23)
BUN SERPL-MCNC: 5 MG/DL (ref 6–23)
CALCIUM SERPL-MCNC: 7.4 MG/DL (ref 8.6–10.2)
CALCIUM SERPL-MCNC: 7.8 MG/DL (ref 8.6–10.2)
CHLORIDE SERPL-SCNC: 106 MMOL/L (ref 98–107)
CHLORIDE SERPL-SCNC: 109 MMOL/L (ref 98–107)
CO2 SERPL-SCNC: 21 MMOL/L (ref 22–29)
CO2 SERPL-SCNC: 22 MMOL/L (ref 22–29)
CREAT SERPL-MCNC: 0.5 MG/DL (ref 0.7–1.2)
CREAT SERPL-MCNC: 0.6 MG/DL (ref 0.7–1.2)
ECHO AO ASC DIAM: 3.6 CM
ECHO AO ASCENDING AORTA INDEX: 1.72 CM/M2
ECHO AO SINUS VALSALVA DIAM: 3.7 CM
ECHO AO SINUS VALSALVA INDEX: 1.77 CM/M2
ECHO EST RA PRESSURE: 8 MMHG
ECHO LV EF PHYSICIAN: 62 %
ECHO RIGHT VENTRICULAR SYSTOLIC PRESSURE (RVSP): 42 MMHG
ECHO TV REGURGITANT MAX VELOCITY: 2.93 M/S
ECHO TV REGURGITANT PEAK GRADIENT: 34 MMHG
EKG ATRIAL RATE: 142 BPM
EKG ATRIAL RATE: 150 BPM
EKG ATRIAL RATE: 59 BPM
EKG P AXIS: 155 DEGREES
EKG P-R INTERVAL: 144 MS
EKG P-R INTERVAL: 188 MS
EKG Q-T INTERVAL: 266 MS
EKG Q-T INTERVAL: 340 MS
EKG Q-T INTERVAL: 450 MS
EKG QRS DURATION: 76 MS
EKG QRS DURATION: 82 MS
EKG QRS DURATION: 90 MS
EKG QTC CALCULATION (BAZETT): 409 MS
EKG QTC CALCULATION (BAZETT): 445 MS
EKG QTC CALCULATION (BAZETT): 538 MS
EKG R AXIS: -3 DEGREES
EKG R AXIS: 171 DEGREES
EKG R AXIS: 25 DEGREES
EKG T AXIS: 159 DEGREES
EKG T AXIS: 17 DEGREES
EKG T AXIS: 52 DEGREES
EKG VENTRICULAR RATE: 142 BPM
EKG VENTRICULAR RATE: 151 BPM
EKG VENTRICULAR RATE: 59 BPM
EOSINOPHIL # BLD: 0.06 K/UL (ref 0.05–0.5)
EOSINOPHILS RELATIVE PERCENT: 1 % (ref 0–6)
ERYTHROCYTE [DISTWIDTH] IN BLOOD BY AUTOMATED COUNT: 18.9 % (ref 11.5–15)
GFR, ESTIMATED: >90 ML/MIN/1.73M2
GFR, ESTIMATED: >90 ML/MIN/1.73M2
GLUCOSE BLD-MCNC: 107 MG/DL (ref 74–99)
GLUCOSE BLD-MCNC: 126 MG/DL (ref 74–99)
GLUCOSE SERPL-MCNC: 113 MG/DL (ref 74–99)
GLUCOSE SERPL-MCNC: 127 MG/DL (ref 74–99)
HCT VFR BLD AUTO: 25 % (ref 37–54)
HGB BLD-MCNC: 7.7 G/DL (ref 12.5–16.5)
IMM GRANULOCYTES # BLD AUTO: 0.27 K/UL (ref 0–0.58)
IMM GRANULOCYTES NFR BLD: 2 % (ref 0–5)
INR PPP: 1.1
LYMPHOCYTES NFR BLD: 0.69 K/UL (ref 1.5–4)
LYMPHOCYTES RELATIVE PERCENT: 6 % (ref 20–42)
MAGNESIUM SERPL-MCNC: 2 MG/DL (ref 1.6–2.6)
MAGNESIUM SERPL-MCNC: 2.1 MG/DL (ref 1.6–2.6)
MCH RBC QN AUTO: 31.2 PG (ref 26–35)
MCHC RBC AUTO-ENTMCNC: 30.8 G/DL (ref 32–34.5)
MCV RBC AUTO: 101.2 FL (ref 80–99.9)
MONOCYTES NFR BLD: 1.02 K/UL (ref 0.1–0.95)
MONOCYTES NFR BLD: 9 % (ref 2–12)
NEUTROPHILS NFR BLD: 83 % (ref 43–80)
NEUTS SEG NFR BLD: 9.76 K/UL (ref 1.8–7.3)
PARTIAL THROMBOPLASTIN TIME: 49.4 SEC (ref 24.5–35.1)
PARTIAL THROMBOPLASTIN TIME: 64.3 SEC (ref 24.5–35.1)
PATH REV BLD -IMP: NORMAL
PHOSPHATE SERPL-MCNC: 3.2 MG/DL (ref 2.5–4.5)
PHOSPHATE SERPL-MCNC: 3.3 MG/DL (ref 2.5–4.5)
PLATELET # BLD AUTO: 319 K/UL (ref 130–450)
PMV BLD AUTO: 9.6 FL (ref 7–12)
POTASSIUM SERPL-SCNC: 3.5 MMOL/L (ref 3.5–5)
POTASSIUM SERPL-SCNC: 4 MMOL/L (ref 3.5–5)
PROCALCITONIN SERPL-MCNC: 8.76 NG/ML (ref 0–0.08)
PROTHROMBIN TIME: 12 SEC (ref 9.3–12.4)
RBC # BLD AUTO: 2.47 M/UL (ref 3.8–5.8)
SODIUM SERPL-SCNC: 136 MMOL/L (ref 132–146)
SODIUM SERPL-SCNC: 140 MMOL/L (ref 132–146)
WBC OTHER # BLD: 11.8 K/UL (ref 4.5–11.5)

## 2025-01-07 PROCEDURE — 2580000003 HC RX 258

## 2025-01-07 PROCEDURE — 6360000002 HC RX W HCPCS

## 2025-01-07 PROCEDURE — 84100 ASSAY OF PHOSPHORUS: CPT

## 2025-01-07 PROCEDURE — 3700000000 HC ANESTHESIA ATTENDED CARE

## 2025-01-07 PROCEDURE — 85730 THROMBOPLASTIN TIME PARTIAL: CPT

## 2025-01-07 PROCEDURE — 2500000003 HC RX 250 WO HCPCS

## 2025-01-07 PROCEDURE — 82962 GLUCOSE BLOOD TEST: CPT

## 2025-01-07 PROCEDURE — 93320 DOPPLER ECHO COMPLETE: CPT

## 2025-01-07 PROCEDURE — 93325 DOPPLER ECHO COLOR FLOW MAPG: CPT | Performed by: INTERNAL MEDICINE

## 2025-01-07 PROCEDURE — 83735 ASSAY OF MAGNESIUM: CPT

## 2025-01-07 PROCEDURE — 99232 SBSQ HOSP IP/OBS MODERATE 35: CPT | Performed by: INTERNAL MEDICINE

## 2025-01-07 PROCEDURE — 93320 DOPPLER ECHO COMPLETE: CPT | Performed by: INTERNAL MEDICINE

## 2025-01-07 PROCEDURE — 93312 ECHO TRANSESOPHAGEAL: CPT | Performed by: INTERNAL MEDICINE

## 2025-01-07 PROCEDURE — B24BZZ4 ULTRASONOGRAPHY OF HEART WITH AORTA, TRANSESOPHAGEAL: ICD-10-PCS | Performed by: INTERNAL MEDICINE

## 2025-01-07 PROCEDURE — 97530 THERAPEUTIC ACTIVITIES: CPT

## 2025-01-07 PROCEDURE — 97535 SELF CARE MNGMENT TRAINING: CPT

## 2025-01-07 PROCEDURE — 6370000000 HC RX 637 (ALT 250 FOR IP)

## 2025-01-07 PROCEDURE — 3700000001 HC ADD 15 MINUTES (ANESTHESIA)

## 2025-01-07 PROCEDURE — 84145 PROCALCITONIN (PCT): CPT

## 2025-01-07 PROCEDURE — 85025 COMPLETE CBC W/AUTO DIFF WBC: CPT

## 2025-01-07 PROCEDURE — 2060000000 HC ICU INTERMEDIATE R&B

## 2025-01-07 PROCEDURE — 85610 PROTHROMBIN TIME: CPT

## 2025-01-07 PROCEDURE — 2700000000 HC OXYGEN THERAPY PER DAY

## 2025-01-07 PROCEDURE — 93010 ELECTROCARDIOGRAM REPORT: CPT | Performed by: INTERNAL MEDICINE

## 2025-01-07 PROCEDURE — 93005 ELECTROCARDIOGRAM TRACING: CPT

## 2025-01-07 PROCEDURE — 99291 CRITICAL CARE FIRST HOUR: CPT | Performed by: INTERNAL MEDICINE

## 2025-01-07 PROCEDURE — 80048 BASIC METABOLIC PNL TOTAL CA: CPT

## 2025-01-07 RX ORDER — METOPROLOL TARTRATE 1 MG/ML
5 INJECTION, SOLUTION INTRAVENOUS ONCE
Status: COMPLETED | OUTPATIENT
Start: 2025-01-07 | End: 2025-01-07

## 2025-01-07 RX ORDER — METOPROLOL TARTRATE 1 MG/ML
INJECTION, SOLUTION INTRAVENOUS
Status: COMPLETED
Start: 2025-01-07 | End: 2025-01-07

## 2025-01-07 RX ORDER — METOPROLOL SUCCINATE 25 MG/1
25 TABLET, EXTENDED RELEASE ORAL DAILY
Status: DISCONTINUED | OUTPATIENT
Start: 2025-01-08 | End: 2025-01-08

## 2025-01-07 RX ORDER — SODIUM CHLORIDE 9 MG/ML
INJECTION, SOLUTION INTRAVENOUS
Status: DISCONTINUED | OUTPATIENT
Start: 2025-01-07 | End: 2025-01-07 | Stop reason: SDUPTHER

## 2025-01-07 RX ORDER — METOPROLOL TARTRATE 1 MG/ML
5 INJECTION, SOLUTION INTRAVENOUS ONCE
Status: COMPLETED | OUTPATIENT
Start: 2025-01-08 | End: 2025-01-08

## 2025-01-07 RX ORDER — ADENOSINE 3 MG/ML
INJECTION, SOLUTION INTRAVENOUS
Status: COMPLETED
Start: 2025-01-07 | End: 2025-01-07

## 2025-01-07 RX ORDER — DILTIAZEM HYDROCHLORIDE 5 MG/ML
10 INJECTION INTRAVENOUS ONCE
Status: COMPLETED | OUTPATIENT
Start: 2025-01-07 | End: 2025-01-07

## 2025-01-07 RX ORDER — ADENOSINE 3 MG/ML
6 INJECTION, SOLUTION INTRAVENOUS ONCE
Status: COMPLETED | OUTPATIENT
Start: 2025-01-07 | End: 2025-01-07

## 2025-01-07 RX ORDER — PROPOFOL 10 MG/ML
INJECTION, EMULSION INTRAVENOUS
Status: DISCONTINUED | OUTPATIENT
Start: 2025-01-07 | End: 2025-01-07 | Stop reason: SDUPTHER

## 2025-01-07 RX ADMIN — AMPICILLIN SODIUM 2000 MG: 2 INJECTION, POWDER, FOR SOLUTION INTRAMUSCULAR; INTRAVENOUS at 05:22

## 2025-01-07 RX ADMIN — EMPAGLIFLOZIN 10 MG: 10 TABLET, FILM COATED ORAL at 15:56

## 2025-01-07 RX ADMIN — APIXABAN 10 MG: 5 TABLET, FILM COATED ORAL at 15:56

## 2025-01-07 RX ADMIN — MONTELUKAST SODIUM 10 MG: 10 TABLET, FILM COATED ORAL at 20:21

## 2025-01-07 RX ADMIN — DILTIAZEM HYDROCHLORIDE 10 MG: 5 INJECTION INTRAVENOUS at 22:58

## 2025-01-07 RX ADMIN — ADENOSINE 6 MG: 3 INJECTION, SOLUTION INTRAVENOUS at 18:51

## 2025-01-07 RX ADMIN — METOPROLOL TARTRATE 5 MG: 5 INJECTION INTRAVENOUS at 18:51

## 2025-01-07 RX ADMIN — AMPICILLIN SODIUM 2000 MG: 2 INJECTION, POWDER, FOR SOLUTION INTRAMUSCULAR; INTRAVENOUS at 01:49

## 2025-01-07 RX ADMIN — PROPOFOL 130 MG: 10 INJECTION, EMULSION INTRAVENOUS at 13:28

## 2025-01-07 RX ADMIN — METOPROLOL TARTRATE 5 MG: 1 INJECTION, SOLUTION INTRAVENOUS at 06:06

## 2025-01-07 RX ADMIN — TAMSULOSIN HYDROCHLORIDE 0.4 MG: 0.4 CAPSULE ORAL at 15:58

## 2025-01-07 RX ADMIN — METOPROLOL TARTRATE: 5 INJECTION, SOLUTION INTRAVENOUS at 18:52

## 2025-01-07 RX ADMIN — AMPICILLIN SODIUM 2000 MG: 2 INJECTION, POWDER, FOR SOLUTION INTRAMUSCULAR; INTRAVENOUS at 10:09

## 2025-01-07 RX ADMIN — POTASSIUM BICARBONATE 40 MEQ: 782 TABLET, EFFERVESCENT ORAL at 15:57

## 2025-01-07 RX ADMIN — AMPICILLIN SODIUM 2000 MG: 2 INJECTION, POWDER, FOR SOLUTION INTRAMUSCULAR; INTRAVENOUS at 20:24

## 2025-01-07 RX ADMIN — METOPROLOL TARTRATE 5 MG: 5 INJECTION INTRAVENOUS at 22:40

## 2025-01-07 RX ADMIN — AMPICILLIN SODIUM 2000 MG: 2 INJECTION, POWDER, FOR SOLUTION INTRAMUSCULAR; INTRAVENOUS at 14:30

## 2025-01-07 RX ADMIN — SODIUM CHLORIDE, PRESERVATIVE FREE 10 ML: 5 INJECTION INTRAVENOUS at 20:22

## 2025-01-07 RX ADMIN — AMPICILLIN SODIUM 2000 MG: 2 INJECTION, POWDER, FOR SOLUTION INTRAMUSCULAR; INTRAVENOUS at 16:21

## 2025-01-07 RX ADMIN — METOPROLOL TARTRATE 5 MG: 1 INJECTION, SOLUTION INTRAVENOUS at 22:40

## 2025-01-07 RX ADMIN — ACETAMINOPHEN 650 MG: 325 TABLET ORAL at 22:28

## 2025-01-07 RX ADMIN — SODIUM CHLORIDE, PRESERVATIVE FREE 40 MG: 5 INJECTION INTRAVENOUS at 09:10

## 2025-01-07 RX ADMIN — SODIUM CHLORIDE: 9 INJECTION, SOLUTION INTRAVENOUS at 13:28

## 2025-01-07 RX ADMIN — HEPARIN SODIUM 16 UNITS/KG/HR: 10000 INJECTION, SOLUTION INTRAVENOUS at 01:53

## 2025-01-07 RX ADMIN — ACETAMINOPHEN 650 MG: 325 TABLET ORAL at 15:56

## 2025-01-07 RX ADMIN — WATER 2000 MG: 1 INJECTION INTRAMUSCULAR; INTRAVENOUS; SUBCUTANEOUS at 12:13

## 2025-01-07 RX ADMIN — ADENOSINE: 3 INJECTION, SOLUTION INTRAVENOUS at 18:51

## 2025-01-07 RX ADMIN — FINASTERIDE 5 MG: 5 TABLET, FILM COATED ORAL at 15:57

## 2025-01-07 RX ADMIN — SODIUM CHLORIDE, PRESERVATIVE FREE 10 ML: 5 INJECTION INTRAVENOUS at 08:40

## 2025-01-07 RX ADMIN — SENNOSIDES AND DOCUSATE SODIUM 2 TABLET: 8.6; 5 TABLET ORAL at 15:57

## 2025-01-07 RX ADMIN — APIXABAN 10 MG: 5 TABLET, FILM COATED ORAL at 20:21

## 2025-01-07 ASSESSMENT — PAIN SCALES - GENERAL
PAINLEVEL_OUTOF10: 3
PAINLEVEL_OUTOF10: 0

## 2025-01-07 ASSESSMENT — PAIN DESCRIPTION - LOCATION: LOCATION: BACK

## 2025-01-07 ASSESSMENT — PAIN DESCRIPTION - DESCRIPTORS: DESCRIPTORS: ACHING;DISCOMFORT;SORE

## 2025-01-07 ASSESSMENT — PAIN DESCRIPTION - ORIENTATION: ORIENTATION: MID

## 2025-01-07 NOTE — ANESTHESIA PRE PROCEDURE
Department of Anesthesiology  Preprocedure Note       Name:  Michael Garcia   Age:  79 y.o.  :  1945                                          MRN:  [id number]         Date:  2025      Surgeon: * No surgeons listed *    Procedure: * No procedures listed *    Medications prior to admission:   Prior to Admission medications    Medication Sig Start Date End Date Taking? Authorizing Provider   aspirin 81 MG EC tablet Take 1 tablet by mouth daily    Elliott Thakkar MD   lipase-protease-amylase (CREON) 51793-908349 units CPEP delayed release capsule Take 2 capsules by mouth 3 times daily (with meals) 10/10/24   Demetrius Santoyo III, MD   senna-docusate (PERICOLACE) 8.6-50 MG per tablet Take 2 tablets by mouth daily    Elliott Thakkar MD   Calcium Carbonate-Vitamin D (OYSTER SHELL CALCIUM/VITAMIN D PO) Take 500 mg by mouth daily 500-200mg calcium  carb- cholecalciferol  Patient not taking: Reported on 10/24/2024    Elliott Thakkar MD   magnesium hydroxide (MILK OF MAGNESIA) 400 MG/5ML suspension Take 30 mLs by mouth daily as needed for Constipation  Patient not taking: Reported on 10/24/2024    Elliott Thakkar MD   losartan (COZAAR) 50 MG tablet Take 1 tablet by mouth daily  Patient not taking: Reported on 2024    Elliott Thakkar MD   oxyCODONE-acetaminophen (PERCOCET) 5-325 MG per tablet Take 1 tablet by mouth every 6 hours as needed for Pain.  Patient not taking: Reported on 2024    Elliott Thakkar MD   insulin glargine (LANTUS) 100 UNIT/ML injection vial Inject 38 Units into the skin nightly  Patient not taking: Reported on 2024   Sarah Guadalupe MD   insulin lispro (HUMALOG,ADMELOG) 100 UNIT/ML SOLN injection vial Inject 0-18 Units into the skin every 4 hours  Patient not taking: Reported on 2024   Sarah Guadalupe MD   glucose 4 g chewable tablet Take 4 tablets by mouth as needed for Low blood sugar 24   Collins

## 2025-01-07 NOTE — PROCEDURES
PROCEDURE NOTE  Date: 1/7/2025   Name: Michael Garcia  YOB: 1945    Procedures:    Preprocedure diagnosis: Bacteremia    Procedures: PAUL    Prior tests anticoagulated    Primary procedure  Written informed consent was obtained, the PAUL was performed under stable fasting condition. The patient was set up for monitoring of surface EKG and pulse oximetry continuously. Blood pressure was monitored and with automatic cuff measurements. Supplemental oxygen was administered. The procedure was performed under anesthesia by anesthesiology. After ensuring adequate anesthesia, PAUL probe was intubated without difficulty.     Result: Preliminary results-no echocardiographic evidence of endocarditis.  Full report to follow.    Complication: None    Patient was monitored until awake and vitals remained stable.    Benjamin Ndiaye M.D.  [facility] cardiology

## 2025-01-07 NOTE — ANESTHESIA POSTPROCEDURE EVALUATION
Department of Anesthesiology  Postprocedure Note    Patient: Michael Garcia  MRN: [id number]  YOB: 1945  Date of evaluation: 1/7/2025    Procedure Summary       Date: 01/07/25 Room / Location: [facility]    Anesthesia Start: 1322 Anesthesia Stop: 1348    Procedure: PAUL (PRN CONTRAST/BUBBLE/3D) Diagnosis: Bacteremia    Scheduled Providers:  Responsible Provider: Lien Adrian MD    Anesthesia Type: MAC ASA Status: 4            Anesthesia Type: No value filed.    Lindsey Phase I:      Lindsey Phase II:      Anesthesia Post Evaluation    Patient location during evaluation: ICU  Patient participation: complete - patient participated  Level of consciousness: awake  Airway patency: patent  Nausea & Vomiting: no nausea and no vomiting  Cardiovascular status: hemodynamically stable  Respiratory status: acceptable  Hydration status: euvolemic  Multimodal analgesia pain management approach        No notable events documented.

## 2025-01-08 ENCOUNTER — APPOINTMENT (OUTPATIENT)
Dept: GENERAL RADIOLOGY | Age: 80
DRG: 314 | End: 2025-01-08
Payer: MEDICARE

## 2025-01-08 LAB
ANION GAP SERPL CALCULATED.3IONS-SCNC: 9 MMOL/L (ref 7–16)
ANION GAP SERPL CALCULATED.3IONS-SCNC: 9 MMOL/L (ref 7–16)
BASOPHILS # BLD: 0.03 K/UL (ref 0–0.2)
BASOPHILS NFR BLD: 0 % (ref 0–2)
BUN SERPL-MCNC: 5 MG/DL (ref 6–23)
BUN SERPL-MCNC: 7 MG/DL (ref 6–23)
CA-I BLD-SCNC: 1.13 MMOL/L (ref 1.15–1.33)
CALCIUM SERPL-MCNC: 7.5 MG/DL (ref 8.6–10.2)
CALCIUM SERPL-MCNC: 7.7 MG/DL (ref 8.6–10.2)
CHLORIDE SERPL-SCNC: 102 MMOL/L (ref 98–107)
CHLORIDE SERPL-SCNC: 106 MMOL/L (ref 98–107)
CO2 SERPL-SCNC: 23 MMOL/L (ref 22–29)
CO2 SERPL-SCNC: 24 MMOL/L (ref 22–29)
CREAT SERPL-MCNC: 0.5 MG/DL (ref 0.7–1.2)
CREAT SERPL-MCNC: 0.5 MG/DL (ref 0.7–1.2)
EKG ATRIAL RATE: 220 BPM
EKG Q-T INTERVAL: 238 MS
EKG QRS DURATION: 80 MS
EKG QTC CALCULATION (BAZETT): 422 MS
EKG R AXIS: 40 DEGREES
EKG T AXIS: -111 DEGREES
EKG VENTRICULAR RATE: 189 BPM
EOSINOPHIL # BLD: 0 K/UL (ref 0.05–0.5)
EOSINOPHILS RELATIVE PERCENT: 0 % (ref 0–6)
ERYTHROCYTE [DISTWIDTH] IN BLOOD BY AUTOMATED COUNT: 18.9 % (ref 11.5–15)
GFR, ESTIMATED: >90 ML/MIN/1.73M2
GFR, ESTIMATED: >90 ML/MIN/1.73M2
GLUCOSE BLD-MCNC: 118 MG/DL (ref 74–99)
GLUCOSE BLD-MCNC: 143 MG/DL (ref 74–99)
GLUCOSE BLD-MCNC: 159 MG/DL (ref 74–99)
GLUCOSE BLD-MCNC: 97 MG/DL (ref 74–99)
GLUCOSE SERPL-MCNC: 116 MG/DL (ref 74–99)
GLUCOSE SERPL-MCNC: 157 MG/DL (ref 74–99)
HCT VFR BLD AUTO: 24.5 % (ref 37–54)
HGB BLD-MCNC: 8 G/DL (ref 12.5–16.5)
IMM GRANULOCYTES # BLD AUTO: 0.59 K/UL (ref 0–0.58)
IMM GRANULOCYTES NFR BLD: 3 % (ref 0–5)
LACTATE BLDV-SCNC: 0.8 MMOL/L (ref 0.5–2.2)
LYMPHOCYTES NFR BLD: 0.6 K/UL (ref 1.5–4)
LYMPHOCYTES RELATIVE PERCENT: 3 % (ref 20–42)
MAGNESIUM SERPL-MCNC: 1.9 MG/DL (ref 1.6–2.6)
MAGNESIUM SERPL-MCNC: 2.2 MG/DL (ref 1.6–2.6)
MCH RBC QN AUTO: 31.9 PG (ref 26–35)
MCHC RBC AUTO-ENTMCNC: 32.7 G/DL (ref 32–34.5)
MCV RBC AUTO: 97.6 FL (ref 80–99.9)
MICROORGANISM SPEC CULT: NORMAL
MICROORGANISM SPEC CULT: NORMAL
MONOCYTES NFR BLD: 1.4 K/UL (ref 0.1–0.95)
MONOCYTES NFR BLD: 8 % (ref 2–12)
NEUTROPHILS NFR BLD: 86 % (ref 43–80)
NEUTS SEG NFR BLD: 16.04 K/UL (ref 1.8–7.3)
PHOSPHATE SERPL-MCNC: 3.2 MG/DL (ref 2.5–4.5)
PHOSPHATE SERPL-MCNC: 3.6 MG/DL (ref 2.5–4.5)
PLATELET # BLD AUTO: 368 K/UL (ref 130–450)
PMV BLD AUTO: 10 FL (ref 7–12)
POTASSIUM SERPL-SCNC: 3.9 MMOL/L (ref 3.5–5)
POTASSIUM SERPL-SCNC: 4 MMOL/L (ref 3.5–5)
PROCALCITONIN SERPL-MCNC: 7.02 NG/ML (ref 0–0.08)
RBC # BLD AUTO: 2.51 M/UL (ref 3.8–5.8)
RBC # BLD: ABNORMAL 10*6/UL
SERVICE CMNT-IMP: NORMAL
SERVICE CMNT-IMP: NORMAL
SODIUM SERPL-SCNC: 135 MMOL/L (ref 132–146)
SODIUM SERPL-SCNC: 138 MMOL/L (ref 132–146)
SPECIMEN DESCRIPTION: NORMAL
SPECIMEN DESCRIPTION: NORMAL
WBC OTHER # BLD: 18.7 K/UL (ref 4.5–11.5)

## 2025-01-08 PROCEDURE — 2500000003 HC RX 250 WO HCPCS

## 2025-01-08 PROCEDURE — 36569 INSJ PICC 5 YR+ W/O IMAGING: CPT

## 2025-01-08 PROCEDURE — 99291 CRITICAL CARE FIRST HOUR: CPT | Performed by: INTERNAL MEDICINE

## 2025-01-08 PROCEDURE — 6360000002 HC RX W HCPCS

## 2025-01-08 PROCEDURE — 6370000000 HC RX 637 (ALT 250 FOR IP)

## 2025-01-08 PROCEDURE — 2580000003 HC RX 258

## 2025-01-08 PROCEDURE — 84145 PROCALCITONIN (PCT): CPT

## 2025-01-08 PROCEDURE — C1751 CATH, INF, PER/CENT/MIDLINE: HCPCS

## 2025-01-08 PROCEDURE — 99232 SBSQ HOSP IP/OBS MODERATE 35: CPT | Performed by: INTERNAL MEDICINE

## 2025-01-08 PROCEDURE — 82330 ASSAY OF CALCIUM: CPT

## 2025-01-08 PROCEDURE — 2500000003 HC RX 250 WO HCPCS: Performed by: NURSE PRACTITIONER

## 2025-01-08 PROCEDURE — 2580000003 HC RX 258: Performed by: NURSE PRACTITIONER

## 2025-01-08 PROCEDURE — 83605 ASSAY OF LACTIC ACID: CPT

## 2025-01-08 PROCEDURE — 83735 ASSAY OF MAGNESIUM: CPT

## 2025-01-08 PROCEDURE — 71045 X-RAY EXAM CHEST 1 VIEW: CPT

## 2025-01-08 PROCEDURE — 99233 SBSQ HOSP IP/OBS HIGH 50: CPT | Performed by: INTERNAL MEDICINE

## 2025-01-08 PROCEDURE — 80048 BASIC METABOLIC PNL TOTAL CA: CPT

## 2025-01-08 PROCEDURE — 2060000000 HC ICU INTERMEDIATE R&B

## 2025-01-08 PROCEDURE — 82962 GLUCOSE BLOOD TEST: CPT

## 2025-01-08 PROCEDURE — 93005 ELECTROCARDIOGRAM TRACING: CPT | Performed by: NURSE PRACTITIONER

## 2025-01-08 PROCEDURE — 6360000002 HC RX W HCPCS: Performed by: NURSE PRACTITIONER

## 2025-01-08 PROCEDURE — 76937 US GUIDE VASCULAR ACCESS: CPT

## 2025-01-08 PROCEDURE — 93010 ELECTROCARDIOGRAM REPORT: CPT | Performed by: INTERNAL MEDICINE

## 2025-01-08 PROCEDURE — 85025 COMPLETE CBC W/AUTO DIFF WBC: CPT

## 2025-01-08 PROCEDURE — 84100 ASSAY OF PHOSPHORUS: CPT

## 2025-01-08 RX ORDER — SODIUM CHLORIDE 0.9 % (FLUSH) 0.9 %
5-40 SYRINGE (ML) INJECTION EVERY 12 HOURS SCHEDULED
Status: DISCONTINUED | OUTPATIENT
Start: 2025-01-08 | End: 2025-01-08 | Stop reason: SDUPTHER

## 2025-01-08 RX ORDER — MAGNESIUM SULFATE IN WATER 40 MG/ML
2000 INJECTION, SOLUTION INTRAVENOUS ONCE
Status: COMPLETED | OUTPATIENT
Start: 2025-01-08 | End: 2025-01-08

## 2025-01-08 RX ORDER — LIDOCAINE HYDROCHLORIDE 10 MG/ML
50 INJECTION, SOLUTION EPIDURAL; INFILTRATION; INTRACAUDAL; PERINEURAL ONCE
Status: DISCONTINUED | OUTPATIENT
Start: 2025-01-08 | End: 2025-01-08 | Stop reason: SDUPTHER

## 2025-01-08 RX ORDER — SODIUM CHLORIDE 0.9 % (FLUSH) 0.9 %
5-40 SYRINGE (ML) INJECTION PRN
Status: DISCONTINUED | OUTPATIENT
Start: 2025-01-08 | End: 2025-01-08 | Stop reason: SDUPTHER

## 2025-01-08 RX ORDER — SODIUM CHLORIDE 0.9 % (FLUSH) 0.9 %
5-40 SYRINGE (ML) INJECTION PRN
Status: DISCONTINUED | OUTPATIENT
Start: 2025-01-08 | End: 2025-01-13 | Stop reason: HOSPADM

## 2025-01-08 RX ORDER — SODIUM CHLORIDE 9 MG/ML
INJECTION, SOLUTION INTRAVENOUS PRN
Status: DISCONTINUED | OUTPATIENT
Start: 2025-01-08 | End: 2025-01-08 | Stop reason: SDUPTHER

## 2025-01-08 RX ORDER — SODIUM CHLORIDE 9 MG/ML
INJECTION, SOLUTION INTRAVENOUS PRN
Status: DISCONTINUED | OUTPATIENT
Start: 2025-01-08 | End: 2025-01-08

## 2025-01-08 RX ORDER — 0.9 % SODIUM CHLORIDE 0.9 %
500 INTRAVENOUS SOLUTION INTRAVENOUS ONCE
Status: COMPLETED | OUTPATIENT
Start: 2025-01-08 | End: 2025-01-08

## 2025-01-08 RX ORDER — METOPROLOL SUCCINATE 50 MG/1
50 TABLET, EXTENDED RELEASE ORAL DAILY
Status: DISCONTINUED | OUTPATIENT
Start: 2025-01-09 | End: 2025-01-13 | Stop reason: HOSPADM

## 2025-01-08 RX ORDER — SODIUM CHLORIDE 9 MG/ML
INJECTION, SOLUTION INTRAVENOUS PRN
Status: DISCONTINUED | OUTPATIENT
Start: 2025-01-08 | End: 2025-01-13 | Stop reason: HOSPADM

## 2025-01-08 RX ORDER — LIDOCAINE HYDROCHLORIDE 20 MG/ML
100 INJECTION, SOLUTION INFILTRATION; PERINEURAL ONCE
Status: DISCONTINUED | OUTPATIENT
Start: 2025-01-08 | End: 2025-01-08

## 2025-01-08 RX ORDER — SODIUM CHLORIDE 0.9 % (FLUSH) 0.9 %
5-40 SYRINGE (ML) INJECTION PRN
Status: DISCONTINUED | OUTPATIENT
Start: 2025-01-08 | End: 2025-01-08

## 2025-01-08 RX ORDER — SODIUM CHLORIDE 0.9 % (FLUSH) 0.9 %
5-40 SYRINGE (ML) INJECTION EVERY 12 HOURS SCHEDULED
Status: DISCONTINUED | OUTPATIENT
Start: 2025-01-08 | End: 2025-01-08

## 2025-01-08 RX ORDER — ADENOSINE 3 MG/ML
6 INJECTION, SOLUTION INTRAVENOUS ONCE
Status: COMPLETED | OUTPATIENT
Start: 2025-01-08 | End: 2025-01-08

## 2025-01-08 RX ORDER — LIDOCAINE HYDROCHLORIDE 10 MG/ML
50 INJECTION, SOLUTION EPIDURAL; INFILTRATION; INTRACAUDAL; PERINEURAL ONCE
Status: DISCONTINUED | OUTPATIENT
Start: 2025-01-08 | End: 2025-01-13 | Stop reason: HOSPADM

## 2025-01-08 RX ORDER — SODIUM CHLORIDE 0.9 % (FLUSH) 0.9 %
5-40 SYRINGE (ML) INJECTION EVERY 12 HOURS SCHEDULED
Status: DISCONTINUED | OUTPATIENT
Start: 2025-01-08 | End: 2025-01-13 | Stop reason: HOSPADM

## 2025-01-08 RX ADMIN — SENNOSIDES AND DOCUSATE SODIUM 2 TABLET: 8.6; 5 TABLET ORAL at 09:26

## 2025-01-08 RX ADMIN — WATER 2000 MG: 1 INJECTION INTRAMUSCULAR; INTRAVENOUS; SUBCUTANEOUS at 00:03

## 2025-01-08 RX ADMIN — AMIODARONE HYDROCHLORIDE 1 MG/MIN: 1.8 INJECTION, SOLUTION INTRAVENOUS at 16:58

## 2025-01-08 RX ADMIN — AMPICILLIN SODIUM 2000 MG: 2 INJECTION, POWDER, FOR SOLUTION INTRAMUSCULAR; INTRAVENOUS at 09:25

## 2025-01-08 RX ADMIN — AMIODARONE HYDROCHLORIDE 150 MG: 50 INJECTION, SOLUTION INTRAVENOUS at 00:53

## 2025-01-08 RX ADMIN — APIXABAN 10 MG: 5 TABLET, FILM COATED ORAL at 20:06

## 2025-01-08 RX ADMIN — EMPAGLIFLOZIN 10 MG: 10 TABLET, FILM COATED ORAL at 09:26

## 2025-01-08 RX ADMIN — AMPICILLIN SODIUM 2000 MG: 2 INJECTION, POWDER, FOR SOLUTION INTRAMUSCULAR; INTRAVENOUS at 17:00

## 2025-01-08 RX ADMIN — AMPICILLIN SODIUM 2000 MG: 2 INJECTION, POWDER, FOR SOLUTION INTRAMUSCULAR; INTRAVENOUS at 06:14

## 2025-01-08 RX ADMIN — METOPROLOL SUCCINATE 25 MG: 25 TABLET, EXTENDED RELEASE ORAL at 09:26

## 2025-01-08 RX ADMIN — AMPICILLIN SODIUM 2000 MG: 2 INJECTION, POWDER, FOR SOLUTION INTRAMUSCULAR; INTRAVENOUS at 21:37

## 2025-01-08 RX ADMIN — SODIUM CHLORIDE, PRESERVATIVE FREE 40 MG: 5 INJECTION INTRAVENOUS at 09:27

## 2025-01-08 RX ADMIN — SODIUM CHLORIDE 500 ML: 9 INJECTION, SOLUTION INTRAVENOUS at 14:34

## 2025-01-08 RX ADMIN — METOPROLOL TARTRATE 5 MG: 5 INJECTION INTRAVENOUS at 00:03

## 2025-01-08 RX ADMIN — AMIODARONE HYDROCHLORIDE 150 MG: 50 INJECTION, SOLUTION INTRAVENOUS at 15:50

## 2025-01-08 RX ADMIN — FINASTERIDE 5 MG: 5 TABLET, FILM COATED ORAL at 09:26

## 2025-01-08 RX ADMIN — AMIODARONE HYDROCHLORIDE 0.5 MG/MIN: 1.8 INJECTION, SOLUTION INTRAVENOUS at 21:35

## 2025-01-08 RX ADMIN — ADENOSINE 6 MG: 3 INJECTION, SOLUTION INTRAVENOUS at 13:55

## 2025-01-08 RX ADMIN — MONTELUKAST SODIUM 10 MG: 10 TABLET, FILM COATED ORAL at 20:06

## 2025-01-08 RX ADMIN — SODIUM CHLORIDE, PRESERVATIVE FREE 10 ML: 5 INJECTION INTRAVENOUS at 09:27

## 2025-01-08 RX ADMIN — TAMSULOSIN HYDROCHLORIDE 0.4 MG: 0.4 CAPSULE ORAL at 09:26

## 2025-01-08 RX ADMIN — MAGNESIUM SULFATE HEPTAHYDRATE 2000 MG: 40 INJECTION, SOLUTION INTRAVENOUS at 06:58

## 2025-01-08 RX ADMIN — APIXABAN 10 MG: 5 TABLET, FILM COATED ORAL at 09:26

## 2025-01-08 RX ADMIN — AMPICILLIN SODIUM 2000 MG: 2 INJECTION, POWDER, FOR SOLUTION INTRAMUSCULAR; INTRAVENOUS at 12:57

## 2025-01-08 RX ADMIN — SODIUM CHLORIDE, PRESERVATIVE FREE 10 ML: 5 INJECTION INTRAVENOUS at 20:07

## 2025-01-08 RX ADMIN — AMPICILLIN SODIUM 2000 MG: 2 INJECTION, POWDER, FOR SOLUTION INTRAMUSCULAR; INTRAVENOUS at 00:14

## 2025-01-08 ASSESSMENT — PAIN SCALES - GENERAL
PAINLEVEL_OUTOF10: 0
PAINLEVEL_OUTOF10: 0

## 2025-01-09 PROBLEM — A41.9 SEPTICEMIA (HCC): Status: ACTIVE | Noted: 2025-01-02

## 2025-01-09 PROBLEM — R60.0 BILATERAL LOWER EXTREMITY EDEMA: Status: ACTIVE | Noted: 2025-01-09

## 2025-01-09 LAB
ANION GAP SERPL CALCULATED.3IONS-SCNC: 9 MMOL/L (ref 7–16)
BASOPHILS # BLD: 0.02 K/UL (ref 0–0.2)
BASOPHILS NFR BLD: 0 % (ref 0–2)
BUN SERPL-MCNC: 7 MG/DL (ref 6–23)
CALCIUM SERPL-MCNC: 7.4 MG/DL (ref 8.6–10.2)
CHLORIDE SERPL-SCNC: 102 MMOL/L (ref 98–107)
CO2 SERPL-SCNC: 23 MMOL/L (ref 22–29)
CREAT SERPL-MCNC: 0.5 MG/DL (ref 0.7–1.2)
EOSINOPHIL # BLD: 0.03 K/UL (ref 0.05–0.5)
EOSINOPHILS RELATIVE PERCENT: 0 % (ref 0–6)
ERYTHROCYTE [DISTWIDTH] IN BLOOD BY AUTOMATED COUNT: 19.4 % (ref 11.5–15)
GFR, ESTIMATED: >90 ML/MIN/1.73M2
GLUCOSE BLD-MCNC: 146 MG/DL (ref 74–99)
GLUCOSE BLD-MCNC: 159 MG/DL (ref 74–99)
GLUCOSE BLD-MCNC: 174 MG/DL (ref 74–99)
GLUCOSE SERPL-MCNC: 171 MG/DL (ref 74–99)
HCT VFR BLD AUTO: 25.9 % (ref 37–54)
HGB BLD-MCNC: 8 G/DL (ref 12.5–16.5)
IMM GRANULOCYTES # BLD AUTO: 0.3 K/UL (ref 0–0.58)
IMM GRANULOCYTES NFR BLD: 2 % (ref 0–5)
LYMPHOCYTES NFR BLD: 0.49 K/UL (ref 1.5–4)
LYMPHOCYTES RELATIVE PERCENT: 3 % (ref 20–42)
MAGNESIUM SERPL-MCNC: 2.1 MG/DL (ref 1.6–2.6)
MCH RBC QN AUTO: 31.1 PG (ref 26–35)
MCHC RBC AUTO-ENTMCNC: 30.9 G/DL (ref 32–34.5)
MCV RBC AUTO: 100.8 FL (ref 80–99.9)
MICROORGANISM SPEC CULT: ABNORMAL
MICROORGANISM/AGENT SPEC: ABNORMAL
MONOCYTES NFR BLD: 1.25 K/UL (ref 0.1–0.95)
MONOCYTES NFR BLD: 8 % (ref 2–12)
NEUTROPHILS NFR BLD: 87 % (ref 43–80)
NEUTS SEG NFR BLD: 13.53 K/UL (ref 1.8–7.3)
PHOSPHATE SERPL-MCNC: 2.9 MG/DL (ref 2.5–4.5)
PLATELET # BLD AUTO: 378 K/UL (ref 130–450)
PMV BLD AUTO: 9.9 FL (ref 7–12)
POTASSIUM SERPL-SCNC: 3.6 MMOL/L (ref 3.5–5)
RBC # BLD AUTO: 2.57 M/UL (ref 3.8–5.8)
RBC # BLD: ABNORMAL 10*6/UL
SERVICE CMNT-IMP: ABNORMAL
SODIUM SERPL-SCNC: 134 MMOL/L (ref 132–146)
SPECIMEN DESCRIPTION: ABNORMAL
WBC OTHER # BLD: 15.6 K/UL (ref 4.5–11.5)

## 2025-01-09 PROCEDURE — 6370000000 HC RX 637 (ALT 250 FOR IP)

## 2025-01-09 PROCEDURE — 2060000000 HC ICU INTERMEDIATE R&B

## 2025-01-09 PROCEDURE — 6360000002 HC RX W HCPCS

## 2025-01-09 PROCEDURE — 99232 SBSQ HOSP IP/OBS MODERATE 35: CPT | Performed by: INTERNAL MEDICINE

## 2025-01-09 PROCEDURE — 2580000003 HC RX 258

## 2025-01-09 PROCEDURE — 2500000003 HC RX 250 WO HCPCS

## 2025-01-09 PROCEDURE — 97530 THERAPEUTIC ACTIVITIES: CPT

## 2025-01-09 PROCEDURE — 2500000003 HC RX 250 WO HCPCS: Performed by: INTERNAL MEDICINE

## 2025-01-09 PROCEDURE — 82962 GLUCOSE BLOOD TEST: CPT

## 2025-01-09 PROCEDURE — 84100 ASSAY OF PHOSPHORUS: CPT

## 2025-01-09 PROCEDURE — 80048 BASIC METABOLIC PNL TOTAL CA: CPT

## 2025-01-09 PROCEDURE — 2500000003 HC RX 250 WO HCPCS: Performed by: NURSE PRACTITIONER

## 2025-01-09 PROCEDURE — 6360000002 HC RX W HCPCS: Performed by: INTERNAL MEDICINE

## 2025-01-09 PROCEDURE — 85025 COMPLETE CBC W/AUTO DIFF WBC: CPT

## 2025-01-09 PROCEDURE — 83735 ASSAY OF MAGNESIUM: CPT

## 2025-01-09 PROCEDURE — 2580000003 HC RX 258: Performed by: INTERNAL MEDICINE

## 2025-01-09 RX ORDER — POTASSIUM CHLORIDE 1500 MG/1
20 TABLET, EXTENDED RELEASE ORAL ONCE
Status: COMPLETED | OUTPATIENT
Start: 2025-01-09 | End: 2025-01-09

## 2025-01-09 RX ORDER — DEXTROSE MONOHYDRATE 100 MG/ML
INJECTION, SOLUTION INTRAVENOUS CONTINUOUS
Status: DISCONTINUED | OUTPATIENT
Start: 2025-01-09 | End: 2025-01-09

## 2025-01-09 RX ADMIN — AMPICILLIN SODIUM 2000 MG: 2 INJECTION, POWDER, FOR SOLUTION INTRAMUSCULAR; INTRAVENOUS at 05:37

## 2025-01-09 RX ADMIN — AMPICILLIN SODIUM 2000 MG: 2 INJECTION, POWDER, FOR SOLUTION INTRAMUSCULAR; INTRAVENOUS at 18:51

## 2025-01-09 RX ADMIN — SODIUM CHLORIDE, PRESERVATIVE FREE 40 MG: 5 INJECTION INTRAVENOUS at 09:42

## 2025-01-09 RX ADMIN — APIXABAN 10 MG: 5 TABLET, FILM COATED ORAL at 09:30

## 2025-01-09 RX ADMIN — MONTELUKAST SODIUM 10 MG: 10 TABLET, FILM COATED ORAL at 20:38

## 2025-01-09 RX ADMIN — AMIODARONE HYDROCHLORIDE 0.5 MG/MIN: 1.8 INJECTION, SOLUTION INTRAVENOUS at 19:33

## 2025-01-09 RX ADMIN — POTASSIUM CHLORIDE 20 MEQ: 1500 TABLET, EXTENDED RELEASE ORAL at 09:53

## 2025-01-09 RX ADMIN — FINASTERIDE 5 MG: 5 TABLET, FILM COATED ORAL at 09:53

## 2025-01-09 RX ADMIN — SODIUM CHLORIDE, PRESERVATIVE FREE 10 ML: 5 INJECTION INTRAVENOUS at 11:22

## 2025-01-09 RX ADMIN — AMPICILLIN SODIUM 2000 MG: 2 INJECTION, POWDER, FOR SOLUTION INTRAMUSCULAR; INTRAVENOUS at 02:29

## 2025-01-09 RX ADMIN — TAMSULOSIN HYDROCHLORIDE 0.4 MG: 0.4 CAPSULE ORAL at 09:53

## 2025-01-09 RX ADMIN — ACETAMINOPHEN 650 MG: 325 TABLET ORAL at 20:38

## 2025-01-09 RX ADMIN — METOPROLOL SUCCINATE 50 MG: 50 TABLET, EXTENDED RELEASE ORAL at 09:42

## 2025-01-09 RX ADMIN — SENNOSIDES AND DOCUSATE SODIUM 2 TABLET: 8.6; 5 TABLET ORAL at 09:43

## 2025-01-09 RX ADMIN — SODIUM CHLORIDE, PRESERVATIVE FREE 10 ML: 5 INJECTION INTRAVENOUS at 20:38

## 2025-01-09 RX ADMIN — AMPICILLIN SODIUM 2000 MG: 2 INJECTION, POWDER, FOR SOLUTION INTRAMUSCULAR; INTRAVENOUS at 14:49

## 2025-01-09 RX ADMIN — AMIODARONE HYDROCHLORIDE 0.5 MG/MIN: 1.8 INJECTION, SOLUTION INTRAVENOUS at 10:02

## 2025-01-09 RX ADMIN — EMPAGLIFLOZIN 10 MG: 10 TABLET, FILM COATED ORAL at 09:53

## 2025-01-09 RX ADMIN — SODIUM CHLORIDE, PRESERVATIVE FREE 10 ML: 5 INJECTION INTRAVENOUS at 09:42

## 2025-01-09 RX ADMIN — WATER 2000 MG: 1 INJECTION INTRAMUSCULAR; INTRAVENOUS; SUBCUTANEOUS at 11:22

## 2025-01-09 RX ADMIN — APIXABAN 10 MG: 5 TABLET, FILM COATED ORAL at 20:37

## 2025-01-09 ASSESSMENT — PAIN SCALES - GENERAL
PAINLEVEL_OUTOF10: 0

## 2025-01-10 LAB
ANION GAP SERPL CALCULATED.3IONS-SCNC: 4 MMOL/L (ref 7–16)
BASOPHILS # BLD: 0 K/UL (ref 0–0.2)
BASOPHILS NFR BLD: 0 % (ref 0–2)
BUN SERPL-MCNC: 6 MG/DL (ref 6–23)
CALCIUM SERPL-MCNC: 7.2 MG/DL (ref 8.6–10.2)
CHLORIDE SERPL-SCNC: 104 MMOL/L (ref 98–107)
CO2 SERPL-SCNC: 26 MMOL/L (ref 22–29)
CREAT SERPL-MCNC: 0.5 MG/DL (ref 0.7–1.2)
EOSINOPHIL # BLD: 0 K/UL (ref 0.05–0.5)
EOSINOPHILS RELATIVE PERCENT: 0 % (ref 0–6)
ERYTHROCYTE [DISTWIDTH] IN BLOOD BY AUTOMATED COUNT: 19.3 % (ref 11.5–15)
GFR, ESTIMATED: >90 ML/MIN/1.73M2
GLUCOSE BLD-MCNC: 128 MG/DL (ref 74–99)
GLUCOSE BLD-MCNC: 142 MG/DL (ref 74–99)
GLUCOSE BLD-MCNC: 156 MG/DL (ref 74–99)
GLUCOSE BLD-MCNC: 168 MG/DL (ref 74–99)
GLUCOSE SERPL-MCNC: 144 MG/DL (ref 74–99)
HCT VFR BLD AUTO: 24.9 % (ref 37–54)
HGB BLD-MCNC: 7.9 G/DL (ref 12.5–16.5)
LYMPHOCYTES NFR BLD: 0 K/UL (ref 1.5–4)
LYMPHOCYTES RELATIVE PERCENT: 0 % (ref 20–42)
MAGNESIUM SERPL-MCNC: 2 MG/DL (ref 1.6–2.6)
MCH RBC QN AUTO: 31.2 PG (ref 26–35)
MCHC RBC AUTO-ENTMCNC: 31.7 G/DL (ref 32–34.5)
MCV RBC AUTO: 98.4 FL (ref 80–99.9)
MONOCYTES NFR BLD: 0.36 K/UL (ref 0.1–0.95)
MONOCYTES NFR BLD: 3 % (ref 2–12)
NEUTROPHILS NFR BLD: 97 % (ref 43–80)
NEUTS SEG NFR BLD: 13.44 K/UL (ref 1.8–7.3)
PHOSPHATE SERPL-MCNC: 2.9 MG/DL (ref 2.5–4.5)
PLATELET # BLD AUTO: 340 K/UL (ref 130–450)
PMV BLD AUTO: 9.8 FL (ref 7–12)
POTASSIUM SERPL-SCNC: 3.6 MMOL/L (ref 3.5–5)
RBC # BLD AUTO: 2.53 M/UL (ref 3.8–5.8)
RBC # BLD: ABNORMAL 10*6/UL
SODIUM SERPL-SCNC: 134 MMOL/L (ref 132–146)
WBC OTHER # BLD: 13.8 K/UL (ref 4.5–11.5)

## 2025-01-10 PROCEDURE — 2500000003 HC RX 250 WO HCPCS: Performed by: INTERNAL MEDICINE

## 2025-01-10 PROCEDURE — 6370000000 HC RX 637 (ALT 250 FOR IP)

## 2025-01-10 PROCEDURE — 2500000003 HC RX 250 WO HCPCS

## 2025-01-10 PROCEDURE — 2060000000 HC ICU INTERMEDIATE R&B

## 2025-01-10 PROCEDURE — 6360000002 HC RX W HCPCS

## 2025-01-10 PROCEDURE — 85025 COMPLETE CBC W/AUTO DIFF WBC: CPT

## 2025-01-10 PROCEDURE — 2580000003 HC RX 258

## 2025-01-10 PROCEDURE — 82962 GLUCOSE BLOOD TEST: CPT

## 2025-01-10 PROCEDURE — 6360000002 HC RX W HCPCS: Performed by: INTERNAL MEDICINE

## 2025-01-10 PROCEDURE — 2580000003 HC RX 258: Performed by: INTERNAL MEDICINE

## 2025-01-10 PROCEDURE — 99232 SBSQ HOSP IP/OBS MODERATE 35: CPT | Performed by: INTERNAL MEDICINE

## 2025-01-10 PROCEDURE — 84100 ASSAY OF PHOSPHORUS: CPT

## 2025-01-10 PROCEDURE — 80048 BASIC METABOLIC PNL TOTAL CA: CPT

## 2025-01-10 PROCEDURE — 83735 ASSAY OF MAGNESIUM: CPT

## 2025-01-10 RX ADMIN — APIXABAN 10 MG: 5 TABLET, FILM COATED ORAL at 20:54

## 2025-01-10 RX ADMIN — AMPICILLIN SODIUM 2000 MG: 2 INJECTION, POWDER, FOR SOLUTION INTRAMUSCULAR; INTRAVENOUS at 00:24

## 2025-01-10 RX ADMIN — POTASSIUM BICARBONATE 40 MEQ: 782 TABLET, EFFERVESCENT ORAL at 08:45

## 2025-01-10 RX ADMIN — ONDANSETRON 4 MG: 4 TABLET, ORALLY DISINTEGRATING ORAL at 20:55

## 2025-01-10 RX ADMIN — APIXABAN 10 MG: 5 TABLET, FILM COATED ORAL at 08:25

## 2025-01-10 RX ADMIN — AMPICILLIN SODIUM 2000 MG: 2 INJECTION, POWDER, FOR SOLUTION INTRAMUSCULAR; INTRAVENOUS at 12:41

## 2025-01-10 RX ADMIN — MONTELUKAST SODIUM 10 MG: 10 TABLET, FILM COATED ORAL at 20:54

## 2025-01-10 RX ADMIN — AMIODARONE HYDROCHLORIDE 0.5 MG/MIN: 1.8 INJECTION, SOLUTION INTRAVENOUS at 20:58

## 2025-01-10 RX ADMIN — SODIUM CHLORIDE, PRESERVATIVE FREE 10 ML: 5 INJECTION INTRAVENOUS at 21:04

## 2025-01-10 RX ADMIN — SODIUM CHLORIDE, PRESERVATIVE FREE 10 ML: 5 INJECTION INTRAVENOUS at 08:30

## 2025-01-10 RX ADMIN — TAMSULOSIN HYDROCHLORIDE 0.4 MG: 0.4 CAPSULE ORAL at 08:27

## 2025-01-10 RX ADMIN — AMPICILLIN SODIUM 2000 MG: 2 INJECTION, POWDER, FOR SOLUTION INTRAMUSCULAR; INTRAVENOUS at 17:54

## 2025-01-10 RX ADMIN — AMIODARONE HYDROCHLORIDE 0.5 MG/MIN: 1.8 INJECTION, SOLUTION INTRAVENOUS at 08:25

## 2025-01-10 RX ADMIN — METOPROLOL SUCCINATE 50 MG: 50 TABLET, EXTENDED RELEASE ORAL at 08:27

## 2025-01-10 RX ADMIN — WATER 2000 MG: 1 INJECTION INTRAMUSCULAR; INTRAVENOUS; SUBCUTANEOUS at 11:18

## 2025-01-10 RX ADMIN — SENNOSIDES AND DOCUSATE SODIUM 2 TABLET: 8.6; 5 TABLET ORAL at 08:26

## 2025-01-10 RX ADMIN — SODIUM CHLORIDE, PRESERVATIVE FREE 40 MG: 5 INJECTION INTRAVENOUS at 08:27

## 2025-01-10 RX ADMIN — EMPAGLIFLOZIN 10 MG: 10 TABLET, FILM COATED ORAL at 08:29

## 2025-01-10 RX ADMIN — AMPICILLIN SODIUM 2000 MG: 2 INJECTION, POWDER, FOR SOLUTION INTRAMUSCULAR; INTRAVENOUS at 06:13

## 2025-01-10 RX ADMIN — FINASTERIDE 5 MG: 5 TABLET, FILM COATED ORAL at 08:28

## 2025-01-10 ASSESSMENT — PAIN SCALES - GENERAL
PAINLEVEL_OUTOF10: 0

## 2025-01-11 ENCOUNTER — APPOINTMENT (OUTPATIENT)
Dept: GENERAL RADIOLOGY | Age: 80
DRG: 314 | End: 2025-01-11
Payer: MEDICARE

## 2025-01-11 LAB
ANION GAP SERPL CALCULATED.3IONS-SCNC: 9 MMOL/L (ref 7–16)
BASOPHILS # BLD: 0 K/UL (ref 0–0.2)
BASOPHILS NFR BLD: 0 % (ref 0–2)
BUN SERPL-MCNC: 5 MG/DL (ref 6–23)
CALCIUM SERPL-MCNC: 7.2 MG/DL (ref 8.6–10.2)
CHLORIDE SERPL-SCNC: 101 MMOL/L (ref 98–107)
CO2 SERPL-SCNC: 22 MMOL/L (ref 22–29)
CREAT SERPL-MCNC: 0.5 MG/DL (ref 0.7–1.2)
EKG ATRIAL RATE: 156 BPM
EKG ATRIAL RATE: 161 BPM
EKG P AXIS: -102 DEGREES
EKG P-R INTERVAL: 114 MS
EKG Q-T INTERVAL: 252 MS
EKG Q-T INTERVAL: 254 MS
EKG QRS DURATION: 82 MS
EKG QRS DURATION: 84 MS
EKG QTC CALCULATION (BAZETT): 410 MS
EKG QTC CALCULATION (BAZETT): 412 MS
EKG R AXIS: 14 DEGREES
EKG R AXIS: 37 DEGREES
EKG T AXIS: -132 DEGREES
EKG T AXIS: -165 DEGREES
EKG VENTRICULAR RATE: 157 BPM
EKG VENTRICULAR RATE: 161 BPM
EOSINOPHIL # BLD: 0.1 K/UL (ref 0.05–0.5)
EOSINOPHILS RELATIVE PERCENT: 1 % (ref 0–6)
ERYTHROCYTE [DISTWIDTH] IN BLOOD BY AUTOMATED COUNT: 18.7 % (ref 11.5–15)
GFR, ESTIMATED: >90 ML/MIN/1.73M2
GLUCOSE BLD-MCNC: 136 MG/DL (ref 74–99)
GLUCOSE BLD-MCNC: 142 MG/DL (ref 74–99)
GLUCOSE BLD-MCNC: 166 MG/DL (ref 74–99)
GLUCOSE SERPL-MCNC: 143 MG/DL (ref 74–99)
HCT VFR BLD AUTO: 26.5 % (ref 37–54)
HGB BLD-MCNC: 8.3 G/DL (ref 12.5–16.5)
LYMPHOCYTES NFR BLD: 0.21 K/UL (ref 1.5–4)
LYMPHOCYTES RELATIVE PERCENT: 2 % (ref 20–42)
MAGNESIUM SERPL-MCNC: 2 MG/DL (ref 1.6–2.6)
MCH RBC QN AUTO: 30.9 PG (ref 26–35)
MCHC RBC AUTO-ENTMCNC: 31.3 G/DL (ref 32–34.5)
MCV RBC AUTO: 98.5 FL (ref 80–99.9)
MONOCYTES NFR BLD: 1.33 K/UL (ref 0.1–0.95)
MONOCYTES NFR BLD: 11 % (ref 2–12)
NEUTROPHILS NFR BLD: 86 % (ref 43–80)
NEUTS SEG NFR BLD: 10.06 K/UL (ref 1.8–7.3)
PHOSPHATE SERPL-MCNC: 2.9 MG/DL (ref 2.5–4.5)
PLATELET # BLD AUTO: 401 K/UL (ref 130–450)
PMV BLD AUTO: 9.8 FL (ref 7–12)
POTASSIUM SERPL-SCNC: 3.6 MMOL/L (ref 3.5–5)
RBC # BLD AUTO: 2.69 M/UL (ref 3.8–5.8)
RBC # BLD: ABNORMAL 10*6/UL
SODIUM SERPL-SCNC: 132 MMOL/L (ref 132–146)
WBC OTHER # BLD: 11.7 K/UL (ref 4.5–11.5)

## 2025-01-11 PROCEDURE — 6360000002 HC RX W HCPCS

## 2025-01-11 PROCEDURE — 99232 SBSQ HOSP IP/OBS MODERATE 35: CPT | Performed by: INTERNAL MEDICINE

## 2025-01-11 PROCEDURE — 82962 GLUCOSE BLOOD TEST: CPT

## 2025-01-11 PROCEDURE — 83735 ASSAY OF MAGNESIUM: CPT

## 2025-01-11 PROCEDURE — 2060000000 HC ICU INTERMEDIATE R&B

## 2025-01-11 PROCEDURE — 6370000000 HC RX 637 (ALT 250 FOR IP)

## 2025-01-11 PROCEDURE — 2580000003 HC RX 258

## 2025-01-11 PROCEDURE — 2500000003 HC RX 250 WO HCPCS

## 2025-01-11 PROCEDURE — 85025 COMPLETE CBC W/AUTO DIFF WBC: CPT

## 2025-01-11 PROCEDURE — 74018 RADEX ABDOMEN 1 VIEW: CPT

## 2025-01-11 PROCEDURE — 80048 BASIC METABOLIC PNL TOTAL CA: CPT

## 2025-01-11 PROCEDURE — 93010 ELECTROCARDIOGRAM REPORT: CPT | Performed by: INTERNAL MEDICINE

## 2025-01-11 PROCEDURE — 84100 ASSAY OF PHOSPHORUS: CPT

## 2025-01-11 PROCEDURE — 6370000000 HC RX 637 (ALT 250 FOR IP): Performed by: INTERNAL MEDICINE

## 2025-01-11 PROCEDURE — 2500000003 HC RX 250 WO HCPCS: Performed by: INTERNAL MEDICINE

## 2025-01-11 PROCEDURE — 6360000002 HC RX W HCPCS: Performed by: INTERNAL MEDICINE

## 2025-01-11 RX ORDER — POLYETHYLENE GLYCOL 3350 17 G/17G
17 POWDER, FOR SOLUTION ORAL DAILY
Status: DISCONTINUED | OUTPATIENT
Start: 2025-01-11 | End: 2025-01-13 | Stop reason: HOSPADM

## 2025-01-11 RX ORDER — PROCHLORPERAZINE EDISYLATE 5 MG/ML
10 INJECTION INTRAMUSCULAR; INTRAVENOUS EVERY 6 HOURS PRN
Status: DISCONTINUED | OUTPATIENT
Start: 2025-01-11 | End: 2025-01-13 | Stop reason: HOSPADM

## 2025-01-11 RX ORDER — BUMETANIDE 0.25 MG/ML
1 INJECTION, SOLUTION INTRAMUSCULAR; INTRAVENOUS ONCE
Status: COMPLETED | OUTPATIENT
Start: 2025-01-11 | End: 2025-01-11

## 2025-01-11 RX ADMIN — SENNOSIDES AND DOCUSATE SODIUM 2 TABLET: 8.6; 5 TABLET ORAL at 09:32

## 2025-01-11 RX ADMIN — METOPROLOL SUCCINATE 50 MG: 50 TABLET, EXTENDED RELEASE ORAL at 09:33

## 2025-01-11 RX ADMIN — SODIUM CHLORIDE, PRESERVATIVE FREE 40 MG: 5 INJECTION INTRAVENOUS at 09:34

## 2025-01-11 RX ADMIN — TAMSULOSIN HYDROCHLORIDE 0.4 MG: 0.4 CAPSULE ORAL at 09:33

## 2025-01-11 RX ADMIN — SODIUM CHLORIDE, PRESERVATIVE FREE 10 ML: 5 INJECTION INTRAVENOUS at 20:51

## 2025-01-11 RX ADMIN — BUMETANIDE 1 MG: 0.25 INJECTION INTRAMUSCULAR; INTRAVENOUS at 10:41

## 2025-01-11 RX ADMIN — PROMETHAZINE HYDROCHLORIDE 6.25 MG: 25 INJECTION INTRAMUSCULAR; INTRAVENOUS at 12:19

## 2025-01-11 RX ADMIN — APIXABAN 10 MG: 5 TABLET, FILM COATED ORAL at 20:49

## 2025-01-11 RX ADMIN — FINASTERIDE 5 MG: 5 TABLET, FILM COATED ORAL at 09:33

## 2025-01-11 RX ADMIN — AMOXICILLIN AND CLAVULANATE POTASSIUM 1 TABLET: 875; 125 TABLET, FILM COATED ORAL at 09:33

## 2025-01-11 RX ADMIN — ONDANSETRON 4 MG: 4 TABLET, ORALLY DISINTEGRATING ORAL at 10:20

## 2025-01-11 RX ADMIN — EMPAGLIFLOZIN 10 MG: 10 TABLET, FILM COATED ORAL at 09:32

## 2025-01-11 RX ADMIN — AMIODARONE HYDROCHLORIDE 0.5 MG/MIN: 1.8 INJECTION, SOLUTION INTRAVENOUS at 09:55

## 2025-01-11 RX ADMIN — APIXABAN 10 MG: 5 TABLET, FILM COATED ORAL at 09:33

## 2025-01-11 RX ADMIN — AMOXICILLIN AND CLAVULANATE POTASSIUM 1 TABLET: 875; 125 TABLET, FILM COATED ORAL at 20:49

## 2025-01-11 RX ADMIN — MONTELUKAST SODIUM 10 MG: 10 TABLET, FILM COATED ORAL at 20:49

## 2025-01-11 RX ADMIN — SODIUM CHLORIDE, PRESERVATIVE FREE 10 ML: 5 INJECTION INTRAVENOUS at 09:35

## 2025-01-12 LAB
ANION GAP SERPL CALCULATED.3IONS-SCNC: 11 MMOL/L (ref 7–16)
BASOPHILS # BLD: 0 K/UL (ref 0–0.2)
BASOPHILS NFR BLD: 0 % (ref 0–2)
BNP SERPL-MCNC: 306 PG/ML (ref 0–450)
BUN SERPL-MCNC: 5 MG/DL (ref 6–23)
CALCIUM SERPL-MCNC: 7.6 MG/DL (ref 8.6–10.2)
CHLORIDE SERPL-SCNC: 104 MMOL/L (ref 98–107)
CO2 SERPL-SCNC: 24 MMOL/L (ref 22–29)
CREAT SERPL-MCNC: 0.5 MG/DL (ref 0.7–1.2)
EOSINOPHIL # BLD: 0 K/UL (ref 0.05–0.5)
EOSINOPHILS RELATIVE PERCENT: 0 % (ref 0–6)
ERYTHROCYTE [DISTWIDTH] IN BLOOD BY AUTOMATED COUNT: 18.8 % (ref 11.5–15)
GFR, ESTIMATED: >90 ML/MIN/1.73M2
GLUCOSE BLD-MCNC: 117 MG/DL (ref 74–99)
GLUCOSE BLD-MCNC: 139 MG/DL (ref 74–99)
GLUCOSE BLD-MCNC: 154 MG/DL (ref 74–99)
GLUCOSE BLD-MCNC: 162 MG/DL (ref 74–99)
GLUCOSE SERPL-MCNC: 118 MG/DL (ref 74–99)
HCT VFR BLD AUTO: 25.6 % (ref 37–54)
HGB BLD-MCNC: 8.1 G/DL (ref 12.5–16.5)
LYMPHOCYTES NFR BLD: 0.71 K/UL (ref 1.5–4)
LYMPHOCYTES RELATIVE PERCENT: 8 % (ref 20–42)
MAGNESIUM SERPL-MCNC: 2 MG/DL (ref 1.6–2.6)
MCH RBC QN AUTO: 31.2 PG (ref 26–35)
MCHC RBC AUTO-ENTMCNC: 31.6 G/DL (ref 32–34.5)
MCV RBC AUTO: 98.5 FL (ref 80–99.9)
MONOCYTES NFR BLD: 0.47 K/UL (ref 0.1–0.95)
MONOCYTES NFR BLD: 5 % (ref 2–12)
MYELOCYTES ABSOLUTE COUNT: 0.16 K/UL
MYELOCYTES: 2 %
NEUTROPHILS NFR BLD: 85 % (ref 43–80)
NEUTS SEG NFR BLD: 7.77 K/UL (ref 1.8–7.3)
PHOSPHATE SERPL-MCNC: 3.4 MG/DL (ref 2.5–4.5)
PLATELET # BLD AUTO: 400 K/UL (ref 130–450)
PMV BLD AUTO: 9.5 FL (ref 7–12)
POTASSIUM SERPL-SCNC: 3.6 MMOL/L (ref 3.5–5)
RBC # BLD AUTO: 2.6 M/UL (ref 3.8–5.8)
RBC # BLD: ABNORMAL 10*6/UL
SODIUM SERPL-SCNC: 139 MMOL/L (ref 132–146)
WBC OTHER # BLD: 9.1 K/UL (ref 4.5–11.5)

## 2025-01-12 PROCEDURE — 6370000000 HC RX 637 (ALT 250 FOR IP)

## 2025-01-12 PROCEDURE — 80048 BASIC METABOLIC PNL TOTAL CA: CPT

## 2025-01-12 PROCEDURE — 83735 ASSAY OF MAGNESIUM: CPT

## 2025-01-12 PROCEDURE — 2060000000 HC ICU INTERMEDIATE R&B

## 2025-01-12 PROCEDURE — 6360000002 HC RX W HCPCS: Performed by: INTERNAL MEDICINE

## 2025-01-12 PROCEDURE — 2500000003 HC RX 250 WO HCPCS

## 2025-01-12 PROCEDURE — 85025 COMPLETE CBC W/AUTO DIFF WBC: CPT

## 2025-01-12 PROCEDURE — 6370000000 HC RX 637 (ALT 250 FOR IP): Performed by: INTERNAL MEDICINE

## 2025-01-12 PROCEDURE — 6360000002 HC RX W HCPCS

## 2025-01-12 PROCEDURE — 99232 SBSQ HOSP IP/OBS MODERATE 35: CPT | Performed by: INTERNAL MEDICINE

## 2025-01-12 PROCEDURE — 82962 GLUCOSE BLOOD TEST: CPT

## 2025-01-12 PROCEDURE — 83880 ASSAY OF NATRIURETIC PEPTIDE: CPT

## 2025-01-12 PROCEDURE — 84100 ASSAY OF PHOSPHORUS: CPT

## 2025-01-12 PROCEDURE — 2580000003 HC RX 258

## 2025-01-12 RX ORDER — BUMETANIDE 0.25 MG/ML
1 INJECTION, SOLUTION INTRAMUSCULAR; INTRAVENOUS ONCE
Status: COMPLETED | OUTPATIENT
Start: 2025-01-12 | End: 2025-01-12

## 2025-01-12 RX ORDER — BISACODYL 5 MG/1
5 TABLET, DELAYED RELEASE ORAL DAILY
Status: DISCONTINUED | OUTPATIENT
Start: 2025-01-12 | End: 2025-01-13 | Stop reason: HOSPADM

## 2025-01-12 RX ADMIN — SODIUM CHLORIDE, PRESERVATIVE FREE 10 ML: 5 INJECTION INTRAVENOUS at 09:24

## 2025-01-12 RX ADMIN — APIXABAN 10 MG: 5 TABLET, FILM COATED ORAL at 09:17

## 2025-01-12 RX ADMIN — TAMSULOSIN HYDROCHLORIDE 0.4 MG: 0.4 CAPSULE ORAL at 09:17

## 2025-01-12 RX ADMIN — AMOXICILLIN AND CLAVULANATE POTASSIUM 1 TABLET: 875; 125 TABLET, FILM COATED ORAL at 09:18

## 2025-01-12 RX ADMIN — MONTELUKAST SODIUM 10 MG: 10 TABLET, FILM COATED ORAL at 20:35

## 2025-01-12 RX ADMIN — SODIUM CHLORIDE, PRESERVATIVE FREE 40 MG: 5 INJECTION INTRAVENOUS at 09:19

## 2025-01-12 RX ADMIN — SENNOSIDES AND DOCUSATE SODIUM 2 TABLET: 8.6; 5 TABLET ORAL at 09:17

## 2025-01-12 RX ADMIN — SODIUM CHLORIDE, PRESERVATIVE FREE 10 ML: 5 INJECTION INTRAVENOUS at 20:37

## 2025-01-12 RX ADMIN — APIXABAN 10 MG: 5 TABLET, FILM COATED ORAL at 20:35

## 2025-01-12 RX ADMIN — FINASTERIDE 5 MG: 5 TABLET, FILM COATED ORAL at 09:18

## 2025-01-12 RX ADMIN — EMPAGLIFLOZIN 10 MG: 10 TABLET, FILM COATED ORAL at 09:16

## 2025-01-12 RX ADMIN — BUMETANIDE 1 MG: 0.25 INJECTION INTRAMUSCULAR; INTRAVENOUS at 09:20

## 2025-01-12 RX ADMIN — METOPROLOL SUCCINATE 50 MG: 50 TABLET, EXTENDED RELEASE ORAL at 09:18

## 2025-01-12 RX ADMIN — AMOXICILLIN AND CLAVULANATE POTASSIUM 1 TABLET: 875; 125 TABLET, FILM COATED ORAL at 20:35

## 2025-01-13 ENCOUNTER — PREP FOR PROCEDURE (OUTPATIENT)
Dept: HEMATOLOGY | Age: 80
End: 2025-01-13

## 2025-01-13 VITALS
HEART RATE: 92 BPM | WEIGHT: 183.2 LBS | SYSTOLIC BLOOD PRESSURE: 117 MMHG | OXYGEN SATURATION: 99 % | HEIGHT: 74 IN | BODY MASS INDEX: 23.51 KG/M2 | RESPIRATION RATE: 16 BRPM | TEMPERATURE: 97.6 F | DIASTOLIC BLOOD PRESSURE: 83 MMHG

## 2025-01-13 PROBLEM — C25.9 PANCREATIC CANCER (HCC): Status: ACTIVE | Noted: 2025-01-13

## 2025-01-13 LAB
ANION GAP SERPL CALCULATED.3IONS-SCNC: 6 MMOL/L (ref 7–16)
BASOPHILS # BLD: 0.03 K/UL (ref 0–0.2)
BASOPHILS NFR BLD: 0 % (ref 0–2)
BUN SERPL-MCNC: 5 MG/DL (ref 6–23)
CALCIUM SERPL-MCNC: 7.7 MG/DL (ref 8.6–10.2)
CHLORIDE SERPL-SCNC: 103 MMOL/L (ref 98–107)
CO2 SERPL-SCNC: 29 MMOL/L (ref 22–29)
CREAT SERPL-MCNC: 0.5 MG/DL (ref 0.7–1.2)
EOSINOPHIL # BLD: 0.06 K/UL (ref 0.05–0.5)
EOSINOPHILS RELATIVE PERCENT: 1 % (ref 0–6)
ERYTHROCYTE [DISTWIDTH] IN BLOOD BY AUTOMATED COUNT: 18.6 % (ref 11.5–15)
GFR, ESTIMATED: >90 ML/MIN/1.73M2
GLUCOSE BLD-MCNC: 133 MG/DL (ref 74–99)
GLUCOSE BLD-MCNC: 148 MG/DL (ref 74–99)
GLUCOSE BLD-MCNC: 182 MG/DL (ref 74–99)
GLUCOSE SERPL-MCNC: 130 MG/DL (ref 74–99)
HCT VFR BLD AUTO: 25.9 % (ref 37–54)
HGB BLD-MCNC: 8.1 G/DL (ref 12.5–16.5)
IMM GRANULOCYTES # BLD AUTO: 0.54 K/UL (ref 0–0.58)
IMM GRANULOCYTES NFR BLD: 5 % (ref 0–5)
LYMPHOCYTES NFR BLD: 0.77 K/UL (ref 1.5–4)
LYMPHOCYTES RELATIVE PERCENT: 7 % (ref 20–42)
MAGNESIUM SERPL-MCNC: 2 MG/DL (ref 1.6–2.6)
MCH RBC QN AUTO: 31.3 PG (ref 26–35)
MCHC RBC AUTO-ENTMCNC: 31.3 G/DL (ref 32–34.5)
MCV RBC AUTO: 100 FL (ref 80–99.9)
MONOCYTES NFR BLD: 1.01 K/UL (ref 0.1–0.95)
MONOCYTES NFR BLD: 10 % (ref 2–12)
NEUTROPHILS NFR BLD: 77 % (ref 43–80)
NEUTS SEG NFR BLD: 7.97 K/UL (ref 1.8–7.3)
PHOSPHATE SERPL-MCNC: 3.3 MG/DL (ref 2.5–4.5)
PLATELET # BLD AUTO: 372 K/UL (ref 130–450)
PMV BLD AUTO: 9.4 FL (ref 7–12)
POTASSIUM SERPL-SCNC: 3.6 MMOL/L (ref 3.5–5)
RBC # BLD AUTO: 2.59 M/UL (ref 3.8–5.8)
SODIUM SERPL-SCNC: 138 MMOL/L (ref 132–146)
WBC OTHER # BLD: 10.4 K/UL (ref 4.5–11.5)

## 2025-01-13 PROCEDURE — 6370000000 HC RX 637 (ALT 250 FOR IP)

## 2025-01-13 PROCEDURE — 84100 ASSAY OF PHOSPHORUS: CPT

## 2025-01-13 PROCEDURE — 6370000000 HC RX 637 (ALT 250 FOR IP): Performed by: INTERNAL MEDICINE

## 2025-01-13 PROCEDURE — 97530 THERAPEUTIC ACTIVITIES: CPT

## 2025-01-13 PROCEDURE — 99232 SBSQ HOSP IP/OBS MODERATE 35: CPT | Performed by: INTERNAL MEDICINE

## 2025-01-13 PROCEDURE — 2500000003 HC RX 250 WO HCPCS

## 2025-01-13 PROCEDURE — 85025 COMPLETE CBC W/AUTO DIFF WBC: CPT

## 2025-01-13 PROCEDURE — 97535 SELF CARE MNGMENT TRAINING: CPT

## 2025-01-13 PROCEDURE — 2580000003 HC RX 258

## 2025-01-13 PROCEDURE — 6360000002 HC RX W HCPCS

## 2025-01-13 PROCEDURE — 6360000002 HC RX W HCPCS: Performed by: INTERNAL MEDICINE

## 2025-01-13 PROCEDURE — 80048 BASIC METABOLIC PNL TOTAL CA: CPT

## 2025-01-13 PROCEDURE — 83735 ASSAY OF MAGNESIUM: CPT

## 2025-01-13 PROCEDURE — 82962 GLUCOSE BLOOD TEST: CPT

## 2025-01-13 RX ORDER — SODIUM CHLORIDE 9 MG/ML
INJECTION, SOLUTION INTRAVENOUS PRN
Status: CANCELLED | OUTPATIENT
Start: 2025-01-13

## 2025-01-13 RX ORDER — METOPROLOL SUCCINATE 50 MG/1
50 TABLET, EXTENDED RELEASE ORAL DAILY
DISCHARGE
Start: 2025-01-14

## 2025-01-13 RX ORDER — PANTOPRAZOLE SODIUM 40 MG/1
40 TABLET, DELAYED RELEASE ORAL
Status: DISCONTINUED | OUTPATIENT
Start: 2025-01-14 | End: 2025-01-13 | Stop reason: HOSPADM

## 2025-01-13 RX ORDER — BUMETANIDE 0.25 MG/ML
1 INJECTION, SOLUTION INTRAMUSCULAR; INTRAVENOUS ONCE
Status: COMPLETED | OUTPATIENT
Start: 2025-01-13 | End: 2025-01-13

## 2025-01-13 RX ORDER — SODIUM CHLORIDE 0.9 % (FLUSH) 0.9 %
5-40 SYRINGE (ML) INJECTION PRN
Status: CANCELLED | OUTPATIENT
Start: 2025-01-13

## 2025-01-13 RX ORDER — SODIUM CHLORIDE 0.9 % (FLUSH) 0.9 %
5-40 SYRINGE (ML) INJECTION EVERY 12 HOURS SCHEDULED
Status: CANCELLED | OUTPATIENT
Start: 2025-01-13

## 2025-01-13 RX ORDER — BUMETANIDE 2 MG/1
1 TABLET ORAL DAILY
Refills: 0 | DISCHARGE
Start: 2025-01-13 | End: 2025-01-23

## 2025-01-13 RX ADMIN — SENNOSIDES AND DOCUSATE SODIUM 2 TABLET: 8.6; 5 TABLET ORAL at 09:23

## 2025-01-13 RX ADMIN — EMPAGLIFLOZIN 10 MG: 10 TABLET, FILM COATED ORAL at 09:24

## 2025-01-13 RX ADMIN — INSULIN LISPRO 1 UNITS: 100 INJECTION, SOLUTION INTRAVENOUS; SUBCUTANEOUS at 17:06

## 2025-01-13 RX ADMIN — BISACODYL 5 MG: 5 TABLET, COATED ORAL at 09:23

## 2025-01-13 RX ADMIN — SODIUM CHLORIDE, PRESERVATIVE FREE 40 MG: 5 INJECTION INTRAVENOUS at 09:24

## 2025-01-13 RX ADMIN — APIXABAN 10 MG: 5 TABLET, FILM COATED ORAL at 09:23

## 2025-01-13 RX ADMIN — BUMETANIDE 1 MG: 0.25 INJECTION INTRAMUSCULAR; INTRAVENOUS at 09:24

## 2025-01-13 RX ADMIN — FINASTERIDE 5 MG: 5 TABLET, FILM COATED ORAL at 09:23

## 2025-01-13 RX ADMIN — TAMSULOSIN HYDROCHLORIDE 0.4 MG: 0.4 CAPSULE ORAL at 09:23

## 2025-01-13 RX ADMIN — METOPROLOL SUCCINATE 50 MG: 50 TABLET, EXTENDED RELEASE ORAL at 09:23

## 2025-01-13 RX ADMIN — SODIUM CHLORIDE, PRESERVATIVE FREE 10 ML: 5 INJECTION INTRAVENOUS at 09:24

## 2025-01-13 RX ADMIN — AMOXICILLIN AND CLAVULANATE POTASSIUM 1 TABLET: 875; 125 TABLET, FILM COATED ORAL at 09:23

## 2025-01-13 RX ADMIN — POLYETHYLENE GLYCOL 3350 17 G: 17 POWDER, FOR SOLUTION ORAL at 09:24

## 2025-01-13 NOTE — DISCHARGE INSTR - COC
Continuity of Care Form    Patient Name: Michael Garcia   :  1945  MRN:  [id number]    Admit date:  2024  Discharge date:  2025    Code Status Order: Full Code   Advance Directives:   Advance Care Flowsheet Documentation             Admitting Physician:  Cammie Alberts DO  PCP: Moshe Moran DO    Discharging Nurse: ADINA RN  Discharging Hospital Unit/Room#: 7407/7407-A  Discharging Unit Phone Number: [phone]    Emergency Contact:   Extended Emergency Contact Information  Primary Emergency Contact: Razia Tim  Home Phone: [phone]  Relation: Other    Past Surgical History:  Past Surgical History:   Procedure Laterality Date    CARPAL TUNNEL RELEASE      ERCP N/A 2024    FAILED ENDOSCOPIC RETROGRADE CHOLANGIOPANCREATOGRAPHY performed by Demetrius Carlos DO at [facility] ENDOSCOPY    IR BILIARY DRAIN INT AND EXT  2024    IR BILIARY DRAIN INT AND EXT 2024 Sonny, Rodríguez Greer MD [facility] SPECIAL PROCEDURES    PANCREAS SURGERY N/A 2024    WHIPPLE with portal vein reconstruction, intraoperative US performed by Demetrius Santoyo III, MD at [facility] OR    PORT SURGERY Right 2024    mediport insertion performed by Demetrius Santoyo III, MD at [facility] OR    PORT SURGERY Right 1/3/2025    PORT REMOVAL performed by Yary Gardiner MD at [facility] OR    ROTATOR CUFF REPAIR      SHOULDER SURGERY      UPPER GASTROINTESTINAL ENDOSCOPY N/A 2024    ESOPHAGOGASTRODUODENOSCOPY DILATION BALLOON performed by Demetrius Carlos DO at [facility] ENDOSCOPY    UPPER GASTROINTESTINAL ENDOSCOPY N/A 2024    ESOPHAGOGASTRODUODENOSCOPY TUBE INSERTION performed by Demetrius Carlos DO at [facility] ENDOSCOPY    UPPER GASTROINTESTINAL ENDOSCOPY N/A 2024    ESOPHAGOGASTRODUODENOSCOPY TUBE INSERTION performed by Demetrius Carlos DO at [facility] ENDOSCOPY       Immunization History:   Immunization History   Administered Date(s) Administered    COVID-19, J&J, (age 18y+), IM, 0.5 mL 2021,  Dressing: steri-strips and pressure dressing Saucerization Excision Additional Text (Leave Blank If You Do Not Want): The margin was drawn around the clinically apparent lesion.  Incisions were then made along these lines, in a tangential fashion, to the appropriate tissue plane and the lesion was extirpated. V-Y Plasty Text: The defect edges were debeveled with a #15 scalpel blade.  Given the location of the defect, shape of the defect and the proximity to free margins an V-Y advancement flap was deemed most appropriate.  Using a sterile surgical marker, an appropriate advancement flap was drawn incorporating the defect and placing the expected incisions within the relaxed skin tension lines where possible.    The area thus outlined was incised deep to adipose tissue with a #15 scalpel blade.  The skin margins were undermined to an appropriate distance in all directions utilizing iris scissors. Complex Repair And Epidermal Autograft Text: The defect edges were debeveled with a #15 scalpel blade.  The primary defect was closed partially with a complex linear closure.  Given the location of the defect, shape of the defect and the proximity to free margins an epidermal autograft was deemed most appropriate to repair the remaining defect.  The graft was trimmed to fit the size of the remaining defect.  The graft was then placed in the primary defect, oriented appropriately, and sutured into place. Size Of Lesion In Cm: 0.6 Complex Repair And O-T Advancement Flap Text: The defect edges were debeveled with a #15 scalpel blade.  The primary defect was closed partially with a complex linear closure.  Given the location of the remaining defect, shape of the defect and the proximity to free margins an O-T advancement flap was deemed most appropriate for complete closure of the defect.  Using a sterile surgical marker, an appropriate advancement flap was drawn incorporating the defect and placing the expected incisions within the relaxed skin tension lines where possible.    The area thus outlined was incised deep to adipose tissue with a #15 scalpel blade.  The skin margins were undermined to an appropriate distance in all directions utilizing iris scissors. Home Suture Removal Text: Patient was provided a home suture removal kit and will remove their sutures at home.  If they have any questions or difficulties they will call the office. Billing Type: Third-Party Bill O-Z Flap Text: The defect edges were debeveled with a #15 scalpel blade.  Given the location of the defect, shape of the defect and the proximity to free margins an O-Z flap was deemed most appropriate.  Using a sterile surgical marker, an appropriate transposition flap was drawn incorporating the defect and placing the expected incisions within the relaxed skin tension lines where possible. The area thus outlined was incised deep to adipose tissue with a #15 scalpel blade.  The skin margins were undermined to an appropriate distance in all directions utilizing iris scissors. Show Surgeon Variable: Yes Post-Care Instructions: I reviewed with the patient in detail post-care instructions. Patient is not to engage in any heavy lifting, exercise, or swimming for the next 14 days. Should the patient develop any fevers, chills, bleeding, severe pain patient will contact the office immediately. Elliptical Excision Additional Text (Leave Blank If You Do Not Want): The margin was drawn around the clinically apparent lesion.  An elliptical shape was then drawn on the skin incorporating the lesion and margins.  Incisions were then made along these lines to the appropriate tissue plane and the lesion was extirpated. Double O-Z Plasty Text: The defect edges were debeveled with a #15 scalpel blade.  Given the location of the defect, shape of the defect and the proximity to free margins a Double O-Z plasty (double transposition flap) was deemed most appropriate.  Using a sterile surgical marker, the appropriate transposition flaps were drawn incorporating the defect and placing the expected incisions within the relaxed skin tension lines where possible. The area thus outlined was incised deep to adipose tissue with a #15 scalpel blade.  The skin margins were undermined to an appropriate distance in all directions utilizing iris scissors.  Hemostasis was achieved with electrocautery.  The flaps were then transposed into place, one clockwise and the other counterclockwise, and anchored with interrupted buried subcutaneous sutures. Secondary Defect Length (In Cm): 0 Complex Repair And Split-Thickness Skin Graft Text: The defect edges were debeveled with a #15 scalpel blade.  The primary defect was closed partially with a complex linear closure.  Given the location of the defect, shape of the defect and the proximity to free margins a split thickness skin graft was deemed most appropriate to repair the remaining defect.  The graft was trimmed to fit the size of the remaining defect.  The graft was then placed in the primary defect, oriented appropriately, and sutured into place. Curettage Prior To Excision?: No Banner Transposition Flap Text: The defect edges were debeveled with a #15 scalpel blade.  Given the location of the defect and the proximity to free margins a Banner transposition flap was deemed most appropriate.  Using a sterile surgical marker, an appropriate flap drawn around the defect. The area thus outlined was incised deep to adipose tissue with a #15 scalpel blade.  The skin margins were undermined to an appropriate distance in all directions utilizing iris scissors. O-L Flap Text: The defect edges were debeveled with a #15 scalpel blade.  Given the location of the defect, shape of the defect and the proximity to free margins an O-L flap was deemed most appropriate.  Using a sterile surgical marker, an appropriate advancement flap was drawn incorporating the defect and placing the expected incisions within the relaxed skin tension lines where possible.    The area thus outlined was incised deep to adipose tissue with a #15 scalpel blade.  The skin margins were undermined to an appropriate distance in all directions utilizing iris scissors. Hemigard Retention Suture: 2-0 Nylon Excisional Biopsy Additional Text (Leave Blank If You Do Not Want): The margin was drawn around the clinically apparent lesion. An elliptical shape was then drawn on the skin incorporating the lesion and margins.  Incisions were then made along these lines to the appropriate tissue plane and the lesion was extirpated. Complex Repair And Bilobe Flap Text: The defect edges were debeveled with a #15 scalpel blade.  The primary defect was closed partially with a complex linear closure.  Given the location of the remaining defect, shape of the defect and the proximity to free margins a bilobe flap was deemed most appropriate for complete closure of the defect.  Using a sterile surgical marker, an appropriate advancement flap was drawn incorporating the defect and placing the expected incisions within the relaxed skin tension lines where possible.    The area thus outlined was incised deep to adipose tissue with a #15 scalpel blade.  The skin margins were undermined to an appropriate distance in all directions utilizing iris scissors. Excision Method: Elliptical Complex Repair And Tissue Cultured Epidermal Autograft Text: The defect edges were debeveled with a #15 scalpel blade.  The primary defect was closed partially with a complex linear closure.  Given the location of the defect, shape of the defect and the proximity to free margins an tissue cultured epidermal autograft was deemed most appropriate to repair the remaining defect.  The graft was trimmed to fit the size of the remaining defect.  The graft was then placed in the primary defect, oriented appropriately, and sutured into place. Z Plasty Text: The lesion was extirpated to the level of the fat with a #15 scalpel blade.  Given the location of the defect, shape of the defect and the proximity to free margins a Z-plasty was deemed most appropriate for repair.  Using a sterile surgical marker, the appropriate transposition arms of the Z-plasty were drawn incorporating the defect and placing the expected incisions within the relaxed skin tension lines where possible.    The area thus outlined was incised deep to adipose tissue with a #15 scalpel blade.  The skin margins were undermined to an appropriate distance in all directions utilizing iris scissors.  The opposing transposition arms were then transposed into place in opposite direction and anchored with interrupted buried subcutaneous sutures. Bilobed Transposition Flap Text: The defect edges were debeveled with a #15 scalpel blade.  Given the location of the defect and the proximity to free margins a bilobed transposition flap was deemed most appropriate.  Using a sterile surgical marker, an appropriate bilobe flap drawn around the defect.    The area thus outlined was incised deep to adipose tissue with a #15 scalpel blade.  The skin margins were undermined to an appropriate distance in all directions utilizing iris scissors. Double O-Z Flap Text: The defect edges were debeveled with a #15 scalpel blade.  Given the location of the defect, shape of the defect and the proximity to free margins a Double O-Z flap was deemed most appropriate.  Using a sterile surgical marker, an appropriate transposition flap was drawn incorporating the defect and placing the expected incisions within the relaxed skin tension lines where possible. The area thus outlined was incised deep to adipose tissue with a #15 scalpel blade.  The skin margins were undermined to an appropriate distance in all directions utilizing iris scissors. Slit Excision Additional Text (Leave Blank If You Do Not Want): A linear line was drawn on the skin overlying the lesion. An incision was made slowly until the lesion was visualized.  Once visualized, the lesion was removed with blunt dissection. H Plasty Text: Given the location of the defect, shape of the defect and the proximity to free margins a H-plasty was deemed most appropriate for repair.  Using a sterile surgical marker, the appropriate advancement arms of the H-plasty were drawn incorporating the defect and placing the expected incisions within the relaxed skin tension lines where possible. The area thus outlined was incised deep to adipose tissue with a #15 scalpel blade. The skin margins were undermined to an appropriate distance in all directions utilizing iris scissors.  The opposing advancement arms were then advanced into place in opposite direction and anchored with interrupted buried subcutaneous sutures. Complex Repair And Dermal Autograft Text: The defect edges were debeveled with a #15 scalpel blade.  The primary defect was closed partially with a complex linear closure.  Given the location of the defect, shape of the defect and the proximity to free margins an dermal autograft was deemed most appropriate to repair the remaining defect.  The graft was trimmed to fit the size of the remaining defect.  The graft was then placed in the primary defect, oriented appropriately, and sutured into place. Complex Repair And O-L Flap Text: The defect edges were debeveled with a #15 scalpel blade.  The primary defect was closed partially with a complex linear closure.  Given the location of the remaining defect, shape of the defect and the proximity to free margins an O-L flap was deemed most appropriate for complete closure of the defect.  Using a sterile surgical marker, an appropriate flap was drawn incorporating the defect and placing the expected incisions within the relaxed skin tension lines where possible.    The area thus outlined was incised deep to adipose tissue with a #15 scalpel blade.  The skin margins were undermined to an appropriate distance in all directions utilizing iris scissors. W Plasty Text: The lesion was extirpated to the level of the fat with a #15 scalpel blade.  Given the location of the defect, shape of the defect and the proximity to free margins a W-plasty was deemed most appropriate for repair.  Using a sterile surgical marker, the appropriate transposition arms of the W-plasty were drawn incorporating the defect and placing the expected incisions within the relaxed skin tension lines where possible.    The area thus outlined was incised deep to adipose tissue with a #15 scalpel blade.  The skin margins were undermined to an appropriate distance in all directions utilizing iris scissors.  The opposing transposition arms were then transposed into place in opposite direction and anchored with interrupted buried subcutaneous sutures. Epidermal Sutures: 4-0 Prolene Number Of Hemigard Strips Per Side: 1 Bilobed Flap Text: The defect edges were debeveled with a #15 scalpel blade.  Given the location of the defect and the proximity to free margins a bilobe flap was deemed most appropriate.  Using a sterile surgical marker, an appropriate bilobe flap drawn around the defect.    The area thus outlined was incised deep to adipose tissue with a #15 scalpel blade.  The skin margins were undermined to an appropriate distance in all directions utilizing iris scissors. Size Of Margin In Cm: 0.3 V-Y Flap Text: The defect edges were debeveled with a #15 scalpel blade.  Given the location of the defect, shape of the defect and the proximity to free margins a V-Y flap was deemed most appropriate.  Using a sterile surgical marker, an appropriate advancement flap was drawn incorporating the defect and placing the expected incisions within the relaxed skin tension lines where possible.    The area thus outlined was incised deep to adipose tissue with a #15 scalpel blade.  The skin margins were undermined to an appropriate distance in all directions utilizing iris scissors. Complex Repair And Rotation Flap Text: The defect edges were debeveled with a #15 scalpel blade.  The primary defect was closed partially with a complex linear closure.  Given the location of the remaining defect, shape of the defect and the proximity to free margins a rotation flap was deemed most appropriate for complete closure of the defect.  Using a sterile surgical marker, an appropriate advancement flap was drawn incorporating the defect and placing the expected incisions within the relaxed skin tension lines where possible.    The area thus outlined was incised deep to adipose tissue with a #15 scalpel blade.  The skin margins were undermined to an appropriate distance in all directions utilizing iris scissors. Complex Repair And Skin Substitute Graft Text: The defect edges were debeveled with a #15 scalpel blade.  The primary defect was closed partially with a complex linear closure.  Given the location of the remaining defect, shape of the defect and the proximity to free margins a skin substitute graft was deemed most appropriate to repair the remaining defect.  The graft was trimmed to fit the size of the remaining defect.  The graft was then placed in the primary defect, oriented appropriately, and sutured into place. Suturegard Intro: Intraoperative tissue expansion was performed, utilizing the SUTUREGARD device, in order to reduce wound tension. Trilobed Flap Text: The defect edges were debeveled with a #15 scalpel blade.  Given the location of the defect and the proximity to free margins a trilobed flap was deemed most appropriate.  Using a sterile surgical marker, an appropriate trilobed flap drawn around the defect.    The area thus outlined was incised deep to adipose tissue with a #15 scalpel blade.  The skin margins were undermined to an appropriate distance in all directions utilizing iris scissors. Perilesional Excision Additional Text (Leave Blank If You Do Not Want): The margin was drawn around the clinically apparent lesion. Incisions were then made along these lines to the appropriate tissue plane and the lesion was extirpated. Additional Anesthesia Volume In Cc: 6 Complex Repair And Melolabial Flap Text: The defect edges were debeveled with a #15 scalpel blade.  The primary defect was closed partially with a complex linear closure.  Given the location of the remaining defect, shape of the defect and the proximity to free margins a melolabial flap was deemed most appropriate for complete closure of the defect.  Using a sterile surgical marker, an appropriate advancement flap was drawn incorporating the defect and placing the expected incisions within the relaxed skin tension lines where possible.    The area thus outlined was incised deep to adipose tissue with a #15 scalpel blade.  The skin margins were undermined to an appropriate distance in all directions utilizing iris scissors. Zygomaticofacial Flap Text: Given the location of the defect, shape of the defect and the proximity to free margins a zygomaticofacial flap was deemed most appropriate for repair.  Using a sterile surgical marker, the appropriate flap was drawn incorporating the defect and placing the expected incisions within the relaxed skin tension lines where possible. The area thus outlined was incised deep to adipose tissue with a #15 scalpel blade with preservation of a vascular pedicle.  The skin margins were undermined to an appropriate distance in all directions utilizing iris scissors.  The flap was then placed into the defect and anchored with interrupted buried subcutaneous sutures. Anesthesia Volume In Cc: 8 Intermediate / Complex Repair - Final Wound Length In Cm: 3.2 Complex Repair And Transposition Flap Text: The defect edges were debeveled with a #15 scalpel blade.  The primary defect was closed partially with a complex linear closure.  Given the location of the remaining defect, shape of the defect and the proximity to free margins a transposition flap was deemed most appropriate for complete closure of the defect.  Using a sterile surgical marker, an appropriate advancement flap was drawn incorporating the defect and placing the expected incisions within the relaxed skin tension lines where possible.    The area thus outlined was incised deep to adipose tissue with a #15 scalpel blade.  The skin margins were undermined to an appropriate distance in all directions utilizing iris scissors. Suturegard Body: The suture ends were repeatedly re-tightened and re-clamped to achieve the desired tissue expansion. Repair Type: Complex Dorsal Nasal Flap Text: The defect edges were debeveled with a #15 scalpel blade.  Given the location of the defect and the proximity to free margins a dorsal nasal flap was deemed most appropriate.  Using a sterile surgical marker, an appropriate dorsal nasal flap was drawn around the defect.    The area thus outlined was incised deep to adipose tissue with a #15 scalpel blade.  The skin margins were undermined to an appropriate distance in all directions utilizing iris scissors. Interpolation Flap Text: A decision was made to reconstruct the defect utilizing an interpolation axial flap and a staged reconstruction.  A telfa template was made of the defect.  This telfa template was then used to outline the interpolation flap.  The donor area for the pedicle flap was then injected with anesthesia.  The flap was excised through the skin and subcutaneous tissue down to the layer of the underlying musculature.  The interpolation flap was carefully excised within this deep plane to maintain its blood supply.  The edges of the donor site were undermined.   The donor site was closed in a primary fashion.  The pedicle was then rotated into position and sutured.  Once the tube was sutured into place, adequate blood supply was confirmed with blanching and refill.  The pedicle was then wrapped with xeroform gauze and dressed appropriately with a telfa and gauze bandage to ensure continued blood supply and protect the attached pedicle. No Repair - Repaired With Adjacent Surgical Defect Text (Leave Blank If You Do Not Want): After the excision the defect was repaired concurrently with another surgical defect which was in close approximation. Mercedes Flap Text: The defect edges were debeveled with a #15 scalpel blade.  Given the location of the defect, shape of the defect and the proximity to free margins a Mercedes flap was deemed most appropriate.  Using a sterile surgical marker, an appropriate advancement flap was drawn incorporating the defect and placing the expected incisions within the relaxed skin tension lines where possible. The area thus outlined was incised deep to adipose tissue with a #15 scalpel blade.  The skin margins were undermined to an appropriate distance in all directions utilizing iris scissors. Complex Repair And Rhombic Flap Text: The defect edges were debeveled with a #15 scalpel blade.  The primary defect was closed partially with a complex linear closure.  Given the location of the remaining defect, shape of the defect and the proximity to free margins a rhombic flap was deemed most appropriate for complete closure of the defect.  Using a sterile surgical marker, an appropriate advancement flap was drawn incorporating the defect and placing the expected incisions within the relaxed skin tension lines where possible.    The area thus outlined was incised deep to adipose tissue with a #15 scalpel blade.  The skin margins were undermined to an appropriate distance in all directions utilizing iris scissors. Repair Performed By Another Provider Text (Leave Blank If You Do Not Want): After the tissue was excised the defect was repaired by another provider. Cheek Interpolation Flap Text: A decision was made to reconstruct the defect utilizing an interpolation axial flap and a staged reconstruction.  A telfa template was made of the defect.  This telfa template was then used to outline the Cheek Interpolation flap.  The donor area for the pedicle flap was then injected with anesthesia.  The flap was excised through the skin and subcutaneous tissue down to the layer of the underlying musculature.  The interpolation flap was carefully excised within this deep plane to maintain its blood supply.  The edges of the donor site were undermined.   The donor site was closed in a primary fashion.  The pedicle was then rotated into position and sutured.  Once the tube was sutured into place, adequate blood supply was confirmed with blanching and refill.  The pedicle was then wrapped with xeroform gauze and dressed appropriately with a telfa and gauze bandage to ensure continued blood supply and protect the attached pedicle. Advancement-Rotation Flap Text: The defect edges were debeveled with a #15 scalpel blade.  Given the location of the defect, shape of the defect and the proximity to free margins an advancement-rotation flap was deemed most appropriate.  Using a sterile surgical marker, an appropriate flap was drawn incorporating the defect and placing the expected incisions within the relaxed skin tension lines where possible. The area thus outlined was incised deep to adipose tissue with a #15 scalpel blade.  The skin margins were undermined to an appropriate distance in all directions utilizing iris scissors. Cheek-To-Nose Interpolation Flap Text: A decision was made to reconstruct the defect utilizing an interpolation axial flap and a staged reconstruction.  A telfa template was made of the defect.  This telfa template was then used to outline the Cheek-To-Nose Interpolation flap.  The donor area for the pedicle flap was then injected with anesthesia.  The flap was excised through the skin and subcutaneous tissue down to the layer of the underlying musculature.  The interpolation flap was carefully excised within this deep plane to maintain its blood supply.  The edges of the donor site were undermined.   The donor site was closed in a primary fashion.  The pedicle was then rotated into position and sutured.  Once the tube was sutured into place, adequate blood supply was confirmed with blanching and refill.  The pedicle was then wrapped with xeroform gauze and dressed appropriately with a telfa and gauze bandage to ensure continued blood supply and protect the attached pedicle. Hemostasis: Electrocautery Island Pedicle Flap With Canthal Suspension Text: The defect edges were debeveled with a #15 scalpel blade.  Given the location of the defect, shape of the defect and the proximity to free margins an island pedicle advancement flap was deemed most appropriate.  Using a sterile surgical marker, an appropriate advancement flap was drawn incorporating the defect, outlining the appropriate donor tissue and placing the expected incisions within the relaxed skin tension lines where possible. The area thus outlined was incised deep to adipose tissue with a #15 scalpel blade.  The skin margins were undermined to an appropriate distance in all directions around the primary defect and laterally outward around the island pedicle utilizing iris scissors.  There was minimal undermining beneath the pedicle flap. A suspension suture was placed in the canthal tendon to prevent tension and prevent ectropion. Modified Advancement Flap Text: The defect edges were debeveled with a #15 scalpel blade.  Given the location of the defect, shape of the defect and the proximity to free margins a modified advancement flap was deemed most appropriate.  Using a sterile surgical marker, an appropriate advancement flap was drawn incorporating the defect and placing the expected incisions within the relaxed skin tension lines where possible.    The area thus outlined was incised deep to adipose tissue with a #15 scalpel blade.  The skin margins were undermined to an appropriate distance in all directions utilizing iris scissors. Width Of Defect Perpendicular To Closure In Cm (Required): 1.2 Complex Repair And M Plasty Text: The defect edges were debeveled with a #15 scalpel blade.  The primary defect was closed partially with a complex linear closure.  Given the location of the remaining defect, shape of the defect and the proximity to free margins an M plasty was deemed most appropriate for complete closure of the defect.  Using a sterile surgical marker, an appropriate advancement flap was drawn incorporating the defect and placing the expected incisions within the relaxed skin tension lines where possible.    The area thus outlined was incised deep to adipose tissue with a #15 scalpel blade.  The skin margins were undermined to an appropriate distance in all directions utilizing iris scissors. Advancement Flap (Single) Text: The defect edges were debeveled with a #15 scalpel blade.  Given the location of the defect and the proximity to free margins a single advancement flap was deemed most appropriate.  Using a sterile surgical marker, an appropriate advancement flap was drawn incorporating the defect and placing the expected incisions within the relaxed skin tension lines where possible.    The area thus outlined was incised deep to adipose tissue with a #15 scalpel blade.  The skin margins were undermined to an appropriate distance in all directions utilizing iris scissors. Estimated Blood Loss (Cc): minimal Mastoid Interpolation Flap Text: A decision was made to reconstruct the defect utilizing an interpolation axial flap and a staged reconstruction.  A telfa template was made of the defect.  This telfa template was then used to outline the mastoid interpolation flap.  The donor area for the pedicle flap was then injected with anesthesia.  The flap was excised through the skin and subcutaneous tissue down to the layer of the underlying musculature.  The pedicle flap was carefully excised within this deep plane to maintain its blood supply.  The edges of the donor site were undermined.   The donor site was closed in a primary fashion.  The pedicle was then rotated into position and sutured.  Once the tube was sutured into place, adequate blood supply was confirmed with blanching and refill.  The pedicle was then wrapped with xeroform gauze and dressed appropriately with a telfa and gauze bandage to ensure continued blood supply and protect the attached pedicle. Melolabial Interpolation Flap Text: A decision was made to reconstruct the defect utilizing an interpolation axial flap and a staged reconstruction.  A telfa template was made of the defect.  This telfa template was then used to outline the melolabial interpolation flap.  The donor area for the pedicle flap was then injected with anesthesia.  The flap was excised through the skin and subcutaneous tissue down to the layer of the underlying musculature.  The pedicle flap was carefully excised within this deep plane to maintain its blood supply.  The edges of the donor site were undermined.   The donor site was closed in a primary fashion.  The pedicle was then rotated into position and sutured.  Once the tube was sutured into place, adequate blood supply was confirmed with blanching and refill.  The pedicle was then wrapped with xeroform gauze and dressed appropriately with a telfa and gauze bandage to ensure continued blood supply and protect the attached pedicle. Island Pedicle Flap Text: The defect edges were debeveled with a #15 scalpel blade.  Given the location of the defect, shape of the defect and the proximity to free margins an island pedicle advancement flap was deemed most appropriate.  Using a sterile surgical marker, an appropriate advancement flap was drawn incorporating the defect, outlining the appropriate donor tissue and placing the expected incisions within the relaxed skin tension lines where possible.    The area thus outlined was incised deep to adipose tissue with a #15 scalpel blade.  The skin margins were undermined to an appropriate distance in all directions around the primary defect and laterally outward around the island pedicle utilizing iris scissors.  There was minimal undermining beneath the pedicle flap. Excision Depth: adipose tissue Complex Repair And V-Y Plasty Text: The defect edges were debeveled with a #15 scalpel blade.  The primary defect was closed partially with a complex linear closure.  Given the location of the remaining defect, shape of the defect and the proximity to free margins a V-Y plasty was deemed most appropriate for complete closure of the defect.  Using a sterile surgical marker, an appropriate advancement flap was drawn incorporating the defect and placing the expected incisions within the relaxed skin tension lines where possible.    The area thus outlined was incised deep to adipose tissue with a #15 scalpel blade.  The skin margins were undermined to an appropriate distance in all directions utilizing iris scissors. Hemigard Intro: Due to skin fragility and wound tension, it was decided to use HEMIGARD adhesive retention suture devices to permit a linear closure. The skin was cleaned and dried for a 6cm distance away from the wound. Excessive hair, if present, was removed to allow for adhesion. Complex Repair And W Plasty Text: The defect edges were debeveled with a #15 scalpel blade.  The primary defect was closed partially with a complex linear closure.  Given the location of the remaining defect, shape of the defect and the proximity to free margins a W plasty was deemed most appropriate for complete closure of the defect.  Using a sterile surgical marker, an appropriate advancement flap was drawn incorporating the defect and placing the expected incisions within the relaxed skin tension lines where possible.    The area thus outlined was incised deep to adipose tissue with a #15 scalpel blade.  The skin margins were undermined to an appropriate distance in all directions utilizing iris scissors. Paramedian Forehead Flap Text: A decision was made to reconstruct the defect utilizing an interpolation axial flap and a staged reconstruction.  A telfa template was made of the defect.  This telfa template was then used to outline the paramedian forehead pedicle flap.  The donor area for the pedicle flap was then injected with anesthesia.  The flap was excised through the skin and subcutaneous tissue down to the layer of the underlying musculature.  The pedicle flap was carefully excised within this deep plane to maintain its blood supply.  The edges of the donor site were undermined.   The donor site was closed in a primary fashion.  The pedicle was then rotated into position and sutured.  Once the tube was sutured into place, adequate blood supply was confirmed with blanching and refill.  The pedicle was then wrapped with xeroform gauze and dressed appropriately with a telfa and gauze bandage to ensure continued blood supply and protect the attached pedicle. Burow's Advancement Flap Text: The defect edges were debeveled with a #15 scalpel blade.  Given the location of the defect and the proximity to free margins a Burow's advancement flap was deemed most appropriate.  Using a sterile surgical marker, the appropriate advancement flap was drawn incorporating the defect and placing the expected incisions within the relaxed skin tension lines where possible.    The area thus outlined was incised deep to adipose tissue with a #15 scalpel blade.  The skin margins were undermined to an appropriate distance in all directions utilizing iris scissors. Double Island Pedicle Flap Text: The defect edges were debeveled with a #15 scalpel blade.  Given the location of the defect, shape of the defect and the proximity to free margins a double island pedicle advancement flap was deemed most appropriate.  Using a sterile surgical marker, an appropriate advancement flap was drawn incorporating the defect, outlining the appropriate donor tissue and placing the expected incisions within the relaxed skin tension lines where possible.    The area thus outlined was incised deep to adipose tissue with a #15 scalpel blade.  The skin margins were undermined to an appropriate distance in all directions around the primary defect and laterally outward around the island pedicle utilizing iris scissors.  There was minimal undermining beneath the pedicle flap. Peng Advancement Flap Text: The defect edges were debeveled with a #15 scalpel blade.  Given the location of the defect, shape of the defect and the proximity to free margins a Peng advancement flap was deemed most appropriate.  Using a sterile surgical marker, an appropriate advancement flap was drawn incorporating the defect and placing the expected incisions within the relaxed skin tension lines where possible. The area thus outlined was incised deep to adipose tissue with a #15 scalpel blade.  The skin margins were undermined to an appropriate distance in all directions utilizing iris scissors. Posterior Auricular Interpolation Flap Text: A decision was made to reconstruct the defect utilizing an interpolation axial flap and a staged reconstruction.  A telfa template was made of the defect.  This telfa template was then used to outline the posterior auricular interpolation flap.  The donor area for the pedicle flap was then injected with anesthesia.  The flap was excised through the skin and subcutaneous tissue down to the layer of the underlying musculature.  The pedicle flap was carefully excised within this deep plane to maintain its blood supply.  The edges of the donor site were undermined.   The donor site was closed in a primary fashion.  The pedicle was then rotated into position and sutured.  Once the tube was sutured into place, adequate blood supply was confirmed with blanching and refill.  The pedicle was then wrapped with xeroform gauze and dressed appropriately with a telfa and gauze bandage to ensure continued blood supply and protect the attached pedicle. Complex Repair And Double M Plasty Text: The defect edges were debeveled with a #15 scalpel blade.  The primary defect was closed partially with a complex linear closure.  Given the location of the remaining defect, shape of the defect and the proximity to free margins a double M plasty was deemed most appropriate for complete closure of the defect.  Using a sterile surgical marker, an appropriate advancement flap was drawn incorporating the defect and placing the expected incisions within the relaxed skin tension lines where possible.    The area thus outlined was incised deep to adipose tissue with a #15 scalpel blade.  The skin margins were undermined to an appropriate distance in all directions utilizing iris scissors. Hemigard Postcare Instructions: The HEMIGARD strips are to remain completely dry for at least 5-7 days. Distance Of Undermining In Cm (Required): 1.5 Advancement Flap (Double) Text: The defect edges were debeveled with a #15 scalpel blade.  Given the location of the defect and the proximity to free margins a double advancement flap was deemed most appropriate.  Using a sterile surgical marker, the appropriate advancement flaps were drawn incorporating the defect and placing the expected incisions within the relaxed skin tension lines where possible.    The area thus outlined was incised deep to adipose tissue with a #15 scalpel blade.  The skin margins were undermined to an appropriate distance in all directions utilizing iris scissors. Undermining Type: Entire Wound Alar Island Pedicle Flap Text: The defect edges were debeveled with a #15 scalpel blade.  Given the location of the defect, shape of the defect and the proximity to the alar rim an island pedicle advancement flap was deemed most appropriate.  Using a sterile surgical marker, an appropriate advancement flap was drawn incorporating the defect, outlining the appropriate donor tissue and placing the expected incisions within the nasal ala running parallel to the alar rim. The area thus outlined was incised with a #15 scalpel blade.  The skin margins were undermined minimally to an appropriate distance in all directions around the primary defect and laterally outward around the island pedicle utilizing iris scissors.  There was minimal undermining beneath the pedicle flap. Scalpel Size: 15 blade Epidermal Closure: vertical mattress Graft Donor Site Bandage (Optional-Leave Blank If You Don't Want In Note): Steri-strips and a pressure bandage were applied to the donor site. Mucosal Advancement Flap Text: Given the location of the defect, shape of the defect and the proximity to free margins a mucosal advancement flap was deemed most appropriate. Incisions were made with a 15 blade scalpel in the appropriate fashion along the cutaneous vermillion border and the mucosal lip. The remaining actinically damaged mucosal tissue was excised.  The mucosal advancement flap was then elevated to the gingival sulcus with care taken to preserve the neurovascular structures and advanced into the primary defect. Care was taken to ensure that precise realignment of the vermillion border was achieved. Pre-Excision Curettage Text (Leave Blank If You Do Not Want): Prior to drawing the surgical margin the visible lesion was removed with electrodesiccation and curettage to clearly define the lesion size. Complex Repair And Dorsal Nasal Flap Text: The defect edges were debeveled with a #15 scalpel blade.  The primary defect was closed partially with a complex linear closure.  Given the location of the remaining defect, shape of the defect and the proximity to free margins a dorsal nasal flap was deemed most appropriate for complete closure of the defect.  Using a sterile surgical marker, an appropriate flap was drawn incorporating the defect and placing the expected incisions within the relaxed skin tension lines where possible.    The area thus outlined was incised deep to adipose tissue with a #15 scalpel blade.  The skin margins were undermined to an appropriate distance in all directions utilizing iris scissors. Island Pedicle Flap-Requiring Vessel Identification Text: The defect edges were debeveled with a #15 scalpel blade.  Given the location of the defect, shape of the defect and the proximity to free margins an island pedicle advancement flap was deemed most appropriate.  Using a sterile surgical marker, an appropriate advancement flap was drawn, based on the axial vessel mentioned above, incorporating the defect, outlining the appropriate donor tissue and placing the expected incisions within the relaxed skin tension lines where possible.    The area thus outlined was incised deep to adipose tissue with a #15 scalpel blade.  The skin margins were undermined to an appropriate distance in all directions around the primary defect and laterally outward around the island pedicle utilizing iris scissors.  There was minimal undermining beneath the pedicle flap. Ftsg Text: The defect edges were debeveled with a #15 scalpel blade.  Given the location of the defect, shape of the defect and the proximity to free margins a full thickness skin graft was deemed most appropriate.  Using a sterile surgical marker, the primary defect shape was transferred to the donor site. The area thus outlined was incised deep to adipose tissue with a #15 scalpel blade.  The harvested graft was then trimmed of adipose tissue until only dermis and epidermis was left.  The skin margins of the secondary defect were undermined to an appropriate distance in all directions utilizing iris scissors.  The secondary defect was closed with interrupted buried subcutaneous sutures.  The skin edges were then re-apposed with running  sutures.  The skin graft was then placed in the primary defect and oriented appropriately. Hatchet Flap Text: The defect edges were debeveled with a #15 scalpel blade.  Given the location of the defect, shape of the defect and the proximity to free margins a hatchet flap was deemed most appropriate.  Using a sterile surgical marker, an appropriate hatchet flap was drawn incorporating the defect and placing the expected incisions within the relaxed skin tension lines where possible.    The area thus outlined was incised deep to adipose tissue with a #15 scalpel blade.  The skin margins were undermined to an appropriate distance in all directions utilizing iris scissors. Split-Thickness Skin Graft Text: The defect edges were debeveled with a #15 scalpel blade.  Given the location of the defect, shape of the defect and the proximity to free margins a split thickness skin graft was deemed most appropriate.  Using a sterile surgical marker, the primary defect shape was transferred to the donor site. The split thickness graft was then harvested.  The skin graft was then placed in the primary defect and oriented appropriately. Positioning (Leave Blank If You Do Not Want): The patient was placed in a comfortable position exposing the surgical site. Complex Repair And Z Plasty Text: The defect edges were debeveled with a #15 scalpel blade.  The primary defect was closed partially with a complex linear closure.  Given the location of the remaining defect, shape of the defect and the proximity to free margins a Z plasty was deemed most appropriate for complete closure of the defect.  Using a sterile surgical marker, an appropriate advancement flap was drawn incorporating the defect and placing the expected incisions within the relaxed skin tension lines where possible.    The area thus outlined was incised deep to adipose tissue with a #15 scalpel blade.  The skin margins were undermined to an appropriate distance in all directions utilizing iris scissors. Lip Wedge Excision Repair Text: Given the location of the defect and the proximity to free margins a full thickness wedge repair was deemed most appropriate.  Using a sterile surgical marker, the appropriate repair was drawn incorporating the defect and placing the expected incisions perpendicular to the vermillion border.  The vermillion border was also meticulously outlined to ensure appropriate reapproximation during the repair.  The area thus outlined was incised through and through with a #15 scalpel blade.  The muscularis and dermis were reaproximated with deep sutures following hemostasis. Care was taken to realign the vermillion border before proceeding with the superficial closure.  Once the vermillion was realigned the superfical and mucosal closure was finished. Detail Level: Detailed Chonodrocutaneous Helical Advancement Flap Text: The defect edges were debeveled with a #15 scalpel blade.  Given the location of the defect and the proximity to free margins a chondrocutaneous helical advancement flap was deemed most appropriate.  Using a sterile surgical marker, the appropriate advancement flap was drawn incorporating the defect and placing the expected incisions within the relaxed skin tension lines where possible.    The area thus outlined was incised deep to adipose tissue with a #15 scalpel blade.  The skin margins were undermined to an appropriate distance in all directions utilizing iris scissors. Complex Repair And Burow's Graft Text: The defect edges were debeveled with a #15 scalpel blade.  The primary defect was closed partially with a complex linear closure.  Given the location of the defect, shape of the defect, the proximity to free margins and the presence of a standing cone deformity a Burow's graft was deemed most appropriate to repair the remaining defect.  The graft was trimmed to fit the size of the remaining defect.  The graft was then placed in the primary defect, oriented appropriately, and sutured into place. Intermediate Repair Preamble Text (Leave Blank If You Do Not Want): Undermining was performed with blunt dissection. Rotation Flap Text: The defect edges were debeveled with a #15 scalpel blade.  Given the location of the defect, shape of the defect and the proximity to free margins a rotation flap was deemed most appropriate.  Using a sterile surgical marker, an appropriate rotation flap was drawn incorporating the defect and placing the expected incisions within the relaxed skin tension lines where possible.    The area thus outlined was incised deep to adipose tissue with a #15 scalpel blade.  The skin margins were undermined to an appropriate distance in all directions utilizing iris scissors. Information: Selecting Yes will display possible errors in your note based on the variables you have selected. This validation is only offered as a suggestion for you. PLEASE NOTE THAT THE VALIDATION TEXT WILL BE REMOVED WHEN YOU FINALIZE YOUR NOTE. IF YOU WANT TO FAX A PRELIMINARY NOTE YOU WILL NEED TO TOGGLE THIS TO 'NO' IF YOU DO NOT WANT IT IN YOUR FAXED NOTE. Keystone Flap Text: The defect edges were debeveled with a #15 scalpel blade.  Given the location of the defect, shape of the defect a keystone flap was deemed most appropriate.  Using a sterile surgical marker, an appropriate keystone flap was drawn incorporating the defect, outlining the appropriate donor tissue and placing the expected incisions within the relaxed skin tension lines where possible. The area thus outlined was incised deep to adipose tissue with a #15 scalpel blade.  The skin margins were undermined to an appropriate distance in all directions around the primary defect and laterally outward around the flap utilizing iris scissors. Complex Repair And Ftsg Text: The defect edges were debeveled with a #15 scalpel blade.  The primary defect was closed partially with a complex linear closure.  Given the location of the defect, shape of the defect and the proximity to free margins a full thickness skin graft was deemed most appropriate to repair the remaining defect.  The graft was trimmed to fit the size of the remaining defect.  The graft was then placed in the primary defect, oriented appropriately, and sutured into place. Complex Repair Preamble Text (Leave Blank If You Do Not Want): Extensive wide undermining was performed. Burow's Graft Text: The defect edges were debeveled with a #15 scalpel blade.  Given the location of the defect, shape of the defect, the proximity to free margins and the presence of a standing cone deformity a Burow's skin graft was deemed most appropriate. The standing cone was removed and this tissue was then trimmed to the shape of the primary defect. The adipose tissue was also removed until only dermis and epidermis were left.  The skin margins of the secondary defect were undermined to an appropriate distance in all directions utilizing iris scissors.  The secondary defect was closed with interrupted buried subcutaneous sutures.  The skin edges were then re-apposed with running  sutures.  The skin graft was then placed in the primary defect and oriented appropriately. Composite Graft Text: The defect edges were debeveled with a #15 scalpel blade.  Given the location of the defect, shape of the defect, the proximity to free margins and the fact the defect was full thickness a composite graft was deemed most appropriate.  The defect was outline and then transferred to the donor site.  A full thickness graft was then excised from the donor site. The graft was then placed in the primary defect, oriented appropriately and then sutured into place.  The secondary defect was then repaired using a primary closure. Star Wedge Flap Text: The defect edges were debeveled with a #15 scalpel blade.  Given the location of the defect, shape of the defect and the proximity to free margins a star wedge flap was deemed most appropriate.  Using a sterile surgical marker, an appropriate rotation flap was drawn incorporating the defect and placing the expected incisions within the relaxed skin tension lines where possible. The area thus outlined was incised deep to adipose tissue with a #15 scalpel blade.  The skin margins were undermined to an appropriate distance in all directions utilizing iris scissors. Cartilage Graft Text: The defect edges were debeveled with a #15 scalpel blade.  Given the location of the defect, shape of the defect, the fact the defect involved a full thickness cartilage defect a cartilage graft was deemed most appropriate.  An appropriate donor site was identified, cleansed, and anesthetized. The cartilage graft was then harvested and transferred to the recipient site, oriented appropriately and then sutured into place.  The secondary defect was then repaired using a primary closure. Spiral Flap Text: The defect edges were debeveled with a #15 scalpel blade.  Given the location of the defect, shape of the defect and the proximity to free margins a spiral flap was deemed most appropriate.  Using a sterile surgical marker, an appropriate rotation flap was drawn incorporating the defect and placing the expected incisions within the relaxed skin tension lines where possible. The area thus outlined was incised deep to adipose tissue with a #15 scalpel blade.  The skin margins were undermined to an appropriate distance in all directions utilizing iris scissors. Anesthesia Type: 1% lidocaine with epinephrine Skin Substitute Text: The defect edges were debeveled with a #15 scalpel blade.  Given the location of the defect, shape of the defect and the proximity to free margins a skin substitute graft was deemed most appropriate.  The graft material was trimmed to fit the size of the defect. The graft was then placed in the primary defect and oriented appropriately. Transposition Flap Text: The defect edges were debeveled with a #15 scalpel blade.  Given the location of the defect and the proximity to free margins a transposition flap was deemed most appropriate.  Using a sterile surgical marker, an appropriate transposition flap was drawn incorporating the defect.    The area thus outlined was incised deep to adipose tissue with a #15 scalpel blade.  The skin margins were undermined to an appropriate distance in all directions utilizing iris scissors. Epidermal Autograft Text: The defect edges were debeveled with a #15 scalpel blade.  Given the location of the defect, shape of the defect and the proximity to free margins an epidermal autograft was deemed most appropriate.  Using a sterile surgical marker, the primary defect shape was transferred to the donor site. The epidermal graft was then harvested.  The skin graft was then placed in the primary defect and oriented appropriately. Dermal Autograft Text: The defect edges were debeveled with a #15 scalpel blade.  Given the location of the defect, shape of the defect and the proximity to free margins a dermal autograft was deemed most appropriate.  Using a sterile surgical marker, the primary defect shape was transferred to the donor site. The area thus outlined was incised deep to adipose tissue with a #15 scalpel blade.  The harvested graft was then trimmed of adipose and epidermal tissue until only dermis was left.  The skin graft was then placed in the primary defect and oriented appropriately. Muscle Hinge Flap Text: The defect edges were debeveled with a #15 scalpel blade.  Given the size, depth and location of the defect and the proximity to free margins a muscle hinge flap was deemed most appropriate.  Using a sterile surgical marker, an appropriate hinge flap was drawn incorporating the defect. The area thus outlined was incised with a #15 scalpel blade.  The skin margins were undermined to an appropriate distance in all directions utilizing iris scissors. Debridement Text: The wound edges were debrided prior to proceeding with the closure to facilitate wound healing. Xenograft Text: The defect edges were debeveled with a #15 scalpel blade.  Given the location of the defect, shape of the defect and the proximity to free margins a xenograft was deemed most appropriate.  The graft was then trimmed to fit the size of the defect.  The graft was then placed in the primary defect and oriented appropriately. Tissue Cultured Epidermal Autograft Text: The defect edges were debeveled with a #15 scalpel blade.  Given the location of the defect, shape of the defect and the proximity to free margins a tissue cultured epidermal autograft was deemed most appropriate.  The graft was then trimmed to fit the size of the defect.  The graft was then placed in the primary defect and oriented appropriately. Melolabial Transposition Flap Text: The defect edges were debeveled with a #15 scalpel blade.  Given the location of the defect and the proximity to free margins a melolabial flap was deemed most appropriate.  Using a sterile surgical marker, an appropriate melolabial transposition flap was drawn incorporating the defect.    The area thus outlined was incised deep to adipose tissue with a #15 scalpel blade.  The skin margins were undermined to an appropriate distance in all directions utilizing iris scissors. Helical Rim Text: The closure involved the helical rim. Purse String (Intermediate) Text: Given the location of the defect and the characteristics of the surrounding skin a pursestring intermediate closure was deemed most appropriate.  Undermining was performed circumfirentially around the surgical defect.  A purstring suture was then placed and tightened. Nostril Rim Text: The closure involved the nostril rim. Partial Purse String (Intermediate) Text: Given the location of the defect and the characteristics of the surrounding skin an intermediate purse string closure was deemed most appropriate.  Undermining was performed circumferentially around the surgical defect.  A purse string suture was then placed and tightened. Wound tension of the circular defect prevented complete closure of the wound. Rhomboid Transposition Flap Text: The defect edges were debeveled with a #15 scalpel blade.  Given the location of the defect and the proximity to free margins a rhomboid transposition flap was deemed most appropriate.  Using a sterile surgical marker, an appropriate rhomboid flap was drawn incorporating the defect.    The area thus outlined was incised deep to adipose tissue with a #15 scalpel blade.  The skin margins were undermined to an appropriate distance in all directions utilizing iris scissors. Rhombic Flap Text: The defect edges were debeveled with a #15 scalpel blade.  Given the location of the defect and the proximity to free margins a rhombic flap was deemed most appropriate.  Using a sterile surgical marker, an appropriate rhombic flap was drawn incorporating the defect.    The area thus outlined was incised deep to adipose tissue with a #15 scalpel blade.  The skin margins were undermined to an appropriate distance in all directions utilizing iris scissors. Purse String (Simple) Text: Given the location of the defect and the characteristics of the surrounding skin a purse string simple closure was deemed most appropriate.  Undermining was performed circumferentially around the surgical defect.  A purse string suture was then placed and tightened. Vermilion Border Text: The closure involved the vermilion border. Deep Sutures: 4-0 Vicryl Retention Suture Bite Size: 3 mm Bi-Rhombic Flap Text: The defect edges were debeveled with a #15 scalpel blade.  Given the location of the defect and the proximity to free margins a bi-rhombic flap was deemed most appropriate.  Using a sterile surgical marker, an appropriate rhombic flap was drawn incorporating the defect. The area thus outlined was incised deep to adipose tissue with a #15 scalpel blade.  The skin margins were undermined to an appropriate distance in all directions utilizing iris scissors. Retention Suture Text: Retention sutures were placed to support the closure and prevent dehiscence. Path Notes (To The Dermatopathologist): Please check margins. Epidermal Closure Graft Donor Site (Optional): simple interrupted Partial Purse String (Simple) Text: Given the location of the defect and the characteristics of the surrounding skin a simple purse string closure was deemed most appropriate.  Undermining was performed circumferentially around the surgical defect.  A purse string suture was then placed and tightened. Wound tension of the circular defect prevented complete closure of the wound. Where Do You Want The Question To Include Opioid Counseling Located?: Case Summary Tab Suture Removal: 10 days Helical Rim Advancement Flap Text: The defect edges were debeveled with a #15 blade scalpel.  Given the location of the defect and the proximity to free margins (helical rim) a double helical rim advancement flap was deemed most appropriate.  Using a sterile surgical marker, the appropriate advancement flaps were drawn incorporating the defect and placing the expected incisions between the helical rim and antihelix where possible.  The area thus outlined was incised through and through with a #15 scalpel blade.  With a skin hook and iris scissors, the flaps were gently and sharply undermined and freed up. Consent was obtained from the patient. The risks and benefits to therapy were discussed in detail. Specifically, the risks of infection, scarring, bleeding, prolonged wound healing, incomplete removal, allergy to anesthesia, nerve injury and recurrence were addressed. Prior to the procedure, the treatment site was clearly identified and confirmed by the patient. All components of Universal Protocol/PAUSE Rule completed. Complex Repair And Double Advancement Flap Text: The defect edges were debeveled with a #15 scalpel blade.  The primary defect was closed partially with a complex linear closure.  Given the location of the remaining defect, shape of the defect and the proximity to free margins a double advancement flap was deemed most appropriate for complete closure of the defect.  Using a sterile surgical marker, an appropriate advancement flap was drawn incorporating the defect and placing the expected incisions within the relaxed skin tension lines where possible.    The area thus outlined was incised deep to adipose tissue with a #15 scalpel blade.  The skin margins were undermined to an appropriate distance in all directions utilizing iris scissors. Crescentic Advancement Flap Text: The defect edges were debeveled with a #15 scalpel blade.  Given the location of the defect and the proximity to free margins a crescentic advancement flap was deemed most appropriate.  Using a sterile surgical marker, the appropriate advancement flap was drawn incorporating the defect and placing the expected incisions within the relaxed skin tension lines where possible.    The area thus outlined was incised deep to adipose tissue with a #15 scalpel blade.  The skin margins were undermined to an appropriate distance in all directions utilizing iris scissors. Length To Time In Minutes Device Was In Place: 10 Complex Repair And Single Advancement Flap Text: The defect edges were debeveled with a #15 scalpel blade.  The primary defect was closed partially with a complex linear closure.  Given the location of the remaining defect, shape of the defect and the proximity to free margins a single advancement flap was deemed most appropriate for complete closure of the defect.  Using a sterile surgical marker, an appropriate advancement flap was drawn incorporating the defect and placing the expected incisions within the relaxed skin tension lines where possible.    The area thus outlined was incised deep to adipose tissue with a #15 scalpel blade.  The skin margins were undermined to an appropriate distance in all directions utilizing iris scissors. O-Z Plasty Text: The defect edges were debeveled with a #15 scalpel blade.  Given the location of the defect, shape of the defect and the proximity to free margins an O-Z plasty (double transposition flap) was deemed most appropriate.  Using a sterile surgical marker, the appropriate transposition flaps were drawn incorporating the defect and placing the expected incisions within the relaxed skin tension lines where possible.    The area thus outlined was incised deep to adipose tissue with a #15 scalpel blade.  The skin margins were undermined to an appropriate distance in all directions utilizing iris scissors.  Hemostasis was achieved with electrocautery.  The flaps were then transposed into place, one clockwise and the other counterclockwise, and anchored with interrupted buried subcutaneous sutures. Complex Repair And A-T Advancement Flap Text: The defect edges were debeveled with a #15 scalpel blade.  The primary defect was closed partially with a complex linear closure.  Given the location of the remaining defect, shape of the defect and the proximity to free margins an A-T advancement flap was deemed most appropriate for complete closure of the defect.  Using a sterile surgical marker, an appropriate advancement flap was drawn incorporating the defect and placing the expected incisions within the relaxed skin tension lines where possible.    The area thus outlined was incised deep to adipose tissue with a #15 scalpel blade.  The skin margins were undermined to an appropriate distance in all directions utilizing iris scissors. O-T Advancement Flap Text: The defect edges were debeveled with a #15 scalpel blade.  Given the location of the defect, shape of the defect and the proximity to free margins an O-T advancement flap was deemed most appropriate.  Using a sterile surgical marker, an appropriate advancement flap was drawn incorporating the defect and placing the expected incisions within the relaxed skin tension lines where possible.    The area thus outlined was incised deep to adipose tissue with a #15 scalpel blade.  The skin margins were undermined to an appropriate distance in all directions utilizing iris scissors. Ear Star Wedge Flap Text: The defect edges were debeveled with a #15 blade scalpel.  Given the location of the defect and the proximity to free margins (helical rim) an ear star wedge flap was deemed most appropriate.  Using a sterile surgical marker, the appropriate flap was drawn incorporating the defect and placing the expected incisions between the helical rim and antihelix where possible.  The area thus outlined was incised through and through with a #15 scalpel blade. A-T Advancement Flap Text: The defect edges were debeveled with a #15 scalpel blade.  Given the location of the defect, shape of the defect and the proximity to free margins an A-T advancement flap was deemed most appropriate.  Using a sterile surgical marker, an appropriate advancement flap was drawn incorporating the defect and placing the expected incisions within the relaxed skin tension lines where possible.    The area thus outlined was incised deep to adipose tissue with a #15 scalpel blade.  The skin margins were undermined to an appropriate distance in all directions utilizing iris scissors. Curvilinear Excision Additional Text (Leave Blank If You Do Not Want): The margin was drawn around the clinically apparent lesion.  A curvilinear shape was then drawn on the skin incorporating the lesion and margins.  Incisions were then made along these lines to the appropriate tissue plane and the lesion was extirpated. Complex Repair And Modified Advancement Flap Text: The defect edges were debeveled with a #15 scalpel blade.  The primary defect was closed partially with a complex linear closure.  Given the location of the remaining defect, shape of the defect and the proximity to free margins a modified advancement flap was deemed most appropriate for complete closure of the defect.  Using a sterile surgical marker, an appropriate advancement flap was drawn incorporating the defect and placing the expected incisions within the relaxed skin tension lines where possible.    The area thus outlined was incised deep to adipose tissue with a #15 scalpel blade.  The skin margins were undermined to an appropriate distance in all directions utilizing iris scissors. Fusiform Excision Additional Text (Leave Blank If You Do Not Want): The margin was drawn around the clinically apparent lesion.  A fusiform shape was then drawn on the skin incorporating the lesion and margins.  Incisions were then made along these lines to the appropriate tissue plane and the lesion was extirpated. Wound Care: Mupirocin O-T Plasty Text: The defect edges were debeveled with a #15 scalpel blade.  Given the location of the defect, shape of the defect and the proximity to free margins an O-T plasty was deemed most appropriate.  Using a sterile surgical marker, an appropriate O-T plasty was drawn incorporating the defect and placing the expected incisions within the relaxed skin tension lines where possible.    The area thus outlined was incised deep to adipose tissue with a #15 scalpel blade.  The skin margins were undermined to an appropriate distance in all directions utilizing iris scissors. Bilateral Helical Rim Advancement Flap Text: The defect edges were debeveled with a #15 blade scalpel.  Given the location of the defect and the proximity to free margins (helical rim) a bilateral helical rim advancement flap was deemed most appropriate.  Using a sterile surgical marker, the appropriate advancement flaps were drawn incorporating the defect and placing the expected incisions between the helical rim and antihelix where possible.  The area thus outlined was incised through and through with a #15 scalpel blade.  With a skin hook and iris scissors, the flaps were gently and sharply undermined and freed up.

## 2025-01-13 NOTE — CONSULTS
Attending Physician Statement:    Chief Complaint:   Chief Complaint   Patient presents with    Fatigue     Pt has stage 3 pancreatic cancer and has been having cold and flu symptoms. Family called [facility] and were told to just watch him at home but pt has gotten increasingly weak and is now unable to ambulate.        I have examined the patient and performed the key aspects of physical exam, reviewed the record (including all pertinent and new radiology images and laboratory findings), and discussed the case with the surgical team.  I agree with the assessment and plan with the following additions, corrections, and changes. 14pt review of symptoms completed and negative except as mentioned.    Patient known to [facility] service. Had whipple for pancreatic ca with Portal vein with my partner Dr. Santoyo. Has been on adjuvant chemo. Developed bacteremia. COVID positive. CT doesn't show acute abdominal source. ID recommending port removal. Will plan for removal in OR when schedule allows.     60 Minutes of which greater than 50% was spent counseling or coordinating his care.      Yary Gardiner MD  01/02/25  6:48 PM        HEPATOBILIARY AND PANCREATIC SURGERY  CONSULT NOTE    Patient's Name/Date of Birth: Michael Garcia / 1945    Date: January 1, 2025     PCP: Moshe Moran DO     Chief Complaint:   Chief Complaint   Patient presents with    Fatigue     Pt has stage 3 pancreatic cancer and has been having cold and flu symptoms. Family called [facility] and were told to just watch him at home but pt has gotten increasingly weak and is now unable to ambulate.        Physician Consulted: Dr. Gardiner  Reason for Consult: eval for port infection    HPI:  Michael Garcia is a 79 y.o. male with history of stage III pancreatic adenocarcinoma s/p whipple w/ PV recon secondary to duo obstruction 8/14, on chemo, with R IJ port placed 9/17 by Dr. Santoyo who presented to the ED for increasing fatigue and 
[facility] CONSULT NOTE    Reason for Consult: COVID-19  Requested by: Cammie Alberts DO     Chief complaint: Fatigue    History Obtained From: Patient and EMR     HISTORY OFPRESENT ILLNESS              The patient is a 79 y.o. male with history of DM, hypertension, stage III pancreatic adenocarcinoma s/p Whipple procedure with portal vein reconstruction due to duodenal obstruction in 08/2024 on chemotherapy with gemcitabine and abraxane, presented on 12/31 with fatigue accompanied by cold and flu-like symptoms since 12/26 found to test positive for SARS-CoV-2 PCR.  On admission, he was afebrile and hemodynamically stable with leukocytopenia of 1800.  Procalcitonin level was elevated at 3.8 ng/mL. Chest x-ray showed no acute process.  CT abdomen and pelvis showed new irregular area of decreased density in the right lobe of the liver measuring 3 x 1.3 x 3 cm probably new metastatic lesion, new low-density nodule in the lateral posterior right lobe of the liver measuring 1.2 x 0.9 cm probably additional metastatic lesion, stable mild compressive and patchy atelectasis of posterior lower lobes with tiny bilateral pleural effusions.  He received a dose of piperacillin/tazobactam and vancomycin on admission.  ID service was subsequently consulted for further recommendations.    Past Medical History  Past Medical History:   Diagnosis Date    Deaf, bilateral     Diabetes (HCC)     Hypertension     Osteoarthritis     Primary osteoarthritis of left knee 10/18/2017    Prostate cancer (HCC) 03/27/2024       Current Facility-Administered Medications   Medication Dose Route Frequency Provider Last Rate Last Admin    sodium chloride flush 0.9 % injection 5-40 mL  5-40 mL IntraVENous 2 times per day Wil Ballesteros MD        sodium chloride flush 0.9 % injection 5-40 mL  5-40 mL IntraVENous PRN Wil Ballesteros MD        0.9 % sodium chloride infusion   IntraVENous PRN Wil Ballesteros MD        benzocaine-menthol (CEPACOL SORE THROAT) 
Vascular Surgery Consultation Note    Reason for Consult:  IVC filter    HPI :    This is a 79 y.o. male with history of stage 3 pancreatic adenocarcinoma s/p Whipple procedure 8/24 who is admitted to the hospital with bacteremia. Vascular surgery was consulted for IVC filter. Patient is chronically anemic. He underwent DVT US for LLE swelling which revealed occlusive DVT in left mid femoral vein extending into popliteal vein. He was started on heparin gtt yesterday. He underwent mediport removal this AM. His HgB early afternoon was found to be 6.8 from 8.4. There were no issues with bleeding during the surgery. He denies melena, hematochezia, hematuria, and hematemesis. His heparin gtt is currently held as he is receiving a unit of blood.     ROS : Negative if blank [], Positive if [x]  General Vascular   [] Fevers [] Claudication (Blocks)   [] Chills [] Rest Pain   [] Weight Loss [] Tissue Loss   [] Chest Pain [] Clotting Disorder    [] SOB at rest [x] Leg Swelling   [] SOB with exertion [] DVT/PE      [] Nausea    [] Vomitting [] Stroke/TIA   [] Abdominal Pain [] Focal weakness   [] Melena [] Slurred Speech   [] Hematochezia [] Vision Changes   [] Hematuria    [] Dysuria [x] Hx of Central Catheters   [] Wears Glasses/Contacts  [] Dialysis and If so date initiated   [] Blindness     [] Hand Dominant   [] Difficulty swallowing        Past Medical History:   Diagnosis Date    Deaf, bilateral     Diabetes (HCC)     Hypertension     Osteoarthritis     Primary osteoarthritis of left knee 10/18/2017    Prostate cancer (HCC) 03/27/2024        Past Surgical History:   Procedure Laterality Date    CARPAL TUNNEL RELEASE      ERCP N/A 8/2/2024    FAILED ENDOSCOPIC RETROGRADE CHOLANGIOPANCREATOGRAPHY performed by Demetrius Carlos DO at [facility] ENDOSCOPY    IR BILIARY DRAIN INT AND EXT  8/6/2024    IR BILIARY DRAIN INT AND EXT 8/6/2024 Sonny, Rodríguez Greer MD [facility] SPECIAL PROCEDURES    PANCREAS SURGERY N/A 8/14/2024    WHIPPLE 
Take 1 tablet by mouth daily (Patient not taking: Reported on 9/19/2024)    Allergies:    No known allergies    Social History:    reports that he has never smoked. He has never used smokeless tobacco. He reports that he does not drink alcohol and does not use drugs.    Family History:   Non-contributory to this Urological problem  family history includes Heart Attack in his brother; Heart Disease in his paternal cousin, sister, and sister.    Review of Systems:  Constitutional: No fever or chills   Respiratory: negative for cough and hemoptysis  Cardiovascular: negative for chest pain and dyspnea  Gastrointestinal: negative for abdominal pain, diarrhea, nausea and vomiting   Derm: negative for rash and skin lesion(s)  Neurological: negative for seizures and tremors  Musculoskeletal: Negative    Psychiatric: Negative   : As above in the HPI, otherwise negative  All other reviews are negative    Physical Exam:     Vitals:  /83   Pulse 92   Temp 97.6 °F (36.4 °C) (Temporal)   Resp 16   Ht 1.88 m (6' 2\")   Wt 83.1 kg (183 lb 3.2 oz)   SpO2 99%   BMI 23.52 kg/m²     General:  Awake, alert, oriented X 3. No apparent distress.  HEENT:  Normocephalic, atraumatic.  Lungs:  Respirations symmetric and non-labored.  Abdomen:  soft, nontender, no masses  Extremities:  No clubbing, cyanosis, or edema  Skin:  Warm and dry, no open lesions or rashes  Neuro: There are no motor or sensory deficits in the 4 quadrant extremities   Rectal: deferred  Genitourinary:  moderate scrotal swelling. No abscess. Non tender on exam     Labs:     Recent Labs     01/11/25  0600 01/12/25  0545 01/13/25  0531   WBC 11.7* 9.1 10.4   RBC 2.69* 2.60* 2.59*   HGB 8.3* 8.1* 8.1*   HCT 26.5* 25.6* 25.9*   MCV 98.5 98.5 100.0*   MCH 30.9 31.2 31.3   MCHC 31.3* 31.6* 31.3*   RDW 18.7* 18.8* 18.6*    400 372   MPV 9.8 9.5 9.4         Recent Labs     01/11/25  0600 01/12/25  0545 01/13/25  0531   CREATININE 0.5* 0.5* 0.5*       Lab 
promethazine (PHENERGAN) injection 6.25 mg  6.25 mg IntraMUSCular Q6H PRN Yary Li MD        sodium chloride flush 0.9 % injection 5-40 mL  5-40 mL IntraVENous 2 times per day Eliza Dietrich DO   10 mL at 01/08/25 0927    0.9 % sodium chloride infusion   IntraVENous PRN Eliza Dietrich DO        benzocaine-menthol (CEPACOL SORE THROAT) lozenge 1 lozenge  1 lozenge Oral Q2H PRN Eliza Dietrich DO        benzonatate (TESSALON) capsule 100 mg  100 mg Oral TID PRN Eliza Dietrich DO   100 mg at 01/04/25 2140    calcium carbonate (TUMS) chewable tablet 500 mg  500 mg Oral TID PRN Eliza Dietrich DO        [Held by provider] hydrALAZINE (APRESOLINE) injection 10 mg  10 mg IntraVENous Q6H PRN Eliza Dietrich DO        melatonin tablet 3 mg  3 mg Oral Nightly PRN Eliza Dietrich DO   3 mg at 01/04/25 2140    polyvinyl alcohol (LIQUIFILM TEARS) 1.4 % ophthalmic solution 1 drop  1 drop Both Eyes PRN Eliza Dietrich DO        sodium chloride (OCEAN, BABY AYR) 0.65 % nasal spray 1 spray  1 spray Each Nostril PRN Eliza Dietrich DO        sodium chloride flush 0.9 % injection 5-40 mL  5-40 mL IntraVENous PRN Eliza Dietrich DO   10 mL at 01/05/25 0231    potassium chloride (KLOR-CON M) extended release tablet 40 mEq  40 mEq Oral PRN Eliza Dietrich DO        Or    potassium bicarb-citric acid (EFFER-K) effervescent tablet 40 mEq  40 mEq Oral PRN Eliza Dietrich DO        Or    potassium chloride 10 mEq/100 mL IVPB (Peripheral Line)  10 mEq IntraVENous PRN Eliza Dietrich DO        magnesium sulfate 2000 mg in 50 mL IVPB premix  2,000 mg IntraVENous PRN Eliza Dietrich DO   Stopped at 01/03/25 1318    ondansetron (ZOFRAN-ODT) disintegrating tablet 4 mg  4 mg Oral Q8H PRN Eliza Dietrich DO        Or    ondansetron (ZOFRAN) injection 4 mg  4 mg IntraVENous Q6H PRN Eliza Dietrich DO   4 mg at 01/06/25 9967    polyethylene glycol (GLYCOLAX) packet 17 g  17 g Oral Daily PRN Eliza Dietrich DO        acetaminophen (TYLENOL) tablet 650 
CHEST PORTABLE    Result Date: 12/31/2024  EXAMINATION: ONE XRAY VIEW OF THE CHEST 12/31/2024 1:04 pm COMPARISON: September 17, 2024 HISTORY: ORDERING SYSTEM PROVIDED HISTORY: r/o pna TECHNOLOGIST PROVIDED HISTORY: Reason for exam:->r/o pna FINDINGS: The lungs are without acute focal process.  There is no effusion or pneumothorax. The cardiomediastinal silhouette is without acute process. The osseous structures are without acute process.  Infusion port present.     No acute process.           Echo: PAUL pending      Labs:  Lab Results   Component Value Date/Time    WBC 5.4 01/02/2025 10:15 AM    RBC 3.04 01/02/2025 10:15 AM    HGB 9.9 01/02/2025 10:15 AM    HCT 31.4 01/02/2025 10:15 AM    .3 01/02/2025 10:15 AM    MCH 32.6 01/02/2025 10:15 AM    MCHC 31.5 01/02/2025 10:15 AM    RDW 15.6 01/02/2025 10:15 AM     01/02/2025 10:15 AM    MPV 10.3 01/02/2025 10:15 AM     Lab Results   Component Value Date/Time     01/02/2025 10:15 AM    K 3.7 01/02/2025 10:15 AM    K 4.2 03/28/2021 06:18 AM     01/02/2025 10:15 AM    CO2 17 01/02/2025 10:15 AM    BUN 9 01/02/2025 10:15 AM    CREATININE 0.4 01/02/2025 10:15 AM    CALCIUM 7.8 01/02/2025 10:15 AM    GFRAA >60 09/01/2022 06:09 PM    LABGLOM >90 01/02/2025 10:15 AM    LABGLOM >60 05/10/2023 08:51 AM     Lab Results   Component Value Date/Time    PROTIME 13.3 08/06/2024 06:08 AM    INR 1.2 08/06/2024 06:08 AM     Recent Labs     12/31/24  1212   PROBNP 293     No results for input(s): \"TROPONINI\" in the last 72 hours.  Recent Labs     12/31/24  1212   PROCAL 3.80*     This SmartLink has not been configured with any valid records.       Micro:  No results for input(s): \"CULTRESP\" in the last 72 hours.  No results for input(s): \"LABGRAM\" in the last 72 hours.  No results for input(s): \"LEGUR\" in the last 72 hours.  No results for input(s): \"STREPNEUMAGU\" in the last 72 hours.  No results for input(s): \"LP1UAG\" in the last 72 hours.      Assessment:  Acute 
Hemorrhage: There is no evidence of acute intracranial hemorrhage. ECASS hemorrhagic transformation score: Not Applicable Mass Lesion / Mass Effect: There is no evidence of an intracranial mass or extraaxial fluid collection.  No abnormal parenchymal enhancement is appreciated.  No significant mass effect. Chronic change: Patchy foci of  low attenuation coefficient are present within the supratentorial white matter which is a nonspecific finding but likely represents moderate microvascular ischemia. Parenchyma: There is mild generalized volume loss.  The brain parenchyma is otherwise within normal limits for age. Ventricles: The ventricles are within normal limits of size and configuration for age. Paranasal sinuses and skull base: There is moderate nodular mucosal thickening in the left sphenoid sinus.   The skull base and imaged soft tissues are unremarkable.    IMPRESSION: No evidence of metastatic disease. Moderate white matter changes in keeping with small vessel ischemic disease. Transcribe Date/Time: Dec  8 2024  9:31A Dictated by: MORGAN BYRNE MD This examination was interpreted and the report reviewed and electronically signed by: MORGAN BYRNE MD on Dec  8 2024  9:34AM  EST Thank you for allowing us to participate in the care of your patient. Should there be any questions regarding this interpretation, please call [phone]. If you are unable to reach us at the number above, please feel free to contact [facility] at [phone].      EKG:   Sinus tachycardia  Low voltage QRS  Nonspecific T wave abnormality    Resident's Assessment and Plan     Michael Garcia is a 79 y.o. male    Neurology:     Mentation at baseline    Bilateral deafness  Needs sign language    Cardiology:     Septic shock secondary to polymicrobial bacteremia likely secondary to infected Mediport vs left knee replacement and COVID 19  On phenylephrine drip, wean as tolerated  ID following, appreciate 
Abdomen is soft and not distended. No tenderness.    EXTREMITIES: There is 2+ pedal edema.   MUSCULOSKELETAL: No joint swelling. Normal range of motion    SKIN: Skin is moist. No ulcers or lesions.   NEUROLOGICAL: No evidence of obvious neurological deficits.    PSYCHOLOGICAL: Patient is in normal mood.        Pertinent cardiac studies:    -TTE 2/10/2023:  Summary   Technically adequate study.   Mild tricuspid regurgitation.   RVSP 33 mmHg.   EF 55%.      -ECG 1/1/2025:  Sinus rhythm, low voltage QRS, septal Q waves      -Telemetry: SVT runs          Impression/Plan:    79-year-old male past medical history of deafness, CAD (on CT chest 2022), DM2, HTN, HLD, pancreatic head cancer stage III (s/p Whipple 8/2024, on chemo starting 10/2024), prostate cancer (s/p radiation 2023) who presented to the hospital with chronic cough and weakness and was found to have bacteremia (multi organisms) along with hypotension.  Cardiology consulted for SVT management.     SVT:  Obtain TTE  SVT likely in the setting of acute illness  Obtain PAUL to rule out endocarditis in the setting of bacteremia  Patient with metastatic pancreatic cancer with mets to liver  Consider beta-blockers once acute illness improves and if BP is adequate  Bacteremia:  Multiple organisms  Follow-up PAUL  Antibiotics per ID  Metastatic pancreatic cancer      -----------------------------------------------------------------------------------------------------------------------------------------------  CARDIOLOGY ATTENDING ATTESTATION  I spent > 51% of the total time involved with completing the encounter. The total time included the following:  Independently interviewing the patient (HPI, ROS, PMH, PSH, FMH, SH, allergies, and medications)  Independently performing a medically appropriate examination  Reviewing the above documentation completed by the JOHANNA  Ordering medications, tests, and/or procedures  Formulating the assessment/plan and reviewing the rationale

## 2025-01-13 NOTE — PROGRESS NOTES
Lei Angulo M.D.,[facility]  Seven Balderas D.O., RY., [facility]  Eduardo Reym M.D.  Ifrah Murphy M.D.   Deangelo Piedra D.O.  Moshe Martin M.D.         Daily Pulmonary Progress Note    Patient:  Michael Garcia 79 y.o. male MRN: [id number]            Synopsis     We are following patient for covid 19 infection    \"CC\"  shortness of breath     Code status: FULL       Subjective      Patient was seen and examined. On RA. Complaining of feeling cold. IV heparin infusion, small PE on CTA chest. BP improved, off pressors.  Brennon weaned off.      Review of Systems:  Constitutional: Denies fever, weight loss, night sweats, and fatigue  Skin: Denies pigmentation, dark lesions, and rashes   HEENT: Denies hearing loss, tinnitus, ear drainage, epistaxis, sore throat, and hoarseness.  Cardiovascular: Denies palpitations, chest pain, and chest pressure.  Respiratory: Denies cough, dyspnea at rest, hemoptysis, apnea, and choking.  Gastrointestinal: Denies nausea, vomiting, poor appetite, diarrhea, heartburn or reflux  Genitourinary: Denies dysuria, frequency, urgency or hematuria  Musculoskeletal: Denies myalgias, muscle weakness, and bone pain  Neurological: Denies dizziness, vertigo, headache, and focal weakness  Psychological: Denies anxiety and depression  Endocrine: Denies heat intolerance and cold intolerance  Hematopoietic/Lymphatic: Denies bleeding problems and blood transfusions    24-hour events:  None     Objective   OBJECTIVE:   /78   Pulse 93   Temp 98 °F (36.7 °C) (Oral)   Resp 22   Ht 1.88 m (6' 2\")   Wt 83.1 kg (183 lb 3.2 oz)   SpO2 97%   BMI 23.52 kg/m²   SpO2 Readings from Last 1 Encounters:   01/06/25 97%        I/O:    Intake/Output Summary (Last 24 hours) at 1/6/2025 1521  Last data filed at 1/6/2025 0647  Gross per 24 hour   Intake 1397.18 ml   Output 1700 ml   Net -302.82 ml                      CURRENT MEDS :  Scheduled Meds:   insulin lispro  0-4 Units SubCUTAneous 4x Daily 
        Hospitalist Progress Note      Chief Complaint:  fatigue and cold like symptoms     Admission Date: 2024     SYNOPSIS:Michael presents to the ER with cold and flu symptoms.  He has become increasingly weak and is unable to ambulate.  He does have stage III pancreatic cancer.  He had a Whipple procedure previously. Upon presentation to the ER, routine labwork was performed which revealed Pro-Nicolas 3.8, troponin 23, 23, alk phos 159, bilirubin 1.7, WBC 1.8, hemoglobin 9.4.  Imaging results are as outlined below in the Imaging section of this note.     Upon arrival to the ER, patient was 98/59, tachycardic at 140.  The patient received vancomycin, Zosyn, IVF in the emergency room and was admitted to [facility].     SUBJECTIVE:    Patient endorsed no complaints today.   Hearing impaired but we established communication in writing (provided him with paper and a pen) and reading lips. There was no family at bedside   Denies pains, shortness of breath  His legs have been swollen for a while   Records reviewed.     Stable overnight. No overnight issues reported     Temp (24hrs), Av.9 °F (37.2 °C), Min:97.9 °F (36.6 °C), Max:100.1 °F (37.8 °C)    DIET: ADULT DIET; Regular  CODE: Full Code    Intake/Output Summary (Last 24 hours) at 2025 0740  Last data filed at 2024  Gross per 24 hour   Intake 538.29 ml   Output 700 ml   Net -161.71 ml       OBJECTIVE:    BP 98/60   Pulse 97   Temp 97.9 °F (36.6 °C) (Axillary)   Resp 21   Wt 83.1 kg (183 lb 3.2 oz)   SpO2 98%   BMI 23.52 kg/m²     General appearance: No apparent distress, appears stated age and cooperative. Appears ill  HEENT:  Normocephalic. Conjunctivae/corneas clear. Moist mucosa   Neck: Supple. No jugular venous distention. Thyroid not visible, non tender   Respiratory: Normal respiratory effort. Clear to auscultation bilaterally. No stridor/wheezing/rhonchi/crackles   Cardiovascular: Regular heart beats, normal S1-S2. No 
       [facility] Hospitalist Progress Note    Admitting Date and Time: 12/31/2024 11:52 AM  Admit Dx: Bacteremia [R78.81]  Tachycardia [R00.0]  Septicemia (HCC) [A41.9]  Lesion of liver [K76.9]  COVID-19 [U07.1]    Synopsis: The patient is a 79-year-old male with past medical history of pancreatic cancer stage III status post Whipple's on active chemotherapy, prostate cancer status post XRT who was admitted to the MICU for concerns for septic shock.  He has a Mediport that was suspicious for infection and was removed on 01/03/2025.  Also had bacteremia with multi organisms.  He has been weaned off pressor support.  PAUL was done with no vegetations found.  On 1/7 patient went into SVT with given adenosine and started on amiodarone drip.    Subjective:  Patient is being followed for Bacteremia [R78.81]  Tachycardia [R00.0]  Septicemia (HCC) [A41.9]  Lesion of liver [K76.9]  COVID-19 [U07.1]   Patient seen and examined at bedside  Resting comfortably  No new issues   Mild abdominal pain and constipation     ROS: denies fever, chills, cp, sob, n/v, HA unless stated above.      [START ON 1/14/2025] apixaban  5 mg Oral BID    ampicillin IV  2,000 mg IntraVENous 4 times per day    cefTRIAXone (ROCEPHIN) IV  2,000 mg IntraVENous Q24H    metoprolol succinate  50 mg Oral Daily    sodium chloride flush  5-40 mL IntraVENous 2 times per day    lidocaine 1 % injection  50 mg IntraDERmal Once    apixaban  10 mg Oral BID    insulin lispro  0-4 Units SubCUTAneous 4x Daily AC & HS    pantoprazole (PROTONIX) 40 mg in sodium chloride (PF) 0.9 % 10 mL injection  40 mg IntraVENous Daily    finasteride  5 mg Oral Daily    montelukast  10 mg Oral Nightly    sennosides-docusate sodium  2 tablet Oral Daily    tamsulosin  0.4 mg Oral Daily    empagliflozin  10 mg Oral Daily     sodium chloride flush, 5-40 mL, PRN  sodium chloride, , PRN  promethazine, 6.25 mg, Q6H PRN  benzocaine-menthol, 1 lozenge, Q2H PRN  benzonatate, 100 mg, TID 
       [facility] Hospitalist Progress Note    Admitting Date and Time: 12/31/2024 11:52 AM  Admit Dx: Bacteremia [R78.81]  Tachycardia [R00.0]  Septicemia (HCC) [A41.9]  Lesion of liver [K76.9]  COVID-19 [U07.1]    Synopsis: The patient is a 79-year-old male with past medical history of pancreatic cancer stage III status post Whipple's on active chemotherapy, prostate cancer status post XRT who was admitted to the MICU for concerns for septic shock.  He has a Mediport that was suspicious for infection and was removed on 01/03/2025.  Also had bacteremia with multi organisms.  He has been weaned off pressor support.  PAUL was done with no vegetations found.  On 1/7 patient went into SVT with given adenosine and started on amiodarone drip. He was transferred out of the ICU on 1/10/2025.    Subjective:  Patient is being followed for Bacteremia [R78.81]  Tachycardia [R00.0]  Septicemia (HCC) [A41.9]  Lesion of liver [K76.9]  COVID-19 [U07.1]   Patient seen and examined at bedside  Vomited once this morning, mild nausea       ROS: denies fever, chills, cp, sob, n/v, HA unless stated above.      polyethylene glycol  17 g Oral Daily    bumetanide  1 mg IntraVENous Once    amoxicillin-clavulanate  1 tablet Oral 2 times per day    [START ON 1/14/2025] apixaban  5 mg Oral BID    metoprolol succinate  50 mg Oral Daily    sodium chloride flush  5-40 mL IntraVENous 2 times per day    lidocaine 1 % injection  50 mg IntraDERmal Once    apixaban  10 mg Oral BID    insulin lispro  0-4 Units SubCUTAneous 4x Daily AC & HS    pantoprazole (PROTONIX) 40 mg in sodium chloride (PF) 0.9 % 10 mL injection  40 mg IntraVENous Daily    finasteride  5 mg Oral Daily    montelukast  10 mg Oral Nightly    sennosides-docusate sodium  2 tablet Oral Daily    tamsulosin  0.4 mg Oral Daily    empagliflozin  10 mg Oral Daily     sodium chloride flush, 5-40 mL, PRN  sodium chloride, , PRN  promethazine, 6.25 mg, Q6H PRN  benzocaine-menthol, 1 lozenge, 
       [facility] Hospitalist Progress Note    Admitting Date and Time: 12/31/2024 11:52 AM  Admit Dx: Bacteremia [R78.81]  Tachycardia [R00.0]  Septicemia (HCC) [A41.9]  Lesion of liver [K76.9]  COVID-19 [U07.1]    Synopsis: The patient is a 79-year-old male with past medical history of pancreatic cancer stage III status post Whipple's on active chemotherapy, prostate cancer status post XRT who was admitted to the MICU for concerns for septic shock.  He has a Mediport that was suspicious for infection and was removed on 01/03/2025.  Also had bacteremia with multi organisms.  He has been weaned off pressor support.  PAUL was done with no vegetations found.  On 1/7 patient went into SVT with given adenosine and started on amiodarone drip. He was transferred out of the ICU on 1/10/2025    Subjective:  Patient is being followed for Bacteremia [R78.81]  Tachycardia [R00.0]  Septicemia (HCC) [A41.9]  Lesion of liver [K76.9]  COVID-19 [U07.1]   Patient seen and examined at bedside  No new issues       ROS: denies fever, chills, cp, sob, n/v, HA unless stated above.      [START ON 1/11/2025] amoxicillin-clavulanate  1 tablet Oral 2 times per day    [START ON 1/14/2025] apixaban  5 mg Oral BID    ampicillin IV  2,000 mg IntraVENous 4 times per day    metoprolol succinate  50 mg Oral Daily    sodium chloride flush  5-40 mL IntraVENous 2 times per day    lidocaine 1 % injection  50 mg IntraDERmal Once    apixaban  10 mg Oral BID    insulin lispro  0-4 Units SubCUTAneous 4x Daily AC & HS    pantoprazole (PROTONIX) 40 mg in sodium chloride (PF) 0.9 % 10 mL injection  40 mg IntraVENous Daily    finasteride  5 mg Oral Daily    montelukast  10 mg Oral Nightly    sennosides-docusate sodium  2 tablet Oral Daily    tamsulosin  0.4 mg Oral Daily    empagliflozin  10 mg Oral Daily     sodium chloride flush, 5-40 mL, PRN  sodium chloride, , PRN  promethazine, 6.25 mg, Q6H PRN  benzocaine-menthol, 1 lozenge, Q2H PRN  benzonatate, 100 
       [facility] Hospitalist Progress Note    Admitting Date and Time: 12/31/2024 11:52 AM  Admit Dx: Bacteremia [R78.81]  Tachycardia [R00.0]  Septicemia (HCC) [A41.9]  Lesion of liver [K76.9]  COVID-19 [U07.1]    Subjective:  Patient is being followed for Bacteremia [R78.81]  Tachycardia [R00.0]  Septicemia (HCC) [A41.9]  Lesion of liver [K76.9]  COVID-19 [U07.1]   Pt feels chills    ROS: denies fever, chills, cp, sob, n/v, HA unless stated above.      [id number]        cefTRIAXone (ROCEPHIN) IV  2,000 mg IntraVENous Q12H    sodium chloride flush  5-40 mL IntraVENous 2 times per day    sodium chloride flush  5-40 mL IntraVENous 2 times per day    finasteride  5 mg Oral Daily    glipiZIDE  10 mg Oral BID AC    metoprolol succinate  25 mg Oral Daily    montelukast  10 mg Oral Nightly    sennosides-docusate sodium  2 tablet Oral Daily    tamsulosin  0.4 mg Oral Daily    empagliflozin  10 mg Oral Daily    remdesivir 100 mg in sodium chloride 0.9 % 250 mL IVPB  100 mg IntraVENous Q24H    ampicillin IV  2,000 mg IntraVENous 6 times per day     heparin (porcine), 80 Units/kg, PRN  heparin (porcine), 40 Units/kg, PRN  [id number], ,   sodium chloride flush, 5-40 mL, PRN  sodium chloride, , PRN  benzocaine-menthol, 1 lozenge, Q2H PRN  benzonatate, 100 mg, TID PRN  calcium carbonate, 500 mg, TID PRN  hydrALAZINE, 10 mg, Q6H PRN  melatonin, 3 mg, Nightly PRN  polyvinyl alcohol, 1 drop, PRN  sodium chloride, 1 spray, PRN  sodium chloride flush, 5-40 mL, PRN  sodium chloride, , PRN  potassium chloride, 40 mEq, PRN   Or  potassium alternative oral replacement, 40 mEq, PRN   Or  potassium chloride, 10 mEq, PRN  magnesium sulfate, 2,000 mg, PRN  ondansetron, 4 mg, Q8H PRN   Or  ondansetron, 4 mg, Q6H PRN  polyethylene glycol, 17 g, Daily PRN  acetaminophen, 650 mg, Q6H PRN   Or  acetaminophen, 650 mg, Q6H PRN  glucose, 4 tablet, PRN  dextrose bolus, 125 mL, PRN   Or  dextrose bolus, 250 mL, PRN  glucagon (rDNA), 1 mg, 
       [facility] Hospitalist Progress Note    Admitting Date and Time: 12/31/2024 11:52 AM  Admit Dx: Bacteremia [R78.81]  Tachycardia [R00.0]  Septicemia (HCC) [A41.9]  Lesion of liver [K76.9]  COVID-19 [U07.1]    Synopsis: The patient is a 79-year-old male with past medical history of pancreatic cancer stage III status post Whipple's on active chemotherapy, prostate cancer status post XRT who was admitted to the MICU for concerns for septic shock.  He has a Mediport that was suspicious for infection and was removed on 01/03/2025.  Also had bacteremia with multi organisms.  He has been weaned off pressor support.  TTE is currently pending.    Subjective:  Patient is being followed for Bacteremia [R78.81]  Tachycardia [R00.0]  Septicemia (HCC) [A41.9]  Lesion of liver [K76.9]  COVID-19 [U07.1]   Patient seen and examined at bedside  No new complaints except for mild back pain this morning.    ROS: denies fever, chills, cp, sob, n/v, HA unless stated above.      potassium bicarb-citric acid  40 mEq Oral Once    insulin lispro  0-4 Units SubCUTAneous 4x Daily AC & HS    pantoprazole (PROTONIX) 40 mg in sodium chloride (PF) 0.9 % 10 mL injection  40 mg IntraVENous Daily    [Held by provider] lipase-protease-amylas  30,000 Units Oral TID WC    [Held by provider] lipase-protease-amylase  40,000 Units Oral TID WC    metoprolol  5 mg IntraVENous Q6H    cefTRIAXone (ROCEPHIN) IV  2,000 mg IntraVENous Q12H    sodium chloride flush  5-40 mL IntraVENous 2 times per day    finasteride  5 mg Oral Daily    [Held by provider] metoprolol succinate  25 mg Oral Daily    montelukast  10 mg Oral Nightly    sennosides-docusate sodium  2 tablet Oral Daily    tamsulosin  0.4 mg Oral Daily    empagliflozin  10 mg Oral Daily    ampicillin IV  2,000 mg IntraVENous 6 times per day     promethazine, 6.25 mg, Q6H PRN  heparin (porcine), 80 Units/kg, PRN  heparin (porcine), 40 Units/kg, PRN  sodium chloride, , PRN  benzocaine-menthol, 1 
       [facility] Hospitalist Progress Note    Admitting Date and Time: 12/31/2024 11:52 AM  Admit Dx: Bacteremia [R78.81]  Tachycardia [R00.0]  Septicemia (HCC) [A41.9]  Lesion of liver [K76.9]  COVID-19 [U07.1]    Subjective:  Patient is being followed for Bacteremia [R78.81]  Tachycardia [R00.0]  Septicemia (HCC) [A41.9]  Lesion of liver [K76.9]  COVID-19 [U07.1]   Pt feels chills    ROS: denies fever, chills, cp, sob, n/v, HA unless stated above.      potassium bicarb-citric acid  40 mEq Oral BID    [id number]        potassium phosphate IVPB (PERIPHERAL LINE)  15 mmol IntraVENous Once    cefTRIAXone (ROCEPHIN) IV  2,000 mg IntraVENous Q12H    sodium chloride flush  5-40 mL IntraVENous 2 times per day    sodium chloride flush  5-40 mL IntraVENous 2 times per day    finasteride  5 mg Oral Daily    glipiZIDE  10 mg Oral BID AC    metoprolol succinate  25 mg Oral Daily    montelukast  10 mg Oral Nightly    sennosides-docusate sodium  2 tablet Oral Daily    tamsulosin  0.4 mg Oral Daily    empagliflozin  10 mg Oral Daily    remdesivir 100 mg in sodium chloride 0.9 % 250 mL IVPB  100 mg IntraVENous Q24H    ampicillin IV  2,000 mg IntraVENous 6 times per day     [id number], ,   sodium chloride, , PRN  heparin (porcine), 80 Units/kg, PRN  heparin (porcine), 40 Units/kg, PRN  sodium chloride flush, 5-40 mL, PRN  sodium chloride, , PRN  benzocaine-menthol, 1 lozenge, Q2H PRN  benzonatate, 100 mg, TID PRN  calcium carbonate, 500 mg, TID PRN  hydrALAZINE, 10 mg, Q6H PRN  melatonin, 3 mg, Nightly PRN  polyvinyl alcohol, 1 drop, PRN  sodium chloride, 1 spray, PRN  sodium chloride flush, 5-40 mL, PRN  sodium chloride, , PRN  potassium chloride, 40 mEq, PRN   Or  potassium alternative oral replacement, 40 mEq, PRN   Or  potassium chloride, 10 mEq, PRN  magnesium sulfate, 2,000 mg, PRN  ondansetron, 4 mg, Q8H PRN   Or  ondansetron, 4 mg, Q6H PRN  polyethylene glycol, 17 g, Daily PRN  acetaminophen, 650 mg, Q6H PRN   
       [facility] Hospitalist Progress Note    Admitting Date and Time: 12/31/2024 11:52 AM  Admit Dx: Bacteremia [R78.81]  Tachycardia [R00.0]  Septicemia (HCC) [A41.9]  Lesion of liver [K76.9]  COVID-19 [U07.1]    Synopsis: The patient is a 79-year-old male with past medical history of pancreatic cancer stage III status post Whipple's on active chemotherapy, prostate cancer status post XRT who was admitted to the MICU for concerns for septic shock.  He has a Mediport that was suspicious for infection and was removed on 01/03/2025.  Also had bacteremia with multi organisms.  He has been weaned off pressor support.  PAUL was done with no vegetations found.  On 1/7 patient went into SVT with given adenosine and started on amiodarone drip. He was transferred out of the ICU on 1/10/2025. Amiodarone drip has been discontinued. Started on metoprolol. Currently awaiting reevals for possible placement.     Subjective:  Patient is being followed for Bacteremia [R78.81]  Tachycardia [R00.0]  Septicemia (HCC) [A41.9]  Lesion of liver [K76.9]  COVID-19 [U07.1]   Patient seen and examined at bedside  No new issues.   Scrotal swelling improved      ROS: denies fever, chills, cp, sob, n/v, HA unless stated above.      [START ON 1/14/2025] pantoprazole  40 mg Oral QAM AC    bisacodyl  5 mg Oral Daily    polyethylene glycol  17 g Oral Daily    amoxicillin-clavulanate  1 tablet Oral 2 times per day    [START ON 1/14/2025] apixaban  5 mg Oral BID    metoprolol succinate  50 mg Oral Daily    sodium chloride flush  5-40 mL IntraVENous 2 times per day    lidocaine 1 % injection  50 mg IntraDERmal Once    apixaban  10 mg Oral BID    insulin lispro  0-4 Units SubCUTAneous 4x Daily AC & HS    finasteride  5 mg Oral Daily    montelukast  10 mg Oral Nightly    sennosides-docusate sodium  2 tablet Oral Daily    tamsulosin  0.4 mg Oral Daily    empagliflozin  10 mg Oral Daily     prochlorperazine, 10 mg, Q6H PRN  sodium chloride flush, 5-40 
       [facility] Hospitalist Progress Note    Admitting Date and Time: 12/31/2024 11:52 AM  Admit Dx: Bacteremia [R78.81]  Tachycardia [R00.0]  Septicemia (HCC) [A41.9]  Lesion of liver [K76.9]  COVID-19 [U07.1]    Synopsis: The patient is a 79-year-old male with past medical history of pancreatic cancer stage III status post Whipple's on active chemotherapy, prostate cancer status post XRT who was admitted to the MICU for concerns for septic shock.  He has a Mediport that was suspicious for infection and was removed on 01/03/2025.  Also had bacteremia with multi organisms.  He has been weaned off pressor support.  PAUL was done with no vegetations found.  On 1/7 patient went into SVT with given adenosine and started on amiodarone drip.    Subjective:  Patient is being followed for Bacteremia [R78.81]  Tachycardia [R00.0]  Septicemia (HCC) [A41.9]  Lesion of liver [K76.9]  COVID-19 [U07.1]   Patient seen and examined at bedside  Resting comfortably  Overnight had to be given adenosine and started on amiodarone for SVT now back to sinus rhythm    ROS: denies fever, chills, cp, sob, n/v, HA unless stated above.      [START ON 1/9/2025] metoprolol succinate  50 mg Oral Daily    apixaban  10 mg Oral BID    insulin lispro  0-4 Units SubCUTAneous 4x Daily AC & HS    pantoprazole (PROTONIX) 40 mg in sodium chloride (PF) 0.9 % 10 mL injection  40 mg IntraVENous Daily    sodium chloride flush  5-40 mL IntraVENous 2 times per day    finasteride  5 mg Oral Daily    montelukast  10 mg Oral Nightly    sennosides-docusate sodium  2 tablet Oral Daily    tamsulosin  0.4 mg Oral Daily    empagliflozin  10 mg Oral Daily    ampicillin IV  2,000 mg IntraVENous 6 times per day     promethazine, 6.25 mg, Q6H PRN  sodium chloride, , PRN  benzocaine-menthol, 1 lozenge, Q2H PRN  benzonatate, 100 mg, TID PRN  calcium carbonate, 500 mg, TID PRN  [Held by provider] hydrALAZINE, 10 mg, Q6H PRN  melatonin, 3 mg, Nightly PRN  polyvinyl alcohol, 
  Subjective:    The patient is awake and alert, patient is H.O.H and read lips-however he is COVID-19 +. Significant other present signing and repeating everything for him. He denies any pain, no n/v/d. Currently requiring 10L HFNC    Objective:    /68   Pulse (!) 113   Temp 98.2 °F (36.8 °C)   Resp 16   Wt 83.1 kg (183 lb 3.2 oz)   SpO2 95%   BMI 23.52 kg/m²     General: Alert, oriented, no acute distress  HEENT: No thrush or mucositis, EOMI, PERRLA  Heart:  RRR, no murmurs, gallops, or rubs.  Lungs:  CTA bilaterally, no wheeze, rales or rhonchi  Abd: BS present, nontender, nondistended, no masses  Extrem:  No clubbing, cyanosis, or edema  Lymphatics: No palpable adenopathy in cervical and supraclavicular regions  Skin: Intact, no petechia or purpura    CBC with Differential:    Lab Results   Component Value Date/Time    WBC 4.8 01/01/2025 07:00 AM    RBC 2.28 01/01/2025 07:00 AM    HGB 7.4 01/01/2025 07:00 AM    HCT 23.6 01/01/2025 07:00 AM     01/01/2025 07:00 AM    .5 01/01/2025 07:00 AM    MCH 32.5 01/01/2025 07:00 AM    MCHC 31.4 01/01/2025 07:00 AM    RDW 15.7 01/01/2025 07:00 AM    METASPCT 1 08/20/2024 06:00 AM    LYMPHOPCT 4 12/31/2024 12:12 PM    MONOPCT 3 12/31/2024 12:12 PM    MYELOPCT 1 08/20/2024 06:00 AM    EOSPCT 0 12/31/2024 12:12 PM    BASOPCT 0 12/31/2024 12:12 PM    MONOSABS 0.05 12/31/2024 12:12 PM    LYMPHSABS 0.06 12/31/2024 12:12 PM    EOSABS 0.00 12/31/2024 12:12 PM    BASOSABS 0.00 12/31/2024 12:12 PM     CMP:    Lab Results   Component Value Date/Time     01/01/2025 07:00 AM    K 3.5 01/01/2025 07:00 AM    K 4.2 03/28/2021 06:18 AM     01/01/2025 07:00 AM    CO2 21 01/01/2025 07:00 AM    BUN 9 01/01/2025 07:00 AM    CREATININE 0.5 01/01/2025 07:00 AM    GFRAA >60 09/01/2022 06:09 PM    LABGLOM >90 01/01/2025 07:00 AM    LABGLOM >60 05/10/2023 08:51 AM    GLUCOSE 83 01/01/2025 07:00 AM    GLUCOSE 146 10/04/2010 10:20 AM    CALCIUM 7.0 01/01/2025 07:00 
  Subjective:    The patient is awake and alert, significant other at the bedside.  No problems overnight.  Denies chest pain, angina, dyspnea or abdominal discomfort. No nausea or vomiting. Tolerating diet.      Objective:    BP 91/65   Pulse (!) 103   Temp 97.6 °F (36.4 °C) (Oral)   Resp 20   Ht 1.88 m (6' 2\")   Wt 83.1 kg (183 lb 3.2 oz)   SpO2 100%   BMI 23.52 kg/m²     General: Alert, oriented, no acute distress  HEENT: No thrush or mucositis, EOMI, PERRLA  Heart:  RRR, no murmurs, gallops, or rubs.  Lungs:  CTA bilaterally, no wheeze, rales or rhonchi  Abd: BS present, nontender, nondistended, no masses  Extrem:  No clubbing, cyanosis, or edema  Lymphatics: No palpable adenopathy in cervical and supraclavicular regions  Skin: Intact, no petechia or purpura    CBC with Differential:    Lab Results   Component Value Date/Time    WBC 18.7 01/08/2025 04:26 AM    RBC 2.51 01/08/2025 04:26 AM    HGB 8.0 01/08/2025 04:26 AM    HCT 24.5 01/08/2025 04:26 AM     01/08/2025 04:26 AM    MCV 97.6 01/08/2025 04:26 AM    MCH 31.9 01/08/2025 04:26 AM    MCHC 32.7 01/08/2025 04:26 AM    RDW 18.9 01/08/2025 04:26 AM    NRBC 1 01/04/2025 10:05 PM    METASPCT 1 08/20/2024 06:00 AM    LYMPHOPCT 3 01/08/2025 04:26 AM    MONOPCT 8 01/08/2025 04:26 AM    MYELOPCT 1 01/04/2025 10:05 PM    EOSPCT 0 01/08/2025 04:26 AM    BASOPCT 0 01/08/2025 04:26 AM    MONOSABS 1.40 01/08/2025 04:26 AM    LYMPHSABS 0.60 01/08/2025 04:26 AM    EOSABS 0.00 01/08/2025 04:26 AM    BASOSABS 0.03 01/08/2025 04:26 AM     CMP:    Lab Results   Component Value Date/Time     01/08/2025 04:26 AM    K 3.9 01/08/2025 04:26 AM    K 4.2 03/28/2021 06:18 AM     01/08/2025 04:26 AM    CO2 23 01/08/2025 04:26 AM    BUN 5 01/08/2025 04:26 AM    CREATININE 0.5 01/08/2025 04:26 AM    GFRAA >60 09/01/2022 06:09 PM    LABGLOM >90 01/08/2025 04:26 AM    LABGLOM >60 05/10/2023 08:51 AM    GLUCOSE 116 01/08/2025 04:26 AM    GLUCOSE 146 10/04/2010 
  Subjective:    The patient is sleeping, hep gtt infusing.     Objective:    /81   Pulse 75   Temp 98 °F (36.7 °C) (Oral)   Resp 18   Ht 1.88 m (6' 2\")   Wt 83.1 kg (183 lb 3.2 oz)   SpO2 100%   BMI 23.52 kg/m²     General: Sleeping, no acute distress  HEENT: No thrush or mucositis, EOMI, PERRLA  Heart:  RRR, no murmurs, gallops, or rubs.  Lungs:  CTA bilaterally, no wheeze, rales or rhonchi  Abd: BS present, nontender, nondistended, no masses  Extrem:  No clubbing, cyanosis, or edema  Lymphatics: No palpable adenopathy in cervical and supraclavicular regions  Skin: Intact, no petechia or purpura    CBC with Differential:    Lab Results   Component Value Date/Time    WBC 13.2 01/06/2025 04:28 AM    RBC 2.20 01/06/2025 04:28 AM    HGB 7.0 01/06/2025 04:28 AM    HCT 22.0 01/06/2025 04:28 AM     01/06/2025 04:28 AM    .0 01/06/2025 04:28 AM    MCH 31.8 01/06/2025 04:28 AM    MCHC 31.8 01/06/2025 04:28 AM    RDW 18.7 01/06/2025 04:28 AM    NRBC 1 01/04/2025 10:05 PM    METASPCT 1 08/20/2024 06:00 AM    LYMPHOPCT 4 01/06/2025 04:28 AM    MONOPCT 8 01/06/2025 04:28 AM    MYELOPCT 1 01/04/2025 10:05 PM    EOSPCT 0 01/06/2025 04:28 AM    BASOPCT 0 01/06/2025 04:28 AM    MONOSABS 1.07 01/06/2025 04:28 AM    LYMPHSABS 0.50 01/06/2025 04:28 AM    EOSABS 0.04 01/06/2025 04:28 AM    BASOSABS 0.01 01/06/2025 04:28 AM     CMP:    Lab Results   Component Value Date/Time     01/06/2025 04:28 AM    K 3.4 01/06/2025 04:28 AM    K 4.2 03/28/2021 06:18 AM     01/06/2025 04:28 AM    CO2 24 01/06/2025 04:28 AM    BUN 6 01/06/2025 04:28 AM    CREATININE 0.5 01/06/2025 04:28 AM    GFRAA >60 09/01/2022 06:09 PM    LABGLOM >90 01/06/2025 04:28 AM    LABGLOM >60 05/10/2023 08:51 AM    GLUCOSE 148 01/06/2025 04:28 AM    GLUCOSE 146 10/04/2010 10:20 AM    CALCIUM 7.4 01/06/2025 04:28 AM    BILITOT 0.6 01/01/2025 07:00 AM    ALKPHOS 109 01/01/2025 07:00 AM    AST 19 01/04/2025 04:44 AM    ALT 12 01/04/2025 
  Subjective:  Patient sleeping  Vascular consulted - recommendation to restart anticoagulation with heparin then transition to eliquis if H/H stable  Surgery - PORT removed 1/3/2025  PAUL ordered, SVT overnight (adenosine x 2, lopressor x 1)    Objective:    /66   Pulse 58   Temp 97.2 °F (36.2 °C) (Oral)   Resp 15   Ht 1.88 m (6' 2\")   Wt 83.1 kg (183 lb 3.2 oz)   SpO2 100%   BMI 23.52 kg/m²     General: sleeping, no acute distress  HEENT: No thrush or mucositis  Heart:  RRR now  Lungs:  respirations easy and not labored  Abd:  nondistended  Extrem:  No clubbing, cyanosis, or edema  Skin: pale, Intact, no petechia or purpura    CBC with Differential:    Lab Results   Component Value Date/Time    WBC 18.2 01/04/2025 04:44 AM    RBC 2.62 01/04/2025 04:44 AM    HGB 8.3 01/04/2025 04:44 AM    HCT 25.5 01/04/2025 04:44 AM     01/04/2025 04:44 AM    MCV 97.3 01/04/2025 04:44 AM    MCH 31.7 01/04/2025 04:44 AM    MCHC 32.5 01/04/2025 04:44 AM    RDW 18.9 01/04/2025 04:44 AM    METASPCT 1 08/20/2024 06:00 AM    LYMPHOPCT 1 01/04/2025 04:44 AM    MONOPCT 4 01/04/2025 04:44 AM    MYELOPCT 1 01/04/2025 04:44 AM    EOSPCT 0 01/04/2025 04:44 AM    BASOPCT 0 01/04/2025 04:44 AM    MONOSABS 0.80 01/04/2025 04:44 AM    LYMPHSABS 0.16 01/04/2025 04:44 AM    EOSABS 0.00 01/04/2025 04:44 AM    BASOSABS 0.00 01/04/2025 04:44 AM     CMP:    Lab Results   Component Value Date/Time     01/04/2025 04:44 AM    K 3.6 01/04/2025 04:44 AM    K 4.2 03/28/2021 06:18 AM     01/04/2025 04:44 AM    CO2 22 01/04/2025 04:44 AM    BUN 6 01/04/2025 04:44 AM    CREATININE 0.6 01/04/2025 04:44 AM    GFRAA >60 09/01/2022 06:09 PM    LABGLOM >90 01/04/2025 04:44 AM    LABGLOM >60 05/10/2023 08:51 AM    GLUCOSE 130 01/04/2025 04:44 AM    GLUCOSE 146 10/04/2010 10:20 AM    CALCIUM 7.4 01/04/2025 04:44 AM    BILITOT 0.6 01/01/2025 07:00 AM    ALKPHOS 109 01/01/2025 07:00 AM    AST 19 01/04/2025 04:44 AM    ALT 12 01/04/2025 
 [facility] PROGRESS NOTE      Chief complaint: Follow-up of polymicrobial bacteremia    The patient is a 79 y.o. male with history of DM, hypertension, stage III pancreatic adenocarcinoma s/p Whipple procedure with portal vein reconstruction due to duodenal obstruction in 08/2024 on chemotherapy with gemcitabine and abraxane, presented on 12/31 with fatigue accompanied by cold and flu-like symptoms since 12/26 found to test positive for SARS-CoV-2 PCR.  On admission, he was afebrile and hemodynamically stable with leukocytopenia of 1800.  Procalcitonin level was elevated at 3.8 ng/mL. Chest x-ray showed no acute process.  CT abdomen and pelvis showed new irregular area of decreased density in the right lobe of the liver measuring 3 x 1.3 x 3 cm probably new metastatic lesion, new low-density nodule in the lateral posterior right lobe of the liver measuring 1.2 x 0.9 cm probably additional metastatic lesion, stable mild compressive and patchy atelectasis of posterior lower lobes with tiny bilateral pleural effusions.  Blood cultures showed gram-positive cocci in chains and gram-negative rods (E. coli, Klebsiella pneumoniae, Streptococcus species, Enterococcus faecalis by PCR).  He received a dose of piperacillin/tazobactam and vancomycin on admission.     Subjective: Patient was seen and examined. No chills, no abdominal pain, no diarrhea, no rash, no itching. Afebrile. He reports feeling better.      Objective:    Vitals:    01/01/25 1500   BP: 110/67   Pulse: (!) 116   Resp: 20   Temp: 97.1 °F (36.2 °C)   SpO2: 100%     Constitutional: Alert, not in distress  Respiratory: Clear breath sounds, no crackles, no wheezes  Cardiovascular: Regular rate and rhythm, no murmurs  Gastrointestinal: Bowel sounds present, soft, nontender  Skin: Warm and dry, no active dermatoses.  Right chest wall venous access port in place  Musculoskeletal: No joint swelling, no joint erythema    Labs, imaging, and medical records/notes were 
 [facility] PROGRESS NOTE      Chief complaint: Follow-up of polymicrobial bacteremia    The patient is a 79 y.o. male with history of DM, hypertension, stage III pancreatic adenocarcinoma s/p Whipple procedure with portal vein reconstruction due to duodenal obstruction in 08/2024 on chemotherapy with gemcitabine and abraxane, presented on 12/31 with fatigue accompanied by cold and flu-like symptoms since 12/26 found to test positive for SARS-CoV-2 PCR.  On admission, he was afebrile and hemodynamically stable with leukocytopenia of 1800.  Procalcitonin level was elevated at 3.8 ng/mL. Chest x-ray showed no acute process.  CT abdomen and pelvis showed new irregular area of decreased density in the right lobe of the liver measuring 3 x 1.3 x 3 cm probably new metastatic lesion, new low-density nodule in the lateral posterior right lobe of the liver measuring 1.2 x 0.9 cm probably additional metastatic lesion, stable mild compressive and patchy atelectasis of posterior lower lobes with tiny bilateral pleural effusions.  Blood cultures showed gram-positive cocci in chains and gram-negative rods (E. coli, Klebsiella pneumoniae, Streptococcus species, Enterococcus faecalis by PCR).  He received a dose of piperacillin/tazobactam and vancomycin on admission.     Subjective: Patient was seen and examined. No chills, no abdominal pain, no diarrhea, no rash, no itching. Afebrile. He reports feeling better.      Objective:    Vitals:    01/02/25 1029   BP: 104/68   Pulse: (!) 125   Resp: 26   Temp: 98.9 °F (37.2 °C)   SpO2: 100%     Constitutional: Alert, not in distress  Respiratory: Clear breath sounds, no crackles, no wheezes  Cardiovascular: Regular rate and rhythm, no murmurs  Gastrointestinal: Bowel sounds present, soft, nontender  Skin: Warm and dry, no active dermatoses.  Right chest wall venous access port in place  Musculoskeletal: No joint swelling, no joint erythema    Labs, imaging, and medical records/notes were 
 [facility] PROGRESS NOTE      Chief complaint:  Klebsiella, E coli , Strep bacteremia, Enterococcus bacteremia     Pt is awake and alert   No distress   Afebrile         Current Facility-Administered Medications   Medication Dose Route Frequency Provider Last Rate Last Admin    metoprolol (LOPRESSOR) injection 5 mg  5 mg IntraVENous Q6H Neil Grider MD   5 mg at 01/05/25 0912    phenylephrine (AKILA-SYNEPHRINE) 50 mg in sodium chloride 0.9 % 250 mL infusion   mcg/min IntraVENous Continuous Tona Hurley MD 21 mL/hr at 01/05/25 0716 70 mcg/min at 01/05/25 0716    0.9 % sodium chloride infusion   IntraVENous PRN Guido Hall MD        heparin (porcine) injection 6,600 Units  80 Units/kg IntraVENous PRN Eliza Dietrich DO        heparin (porcine) injection 3,300 Units  40 Units/kg IntraVENous PRN Eliza Dietrich DO   3,300 Units at 01/05/25 0041    heparin 25,000 units in dextrose 5% 250 mL (premix) infusion  5-30 Units/kg/hr IntraVENous Continuous Yary Li MD 15 mL/hr at 01/05/25 0911 18 Units/kg/hr at 01/05/25 0911    cefTRIAXone (ROCEPHIN) 2,000 mg in sterile water 20 mL IV syringe  2,000 mg IntraVENous Q12H Eliza Dietrich DO   2,000 mg at 01/05/25 0040    sodium chloride flush 0.9 % injection 5-40 mL  5-40 mL IntraVENous 2 times per day Eliza Dietrich DO   10 mL at 01/04/25 1059    sodium chloride flush 0.9 % injection 5-40 mL  5-40 mL IntraVENous PRN Eliza Dietrich DO        0.9 % sodium chloride infusion   IntraVENous PRN Eliza Dietrich DO        benzocaine-menthol (CEPACOL SORE THROAT) lozenge 1 lozenge  1 lozenge Oral Q2H PRN Eliza Dietrich DO        benzonatate (TESSALON) capsule 100 mg  100 mg Oral TID PRN Eliza Dietrich DO   100 mg at 01/04/25 2140    calcium carbonate (TUMS) chewable tablet 500 mg  500 mg Oral TID PRN Eliza Dietrich DO        [Held by provider] hydrALAZINE (APRESOLINE) injection 10 mg  10 mg IntraVENous Q6H PRN Eliza Dietrich DO        melatonin tablet 3 mg  3 
 [facility] PROGRESS NOTE      Chief complaint:  Klebsiella, E coli , Strep bacteremia, Enterococcus bacteremia     Pt is awake and alert   No distress   Afebrile         Current Facility-Administered Medications   Medication Dose Route Frequency Provider Last Rate Last Admin    potassium phosphate 20 mmol in sodium chloride 0.9 % 500 mL IVPB  20 mmol IntraVENous Once Ceci Mercedes  mL/hr at 01/04/25 0700 20 mmol at 01/04/25 0700    potassium bicarb-citric acid (EFFER-K) effervescent tablet 40 mEq  40 mEq Oral BID Eliza Dietrich DO   40 mEq at 01/03/25 2102    phenylephrine (AKILA-SYNEPHRINE) 50 mg in sodium chloride 0.9 % 250 mL infusion   mcg/min IntraVENous Continuous Tona Hurley MD 15 mL/hr at 01/04/25 0653 50 mcg/min at 01/04/25 0653    0.9 % sodium chloride infusion   IntraVENous PRN Guido Hall MD        heparin (porcine) injection 6,600 Units  80 Units/kg IntraVENous PRN Eliza Dietrich DO        heparin (porcine) injection 3,300 Units  40 Units/kg IntraVENous PRN Eliza Dietrich DO        [Held by provider] heparin 25,000 units in dextrose 5% 250 mL (premix) infusion  5-30 Units/kg/hr IntraVENous Continuous Eliza Dietrich DO   Stopped at 01/03/25 0000    cefTRIAXone (ROCEPHIN) 2,000 mg in sterile water 20 mL IV syringe  2,000 mg IntraVENous Q12H Eliza Dietrich DO   2,000 mg at 01/03/25 2317    sodium chloride flush 0.9 % injection 5-40 mL  5-40 mL IntraVENous 2 times per day Eliza Dietrich DO   10 mL at 01/03/25 2103    sodium chloride flush 0.9 % injection 5-40 mL  5-40 mL IntraVENous PRN Eliza Dietrich DO        0.9 % sodium chloride infusion   IntraVENous PRN Eliza Dietrich DO        benzocaine-menthol (CEPACOL SORE THROAT) lozenge 1 lozenge  1 lozenge Oral Q2H PRN Eliza Dietrich DO        benzonatate (TESSALON) capsule 100 mg  100 mg Oral TID PRN Eliza Dietrich DO   100 mg at 01/02/25 1948    calcium carbonate (TUMS) chewable tablet 500 mg  500 mg Oral TID PRN Eliza Dietrich,         
4 Eyes Skin Assessment     NAME:  Michael Garcia  YOB: 1945  MEDICAL RECORD NUMBER:  [id number]    The patient is being assessed for  Admission    I agree that at least one RN has performed a thorough Head to Toe Skin Assessment on the patient. ALL assessment sites listed below have been assessed.      Areas assessed by both nurses:    Head, Face, Ears, Shoulders, Back, Chest, and Arms, Elbows, Hands        Does the Patient have a Wound? No noted wound(s)       Mike Prevention initiated by RN: Yes  Wound Care Orders initiated by RN: Yes    Pressure Injury (Stage 3,4, Unstageable, DTI, NWPT, and Complex wounds) if present, place Wound referral order by RN under : No    New Ostomies, if present place, Ostomy referral order under : No     Nurse 1 eSignature: Electronically signed by Rosetta Roberts RN on 1/10/25 at 6:50 PM EST    **SHARE this note so that the co-signing nurse can place an eSignature**    Nurse 2 eSignature: Electronically signed by Xavier Gilmore RN on 1/10/25 at 7:05 PM EST   
CHG double lumen power picc Placement 1/8/2025    Product number: [id number]   Lot Number: [id number]      Ultrasound: yes   Right Brachial vein:                Upper Arm Circumference: (CM) 32    Size:(FR)/GUAGE 5.5/42 cm    Exposed Length: (CM) 1    Internal Length: (CM) 41   Cut: (CM) 13   Vein Measurement: 0.52 cm              Lidocaine Given: yes lidocaine 1% (cornelia picc kit)                Procedure performed by CHON Lawler RN  1/8/2025  4:51 PM      CHG double lumen power picc inserted using the VPS guidance system. Tip placed at the lower 1/3 of the SVC/CAJ. Sagar christensen.                     
Cardiology consult sent to Lovely Jones with [facility] cardiology.  
Comprehensive Nutrition Assessment    Type and Reason for Visit:  Initial, Positive nutrition screen    Nutrition Recommendations/Plan:     1.) Continue Current Diet  2.) Modify Oral Nutrition Supplement (for better glycemic control)       Malnutrition Assessment:  Malnutrition Status:  Severe malnutrition (01/04/25 1059)    Context:  Chronic Illness     Findings of the 6 clinical characteristics of malnutrition:  Energy Intake:  75% or less estimated energy requirements for 1 month or longer  Weight Loss:  7.5% over 3 months (overal 13.6% x 5 mon)     Body Fat Loss:  Mild body fat loss (per last RD assessment d/t current covid iso) Orbital   Muscle Mass Loss:   (per last RD assessment d/t current covid iso) Temples (temporalis), Clavicles (pectoralis & deltoids)  Fluid Accumulation:  No fluid accumulation   Strength:  Not Performed    Nutrition Assessment:    Pt admit w/ weakness/fatigue found to have +COVID19. Noted Septic shock/ polymicrobial bacteremia likely 2/2 infected mediport now s/p removal 1/3. PMHx Pancreatic CA s/p whipple 8/2024 on chemo, prostate CA s/p XRT, & DM. Pt meets criteria for Severe Malnutrition. PO diet advanced, intakes ~50-75%. Ensure TID currently ordered, will switch to Glucerna for better glycemic control. Will follow.    Nutrition Related Findings:    Pt A&Ox4, [place]/ reads lips, hypotension on pressor x 1, +I/O's, +1/+2 pitting edema, active BS     Wound Type: Surgical Incision (x 1)       Current Nutrition Intake & Therapies:    Average Meal Intake: 51-75% (average)  ADULT DIET; Regular  ADULT ORAL NUTRITION SUPPLEMENT; Breakfast, Lunch, Dinner; Standard High Calorie/High Protein Oral Supplement    Anthropometric Measures:  Height: 188 cm (6' 2\")  Ideal Body Weight (IBW): 190 lbs (86 kg)    Admission Body Weight: 83.1 kg (183 lb 3.2 oz) (12/31 first measured)  Current Body Weight: 83.1 kg (183 lb 3.2 oz) (12/31 bedscale), 96.4 % IBW.    Current BMI (kg/m2): 23.5  Usual Body 
Comprehensive Nutrition Assessment    Type and Reason for Visit:  Reassess    Nutrition Recommendations/Plan:   Continue current diet and ONS     Malnutrition Assessment:  Malnutrition Status:  Severe malnutrition (01/04/25 1059)    Context:  Chronic Illness     Findings of the 6 clinical characteristics of malnutrition:  Energy Intake:  75% or less estimated energy requirements for 1 month or longer  Weight Loss:  7.5% over 3 months (overal 13.6% x 5 mon)     Body Fat Loss:  Mild body fat loss (per last RD assessment d/t current covid iso) Orbital   Muscle Mass Loss:   (per last RD assessment d/t current covid iso) Temples (temporalis), Clavicles (pectoralis & deltoids)  Fluid Accumulation:  No fluid accumulation     Strength:  Not Performed    Nutrition Assessment:    Pt improving now transferred out of ICU. Pt admit w/ weakness/fatigue found to have +COVID19. Noted septic shock/polymicrobial bacteremia 2/2 infected mediport now s/p removal 1/3. PMHx pancreatic CA s/p whipple 8/2024 on chemo, prostate CA s/p XRT, DM. Pt meets criteria for severe malnutrition. Continue current ONS and will monitor.    Nutrition Related Findings:    pt alert, [place]/reads lips, active BS, soft abd,, +1-3 pitting edema, +I/Os, hyperglycemia   Wound Type: Surgical Incision       Current Nutrition Intake & Therapies:    Average Meal Intake: 51-75%     ADULT DIET; Regular  ADULT ORAL NUTRITION SUPPLEMENT; Breakfast, Lunch, Dinner; Diabetic Oral Supplement    Anthropometric Measures:  Height: 188 cm (6' 2\")  Ideal Body Weight (IBW): 190 lbs (86 kg)    Admission Body Weight: 83.1 kg (183 lb 3.2 oz) (12/31 first measured)  Current Body Weight: 83 kg (183 lb) (12/31 bed scale), 96.3 % IBW.    Current BMI (kg/m2): 23.5  Usual Body Weight: 96.2 kg (212 lb 1 oz) (8/15/24 [facility] bedscale)     % Weight Change (Calculated): -13.6                    BMI Categories: Normal Weight (BMI 22.0 to 24.9) age over 65    Estimated Daily Nutrient 
Consulted for picc line insertion. Spoke with dr eid re elevvated wbc. Per dr eid, no picc line.  
EP signed off of case. Dr. Arjun Vitale notified.  
General surgery consult for port placement called to  per randee serv patient added to census. Dr.Chirichella jeff served back to say they well do port placement out patient.  
HEPATOBILIARY AND PANCREATIC SURGERY  DAILY PROGRESS NOTE    Patient's Name/Date of Birth: Michael Garcia / 1945    Date: 2025     Chief Complaint   Patient presents with    Fatigue     Pt has stage 3 pancreatic cancer and has been having cold and flu symptoms. Family called [facility] and were told to just watch him at home but pt has gotten increasingly weak and is now unable to ambulate.         Subjective:  Resting comfortably. No acute changes overnight.       Objective:  Last 24Hrs  Temp  Av.9 °F (36.6 °C)  Min: 97 °F (36.1 °C)  Max: 99.3 °F (37.4 °C)  Resp  Av.3  Min: 15  Max: 27  Pulse  Av.9  Min: 96  Max: 116  Systolic (24hrs), Av , Min:87 , Max:111     Diastolic (24hrs), Av, Min:51, Max:67    SpO2  Av %  Min: 96 %  Max: 100 %    I/O last 3 completed shifts:  In: 538.3 [I.V.:300; IV Piggyback:238.3]  Out: 700 [Urine:700]      General: In no acute distress, alert  Cardiovascular: Warm throughout  Respiratory: no respiratory distress, equal chest rise, tolerating room air.  Abdomen: soft, nondistended, nontender  Skin: no obvious rashes or lesions appreciated, no jaundice  Extremities: atraumatic, no focal motor deficits, no open wounds      CBC  Recent Labs     24  1212 25  0700   WBC 1.8* 4.8   RBC 2.87* 2.28*   HGB 9.4* 7.4*   HCT 28.8* 23.6*   .3* 103.5*   MCH 32.8 32.5   MCHC 32.6 31.4*   RDW 14.9 15.7*    152   MPV 9.5 10.7       CMP  Recent Labs     24  1212 24  1825 25  0700     --  139   K 4.0  --  3.5     --  110*   CO2 23  --  21*   BUN 8  --  9   CREATININE 0.5*  --  0.5*   GLUCOSE 141* 127 83   CALCIUM 8.1*  --  7.0*   BILITOT 1.7*  --  0.6   ALKPHOS 159*  --  109   AST 26  --  27   ALT 15  --  14         Assessment/Plan:    Patient Active Problem List   Diagnosis    Degenerative arthritis of knee, bilateral    Prepatellar bursitis of both knees    Primary osteoarthritis of left knee    
[facility] Surgery    Will plan to place mediport January 23rd in [place].  My office will reach out to his wife to schedule.  Will cancel consult.    Electronically signed by Demetrius Santoyo MD on 1/13/2025 at 9:10 AM    
Lab called and notified for Blood cultures  
Message sent to Dr. Cast to see if the patient needs to continue PICC line via perfect serve.  
Messaged [facility] Urology for Scrotal Swelling. Dr. Hutton on call   
[facility]  Department of Internal Medicine   Internal Medicine Residency   MICU Progress Note    Patient:  Michael Garcia 79 y.o. male  MRN: [id number]     Date of Service: 1/4/2025    Allergy: No known allergies    Subjective     The patient was seen and examined at bedside. He looked comfortable and denied any acute complaints. He did not have any chest pain or shortness of breath.    24h change: developed SVT, given adenosine x2 and Lopressor x1    Objective     VS: /66   Pulse 71   Temp 97.2 °F (36.2 °C) (Oral)   Resp 17   Wt 83.1 kg (183 lb 3.2 oz)   SpO2 100%   BMI 23.52 kg/m²         I & O - 24hr:   Intake/Output Summary (Last 24 hours) at 1/4/2025 0730  Last data filed at 1/4/2025 0630  Gross per 24 hour   Intake 3210.22 ml   Output 650 ml   Net 2560.22 ml       Physical Exam:  General Appearance: alert, appears stated age, and cooperative  Neck: no adenopathy, no carotid bruit, no JVD, supple, symmetrical, trachea midline, and thyroid not enlarged, symmetric, no tenderness/mass/nodules  Lung: clear to auscultation bilaterally  Heart: regular rate and rhythm, S1, S2 normal, no murmur, click, rub or gallop  Abdomen: soft, non-tender; bowel sounds normal; no masses,  no organomegaly  Extremities:  extremities normal, atraumatic, no cyanosis or edema  Musculoskeletal: No joint swelling, no muscle tenderness. ROM normal in all joints of extremities.   Neurologic: Mental status: Alert, follows commands    Lines     site day    Art line   None    TLC None    PICC None    Hemoaccess None      Oxygen:    nasal canula at 2L/min    ABG:     Lab Results   Component Value Date/Time    PH 7.478 01/03/2025 11:05 PM    PCO2 29.2 01/03/2025 11:05 PM    PO2 60.7 01/03/2025 11:05 PM    HCO3 21.2 01/03/2025 11:05 PM    BE -1.7 01/03/2025 11:05 PM    THB 9.9 01/03/2025 11:05 PM    O2SAT 91.9 01/03/2025 11:05 PM        Medications     Infusions: (Fluid, Sedation, 
Notified Dr Gardiner and resident on call of new consult  
Notified Dr. Tony vascular duplex results and orders for heprin drip.  
OCCUPATIONAL THERAPY TREATMENT NOTE    [facility]  OT BEDSIDE TREATMENT NOTE      Date:2025  Patient Name: Michael Garcia  MRN: [id number]  : 1945  Room: [address] [place] [address]     Evaluating OT: Christophe Mullins OTR/L [id number]     Referring Provider:     Cammie Alberts DO                                         Specific Provider Orders/Date: OT evaluation and treatment 24 1600     Diagnosis:  Bacteremia [R78.81]  Tachycardia [R00.0]  Septicemia (HCC) [A41.9]  Lesion of liver [K76.9]  COVID-19 [U07.1]       Pertinent Medical History:  has a past medical history of Deaf, bilateral, Diabetes (HCC), Hypertension, Osteoarthritis, Primary osteoarthritis of left knee, and Prostate cancer (HCC).   Past Surgical History         Past Surgical History:   Procedure Laterality Date    CARPAL TUNNEL RELEASE        ERCP N/A 2024     FAILED ENDOSCOPIC RETROGRADE CHOLANGIOPANCREATOGRAPHY performed by Demetrius Carlos DO at [facility] ENDOSCOPY    IR BILIARY DRAIN INT AND EXT   2024     IR BILIARY DRAIN INT AND EXT 2024 SonnyRodríguez MD [facility] SPECIAL PROCEDURES    PANCREAS SURGERY N/A 2024     WHIPPLE with portal vein reconstruction, intraoperative US performed by Demetrius Santoyo III, MD at [facility] OR    PORT SURGERY Right 2024     mediport insertion performed by Demetrius Santoyo III, MD at [facility] OR    ROTATOR CUFF REPAIR       SHOULDER SURGERY        UPPER GASTROINTESTINAL ENDOSCOPY N/A 2024     ESOPHAGOGASTRODUODENOSCOPY DILATION BALLOON performed by Demetrius Carlos DO at [facility] ENDOSCOPY    UPPER GASTROINTESTINAL ENDOSCOPY N/A 2024     ESOPHAGOGASTRODUODENOSCOPY TUBE INSERTION performed by Demetrius Carlos DO at [facility] ENDOSCOPY    UPPER GASTROINTESTINAL ENDOSCOPY N/A 2024     ESOPHAGOGASTRODUODENOSCOPY TUBE INSERTION performed by Demetrius Carlos DO at [facility] ENDOSCOPY            Pt presented to the emergency dept on  with 
OCCUPATIONAL THERAPY TREATMENT NOTE    [facility]  OT BEDSIDE TREATMENT NOTE      Date:2025  Patient Name: Michael Garcia  MRN: [id number]  : 1945  Room: [address] [place], [place] [address]     Evaluating OT: Christophe Mullins OTR/L [id number]     Referring Provider:     Cammie Alberts DO                                         Specific Provider Orders/Date: OT evaluation and treatment 24 1600     Diagnosis:  Bacteremia [R78.81]  Tachycardia [R00.0]  Septicemia (HCC) [A41.9]  Lesion of liver [K76.9]  COVID-19 [U07.1]       Pertinent Medical History:  has a past medical history of Deaf, bilateral, Diabetes (HCC), Hypertension, Osteoarthritis, Primary osteoarthritis of left knee, and Prostate cancer (HCC).   Past Surgical History         Past Surgical History:   Procedure Laterality Date    CARPAL TUNNEL RELEASE        ERCP N/A 2024     FAILED ENDOSCOPIC RETROGRADE CHOLANGIOPANCREATOGRAPHY performed by Demetrius Carlos DO at [facility] ENDOSCOPY    IR BILIARY DRAIN INT AND EXT   2024     IR BILIARY DRAIN INT AND EXT 2024 [place]nyRodríguez [place] [facility] [place]    PANCREAS SURGERY N/A 2024     WHIPPLE with portal vein reconstruction, intraoperative US performed by Demetrius Santoyo III, MD at [facility] [place]    PORT SURGERY Right 2024     mediport insertion performed by Demetrius Santoyo III, MD at [facility]    ROTATOR CUFF REPAIR       SHOULDER SURGERY        UPPER GASTROINTESTINAL ENDOSCOPY N/A 2024     ESOPHAGOGASTRODUODENOSCOPY DILATION BALLOON performed by Demetrius Carlos DO at [facility] ENDOSCOPY    UPPER GASTROINTESTINAL ENDOSCOPY N/A 2024     ESOPHAGOGASTRODUODENOSCOPY TUBE INSERTION performed by Demetrius Carlos DO at [facility]    UPPER GASTROINTESTINAL ENDOSCOPY N/A 2024     ESOPHAGOGASTRODUODENOSCOPY TUBE INSERTION performed by Demetrius Carlos DO at [facility]            Pt presented to the emergency dept on  with 
OT SESSION ATTEMPT     Date:2025  Patient Name: Michael Garcia  MRN: [id number]  : 1945  Room: [address] [place], [place] [address]     Occupational therapy orders received/chart review completed and OT session attempted this date:   Pt off the floor       Will reattempt OT at a later time/date.  Thank you,   Christophe Mullins OTR/L [id number]    
Patient began to have Tachycardia with heart rate sustaining 130s-140s. Critical Care Resident Physician notified, informed this RN that they will just monitor it, no new orders at this time.   
Patient having sustained Tachycardia, heart rate 134-153. Critical Care Resident physician notified. No new orders at this time.  
Patient's heart rate sustaining in 130-140 since 1300 hours today. Asked resident if discontinuation order for amiodarone drip is correct. Resident advised that she will check into it. Amiodarone drip will continue until she gives further orders. Resident ok with keeping drip running at current rate.  
Perfect serve Dr. Tony for pulmoanary consult.  
Physical Therapy    Physical Therapy Daily Treatment Note      Name: Michael Garcia  : 1945  MRN: [id number]      Date of Service: 2025    Evaluating PT:  Frances Tomlin, PT, DPT, [id number]    Room #:  4429/4429-A  Diagnosis:  Bacteremia [R78.81]  Tachycardia [R00.0]  Septicemia (HCC) [A41.9]  Lesion of liver [K76.9]  COVID-19 [U07.1]  PMHx/PSHx:    Past Medical History:   Diagnosis Date    Deaf, bilateral     Diabetes (HCC)     Hypertension     Osteoarthritis     Primary osteoarthritis of left knee 10/18/2017    Prostate cancer (HCC) 2024      Past Surgical History:   Procedure Laterality Date    CARPAL TUNNEL RELEASE      ERCP N/A 2024    FAILED ENDOSCOPIC RETROGRADE CHOLANGIOPANCREATOGRAPHY performed by Demetrius Carlos DO at [facility] ENDOSCOPY    IR BILIARY DRAIN INT AND EXT  2024    IR BILIARY DRAIN INT AND EXT 2024 Sonny, Rodríguez Greer [place] [facility] [place]    PANCREAS SURGERY N/A 2024    WHIPPLE with portal vein reconstruction, intraoperative US performed by Demetrius Santoyo III, [place] at [facility]    PORT SURGERY Right 2024    mediport insertion performed by Demetrius Santoyo III, [place] at [facility]    PORT SURGERY Right 1/3/2025    PORT REMOVAL performed by Yary Gardiner MD at [facility]    ROTATOR CUFF REPAIR      SHOULDER SURGERY      UPPER GASTROINTESTINAL ENDOSCOPY N/A 2024    ESOPHAGOGASTRODUODENOSCOPY DILATION BALLOON performed by Demetrius Carlos DO at [facility] ENDOSCOPY    UPPER GASTROINTESTINAL ENDOSCOPY N/A 2024    ESOPHAGOGASTRODUODENOSCOPY TUBE INSERTION performed by Demetrius Carlos DO at [facility] [place]    UPPER GASTROINTESTINAL ENDOSCOPY N/A 2024    ESOPHAGOGASTRODUODENOSCOPY TUBE INSERTION performed by Demetrius Carlos DO at [facility] [place]      Procedure/Surgery:  none this admission  Precautions:  Fall Risk, Deaf - Reads lips/Sig other translates, Droplet Plus (COVID-19), + Alarms, Continuous Pulse Ox, h/o Pancreatic 
Physical Therapy    Physical Therapy Daily Treatment Note      Name: Michael Garcia  : 1945  MRN: [id number]      Date of Service: 2025    Evaluating PT:  Frances Tomlin, PT, DPT, [id number]    Room #:  4429/4429-A  Diagnosis:  Bacteremia [R78.81]  Tachycardia [R00.0]  Septicemia (HCC) [A41.9]  Lesion of liver [K76.9]  COVID-19 [U07.1]  PMHx/PSHx:    Past Medical History:   Diagnosis Date    Deaf, bilateral     Diabetes (HCC)     Hypertension     Osteoarthritis     Primary osteoarthritis of left knee 10/18/2017    Prostate cancer (HCC) 2024      Past Surgical History:   Procedure Laterality Date    CARPAL TUNNEL RELEASE      ERCP N/A 2024    FAILED ENDOSCOPIC RETROGRADE CHOLANGIOPANCREATOGRAPHY performed by Demetrius Carlos DO at [facility] ENDOSCOPY    IR BILIARY DRAIN INT AND EXT  2024    IR BILIARY DRAIN INT AND EXT 2024 Sonny, Rodríguez Greer MD [facility] [place]    PANCREAS SURGERY N/A 2024    WHIPPLE with portal vein reconstruction, intraoperative US performed by Demetrius Santoyo III, MD at [facility]    PORT SURGERY Right 2024    mediport insertion performed by Demetrius Santoyo III, MD at [facility]    PORT SURGERY Right 1/3/2025    PORT REMOVAL performed by Yary Gardiner MD at [facility]    ROTATOR CUFF REPAIR      SHOULDER SURGERY      UPPER GASTROINTESTINAL ENDOSCOPY N/A 2024    ESOPHAGOGASTRODUODENOSCOPY DILATION BALLOON performed by Demetrius Carlos DO at [facility] ENDOSCOPY    UPPER GASTROINTESTINAL ENDOSCOPY N/A 2024    ESOPHAGOGASTRODUODENOSCOPY TUBE INSERTION performed by Demetrius Carlos DO at [facility]COPY    UPPER GASTROINTESTINAL ENDOSCOPY N/A 2024    ESOPHAGOGASTRODUODENOSCOPY TUBE INSERTION performed by Demetrius Carlos DO at [facility]OSCOPY      Procedure/Surgery:  none this admission  Precautions:  Fall Risk, Deaf - Reads lips/Sig other translates, Droplet Plus (COVID-19), + Alarms, Continuous Pulse Ox, h/o Pancreatic 
Physical Therapy  Physical Therapy Initial Assessment     Name: Michael Garcia  : 1945  MRN: [id number]      Date of Service: 2025    Evaluating PT:  Frances Tomlin, PT, DPT, [id number]    Room #:  8511/8511-A  Diagnosis:  Bacteremia [R78.81]  Tachycardia [R00.0]  Septicemia (HCC) [A41.9]  Lesion of liver [K76.9]  COVID-19 [U07.1]  PMHx/PSHx:    Past Medical History:   Diagnosis Date    Deaf, bilateral     Diabetes (HCC)     Hypertension     Osteoarthritis     Primary osteoarthritis of left knee 10/18/2017    Prostate cancer (HCC) 2024      Past Surgical History:   Procedure Laterality Date    CARPAL TUNNEL RELEASE      ERCP N/A 2024    FAILED ENDOSCOPIC RETROGRADE CHOLANGIOPANCREATOGRAPHY performed by Demetrius Carlos DO at [facility] ENDOSCOPY    IR BILIARY DRAIN INT AND EXT  2024    IR BILIARY DRAIN INT AND EXT 2024 Sonny, Rodríguez Gre[place] [facility] [place]    PANCREAS SURGERY N/A 2024    WHIPPLE with portal vein reconstruction, intraoperative US performed by Demetrius Santoyo III, [place] [facility]    PORT SURGERY Right 2024    mediport insertion performed by Demetrius Santoyo III, [place] at [facility]    ROTATOR CUFF REPAIR      SHOULDER SURGERY      UPPER GASTROINTESTINAL ENDOSCOPY N/A 2024    ESOPHAGOGASTRODUODENOSCOPY DILATION BALLOON performed by Demetrius Carlos DO at [facility] ENDOSCOPY    UPPER GASTROINTESTINAL ENDOSCOPY N/A 2024    ESOPHAGOGASTRODUODENOSCOPY TUBE INSERTION performed by Demetrius [place] DO [place] [facility] [place]    UPPER GASTROINTESTINAL ENDOSCOPY N/A 2024    ESOPHAGOGASTRODUODENOSCOPY TUBE INSERTION performed by Demetrius Carlos DO at [facility] [place]      Procedure/Surgery:  none this admission  Precautions:  Fall Risk, Deaf - Reads lips/Sig other translates, Droplet Plus (COVID-19), + Alarms, Continuous Pulse Ox, h/o Pancreatic Cancer  Equipment Needs:  unremarkable     SUBJECTIVE:    Pt lives with his significant other in a 2 
Plan for port removal today 1/3. Keep patient NPO for procedure. Heparin was held 6 hours prior to procedure and is still on hold this AM.     Electronically signed by Eliza Dietrich DO on 1/3/2025 at 6:10 AM    
Pt converted to NSR  
Pulmonary Subsequent F/U note    Patient is being followed for: acute hypoxic respiratory failure, covid, pulmonary embolism    Interval HPI:  Remains in the ICU  He is on phenylephrine and a heparin infusion  CTA yesterday with small PEs    ROS:  No reported issues      Exam:  /71   Pulse 70   Temp 97.9 °F (36.6 °C) (Temporal)   Resp 15   Ht 1.88 m (6' 2\")   Wt 83.1 kg (183 lb 3.2 oz)   SpO2 100%   BMI 23.52 kg/m²    Due to the current efforts to prevent transmission of COVID-19 and also the need to preserve PPE for other caregivers, a face-to-face encounter with the patient was not performed. That being said, all relevant records and diagnostic tests were reviewed, including laboratory results and imaging. Please reference any relevant documentation elsewhere. Care will be coordinated with the primary service.    Data:    Oximetry:  SpO2 Readings from Last 1 Encounters:   01/05/25 100%       Imaging personally reviewed by myself:  CTA chest  IMPRESSION:  1.  There is a small pulmonary embolus in a distal segmental artery for the  medial segment of the right middle lobe.     2.  There is suspected pulmonary embolus in the right inter lobar pulmonary  arteries to the posteromedial basal segmental artery for the right lower  lobe, as some flow related artifacts could be present as well.     3.  Postoperative changes in the upper abdomen.  See report CT abdomen pelvis  December 31st, 2024.    Echo  Conclusions      Summary   Technically adequate study.   Mild tricuspid regurgitation.   RVSP 33 mmHg.   EF 55%.    Pertinent labs reviewed and noted:  Lab Results   Component Value Date/Time    WBC 23.7 01/05/2025 06:22 AM    RBC 2.51 01/05/2025 06:22 AM    HGB 7.9 01/05/2025 06:22 AM    HCT 24.6 01/05/2025 06:22 AM    MCV 98.0 01/05/2025 06:22 AM    MCH 31.5 01/05/2025 06:22 AM    MCHC 32.1 01/05/2025 06:22 AM    RDW 19.1 01/05/2025 06:22 AM     01/05/2025 06:22 AM    MPV 10.8 01/05/2025 06:22 AM 
Pulmonary consult sent to Dr. Murphy.  
Report called to Frances carrillo  
Spoke with Dr Burk answering service regarding new consult  
Spoke with Dr. Ross. States hold on picc for now.. Elevated WBC and blood cx pending.  
Stage  CI HR MAP TPRI SVI   Baseline 1.8 56   32   Challenge 2.3 70   37   Result (%?) 27.3 25.6   13.8     PT is fluid responsive- given 250cc of NS 13.8% SVI  
[facility]  Department of Internal Medicine   Internal Medicine Residency   MICU Progress Note    Patient:  Michael Garcia 79 y.o. male  MRN: [id number]     Date of Service: 1/5/2025    Allergy: No known allergies    Subjective     The patient was seen and examined at bedside. He was sleeping but awakened easily to being tapped. He did not have any acute complaints.    24h change: CTA showed PE; started on heparin drip; given Lopressor x1    Objective     VS: /71   Pulse 70   Temp 97.9 °F (36.6 °C) (Temporal)   Resp 15   Ht 1.88 m (6' 2\")   Wt 83.1 kg (183 lb 3.2 oz)   SpO2 100%   BMI 23.52 kg/m²         I & O - 24hr:   Intake/Output Summary (Last 24 hours) at 1/5/2025 0931  Last data filed at 1/5/2025 0716  Gross per 24 hour   Intake 4277.9 ml   Output 1175 ml   Net 3102.9 ml       Physical Exam:  General Appearance: alert, appears stated age, and cooperative  Neck: no adenopathy, no carotid bruit, no JVD, supple, symmetrical, trachea midline, and thyroid not enlarged, symmetric, no tenderness/mass/nodules  Lung: clear to auscultation bilaterally  Heart: regular rate and rhythm, S1, S2 normal, no murmur, click, rub or gallop  Abdomen: soft, non-tender; bowel sounds normal; no masses,  no organomegaly  Extremities:  bilateral extremity swelling  Musculoskeletal: No joint swelling, no muscle tenderness. ROM normal in all joints of extremities.   Neurologic: Mental status: Alert, follows commands    Lines     site day    Art line   None    TLC None    PICC None    Hemoaccess None      Oxygen:    nasal canula at 2L/min    ABG:     Lab Results   Component Value Date/Time    PH 7.478 01/03/2025 11:05 PM    PCO2 29.2 01/03/2025 11:05 PM    PO2 60.7 01/03/2025 11:05 PM    HCO3 21.2 01/03/2025 11:05 PM    BE -1.7 01/03/2025 11:05 PM    THB 9.9 01/03/2025 11:05 PM    O2SAT 91.9 01/03/2025 11:05 PM        Medications     Infusions: (Fluid, Sedation, Vasopressors)  Brennon-Synephrine at 
Vascular Surgery Progress Note    Pt is being seen in f/u today regarding IVCF     Subjective  Pt s/e.  Still on akila overnight. No bleeding per nursing    Current Medications:    phenylephrine (AKILA-SYNEPHRINE) 50 mg in sodium chloride 0.9 % 250 mL infusion 70 mcg/min (01/05/25 0716)    sodium chloride      heparin (PORCINE) Infusion 20 Units/kg/hr (01/05/25 0046)    sodium chloride      sodium chloride      dextrose        sodium chloride, heparin (porcine), heparin (porcine), sodium chloride flush, sodium chloride, benzocaine-menthol, benzonatate, calcium carbonate, [Held by provider] hydrALAZINE, melatonin, polyvinyl alcohol, sodium chloride, sodium chloride flush, sodium chloride, potassium chloride **OR** potassium alternative oral replacement **OR** potassium chloride, magnesium sulfate, ondansetron **OR** ondansetron, polyethylene glycol, acetaminophen **OR** acetaminophen, glucose, dextrose bolus **OR** dextrose bolus, glucagon (rDNA), dextrose, sodium chloride    metoprolol  5 mg IntraVENous Q6H    cefTRIAXone (ROCEPHIN) IV  2,000 mg IntraVENous Q12H    sodium chloride flush  5-40 mL IntraVENous 2 times per day    sodium chloride flush  5-40 mL IntraVENous 2 times per day    finasteride  5 mg Oral Daily    glipiZIDE  10 mg Oral BID AC    [Held by provider] metoprolol succinate  25 mg Oral Daily    montelukast  10 mg Oral Nightly    sennosides-docusate sodium  2 tablet Oral Daily    tamsulosin  0.4 mg Oral Daily    empagliflozin  10 mg Oral Daily    ampicillin IV  2,000 mg IntraVENous 6 times per day        PHYSICAL EXAM:    /71   Pulse 73   Temp 97.9 °F (36.6 °C) (Temporal)   Resp 23   Ht 1.88 m (6' 2\")   Wt 83.1 kg (183 lb 3.2 oz)   SpO2 100%   BMI 23.52 kg/m²     Intake/Output Summary (Last 24 hours) at 1/5/2025 0738  Last data filed at 1/5/2025 0716  Gross per 24 hour   Intake 4277.9 ml   Output 1175 ml   Net 3102.9 ml          Gen: awake, alert and oriented x3, no apparent distress  CVS: 
Will plan for Mediport removal tomorrow, 1/3. Patient is okay for diet today. NPO at midnight for procedure. Okay to initiate therapeutic anticoagulation with heparin drip from hepatobiliary surgery perspective this evening but heparin will need held at midnight 1/3 with procedure planned for tomorrow. Discussed with nursing.     Electronically signed by Eliza Dietrich DO on 1/2/2025 at 3:57 PM    
    Patient currently has   DVT prophylaxis  GI prophylaxis    Labs     CBC with Differential:    Lab Results   Component Value Date/Time    WBC 13.2 01/06/2025 04:28 AM    RBC 2.20 01/06/2025 04:28 AM    HGB 7.0 01/06/2025 04:28 AM    HCT 22.0 01/06/2025 04:28 AM     01/06/2025 04:28 AM    .0 01/06/2025 04:28 AM    MCH 31.8 01/06/2025 04:28 AM    MCHC 31.8 01/06/2025 04:28 AM    RDW 18.7 01/06/2025 04:28 AM    NRBC 1 01/04/2025 10:05 PM    METASPCT 1 08/20/2024 06:00 AM    LYMPHOPCT 4 01/06/2025 04:28 AM    MONOPCT 8 01/06/2025 04:28 AM    MYELOPCT 1 01/04/2025 10:05 PM    EOSPCT 0 01/06/2025 04:28 AM    BASOPCT 0 01/06/2025 04:28 AM    MONOSABS 1.07 01/06/2025 04:28 AM    LYMPHSABS 0.50 01/06/2025 04:28 AM    EOSABS 0.04 01/06/2025 04:28 AM    BASOSABS 0.01 01/06/2025 04:28 AM     CMP:    Lab Results   Component Value Date/Time     01/06/2025 04:28 AM    K 3.4 01/06/2025 04:28 AM    K 4.2 03/28/2021 06:18 AM     01/06/2025 04:28 AM    CO2 24 01/06/2025 04:28 AM    BUN 6 01/06/2025 04:28 AM    CREATININE 0.5 01/06/2025 04:28 AM    GFRAA >60 09/01/2022 06:09 PM    LABGLOM >90 01/06/2025 04:28 AM    LABGLOM >60 05/10/2023 08:51 AM    GLUCOSE 148 01/06/2025 04:28 AM    GLUCOSE 146 10/04/2010 10:20 AM    CALCIUM 7.4 01/06/2025 04:28 AM    BILITOT 0.6 01/01/2025 07:00 AM    ALKPHOS 109 01/01/2025 07:00 AM    AST 19 01/04/2025 04:44 AM    ALT 12 01/04/2025 04:44 AM       Resident's Assessment and Plan     Neurology:     Mentation at baseline     Bilateral deafness  Needs sign language     Cardiology:      Septic shock secondary to polymicrobial bacteremia likely secondary to infected Mediport  Off of pressors this morning  S/p mediport removal (01/03/2025) - inserted on 09/17/2024  ID following, appreciate recommendations  Blood cultures (12/31/2024): Klebsiella pneumoniae, Escherichia coli, Streptococcus infantarius  Elevated procalcitonin 3.80 > 13.74 > 16.76  On ampicillin and 
  Musculoskeletal: No joint swelling, no joint erythema    Labs, imaging, and medical records/notes were personally reviewed.    Laboratory:  Lab Results   Component Value Date    WBC 8.6 01/02/2025    WBC 5.4 01/02/2025    WBC 4.8 01/01/2025    HGB 8.4 (L) 01/02/2025    HCT 25.8 (L) 01/02/2025    .2 (H) 01/02/2025     01/02/2025     Lab Results   Component Value Date    NEUTROABS 5.02 01/02/2025    NEUTROABS 1.69 (L) 12/31/2024    NEUTROABS 6.91 09/04/2024     Lab Results   Component Value Date    CRP <0.3 06/12/2023     No results found for: \"CRPHS\"  Lab Results   Component Value Date    SEDRATE 4 06/12/2023     Lab Results   Component Value Date    ALT 10 01/03/2025    AST 17 01/03/2025    ALKPHOS 109 01/01/2025    BILITOT 0.6 01/01/2025     Lab Results   Component Value Date/Time     01/03/2025 04:43 AM    K 2.8 01/03/2025 04:43 AM    K 4.2 03/28/2021 06:18 AM     01/03/2025 04:43 AM    CO2 21 01/03/2025 04:43 AM    BUN 6 01/03/2025 04:43 AM    CREATININE 0.5 01/03/2025 04:43 AM    CREATININE 0.4 01/02/2025 10:15 AM    CREATININE 0.5 01/01/2025 07:00 AM    GFRAA >60 09/01/2022 06:09 PM    LABGLOM >90 01/03/2025 04:43 AM    LABGLOM >60 05/10/2023 08:51 AM    GLUCOSE 95 01/03/2025 04:43 AM    GLUCOSE 146 10/04/2010 10:20 AM    CALCIUM 7.2 01/03/2025 04:43 AM    BILITOT 0.6 01/01/2025 07:00 AM    ALKPHOS 109 01/01/2025 07:00 AM    AST 17 01/03/2025 04:43 AM    ALT 10 01/03/2025 04:43 AM       Radiology: CT abdomen and pelvis: 1. No sign of deep seated infection in the abdomen or pelvis.   2. New irregular area of decreased density in segment 7 of the right lobe of   the liver measuring 3.0 x 1.7 x 3.0 cm, probably a new metastatic lesion.   3. New low-density nodule in the lateral posterior right lobe of the liver   between segments 6 and 7 measuring 1.2 x 0.9 cm, probably an additional   metastatic lesion.   4. Stable moderate pneumobilia, more prominent on the left than the right,   with 
  Neurologic: Mental status: Alert, oriented, thought content appropriate    Lines     site day    Art line   None    TLC None    PICC R Arm    Hemoaccess None    Oxygen:    None  ABG:     Lab Results   Component Value Date/Time    PH 7.478 01/03/2025 11:05 PM    PCO2 29.2 01/03/2025 11:05 PM    PO2 60.7 01/03/2025 11:05 PM    HCO3 21.2 01/03/2025 11:05 PM    BE -1.7 01/03/2025 11:05 PM    THB 9.9 01/03/2025 11:05 PM    O2SAT 91.9 01/03/2025 11:05 PM        Medications     Infusions: (Fluid, Sedation, Vasopressors)  Amiodarone drip 150 mg/hr     Nutrition:   Adult regular diet   ATB:   Antibiotics  Days   Ampicillin  7             Skin issues: None  Patient currently has   Urinary cath  Isolation  DVT prophylaxis/ GI prophylaxis,    Labs   CBC with Differential:    Lab Results   Component Value Date/Time    WBC 18.7 01/08/2025 04:26 AM    RBC 2.51 01/08/2025 04:26 AM    HGB 8.0 01/08/2025 04:26 AM    HCT 24.5 01/08/2025 04:26 AM     01/08/2025 04:26 AM    MCV 97.6 01/08/2025 04:26 AM    MCH 31.9 01/08/2025 04:26 AM    MCHC 32.7 01/08/2025 04:26 AM    RDW 18.9 01/08/2025 04:26 AM    NRBC 1 01/04/2025 10:05 PM    METASPCT 1 08/20/2024 06:00 AM    LYMPHOPCT 3 01/08/2025 04:26 AM    MONOPCT 8 01/08/2025 04:26 AM    MYELOPCT 1 01/04/2025 10:05 PM    EOSPCT 0 01/08/2025 04:26 AM    BASOPCT 0 01/08/2025 04:26 AM    MONOSABS 1.40 01/08/2025 04:26 AM    LYMPHSABS 0.60 01/08/2025 04:26 AM    EOSABS 0.00 01/08/2025 04:26 AM    BASOSABS 0.03 01/08/2025 04:26 AM     CMP:    Lab Results   Component Value Date/Time     01/08/2025 04:26 AM    K 3.9 01/08/2025 04:26 AM    K 4.2 03/28/2021 06:18 AM     01/08/2025 04:26 AM    CO2 23 01/08/2025 04:26 AM    BUN 5 01/08/2025 04:26 AM    CREATININE 0.5 01/08/2025 04:26 AM    GFRAA >60 09/01/2022 06:09 PM    LABGLOM >90 01/08/2025 04:26 AM    LABGLOM >60 05/10/2023 08:51 AM    GLUCOSE 116 01/08/2025 04:26 AM    GLUCOSE 146 10/04/2010 10:20 AM    CALCIUM 7.5 01/08/2025 
BMI 23.52 kg/m²     Intake/Output Summary (Last 24 hours) at 1/4/2025 0736  Last data filed at 1/4/2025 0630  Gross per 24 hour   Intake 3210.22 ml   Output 650 ml   Net 2560.22 ml          Gen: awake, alert and oriented x3, no apparent distress  CVS: RRR. On neosynephrine   Resp: No increased work of breathing  Abd: Soft, non-tender, non-distended  R LE: edema present 2+ DP   L LE: edema present 2+ DP     LABS:    Lab Results   Component Value Date    WBC 18.2 (H) 01/04/2025    HGB 8.3 (L) 01/04/2025    HCT 25.5 (L) 01/04/2025     01/04/2025    PROTIME 13.2 (H) 01/03/2025    INR 1.2 01/03/2025    APTT 32.6 01/04/2025    K 3.6 01/04/2025    BUN 6 01/04/2025    CREATININE 0.6 (L) 01/04/2025       A/P  79 y.o. male with left femoral vein DVT     Continues to not have signs of bleeding  Hgb stable this AM 8.3 from 7.7   Recommend resuming heparin gtt at this time and monitor for signs of bleeding    DW Dr. Dasha Taylor MD      
PRN  benzonatate, 100 mg, TID PRN  calcium carbonate, 500 mg, TID PRN  [Held by provider] hydrALAZINE, 10 mg, Q6H PRN  melatonin, 3 mg, Nightly PRN  polyvinyl alcohol, 1 drop, PRN  sodium chloride, 1 spray, PRN  sodium chloride flush, 5-40 mL, PRN  potassium chloride, 40 mEq, PRN   Or  potassium alternative oral replacement, 40 mEq, PRN   Or  potassium chloride, 10 mEq, PRN  magnesium sulfate, 2,000 mg, PRN  ondansetron, 4 mg, Q8H PRN   Or  ondansetron, 4 mg, Q6H PRN  acetaminophen, 650 mg, Q6H PRN   Or  acetaminophen, 650 mg, Q6H PRN  glucose, 4 tablet, PRN  dextrose bolus, 125 mL, PRN   Or  dextrose bolus, 250 mL, PRN  glucagon (rDNA), 1 mg, PRN  dextrose, , Continuous PRN         Objective:    /80   Pulse 80   Temp 98.2 °F (36.8 °C) (Temporal)   Resp 18   Ht 1.88 m (6' 2\")   Wt 83.1 kg (183 lb 3.2 oz)   SpO2 97%   BMI 23.52 kg/m²     General Appearance: alert and oriented to person, place and time and in no acute distress  Skin: warm and dry  Head: normocephalic and atraumatic  Eyes: pupils equal, round, and reactive to light, extraocular eye movements intact, conjunctivae normal  Neck: neck supple and non tender without mass   Pulmonary/Chest: clear to auscultation bilaterally- no wheezes, rales or rhonchi, normal air movement, no respiratory distress  Cardiovascular: normal rate, normal S1 and S2 and no carotid bruits  Abdomen: soft, non-tender, non-distended, normal bowel sounds, no masses or organomegaly  Extremities: no cyanosis, no clubbing and no edema  Neurologic: no cranial nerve deficit and speech normal  Scrotal swelling         Recent Labs     01/10/25  0412 01/11/25  0600 01/12/25  0545    132 139   K 3.6 3.6 3.6    101 104   CO2 26 22 24   BUN 6 5* 5*   CREATININE 0.5* 0.5* 0.5*   GLUCOSE 144* 143* 118*   CALCIUM 7.2* 7.2* 7.6*       Recent Labs     01/10/25  0412 01/11/25  0600 01/12/25  0545   WBC 13.8* 11.7* 9.1   RBC 2.53* 2.69* 2.60*   HGB 7.9* 8.3* 8.1*   HCT 24.9* 
PRN  glucagon (rDNA), 1 mg, PRN  dextrose, , Continuous PRN  sodium chloride, 30 mL, PRN         Objective:    /66   Pulse 58   Temp 97.2 °F (36.2 °C) (Oral)   Resp 15   Ht 1.88 m (6' 2\")   Wt 83.1 kg (183 lb 3.2 oz)   SpO2 100%   BMI 23.52 kg/m²     General Appearance: alert and oriented to person, place and time and in no acute distress  Skin: warm and dry  Head: normocephalic and atraumatic  Eyes: pupils equal, round, and reactive to light, extraocular eye movements intact, conjunctivae normal  Neck: neck supple and non tender without mass   Pulmonary/Chest: clear to auscultation bilaterally- no wheezes, rales or rhonchi, normal air movement, no respiratory distress  Cardiovascular: normal rate, normal S1 and S2 and no carotid bruits  Abdomen: soft, non-tender, non-distended, normal bowel sounds, no masses or organomegaly  Extremities: no cyanosis, no clubbing and no edema  Neurologic: no cranial nerve deficit and speech normal        Recent Labs     01/03/25  0443 01/03/25  1101 01/04/25  0444    139 136   K 2.8* 3.3* 3.6    107 106   CO2 21* 21* 22   BUN 6 6 6   CREATININE 0.5* 0.5* 0.6*   GLUCOSE 95 100* 130*   CALCIUM 7.2* 7.5* 7.4*       Recent Labs     01/02/25  1634 01/03/25  1150 01/03/25  1700 01/04/25  0444   WBC 8.6 12.6*  --  18.2*   RBC 2.55* 2.11*  --  2.62*   HGB 8.4* 6.8* 7.7* 8.3*   HCT 25.8* 21.9* 24.5* 25.5*   .2* 103.8*  --  97.3   MCH 32.9 32.2  --  31.7   MCHC 32.6 31.1*  --  32.5   RDW 15.9* 16.4*  --  18.9*    175  --  180   MPV 10.8 10.7  --  10.7           Assessment:    Principal Problem:    COVID-19  Active Problems:    Essential hypertension    HLD (hyperlipidemia)    Adenocarcinoma of pancreas (HCC)    Severe protein-calorie malnutrition (HCC)    Bacteremia    Port or reservoir infection  Resolved Problems:    * No resolved hospital problems. *      Plan:  1.  Acute respiratory failure due to COVID-19 infection: Continue remdesivir and Decadron. 
assessment and plan with the following additions, corrections, and changes. 14pt review of symptoms completed and negative except as mentioned.    Pressors weaning off. No issues near port site. No abdominal pain, nausea vomiting. He is eating fine. Do not think there is an abdominal source. ID managing abx for bacteremia. No acute HPB surgical needs, please call if questions or concerns. Suspect mediport will not be able to be replaced for a while.     Yary Gardiner MD  01/04/25  10:41 AM    
mL, PRN  glucagon (rDNA), 1 mg, PRN  dextrose, , Continuous PRN  sodium chloride, 30 mL, PRN         Objective:    /71   Pulse 70   Temp 97.9 °F (36.6 °C) (Temporal)   Resp 15   Ht 1.88 m (6' 2\")   Wt 83.1 kg (183 lb 3.2 oz)   SpO2 100%   BMI 23.52 kg/m²     General Appearance: alert and oriented to person, place and time and in no acute distress  Skin: warm and dry  Head: normocephalic and atraumatic  Eyes: pupils equal, round, and reactive to light, extraocular eye movements intact, conjunctivae normal  Neck: neck supple and non tender without mass   Pulmonary/Chest: clear to auscultation bilaterally- no wheezes, rales or rhonchi, normal air movement, no respiratory distress  Cardiovascular: normal rate, normal S1 and S2 and no carotid bruits  Abdomen: soft, non-tender, non-distended, normal bowel sounds, no masses or organomegaly  Extremities: no cyanosis, no clubbing and no edema  Neurologic: no cranial nerve deficit and speech normal        Recent Labs     01/04/25 0444 01/04/25 2205 01/05/25  0622    136 138   K 3.6 4.4 3.8    105 103   CO2 22 21* 22   BUN 6 7 7   CREATININE 0.6* 0.5* 0.5*   GLUCOSE 130* 139* 150*   CALCIUM 7.4* 7.9* 7.5*       Recent Labs     01/04/25 0444 01/04/25 2205 01/05/25  0622   WBC 18.2* 4.2* 23.7*   RBC 2.62* 3.17* 2.51*   HGB 8.3* 10.0* 7.9*   HCT 25.5* 30.9* 24.6*   MCV 97.3 97.5 98.0   MCH 31.7 31.5 31.5   MCHC 32.5 32.4 32.1   RDW 18.9* 19.0* 19.1*    233 247   MPV 10.7 10.8 10.8           Assessment:    Principal Problem:    COVID-19  Active Problems:    Essential hypertension    HLD (hyperlipidemia)    Adenocarcinoma of pancreas (HCC)    Severe protein-calorie malnutrition (HCC)    Bacteremia    Port or reservoir infection  Resolved Problems:    * No resolved hospital problems. *      Plan:  1.  Acute respiratory failure due to COVID-19 infection: Continue remdesivir a Patient is verbalized as he have stressed 3 pancreatic cancer at her 
unremarkable     SUBJECTIVE:    Pt lives with his significant other in a 2 story home with 3 stairs to enter and 1 rail.  Bed is on 1 floor and bath is on 1 floor.  Pt ambulated with no AD PTA.    OBJECTIVE:   Initial Evaluation  Date: 1/2/25 Treatment  Date: 1/13/25 Short Term/ Long Term   Goals   AM-PAC 6 Clicks 10/24 9/24    Was pt agreeable to Eval/treatment? yes Yes    Does pt have pain? No c/o pain  5/10 BLE    Bed Mobility  Rolling: NT  Supine to sit: [place]  Sit to supine: ModA  Scooting: [place] Rolling: NT  Supine to sit: mod A  Sit to supine: NT  Scooting: mod A Rolling: Independent   Supine to sit: Independent   Sit to supine: Independent   Scooting: Independent    Transfers Sit to stand: ModAx2  Stand to sit: ModAx2  Stand pivot: NT Sit to stand: mod Ax2  Stand to sit: mod Ax2  Stand pivot: NT Sit to stand: Independent   Stand to sit: Independent   Stand pivot: mod Independent with AAD   Ambulation    NT 2/2 dizziness NT 50+ feet with AAD mod Independent    Stair negotiation: ascended and descended  NT NT 3+ steps with 1 rail mod Independent    ROM BUE:  Refer to OT note  BLE:  WFL     Strength BUE:  Refer to OT note  BLE:  NT     Balance Sitting EOB:  SBA  Dynamic Standing:  NT Sitting EOB:  SBA  Dynamic Standing:  mod Ax2 Sitting EOB:  Independent   Dynamic Standing:  mod Independent with AAD     Pt is A & O x 4  Sensation:  Pt denies numbness and tingling to extremities  Edema:  RUE, BLE swelling    Patient education  Pt educated on safety with functional mobility     Patient response to education:   Pt verbalized understanding Pt demonstrated skill Pt requires further education in this area   yes yes reinforce     ASSESSMENT:      Comments:  Pt supine in bed upon entering, pt agreeable to participate. Pt instructed to transfer to EOB, completing transfer with assist of trunk and BLE. Pt sitting upright with good sitting balance. Pt with c/o mild dizziness with position change, provided time to allow 
  HCT 30.9* 24.6* 22.0*   MCV 97.5 98.0 100.0*   MCH 31.5 31.5 31.8   MCHC 32.4 32.1 31.8*   RDW 19.0* 19.1* 18.7*    247 225   MPV 10.8 10.8 10.8           Assessment:    Principal Problem:    COVID-19  Active Problems:    Essential hypertension    HLD (hyperlipidemia)    Adenocarcinoma of pancreas (HCC)    Severe protein-calorie malnutrition (HCC)    Bacteremia    Port or reservoir infection  Resolved Problems:    * No resolved hospital problems. *      Plan:   Acute respiratory failure due to COVID-19 infection: Status post remdesivir.  Now weaned off oxygen, pulmonology following recommendations appreciated  Bacteremia due to polymicrobial: Currently on Rocephin and ampicillin.  PAUL pending.    Small pulmonary embolism in deep vein thrombosis: IV heparin restarted, to transition to oral anticoagulation when able, continue to monitor hemoglobin  Diabetes mellitus: Continue insulin coverage, DC glipizide and empagliflozin.  Anemia: Hemoglobin stable, continue to monitor for hemoglobin less than 7  history of pancreatic cancer stage III status post Whipple's on active chemotherapy, prostate cancer status post XRT heme-onc following, plan for new port placement once cleared by ID.      NOTE: This report was transcribed using voice recognition software. Every effort was made to ensure accuracy; however, inadvertent computerized transcription errors may be present.  Electronically signed by Sheila Vitale MD on 1/6/2025 at 1:56 PM     
1010    [Held by provider] metoprolol succinate (TOPROL XL) extended release tablet 25 mg  25 mg Oral Daily Eliza Dietrich DO   25 mg at 01/02/25 0949    montelukast (SINGULAIR) tablet 10 mg  10 mg Oral Nightly Eliza Dietrich DO   10 mg at 01/06/25 2059    sennosides-docusate sodium (SENOKOT-S) 8.6-50 MG tablet 2 tablet  2 tablet Oral Daily Eliza Dietrich DO   2 tablet at 01/06/25 1011    tamsulosin (FLOMAX) capsule 0.4 mg  0.4 mg Oral Daily Eliza Dietrich DO   0.4 mg at 01/06/25 1011    glucose chewable tablet 16 g  4 tablet Oral PRN Eliza Dietrich DO        dextrose bolus 10% 125 mL  125 mL IntraVENous PRN Eliza Dietrich DO        Or    dextrose bolus 10% 250 mL  250 mL IntraVENous PRN Eliza Dietrich DO        glucagon injection 1 mg  1 mg SubCUTAneous PRN Eliza Dietrich DO        dextrose 10 % infusion   IntraVENous Continuous PRN Eliza Dietrich DO        empagliflozin (JARDIANCE) tablet 10 mg  10 mg Oral Daily Eliza Dietrich DO   10 mg at 01/06/25 1010    ampicillin (OMNIPEN) 2,000 mg in sodium chloride 0.9 % 100 mL IVPB (Qvvs1Man)  2,000 mg IntraVENous 6 times per day Eliza Dietrich  mL/hr at 01/07/25 1009 2,000 mg at 01/07/25 1009         Objective:  /73   Pulse 81   Temp 98.6 °F (37 °C) (Axillary)   Resp 17   Ht 1.88 m (6' 2\")   Wt 83.1 kg (183 lb 3.2 oz)   SpO2 100%   BMI 23.52 kg/m²   Constitutional: Alert, not in distress  HEENT :  no pallor, no icterus   Respiratory: Clear breath sounds, no crackles, no wheezes  Cardiovascular: Regular rate and rhythm, no murmurs  Gastrointestinal: Bowel sounds present, soft, nontender  Skin: Warm and dry, no active dermatoses.   Musculoskeletal: No joint swelling, no joint erythema    Laboratory:  Lab Results   Component Value Date    WBC 11.8 (H) 01/07/2025    WBC 12.6 (H) 01/06/2025    WBC 13.2 (H) 01/06/2025    HGB 7.7 (L) 01/07/2025    HCT 25.0 (L) 01/07/2025    .2 (H) 01/07/2025     01/07/2025     Lab Results   Component Value Date 
male past medical history of deafness, CAD (on CT chest 2022), DM2, HTN, HLD, pancreatic head cancer stage III (s/p Whipple 8/2024, on chemo starting 10/2024), prostate cancer (s/p radiation 2023) who presented to the hospital with chronic cough and weakness and was found to have bacteremia (multi organisms) along with hypotension.  Cardiology consulted for SVT management.      SVT:  Obtain TTE, bedside echo showed small pericardial effusion and overall good cardiac function, pending full study.  SVT likely in the setting of acute illness  Obtain PAUL to rule out endocarditis in the setting of bacteremia  Patient with metastatic pancreatic cancer with mets to liver  Consider beta-blockers once acute illness improves and if BP is adequate  Bacteremia:  Multiple organisms  Follow-up PAUL  Antibiotics per ID  Metastatic pancreatic cancer      Naseem Miller MD  Interventional Cardiology/ Structural heart disease      I spent a total of 44 minutes of critical care time on the date of the service which included preparing to see the patient, face-to-face patient care, completing clinical documentation, obtaining and/or reviewing separately obtained history, performing a medically appropriate examination, counseling and educating the patient/family/caregiver, and ordering medications, tests, or procedures.     
  cefOXitin Sensitive <=4 BACTERIAL SUSCEPTIBILITY PANEL GABY Preliminary    cefTAZidime Sensitive <=1 BACTERIAL SUSCEPTIBILITY PANEL GABY Preliminary    ciprofloxacin Sensitive <=0.06 BACTERIAL SUSCEPTIBILITY PANEL GABY Preliminary    levofloxacin Sensitive <=0.12 BACTERIAL SUSCEPTIBILITY PANEL GABY Preliminary    meropenem Sensitive <=0.25 BACTERIAL SUSCEPTIBILITY PANEL GABY Preliminary    piperacillin-tazobactam Sensitive <=4 BACTERIAL SUSCEPTIBILITY PANEL GABY Preliminary    trimethoprim-sulfamethoxazole Sensitive <=20 BACTERIAL SUSCEPTIBILITY PANEL GABY Preliminary    Escherichia coli (2)    Antibiotic Interpretation Microscan Method Status    ceFAZolin Sensitive <=4 BACTERIAL SUSCEPTIBILITY PANEL GABY Preliminary    cefepime Sensitive <=0.12 BACTERIAL SUSCEPTIBILITY PANEL GABY Preliminary    cefotaxime Sensitive <=0.25 BACTERIAL SUSCEPTIBILITY PANEL GABY Preliminary    cefOXitin Sensitive <=4 BACTERIAL SUSCEPTIBILITY PANEL GABY Preliminary    cefTAZidime Sensitive <=1 BACTERIAL SUSCEPTIBILITY PANEL GABY Preliminary    ciprofloxacin Sensitive <=0.06 BACTERIAL SUSCEPTIBILITY PANEL GABY Preliminary    levofloxacin Sensitive <=0.12 BACTERIAL SUSCEPTIBILITY PANEL GABY Preliminary    meropenem Sensitive <=0.25 BACTERIAL SUSCEPTIBILITY PANEL GABY Preliminary    piperacillin-tazobactam Sensitive <=4 BACTERIAL SUSCEPTIBILITY PANEL GABY Preliminary    trimethoprim-sulfamethoxazole Resistant >=320 BACTERIAL SUSCEPTIBILITY PANEL GABY Preliminary    Streptococcus lutetiensis (3)    Antibiotic Interpretation Microscan Method Status    penicillin Sensitive <=0.06 BACTERIAL SUSCEPTIBILITY PANEL GABY Preliminary    ampicillin Sensitive <=0.25 BACTERIAL SUSCEPTIBILITY PANEL GABY Preliminary    cefotaxime Sensitive <=0.12 BACTERIAL SUSCEPTIBILITY PANEL GABY Preliminary    cefTRIAXone Sensitive <=0.12 BACTERIAL SUSCEPTIBILITY PANEL GABY Preliminary    levofloxacin Sensitive 2 BACTERIAL SUSCEPTIBILITY PANEL GABY Preliminary    moxifloxacin Sensitive 
(FLOMAX) capsule 0.4 mg, 0.4 mg, Oral, Daily, Cammie Alberts M, DO, 0.4 mg at 01/02/25 0949    glucose chewable tablet 16 g, 4 tablet, Oral, PRN, Cammie Alberts M, DO    dextrose bolus 10% 125 mL, 125 mL, IntraVENous, PRN **OR** dextrose bolus 10% 250 mL, 250 mL, IntraVENous, PRN, Cammie Alberts M, DO    glucagon injection 1 mg, 1 mg, SubCUTAneous, PRN, Cammie Alberts M, DO    dextrose 10 % infusion, , IntraVENous, Continuous PRN, Cammie Alberts M, DO    empagliflozin (JARDIANCE) tablet 10 mg, 10 mg, Oral, Daily, Cammie Alberts, DO, 10 mg at 01/02/25 0950    [COMPLETED] remdesivir 200 mg in sodium chloride 0.9 % 250 mL IVPB, 200 mg, IntraVENous, Once, Stopped at 12/31/24 1822 **FOLLOWED BY** remdesivir 100 mg in sodium chloride 0.9 % 250 mL IVPB, 100 mg, IntraVENous, Q24H, Monica Mason MD, Stopped at 01/02/25 1900    sodium chloride 0.9 % bolus 30 mL, 30 mL, IntraVENous, PRN, Monica Mason MD    ampicillin (OMNIPEN) 2,000 mg in sodium chloride 0.9 % 100 mL IVPB ([id number]), 2,000 mg, IntraVENous, 6 times per day, Monica Mason MD, Last Rate: 200 mL/hr at 01/03/25 0628, 2,000 mg at 01/03/25 0628    Vascular duplex lower extremity venous bilateral   Final Result   Occlusive DVT in the left mid femoral vein extending inferiorly into   popliteal vein and trunk.      No evidence of DVT in the right lower extremity.      Urgent call result to be provided to a licensed caregiver at 2:50 p.m.         CT ABDOMEN PELVIS W IV CONTRAST Additional Contrast? None   Final Result   1. No sign of deep seated infection in the abdomen or pelvis.   2. New irregular area of decreased density in segment 7 of the right lobe of   the liver measuring 3.0 x 1.7 x 3.0 cm, probably a new metastatic lesion.   3. New low-density nodule in the lateral posterior right lobe of the liver   between segments 6 and 7 measuring 1.2 x 0.9 cm, probably an additional   metastatic lesion.   4. Stable 
Sensitive <=4 BACTERIAL SUSCEPTIBILITY PANEL GABY Preliminary    cefTAZidime Sensitive <=1 BACTERIAL SUSCEPTIBILITY PANEL GABY Preliminary    ciprofloxacin Sensitive <=0.06 BACTERIAL SUSCEPTIBILITY PANEL GABY Preliminary    levofloxacin Sensitive <=0.12 BACTERIAL SUSCEPTIBILITY PANEL GABY Preliminary    meropenem Sensitive <=0.25 BACTERIAL SUSCEPTIBILITY PANEL GABY Preliminary    piperacillin-tazobactam Sensitive <=4 BACTERIAL SUSCEPTIBILITY PANEL GABY Preliminary    trimethoprim-sulfamethoxazole Sensitive <=20 BACTERIAL SUSCEPTIBILITY PANEL GABY Preliminary    Escherichia coli (2)    Antibiotic Interpretation Microscan Method Status    ceFAZolin Sensitive <=4 BACTERIAL SUSCEPTIBILITY PANEL GABY Preliminary    cefepime Sensitive <=0.12 BACTERIAL SUSCEPTIBILITY PANEL GABY Preliminary    cefotaxime Sensitive <=0.25 BACTERIAL SUSCEPTIBILITY PANEL GABY Preliminary    cefOXitin Sensitive <=4 BACTERIAL SUSCEPTIBILITY PANEL GABY Preliminary    cefTAZidime Sensitive <=1 BACTERIAL SUSCEPTIBILITY PANEL GABY Preliminary    ciprofloxacin Sensitive <=0.06 BACTERIAL SUSCEPTIBILITY PANEL GABY Preliminary    levofloxacin Sensitive <=0.12 BACTERIAL SUSCEPTIBILITY PANEL GABY Preliminary    meropenem Sensitive <=0.25 BACTERIAL SUSCEPTIBILITY PANEL GABY Preliminary    piperacillin-tazobactam Sensitive <=4 BACTERIAL SUSCEPTIBILITY PANEL GABY Preliminary    trimethoprim-sulfamethoxazole Resistant >=320 BACTERIAL SUSCEPTIBILITY PANEL GABY Preliminary    Streptococcus lutetiensis (3)    Antibiotic Interpretation Microscan Method Status    penicillin Sensitive <=0.06 BACTERIAL SUSCEPTIBILITY PANEL GABY Preliminary    ampicillin Sensitive <=0.25 BACTERIAL SUSCEPTIBILITY PANEL GABY Preliminary    cefotaxime Sensitive <=0.12 BACTERIAL SUSCEPTIBILITY PANEL GABY Preliminary    cefTRIAXone Sensitive <=0.12 BACTERIAL SUSCEPTIBILITY PANEL GABY Preliminary    levofloxacin Sensitive 2 BACTERIAL SUSCEPTIBILITY PANEL GABY Preliminary    moxifloxacin Sensitive 0.5 BACTERIAL 
Sitting: Min A       Standing: NT  Sitting: SBA     Standing: Mod A x2 w/ jacklyn HHA Sitting: Mod I       Standing: Stand by assist    Activity Tolerance Poor +     Vitals:  BP supine: 99/73 HR: 104 bpm  BP sittin/61 HR: 108 bpm   BP sitting post stand: 94/72 HR: 120 bpm   SPO2 5L/min: 99% Fair tolerance w/ light activity  Fair   Visual/  Perceptual no glasses present                    BUE  ROM/Strength/  Fine motor Coordination Hand dominance: right      RUE: ROM WFL      Strength: grossly 4-/5      Strength: WFL      Coordination:  WFL      LUE: ROM WFL      Strength: grossly 4-/5      Strength: WFL      Coordination: WFL        Safety   Fair   Good during functional activity       Hearing:  [place]/hearing impaired - signs vs lip reading, prefers pen/paper communication d/t covid+  Sensation: no c/o numbness or tingling   Tone: WFL   Edema: unremarkable     Comments:  Cleared by RN to see pt. Upon arrival patient supine in bed and agreeable to OT session. At end of session, patient seated in bedside chair with call light and phone within reach, all lines and tubes intact. Therapist facilitated ADL tasks, functional transfers, functional mobility, bed mobility to address safety awareness, implementation of fall prevention strategies, & functional engagement throughout ADLs. Pt limited by deconditioning, reduced activity tolerance. Pt demo'd improved participation in UB grooming tasks seated EOB for extended period of time to promote core strength, dynamic sitting balance, & activity tolerance. BUE swelling noted this date- pt educated on BUE exercises at bed level to reduce swelling and improve participation in ADLs. Pt would benefit from continued skilled OT to increase safety and independence with completion of ADL/IADL tasks for functional independence and quality of life.    Treatment: OT treatment provided this date includes:   Instruction/training on safety and adapted techniques for completion of 
Strength: WFL      Coordination:  WFL     LUE: ROM WFL      Strength: grossly 4-/5      Strength: WFL      Coordination: WFL      Safety   Fair  Good during functional activity      Hearing:  [place]/hearing impaired - signs vs lip reading   Sensation: no c/o numbness or tingling   Tone: WFL   Edema: unremarkable     Comments:   RN cleared patient for OT.  Upon arrival, patient was semi-supine in bed  and agreeable to OT session.  S.o. present throughout session . At end of session, patient semi-supine in bed ; with call light and phone within reach; all lines and tubes intact.   Patient presents with decreased safety awareness, activity tolerance , balance , and strength . Pt demonstrated decreased independence during ADLs, bed mobility , functional transfers, and functional mobility. Pt would benefit from continued skilled OT to increase safety and independence with completion of ADL tasks and functional mobility for improved quality of life and return to PLOF.       Treatment: OT treatment provided this date includes:  OT edu pt/family on role of OT in the acute care setting. Pt  and spouse verbalized understanding   Therapist facilitated and instructed pt on adapted techniques & compensatory strategies to improve safety and independence with ADLs, bed mobility , functional transfers, and functional mobility as noted above to allow pt to achieve highest level of independence and safely. Pt provided  min cuing , verbal instructions , extended time , and encouragement  in order to promote maximum level of independence.   Pt edu on use of call light and waiting until staff present to attempt any functional mobility.     Rehab Potential: Good  for established goals     Patient / Family Goal: to get better     Patient and/or family were instructed on functional diagnosis, prognosis/goals and OT plan of care. Pt/family demonstrated understanding.      Eval Complexity:      Description  Performance deficits  Clinical 
mg, 0.4 mg, Oral, Daily, Karlo, Eliza A, DO, 0.4 mg at 01/12/25 0917    glucose chewable tablet 16 g, 4 tablet, Oral, PRN, Karlo, Eliza A, DO    dextrose bolus 10% 125 mL, 125 mL, IntraVENous, PRN **OR** dextrose bolus 10% 250 mL, 250 mL, IntraVENous, PRN, Karlo, Eliza A, DO    glucagon injection 1 mg, 1 mg, SubCUTAneous, PRN, Karlo, Eliza A, DO    dextrose 10 % infusion, , IntraVENous, Continuous PRN, Karlo, Eliza A, DO    empagliflozin (JARDIANCE) tablet 10 mg, 10 mg, Oral, Daily, Karlo, Eliza A, DO, 10 mg at 01/12/25 0916    XR ABDOMEN (KUB) (SINGLE AP VIEW)   Final Result   No evidence of bowel obstruction.      Colonic fecal retention.         XR CHEST PORTABLE   Final Result   There is evidence for a increase size left-sided pleural effusion now in   mild-to-moderate degree.  There is minimal pleural effusion right-side.   Patient is rotated to the left.  Cardiac area more likely in upper borderline   size range.  There is slight prominence of perihilar vessels.      There is no pneumothorax.      Quantification of pleural effusion if needed for clinical management can be   achieved with right and lateral decubitus views of the chest or with bedside   ultrasound.      RECOMMENDATION:   Further increase size of left-sided pleural effusion.         CTA CHEST W CONTRAST   Final Result   1.  There is a small pulmonary embolus in a distal segmental artery for the   medial segment of the right middle lobe.      2.  There is suspected pulmonary embolus in the right inter lobar pulmonary   arteries to the posteromedial basal segmental artery for the right lower   lobe, as some flow related artifacts could be present as well.      3.  Postoperative changes in the upper abdomen.  See report CT abdomen pelvis   December 31st, 2024.         XR CHEST PORTABLE   Final Result   Left lower lobe pneumonia.         Vascular duplex lower extremity venous bilateral   Final Result   Occlusive DVT in the left mid femoral vein extending 
Abnormal    Klebsiella pneumoniae (1)    Antibiotic Interpretation Microscan Method Status    ceFAZolin Sensitive <=4 BACTERIAL SUSCEPTIBILITY PANEL GABY Preliminary    cefepime Sensitive <=0.12 BACTERIAL SUSCEPTIBILITY PANEL GABY Preliminary    cefotaxime Sensitive <=0.25 BACTERIAL SUSCEPTIBILITY PANEL GABY Preliminary    cefOXitin Sensitive <=4 BACTERIAL SUSCEPTIBILITY PANEL GABY Preliminary    cefTAZidime Sensitive <=1 BACTERIAL SUSCEPTIBILITY PANEL GABY Preliminary    ciprofloxacin Sensitive <=0.06 BACTERIAL SUSCEPTIBILITY PANEL GABY Preliminary    levofloxacin Sensitive <=0.12 BACTERIAL SUSCEPTIBILITY PANEL GABY Preliminary    meropenem Sensitive <=0.25 BACTERIAL SUSCEPTIBILITY PANEL GABY Preliminary    piperacillin-tazobactam Sensitive <=4 BACTERIAL SUSCEPTIBILITY PANEL GABY Preliminary    trimethoprim-sulfamethoxazole Sensitive <=20 BACTERIAL SUSCEPTIBILITY PANEL GABY Preliminary    Escherichia coli (2)    Antibiotic Interpretation Microscan Method Status    ceFAZolin Sensitive <=4 BACTERIAL SUSCEPTIBILITY PANEL GABY Preliminary    cefepime Sensitive <=0.12 BACTERIAL SUSCEPTIBILITY PANEL GABY Preliminary    cefotaxime Sensitive <=0.25 BACTERIAL SUSCEPTIBILITY PANEL GABY Preliminary    cefOXitin Sensitive <=4 BACTERIAL SUSCEPTIBILITY PANEL GABY Preliminary    cefTAZidime Sensitive <=1 BACTERIAL SUSCEPTIBILITY PANEL GABY Preliminary    ciprofloxacin Sensitive <=0.06 BACTERIAL SUSCEPTIBILITY PANEL GABY Preliminary    levofloxacin Sensitive <=0.12 BACTERIAL SUSCEPTIBILITY PANEL GABY Preliminary    meropenem Sensitive <=0.25 BACTERIAL SUSCEPTIBILITY PANEL GABY Preliminary    piperacillin-tazobactam Sensitive <=4 BACTERIAL SUSCEPTIBILITY PANEL GABY Preliminary    trimethoprim-sulfamethoxazole Resistant >=320 BACTERIAL SUSCEPTIBILITY PANEL GABY Preliminary    Streptococcus lutetiensis (3)    Antibiotic Interpretation Microscan Method Status    penicillin Sensitive <=0.06 BACTERIAL SUSCEPTIBILITY PANEL GABY Preliminary    ampicillin 
bottle and Polymerase Chain Reaction (PCR) results called to and read   back by: Frances Weber RN 12/31/24 1236       Culture KLEBSIELLA PNEUMONIAE Identification by MALDI-TOF Abnormal      ESCHERICHIA COLI Identification by MALDI-TOF Abnormal      Streptococcus infantarius ssp coli Identification by MALDI-TOF Abnormal     Escherichia Coli DETECTED Abnormal     Enterobacterales DETECTED Abnormal     Klebsiella pneumoniae group DETECTED Abnormal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

## 2025-01-13 NOTE — PLAN OF CARE
Problem: Safety - Adult  Goal: Free from fall injury  1/11/2025 2212 by Jovanna Chavez RN  Outcome: Progressing     Problem: Chronic Conditions and Co-morbidities  Goal: Patient's chronic conditions and co-morbidity symptoms are monitored and maintained or improved  1/11/2025 2212 by Jovanna Chavez RN  Outcome: Progressing     Problem: Discharge Planning  Goal: Discharge to home or other facility with appropriate resources  1/11/2025 2212 by Jovanna Chavez RN  Outcome: Progressing     Problem: Skin/Tissue Integrity  Goal: Absence of new skin breakdown  Description: 1.  Monitor for areas of redness and/or skin breakdown  2.  Assess vascular access sites hourly  3.  Every 4-6 hours minimum:  Change oxygen saturation probe site  4.  Every 4-6 hours:  If on nasal continuous positive airway pressure, respiratory therapy assess nares and determine need for appliance change or resting period.  1/11/2025 2212 by Jovanna Chavez RN  Outcome: Progressing     Problem: ABCDS Injury Assessment  Goal: Absence of physical injury  1/11/2025 2212 by Jovanna Chavez RN  Outcome: Progressing     Problem: Pain  Goal: Verbalizes/displays adequate comfort level or baseline comfort level  1/11/2025 2212 by Jovanna Chavez RN  Outcome: Progressing     Problem: Nutrition Deficit:  Goal: Optimize nutritional status  1/11/2025 2212 by Jovanna Chavez RN  Outcome: Progressing     Problem: Respiratory - Adult  Goal: Achieves optimal ventilation and oxygenation  1/11/2025 2212 by Jovanna Chavez RN  Outcome: Progressing     Problem: Gastrointestinal - Adult  Goal: Minimal or absence of nausea and vomiting  1/11/2025 2212 by Jovanna Chavez RN  Outcome: Progressing     Problem: Gastrointestinal - Adult  Goal: Maintains or returns to baseline bowel function  1/11/2025 2212 by Jovanna Chavez RN  Outcome: Progressing     Problem: Gastrointestinal - Adult  Goal: Maintains adequate 
  Problem: Safety - Adult  Goal: Free from fall injury  1/12/2025 2121 by Jovanna Chavez RN  Outcome: Progressing     Problem: Chronic Conditions and Co-morbidities  Goal: Patient's chronic conditions and co-morbidity symptoms are monitored and maintained or improved  1/12/2025 2121 by Jovanna Chavez RN  Outcome: Progressing     Problem: Discharge Planning  Goal: Discharge to home or other facility with appropriate resources  1/12/2025 2121 by Jovanna Chavez RN  Outcome: Progressing     Problem: Skin/Tissue Integrity  Goal: Absence of new skin breakdown  Description: 1.  Monitor for areas of redness and/or skin breakdown  2.  Assess vascular access sites hourly  3.  Every 4-6 hours minimum:  Change oxygen saturation probe site  4.  Every 4-6 hours:  If on nasal continuous positive airway pressure, respiratory therapy assess nares and determine need for appliance change or resting period.  1/12/2025 2121 by Jovanna hCavez RN  Outcome: Progressing     Problem: ABCDS Injury Assessment  Goal: Absence of physical injury  1/12/2025 2121 by Jovanna Chavez RN  Outcome: Progressing     Problem: Pain  Goal: Verbalizes/displays adequate comfort level or baseline comfort level  1/12/2025 2121 by Jovanna Chavez RN  Outcome: Progressing     Problem: Nutrition Deficit:  Goal: Optimize nutritional status  1/12/2025 2121 by Jovanna Chavez RN  Outcome: Progressing     Problem: Respiratory - Adult  Goal: Achieves optimal ventilation and oxygenation  1/12/2025 2121 by Jovanna Chavez RN  Outcome: Progressing     Problem: Cardiovascular - Adult  Goal: Maintains optimal cardiac output and hemodynamic stability  1/12/2025 2121 by Jovanna Chavez RN  Outcome: Progressing     Problem: Cardiovascular - Adult  Goal: Absence of cardiac dysrhythmias or at baseline  1/12/2025 2121 by Jovanna Chavez RN  Outcome: Progressing     Problem: Skin/Tissue Integrity - Adult  Goal: 
  Problem: Safety - Adult  Goal: Free from fall injury  1/13/2025 1038 by Sharon Arreola RN  Outcome: Progressing  Flowsheets (Taken 1/13/2025 1036)  Free From Fall Injury: Instruct family/caregiver on patient safety  1/12/2025 2121 by Jovanna Chavez RN  Outcome: Progressing     Problem: Chronic Conditions and Co-morbidities  Goal: Patient's chronic conditions and co-morbidity symptoms are monitored and maintained or improved  1/13/2025 1038 by Sharon Arreola RN  Outcome: Progressing  1/12/2025 2121 by Jovanna Chavez RN  Outcome: Progressing     Problem: Discharge Planning  Goal: Discharge to home or other facility with appropriate resources  1/13/2025 1038 by Sharon Arreola RN  Outcome: Progressing  1/12/2025 2121 by Jovanna Chavez RN  Outcome: Progressing     Problem: Skin/Tissue Integrity  Goal: Absence of new skin breakdown  Description: 1.  Monitor for areas of redness and/or skin breakdown  2.  Assess vascular access sites hourly  3.  Every 4-6 hours minimum:  Change oxygen saturation probe site  4.  Every 4-6 hours:  If on nasal continuous positive airway pressure, respiratory therapy assess nares and determine need for appliance change or resting period.  1/13/2025 1038 by Sharon Arreola RN  Outcome: Progressing  1/12/2025 2121 by Jovanna Chavez RN  Outcome: Progressing     Problem: ABCDS Injury Assessment  Goal: Absence of physical injury  1/13/2025 1038 by Sharon Arreola RN  Outcome: Progressing  Flowsheets (Taken 1/13/2025 1036)  Absence of Physical Injury: Implement safety measures based on patient assessment  1/12/2025 2121 by Jovanna Chavez RN  Outcome: Progressing     Problem: Pain  Goal: Verbalizes/displays adequate comfort level or baseline comfort level  1/13/2025 1038 by Sharon Arreola RN  Outcome: Progressing  1/12/2025 2121 by Jovanna Chavez RN  Outcome: Progressing     Problem: Nutrition Deficit:  Goal: Optimize nutritional 
  Problem: Safety - Adult  Goal: Free from fall injury  1/3/2025 1017 by Claribel Rodriguez, RN  Outcome: Progressing  1/3/2025 0420 by Eric Torres, RN  Outcome: Progressing     Problem: Chronic Conditions and Co-morbidities  Goal: Patient's chronic conditions and co-morbidity symptoms are monitored and maintained or improved  Outcome: Progressing     Problem: Discharge Planning  Goal: Discharge to home or other facility with appropriate resources  Outcome: Progressing     Problem: Skin/Tissue Integrity  Goal: Absence of new skin breakdown  Description: 1.  Monitor for areas of redness and/or skin breakdown  2.  Assess vascular access sites hourly  3.  Every 4-6 hours minimum:  Change oxygen saturation probe site  4.  Every 4-6 hours:  If on nasal continuous positive airway pressure, respiratory therapy assess nares and determine need for appliance change or resting period.  Outcome: Progressing     Problem: ABCDS Injury Assessment  Goal: Absence of physical injury  Outcome: Progressing     
  Problem: Safety - Adult  Goal: Free from fall injury  1/4/2025 0001 by Crystal Lazaro RN  Outcome: Progressing  Flowsheets (Taken 1/4/2025 0000)  Free From Fall Injury: Instruct family/caregiver on patient safety     Problem: Chronic Conditions and Co-morbidities  Goal: Patient's chronic conditions and co-morbidity symptoms are monitored and maintained or improved  1/4/2025 0001 by Crystal Lazaro RN  Outcome: Progressing     Problem: Discharge Planning  Goal: Discharge to home or other facility with appropriate resources  1/4/2025 0001 by Crystal Lazaro RN  Outcome: Progressing     Problem: Skin/Tissue Integrity  Goal: Absence of new skin breakdown  Description: 1.  Monitor for areas of redness and/or skin breakdown  2.  Assess vascular access sites hourly  3.  Every 4-6 hours minimum:  Change oxygen saturation probe site  4.  Every 4-6 hours:  If on nasal continuous positive airway pressure, respiratory therapy assess nares and determine need for appliance change or resting period.  1/4/2025 0001 by Crystal Lazaro RN  Outcome: Progressing     Problem: ABCDS Injury Assessment  Goal: Absence of physical injury  1/4/2025 0001 by Crystal Lazaro RN  Outcome: Progressing  Flowsheets (Taken 1/4/2025 0000)  Absence of Physical Injury: Implement safety measures based on patient assessment     Problem: Pain  Goal: Verbalizes/displays adequate comfort level or baseline comfort level  1/4/2025 0001 by Crystal Lazaro RN  Outcome: Progressing     
  Problem: Safety - Adult  Goal: Free from fall injury  1/9/2025 1107 by Pati Noel RN  Outcome: Progressing  1/9/2025 0245 by Solange Arrington, RN  Outcome: Progressing     Problem: ABCDS Injury Assessment  Goal: Absence of physical injury  Outcome: Progressing  Flowsheets (Taken 1/9/2025 0800)  Absence of Physical Injury: Implement safety measures based on patient assessment     Problem: Pain  Goal: Verbalizes/displays adequate comfort level or baseline comfort level  Outcome: Progressing  Flowsheets  Taken 1/9/2025 1000  Verbalizes/displays adequate comfort level or baseline comfort level:   Encourage patient to monitor pain and request assistance   Assess pain using appropriate pain scale   Administer analgesics based on type and severity of pain and evaluate response   Implement non-pharmacological measures as appropriate and evaluate response  Taken 1/9/2025 0900  Verbalizes/displays adequate comfort level or baseline comfort level:   Encourage patient to monitor pain and request assistance   Assess pain using appropriate pain scale   Administer analgesics based on type and severity of pain and evaluate response   Implement non-pharmacological measures as appropriate and evaluate response  Taken 1/9/2025 0800  Verbalizes/displays adequate comfort level or baseline comfort level:   Encourage patient to monitor pain and request assistance   Assess pain using appropriate pain scale   Administer analgesics based on type and severity of pain and evaluate response   Implement non-pharmacological measures as appropriate and evaluate response     Problem: Nutrition Deficit:  Goal: Optimize nutritional status  Outcome: Progressing     Problem: Respiratory - Adult  Goal: Achieves optimal ventilation and oxygenation  Outcome: Progressing  Flowsheets (Taken 1/9/2025 0800)  Achieves optimal ventilation and oxygenation:   Assess for changes in respiratory status   Assess for changes in mentation and behavior   Position 
  Problem: Safety - Adult  Goal: Free from fall injury  1/9/2025 2147 by Solange Arrington RN  Outcome: Progressing     Problem: Chronic Conditions and Co-morbidities  Goal: Patient's chronic conditions and co-morbidity symptoms are monitored and maintained or improved  Outcome: Progressing     Problem: Discharge Planning  Goal: Discharge to home or other facility with appropriate resources  Outcome: Progressing     Problem: Skin/Tissue Integrity  Goal: Absence of new skin breakdown  Description: 1.  Monitor for areas of redness and/or skin breakdown  2.  Assess vascular access sites hourly  3.  Every 4-6 hours minimum:  Change oxygen saturation probe site  4.  Every 4-6 hours:  If on nasal continuous positive airway pressure, respiratory therapy assess nares and determine need for appliance change or resting period.  Outcome: Progressing     Problem: Cardiovascular - Adult  Goal: Maintains optimal cardiac output and hemodynamic stability  1/9/2025 2147 by Solange Arrington RN  Outcome: Not Progressing     Problem: Cardiovascular - Adult  Goal: Absence of cardiac dysrhythmias or at baseline  Outcome: Not Progressing  Flowsheets (Taken 1/9/2025 0800 by Pati Noel RN)  Absence of cardiac dysrhythmias or at baseline: Monitor cardiac rate and rhythm     Problem: Cardiovascular - Adult  Goal: Maintains optimal cardiac output and hemodynamic stability  1/9/2025 2147 by Solange Arrington RN  Outcome: Not Progressing  1/9/2025 1607 by Pati Noel RN  Outcome: Not Progressing  1/9/2025 1107 by Pati Noel RN  Outcome: Not Progressing  Flowsheets (Taken 1/9/2025 0800)  Maintains optimal cardiac output and hemodynamic stability: Monitor blood pressure and heart rate  Goal: Absence of cardiac dysrhythmias or at baseline  Outcome: Not Progressing  Flowsheets (Taken 1/9/2025 0800 by Pati Noel RN)  Absence of cardiac dysrhythmias or at baseline: Monitor cardiac rate and rhythm     Problem: Metabolic/Fluid and 
  Problem: Safety - Adult  Goal: Free from fall injury  Outcome: Progressing     Problem: Chronic Conditions and Co-morbidities  Goal: Patient's chronic conditions and co-morbidity symptoms are monitored and maintained or improved  Outcome: Progressing     Problem: Discharge Planning  Goal: Discharge to home or other facility with appropriate resources  Outcome: Progressing     Problem: Skin/Tissue Integrity  Goal: Absence of new skin breakdown  Description: 1.  Monitor for areas of redness and/or skin breakdown  2.  Assess vascular access sites hourly  3.  Every 4-6 hours minimum:  Change oxygen saturation probe site  4.  Every 4-6 hours:  If on nasal continuous positive airway pressure, respiratory therapy assess nares and determine need for appliance change or resting period.  Outcome: Progressing     Problem: ABCDS Injury Assessment  Goal: Absence of physical injury  Outcome: Progressing     Problem: Pain  Goal: Verbalizes/displays adequate comfort level or baseline comfort level  Outcome: Progressing     Problem: Nutrition Deficit:  Goal: Optimize nutritional status  Outcome: Progressing     Problem: Respiratory - Adult  Goal: Achieves optimal ventilation and oxygenation  Outcome: Progressing     Problem: Cardiovascular - Adult  Goal: Maintains optimal cardiac output and hemodynamic stability  Outcome: Progressing  Goal: Absence of cardiac dysrhythmias or at baseline  Outcome: Progressing     Problem: Skin/Tissue Integrity - Adult  Goal: Incisions, wounds, or drain sites healing without S/S of infection  Outcome: Progressing  Goal: Oral mucous membranes remain intact  Outcome: Progressing     Problem: Gastrointestinal - Adult  Goal: Minimal or absence of nausea and vomiting  Outcome: Progressing     Problem: Hematologic - Adult  Goal: Maintains hematologic stability  Outcome: Progressing     
  Problem: Safety - Adult  Goal: Free from fall injury  Outcome: Progressing     Problem: Chronic Conditions and Co-morbidities  Goal: Patient's chronic conditions and co-morbidity symptoms are monitored and maintained or improved  Outcome: Progressing     Problem: Discharge Planning  Goal: Discharge to home or other facility with appropriate resources  Outcome: Progressing     Problem: Skin/Tissue Integrity  Goal: Absence of new skin breakdown  Description: 1.  Monitor for areas of redness and/or skin breakdown  2.  Assess vascular access sites hourly  3.  Every 4-6 hours minimum:  Change oxygen saturation probe site  4.  Every 4-6 hours:  If on nasal continuous positive airway pressure, respiratory therapy assess nares and determine need for appliance change or resting period.  Outcome: Progressing     Problem: ABCDS Injury Assessment  Goal: Absence of physical injury  Outcome: Progressing     Problem: Pain  Goal: Verbalizes/displays adequate comfort level or baseline comfort level  Outcome: Progressing     Problem: Nutrition Deficit:  Goal: Optimize nutritional status  Outcome: Progressing     Problem: Respiratory - Adult  Goal: Achieves optimal ventilation and oxygenation  Outcome: Progressing     Problem: Cardiovascular - Adult  Goal: Maintains optimal cardiac output and hemodynamic stability  Outcome: Progressing  Goal: Absence of cardiac dysrhythmias or at baseline  Outcome: Progressing     Problem: Skin/Tissue Integrity - Adult  Goal: Incisions, wounds, or drain sites healing without S/S of infection  Outcome: Progressing  Goal: Oral mucous membranes remain intact  Outcome: Progressing     Problem: Gastrointestinal - Adult  Goal: Minimal or absence of nausea and vomiting  Outcome: Progressing  Goal: Maintains or returns to baseline bowel function  Outcome: Progressing  Goal: Maintains adequate nutritional intake  Outcome: Progressing     Problem: Metabolic/Fluid and Electrolytes - Adult  Goal: Electrolytes 
  Problem: Safety - Adult  Goal: Free from fall injury  Outcome: Progressing     Problem: Chronic Conditions and Co-morbidities  Goal: Patient's chronic conditions and co-morbidity symptoms are monitored and maintained or improved  Outcome: Progressing     Problem: Discharge Planning  Goal: Discharge to home or other facility with appropriate resources  Outcome: Progressing     Problem: Skin/Tissue Integrity  Goal: Absence of new skin breakdown  Description: 1.  Monitor for areas of redness and/or skin breakdown  2.  Assess vascular access sites hourly  3.  Every 4-6 hours minimum:  Change oxygen saturation probe site  4.  Every 4-6 hours:  If on nasal continuous positive airway pressure, respiratory therapy assess nares and determine need for appliance change or resting period.  Outcome: Progressing     Problem: ABCDS Injury Assessment  Goal: Absence of physical injury  Outcome: Progressing  Flowsheets (Taken 1/6/2025 2116)  Absence of Physical Injury: Implement safety measures based on patient assessment     Problem: Pain  Goal: Verbalizes/displays adequate comfort level or baseline comfort level  Outcome: Progressing     Problem: Nutrition Deficit:  Goal: Optimize nutritional status  Outcome: Progressing     Problem: Respiratory - Adult  Goal: Achieves optimal ventilation and oxygenation  Outcome: Progressing     Problem: Cardiovascular - Adult  Goal: Maintains optimal cardiac output and hemodynamic stability  Outcome: Progressing     Problem: Cardiovascular - Adult  Goal: Absence of cardiac dysrhythmias or at baseline  Outcome: Progressing     Problem: Skin/Tissue Integrity - Adult  Goal: Incisions, wounds, or drain sites healing without S/S of infection  Outcome: Progressing     Problem: Skin/Tissue Integrity - Adult  Goal: Oral mucous membranes remain intact  Outcome: Progressing     Problem: Gastrointestinal - Adult  Goal: Minimal or absence of nausea and vomiting  Outcome: Progressing     Problem: 
  Problem: Safety - Adult  Goal: Free from fall injury  Outcome: Progressing     Problem: Chronic Conditions and Co-morbidities  Goal: Patient's chronic conditions and co-morbidity symptoms are monitored and maintained or improved  Outcome: Progressing     Problem: Discharge Planning  Goal: Discharge to home or other facility with appropriate resources  Outcome: Progressing     Problem: Skin/Tissue Integrity  Goal: Absence of new skin breakdown  Description: 1.  Monitor for areas of redness and/or skin breakdown  2.  Assess vascular access sites hourly  3.  Every 4-6 hours minimum:  Change oxygen saturation probe site  4.  Every 4-6 hours:  If on nasal continuous positive airway pressure, respiratory therapy assess nares and determine need for appliance change or resting period.  Outcome: Progressing     Problem: Cardiovascular - Adult  Goal: Maintains optimal cardiac output and hemodynamic stability  Outcome: Progressing     Problem: Cardiovascular - Adult  Goal: Absence of cardiac dysrhythmias or at baseline  Outcome: Progressing     
  Problem: Safety - Adult  Goal: Free from fall injury  Outcome: Progressing     Problem: Chronic Conditions and Co-morbidities  Goal: Patient's chronic conditions and co-morbidity symptoms are monitored and maintained or improved  Outcome: Progressing  Flowsheets (Taken 1/5/2025 2000)  Care Plan - Patient's Chronic Conditions and Co-Morbidity Symptoms are Monitored and Maintained or Improved:   Monitor and assess patient's chronic conditions and comorbid symptoms for stability, deterioration, or improvement   Collaborate with multidisciplinary team to address chronic and comorbid conditions and prevent exacerbation or deterioration   Update acute care plan with appropriate goals if chronic or comorbid symptoms are exacerbated and prevent overall improvement and discharge     Problem: Discharge Planning  Goal: Discharge to home or other facility with appropriate resources  Outcome: Progressing  Flowsheets (Taken 1/5/2025 2000)  Discharge to home or other facility with appropriate resources:   Identify barriers to discharge with patient and caregiver   Arrange for needed discharge resources and transportation as appropriate     Problem: Skin/Tissue Integrity  Goal: Absence of new skin breakdown  Description: 1.  Monitor for areas of redness and/or skin breakdown  2.  Assess vascular access sites hourly  3.  Every 4-6 hours minimum:  Change oxygen saturation probe site  4.  Every 4-6 hours:  If on nasal continuous positive airway pressure, respiratory therapy assess nares and determine need for appliance change or resting period.  Outcome: Progressing     Problem: ABCDS Injury Assessment  Goal: Absence of physical injury  Outcome: Progressing     Problem: Pain  Goal: Verbalizes/displays adequate comfort level or baseline comfort level  Outcome: Progressing     Problem: Nutrition Deficit:  Goal: Optimize nutritional status  Outcome: Progressing     Problem: Respiratory - Adult  Goal: Achieves optimal ventilation and 
  Problem: Safety - Adult  Goal: Free from fall injury  Outcome: Progressing  Flowsheets (Taken 1/12/2025 0800)  Free From Fall Injury: Instruct family/caregiver on patient safety     Problem: Chronic Conditions and Co-morbidities  Goal: Patient's chronic conditions and co-morbidity symptoms are monitored and maintained or improved  Outcome: Progressing  Flowsheets (Taken 1/12/2025 0800)  Care Plan - Patient's Chronic Conditions and Co-Morbidity Symptoms are Monitored and Maintained or Improved: Monitor and assess patient's chronic conditions and comorbid symptoms for stability, deterioration, or improvement     Problem: Discharge Planning  Goal: Discharge to home or other facility with appropriate resources  Outcome: Progressing  Flowsheets (Taken 1/12/2025 0800)  Discharge to home or other facility with appropriate resources: Identify barriers to discharge with patient and caregiver     Problem: Skin/Tissue Integrity  Goal: Absence of new skin breakdown  Description: 1.  Monitor for areas of redness and/or skin breakdown  2.  Assess vascular access sites hourly  3.  Every 4-6 hours minimum:  Change oxygen saturation probe site  4.  Every 4-6 hours:  If on nasal continuous positive airway pressure, respiratory therapy assess nares and determine need for appliance change or resting period.  Outcome: Progressing     Problem: ABCDS Injury Assessment  Goal: Absence of physical injury  Outcome: Progressing  Flowsheets (Taken 1/12/2025 0800)  Absence of Physical Injury: Implement safety measures based on patient assessment     Problem: Pain  Goal: Verbalizes/displays adequate comfort level or baseline comfort level  Outcome: Progressing     Problem: Nutrition Deficit:  Goal: Optimize nutritional status  Outcome: Progressing     Problem: Respiratory - Adult  Goal: Achieves optimal ventilation and oxygenation  Outcome: Progressing  Flowsheets (Taken 1/12/2025 0800)  Achieves optimal ventilation and oxygenation: Assess for 
  Problem: Safety - Adult  Goal: Free from fall injury  Outcome: Progressing  Flowsheets (Taken 1/7/2025 2057)  Free From Fall Injury: Instruct family/caregiver on patient safety     Problem: Pain  Goal: Verbalizes/displays adequate comfort level or baseline comfort level  Outcome: Progressing  Flowsheets (Taken 1/7/2025 2000)  Verbalizes/displays adequate comfort level or baseline comfort level:   Encourage patient to monitor pain and request assistance   Assess pain using appropriate pain scale   Administer analgesics based on type and severity of pain and evaluate response   Implement non-pharmacological measures as appropriate and evaluate response     Problem: Cardiovascular - Adult  Goal: Maintains optimal cardiac output and hemodynamic stability  Outcome: Progressing     Problem: Skin/Tissue Integrity - Adult  Goal: Incisions, wounds, or drain sites healing without S/S of infection  Outcome: Progressing  Flowsheets (Taken 1/7/2025 2057)  Incisions, Wounds, or Drain Sites Healing Without Sign and Symptoms of Infection:   ADMISSION and DAILY: Assess and document risk factors for pressure ulcer development   TWICE DAILY: Assess and document skin integrity     Problem: Gastrointestinal - Adult  Goal: Maintains adequate nutritional intake  Outcome: Progressing     Problem: Metabolic/Fluid and Electrolytes - Adult  Goal: Hemodynamic stability and optimal renal function maintained  Outcome: Progressing  Flowsheets (Taken 1/7/2025 2000)  Hemodynamic stability and optimal renal function maintained:   Monitor labs and assess for signs and symptoms of volume excess or deficit   Monitor intake, output and patient weight   Monitor urine specific gravity, serum osmolarity and serum sodium as indicated or ordered     
Discussed amiodarone drip with bedside RN. Per EP and critical care notes, plan was to transition to oral amiodarone if patient maintained sinus rhythm. Patient remains 140s at this time. Amiodarone ggt has . Reordered ggt at this time and will continue to evaluate patient vitals and discuss further with EP when possible.     Electronically signed by Jessica Paiz DO on 2025 at 10:36 PM      
Progressing  1/10/2025 1319 by Rosetta Roberts, RN  Outcome: Progressing     Problem: Hematologic - Adult  Goal: Maintains hematologic stability  1/11/2025 0135 by Josee Diamond RN  Outcome: Progressing  1/10/2025 1319 by Rosetta Roberts, RN  Outcome: Progressing     
status  1/9/2025 1607 by Pati Noel RN  Outcome: Progressing  1/9/2025 1107 by Pati Noel RN  Outcome: Progressing     Problem: Respiratory - Adult  Goal: Achieves optimal ventilation and oxygenation  1/9/2025 1607 by Ptai Noel RN  Outcome: Progressing  1/9/2025 1107 by Pati Noel RN  Outcome: Progressing  Flowsheets (Taken 1/9/2025 0800)  Achieves optimal ventilation and oxygenation:   Assess for changes in respiratory status   Assess for changes in mentation and behavior   Position to facilitate oxygenation and minimize respiratory effort   Oxygen supplementation based on oxygen saturation or arterial blood gases   Encourage broncho-pulmonary hygiene including cough, deep breathe, incentive spirometry   Assess the need for suctioning and aspirate as needed   Assess and instruct to report shortness of breath or any respiratory difficulty     Problem: Cardiovascular - Adult  Goal: Maintains optimal cardiac output and hemodynamic stability  1/9/2025 1607 by Pati Noel RN  Outcome: Not Progressing  1/9/2025 1107 by Pati Noel RN  Outcome: Not Progressing  Flowsheets (Taken 1/9/2025 0800)  Maintains optimal cardiac output and hemodynamic stability: Monitor blood pressure and heart rate  1/9/2025 0245 by Solange Arrington RN  Outcome: Progressing     Problem: Gastrointestinal - Adult  Goal: Maintains or returns to baseline bowel function  1/9/2025 1607 by Pati Noel RN  Outcome: Progressing  1/9/2025 1107 by Pati Noel RN  Outcome: Progressing  Flowsheets (Taken 1/9/2025 0800)  Maintains or returns to baseline bowel function: Assess bowel function     Problem: Gastrointestinal - Adult  Goal: Maintains adequate nutritional intake  1/9/2025 1607 by Pati Noel RN  Outcome: Progressing  1/9/2025 1107 by Pati Noel RN  Outcome: Progressing     Problem: Metabolic/Fluid and Electrolytes - Adult  Goal: Glucose maintained within prescribed range  Outcome: Not

## 2025-01-13 NOTE — CARE COORDINATION
1/13/25 Update CM Note.  Pt admitted 12/31/25 for COVID, bacteremia. Oral antibiotics.  Previous DC plan to return home, however pt not ambulating at this time.  PT/OT updates called for to help determine safe DC plan.  Pt agreeable to SNF if needed. Choice list  left for pt to review. Message left for sig other per pt request. (Communication with pt via lip reading and writing)    Stacey KENDRICKN RN-BC  [phone]    1200: Addendum: after therapy pt and sig other planning for SNF.  1st choice [facility]-referral sent, 2nd choice [facility] [place]    1444 Addendum: [facility] accepted pt.  Destination/ELINOR updated. Ambulance form/envelope on chart. PASRR completed. PAS set up for 430pm.  Bedside and charge RNs, sig other and facility liaison notified of DC time.       
Care Coordination :LOS 6 day, transfer form 85 on 1/3/25 post port removal d/t bacteremia. Oncology following.  Echo  and PAUL ordered on 1/2/25- order active- left message for Ophelia at 0457. Pt was home PTA., will have therapy work with pt when medically appropriate. Ampicillin q6, rocephin, heparin. Per ID , will need pick.  Ceftriaxone 2 gm q12h. Back door referral to select to follow    Electronically signed by Katie Harrington RN on 1/6/2025 at 4:21 PM   
Care Coordination: LOS 10 day. Accepted by [facility], and SOC is tentatively Monday. Will need orders    Electronically signed by Katie Harrington RN on 1/10/2025 at 9:27 AM   
Care Coordination: LOS 9 day Met with pt at bedside regarding transition of care needs.  Pt is deaf, He prefers NOT to use  and Reads lips- He is in covid isolation, so he also prefers writing answers on paper.   As noted, he lives with significant  other Razia, plan is to return home, He does not want to go to [facility] He does want [facility]. He does not have a preference. Razia will  at discharge. He does not feel he will have any other dc needs at the moment. [facility], referral sent for sn,ptx,otx.  Call to Razia to confirm number, she will be in shortly and has new insurance cards as well. She states he had [facility] in past, but does not remember company and no preference. Therapy at bedside working with pt. Will continue to follow for transition of care needs.    Electronically signed by Katie Harrington RN on 1/9/2025 at 11:30 AM   
Chart reviewed and case reviewed in IDR.  COVID + patient who is deaf but can read lips and knows sign language.  Patient off the floor for port removal this morning.  Heparin drip stopped at midnight.  US positive for DVT LLE.  K 2.8 this am.  Patient hypotensive during procedure.  Patient to transfer to MICU post-op.  Patient on IV Rocephin Q 12 hrs, IV Ampicillin  6 times daily, LR @ 100, IV Remdesivir Q 24 hrs (until 1/5).  PAUL ordered and pending completion.   Patient evaluated by therapy and would benefit from rehab prior to returning home, PT 10/24 and OT 13/24.  Will need to be re-evaluated as appropriate.  Patient is on 5L O2 per NC, no home O2.  Patient does have a history of rehab at [facility].  CM/SW will continue to follow for further transition of care planning needs.         Rose Stallings RN.  P:  [phone]  
Financial resources:    Current community resources:    Current services prior to admission:              Current DME:              Type of Home Care services:       ADLS  Prior functional level: Independent in ADLs/IADLs  Current functional level: Independent in ADLs/IADLs    PT AM-PAC:   /24  OT AM-PAC:   /24    Family can provide assistance at DC: Yes  Would you like Case Management to discuss the discharge plan with any other family members/significant others, and if so, who? No  Plans to Return to Present Housing: Yes  Other Identified Issues/Barriers to RETURNING to current housing:   Potential Assistance needed at discharge:              Potential DME:    Patient expects to discharge to:    Plan for transportation at discharge:      Financial    Payor: MEDICARE / Plan: MEDICARE PART A AND B / Product Type: *No Product type* /     Does insurance require precert for SNF: No    Potential assistance Purchasing Medications:    Meds-to-Beds request:        [id number] - [facility] [address] - P [phone] - F [phone] [address]  [facility] [address]  Phone: [phone] Fax: [phone]    [facility] [address] - P [phone] - F [phone]  [facility]  [facility] [address]  Phone: [phone] Fax: [phone]      Notes:    Factors facilitating achievement of predicted outcomes: Cooperative    Barriers to discharge: Medication managment    Additional Case Management Notes: discharge home with no needs    The Plan for Transition of Care is related to the following treatment goals of Bacteremia [R78.81]  Tachycardia [R00.0]  Septicemia (HCC) [A41.9]  Lesion of liver [K76.9]  COVID-19 [U07.1]    IF APPLICABLE: The Patient and/or patient representative Michael and his family were provided with a choice of provider and agrees with the discharge plan. Freedom of choice list with basic dialogue that supports the patient's

## 2025-01-15 ENCOUNTER — TELEPHONE (OUTPATIENT)
Dept: HEMATOLOGY | Age: 80
End: 2025-01-15

## 2025-01-15 NOTE — TELEPHONE ENCOUNTER
The patient is scheduled for his mediport on 01/23/25 at 8:00 am [facility]. I called and spoke with the patients S/O Razia and gave her the above information. I also let her know that [facility] dept will reach out to her also to let her know further information. I also called [facility] and spoke with Frances to let her know the above. I also let Frances know that the patient should hold his eliquis and jardiance 3 days prior. Frances was also informed that [facility] dept will call there with further instructions. At this time all questions were answered and all the above confirmed  Electronically signed by Gale Cazares MA on 1/15/2025 at 8:40 AM

## 2025-01-17 LAB
SEND OUT REPORT: NORMAL
TEST NAME: NORMAL

## 2025-01-19 LAB
MICROORGANISM SPEC CULT: NORMAL
MICROORGANISM/AGENT SPEC: NORMAL
SERVICE CMNT-IMP: NORMAL
SPECIMEN DESCRIPTION: NORMAL

## 2025-01-21 ENCOUNTER — TELEPHONE (OUTPATIENT)
Dept: HEMATOLOGY | Age: 80
End: 2025-01-21

## 2025-01-21 NOTE — TELEPHONE ENCOUNTER
Amber from [facility] called. Upon calling [facility] to give them the information for the patients procedure that was scheduled on 01/23/25, Frances from [facility] made her aware that the patients eliquis and jardiance were never held like they were told to be. I called scheduling and rescheduled the patient for [facility] on 01/30/25 at 9:00 am. I called the patients SO Razia who was at [facility] who was standing right at the nurses station with Frances right by her. I gave them both the new time and date of the patients procedure. I also reminded both Razia and Frances that the last dose of jardiance and eliquis should be given to the patient on 01/26/2025, which is three days of a hold. Both razia and frances confirmed all of the above and at this time all questions were answered  Electronically signed by Gale Cazares MA on 1/21/2025 at 1:20 PM

## 2025-01-26 ENCOUNTER — APPOINTMENT (OUTPATIENT)
Dept: GENERAL RADIOLOGY | Age: 80
DRG: 435 | End: 2025-01-26
Payer: MEDICARE

## 2025-01-26 ENCOUNTER — APPOINTMENT (OUTPATIENT)
Age: 80
DRG: 435 | End: 2025-01-26
Attending: EMERGENCY MEDICINE
Payer: MEDICARE

## 2025-01-26 ENCOUNTER — APPOINTMENT (OUTPATIENT)
Dept: CT IMAGING | Age: 80
DRG: 435 | End: 2025-01-26
Payer: MEDICARE

## 2025-01-26 ENCOUNTER — HOSPITAL ENCOUNTER (INPATIENT)
Age: 80
LOS: 8 days | Discharge: NURSING, REHAB OR LONG-TERM CARE | DRG: 435 | End: 2025-02-03
Attending: EMERGENCY MEDICINE | Admitting: INTERNAL MEDICINE
Payer: MEDICARE

## 2025-01-26 DIAGNOSIS — I31.39 PERICARDIAL EFFUSION: ICD-10-CM

## 2025-01-26 DIAGNOSIS — R78.81 BACTEREMIA: ICD-10-CM

## 2025-01-26 DIAGNOSIS — R57.9 SHOCK: Primary | ICD-10-CM

## 2025-01-26 DIAGNOSIS — A41.9 SEPTIC SHOCK (HCC): ICD-10-CM

## 2025-01-26 DIAGNOSIS — R65.21 SEPTIC SHOCK (HCC): ICD-10-CM

## 2025-01-26 PROBLEM — J90 PLEURAL EFFUSION: Status: ACTIVE | Noted: 2025-01-26

## 2025-01-26 PROBLEM — Z86.79 HISTORY OF PSVT (PAROXYSMAL SUPRAVENTRICULAR TACHYCARDIA): Status: ACTIVE | Noted: 2025-01-26

## 2025-01-26 LAB
ABO + RH BLD: NORMAL
ALBUMIN SERPL-MCNC: 2.6 G/DL (ref 3.5–5.2)
ALBUMIN SERPL-MCNC: 2.6 G/DL (ref 3.5–5.2)
ALP SERPL-CCNC: 148 U/L (ref 40–129)
ALP SERPL-CCNC: 150 U/L (ref 40–129)
ALT SERPL-CCNC: 7 U/L (ref 0–40)
ALT SERPL-CCNC: 7 U/L (ref 0–40)
ANION GAP SERPL CALCULATED.3IONS-SCNC: 13 MMOL/L (ref 7–16)
ANION GAP SERPL CALCULATED.3IONS-SCNC: 18 MMOL/L (ref 7–16)
ARM BAND NUMBER: NORMAL
AST SERPL-CCNC: 11 U/L (ref 0–39)
AST SERPL-CCNC: 12 U/L (ref 0–39)
B PARAP IS1001 DNA NPH QL NAA+NON-PROBE: NOT DETECTED
B PERT DNA SPEC QL NAA+PROBE: NOT DETECTED
B.E.: -8.4 MMOL/L (ref -3–3)
BACTERIA URNS QL MICRO: ABNORMAL
BASOPHILS # BLD: 0.02 K/UL (ref 0–0.2)
BASOPHILS # BLD: 0.02 K/UL (ref 0–0.2)
BASOPHILS NFR BLD: 0 % (ref 0–2)
BASOPHILS NFR BLD: 0 % (ref 0–2)
BILIRUB SERPL-MCNC: 0.8 MG/DL (ref 0–1.2)
BILIRUB SERPL-MCNC: 0.9 MG/DL (ref 0–1.2)
BILIRUB UR QL STRIP: NEGATIVE
BLOOD BANK SAMPLE EXPIRATION: NORMAL
BLOOD GROUP ANTIBODIES SERPL: NEGATIVE
BNP SERPL-MCNC: 1829 PG/ML (ref 0–450)
BUN SERPL-MCNC: 10 MG/DL (ref 6–23)
BUN SERPL-MCNC: 15 MG/DL (ref 6–23)
C PNEUM DNA NPH QL NAA+NON-PROBE: NOT DETECTED
CALCIUM SERPL-MCNC: 8.4 MG/DL (ref 8.6–10.2)
CALCIUM SERPL-MCNC: 8.5 MG/DL (ref 8.6–10.2)
CHLORIDE SERPL-SCNC: 101 MMOL/L (ref 98–107)
CHLORIDE SERPL-SCNC: 104 MMOL/L (ref 98–107)
CK SERPL-CCNC: 19 U/L (ref 20–200)
CLARITY UR: CLEAR
CO2 SERPL-SCNC: 14 MMOL/L (ref 22–29)
CO2 SERPL-SCNC: 20 MMOL/L (ref 22–29)
COHB: 1.4 % (ref 0–1.5)
COLOR UR: YELLOW
CORTIS SERPL-MCNC: 17.2 UG/DL (ref 2.7–18.4)
CORTIS SERPL-MCNC: 47.9 UG/DL (ref 2.7–18.4)
CORTISOL COLLECTION INFO: ABNORMAL
CREAT SERPL-MCNC: 0.6 MG/DL (ref 0.7–1.2)
CREAT SERPL-MCNC: 0.6 MG/DL (ref 0.7–1.2)
CRITICAL: ABNORMAL
CRYSTALS URNS MICRO: ABNORMAL /HPF
DATE ANALYZED: ABNORMAL
DATE OF COLLECTION: ABNORMAL
ECHO LV EF PHYSICIAN: 60 %
EOSINOPHIL # BLD: 0.01 K/UL (ref 0.05–0.5)
EOSINOPHIL # BLD: 0.01 K/UL (ref 0.05–0.5)
EOSINOPHILS RELATIVE PERCENT: 0 % (ref 0–6)
EOSINOPHILS RELATIVE PERCENT: 0 % (ref 0–6)
EPI CELLS #/AREA URNS HPF: ABNORMAL /HPF
ERYTHROCYTE [DISTWIDTH] IN BLOOD BY AUTOMATED COUNT: 17.3 % (ref 11.5–15)
ERYTHROCYTE [DISTWIDTH] IN BLOOD BY AUTOMATED COUNT: 17.4 % (ref 11.5–15)
FLUAV RNA NPH QL NAA+NON-PROBE: NOT DETECTED
FLUBV RNA NPH QL NAA+NON-PROBE: NOT DETECTED
GFR, ESTIMATED: >90 ML/MIN/1.73M2
GFR, ESTIMATED: >90 ML/MIN/1.73M2
GLUCOSE SERPL-MCNC: 159 MG/DL (ref 74–99)
GLUCOSE SERPL-MCNC: 266 MG/DL (ref 74–99)
GLUCOSE UR STRIP-MCNC: 500 MG/DL
HADV DNA NPH QL NAA+NON-PROBE: NOT DETECTED
HCO3: 14.5 MMOL/L (ref 22–26)
HCOV 229E RNA NPH QL NAA+NON-PROBE: NOT DETECTED
HCOV HKU1 RNA NPH QL NAA+NON-PROBE: NOT DETECTED
HCOV NL63 RNA NPH QL NAA+NON-PROBE: NOT DETECTED
HCOV OC43 RNA NPH QL NAA+NON-PROBE: NOT DETECTED
HCT VFR BLD AUTO: 37.7 % (ref 37–54)
HCT VFR BLD AUTO: 39.6 % (ref 37–54)
HGB BLD-MCNC: 11.6 G/DL (ref 12.5–16.5)
HGB BLD-MCNC: 12.1 G/DL (ref 12.5–16.5)
HGB UR QL STRIP.AUTO: NEGATIVE
HHB: 2.7 % (ref 0–5)
HMPV RNA NPH QL NAA+NON-PROBE: NOT DETECTED
HPIV1 RNA NPH QL NAA+NON-PROBE: NOT DETECTED
HPIV2 RNA NPH QL NAA+NON-PROBE: NOT DETECTED
HPIV3 RNA NPH QL NAA+NON-PROBE: NOT DETECTED
HPIV4 RNA NPH QL NAA+NON-PROBE: NOT DETECTED
IMM GRANULOCYTES # BLD AUTO: 0.06 K/UL (ref 0–0.58)
IMM GRANULOCYTES # BLD AUTO: 0.12 K/UL (ref 0–0.58)
IMM GRANULOCYTES NFR BLD: 1 % (ref 0–5)
IMM GRANULOCYTES NFR BLD: 1 % (ref 0–5)
INR PPP: 1.2
KETONES UR STRIP-MCNC: ABNORMAL MG/DL
LAB: ABNORMAL
LACTATE BLDV-SCNC: 1.3 MMOL/L (ref 0.5–2.2)
LACTATE BLDV-SCNC: 2.2 MMOL/L (ref 0.5–1.9)
LACTATE BLDV-SCNC: 2.6 MMOL/L (ref 0.5–2.2)
LEUKOCYTE ESTERASE UR QL STRIP: NEGATIVE
LYMPHOCYTES NFR BLD: 1.01 K/UL (ref 1.5–4)
LYMPHOCYTES NFR BLD: 1.07 K/UL (ref 1.5–4)
LYMPHOCYTES RELATIVE PERCENT: 12 % (ref 20–42)
LYMPHOCYTES RELATIVE PERCENT: 7 % (ref 20–42)
Lab: 2217
M PNEUMO DNA NPH QL NAA+NON-PROBE: NOT DETECTED
MAGNESIUM SERPL-MCNC: 2.1 MG/DL (ref 1.6–2.6)
MCH RBC QN AUTO: 30.9 PG (ref 26–35)
MCH RBC QN AUTO: 31.5 PG (ref 26–35)
MCHC RBC AUTO-ENTMCNC: 30.6 G/DL (ref 32–34.5)
MCHC RBC AUTO-ENTMCNC: 30.8 G/DL (ref 32–34.5)
MCV RBC AUTO: 101.3 FL (ref 80–99.9)
MCV RBC AUTO: 102.4 FL (ref 80–99.9)
METHB: 0.3 % (ref 0–1.5)
MICROORGANISM SPEC CULT: NORMAL
MICROORGANISM/AGENT SPEC: NORMAL
MODE: ABNORMAL
MONOCYTES NFR BLD: 0.22 K/UL (ref 0.1–0.95)
MONOCYTES NFR BLD: 0.46 K/UL (ref 0.1–0.95)
MONOCYTES NFR BLD: 2 % (ref 2–12)
MONOCYTES NFR BLD: 5 % (ref 2–12)
NEUTROPHILS NFR BLD: 82 % (ref 43–80)
NEUTROPHILS NFR BLD: 90 % (ref 43–80)
NEUTS SEG NFR BLD: 12.63 K/UL (ref 1.8–7.3)
NEUTS SEG NFR BLD: 7.43 K/UL (ref 1.8–7.3)
NITRITE UR QL STRIP: NEGATIVE
O2 SATURATION: 97.3 % (ref 92–98.5)
O2HB: 95.6 % (ref 94–97)
OPERATOR ID: 2593
PATIENT TEMP: 37 C
PCO2: 24 MMHG (ref 35–45)
PH BLOOD GAS: 7.4 (ref 7.35–7.45)
PH UR STRIP: 7 [PH] (ref 5–9)
PHOSPHATE SERPL-MCNC: 5.2 MG/DL (ref 2.5–4.5)
PLATELET # BLD AUTO: 376 K/UL (ref 130–450)
PLATELET # BLD AUTO: 555 K/UL (ref 130–450)
PMV BLD AUTO: 10 FL (ref 7–12)
PMV BLD AUTO: 9.1 FL (ref 7–12)
PO2: 103.4 MMHG (ref 75–100)
POTASSIUM SERPL-SCNC: 3.8 MMOL/L (ref 3.5–5)
POTASSIUM SERPL-SCNC: 4.3 MMOL/L (ref 3.5–5)
PROCALCITONIN SERPL-MCNC: 0.41 NG/ML (ref 0–0.08)
PROT SERPL-MCNC: 6 G/DL (ref 6.4–8.3)
PROT SERPL-MCNC: 6.1 G/DL (ref 6.4–8.3)
PROT UR STRIP-MCNC: ABNORMAL MG/DL
PROTHROMBIN TIME: 13.4 SEC (ref 9.3–12.4)
RBC # BLD AUTO: 3.68 M/UL (ref 3.8–5.8)
RBC # BLD AUTO: 3.91 M/UL (ref 3.8–5.8)
RBC #/AREA URNS HPF: ABNORMAL /HPF
RSV RNA NPH QL NAA+NON-PROBE: NOT DETECTED
RV+EV RNA NPH QL NAA+NON-PROBE: NOT DETECTED
SARS-COV-2 RNA NPH QL NAA+NON-PROBE: NOT DETECTED
SERVICE CMNT-IMP: NORMAL
SODIUM SERPL-SCNC: 134 MMOL/L (ref 132–146)
SODIUM SERPL-SCNC: 136 MMOL/L (ref 132–146)
SOURCE, BLOOD GAS: ABNORMAL
SP GR UR STRIP: 1.02 (ref 1–1.03)
SPECIMEN DESCRIPTION: NORMAL
SPECIMEN DESCRIPTION: NORMAL
THB: 13.2 G/DL (ref 11.5–16.5)
TIME ANALYZED: 2230
TROPONIN I SERPL HS-MCNC: 35 NG/L (ref 0–11)
TROPONIN I SERPL HS-MCNC: 40 NG/L (ref 0–11)
TROPONIN I SERPL HS-MCNC: 66 NG/L (ref 0–11)
TSH SERPL DL<=0.05 MIU/L-ACNC: 4.7 UIU/ML (ref 0.27–4.2)
UROBILINOGEN UR STRIP-ACNC: 1 EU/DL (ref 0–1)
WBC #/AREA URNS HPF: ABNORMAL /HPF
WBC OTHER # BLD: 14 K/UL (ref 4.5–11.5)
WBC OTHER # BLD: 9.1 K/UL (ref 4.5–11.5)

## 2025-01-26 PROCEDURE — 96374 THER/PROPH/DIAG INJ IV PUSH: CPT

## 2025-01-26 PROCEDURE — 6360000002 HC RX W HCPCS: Performed by: NURSE PRACTITIONER

## 2025-01-26 PROCEDURE — 2580000003 HC RX 258: Performed by: EMERGENCY MEDICINE

## 2025-01-26 PROCEDURE — 6370000000 HC RX 637 (ALT 250 FOR IP): Performed by: INTERNAL MEDICINE

## 2025-01-26 PROCEDURE — 99285 EMERGENCY DEPT VISIT HI MDM: CPT

## 2025-01-26 PROCEDURE — 93325 DOPPLER ECHO COLOR FLOW MAPG: CPT | Performed by: INTERNAL MEDICINE

## 2025-01-26 PROCEDURE — 99223 1ST HOSP IP/OBS HIGH 75: CPT | Performed by: INTERNAL MEDICINE

## 2025-01-26 PROCEDURE — 93308 TTE F-UP OR LMTD: CPT | Performed by: INTERNAL MEDICINE

## 2025-01-26 PROCEDURE — 82805 BLOOD GASES W/O2 SATURATION: CPT

## 2025-01-26 PROCEDURE — 36620 INSERTION CATHETER ARTERY: CPT

## 2025-01-26 PROCEDURE — 71045 X-RAY EXAM CHEST 1 VIEW: CPT

## 2025-01-26 PROCEDURE — 93005 ELECTROCARDIOGRAM TRACING: CPT | Performed by: STUDENT IN AN ORGANIZED HEALTH CARE EDUCATION/TRAINING PROGRAM

## 2025-01-26 PROCEDURE — 93308 TTE F-UP OR LMTD: CPT

## 2025-01-26 PROCEDURE — 6370000000 HC RX 637 (ALT 250 FOR IP): Performed by: NURSE PRACTITIONER

## 2025-01-26 PROCEDURE — 96365 THER/PROPH/DIAG IV INF INIT: CPT

## 2025-01-26 PROCEDURE — 36625 INSERTION CATHETER ARTERY: CPT | Performed by: NURSE PRACTITIONER

## 2025-01-26 PROCEDURE — 99291 CRITICAL CARE FIRST HOUR: CPT | Performed by: NURSE PRACTITIONER

## 2025-01-26 PROCEDURE — 2500000003 HC RX 250 WO HCPCS: Performed by: EMERGENCY MEDICINE

## 2025-01-26 PROCEDURE — 6360000002 HC RX W HCPCS: Performed by: INTERNAL MEDICINE

## 2025-01-26 PROCEDURE — 2580000003 HC RX 258

## 2025-01-26 PROCEDURE — 2500000003 HC RX 250 WO HCPCS: Performed by: INTERNAL MEDICINE

## 2025-01-26 PROCEDURE — 71275 CT ANGIOGRAPHY CHEST: CPT

## 2025-01-26 PROCEDURE — 96367 TX/PROPH/DG ADDL SEQ IV INF: CPT

## 2025-01-26 PROCEDURE — 6360000002 HC RX W HCPCS: Performed by: EMERGENCY MEDICINE

## 2025-01-26 PROCEDURE — 6360000004 HC RX CONTRAST MEDICATION: Performed by: RADIOLOGY

## 2025-01-26 PROCEDURE — 03HY32Z INSERTION OF MONITORING DEVICE INTO UPPER ARTERY, PERCUTANEOUS APPROACH: ICD-10-PCS | Performed by: STUDENT IN AN ORGANIZED HEALTH CARE EDUCATION/TRAINING PROGRAM

## 2025-01-26 PROCEDURE — 36556 INSERT NON-TUNNEL CV CATH: CPT

## 2025-01-26 PROCEDURE — 36556 INSERT NON-TUNNEL CV CATH: CPT | Performed by: NURSE PRACTITIONER

## 2025-01-26 PROCEDURE — 02HV33Z INSERTION OF INFUSION DEVICE INTO SUPERIOR VENA CAVA, PERCUTANEOUS APPROACH: ICD-10-PCS | Performed by: STUDENT IN AN ORGANIZED HEALTH CARE EDUCATION/TRAINING PROGRAM

## 2025-01-26 PROCEDURE — 74174 CTA ABD&PLVS W/CONTRAST: CPT

## 2025-01-26 PROCEDURE — 2000000000 HC ICU R&B

## 2025-01-26 PROCEDURE — 2580000003 HC RX 258: Performed by: NURSE PRACTITIONER

## 2025-01-26 RX ORDER — SODIUM CHLORIDE 0.9 % (FLUSH) 0.9 %
5-40 SYRINGE (ML) INJECTION PRN
Status: DISCONTINUED | OUTPATIENT
Start: 2025-01-26 | End: 2025-02-03 | Stop reason: HOSPADM

## 2025-01-26 RX ORDER — SODIUM CHLORIDE 0.9 % (FLUSH) 0.9 %
5-40 SYRINGE (ML) INJECTION EVERY 12 HOURS SCHEDULED
Status: DISCONTINUED | OUTPATIENT
Start: 2025-01-26 | End: 2025-02-03 | Stop reason: HOSPADM

## 2025-01-26 RX ORDER — 0.9 % SODIUM CHLORIDE 0.9 %
1000 INTRAVENOUS SOLUTION INTRAVENOUS ONCE
Status: COMPLETED | OUTPATIENT
Start: 2025-01-26 | End: 2025-01-26

## 2025-01-26 RX ORDER — GLIPIZIDE 5 MG/1
10 TABLET ORAL
Status: DISCONTINUED | OUTPATIENT
Start: 2025-01-27 | End: 2025-02-03 | Stop reason: HOSPADM

## 2025-01-26 RX ORDER — PANTOPRAZOLE SODIUM 40 MG/1
40 TABLET, DELAYED RELEASE ORAL 2 TIMES DAILY
Status: DISCONTINUED | OUTPATIENT
Start: 2025-01-27 | End: 2025-02-03 | Stop reason: HOSPADM

## 2025-01-26 RX ORDER — ACETAMINOPHEN 325 MG/1
650 TABLET ORAL EVERY 6 HOURS PRN
Status: DISCONTINUED | OUTPATIENT
Start: 2025-01-26 | End: 2025-02-03 | Stop reason: HOSPADM

## 2025-01-26 RX ORDER — BUMETANIDE 1 MG/1
1 TABLET ORAL DAILY
Status: DISCONTINUED | OUTPATIENT
Start: 2025-01-26 | End: 2025-02-03 | Stop reason: HOSPADM

## 2025-01-26 RX ORDER — MONTELUKAST SODIUM 10 MG/1
10 TABLET ORAL NIGHTLY
Status: DISCONTINUED | OUTPATIENT
Start: 2025-01-26 | End: 2025-02-03 | Stop reason: HOSPADM

## 2025-01-26 RX ORDER — 0.9 % SODIUM CHLORIDE 0.9 %
500 INTRAVENOUS SOLUTION INTRAVENOUS ONCE
Status: COMPLETED | OUTPATIENT
Start: 2025-01-26 | End: 2025-01-26

## 2025-01-26 RX ORDER — METOPROLOL SUCCINATE 50 MG/1
50 TABLET, EXTENDED RELEASE ORAL DAILY
Status: DISCONTINUED | OUTPATIENT
Start: 2025-01-26 | End: 2025-01-30

## 2025-01-26 RX ORDER — SODIUM CHLORIDE, SODIUM LACTATE, POTASSIUM CHLORIDE, CALCIUM CHLORIDE 600; 310; 30; 20 MG/100ML; MG/100ML; MG/100ML; MG/100ML
INJECTION, SOLUTION INTRAVENOUS CONTINUOUS
Status: DISCONTINUED | OUTPATIENT
Start: 2025-01-26 | End: 2025-01-28

## 2025-01-26 RX ORDER — SODIUM CHLORIDE 9 MG/ML
INJECTION, SOLUTION INTRAVENOUS PRN
Status: DISCONTINUED | OUTPATIENT
Start: 2025-01-26 | End: 2025-01-28

## 2025-01-26 RX ORDER — TAMSULOSIN HYDROCHLORIDE 0.4 MG/1
0.4 CAPSULE ORAL DAILY
Status: DISCONTINUED | OUTPATIENT
Start: 2025-01-27 | End: 2025-02-03 | Stop reason: HOSPADM

## 2025-01-26 RX ORDER — SENNA AND DOCUSATE SODIUM 50; 8.6 MG/1; MG/1
2 TABLET, FILM COATED ORAL DAILY
Status: DISCONTINUED | OUTPATIENT
Start: 2025-01-27 | End: 2025-02-03 | Stop reason: HOSPADM

## 2025-01-26 RX ORDER — HYDROCORTISONE SODIUM SUCCINATE 100 MG/2ML
100 INJECTION INTRAMUSCULAR; INTRAVENOUS EVERY 8 HOURS
Status: DISCONTINUED | OUTPATIENT
Start: 2025-01-26 | End: 2025-01-28

## 2025-01-26 RX ORDER — POLYETHYLENE GLYCOL 3350 17 G/17G
17 POWDER, FOR SOLUTION ORAL DAILY PRN
Status: DISCONTINUED | OUTPATIENT
Start: 2025-01-26 | End: 2025-02-03 | Stop reason: HOSPADM

## 2025-01-26 RX ORDER — IOPAMIDOL 755 MG/ML
105 INJECTION, SOLUTION INTRAVASCULAR
Status: COMPLETED | OUTPATIENT
Start: 2025-01-26 | End: 2025-01-26

## 2025-01-26 RX ORDER — ONDANSETRON 2 MG/ML
4 INJECTION INTRAMUSCULAR; INTRAVENOUS ONCE
Status: DISCONTINUED | OUTPATIENT
Start: 2025-01-26 | End: 2025-01-26

## 2025-01-26 RX ORDER — ATROPINE SULFATE 0.1 MG/ML
INJECTION INTRAVENOUS
Status: DISPENSED
Start: 2025-01-26 | End: 2025-01-27

## 2025-01-26 RX ORDER — INSULIN LISPRO 100 [IU]/ML
0-4 INJECTION, SOLUTION INTRAVENOUS; SUBCUTANEOUS
Status: DISCONTINUED | OUTPATIENT
Start: 2025-01-26 | End: 2025-02-03 | Stop reason: HOSPADM

## 2025-01-26 RX ORDER — PROCHLORPERAZINE EDISYLATE 5 MG/ML
10 INJECTION INTRAMUSCULAR; INTRAVENOUS EVERY 6 HOURS PRN
Status: DISCONTINUED | OUTPATIENT
Start: 2025-01-26 | End: 2025-02-03 | Stop reason: HOSPADM

## 2025-01-26 RX ORDER — FINASTERIDE 5 MG/1
5 TABLET, FILM COATED ORAL DAILY
Status: DISCONTINUED | OUTPATIENT
Start: 2025-01-27 | End: 2025-01-28

## 2025-01-26 RX ORDER — ACETAMINOPHEN 650 MG/1
650 SUPPOSITORY RECTAL EVERY 6 HOURS PRN
Status: DISCONTINUED | OUTPATIENT
Start: 2025-01-26 | End: 2025-02-03 | Stop reason: HOSPADM

## 2025-01-26 RX ORDER — NOREPINEPHRINE BITARTRATE 0.06 MG/ML
1-100 INJECTION, SOLUTION INTRAVENOUS CONTINUOUS
Status: DISCONTINUED | OUTPATIENT
Start: 2025-01-26 | End: 2025-01-28

## 2025-01-26 RX ORDER — IPRATROPIUM BROMIDE AND ALBUTEROL SULFATE 2.5; .5 MG/3ML; MG/3ML
1 SOLUTION RESPIRATORY (INHALATION) EVERY 6 HOURS PRN
Status: DISCONTINUED | OUTPATIENT
Start: 2025-01-26 | End: 2025-02-03 | Stop reason: HOSPADM

## 2025-01-26 RX ORDER — HYDROCORTISONE SODIUM SUCCINATE 100 MG/2ML
100 INJECTION INTRAMUSCULAR; INTRAVENOUS ONCE
Status: COMPLETED | OUTPATIENT
Start: 2025-01-26 | End: 2025-01-26

## 2025-01-26 RX ORDER — DEXTROSE MONOHYDRATE 100 MG/ML
INJECTION, SOLUTION INTRAVENOUS CONTINUOUS PRN
Status: DISCONTINUED | OUTPATIENT
Start: 2025-01-26 | End: 2025-02-03 | Stop reason: HOSPADM

## 2025-01-26 RX ORDER — GLUCAGON 1 MG/ML
1 KIT INJECTION PRN
Status: DISCONTINUED | OUTPATIENT
Start: 2025-01-26 | End: 2025-02-03 | Stop reason: HOSPADM

## 2025-01-26 RX ADMIN — VANCOMYCIN HYDROCHLORIDE 1750 MG: 500 INJECTION, POWDER, LYOPHILIZED, FOR SOLUTION INTRAVENOUS at 16:59

## 2025-01-26 RX ADMIN — Medication 20 MCG/MIN: at 19:04

## 2025-01-26 RX ADMIN — PROCHLORPERAZINE EDISYLATE 10 MG: 5 INJECTION INTRAMUSCULAR; INTRAVENOUS at 19:43

## 2025-01-26 RX ADMIN — INSULIN LISPRO 2 UNITS: 100 INJECTION, SOLUTION INTRAVENOUS; SUBCUTANEOUS at 23:55

## 2025-01-26 RX ADMIN — SODIUM CHLORIDE 1000 ML: 9 INJECTION, SOLUTION INTRAVENOUS at 13:28

## 2025-01-26 RX ADMIN — SODIUM CHLORIDE 500 ML: 9 INJECTION, SOLUTION INTRAVENOUS at 21:14

## 2025-01-26 RX ADMIN — PIPERACILLIN AND TAZOBACTAM 4500 MG: 4; .5 INJECTION, POWDER, FOR SOLUTION INTRAVENOUS at 14:27

## 2025-01-26 RX ADMIN — Medication 5 MCG/MIN: at 15:09

## 2025-01-26 RX ADMIN — PIPERACILLIN AND TAZOBACTAM 4500 MG: 4; .5 INJECTION, POWDER, FOR SOLUTION INTRAVENOUS at 23:03

## 2025-01-26 RX ADMIN — SODIUM CHLORIDE, SODIUM LACTATE, POTASSIUM CHLORIDE, AND CALCIUM CHLORIDE: .6; .31; .03; .02 INJECTION, SOLUTION INTRAVENOUS at 22:33

## 2025-01-26 RX ADMIN — SODIUM CHLORIDE, PRESERVATIVE FREE 10 ML: 5 INJECTION INTRAVENOUS at 19:44

## 2025-01-26 RX ADMIN — SODIUM CHLORIDE 1000 ML: 9 INJECTION, SOLUTION INTRAVENOUS at 13:00

## 2025-01-26 RX ADMIN — HYDROCORTISONE SODIUM SUCCINATE 100 MG: 100 INJECTION, POWDER, FOR SOLUTION INTRAMUSCULAR; INTRAVENOUS at 15:10

## 2025-01-26 RX ADMIN — MONTELUKAST 10 MG: 10 TABLET, FILM COATED ORAL at 19:44

## 2025-01-26 RX ADMIN — IOPAMIDOL 105 ML: 755 INJECTION, SOLUTION INTRAVENOUS at 14:15

## 2025-01-26 RX ADMIN — HYDROCORTISONE SODIUM SUCCINATE 100 MG: 100 INJECTION, POWDER, FOR SOLUTION INTRAMUSCULAR; INTRAVENOUS at 23:01

## 2025-01-26 RX ADMIN — SODIUM CHLORIDE 1000 ML: 9 INJECTION, SOLUTION INTRAVENOUS at 13:50

## 2025-01-26 ASSESSMENT — PAIN SCALES - GENERAL
PAINLEVEL_OUTOF10: 0
PAINLEVEL_OUTOF10: 0

## 2025-01-26 ASSESSMENT — PAIN - FUNCTIONAL ASSESSMENT: PAIN_FUNCTIONAL_ASSESSMENT: NONE - DENIES PAIN

## 2025-01-26 NOTE — ED PROVIDER NOTES
[facility] EMERGENCY DEPARTMENT  EMERGENCY DEPARTMENT ENCOUNTER        Pt Name: Michael Garcia  MRN: [id number]  Birthdate 1945  Date of evaluation: 1/26/2025  Provider: Albino Cooper MD  PCP: Moshe Moran DO  Note Started: 1:25 PM EST 1/26/25    CHIEF COMPLAINT & HISTORY     Chief Complaint   Patient presents with    Hypotension     Pt sent in from [facility] due to tachycardia and hypotension         History From: pt  Michael Garcia is a 79 y.o. deaf male w/pmhx of stage III pancreatic cancer post 8/12/24 whipple with Cathryn, on chemo now with hemonc Dr. Oh, admitted to MICU for septic shock. He has a history of aortic aneurysm that did not require repair and is about 3.8 cm.. He had a mediport removed 1/3/25 that was suspicious for infection and he received ampicillin/ceftriaxone and then augmentin until 1/17. PAUL showed no vegetations. Per girlfriend he has had waxing/waning chest pains and SOB for a while but he and her cannot define exactly how long this has been going on. He is from a nursing home. He follows with Dr. Mireles and does not have a current chemo port, but is on eliquis so he can get his new port placed tomorrow 1/27. On arrival he arrives severely hypotensive but is alert and oriented and can follow commands.      Unless otherwise noted in HPI above, patient denies fever, chills, headache, shortness of breath, chest pain, abdominal pain, nausea, vomiting, diarrhea, lightheadedness, dysuria, hematuria, hematochezia, and melena.    Medical Decision Making/Differential Diagnosis/ED Course:    CC/HPI Summary, Social Determinants of health, Records Reviewed, DDx, testing done/not done, ED Course, Reassessment, disposition considerations/shared decision making with patient, consults, disposition:      Is this patient to be included in the SEP-1 core measure? Yes SEP-1 CORE MEASURE DATA      Sepsis Criteria   Severe Sepsis Criteria   Septic 
mouth daily .       ALLERGIES     No known allergies    FAMILYHISTORY       Family History   Problem Relation Age of Onset    Heart Disease Sister     Heart Disease Sister     Heart Attack Brother         early to mid 60s    Heart Disease Paternal Cousin         CABG        SOCIAL HISTORY       Social History     Tobacco Use    Smoking status: Never    Smokeless tobacco: Never   Vaping Use    Vaping status: Never Used   Substance Use Topics    Alcohol use: No    Drug use: No       SCREENINGS                         CIWA Assessment  BP: (!) 86/68  Pulse: (!) 120           PHYSICAL EXAM  1 or more Elements     ED Triage Vitals [01/26/25 1248]   BP Systolic BP Percentile Diastolic BP Percentile Temp Temp src Pulse Respirations SpO2   (!) 51/34 -- -- 97.8 °F (36.6 °C) -- (!) 141 16 99 %      Height Weight         -- --                       Constitutional/General: Alert and oriented x3  Head: Normocephalic and atraumatic  Eyes: PERRL, EOMI, sclera non icteric  Mouth:  Oropharynx clear, handling secretions  Neck: Supple, full ROM, no stridor, no meningeal signs  Respiratory: Lungs clear to auscultation bilaterally, no wheezes, rales, or rhonchi. Not in respiratory distress  Cardiovascular: Tachycardic but regular rhythm. No murmurs, no gallops, no rubs. 2+ distal pulses. Equal extremity pulses.   GI:  Abdomen Soft, Non tender, Non distended.  No rebound, guarding, or rigidity. No pulsatile masses.  Musculoskeletal: Moves all extremities x 4. Warm and well perfused, no clubbing, no cyanosis, no edema. Capillary refill <3 seconds. Compartments soft and compressible.  No calf tenderness or palpable cords.  Integument: skin warm and dry. No rashes.   Neurologic: GCS 15, no focal deficits, symmetric strength  in the upper and lower extremities bilaterally  Psychiatric: Normal Affect            DIAGNOSTIC RESULTS   LABS: Interpreted by Oscar Rae MD    Labs Reviewed   CBC WITH AUTO DIFFERENTIAL - Abnormal; Notable for the

## 2025-01-26 NOTE — ED NOTES
Radiology Procedure Waiver   Name: Michael Garcia  : 1945  MRN: [id number]    Date:  25    Time: 12:54 PM EST    Benefits of immediately proceeding with Radiology exam(s) without pre-testing outweigh the risks or are not indicated as specified below and therefore the following is/are being waived:    [] Pregnancy test   [] Patients LMP on-time and regular.   [] Patient had Tubal Ligation or has other Contraception Device.   [] Patient  is Menopausal or Premenarcheal.    [] Patient had Full or Partial Hysterectomy.    [] Protocol for Iodine allergy    [] MRI Questionnaire     [x] BUN/Creatinine   [] Patient age w/no hx of renal dysfunction.   [] Patient on Dialysis.   [] Recent Normal Labs.  Electronically signed by Oscar Rae MD on 25 at 12:54 PM Oscar Schofield MD  [phone]     no back pain, no gout, no musculoskeletal pain, no neck pain, and no weakness.

## 2025-01-26 NOTE — H&P
Inpatient H&P      PCP:  Moshe Moran DO  Admitting Physician:  Cammie Alberts DO  Consultants:  Critical care  Chief Complaint:    Chief Complaint   Patient presents with    Hypotension     Pt sent in from [facility] due to tachycardia and hypotension       History of Present Illness  Michael Garcia is a 79 y.o. male who presents to [facility] ER complaining of hypotension.    Michael Garcia has a past medical history that includes pancreatic cancer, hypertension, hyperlipidemia, history DVT    Michael presents to ER with hypotension and tachycardia from [facility].  He was found to have BP of 51/34 and tachycardic at 141 in the ER.  He recently was admitted and treated for COVID-19 and bacteremia.  CTA of the chest was performed in the ER which showed large left pleural effusion, small right pleural effusion, prominent pericardial effusion.  Heterogeneous appearance of the liver concerning for metastatic disease.  He does have a history of pancreatic cancer.  Bedside echo was performed and not in tamponade at this time.  Was started on Levophed and will be admitted to ICU.  He also went into third-degree heart block momentarily in the ER. Patient is [place] but reads lips. He admits to lightheadedness  Discussed patient's case with ED physician.    ER Course  Upon presentation to the ER, routine labwork was performed which revealed lactic acid 2.2, troponin 40, glucose 159, hemoglobin 11.6.  Imaging results are as outlined below in the Imaging section of this note.     Upon arrival to the ER, patient was 51/34, tachycardic at 141.  The patient received 2L NS, zosyn in the emergency room and was admitted to [facility].    Last Hospital Admission - I personally reviewed previous admission from 12/31/24   COVID-19  Active Problems:    Essential hypertension    HLD (hyperlipidemia)    Adenocarcinoma of pancreas (HCC)    Severe protein-calorie malnutrition (HCC)    Bacteremia    Port or reservoir

## 2025-01-27 LAB
ALBUMIN SERPL-MCNC: 2.5 G/DL (ref 3.5–5.2)
ALP SERPL-CCNC: 140 U/L (ref 40–129)
ALT SERPL-CCNC: 7 U/L (ref 0–40)
ANION GAP SERPL CALCULATED.3IONS-SCNC: 16 MMOL/L (ref 7–16)
AST SERPL-CCNC: 10 U/L (ref 0–39)
BILIRUB SERPL-MCNC: 0.6 MG/DL (ref 0–1.2)
BUN SERPL-MCNC: 14 MG/DL (ref 6–23)
CALCIUM SERPL-MCNC: 8.5 MG/DL (ref 8.6–10.2)
CHLORIDE SERPL-SCNC: 100 MMOL/L (ref 98–107)
CHOLEST SERPL-MCNC: 138 MG/DL
CO2 SERPL-SCNC: 16 MMOL/L (ref 22–29)
CREAT SERPL-MCNC: 0.7 MG/DL (ref 0.7–1.2)
DATE LAST DOSE: NORMAL
ERYTHROCYTE [DISTWIDTH] IN BLOOD BY AUTOMATED COUNT: 16.9 % (ref 11.5–15)
GFR, ESTIMATED: >90 ML/MIN/1.73M2
GLUCOSE BLD-MCNC: 194 MG/DL (ref 74–99)
GLUCOSE BLD-MCNC: 203 MG/DL (ref 74–99)
GLUCOSE BLD-MCNC: 225 MG/DL (ref 74–99)
GLUCOSE BLD-MCNC: 249 MG/DL (ref 74–99)
GLUCOSE SERPL-MCNC: 262 MG/DL (ref 74–99)
HBA1C MFR BLD: 4.3 % (ref 4–5.6)
HCT VFR BLD AUTO: 36.7 % (ref 37–54)
HDLC SERPL-MCNC: 40 MG/DL
HGB BLD-MCNC: 11.7 G/DL (ref 12.5–16.5)
L PNEUMO1 AG UR QL IA.RAPID: NEGATIVE
LDLC SERPL CALC-MCNC: 81 MG/DL
MAGNESIUM SERPL-MCNC: 2 MG/DL (ref 1.6–2.6)
MCH RBC QN AUTO: 31.5 PG (ref 26–35)
MCHC RBC AUTO-ENTMCNC: 31.9 G/DL (ref 32–34.5)
MCV RBC AUTO: 98.9 FL (ref 80–99.9)
PHOSPHATE SERPL-MCNC: 5.1 MG/DL (ref 2.5–4.5)
PLATELET # BLD AUTO: 465 K/UL (ref 130–450)
PMV BLD AUTO: 9.7 FL (ref 7–12)
POTASSIUM SERPL-SCNC: 4 MMOL/L (ref 3.5–5)
PROT SERPL-MCNC: 5.7 G/DL (ref 6.4–8.3)
RBC # BLD AUTO: 3.71 M/UL (ref 3.8–5.8)
S PNEUM AG SPEC QL: NEGATIVE
SODIUM SERPL-SCNC: 132 MMOL/L (ref 132–146)
SPECIMEN SOURCE: NORMAL
TME LAST DOSE: NORMAL H
TRIGL SERPL-MCNC: 83 MG/DL
TSH SERPL DL<=0.05 MIU/L-ACNC: 2.12 UIU/ML (ref 0.27–4.2)
VANCOMYCIN DOSE: NORMAL MG
VANCOMYCIN SERPL-MCNC: 11.1 UG/ML (ref 5–40)
VLDLC SERPL CALC-MCNC: 17 MG/DL
WBC OTHER # BLD: 10.4 K/UL (ref 4.5–11.5)

## 2025-01-27 PROCEDURE — 2500000003 HC RX 250 WO HCPCS: Performed by: INTERNAL MEDICINE

## 2025-01-27 PROCEDURE — 83735 ASSAY OF MAGNESIUM: CPT

## 2025-01-27 PROCEDURE — 99291 CRITICAL CARE FIRST HOUR: CPT | Performed by: INTERNAL MEDICINE

## 2025-01-27 PROCEDURE — 85027 COMPLETE CBC AUTOMATED: CPT

## 2025-01-27 PROCEDURE — 80053 COMPREHEN METABOLIC PANEL: CPT

## 2025-01-27 PROCEDURE — 2580000003 HC RX 258: Performed by: NURSE PRACTITIONER

## 2025-01-27 PROCEDURE — 6360000002 HC RX W HCPCS: Performed by: NURSE PRACTITIONER

## 2025-01-27 PROCEDURE — APPSS60 APP SPLIT SHARED TIME 46-60 MINUTES: Performed by: CLINICAL NURSE SPECIALIST

## 2025-01-27 PROCEDURE — 2500000003 HC RX 250 WO HCPCS: Performed by: EMERGENCY MEDICINE

## 2025-01-27 PROCEDURE — 99232 SBSQ HOSP IP/OBS MODERATE 35: CPT | Performed by: INTERNAL MEDICINE

## 2025-01-27 PROCEDURE — 83036 HEMOGLOBIN GLYCOSYLATED A1C: CPT

## 2025-01-27 PROCEDURE — 80061 LIPID PANEL: CPT

## 2025-01-27 PROCEDURE — 82962 GLUCOSE BLOOD TEST: CPT

## 2025-01-27 PROCEDURE — 6370000000 HC RX 637 (ALT 250 FOR IP): Performed by: NURSE PRACTITIONER

## 2025-01-27 PROCEDURE — 84100 ASSAY OF PHOSPHORUS: CPT

## 2025-01-27 PROCEDURE — 84443 ASSAY THYROID STIM HORMONE: CPT

## 2025-01-27 PROCEDURE — 2000000000 HC ICU R&B

## 2025-01-27 PROCEDURE — 80202 ASSAY OF VANCOMYCIN: CPT

## 2025-01-27 PROCEDURE — 6370000000 HC RX 637 (ALT 250 FOR IP): Performed by: INTERNAL MEDICINE

## 2025-01-27 PROCEDURE — 99222 1ST HOSP IP/OBS MODERATE 55: CPT | Performed by: PHYSICIAN ASSISTANT

## 2025-01-27 RX ADMIN — PANTOPRAZOLE SODIUM 40 MG: 40 TABLET, DELAYED RELEASE ORAL at 21:32

## 2025-01-27 RX ADMIN — PIPERACILLIN AND TAZOBACTAM 4500 MG: 4; .5 INJECTION, POWDER, FOR SOLUTION INTRAVENOUS at 21:31

## 2025-01-27 RX ADMIN — PIPERACILLIN AND TAZOBACTAM 4500 MG: 4; .5 INJECTION, POWDER, FOR SOLUTION INTRAVENOUS at 05:50

## 2025-01-27 RX ADMIN — INSULIN LISPRO 1 UNITS: 100 INJECTION, SOLUTION INTRAVENOUS; SUBCUTANEOUS at 21:38

## 2025-01-27 RX ADMIN — INSULIN LISPRO 1 UNITS: 100 INJECTION, SOLUTION INTRAVENOUS; SUBCUTANEOUS at 12:17

## 2025-01-27 RX ADMIN — INSULIN LISPRO 1 UNITS: 100 INJECTION, SOLUTION INTRAVENOUS; SUBCUTANEOUS at 06:05

## 2025-01-27 RX ADMIN — HYDROCORTISONE SODIUM SUCCINATE 100 MG: 100 INJECTION, POWDER, FOR SOLUTION INTRAMUSCULAR; INTRAVENOUS at 14:25

## 2025-01-27 RX ADMIN — HYDROCORTISONE SODIUM SUCCINATE 100 MG: 100 INJECTION, POWDER, FOR SOLUTION INTRAMUSCULAR; INTRAVENOUS at 05:45

## 2025-01-27 RX ADMIN — PANTOPRAZOLE SODIUM 40 MG: 40 TABLET, DELAYED RELEASE ORAL at 08:22

## 2025-01-27 RX ADMIN — SODIUM CHLORIDE, PRESERVATIVE FREE 10 ML: 5 INJECTION INTRAVENOUS at 21:32

## 2025-01-27 RX ADMIN — TAMSULOSIN HYDROCHLORIDE 0.4 MG: 0.4 CAPSULE ORAL at 08:22

## 2025-01-27 RX ADMIN — Medication 2 MCG/MIN: at 14:34

## 2025-01-27 RX ADMIN — SODIUM CHLORIDE, PRESERVATIVE FREE 10 ML: 5 INJECTION INTRAVENOUS at 08:22

## 2025-01-27 RX ADMIN — SODIUM CHLORIDE 1500 MG: 0.9 INJECTION, SOLUTION INTRAVENOUS at 14:24

## 2025-01-27 RX ADMIN — HYDROCORTISONE SODIUM SUCCINATE 100 MG: 100 INJECTION, POWDER, FOR SOLUTION INTRAMUSCULAR; INTRAVENOUS at 21:32

## 2025-01-27 RX ADMIN — SODIUM CHLORIDE, SODIUM LACTATE, POTASSIUM CHLORIDE, AND CALCIUM CHLORIDE: .6; .31; .03; .02 INJECTION, SOLUTION INTRAVENOUS at 22:20

## 2025-01-27 RX ADMIN — FINASTERIDE 5 MG: 5 TABLET, FILM COATED ORAL at 08:22

## 2025-01-27 RX ADMIN — INSULIN LISPRO 1 UNITS: 100 INJECTION, SOLUTION INTRAVENOUS; SUBCUTANEOUS at 17:12

## 2025-01-27 RX ADMIN — PIPERACILLIN AND TAZOBACTAM 4500 MG: 4; .5 INJECTION, POWDER, FOR SOLUTION INTRAVENOUS at 14:25

## 2025-01-27 RX ADMIN — SODIUM CHLORIDE, SODIUM LACTATE, POTASSIUM CHLORIDE, AND CALCIUM CHLORIDE: .6; .31; .03; .02 INJECTION, SOLUTION INTRAVENOUS at 06:10

## 2025-01-27 RX ADMIN — SENNOSIDES AND DOCUSATE SODIUM 2 TABLET: 50; 8.6 TABLET ORAL at 08:22

## 2025-01-27 ASSESSMENT — PAIN SCALES - GENERAL
PAINLEVEL_OUTOF10: 0

## 2025-01-27 NOTE — PLAN OF CARE
Problem: Chronic Conditions and Co-morbidities  Goal: Patient's chronic conditions and co-morbidity symptoms are monitored and maintained or improved  Outcome: Progressing     Problem: Discharge Planning  Goal: Discharge to home or other facility with appropriate resources  Outcome: Progressing     Problem: Cardiovascular - Adult  Goal: Maintains optimal cardiac output and hemodynamic stability  Outcome: Progressing     Problem: Cardiovascular - Adult  Goal: Absence of cardiac dysrhythmias or at baseline  Outcome: Progressing     Problem: Gastrointestinal - Adult  Goal: Minimal or absence of nausea and vomiting  Outcome: Progressing     Problem: Gastrointestinal - Adult  Goal: Maintains adequate nutritional intake  Outcome: Progressing     Problem: Genitourinary - Adult  Goal: Absence of urinary retention  Outcome: Progressing     Problem: Genitourinary - Adult  Goal: Urinary catheter remains patent  Outcome: Progressing

## 2025-01-27 NOTE — CONSULTS
Palliative Care Department  [phone]  Palliative Care Initial Consult  Provider Katherine Moya PA-C     Michael Garcia  [id number]  Hospital Day: 2  Date of Initial Consult: 01/27/2025  Referring Provider: Dana Olsen APRN - CNS   Palliative Medicine was consulted for assistance with: goals of care    HPI:   Michael Garcia is a 79 y.o. Deaf person with a medical history of pancreatic cancer, type 2 diabetes, BPH, hypertension who was admitted on 1/26/2025 from nursing facility with a CHIEF COMPLAINT of hypotension..  He initially presented with tachycardia as well he then became bradycardic.  Concern for pauses/heart block.    Bedside showing pericardial/evidence of tamponade.  LVEF 55 to 60%, dilated right atrium..  Cardiology consulted and recommended transcutaneous pacing if it would recur.  Heart rate improved in the ED.  CTA of chest abdomen and pelvis was negative for PE, bilateral pleural effusions with adjacent atelectasis, prominent pericardial effusion, heterogeneous appearance of liver concerning for metastatic disease.  Patient admitted to medical ICU requiring vasopressor support.  Pan culture sent.  Started on empiric Zosyn.  Patient previously admitted from 12/31 - 1/13 for sepsis/bacteremia from an infected Mediport.  He was treated with ampicillin and Rocephin which was transitioned to Augmentin on discharge.  PAUL was negative for vegetations at that time.  He did have a run of SVT during that admission and was treated with adenosine and amiodarone drip.  He was discharged on metoprolol.    ASSESSMENT/PLAN:     Pertinent Hospital Diagnoses     Hypotension  Bradycardia  Advanced pancreatic adenocarcinoma with new liver lesions concerning for metastasis        Palliative Care Encounter / Counseling Regarding Goals of Care  Please see detailed goals of care discussion as below  At this time, Michael Garcia, Does have capacity for medical decision-making.  Capacity is time limited 
Critical Care Consult Note    Patient - Michael Garcia   MRN -  [id number]   [id number]   - 1945      Date of Admission -  2025 12:46 PM  Date of evaluation -  2025  4404/4404-A   Hospital Day - 0          ADMIT/CONSULT DETAILS     Reason for Admit/Consult   Septic shock  Pericardial effusion    Consulting Service/Physician   Consulting - Cammie Alberts DO  Primary Care Physician - Moshe Moran, DO         HPI   The patient is a 79 y.o. male with significant past medical history of pancreatic cancer stage III status post Whipple on active chemotherapy, prostate cancer status post XRT recently admitted 2024 through 2025 for septic shock and bacteremia with multi organism, concern for Mediport infection and was removed on 1/3/2025, patient was discharged to nursing home now presented to ED on 2025 with hypotension and tachycardia.  Patient complain of dizziness and feeling tired.  He denies any chest pain or short of breath.  Upon ED workup, found to be hypotensive 86/68, pulse of 120.  Lab work showed potassium of 3.8, BUN of 10, creatinine 0.6, lactic of 2.2, troponin of 40, hemoglobin 11.6, platelets of 376.  UA was negative for leukocyte esterases.  Chest x-ray showed left lung base atelectasis and small pleural effusion.  The right lung is clear.  CTA pulmonary and abdomen with contrast showed no sign of acute pulmonary embolism.  Large left pericardial effusion with adjacent atelectasis.  Small right pleural effusion with adjacent atelectasis.  Prominent pericardial effusion.  Heterogeneous appearance of the liver concerning for metastatic disease.  No signs of lithiasis or obstructive uropathy.  No bowel obstruction.  Patient had a bradycardia and pauses, cardiology was consulted, did a bedside echocardiogram that showed a EF of 55 to 60%.  Right atrium is dilated, moderate pericardial effusion with no indication of cardiac tamponade.  He recommended to 
24  ? [place]  Pancreatic head adenocarcinoma with duodenal obstruction s/p whipple procedure w portal vein reconstruction 8/14/24 - T3N2 (Stage III), delayed gastric emptying requiring parenteral nutrition s/p corepak placement 8/29/24  Dr Santoyo /[facility]  On Creon  R IJ port placed 9/17 >> removed for sepsis 12/31/24  Adjuvant chemotherapy gemcitabine and Abraxane started 10/24  Liver mets 1/2025  Hx Prostate CA treated with XRT+ADT in 2023.    1/2025 small right middle lobe PE / DVT left mid femoral vein extending inferiorly into popliteal vein and trunk occlusive on Eliquis   BPH on Proscar and Flomax  OA  Carpal tunnel  COVID / Sepsis admit 12/2024 Blood cultures positive for Klebsiella, E coli , Strep bacteremia, Enterococcus. Felt to be associated with the venous access port, now removed. PAUL negative for vegetation / small right middle lobe PE / DVT left mid femoral vein extending inferiorly into popliteal vein and trunk occlusive 1/1/2025--on Eliquis  Deaf - uses sign        Cardiac Tests:     [place] CT for aortic dissection evaluation: 9/2022  IMPRESSION:   1.  No acute aortic dissection, intramural hematoma, or aneurysm.   2.  Coronary artery calcifications, most conspicuous in the proximal LAD.   3.  Trace bilateral pleural effusions.         Lexiscan done on 12/12/2022. Dr Michael   Moderate size fixed inferior defect probably due to diaphragmatic attenuation artifact and less likely scar from previous myocardial infarction  Absence of reversible ischemia.  Absence of segmental wall motion abnormalities.  Ejection fraction 57%.  4.   Low risk pharmacologic stress test.     Echocardiogram done on 2/10/2023:Dr Michael   Technically adequate study.  Mild tricuspid regurgitation.  RVSP 33 mmHg.  EF 55%.  Normal right ventricular size and function      PAUL 01/07/2025: Dr. Ndiaye    Left Ventricle: Normal left ventricular systolic function with a visually estimated EF of 60 - 65%. Left

## 2025-01-27 NOTE — PROCEDURES
PROCEDURE NOTE  Date: 1/26/2025   Name: Michael Garcia  YOB: 1945    Procedures        Department of Internal Medicine  Division of Pulmonary, Critical Care & Sleep Medicine  [facility]    PROCEDURE NOTE    PROCEDURE:  Arterial line placement.     INDICATIONS:  Continuous hemodynamic monitoring of vital signs.     CNP:   BUD Cruz CNP    ANESTHESIA:  N/a    CONSENT:   Informed written obtained.     PROCEDURAL TECHNIQUE:  Procedure was done using strict aseptic technique. Right radial site was cleaned with chloraprep and draped.  Radial artery was identified by ultrasound guided with sterile probe cover.  Radial arterial line was inserted, a good blood flow was obtained, after which guidewire was inserted all the way with no resistance. Then the canula was inserted and needle with guidewire was withdrawn. Pulsatile bright red blood flow was observed. The canula was connected to BP monitoring apparatus and a good waveform was noted. Then the canula was secured with 2 stay sutures of 3-0 silk after Lidocaine infiltration, following which dressing was applied. The patient tolerated the procedure.     COMPLICATIONS: No immediate complication.       BUD Cruz CNP

## 2025-01-27 NOTE — PROCEDURES
PROCEDURE NOTE  Date: 1/26/2025   Name: Michael Garcia  YOB: 1945    Procedures        Procedure: right internal jugular vein Triple Lumen Catheter placement.    Indications: vascular access, hypovolemia, and centrally administered medications    Consent: The patient provided verbal consent for this procedure.    Number of sticks: 1    Number of Kits used: 1    Procedure: Time Out: Immediately prior to the procedure a \"timeout\" was called to verify the correct patient and procedure. The patient was place in the trendelenburg position and the skin over the right internal jugular vein was prepped with betadine and draped in a sterile fashion and draped in a sterile fashion. Local anesthesia was obtained by infiltration using 2% Lidocaine without epinephrine.  With Ultrasound guidance a large bore needle was used to identify the vein, dark non pulsatile blood returned. The guide wire was then inserted through the needle with minimal resistance. 2 mm nick was made in the skin beside the guidewire. Then a dilator was inserted and removed. A triple lumen catheter was then inserted into the vessel over the guide wire using the Seldinger technique to the 15 cm tiffani.  All ports showed good, free flowing blood return and were flushed with saline solution.  The catheter was then securely fastened to the skin with sutures and with an adhesive dressing and covered with a bio patch and sterile dressing.  A post procedure X-ray was ordered and showed good line position.    Complications: None   The patient tolerated the procedure well.    Estimated blood loss: 4 ml.    BUD Cruz CNP    1/26/2025

## 2025-01-27 NOTE — PLAN OF CARE
Problem: Chronic Conditions and Co-morbidities  Goal: Patient's chronic conditions and co-morbidity symptoms are monitored and maintained or improved  1/27/2025 0055 by Rosibel Ko RN  Outcome: Progressing     Problem: Discharge Planning  Goal: Discharge to home or other facility with appropriate resources  1/27/2025 0055 by Rosibel Ko RN  Outcome: Progressing    Problem: Cardiovascular - Adult  Goal: Maintains optimal cardiac output and hemodynamic stability  1/27/2025 0055 by Rosibel Ko RN  Outcome: Progressing  Flowsheets (Taken 1/26/2025 2000)  Maintains optimal cardiac output and hemodynamic stability:   Monitor blood pressure and heart rate   Monitor urine output and notify Licensed Independent Practitioner for values outside of normal range   Assess for signs of decreased cardiac output   Administer fluid and/or volume expanders as ordered   Administer vasoactive medications as ordered    Goal: Absence of cardiac dysrhythmias or at baseline  1/27/2025 0055 by Rosibel Ko RN  Outcome: Progressing  Flowsheets (Taken 1/26/2025 2000)  Absence of cardiac dysrhythmias or at baseline:   Monitor cardiac rate and rhythm   Assess for signs of decreased cardiac output   Administer antiarrhythmia medication and electrolyte replacement as ordered     Problem: Gastrointestinal - Adult  Goal: Minimal or absence of nausea and vomiting  1/27/2025 0055 by Rosibel Ko RN  Outcome: Progressing  Flowsheets (Taken 1/26/2025 2000)  Minimal or absence of nausea and vomiting:   Administer IV fluids as ordered to ensure adequate hydration   Maintain NPO status until nausea and vomiting are resolved   Nasogastric tube to low intermittent suction as ordered   Administer ordered antiemetic medications as needed   Provide nonpharmacologic comfort measures as appropriate   Advance diet as tolerated, if ordered   Nutrition consult to assist patient with adequate nutrition and appropriate food choices    Goal:

## 2025-01-28 ENCOUNTER — TELEPHONE (OUTPATIENT)
Dept: HEMATOLOGY | Age: 80
End: 2025-01-28

## 2025-01-28 LAB
ALBUMIN SERPL-MCNC: 2.4 G/DL (ref 3.5–5.2)
ALP SERPL-CCNC: 100 U/L (ref 40–129)
ALT SERPL-CCNC: <5 U/L (ref 0–40)
ANION GAP SERPL CALCULATED.3IONS-SCNC: 14 MMOL/L (ref 7–16)
AST SERPL-CCNC: 8 U/L (ref 0–39)
BASOPHILS # BLD: 0 K/UL (ref 0–0.2)
BASOPHILS NFR BLD: 0 % (ref 0–2)
BILIRUB SERPL-MCNC: 0.4 MG/DL (ref 0–1.2)
BUN SERPL-MCNC: 10 MG/DL (ref 6–23)
CALCIUM SERPL-MCNC: 8.3 MG/DL (ref 8.6–10.2)
CHLORIDE SERPL-SCNC: 102 MMOL/L (ref 98–107)
CO2 SERPL-SCNC: 19 MMOL/L (ref 22–29)
CREAT SERPL-MCNC: 0.7 MG/DL (ref 0.7–1.2)
EOSINOPHIL # BLD: 0 K/UL (ref 0.05–0.5)
EOSINOPHILS RELATIVE PERCENT: 0 % (ref 0–6)
ERYTHROCYTE [DISTWIDTH] IN BLOOD BY AUTOMATED COUNT: 16.7 % (ref 11.5–15)
GFR, ESTIMATED: >90 ML/MIN/1.73M2
GLUCOSE BLD-MCNC: 188 MG/DL (ref 74–99)
GLUCOSE BLD-MCNC: 200 MG/DL (ref 74–99)
GLUCOSE BLD-MCNC: 209 MG/DL (ref 74–99)
GLUCOSE BLD-MCNC: 254 MG/DL (ref 74–99)
GLUCOSE SERPL-MCNC: 201 MG/DL (ref 74–99)
HCT VFR BLD AUTO: 28.5 % (ref 37–54)
HGB BLD-MCNC: 8.9 G/DL (ref 12.5–16.5)
LYMPHOCYTES NFR BLD: 0.24 K/UL (ref 1.5–4)
LYMPHOCYTES RELATIVE PERCENT: 4 % (ref 20–42)
MAGNESIUM SERPL-MCNC: 2 MG/DL (ref 1.6–2.6)
MCH RBC QN AUTO: 30.9 PG (ref 26–35)
MCHC RBC AUTO-ENTMCNC: 31.2 G/DL (ref 32–34.5)
MCV RBC AUTO: 99 FL (ref 80–99.9)
MICROORGANISM SPEC CULT: NO GROWTH
MICROORGANISM SPEC CULT: NORMAL
MONOCYTES NFR BLD: 0.12 K/UL (ref 0.1–0.95)
MONOCYTES NFR BLD: 2 % (ref 2–12)
NEUTROPHILS NFR BLD: 95 % (ref 43–80)
NEUTS SEG NFR BLD: 6.63 K/UL (ref 1.8–7.3)
PHOSPHATE SERPL-MCNC: 3 MG/DL (ref 2.5–4.5)
PLATELET # BLD AUTO: 328 K/UL (ref 130–450)
PMV BLD AUTO: 9 FL (ref 7–12)
POTASSIUM SERPL-SCNC: 3.8 MMOL/L (ref 3.5–5)
PROT SERPL-MCNC: 4.9 G/DL (ref 6.4–8.3)
RBC # BLD AUTO: 2.88 M/UL (ref 3.8–5.8)
RBC # BLD: ABNORMAL 10*6/UL
SERVICE CMNT-IMP: NORMAL
SODIUM SERPL-SCNC: 135 MMOL/L (ref 132–146)
SPECIMEN DESCRIPTION: NORMAL
SPECIMEN DESCRIPTION: NORMAL
WBC OTHER # BLD: 7 K/UL (ref 4.5–11.5)

## 2025-01-28 PROCEDURE — 51798 US URINE CAPACITY MEASURE: CPT

## 2025-01-28 PROCEDURE — 6370000000 HC RX 637 (ALT 250 FOR IP): Performed by: INTERNAL MEDICINE

## 2025-01-28 PROCEDURE — 6360000002 HC RX W HCPCS: Performed by: NURSE PRACTITIONER

## 2025-01-28 PROCEDURE — 85025 COMPLETE CBC W/AUTO DIFF WBC: CPT

## 2025-01-28 PROCEDURE — 80053 COMPREHEN METABOLIC PANEL: CPT

## 2025-01-28 PROCEDURE — 99233 SBSQ HOSP IP/OBS HIGH 50: CPT | Performed by: INTERNAL MEDICINE

## 2025-01-28 PROCEDURE — 83735 ASSAY OF MAGNESIUM: CPT

## 2025-01-28 PROCEDURE — 82962 GLUCOSE BLOOD TEST: CPT

## 2025-01-28 PROCEDURE — 2580000003 HC RX 258: Performed by: NURSE PRACTITIONER

## 2025-01-28 PROCEDURE — 2060000000 HC ICU INTERMEDIATE R&B

## 2025-01-28 PROCEDURE — 6360000002 HC RX W HCPCS: Performed by: INTERNAL MEDICINE

## 2025-01-28 PROCEDURE — 99231 SBSQ HOSP IP/OBS SF/LOW 25: CPT | Performed by: INTERNAL MEDICINE

## 2025-01-28 PROCEDURE — 2500000003 HC RX 250 WO HCPCS: Performed by: INTERNAL MEDICINE

## 2025-01-28 PROCEDURE — 84100 ASSAY OF PHOSPHORUS: CPT

## 2025-01-28 PROCEDURE — 6370000000 HC RX 637 (ALT 250 FOR IP): Performed by: NURSE PRACTITIONER

## 2025-01-28 RX ORDER — SODIUM CHLORIDE 0.9 % (FLUSH) 0.9 %
5-40 SYRINGE (ML) INJECTION PRN
Status: DISCONTINUED | OUTPATIENT
Start: 2025-01-28 | End: 2025-01-28

## 2025-01-28 RX ORDER — HYDROCORTISONE SODIUM SUCCINATE 100 MG/2ML
50 INJECTION INTRAMUSCULAR; INTRAVENOUS EVERY 8 HOURS
Status: DISCONTINUED | OUTPATIENT
Start: 2025-01-28 | End: 2025-01-29

## 2025-01-28 RX ORDER — LIDOCAINE HYDROCHLORIDE 10 MG/ML
50 INJECTION, SOLUTION EPIDURAL; INFILTRATION; INTRACAUDAL; PERINEURAL ONCE
Status: DISCONTINUED | OUTPATIENT
Start: 2025-01-28 | End: 2025-02-03 | Stop reason: HOSPADM

## 2025-01-28 RX ORDER — SODIUM CHLORIDE 9 MG/ML
INJECTION, SOLUTION INTRAVENOUS PRN
Status: DISCONTINUED | OUTPATIENT
Start: 2025-01-28 | End: 2025-02-03 | Stop reason: HOSPADM

## 2025-01-28 RX ORDER — FINASTERIDE 5 MG/1
5 TABLET, FILM COATED ORAL DAILY
Status: DISCONTINUED | OUTPATIENT
Start: 2025-01-28 | End: 2025-02-03 | Stop reason: HOSPADM

## 2025-01-28 RX ORDER — SODIUM CHLORIDE 0.9 % (FLUSH) 0.9 %
5-40 SYRINGE (ML) INJECTION EVERY 12 HOURS SCHEDULED
Status: DISCONTINUED | OUTPATIENT
Start: 2025-01-28 | End: 2025-01-28

## 2025-01-28 RX ADMIN — PIPERACILLIN AND TAZOBACTAM 4500 MG: 4; .5 INJECTION, POWDER, FOR SOLUTION INTRAVENOUS at 21:46

## 2025-01-28 RX ADMIN — INSULIN LISPRO 1 UNITS: 100 INJECTION, SOLUTION INTRAVENOUS; SUBCUTANEOUS at 12:27

## 2025-01-28 RX ADMIN — INSULIN LISPRO 1 UNITS: 100 INJECTION, SOLUTION INTRAVENOUS; SUBCUTANEOUS at 06:55

## 2025-01-28 RX ADMIN — INSULIN LISPRO 1 UNITS: 100 INJECTION, SOLUTION INTRAVENOUS; SUBCUTANEOUS at 22:32

## 2025-01-28 RX ADMIN — INSULIN LISPRO 2 UNITS: 100 INJECTION, SOLUTION INTRAVENOUS; SUBCUTANEOUS at 17:08

## 2025-01-28 RX ADMIN — HYDROCORTISONE SODIUM SUCCINATE 50 MG: 100 INJECTION, POWDER, FOR SOLUTION INTRAMUSCULAR; INTRAVENOUS at 21:41

## 2025-01-28 RX ADMIN — FINASTERIDE 5 MG: 5 TABLET, FILM COATED ORAL at 17:13

## 2025-01-28 RX ADMIN — HYDROCORTISONE SODIUM SUCCINATE 100 MG: 100 INJECTION, POWDER, FOR SOLUTION INTRAMUSCULAR; INTRAVENOUS at 12:28

## 2025-01-28 RX ADMIN — PIPERACILLIN AND TAZOBACTAM 4500 MG: 4; .5 INJECTION, POWDER, FOR SOLUTION INTRAVENOUS at 12:33

## 2025-01-28 RX ADMIN — SODIUM CHLORIDE, PRESERVATIVE FREE 10 ML: 5 INJECTION INTRAVENOUS at 08:32

## 2025-01-28 RX ADMIN — PANTOPRAZOLE SODIUM 40 MG: 40 TABLET, DELAYED RELEASE ORAL at 21:41

## 2025-01-28 RX ADMIN — SENNOSIDES AND DOCUSATE SODIUM 2 TABLET: 50; 8.6 TABLET ORAL at 08:33

## 2025-01-28 RX ADMIN — PIPERACILLIN AND TAZOBACTAM 4500 MG: 4; .5 INJECTION, POWDER, FOR SOLUTION INTRAVENOUS at 05:51

## 2025-01-28 RX ADMIN — SODIUM CHLORIDE 1500 MG: 0.9 INJECTION, SOLUTION INTRAVENOUS at 12:31

## 2025-01-28 RX ADMIN — PANTOPRAZOLE SODIUM 40 MG: 40 TABLET, DELAYED RELEASE ORAL at 08:33

## 2025-01-28 RX ADMIN — HYDROCORTISONE SODIUM SUCCINATE 100 MG: 100 INJECTION, POWDER, FOR SOLUTION INTRAMUSCULAR; INTRAVENOUS at 04:47

## 2025-01-28 RX ADMIN — SODIUM CHLORIDE, PRESERVATIVE FREE 10 ML: 5 INJECTION INTRAVENOUS at 21:42

## 2025-01-28 ASSESSMENT — PAIN SCALES - GENERAL
PAINLEVEL_OUTOF10: 0

## 2025-01-28 NOTE — ACP (ADVANCE CARE PLANNING)
Advance Care Planning   The patient has the following advanced directives on file:  Advance Directives       Power of  Living Will ACP-Advance Directive ACP-Power of     Filed on 10/24/24 Filed on 05/26/13 Filed Filed            The patient has appointed the following active healthcare agents:    Primary Decision Maker: Arielle Tim \"Razia\" - Other - [phone]    Secondary Decision Maker: Michael Mccullough Jr. - Child - [phone]    The Patient has the following current code status:    Code Status: Full Code

## 2025-01-28 NOTE — TELEPHONE ENCOUNTER
Incoming call left on voicemail from Razia, pts CURTIS. She stated the patient is currently admitted and he is scheduled for a mediport this Thursday. Will review with Dr Santoyo and return call once a decision is made  Electronically signed by Gale Cazares MA on 1/28/2025 at 8:13 AM    I spoke with Razia this morning to let her know that we did cancel the patients mediport for Thursday, she is aware and so is Dr Santoyo. She stated at this time she thinks that they are going to get palliative involved in his care, but she said she would call the office and keep us updated  Electronically signed by Gale Cazares MA on 1/28/2025 at 8:45 AM

## 2025-01-28 NOTE — PLAN OF CARE
Problem: Chronic Conditions and Co-morbidities  Goal: Patient's chronic conditions and co-morbidity symptoms are monitored and maintained or improved  Outcome: Progressing  Flowsheets (Taken 1/27/2025 2000)  Care Plan - Patient's Chronic Conditions and Co-Morbidity Symptoms are Monitored and Maintained or Improved: Monitor and assess patient's chronic conditions and comorbid symptoms for stability, deterioration, or improvement     Problem: Discharge Planning  Goal: Discharge to home or other facility with appropriate resources  Outcome: Progressing  Flowsheets (Taken 1/27/2025 2000)  Discharge to home or other facility with appropriate resources: Identify barriers to discharge with patient and caregiver     Problem: Cardiovascular - Adult  Goal: Maintains optimal cardiac output and hemodynamic stability  Outcome: Progressing  Flowsheets (Taken 1/27/2025 2000)  Maintains optimal cardiac output and hemodynamic stability:   Monitor blood pressure and heart rate   Assess for signs of decreased cardiac output   Monitor urine output and notify Licensed Independent Practitioner for values outside of normal range   Administer fluid and/or volume expanders as ordered   Administer vasoactive medications as ordered  Goal: Absence of cardiac dysrhythmias or at baseline  Outcome: Progressing  Flowsheets (Taken 1/27/2025 2000)  Absence of cardiac dysrhythmias or at baseline:   Monitor cardiac rate and rhythm   Assess for signs of decreased cardiac output   Administer antiarrhythmia medication and electrolyte replacement as ordered     Problem: Gastrointestinal - Adult  Goal: Minimal or absence of nausea and vomiting  Outcome: Progressing  Goal: Maintains adequate nutritional intake  Outcome: Progressing  Flowsheets (Taken 1/27/2025 2000)  Maintains adequate nutritional intake: Monitor intake and output, weight and lab values     Problem: Genitourinary - Adult  Goal: Absence of urinary retention  Outcome: Progressing  Goal:

## 2025-01-29 ENCOUNTER — APPOINTMENT (OUTPATIENT)
Age: 80
DRG: 435 | End: 2025-01-29
Attending: INTERNAL MEDICINE
Payer: MEDICARE

## 2025-01-29 LAB
ALBUMIN SERPL-MCNC: 2.5 G/DL (ref 3.5–5.2)
ALP SERPL-CCNC: 97 U/L (ref 40–129)
ALT SERPL-CCNC: 5 U/L (ref 0–40)
ANION GAP SERPL CALCULATED.3IONS-SCNC: 9 MMOL/L (ref 7–16)
AST SERPL-CCNC: 8 U/L (ref 0–39)
BASOPHILS # BLD: 0 K/UL (ref 0–0.2)
BASOPHILS NFR BLD: 0 % (ref 0–2)
BILIRUB SERPL-MCNC: 0.4 MG/DL (ref 0–1.2)
BUN SERPL-MCNC: 11 MG/DL (ref 6–23)
CALCIUM SERPL-MCNC: 8.2 MG/DL (ref 8.6–10.2)
CHLORIDE SERPL-SCNC: 102 MMOL/L (ref 98–107)
CO2 SERPL-SCNC: 22 MMOL/L (ref 22–29)
CREAT SERPL-MCNC: 0.7 MG/DL (ref 0.7–1.2)
EOSINOPHIL # BLD: 0 K/UL (ref 0.05–0.5)
EOSINOPHILS RELATIVE PERCENT: 0 % (ref 0–6)
ERYTHROCYTE [DISTWIDTH] IN BLOOD BY AUTOMATED COUNT: 16.3 % (ref 11.5–15)
GFR, ESTIMATED: >90 ML/MIN/1.73M2
GLUCOSE BLD-MCNC: 163 MG/DL (ref 74–99)
GLUCOSE BLD-MCNC: 166 MG/DL (ref 74–99)
GLUCOSE BLD-MCNC: 210 MG/DL (ref 74–99)
GLUCOSE BLD-MCNC: 230 MG/DL (ref 74–99)
GLUCOSE SERPL-MCNC: 172 MG/DL (ref 74–99)
HCT VFR BLD AUTO: 28.8 % (ref 37–54)
HGB BLD-MCNC: 8.9 G/DL (ref 12.5–16.5)
LYMPHOCYTES NFR BLD: 0.13 K/UL (ref 1.5–4)
LYMPHOCYTES RELATIVE PERCENT: 2 % (ref 20–42)
MAGNESIUM SERPL-MCNC: 2 MG/DL (ref 1.6–2.6)
MCH RBC QN AUTO: 30.4 PG (ref 26–35)
MCHC RBC AUTO-ENTMCNC: 30.9 G/DL (ref 32–34.5)
MCV RBC AUTO: 98.3 FL (ref 80–99.9)
MONOCYTES NFR BLD: 0.33 K/UL (ref 0.1–0.95)
MONOCYTES NFR BLD: 4 % (ref 2–12)
NEUTROPHILS NFR BLD: 94 % (ref 43–80)
NEUTS SEG NFR BLD: 7.14 K/UL (ref 1.8–7.3)
PHOSPHATE SERPL-MCNC: 2.2 MG/DL (ref 2.5–4.5)
PLATELET # BLD AUTO: 286 K/UL (ref 130–450)
PMV BLD AUTO: 8.8 FL (ref 7–12)
POTASSIUM SERPL-SCNC: 3.7 MMOL/L (ref 3.5–5)
PROT SERPL-MCNC: 4.9 G/DL (ref 6.4–8.3)
RBC # BLD AUTO: 2.93 M/UL (ref 3.8–5.8)
RBC # BLD: ABNORMAL 10*6/UL
SODIUM SERPL-SCNC: 133 MMOL/L (ref 132–146)
WBC OTHER # BLD: 7.6 K/UL (ref 4.5–11.5)

## 2025-01-29 PROCEDURE — 93308 TTE F-UP OR LMTD: CPT

## 2025-01-29 PROCEDURE — 93308 TTE F-UP OR LMTD: CPT | Performed by: INTERNAL MEDICINE

## 2025-01-29 PROCEDURE — 6370000000 HC RX 637 (ALT 250 FOR IP)

## 2025-01-29 PROCEDURE — 6360000002 HC RX W HCPCS: Performed by: NURSE PRACTITIONER

## 2025-01-29 PROCEDURE — 83735 ASSAY OF MAGNESIUM: CPT

## 2025-01-29 PROCEDURE — 2060000000 HC ICU INTERMEDIATE R&B

## 2025-01-29 PROCEDURE — 80053 COMPREHEN METABOLIC PANEL: CPT

## 2025-01-29 PROCEDURE — 99233 SBSQ HOSP IP/OBS HIGH 50: CPT | Performed by: INTERNAL MEDICINE

## 2025-01-29 PROCEDURE — 99232 SBSQ HOSP IP/OBS MODERATE 35: CPT

## 2025-01-29 PROCEDURE — 99291 CRITICAL CARE FIRST HOUR: CPT | Performed by: INTERNAL MEDICINE

## 2025-01-29 PROCEDURE — 6360000002 HC RX W HCPCS: Performed by: INTERNAL MEDICINE

## 2025-01-29 PROCEDURE — 85025 COMPLETE CBC W/AUTO DIFF WBC: CPT

## 2025-01-29 PROCEDURE — 2580000003 HC RX 258: Performed by: NURSE PRACTITIONER

## 2025-01-29 PROCEDURE — 2500000003 HC RX 250 WO HCPCS: Performed by: INTERNAL MEDICINE

## 2025-01-29 PROCEDURE — 93325 DOPPLER ECHO COLOR FLOW MAPG: CPT | Performed by: INTERNAL MEDICINE

## 2025-01-29 PROCEDURE — 6370000000 HC RX 637 (ALT 250 FOR IP): Performed by: NURSE PRACTITIONER

## 2025-01-29 PROCEDURE — 99231 SBSQ HOSP IP/OBS SF/LOW 25: CPT | Performed by: PHYSICIAN ASSISTANT

## 2025-01-29 PROCEDURE — 6370000000 HC RX 637 (ALT 250 FOR IP): Performed by: INTERNAL MEDICINE

## 2025-01-29 PROCEDURE — 2580000003 HC RX 258: Performed by: INTERNAL MEDICINE

## 2025-01-29 PROCEDURE — 82962 GLUCOSE BLOOD TEST: CPT

## 2025-01-29 PROCEDURE — 84100 ASSAY OF PHOSPHORUS: CPT

## 2025-01-29 RX ADMIN — APIXABAN 5 MG: 5 TABLET, FILM COATED ORAL at 20:39

## 2025-01-29 RX ADMIN — PIPERACILLIN AND TAZOBACTAM 4500 MG: 4; .5 INJECTION, POWDER, FOR SOLUTION INTRAVENOUS at 13:52

## 2025-01-29 RX ADMIN — PANTOPRAZOLE SODIUM 40 MG: 40 TABLET, DELAYED RELEASE ORAL at 09:11

## 2025-01-29 RX ADMIN — INSULIN LISPRO 1 UNITS: 100 INJECTION, SOLUTION INTRAVENOUS; SUBCUTANEOUS at 11:48

## 2025-01-29 RX ADMIN — PIPERACILLIN AND TAZOBACTAM 4500 MG: 4; .5 INJECTION, POWDER, FOR SOLUTION INTRAVENOUS at 20:37

## 2025-01-29 RX ADMIN — SODIUM CHLORIDE, PRESERVATIVE FREE 10 ML: 5 INJECTION INTRAVENOUS at 20:39

## 2025-01-29 RX ADMIN — SENNOSIDES AND DOCUSATE SODIUM 2 TABLET: 50; 8.6 TABLET ORAL at 09:11

## 2025-01-29 RX ADMIN — PANTOPRAZOLE SODIUM 40 MG: 40 TABLET, DELAYED RELEASE ORAL at 20:39

## 2025-01-29 RX ADMIN — INSULIN LISPRO 1 UNITS: 100 INJECTION, SOLUTION INTRAVENOUS; SUBCUTANEOUS at 16:52

## 2025-01-29 RX ADMIN — POTASSIUM & SODIUM PHOSPHATES POWDER PACK 280-160-250 MG 500 MG: 280-160-250 PACK at 06:51

## 2025-01-29 RX ADMIN — SODIUM CHLORIDE: 9 INJECTION, SOLUTION INTRAVENOUS at 20:31

## 2025-01-29 RX ADMIN — FINASTERIDE 5 MG: 5 TABLET, FILM COATED ORAL at 09:11

## 2025-01-29 RX ADMIN — HYDROCORTISONE SODIUM SUCCINATE 50 MG: 100 INJECTION, POWDER, FOR SOLUTION INTRAMUSCULAR; INTRAVENOUS at 06:01

## 2025-01-29 RX ADMIN — PIPERACILLIN AND TAZOBACTAM 4500 MG: 4; .5 INJECTION, POWDER, FOR SOLUTION INTRAVENOUS at 06:02

## 2025-01-29 RX ADMIN — SODIUM CHLORIDE, PRESERVATIVE FREE 10 ML: 5 INJECTION INTRAVENOUS at 09:12

## 2025-01-29 ASSESSMENT — PAIN SCALES - GENERAL
PAINLEVEL_OUTOF10: 0

## 2025-01-29 NOTE — PLAN OF CARE
Problem: Chronic Conditions and Co-morbidities  Goal: Patient's chronic conditions and co-morbidity symptoms are monitored and maintained or improved  Outcome: Progressing  Flowsheets (Taken 1/28/2025 2000)  Care Plan - Patient's Chronic Conditions and Co-Morbidity Symptoms are Monitored and Maintained or Improved: Monitor and assess patient's chronic conditions and comorbid symptoms for stability, deterioration, or improvement     Problem: Discharge Planning  Goal: Discharge to home or other facility with appropriate resources  Outcome: Progressing  Flowsheets (Taken 1/28/2025 2000)  Discharge to home or other facility with appropriate resources: Identify barriers to discharge with patient and caregiver     Problem: Cardiovascular - Adult  Goal: Maintains optimal cardiac output and hemodynamic stability  Outcome: Progressing  Flowsheets (Taken 1/28/2025 2000)  Maintains optimal cardiac output and hemodynamic stability:   Monitor blood pressure and heart rate   Monitor urine output and notify Licensed Independent Practitioner for values outside of normal range   Assess for signs of decreased cardiac output   Administer fluid and/or volume expanders as ordered   Administer vasoactive medications as ordered  Goal: Absence of cardiac dysrhythmias or at baseline  Outcome: Progressing  Flowsheets (Taken 1/28/2025 2000)  Absence of cardiac dysrhythmias or at baseline:   Monitor cardiac rate and rhythm   Assess for signs of decreased cardiac output   Administer antiarrhythmia medication and electrolyte replacement as ordered     Problem: Gastrointestinal - Adult  Goal: Minimal or absence of nausea and vomiting  Outcome: Progressing  Goal: Maintains adequate nutritional intake  Outcome: Progressing  Flowsheets (Taken 1/28/2025 2000)  Maintains adequate nutritional intake: Monitor intake and output, weight and lab values     Problem: Genitourinary - Adult  Goal: Absence of urinary retention  Outcome:

## 2025-01-29 NOTE — PLAN OF CARE
Problem: Chronic Conditions and Co-morbidities  Goal: Patient's chronic conditions and co-morbidity symptoms are monitored and maintained or improved  1/29/2025 1435 by Marge Plata  Outcome: Progressing     Problem: Discharge Planning  Goal: Discharge to home or other facility with appropriate resources  1/29/2025 1435 by Marge Plata  Outcome: Progressing     Problem: Cardiovascular - Adult  Goal: Maintains optimal cardiac output and hemodynamic stability  1/29/2025 1435 by Marge Plata  Outcome: Progressing     Problem: Cardiovascular - Adult  Goal: Absence of cardiac dysrhythmias or at baseline  1/29/2025 1435 by Marge Plata  Outcome: Progressing     Problem: Gastrointestinal - Adult  Goal: Minimal or absence of nausea and vomiting  1/29/2025 1435 by Marge Plata  Outcome: Progressing  Flowsheets (Taken 1/29/2025 0800)  Minimal or absence of nausea and vomiting: Administer IV fluids as ordered to ensure adequate hydration     Problem: Gastrointestinal - Adult  Goal: Maintains adequate nutritional intake  1/29/2025 1435 by Marge Plata  Outcome: Progressing     Problem: Genitourinary - Adult  Goal: Absence of urinary retention  1/29/2025 1435 by Marge Plata  Outcome: Progressing  Flowsheets (Taken 1/29/2025 0800)  Absence of urinary retention:   Monitor intake/output and perform bladder scan as needed   Assess patient’s ability to void and empty bladder     Problem: Genitourinary - Adult  Goal: Urinary catheter remains patent  Outcome: Progressing  Flowsheets (Taken 1/29/2025 0800)  Urinary catheter remains patent: Assess patency of urinary catheter     Problem: Safety - Adult  Goal: Free from fall injury  1/29/2025 1435 by Marge Plata  Outcome: Progressing     Problem: Skin/Tissue Integrity  Goal: Skin integrity remains intact  Description: 1.  Monitor for areas of redness and/or skin breakdown  2.  Assess vascular access sites hourly  3.  Every 4-6 hours minimum:  Change oxygen saturation probe

## 2025-01-30 LAB
ALBUMIN SERPL-MCNC: 2.4 G/DL (ref 3.5–5.2)
ALP SERPL-CCNC: 92 U/L (ref 40–129)
ALT SERPL-CCNC: 10 U/L (ref 0–40)
ANION GAP SERPL CALCULATED.3IONS-SCNC: 10 MMOL/L (ref 7–16)
AST SERPL-CCNC: 18 U/L (ref 0–39)
BASOPHILS # BLD: 0 K/UL (ref 0–0.2)
BASOPHILS NFR BLD: 0 % (ref 0–2)
BILIRUB SERPL-MCNC: 0.3 MG/DL (ref 0–1.2)
BUN SERPL-MCNC: 11 MG/DL (ref 6–23)
CALCIUM SERPL-MCNC: 8.2 MG/DL (ref 8.6–10.2)
CHLORIDE SERPL-SCNC: 104 MMOL/L (ref 98–107)
CO2 SERPL-SCNC: 23 MMOL/L (ref 22–29)
CREAT SERPL-MCNC: 0.7 MG/DL (ref 0.7–1.2)
EKG ATRIAL RATE: 101 BPM
EKG P AXIS: 55 DEGREES
EKG P-R INTERVAL: 174 MS
EKG Q-T INTERVAL: 362 MS
EKG QRS DURATION: 82 MS
EKG QTC CALCULATION (BAZETT): 469 MS
EKG R AXIS: 23 DEGREES
EKG T AXIS: 58 DEGREES
EKG VENTRICULAR RATE: 101 BPM
EOSINOPHIL # BLD: 0.02 K/UL (ref 0.05–0.5)
EOSINOPHILS RELATIVE PERCENT: 0 % (ref 0–6)
ERYTHROCYTE [DISTWIDTH] IN BLOOD BY AUTOMATED COUNT: 16.1 % (ref 11.5–15)
GFR, ESTIMATED: >90 ML/MIN/1.73M2
GLUCOSE BLD-MCNC: 104 MG/DL (ref 74–99)
GLUCOSE BLD-MCNC: 119 MG/DL (ref 74–99)
GLUCOSE BLD-MCNC: 144 MG/DL (ref 74–99)
GLUCOSE BLD-MCNC: 95 MG/DL (ref 74–99)
GLUCOSE SERPL-MCNC: 104 MG/DL (ref 74–99)
HCT VFR BLD AUTO: 30 % (ref 37–54)
HGB BLD-MCNC: 9.3 G/DL (ref 12.5–16.5)
IMM GRANULOCYTES # BLD AUTO: 0.05 K/UL (ref 0–0.58)
IMM GRANULOCYTES NFR BLD: 1 % (ref 0–5)
LYMPHOCYTES NFR BLD: 0.73 K/UL (ref 1.5–4)
LYMPHOCYTES RELATIVE PERCENT: 11 % (ref 20–42)
MAGNESIUM SERPL-MCNC: 2 MG/DL (ref 1.6–2.6)
MCH RBC QN AUTO: 31.1 PG (ref 26–35)
MCHC RBC AUTO-ENTMCNC: 31 G/DL (ref 32–34.5)
MCV RBC AUTO: 100.3 FL (ref 80–99.9)
MONOCYTES NFR BLD: 0.62 K/UL (ref 0.1–0.95)
MONOCYTES NFR BLD: 9 % (ref 2–12)
NEUTROPHILS NFR BLD: 79 % (ref 43–80)
NEUTS SEG NFR BLD: 5.29 K/UL (ref 1.8–7.3)
PHOSPHATE SERPL-MCNC: 2.2 MG/DL (ref 2.5–4.5)
PLATELET # BLD AUTO: 232 K/UL (ref 130–450)
PMV BLD AUTO: 9.3 FL (ref 7–12)
POTASSIUM SERPL-SCNC: 3.7 MMOL/L (ref 3.5–5)
PROT SERPL-MCNC: 4.6 G/DL (ref 6.4–8.3)
RBC # BLD AUTO: 2.99 M/UL (ref 3.8–5.8)
SODIUM SERPL-SCNC: 137 MMOL/L (ref 132–146)
WBC OTHER # BLD: 6.7 K/UL (ref 4.5–11.5)

## 2025-01-30 PROCEDURE — 82962 GLUCOSE BLOOD TEST: CPT

## 2025-01-30 PROCEDURE — 97530 THERAPEUTIC ACTIVITIES: CPT

## 2025-01-30 PROCEDURE — 6370000000 HC RX 637 (ALT 250 FOR IP): Performed by: INTERNAL MEDICINE

## 2025-01-30 PROCEDURE — 2500000003 HC RX 250 WO HCPCS: Performed by: INTERNAL MEDICINE

## 2025-01-30 PROCEDURE — 97166 OT EVAL MOD COMPLEX 45 MIN: CPT

## 2025-01-30 PROCEDURE — 97162 PT EVAL MOD COMPLEX 30 MIN: CPT

## 2025-01-30 PROCEDURE — 6360000002 HC RX W HCPCS: Performed by: NURSE PRACTITIONER

## 2025-01-30 PROCEDURE — 6370000000 HC RX 637 (ALT 250 FOR IP)

## 2025-01-30 PROCEDURE — 99232 SBSQ HOSP IP/OBS MODERATE 35: CPT

## 2025-01-30 PROCEDURE — 84100 ASSAY OF PHOSPHORUS: CPT

## 2025-01-30 PROCEDURE — 85025 COMPLETE CBC W/AUTO DIFF WBC: CPT

## 2025-01-30 PROCEDURE — 83735 ASSAY OF MAGNESIUM: CPT

## 2025-01-30 PROCEDURE — 2580000003 HC RX 258: Performed by: NURSE PRACTITIONER

## 2025-01-30 PROCEDURE — 99233 SBSQ HOSP IP/OBS HIGH 50: CPT | Performed by: INTERNAL MEDICINE

## 2025-01-30 PROCEDURE — 97110 THERAPEUTIC EXERCISES: CPT

## 2025-01-30 PROCEDURE — 1200000000 HC SEMI PRIVATE

## 2025-01-30 PROCEDURE — 80053 COMPREHEN METABOLIC PANEL: CPT

## 2025-01-30 RX ORDER — METOPROLOL TARTRATE 25 MG/1
12.5 TABLET, FILM COATED ORAL 2 TIMES DAILY
Status: DISCONTINUED | OUTPATIENT
Start: 2025-01-30 | End: 2025-02-03 | Stop reason: HOSPADM

## 2025-01-30 RX ADMIN — PIPERACILLIN AND TAZOBACTAM 4500 MG: 4; .5 INJECTION, POWDER, FOR SOLUTION INTRAVENOUS at 04:38

## 2025-01-30 RX ADMIN — PIPERACILLIN AND TAZOBACTAM 4500 MG: 4; .5 INJECTION, POWDER, FOR SOLUTION INTRAVENOUS at 13:35

## 2025-01-30 RX ADMIN — POTASSIUM & SODIUM PHOSPHATES POWDER PACK 280-160-250 MG 500 MG: 280-160-250 PACK at 06:47

## 2025-01-30 RX ADMIN — SODIUM CHLORIDE, PRESERVATIVE FREE 10 ML: 5 INJECTION INTRAVENOUS at 21:34

## 2025-01-30 RX ADMIN — PIPERACILLIN AND TAZOBACTAM 4500 MG: 4; .5 INJECTION, POWDER, FOR SOLUTION INTRAVENOUS at 21:32

## 2025-01-30 RX ADMIN — METOPROLOL TARTRATE 12.5 MG: 25 TABLET, FILM COATED ORAL at 21:27

## 2025-01-30 RX ADMIN — TAMSULOSIN HYDROCHLORIDE 0.4 MG: 0.4 CAPSULE ORAL at 09:13

## 2025-01-30 RX ADMIN — FINASTERIDE 5 MG: 5 TABLET, FILM COATED ORAL at 09:12

## 2025-01-30 RX ADMIN — APIXABAN 5 MG: 5 TABLET, FILM COATED ORAL at 09:12

## 2025-01-30 RX ADMIN — SODIUM CHLORIDE, PRESERVATIVE FREE 10 ML: 5 INJECTION INTRAVENOUS at 09:13

## 2025-01-30 RX ADMIN — SENNOSIDES AND DOCUSATE SODIUM 2 TABLET: 50; 8.6 TABLET ORAL at 09:13

## 2025-01-30 RX ADMIN — METOPROLOL TARTRATE 12.5 MG: 25 TABLET, FILM COATED ORAL at 11:29

## 2025-01-30 RX ADMIN — APIXABAN 5 MG: 5 TABLET, FILM COATED ORAL at 21:27

## 2025-01-30 RX ADMIN — PANTOPRAZOLE SODIUM 40 MG: 40 TABLET, DELAYED RELEASE ORAL at 21:27

## 2025-01-30 RX ADMIN — PANTOPRAZOLE SODIUM 40 MG: 40 TABLET, DELAYED RELEASE ORAL at 09:12

## 2025-01-30 ASSESSMENT — PAIN SCALES - GENERAL
PAINLEVEL_OUTOF10: 0

## 2025-01-30 NOTE — PLAN OF CARE
Problem: Chronic Conditions and Co-morbidities  Goal: Patient's chronic conditions and co-morbidity symptoms are monitored and maintained or improved  1/29/2025 2104 by Jayson Garcia RN  Outcome: Progressing  Flowsheets (Taken 1/29/2025 2000)  Care Plan - Patient's Chronic Conditions and Co-Morbidity Symptoms are Monitored and Maintained or Improved:   Monitor and assess patient's chronic conditions and comorbid symptoms for stability, deterioration, or improvement   Collaborate with multidisciplinary team to address chronic and comorbid conditions and prevent exacerbation or deterioration  1/29/2025 1435 by Marge Plata  Outcome: Progressing     Problem: Discharge Planning  Goal: Discharge to home or other facility with appropriate resources  1/29/2025 2104 by Jayson Garcia RN  Outcome: Progressing  Flowsheets (Taken 1/29/2025 2000)  Discharge to home or other facility with appropriate resources: Identify barriers to discharge with patient and caregiver  1/29/2025 1435 by Marge Plata  Outcome: Progressing     Problem: Cardiovascular - Adult  Goal: Maintains optimal cardiac output and hemodynamic stability  1/29/2025 2104 by Jayson Garcia RN  Outcome: Progressing  Flowsheets (Taken 1/29/2025 2000)  Maintains optimal cardiac output and hemodynamic stability:   Monitor blood pressure and heart rate   Monitor urine output and notify Licensed Independent Practitioner for values outside of normal range   Assess for signs of decreased cardiac output  1/29/2025 1435 by Marge Plata  Outcome: Progressing  Goal: Absence of cardiac dysrhythmias or at baseline  1/29/2025 2104 by Jayson Garcia RN  Outcome: Progressing  Flowsheets (Taken 1/29/2025 2000)  Absence of cardiac dysrhythmias or at baseline:   Monitor cardiac rate and rhythm   Assess for signs of decreased cardiac output  1/29/2025 1435 by Marge Plata  Outcome: Progressing     Problem: Gastrointestinal - Adult  Goal: Minimal or absence of nausea and

## 2025-01-30 NOTE — PLAN OF CARE
Problem: Chronic Conditions and Co-morbidities  Goal: Patient's chronic conditions and co-morbidity symptoms are monitored and maintained or improved  1/30/2025 0813 by Isaias Guillen RN  Outcome: Progressing     Problem: Cardiovascular - Adult  Goal: Maintains optimal cardiac output and hemodynamic stability  1/30/2025 0813 by Isaias Guillen RN  Outcome: Progressing     Problem: Gastrointestinal - Adult  Goal: Maintains adequate nutritional intake  1/30/2025 0813 by Isaias Guillen RN  Outcome: Progressing     Problem: Genitourinary - Adult  Goal: Absence of urinary retention  1/30/2025 0813 by Isaias Guillen RN  Outcome: Progressing     Problem: Safety - Adult  Goal: Free from fall injury  1/30/2025 0813 by Isaias Guillen RN  Outcome: Progressing     Problem: Pain  Goal: Verbalizes/displays adequate comfort level or baseline comfort level  Outcome: Progressing  Flowsheets (Taken 1/29/2025 2000 by Jayson Garcia, RN)  Verbalizes/displays adequate comfort level or baseline comfort level:   Encourage patient to monitor pain and request assistance   Assess pain using appropriate pain scale   Administer analgesics based on type and severity of pain and evaluate response   Implement non-pharmacological measures as appropriate and evaluate response

## 2025-01-31 ENCOUNTER — APPOINTMENT (OUTPATIENT)
Dept: ULTRASOUND IMAGING | Age: 80
DRG: 435 | End: 2025-01-31
Payer: MEDICARE

## 2025-01-31 PROBLEM — Z51.5 PALLIATIVE CARE ENCOUNTER: Status: ACTIVE | Noted: 2025-01-31

## 2025-01-31 PROBLEM — Z71.89 GOALS OF CARE, COUNSELING/DISCUSSION: Status: ACTIVE | Noted: 2025-01-31

## 2025-01-31 LAB
ANION GAP SERPL CALCULATED.3IONS-SCNC: 9 MMOL/L (ref 7–16)
BASOPHILS # BLD: 0.01 K/UL (ref 0–0.2)
BASOPHILS NFR BLD: 0 % (ref 0–2)
BUN SERPL-MCNC: 9 MG/DL (ref 6–23)
CALCIUM SERPL-MCNC: 7.2 MG/DL (ref 8.6–10.2)
CHLORIDE SERPL-SCNC: 108 MMOL/L (ref 98–107)
CO2 SERPL-SCNC: 23 MMOL/L (ref 22–29)
CREAT SERPL-MCNC: 0.5 MG/DL (ref 0.7–1.2)
EOSINOPHIL # BLD: 0.07 K/UL (ref 0.05–0.5)
EOSINOPHILS RELATIVE PERCENT: 1 % (ref 0–6)
ERYTHROCYTE [DISTWIDTH] IN BLOOD BY AUTOMATED COUNT: 15.9 % (ref 11.5–15)
GFR, ESTIMATED: >90 ML/MIN/1.73M2
GLUCOSE BLD-MCNC: 117 MG/DL (ref 74–99)
GLUCOSE BLD-MCNC: 138 MG/DL (ref 74–99)
GLUCOSE BLD-MCNC: 153 MG/DL (ref 74–99)
GLUCOSE BLD-MCNC: 169 MG/DL (ref 74–99)
GLUCOSE SERPL-MCNC: 142 MG/DL (ref 74–99)
HCT VFR BLD AUTO: 31.6 % (ref 37–54)
HGB BLD-MCNC: 10 G/DL (ref 12.5–16.5)
IMM GRANULOCYTES # BLD AUTO: <0.03 K/UL (ref 0–0.58)
IMM GRANULOCYTES NFR BLD: 0 % (ref 0–5)
LYMPHOCYTES NFR BLD: 0.48 K/UL (ref 1.5–4)
LYMPHOCYTES RELATIVE PERCENT: 9 % (ref 20–42)
MCH RBC QN AUTO: 31 PG (ref 26–35)
MCHC RBC AUTO-ENTMCNC: 31.6 G/DL (ref 32–34.5)
MCV RBC AUTO: 97.8 FL (ref 80–99.9)
MICROORGANISM SPEC CULT: NORMAL
MICROORGANISM SPEC CULT: NORMAL
MONOCYTES NFR BLD: 0.48 K/UL (ref 0.1–0.95)
MONOCYTES NFR BLD: 9 % (ref 2–12)
NEUTROPHILS NFR BLD: 81 % (ref 43–80)
NEUTS SEG NFR BLD: 4.57 K/UL (ref 1.8–7.3)
PLATELET # BLD AUTO: 216 K/UL (ref 130–450)
PMV BLD AUTO: 9.1 FL (ref 7–12)
POTASSIUM SERPL-SCNC: 3.9 MMOL/L (ref 3.5–5)
RBC # BLD AUTO: 3.23 M/UL (ref 3.8–5.8)
RBC # BLD: ABNORMAL 10*6/UL
SERVICE CMNT-IMP: NORMAL
SERVICE CMNT-IMP: NORMAL
SODIUM SERPL-SCNC: 140 MMOL/L (ref 132–146)
SPECIMEN DESCRIPTION: NORMAL
SPECIMEN DESCRIPTION: NORMAL
WBC OTHER # BLD: 5.6 K/UL (ref 4.5–11.5)

## 2025-01-31 PROCEDURE — 99231 SBSQ HOSP IP/OBS SF/LOW 25: CPT | Performed by: PHYSICIAN ASSISTANT

## 2025-01-31 PROCEDURE — 82962 GLUCOSE BLOOD TEST: CPT

## 2025-01-31 PROCEDURE — 99232 SBSQ HOSP IP/OBS MODERATE 35: CPT | Performed by: STUDENT IN AN ORGANIZED HEALTH CARE EDUCATION/TRAINING PROGRAM

## 2025-01-31 PROCEDURE — 6370000000 HC RX 637 (ALT 250 FOR IP): Performed by: INTERNAL MEDICINE

## 2025-01-31 PROCEDURE — 2500000003 HC RX 250 WO HCPCS: Performed by: INTERNAL MEDICINE

## 2025-01-31 PROCEDURE — 6360000002 HC RX W HCPCS: Performed by: NURSE PRACTITIONER

## 2025-01-31 PROCEDURE — 93970 EXTREMITY STUDY: CPT

## 2025-01-31 PROCEDURE — 80048 BASIC METABOLIC PNL TOTAL CA: CPT

## 2025-01-31 PROCEDURE — 2580000003 HC RX 258: Performed by: NURSE PRACTITIONER

## 2025-01-31 PROCEDURE — 76937 US GUIDE VASCULAR ACCESS: CPT

## 2025-01-31 PROCEDURE — 85025 COMPLETE CBC W/AUTO DIFF WBC: CPT

## 2025-01-31 PROCEDURE — 1200000000 HC SEMI PRIVATE

## 2025-01-31 RX ADMIN — METOPROLOL TARTRATE 12.5 MG: 25 TABLET, FILM COATED ORAL at 08:29

## 2025-01-31 RX ADMIN — APIXABAN 5 MG: 5 TABLET, FILM COATED ORAL at 08:28

## 2025-01-31 RX ADMIN — PANTOPRAZOLE SODIUM 40 MG: 40 TABLET, DELAYED RELEASE ORAL at 08:28

## 2025-01-31 RX ADMIN — METOPROLOL TARTRATE 12.5 MG: 25 TABLET, FILM COATED ORAL at 21:20

## 2025-01-31 RX ADMIN — APIXABAN 5 MG: 5 TABLET, FILM COATED ORAL at 21:20

## 2025-01-31 RX ADMIN — PIPERACILLIN AND TAZOBACTAM 4500 MG: 4; .5 INJECTION, POWDER, FOR SOLUTION INTRAVENOUS at 05:57

## 2025-01-31 RX ADMIN — SODIUM CHLORIDE, PRESERVATIVE FREE 10 ML: 5 INJECTION INTRAVENOUS at 21:21

## 2025-01-31 RX ADMIN — PIPERACILLIN AND TAZOBACTAM 4500 MG: 4; .5 INJECTION, POWDER, FOR SOLUTION INTRAVENOUS at 14:34

## 2025-01-31 RX ADMIN — SODIUM CHLORIDE, PRESERVATIVE FREE 10 ML: 5 INJECTION INTRAVENOUS at 08:28

## 2025-01-31 RX ADMIN — PANTOPRAZOLE SODIUM 40 MG: 40 TABLET, DELAYED RELEASE ORAL at 21:20

## 2025-01-31 RX ADMIN — PIPERACILLIN AND TAZOBACTAM 4500 MG: 4; .5 INJECTION, POWDER, FOR SOLUTION INTRAVENOUS at 21:53

## 2025-01-31 RX ADMIN — FINASTERIDE 5 MG: 5 TABLET, FILM COATED ORAL at 08:29

## 2025-01-31 RX ADMIN — TAMSULOSIN HYDROCHLORIDE 0.4 MG: 0.4 CAPSULE ORAL at 08:29

## 2025-01-31 RX ADMIN — SENNOSIDES AND DOCUSATE SODIUM 2 TABLET: 50; 8.6 TABLET ORAL at 08:29

## 2025-01-31 ASSESSMENT — PAIN SCALES - GENERAL
PAINLEVEL_OUTOF10: 0
PAINLEVEL_OUTOF10: 0

## 2025-01-31 NOTE — DISCHARGE INSTR - COC
Continuity of Care Form    Patient Name: Michael Garcia   :  1945  MRN:  [id number]    Admit date:  2025  Discharge date:  ***    Code Status Order: Full Code   Advance Directives:   Advance Care Flowsheet Documentation             Admitting Physician:  Cammie Alberts DO  PCP: Moshe Moran DO    Discharging Nurse: ***  Discharging Hospital Unit/Room#: 8421/8421-A  Discharging Unit Phone Number: ***    Emergency Contact:   Extended Emergency Contact Information  Primary Emergency Contact: Arielle Tim \"Razia\"  Home Phone: [phone]  Relation: Other  Preferred language: English   needed? No  Secondary Emergency Contact: Michael Mccullough Jr.  Mobile Phone: [phone]  Relation: Child  Preferred language: English   needed? No    Past Surgical History:  Past Surgical History:   Procedure Laterality Date    CARPAL TUNNEL RELEASE      ERCP N/A 2024    FAILED ENDOSCOPIC RETROGRADE CHOLANGIOPANCREATOGRAPHY performed by Demetrius Carlos DO at [facility] ENDOSCOPY    IR BILIARY DRAIN INT AND EXT  2024    IR BILIARY DRAIN INT AND EXT 2024 Sonny, Rodríguez Greer MD [facility] SPECIAL PROCEDURES    PANCREAS SURGERY N/A 2024    WHIPPLE with portal vein reconstruction, intraoperative US performed by Demetrius Santoyo III, MD at [facility] OR    PORT SURGERY Right 2024    mediport insertion performed by Demetrius Santoyo III, MD at [facility] OR    PORT SURGERY Right 2025    PORT REMOVAL performed by Yary Gardiner MD at [facility] OR    ROTATOR CUFF REPAIR  2012    SHOULDER SURGERY      TOTAL KNEE ARTHROPLASTY Left     UPPER GASTROINTESTINAL ENDOSCOPY N/A 2024    ESOPHAGOGASTRODUODENOSCOPY DILATION BALLOON performed by Demetrius Carlos DO at [facility] ENDOSCOPY    UPPER GASTROINTESTINAL ENDOSCOPY N/A 2024    ESOPHAGOGASTRODUODENOSCOPY TUBE INSERTION performed by Demetrius Carlos DO at [facility] ENDOSCOPY    UPPER GASTROINTESTINAL ENDOSCOPY N/A

## 2025-01-31 NOTE — PLAN OF CARE
Problem: Chronic Conditions and Co-morbidities  Goal: Patient's chronic conditions and co-morbidity symptoms are monitored and maintained or improved  Outcome: Progressing     Problem: Genitourinary - Adult  Goal: Absence of urinary retention  Outcome: Progressing     Problem: Safety - Adult  Goal: Free from fall injury  Outcome: Progressing     Problem: Skin/Tissue Integrity  Goal: Skin integrity remains intact  Description: 1.  Monitor for areas of redness and/or skin breakdown  2.  Assess vascular access sites hourly  3.  Every 4-6 hours minimum:  Change oxygen saturation probe site  4.  Every 4-6 hours:  If on nasal continuous positive airway pressure, respiratory therapy assess nares and determine need for appliance change or resting period  Outcome: Progressing     Problem: Pain  Goal: Verbalizes/displays adequate comfort level or baseline comfort level  Outcome: Progressing

## 2025-01-31 NOTE — PLAN OF CARE
Problem: Chronic Conditions and Co-morbidities  Goal: Patient's chronic conditions and co-morbidity symptoms are monitored and maintained or improved  1/31/2025 1122 by Nazia Charles RN  Outcome: Progressing     Problem: Discharge Planning  Goal: Discharge to home or other facility with appropriate resources  1/31/2025 1122 by Nazia Charles RN  Outcome: Progressing     Problem: Cardiovascular - Adult  Goal: Maintains optimal cardiac output and hemodynamic stability  1/31/2025 1122 by Nazia Charles RN  Outcome: Progressing     Problem: Gastrointestinal - Adult  Goal: Minimal or absence of nausea and vomiting  1/31/2025 1122 by Nazia Charles RN  Outcome: Progressing     Problem: Safety - Adult  Goal: Free from fall injury  1/31/2025 1122 by Nazia Charles RN  Outcome: Progressing     Problem: Skin/Tissue Integrity  Goal: Skin integrity remains intact  Description: 1.  Monitor for areas of redness and/or skin breakdown  2.  Assess vascular access sites hourly  3.  Every 4-6 hours minimum:  Change oxygen saturation probe site  4.  Every 4-6 hours:  If on nasal continuous positive airway pressure, respiratory therapy assess nares and determine need for appliance change or resting period  1/31/2025 1122 by Nazia Charles RN  Outcome: Progressing     Problem: Pain  Goal: Verbalizes/displays adequate comfort level or baseline comfort level  1/31/2025 1122 by Nazia Charles RN  Outcome: Progressing

## 2025-02-01 LAB
ANION GAP SERPL CALCULATED.3IONS-SCNC: 9 MMOL/L (ref 7–16)
BASOPHILS # BLD: 0.01 K/UL (ref 0–0.2)
BASOPHILS NFR BLD: 0 % (ref 0–2)
BUN SERPL-MCNC: 9 MG/DL (ref 6–23)
CA-I BLD-SCNC: 1.2 MMOL/L (ref 1.15–1.33)
CALCIUM SERPL-MCNC: 8.1 MG/DL (ref 8.6–10.2)
CHLORIDE SERPL-SCNC: 101 MMOL/L (ref 98–107)
CO2 SERPL-SCNC: 26 MMOL/L (ref 22–29)
CREAT SERPL-MCNC: 0.5 MG/DL (ref 0.7–1.2)
EOSINOPHIL # BLD: 0.13 K/UL (ref 0.05–0.5)
EOSINOPHILS RELATIVE PERCENT: 3 % (ref 0–6)
ERYTHROCYTE [DISTWIDTH] IN BLOOD BY AUTOMATED COUNT: 16.1 % (ref 11.5–15)
GFR, ESTIMATED: >90 ML/MIN/1.73M2
GLUCOSE BLD-MCNC: 123 MG/DL (ref 74–99)
GLUCOSE BLD-MCNC: 143 MG/DL (ref 74–99)
GLUCOSE BLD-MCNC: 146 MG/DL (ref 74–99)
GLUCOSE BLD-MCNC: 146 MG/DL (ref 74–99)
GLUCOSE SERPL-MCNC: 129 MG/DL (ref 74–99)
HCT VFR BLD AUTO: 30.9 % (ref 37–54)
HGB BLD-MCNC: 9.9 G/DL (ref 12.5–16.5)
IMM GRANULOCYTES # BLD AUTO: <0.03 K/UL (ref 0–0.58)
IMM GRANULOCYTES NFR BLD: 0 % (ref 0–5)
LYMPHOCYTES NFR BLD: 0.55 K/UL (ref 1.5–4)
LYMPHOCYTES RELATIVE PERCENT: 11 % (ref 20–42)
MCH RBC QN AUTO: 31.5 PG (ref 26–35)
MCHC RBC AUTO-ENTMCNC: 32 G/DL (ref 32–34.5)
MCV RBC AUTO: 98.4 FL (ref 80–99.9)
MONOCYTES NFR BLD: 0.48 K/UL (ref 0.1–0.95)
MONOCYTES NFR BLD: 10 % (ref 2–12)
NEUTROPHILS NFR BLD: 76 % (ref 43–80)
NEUTS SEG NFR BLD: 3.84 K/UL (ref 1.8–7.3)
PHOSPHATE SERPL-MCNC: 3.3 MG/DL (ref 2.5–4.5)
PLATELET # BLD AUTO: 209 K/UL (ref 130–450)
PMV BLD AUTO: 9.3 FL (ref 7–12)
POTASSIUM SERPL-SCNC: 3.9 MMOL/L (ref 3.5–5)
RBC # BLD AUTO: 3.14 M/UL (ref 3.8–5.8)
RBC # BLD: ABNORMAL 10*6/UL
SODIUM SERPL-SCNC: 136 MMOL/L (ref 132–146)
WBC OTHER # BLD: 5 K/UL (ref 4.5–11.5)

## 2025-02-01 PROCEDURE — 6370000000 HC RX 637 (ALT 250 FOR IP): Performed by: INTERNAL MEDICINE

## 2025-02-01 PROCEDURE — 82330 ASSAY OF CALCIUM: CPT

## 2025-02-01 PROCEDURE — 82962 GLUCOSE BLOOD TEST: CPT

## 2025-02-01 PROCEDURE — 1200000000 HC SEMI PRIVATE

## 2025-02-01 PROCEDURE — 84100 ASSAY OF PHOSPHORUS: CPT

## 2025-02-01 PROCEDURE — 6360000002 HC RX W HCPCS: Performed by: NURSE PRACTITIONER

## 2025-02-01 PROCEDURE — 85025 COMPLETE CBC W/AUTO DIFF WBC: CPT

## 2025-02-01 PROCEDURE — 2580000003 HC RX 258: Performed by: NURSE PRACTITIONER

## 2025-02-01 PROCEDURE — 99232 SBSQ HOSP IP/OBS MODERATE 35: CPT

## 2025-02-01 PROCEDURE — 80048 BASIC METABOLIC PNL TOTAL CA: CPT

## 2025-02-01 RX ADMIN — APIXABAN 5 MG: 5 TABLET, FILM COATED ORAL at 08:37

## 2025-02-01 RX ADMIN — FINASTERIDE 5 MG: 5 TABLET, FILM COATED ORAL at 08:37

## 2025-02-01 RX ADMIN — SENNOSIDES AND DOCUSATE SODIUM 2 TABLET: 50; 8.6 TABLET ORAL at 08:37

## 2025-02-01 RX ADMIN — PANTOPRAZOLE SODIUM 40 MG: 40 TABLET, DELAYED RELEASE ORAL at 21:36

## 2025-02-01 RX ADMIN — TAMSULOSIN HYDROCHLORIDE 0.4 MG: 0.4 CAPSULE ORAL at 08:37

## 2025-02-01 RX ADMIN — PIPERACILLIN AND TAZOBACTAM 4500 MG: 4; .5 INJECTION, POWDER, FOR SOLUTION INTRAVENOUS at 13:57

## 2025-02-01 RX ADMIN — METOPROLOL TARTRATE 12.5 MG: 25 TABLET, FILM COATED ORAL at 21:36

## 2025-02-01 RX ADMIN — APIXABAN 5 MG: 5 TABLET, FILM COATED ORAL at 21:36

## 2025-02-01 RX ADMIN — METOPROLOL TARTRATE 12.5 MG: 25 TABLET, FILM COATED ORAL at 08:37

## 2025-02-01 RX ADMIN — PIPERACILLIN AND TAZOBACTAM 4500 MG: 4; .5 INJECTION, POWDER, FOR SOLUTION INTRAVENOUS at 21:41

## 2025-02-01 RX ADMIN — PANTOPRAZOLE SODIUM 40 MG: 40 TABLET, DELAYED RELEASE ORAL at 08:37

## 2025-02-01 RX ADMIN — PIPERACILLIN AND TAZOBACTAM 4500 MG: 4; .5 INJECTION, POWDER, FOR SOLUTION INTRAVENOUS at 05:08

## 2025-02-01 NOTE — PLAN OF CARE
Problem: Chronic Conditions and Co-morbidities  Goal: Patient's chronic conditions and co-morbidity symptoms are monitored and maintained or improved  Outcome: Progressing     Problem: Cardiovascular - Adult  Goal: Maintains optimal cardiac output and hemodynamic stability  Outcome: Progressing     Problem: Safety - Adult  Goal: Free from fall injury  Outcome: Progressing

## 2025-02-01 NOTE — PLAN OF CARE
Problem: Chronic Conditions and Co-morbidities  Goal: Patient's chronic conditions and co-morbidity symptoms are monitored and maintained or improved  1/31/2025 1946 by Emmy Cooper RN  Outcome: Progressing  1/31/2025 1122 by Nazia Charles RN  Outcome: Progressing     Problem: Discharge Planning  Goal: Discharge to home or other facility with appropriate resources  1/31/2025 1946 by Emmy Cooper RN  Outcome: Progressing  1/31/2025 1122 by Nazia Charles RN  Outcome: Progressing     Problem: Cardiovascular - Adult  Goal: Maintains optimal cardiac output and hemodynamic stability  1/31/2025 1122 by Nazia Charles RN  Outcome: Progressing     Problem: Gastrointestinal - Adult  Goal: Minimal or absence of nausea and vomiting  1/31/2025 1122 by Nazia Charles RN  Outcome: Progressing     Problem: Safety - Adult  Goal: Free from fall injury  1/31/2025 1122 by Nazia Charles RN  Outcome: Progressing     Problem: Skin/Tissue Integrity  Goal: Skin integrity remains intact  Description: 1.  Monitor for areas of redness and/or skin breakdown  2.  Assess vascular access sites hourly  3.  Every 4-6 hours minimum:  Change oxygen saturation probe site  4.  Every 4-6 hours:  If on nasal continuous positive airway pressure, respiratory therapy assess nares and determine need for appliance change or resting period  Recent Flowsheet Documentation  Taken 1/31/2025 1125 by Nazia Charles RN  Skin Integrity Remains Intact: Monitor for areas of redness and/or skin breakdown  1/31/2025 1122 by Nazia Charles RN  Outcome: Progressing     Problem: Pain  Goal: Verbalizes/displays adequate comfort level or baseline comfort level  1/31/2025 1122 by Nazia Charles RN  Outcome: Progressing

## 2025-02-02 LAB
ANION GAP SERPL CALCULATED.3IONS-SCNC: 10 MMOL/L (ref 7–16)
BASOPHILS # BLD: 0.01 K/UL (ref 0–0.2)
BASOPHILS NFR BLD: 0 % (ref 0–2)
BUN SERPL-MCNC: 8 MG/DL (ref 6–23)
CALCIUM SERPL-MCNC: 8.3 MG/DL (ref 8.6–10.2)
CHLORIDE SERPL-SCNC: 102 MMOL/L (ref 98–107)
CO2 SERPL-SCNC: 25 MMOL/L (ref 22–29)
CREAT SERPL-MCNC: 0.6 MG/DL (ref 0.7–1.2)
EOSINOPHIL # BLD: 0.13 K/UL (ref 0.05–0.5)
EOSINOPHILS RELATIVE PERCENT: 2 % (ref 0–6)
ERYTHROCYTE [DISTWIDTH] IN BLOOD BY AUTOMATED COUNT: 16.5 % (ref 11.5–15)
GFR, ESTIMATED: >90 ML/MIN/1.73M2
GLUCOSE BLD-MCNC: 128 MG/DL (ref 74–99)
GLUCOSE BLD-MCNC: 141 MG/DL (ref 74–99)
GLUCOSE BLD-MCNC: 193 MG/DL (ref 74–99)
GLUCOSE BLD-MCNC: 206 MG/DL (ref 74–99)
GLUCOSE SERPL-MCNC: 135 MG/DL (ref 74–99)
HCT VFR BLD AUTO: 31 % (ref 37–54)
HGB BLD-MCNC: 10 G/DL (ref 12.5–16.5)
IMM GRANULOCYTES # BLD AUTO: <0.03 K/UL (ref 0–0.58)
IMM GRANULOCYTES NFR BLD: 0 % (ref 0–5)
LYMPHOCYTES NFR BLD: 0.53 K/UL (ref 1.5–4)
LYMPHOCYTES RELATIVE PERCENT: 10 % (ref 20–42)
MCH RBC QN AUTO: 31.3 PG (ref 26–35)
MCHC RBC AUTO-ENTMCNC: 32.3 G/DL (ref 32–34.5)
MCV RBC AUTO: 97.2 FL (ref 80–99.9)
MICROORGANISM SPEC CULT: NORMAL
MICROORGANISM/AGENT SPEC: NORMAL
MONOCYTES NFR BLD: 0.48 K/UL (ref 0.1–0.95)
MONOCYTES NFR BLD: 9 % (ref 2–12)
NEUTROPHILS NFR BLD: 79 % (ref 43–80)
NEUTS SEG NFR BLD: 4.38 K/UL (ref 1.8–7.3)
PLATELET # BLD AUTO: 209 K/UL (ref 130–450)
PMV BLD AUTO: 9 FL (ref 7–12)
POTASSIUM SERPL-SCNC: 3.9 MMOL/L (ref 3.5–5)
RBC # BLD AUTO: 3.19 M/UL (ref 3.8–5.8)
RBC # BLD: ABNORMAL 10*6/UL
SERVICE CMNT-IMP: NORMAL
SODIUM SERPL-SCNC: 137 MMOL/L (ref 132–146)
SPECIMEN DESCRIPTION: NORMAL
WBC OTHER # BLD: 5.6 K/UL (ref 4.5–11.5)

## 2025-02-02 PROCEDURE — 80048 BASIC METABOLIC PNL TOTAL CA: CPT

## 2025-02-02 PROCEDURE — 85025 COMPLETE CBC W/AUTO DIFF WBC: CPT

## 2025-02-02 PROCEDURE — 6370000000 HC RX 637 (ALT 250 FOR IP): Performed by: INTERNAL MEDICINE

## 2025-02-02 PROCEDURE — 6370000000 HC RX 637 (ALT 250 FOR IP): Performed by: NURSE PRACTITIONER

## 2025-02-02 PROCEDURE — 99232 SBSQ HOSP IP/OBS MODERATE 35: CPT | Performed by: STUDENT IN AN ORGANIZED HEALTH CARE EDUCATION/TRAINING PROGRAM

## 2025-02-02 PROCEDURE — 2500000003 HC RX 250 WO HCPCS: Performed by: INTERNAL MEDICINE

## 2025-02-02 PROCEDURE — 82962 GLUCOSE BLOOD TEST: CPT

## 2025-02-02 PROCEDURE — 1200000000 HC SEMI PRIVATE

## 2025-02-02 PROCEDURE — 6360000002 HC RX W HCPCS: Performed by: NURSE PRACTITIONER

## 2025-02-02 PROCEDURE — 2580000003 HC RX 258: Performed by: NURSE PRACTITIONER

## 2025-02-02 RX ORDER — METOPROLOL SUCCINATE 50 MG/1
50 TABLET, EXTENDED RELEASE ORAL DAILY
DISCHARGE
Start: 2025-02-02

## 2025-02-02 RX ADMIN — SODIUM CHLORIDE, PRESERVATIVE FREE 10 ML: 5 INJECTION INTRAVENOUS at 19:56

## 2025-02-02 RX ADMIN — APIXABAN 5 MG: 5 TABLET, FILM COATED ORAL at 19:55

## 2025-02-02 RX ADMIN — TAMSULOSIN HYDROCHLORIDE 0.4 MG: 0.4 CAPSULE ORAL at 08:50

## 2025-02-02 RX ADMIN — INSULIN LISPRO 1 UNITS: 100 INJECTION, SOLUTION INTRAVENOUS; SUBCUTANEOUS at 13:19

## 2025-02-02 RX ADMIN — SENNOSIDES AND DOCUSATE SODIUM 2 TABLET: 50; 8.6 TABLET ORAL at 08:51

## 2025-02-02 RX ADMIN — PANTOPRAZOLE SODIUM 40 MG: 40 TABLET, DELAYED RELEASE ORAL at 19:55

## 2025-02-02 RX ADMIN — METOPROLOL TARTRATE 12.5 MG: 25 TABLET, FILM COATED ORAL at 08:50

## 2025-02-02 RX ADMIN — PIPERACILLIN AND TAZOBACTAM 4500 MG: 4; .5 INJECTION, POWDER, FOR SOLUTION INTRAVENOUS at 05:11

## 2025-02-02 RX ADMIN — PANTOPRAZOLE SODIUM 40 MG: 40 TABLET, DELAYED RELEASE ORAL at 08:50

## 2025-02-02 RX ADMIN — INSULIN LISPRO 1 UNITS: 100 INJECTION, SOLUTION INTRAVENOUS; SUBCUTANEOUS at 18:14

## 2025-02-02 RX ADMIN — METOPROLOL TARTRATE 12.5 MG: 25 TABLET, FILM COATED ORAL at 19:55

## 2025-02-02 RX ADMIN — SODIUM CHLORIDE, PRESERVATIVE FREE 10 ML: 5 INJECTION INTRAVENOUS at 01:24

## 2025-02-02 RX ADMIN — PIPERACILLIN AND TAZOBACTAM 4500 MG: 4; .5 INJECTION, POWDER, FOR SOLUTION INTRAVENOUS at 13:42

## 2025-02-02 RX ADMIN — FINASTERIDE 5 MG: 5 TABLET, FILM COATED ORAL at 08:50

## 2025-02-02 RX ADMIN — APIXABAN 5 MG: 5 TABLET, FILM COATED ORAL at 08:50

## 2025-02-02 RX ADMIN — SODIUM CHLORIDE, PRESERVATIVE FREE 10 ML: 5 INJECTION INTRAVENOUS at 05:12

## 2025-02-03 VITALS
HEIGHT: 74 IN | BODY MASS INDEX: 26.51 KG/M2 | DIASTOLIC BLOOD PRESSURE: 67 MMHG | OXYGEN SATURATION: 95 % | SYSTOLIC BLOOD PRESSURE: 119 MMHG | TEMPERATURE: 97.3 F | WEIGHT: 206.57 LBS | RESPIRATION RATE: 18 BRPM | HEART RATE: 80 BPM

## 2025-02-03 LAB
ANION GAP SERPL CALCULATED.3IONS-SCNC: 8 MMOL/L (ref 7–16)
BASOPHILS # BLD: 0 K/UL (ref 0–0.2)
BASOPHILS NFR BLD: 0 % (ref 0–2)
BUN SERPL-MCNC: 9 MG/DL (ref 6–23)
CALCIUM SERPL-MCNC: 8.3 MG/DL (ref 8.6–10.2)
CHLORIDE SERPL-SCNC: 103 MMOL/L (ref 98–107)
CO2 SERPL-SCNC: 26 MMOL/L (ref 22–29)
CREAT SERPL-MCNC: 0.5 MG/DL (ref 0.7–1.2)
EOSINOPHIL # BLD: 0.11 K/UL (ref 0.05–0.5)
EOSINOPHILS RELATIVE PERCENT: 2 % (ref 0–6)
ERYTHROCYTE [DISTWIDTH] IN BLOOD BY AUTOMATED COUNT: 16.6 % (ref 11.5–15)
GFR, ESTIMATED: >90 ML/MIN/1.73M2
GLUCOSE BLD-MCNC: 147 MG/DL (ref 74–99)
GLUCOSE SERPL-MCNC: 156 MG/DL (ref 74–99)
HCT VFR BLD AUTO: 31.1 % (ref 37–54)
HGB BLD-MCNC: 9.9 G/DL (ref 12.5–16.5)
LYMPHOCYTES NFR BLD: 0.27 K/UL (ref 1.5–4)
LYMPHOCYTES RELATIVE PERCENT: 5 % (ref 20–42)
MCH RBC QN AUTO: 31.3 PG (ref 26–35)
MCHC RBC AUTO-ENTMCNC: 31.8 G/DL (ref 32–34.5)
MCV RBC AUTO: 98.4 FL (ref 80–99.9)
MONOCYTES NFR BLD: 0.33 K/UL (ref 0.1–0.95)
MONOCYTES NFR BLD: 5 % (ref 2–12)
NEUTROPHILS NFR BLD: 88 % (ref 43–80)
NEUTS SEG NFR BLD: 5.39 K/UL (ref 1.8–7.3)
PLATELET # BLD AUTO: 211 K/UL (ref 130–450)
PMV BLD AUTO: 9.3 FL (ref 7–12)
POTASSIUM SERPL-SCNC: 3.8 MMOL/L (ref 3.5–5)
RBC # BLD AUTO: 3.16 M/UL (ref 3.8–5.8)
RBC # BLD: ABNORMAL 10*6/UL
SODIUM SERPL-SCNC: 137 MMOL/L (ref 132–146)
WBC OTHER # BLD: 6.1 K/UL (ref 4.5–11.5)

## 2025-02-03 PROCEDURE — 2500000003 HC RX 250 WO HCPCS: Performed by: INTERNAL MEDICINE

## 2025-02-03 PROCEDURE — 85025 COMPLETE CBC W/AUTO DIFF WBC: CPT

## 2025-02-03 PROCEDURE — 6370000000 HC RX 637 (ALT 250 FOR IP): Performed by: INTERNAL MEDICINE

## 2025-02-03 PROCEDURE — 82962 GLUCOSE BLOOD TEST: CPT

## 2025-02-03 PROCEDURE — 99239 HOSP IP/OBS DSCHRG MGMT >30: CPT | Performed by: STUDENT IN AN ORGANIZED HEALTH CARE EDUCATION/TRAINING PROGRAM

## 2025-02-03 PROCEDURE — 80048 BASIC METABOLIC PNL TOTAL CA: CPT

## 2025-02-03 RX ADMIN — FINASTERIDE 5 MG: 5 TABLET, FILM COATED ORAL at 08:32

## 2025-02-03 RX ADMIN — SENNOSIDES AND DOCUSATE SODIUM 2 TABLET: 50; 8.6 TABLET ORAL at 08:31

## 2025-02-03 RX ADMIN — SODIUM CHLORIDE, PRESERVATIVE FREE 10 ML: 5 INJECTION INTRAVENOUS at 08:31

## 2025-02-03 RX ADMIN — TAMSULOSIN HYDROCHLORIDE 0.4 MG: 0.4 CAPSULE ORAL at 08:31

## 2025-02-03 RX ADMIN — METOPROLOL TARTRATE 12.5 MG: 25 TABLET, FILM COATED ORAL at 08:31

## 2025-02-03 RX ADMIN — PANTOPRAZOLE SODIUM 40 MG: 40 TABLET, DELAYED RELEASE ORAL at 08:31

## 2025-02-03 RX ADMIN — APIXABAN 5 MG: 5 TABLET, FILM COATED ORAL at 08:31

## 2025-02-03 NOTE — PLAN OF CARE
Problem: Chronic Conditions and Co-morbidities  Goal: Patient's chronic conditions and co-morbidity symptoms are monitored and maintained or improved  Outcome: Progressing     Problem: Discharge Planning  Goal: Discharge to home or other facility with appropriate resources  Outcome: Progressing     Problem: Cardiovascular - Adult  Goal: Maintains optimal cardiac output and hemodynamic stability  Outcome: Progressing  Goal: Absence of cardiac dysrhythmias or at baseline  Outcome: Progressing     Problem: Gastrointestinal - Adult  Goal: Minimal or absence of nausea and vomiting  Outcome: Progressing  Goal: Maintains adequate nutritional intake  Outcome: Progressing     Problem: Genitourinary - Adult  Goal: Absence of urinary retention  Outcome: Progressing  Goal: Urinary catheter remains patent  Outcome: Progressing     Problem: Safety - Adult  Goal: Free from fall injury  Outcome: Progressing     Problem: Skin/Tissue Integrity  Goal: Skin integrity remains intact  Description: 1.  Monitor for areas of redness and/or skin breakdown  2.  Assess vascular access sites hourly  3.  Every 4-6 hours minimum:  Change oxygen saturation probe site  4.  Every 4-6 hours:  If on nasal continuous positive airway pressure, respiratory therapy assess nares and determine need for appliance change or resting period  Outcome: Progressing     Problem: Pain  Goal: Verbalizes/displays adequate comfort level or baseline comfort level  Outcome: Progressing     Problem: Nutrition Deficit:  Goal: Optimize nutritional status  Outcome: Progressing

## 2025-02-03 NOTE — DISCHARGE SUMMARY
Hospitalist Discharge Summary    Patient ID: Michael Garcia   Patient : [id number]  Patient's PCP: Moshe Moran,     Admit Date: 2025   Admitting Physician: Cammie Alberts DO    Discharge Date:  2/3/2025   Discharge Physician: Joel Parsons MD   Discharge Condition: Stable  Discharge Disposition: Skilled Facility      Hospital course in brief:  (Please refer to daily progress notes for a comprehensive review of the hospitalization by requesting medical records)      Patient with PMH significant for PE/DVT on Eliquis, hypertension, DM, metastatic pancreatic cancer with mets to liver s/p chemo, prostate cancer, proximal AV calcification, SVT, recent bacteremia with COVID infection the lactic acidosis.  Presented back to hospital on 2025 with hypotension, tachycardia and fatigue.  Found to have large pericardial effusion as well as bradycardia and questionable heart block.  Cardiology was consulted and patient was placed in intensive care with consult placed for critical care.  PAUL was completed without concern for tamponade.  Patient is on broad-spectrum antibiotics while pancultures pend.  Cultures all negative to date.  Palliative care is following patient is full code.        patient continued to do better, transferred out of ICU on .  Continue antibiotic till 2025.  Anticipate discharge after completion of antibiotic.        -last day of  zosyn; dc held as ---Per Josh, they cannot accept until Monday as they do not do  admissions      2/3 He continues to do well, has completed antibiotics, currently clinically and HD stable at the time of discharge.        Consults:   IP CONSULT TO CRITICAL CARE  IP CONSULT TO INTERNAL MEDICINE  IP CONSULT TO CARDIOLOGY  PHARMACY TO DOSE VANCOMYCIN  IP CONSULT TO PALLIATIVE CARE  IP CONSULT TO VASCULAR ACCESS TEAM  IP CONSULT TO VASCULAR ACCESS TEAM    Discharge Diagnoses:  Septic Shock - resolved   Left-sided 
input(s): \"ALKPHOS\", \"ALT\", \"AST\", \"BILITOT\", \"AMYLASE\", \"LIPASE\" in the last 72 hours.    Invalid input(s): \"PROT\", \"ALB\"      No results for input(s): \"INR\" in the last 72 hours.    No results for input(s): \"CKTOTAL\", \"TROPONINI\" in the last 72 hours.    Chronic labs:    Lab Results   Component Value Date    CHOL 138 01/27/2025    TRIG 83 01/27/2025    HDL 40 (L) 01/27/2025    TSH 2.12 01/27/2025    PSA 0.04 02/14/2024    INR 1.2 01/26/2025    LABA1C 4.3 01/27/2025       Radiology: REVIEWED DAILY    Assessment/Plan:    Septic shock (resolved)  Recent history of polymicrobial bacteremia, port was removed last admission  Weaned off of vasopressors  MRSA nares as well as strep pneumonia and Legionella and chlamydia negative, viral panel negative as well.  Continue IV Zosyn, will need to continue until February 2, 2025  Multiple times at obtaining IV access resulted in failure, patient will transfer to telemetry floor with triple-lumen.  Triple-lumen will need absolutely removed prior to discharge.      Left-sided pleural effusion, pericardial effusion without tamponade  Cardiology following believe etiology of pleural effusion pericardial effusion likely related to metastatic pancreatic cancer  No evidence of tamponade based on echocardiogram  Close telemetry monitoring  Avoid AV blocking agents     Third-degree AV block, history of SVT  Cardiology following, believe it was an isolated vasovagal event, they have signed off  Avoid AV blocking agents  Monitor on telemetry  Consider Zio patch on discharge     Chronic comorbidities: stage III pancreatic cancer, s/p Whipple procedure, on chemo, Prostate cancer, Severe protein calorie malnutrition, Immunocompromise, type 2 diabetes, BPH  Continue sliding scale insulin and hypoglycemia protocol  Continue rest of home medications as appropriate  Continue to follow labs replete as indicated    DVT Prophylaxis [] Lovenox  []  Heparin [] DOAC [x] PCDs [] Ambulation    GI

## 2025-02-03 NOTE — CARE COORDINATION
Social Work/Case Management Transition of Care Planning (Sima Leger -[phone]):  Per report and chart review, patient is on IV Zosyn q8 with a stop date of 2/2.  BUE US is pending.  PT/OT scores noted to be 8/15.  Patient was a max x2 mobility.  Discharge plan is to [facility].  Patient and family are aware chemo will be on hold while in rehab.  Spoke with Sharon of [facility]. They cannot accept patient with IJ.  Antibiotics will be completed on 2/2 and IJ will need to be removed prior to admission.   ELINOR/destination completed.  No pre-cert is needed. No HENS is needed as patient was from [facility] when he admitted.  Discharge envelope with ambulance form is in the soft chart.  Sima Leger, RUDI  1/31/2025    
Social Work/Case Management Transition of Care Planning (Sima Leger -[phone]):  Discharge order noted.  IJ remains in place at this time.  It will need to be removed after last dose of antibiotics.  Per Josh, they cannot accept until Monday as they do not do Sunday admissions.  Sima Leger, RUDI  2/2/2025    
Social Work/Case Management Transition of Care Planning (Sima Leger -[phone]):  Discharge order noted.  Discharge plan is to [facility].  Transport via [facility] with a  time of 12:00 pm.  Notified patient, Razia - primary contact, Sharon of [facility] and floor nurse. ELINOR/destination completed. No pre-cert is needed. No HENS is needed as patient was from [facility] when he admitted. Discharge envelope with ambulance form is in the soft chart. Sima Leger, RUDI  2/3/2025    
Transition of Care Update:   Cardiology is considering a zio patch on discharge.  Repeat TTE in 3 weeks.  Off vasopressors.  On IV Zosyn til Sunday 2/2.  Both [facility] & [facility] are willing to accept the pt.  [facility] will not accept if the pt plans to continue chemo at facility.  Left a VM for Razia regarding discharge plans to determine if pt is willing to hold off on chemo for now.  No pre-Cert or HENS required.  Transportation will need arranged at discharge.  Facesheet, Ambulance Form in Transport Envelope located in pt's soft chart. ELINOR & Destination will need completed once [facility] is finalized.  Will need to clarify if Zosyn is required at discharge or if it can be transitioned to PO.    CM/SW to follow.             10:28   Left message thru [facility] for Attending to clarify the pt's strict bedrest, request PT/OT evals, & Zosyn clarification.  MD discontinued strict bedrest & will order PT/OT evals.  Attending to check with Dr. Barahona regarding Zosyn.  Also spoke Razia regarding [facility].  She is aware that the pt will not receive chemo while at [facility].   Razia will discuss with pt later today regarding [facility] selection:  [facility] vs [facility] & notify CM of decision.  PT/OT notified of new orders & request to complete evals today.        13:00  Razia & pt would like 1st [facility] & 2nd is [facility].  Attending messaged in [facility] to see if pt can discharge potentially today.  Liaison at [facility] to see if they could accept today.  Waiting on return call.        13:34  Awaiting for Attending for discharge clearance.      13:43  [facility] but the facility cannot accept the pt with IJ.   Pt needs IV Antibiotics until 2/2 per Dr. Barahona.  
Transition of Care Update:  Admitted for Septic Shock, left pleural effusion.  Cardiology following for Pericardial effusion & Bradycardia - pleural effusion & pericardial effusion possibly related to his metastatic pancreatic cancer (Stage III pancreatic cancer, s/p Whipple procedure, on chemo).  Cardiology considering Zio patch upon discharge.  On RA.  Labs noted.  On Solu-Cortef 50 mg IV Q 8 hrs. & Zosyn 4,500 mg IV Q 8 hrs.  Vancomycin was discontinued.  Spoke with Razia today over the phone.  Pt does not wish to return to [facility], left a vm for Liaison at [facility].  Both pt & Razia agreeable to [facility] ([facility]) in [place].  Referral made to Liaison at [facility] in [place] to review.  If accepted, pt would NOT need a pre-Cert or HENS/PASRR at discharge.  Transportation will need to be arranged.  Facesheet, Ambulance Form in Transport Envelope located in pt's soft chart. ELINOR & Destination will need completed once [facility] accepts.  CM/SW to follow.       10:44   Palliative is following to discuss code status with pt & Razia.      11:07   [facility] in [place] had concerns regarding cost of Creon ($500).  Called pt's pharmacy [facility].  The pt is NOT taking Creon.  The script  10/11/2024 per [facility] pharmacist.  This information was given to Liaison at [facility].      11:15   [facility] in [place] declined.  Spoke with Razia regarding more [facility] choices so referrals can be made today.  [facility] list in pt's room.  \"I can think of where I don't want him to go.\"  Requested 2-3 more options.   Will need to follow up later today with [facility] preferences.      12:40  Per Cardiology note repeat an echocardiogram in couple days to assess stability of his pericardial effusion.  Spoke with Razia regarding [facility] preferences: [facility], [facility], [facility] in [place], and [facility].  Left VM with Liaison at [facility] to make a referral.  Referral made to [facility]; spoke with Liaison at [facility] regarding the 
Transition of Care Update:  Septic shock resolving.  Pan cultures pending; on Zosyn 4,500 mg IVPB Q 8 hrs & Vancomycin 1,500 mg IV Q 24 hrs.  Off vasopressors.  Weaning steroids, on Solu-Cortef 100 mg IV Q 8 hrs..  Continue IVF.  On RA.  Liaison from [facility] on the phone with Razia, the pt's POA, regarding possibly returning to [facility] when medically stable.  Left a message for [facility] in [place] concerning potential referral.  Razia wants to talk with pt & family; she will notify CM later today regarding discharge plan.  Notify [facility] Liaison Paola (# [phone]) of family's decision regarding [facility].  CM/SW to follow.          11:00  Facesheet, Ambulance Form in Transport Envelope placed in pt's soft chart.  ELINOR & Destination not completed.    
Readmission Assessment in  Navigator will be completed.    Advance Directives:      Code Status: Full Code   Patient's Primary Decision Maker is: Named in Scanned ACP Document    Primary Decision Maker: Arielle Tim \"Razia\" - Other - [phone]    Secondary Decision Maker: Michael Mccullough Jr. - [phone]    Discharge Planning:    Patient lives with:   Type of Home:    Primary Care Giver: Other (Comment) (sent from [facility] for tachycardia & hypotension)  Patient Support Systems include: Spouse/Significant Other, Family Members     ADLS  Prior functional level: Assistance with the following:, Bathing, Dressing, Toileting, Mobility  Current functional level: Assistance with the following:, Bathing, Dressing, Toileting, Mobility    Family can provide assistance at DC: Yes  Would you like Case Management to discuss the discharge plan with any other family members/significant others, and if so, who? No  Plans to Return to Present Housing: Yes      Financial    Payor: MEDICARE / Plan: MEDICARE PART A AND B / Product Type: *No Product type* /     Does insurance require precert for SNF: No    The Plan for Transition of Care is related to the following treatment goals of Pericardial effusion [I31.39]  Shock [R57.9]  Septic shock (HCC) [A41.9, R65.21]    The Patient and/or Patient Representative Agree with the Discharge Plan? yes     Marisa Sanchez  Case Management Department  Ph: [phone]

## 2025-02-03 NOTE — PROGRESS NOTES
HOSPITALIST PROGRESS NOTES     ADMITTING DATE AND TIME: 1/26/2025 12:46 PM  had concerns including Hypotension (Pt sent in from [facility] due to tachycardia and hypotension).    ADMIT DX: Pericardial effusion [I31.39]  Shock [R57.9]  Septic shock (HCC) [A41.9, R65.21]      SUBJECTIVE:  Patient is being followed for Pericardial effusion [I31.39]  Shock [R57.9]  Septic shock (HCC) [A41.9, R65.21]     Patient was seen examined and evaluated  Recent lab results, charts and pertinent diagnostic imaging reviewed   Ancillary service notes reviewed   Hard of hearing     Care reviewed with nursing staff, medical and surgical specialty care, primary care and the patient's family as available.   ROS: denies fever, chills, cp, sob, n/v, HA unless stated above.      vancomycin  1,250 mg IntraVENous Q24H    sodium chloride flush  5-40 mL IntraVENous 2 times per day    [Held by provider] apixaban  5 mg Oral BID    [Held by provider] bumetanide  1 mg Oral Daily    finasteride  5 mg Oral Daily    [Held by provider] glipiZIDE  10 mg Oral BID AC    [Held by provider] metoprolol succinate  50 mg Oral Daily    montelukast  10 mg Oral Nightly    pantoprazole  40 mg Oral BID    tamsulosin  0.4 mg Oral Daily    sennosides-docusate sodium  2 tablet Oral Daily    hydrocortisone sodium succinate PF  100 mg IntraVENous Q8H    piperacillin-tazobactam  4,500 mg IntraVENous Q8H    insulin lispro  0-4 Units SubCUTAneous 4x Daily AC & HS     sodium chloride flush, 5-40 mL, PRN  sodium chloride, , PRN  polyethylene glycol, 17 g, Daily PRN  acetaminophen, 650 mg, Q6H PRN   Or  acetaminophen, 650 mg, Q6H PRN  ipratropium 0.5 mg-albuterol 2.5 mg, 1 Dose, Q6H PRN  prochlorperazine, 10 mg, Q6H PRN  glucose, 4 tablet, PRN  dextrose bolus, 125 mL, PRN   Or  dextrose bolus, 250 mL, PRN  glucagon (rDNA), 1 mg, PRN  dextrose, , 
                                                                                                                                HOSPITALIST PROGRESS NOTES     ADMITTING DATE AND TIME: 1/26/2025 12:46 PM  had concerns including Hypotension (Pt sent in from [facility] due to tachycardia and hypotension).    ADMIT DX: Pericardial effusion [I31.39]  Shock [R57.9]  Septic shock (HCC) [A41.9, R65.21]      SUBJECTIVE:  Patient is being followed for Pericardial effusion [I31.39]  Shock [R57.9]  Septic shock (HCC) [A41.9, R65.21]     Patient was seen examined and evaluated  Recent lab results, charts and pertinent diagnostic imaging reviewed   Ancillary service notes reviewed   Hard of hearing     Care reviewed with nursing staff, medical and surgical specialty care, primary care and the patient's family as available.   ROS: denies fever, chills, cp, sob, n/v, HA unless stated above.      sodium chloride flush  5-40 mL IntraVENous 2 times per day    lidocaine 1 % injection  50 mg IntraDERmal Once    vancomycin  1,500 mg IntraVENous Q24H    sodium chloride flush  5-40 mL IntraVENous 2 times per day    [Held by provider] apixaban  5 mg Oral BID    [Held by provider] bumetanide  1 mg Oral Daily    [Held by provider] finasteride  5 mg Oral Daily    [Held by provider] glipiZIDE  10 mg Oral BID AC    [Held by provider] metoprolol succinate  50 mg Oral Daily    [Held by provider] montelukast  10 mg Oral Nightly    pantoprazole  40 mg Oral BID    [Held by provider] tamsulosin  0.4 mg Oral Daily    sennosides-docusate sodium  2 tablet Oral Daily    hydrocortisone sodium succinate PF  100 mg IntraVENous Q8H    piperacillin-tazobactam  4,500 mg IntraVENous Q8H    insulin lispro  0-4 Units SubCUTAneous 4x Daily AC & HS     sodium chloride flush, 5-40 mL, PRN  sodium chloride, , PRN  sodium chloride flush, 5-40 mL, PRN  sodium chloride, , PRN  polyethylene glycol, 17 g, Daily PRN  acetaminophen, 650 mg, Q6H PRN   Or  acetaminophen, 650 
            CRITICAL CARE PROGRESS NOTE      [facility]  Department of Pulmonary, Critical Care and Sleep Medicine  [facility]  Dr. Arce, Dr. Mcarthur, Dr. Orozco    SUBJECTIVE:    Patient seen and examined.  No acute events overnight.  He currently is off vasopressors and has no complaints.  Vasopressors have been off for approximately 14 hours.  Denies pain or discomfort.    OBJECTIVE:  Vitals:    01/28/25 0430 01/28/25 0500 01/28/25 0600 01/28/25 0659   BP:       Pulse: 89 88 90 77   Resp: 14 24  20   Temp:       TempSrc:       SpO2: 97% 98%         1. General: Alert and oriented x 3, No acute distress.  2. Eyes: Vision - grossly normal, PERRLA   3. HENT: Head is atraumatic & normocephalic. Neck Supple, No jugular venous distention   4. Respiratory: Lungs are clear to auscultation, Respirations are non-labored, Breath sounds are equal   5. Cardiovascular: S1, S2 normal, Regular rate & rhythm, No murmur, No pedal edema   6. Gastrointestinal: Soft, Non-tender, Non-distended, Normal bowel sounds. No organomegaly.  7. Neurologic: Awake & Alert, Cranial nerves 2-12 grossly intact, No focal motor or sensory deficits.  8. Skin: no rashes, breakdown  9. Musculoskeletal: no LE edema, no joint effusion.  10. Psychiatric - No Anxiety, Depression    DATA:    CBC:   Lab Results   Component Value Date    WBC 7.0 01/28/2025    HGB 8.9 (L) 01/28/2025    HCT 28.5 (L) 01/28/2025    MCV 99.0 01/28/2025     01/28/2025     LFTs:   Lab Results   Component Value Date    ALT <5 01/28/2025    AST 8 01/28/2025    ALKPHOS 100 01/28/2025    BILITOT 0.4 01/28/2025    ALBUMIN 2.4 (L) 01/28/2025     Coags:   Lab Results   Component Value Date    INR 1.2 01/26/2025       RADIOLOGY:      XR CHEST PORTABLE    Result Date: 1/26/2025  EXAMINATION: ONE XRAY VIEW OF THE CHEST 1/26/2025 8:52 pm COMPARISON: None. HISTORY: ORDERING SYSTEM PROVIDED HISTORY: R IJ CVC placement 
    Inpatient Cardiology Progress note        SUBJECTIVE/OBJECTIVE:   No chest pain or shortness of breath  Blood pressure is adequate off hemodynamic support       PHYSICAL EXAM:   /66   Pulse 77   Temp 97.8 °F (36.6 °C) (Temporal)   Resp 17   SpO2 97%    B/P Range last 24 hours: Systolic (24hrs), Av , Min:111 , Max:135    Diastolic (24hrs), Av, Min:54, Max:95    GENERAL: NAD   HEAD: Normocephalic, atraumatic.   NECK: No JVD. No carotid bruits. No neck masses.    CARDIOVASCULAR: Normal rate, regular rhythm, normal S1, S2, no MRG    LUNGS: Diminished breath sound bibasilar no wheezing.    ABDOMEN: Abdomen is soft and not distended. No tenderness.    EXTREMITIES: There is no pedal edema.   MUSCULOSKELETAL: No joint swelling. Normal range of motion    SKIN: Skin is moist. No ulcers or lesions.   NEUROLOGICAL: No evidence of obvious neurological deficits.    PSYCHOLOGICAL: Patient is in normal mood.        Intake/Output Summary (Last 24 hours) at 2025 0025  Last data filed at 2025 1800  Gross per 24 hour   Intake 4111.06 ml   Output 570 ml   Net 3541.06 ml       Weight:   Wt Readings from Last 3 Encounters:   24 83.1 kg (183 lb 3.2 oz)   10/24/24 83.1 kg (183 lb 1.6 oz)   10/10/24 85.7 kg (189 lb)       Current Inpatient Medications:   lidocaine 1 % injection  50 mg IntraDERmal Once    hydrocortisone sodium succinate PF  50 mg IntraVENous Q8H    finasteride  5 mg Oral Daily    sodium chloride flush  5-40 mL IntraVENous 2 times per day    [Held by provider] apixaban  5 mg Oral BID    [Held by provider] bumetanide  1 mg Oral Daily    [Held by provider] glipiZIDE  10 mg Oral BID AC    [Held by provider] metoprolol succinate  50 mg Oral Daily    [Held by provider] montelukast  10 mg Oral Nightly    pantoprazole  40 mg Oral BID    [Held by provider] tamsulosin  0.4 mg Oral Daily    sennosides-docusate sodium  2 tablet Oral Daily    piperacillin-tazobactam  4,500 mg IntraVENous Q8H    
  [facility]  Department of Pulmonary, Critical Care and Sleep Medicine  [facility]      SUBJECTIVE:    Patient seen and examined.    No acute events overnight.    Blood pressure has improved.    OBJECTIVE:  Vitals:    01/31/25 0318 01/31/25 0329 01/31/25 0747 01/31/25 1048   BP: 106/84  121/77 125/71   Pulse: 74  72 74   Resp: 19  18 18   Temp: 98.6 °F (37 °C)  97.8 °F (36.6 °C) 97.3 °F (36.3 °C)   TempSrc: Oral  Oral Oral   SpO2: 100%  98% 99%   Weight:  95.5 kg (210 lb 8.6 oz)     Height:           1. General: Alert and oriented x 3, No acute distress.  2. Eyes: Vision - grossly normal, PERRLA   3. HENT: Head is atraumatic & normocephalic. Neck Supple, No jugular venous distention   4. Respiratory: Lungs are clear to auscultation, Respirations are non-labored, Breath sounds are equal   5. Cardiovascular: S1, S2 normal, Regular rate & rhythm, No murmur, No pedal edema   6. Gastrointestinal: Soft, Non-tender, Non-distended, Normal bowel sounds. No organomegaly.  7. Neurologic: Awake & Alert, Cranial nerves 2-12 grossly intact, No focal motor or sensory deficits.  8. Skin: no rashes, breakdown  9. Musculoskeletal: no LE edema, no joint effusion.  10. Psychiatric - No Anxiety, Depression    DATA:    CBC:   Lab Results   Component Value Date    WBC 5.6 01/31/2025    HGB 10.0 (L) 01/31/2025    HCT 31.6 (L) 01/31/2025    MCV 97.8 01/31/2025     01/31/2025     LFTs:   Lab Results   Component Value Date    ALT 10 01/30/2025    AST 18 01/30/2025    ALKPHOS 92 01/30/2025    BILITOT 0.3 01/30/2025    ALBUMIN 2.4 (L) 01/30/2025     Coags:   Lab Results   Component Value Date    INR 1.2 01/26/2025       RADIOLOGY:      Echo (TTE) limited (PRN contrast/bubble/strain/3D)  Result Date: 1/26/2025    Left Ventricle: Normal left ventricular systolic function with a visually estimated EF of 55 - 60%. Left ventricle size is normal. Normal wall thickness. Normal wall 
  Hospitalist Progress Note      Synopsis:   Patient with PMH significant for PE/DVT on Eliquis, hypertension, DM, metastatic pancreatic cancer with mets to liver s/p chemo, prostate cancer, proximal AV calcification, SVT, recent bacteremia with COVID infection the lactic acidosis.  Presented back to hospital on 2025 with hypotension, tachycardia and fatigue.  Found to have large pericardial effusion as well as bradycardia and questionable heart block.  Cardiology was consulted and patient was placed in intensive care with consult placed for critical care.  PAUL was completed without concern for tamponade.  Patient is on broad-spectrum antibiotics while pancultures pend.    Hospital day 3     Subjective:  Stable overnight.  No issues reported.   Patient seen and examined at bedside, no new complaints states feeling well.  Records reviewed.       Temp (24hrs), Av.1 °F (36.7 °C), Min:97.2 °F (36.2 °C), Max:98.8 °F (37.1 °C)    DIET: ADULT DIET; Regular; 4 carb choices (60 gm/meal)  CODE: Full Code    Intake/Output Summary (Last 24 hours) at 2025 1006  Last data filed at 2025 1800  Gross per 24 hour   Intake 380 ml   Output 300 ml   Net 80 ml       Review of Systems:    All bolded are positive; please see HPI  General:  Fever, chills, diaphoresis, fatigue, malaise, night sweats, weight loss  Psychological:  Anxiety, disorientation, hallucinations.  ENT:  Epistaxis, headaches, vertigo, visual changes.  Cardiovascular:  Chest pain, irregular heartbeats, palpitations, paroxysmal nocturnal dyspnea.  Respiratory:  Shortness of breath, coughing, sputum production, hemoptysis, wheezing, orthopnea.  Gastrointestinal:  Nausea, vomiting, diarrhea, heartburn, constipation, abdominal pain, hematemesis, hematochezia, melena, acholic stools  Genito-Urinary:  Dysuria, urgency, frequency, hematuria  Musculoskeletal:  Joint pain, joint stiffness, joint swelling, muscle pain  Neurology:  Headache, focal neurological 
  Hospitalist Progress Note      Synopsis:   Patient with PMH significant for PE/DVT on Eliquis, hypertension, DM, metastatic pancreatic cancer with mets to liver s/p chemo, prostate cancer, proximal AV calcification, SVT, recent bacteremia with COVID infection the lactic acidosis.  Presented back to hospital on 2025 with hypotension, tachycardia and fatigue.  Found to have large pericardial effusion as well as bradycardia and questionable heart block.  Cardiology was consulted and patient was placed in intensive care with consult placed for critical care.  PAUL was completed without concern for tamponade.  Patient is on broad-spectrum antibiotics while pancultures pend.  Cultures all negative to date.  Palliative care is following patient is full code.       patient continued to do better, transferred out of ICU on .  Continue antibiotic till 2025.  Anticipate discharge after completion of antibiotic.      Subjective:  Stable overnight.  No issues reported.   Patient seen and examined at bedside, family present.  Patient failed weaning trial.   Records reviewed.       Temp (24hrs), Av.3 °F (36.8 °C), Min:97.3 °F (36.3 °C), Max:99.6 °F (37.6 °C)    DIET: ADULT DIET; Regular; 4 carb choices (60 gm/meal)  ADULT ORAL NUTRITION SUPPLEMENT; Breakfast, Lunch, Dinner; Diabetic Oral Supplement  CODE: Full Code    Intake/Output Summary (Last 24 hours) at 2025 1432  Last data filed at 2025 0601  Gross per 24 hour   Intake --   Output 2600 ml   Net -2600 ml       Review of Systems:    Deferred as patient intubated sedated    Objective:    /71   Pulse 74   Temp 97.3 °F (36.3 °C) (Oral)   Resp 18   Ht 1.88 m (6' 2\")   Wt 95.5 kg (210 lb 8.6 oz) Comment: extra blankets on bed  SpO2 99%   BMI 27.03 kg/m²     General appearance: Ill-appearing older male no apparent distress  HEENT: Conjunctivae/corneas clear. Mucous membranes moist.  Neck: Supple. No JVD.  Respiratory: Diminished to 
  Hospitalist Progress Note      Synopsis:   Patient with PMH significant for PE/DVT on Eliquis, hypertension, DM, metastatic pancreatic cancer with mets to liver s/p chemo, prostate cancer, proximal AV calcification, SVT, recent bacteremia with COVID infection the lactic acidosis.  Presented back to hospital on 2025 with hypotension, tachycardia and fatigue.  Found to have large pericardial effusion as well as bradycardia and questionable heart block.  Cardiology was consulted and patient was placed in intensive care with consult placed for critical care.  PAUL was completed without concern for tamponade.  Patient is on broad-spectrum antibiotics while pancultures pend.  Cultures all negative to date.  Palliative care is following patient is full code.      Subjective:  Stable overnight.  No issues reported.   Patient seen and examined at bedside, family present.  Patient failed weaning trial.   Records reviewed.       Temp (24hrs), Av.1 °F (36.2 °C), Min:97 °F (36.1 °C), Max:97.3 °F (36.3 °C)    DIET: ADULT DIET; Regular; 4 carb choices (60 gm/meal)  ADULT ORAL NUTRITION SUPPLEMENT; Breakfast, Lunch, Dinner; Diabetic Oral Supplement  CODE: Full Code    Intake/Output Summary (Last 24 hours) at 2025 1041  Last data filed at 2025 0602  Gross per 24 hour   Intake --   Output 3500 ml   Net -3500 ml       Review of Systems:    Deferred as patient intubated sedated    Objective:    /85   Pulse 65   Temp 97.1 °F (36.2 °C) (Temporal)   Resp 17   Ht 1.88 m (6' 2\")   Wt 93.9 kg (207 lb 0.2 oz)   SpO2 100%   BMI 26.58 kg/m²     General appearance: Ill-appearing older male no apparent distress  HEENT: Conjunctivae/corneas clear. Mucous membranes moist.  Neck: Supple. No JVD.  Respiratory: Diminished to auscultation bilaterally.  Normal respiratory effort.   Cardiovascular:  RRR. S1, S2 without MRG.  PV: Pulses palpable.  2+ edema in bilateral lower extremity  Abdomen: Soft, non-tender, 
  Hospitalist Progress Note      Synopsis:   Patient with PMH significant for PE/DVT on Eliquis, hypertension, DM, metastatic pancreatic cancer with mets to liver s/p chemo, prostate cancer, proximal AV calcification, SVT, recent bacteremia with COVID infection the lactic acidosis.  Presented back to hospital on 2025 with hypotension, tachycardia and fatigue.  Found to have large pericardial effusion as well as bradycardia and questionable heart block.  Cardiology was consulted and patient was placed in intensive care with consult placed for critical care.  PAUL was completed without concern for tamponade.  Patient is on broad-spectrum antibiotics while pancultures pend.  Cultures all negative to date.  Palliative care is following patient is full code.  Stable for transfer out of ICU and discharge plan is possible [facility] if patient not undergoing chemo treatments.    Hospital day 4     Subjective:  Stable overnight.  No issues reported.   Patient seen and examined at bedside, family present.  Patient failed weaning trial.   Records reviewed.       Temp (24hrs), Av °F (36.7 °C), Min:97.7 °F (36.5 °C), Max:98.3 °F (36.8 °C)    DIET: ADULT DIET; Regular; 4 carb choices (60 gm/meal)  CODE: Full Code    Intake/Output Summary (Last 24 hours) at 2025 0744  Last data filed at 2025 0634  Gross per 24 hour   Intake 2392.16 ml   Output 2300 ml   Net 92.16 ml       Review of Systems:    Deferred as patient intubated sedated    Objective:    /79   Pulse 80   Temp 97.7 °F (36.5 °C) (Temporal)   Resp 26   Ht 1.88 m (6' 2.02\")   Wt 83.1 kg (183 lb 3.3 oz)   SpO2 100%   BMI 23.51 kg/m²     General appearance: Ill-appearing older female no apparent distress  HEENT: Conjunctivae/corneas clear. Mucous membranes moist.  Neck: Supple. No JVD.  Respiratory: Diminished to auscultation bilaterally.  Normal respiratory effort.   Cardiovascular:  RRR. S1, S2 without MRG.  PV: Pulses palpable.  2+ edema in 
  OCCUPATIONAL THERAPY INITIAL EVALUATION    [facility]  [address], [place]       Date:2025                                                               Patient Name: Michael Garcia  MRN: [id number]  : 1945  Room: [address] [place], [place] [address]    Evaluating OT: Bing Flores, JMD,  OTR/L; [id number]    Referring Provider: Sandeep Mckeon APRN - CNP    Specific Provider Orders/Date: OT eval and treat (25)       Diagnosis: Pericardial effusion [I31.39]  Shock [R57.9]  Septic shock (HCC) [A41.9, R65.21]     Reason for admission: Pt admitted with tachycardia/hypotension, stage 3 pancreatic cancer +chemo.    Surgery/Procedures:  None this admission    Pertinent Medical History:    Past Medical History:   Diagnosis Date    BPH (benign prostatic hyperplasia)     Cancer of pancreas (HCC)     Deaf, bilateral     Diabetes (HCC)     Hypertension     [place] (obstructive sleep apnea)     Osteoarthritis     Primary osteoarthritis of left knee 10/18/2017    Prostate cancer (HCC) 2024        *Precautions:  Fall Risk, deaf (prefers lip reading and pen/paper communication- declines use of ), hx pancreatic cancer/chemo    Assessment of current deficits   [x] Functional mobility  [x]ADLs  [x] Strength               []Cognition   [x] Functional transfers   [x] IADLs         [x] Safety Awareness   [x]Endurance   [] Fine Coordination        [x] ROM     [] Vision/perception   []Sensation    []Gross Motor Coordination [x] Balance   [] Delirium                  []Motor Control     [x] Communication    OT PLAN OF CARE   OT POC based on physician orders, patient diagnosis and results of clinical assessment.       Frequency/Duration: 1-3 days/wk for 1-2 weeks PRN    Specific OT Treatment Interventions to include:   * Instruction/training on adapted ADL techniques and AE recommendations to increase functional independence within precautions       * Training 
  Palliative Care Department  [phone]  Palliative Care Progress Note  Provider Katherine Moya PA-C     Michael Garcia  [id number]  Hospital Day: 4  Date of Initial Consult: 01/27/2025  Referring Provider: Dana Olsen APRN - CNS   Palliative Medicine was consulted for assistance with: goals of care    HPI:   Michael Garcia is a 79 y.o. Deaf person with a medical history of pancreatic cancer, type 2 diabetes, BPH, hypertension who was admitted on 1/26/2025 from nursing facility with a CHIEF COMPLAINT of hypotension..  He initially presented with tachycardia as well he then became bradycardic.  Concern for pauses/heart block.    Bedside showing pericardial/evidence of tamponade.  LVEF 55 to 60%, dilated right atrium..  Cardiology consulted and recommended transcutaneous pacing if it would recur.  Heart rate improved in the ED.  CTA of chest abdomen and pelvis was negative for PE, bilateral pleural effusions with adjacent atelectasis, prominent pericardial effusion, heterogeneous appearance of liver concerning for metastatic disease.  Patient admitted to medical ICU requiring vasopressor support.  Pan culture sent.  Started on empiric Zosyn.  Patient previously admitted from 12/31 - 1/13 for sepsis/bacteremia from an infected Mediport.  He was treated with ampicillin and Rocephin which was transitioned to Augmentin on discharge.  PAUL was negative for vegetations at that time.  He did have a run of SVT during that admission and was treated with adenosine and amiodarone drip.  He was discharged on metoprolol.    ASSESSMENT/PLAN:     Pertinent Hospital Diagnoses     Hypotension  Bradycardia  Advanced pancreatic adenocarcinoma with new liver lesions concerning for metastasis        Palliative Care Encounter / Counseling Regarding Goals of Care  Please see detailed goals of care discussion as below  At this time, Michael Garcia, Does have capacity for medical decision-making.  Capacity is time limited 
  Palliative Care Department  [phone]  Palliative Care Progress Note  Provider Katherine Moya PA-C     Michael Garcia  [phone]  Hospital Day: 6  Date of Initial Consult: 01/27/2025  Referring Provider: Dana Olsen APRN - CNS   Palliative Medicine was consulted for assistance with: goals of care    HPI:   Michael Garcia is a 79 y.o. Deaf person with a medical history of pancreatic cancer, type 2 diabetes, BPH, hypertension who was admitted on 1/26/2025 from nursing facility with a CHIEF COMPLAINT of hypotension..  He initially presented with tachycardia as well he then became bradycardic.  Concern for pauses/heart block.    Bedside showing pericardial/evidence of tamponade.  LVEF 55 to 60%, dilated right atrium..  Cardiology consulted and recommended transcutaneous pacing if it would recur.  Heart rate improved in the ED.  CTA of chest abdomen and pelvis was negative for PE, bilateral pleural effusions with adjacent atelectasis, prominent pericardial effusion, heterogeneous appearance of liver concerning for metastatic disease.  Patient admitted to medical ICU requiring vasopressor support.  Pan culture sent.  Started on empiric Zosyn.  Patient previously admitted from 12/31 - 1/13 for sepsis/bacteremia from an infected Mediport.  He was treated with ampicillin and Rocephin which was transitioned to Augmentin on discharge.  PAUL was negative for vegetations at that time.  He did have a run of SVT during that admission and was treated with adenosine and amiodarone drip.  He was discharged on metoprolol.    ASSESSMENT/PLAN:     Pertinent Hospital Diagnoses     Hypotension  Bradycardia  Advanced pancreatic adenocarcinoma with new liver lesions concerning for metastasis        Palliative Care Encounter / Counseling Regarding Goals of Care  Please see detailed goals of care discussion as below  At this time, Michael Garcia, Does have capacity for medical decision-making.  Capacity is time limited 
  Physician Progress Note      PATIENT:               KELSEY AYALA  [facility] #:                  [id number]  :                       1945  ADMIT DATE:       2025 12:46 PM  DISCH DATE:  RESPONDING  PROVIDER #:        TOBI PAREKH          QUERY TEXT:    Patient admitted with septic shock.  Noted WBC 14.0-6.7, LA 1.3-2.6, Procal   0.41, BPs /34-95, HRs 141-58, RR 3-26, Tlow 96F (axillary),  Blood   Cultures \"No Growth 3 Days\",  Urine Culture \"No Growth\". If possible,   please document in progress notes and discharge summary the infection source   of septic shock:    The medical record reflects the following:  Risk Factors: Pancreatic CA, DM, Advanced Age >75  Clinical Indicators: Progress Note  \"...Septic shock...Left-sided pleural   effusion...Recent history of polymicrobial bacteremia, port was removed last   admission...\", Critical Care Consult Note  \"...Septic shock secondary to   pneumonia...Pancreatic cancer stage III status post Whipple on active   chemotherapy, prostate cancer status post XRT...\", Critical Care Consult Note    \"...Septic shock-resolving...Small bilateral pleural effusions...\",   Progress Note  \"...Septic shock (resolved)...Recent history of   polymicrobial bacteremia, port was removed last admission...M  Treatment: Labs, 3.5L NS Bolus, IV Levophed IV Abx, IV LR, Chest x-ray, CTA   Abdomen, CTA Pulmonary, Echocardiogram, Consults- Critical Care, Cardiology,   Palliative Care    Thank you,  Prince Heller, AROLDON, RN, CRCR  Clinical Documentation Integrity  [phone]  Options provided:  -- Sepsis with septic shock, present on admission, due to, Please document   source of infection.  -- Sepsis with Septic shock ruled out  -- Other - I will add my own diagnosis  -- Disagree - Not applicable / Not valid  -- Disagree - Clinically unable to determine / Unknown  -- Refer to Clinical Documentation Reviewer    PROVIDER RESPONSE TEXT:    After study, sepsis 
4 Eyes Skin Assessment     NAME:  Michael Garcia  YOB: 1945  MEDICAL RECORD NUMBER:  [id number]    The patient is being assessed for  Admission    I agree that at least one RN has performed a thorough Head to Toe Skin Assessment on the patient. ALL assessment sites listed below have been assessed.      Areas assessed by both nurses:    Head, Face, Ears, Shoulders, Back, Chest, Arms, Elbows, Hands, Sacrum. Buttock, Coccyx, Ischium, Legs. Feet and Heels, and Under Medical Devices         Does the Patient have a Wound?      Buttocks reddened/denuded/healing/discolored.  Feet dry and cracked       Mike Prevention initiated by RN: Yes  Wound Care Orders initiated by RN: No    Pressure Injury (Stage 3,4, Unstageable, DTI, NWPT, and Complex wounds) if present, place Wound referral order by RN under : No    New Ostomies, if present place, Ostomy referral order under : No     Nurse 1 eSignature: Electronically signed by Katie Forbes RN on 1/26/25 at 7:08 PM EST    **SHARE this note so that the co-signing nurse can place an eSignature**    Nurse 2 eSignature: Electronically signed by Ivonne Betancourt RN on 1/26/25 at 7:45 PM EST4 Eyes Skin Assessment     
4 Eyes Skin Assessment     NAME:  Michael Garcia  YOB: 1945  MEDICAL RECORD NUMBER:  [id number]    The patient is being assessed for  Admission    I agree that at least one RN has performed a thorough Head to Toe Skin Assessment on the patient. ALL assessment sites listed below have been assessed.      Areas assessed by both nurses:    Head, Face, Ears, Shoulders, Back, Chest, Arms, Elbows, Hands, Sacrum. Buttock, Coccyx, Ischium, Legs. Feet and Heels, and Under Medical Devices         Does the Patient have a Wound? Yes wound(s) were present on assessment. LDA wound assessment was Initiated and completed by RN       Mike Prevention initiated by RN: Yes  Wound Care Orders initiated by RN: No    Pressure Injury (Stage 3,4, Unstageable, DTI, NWPT, and Complex wounds) if present, place Wound referral order by RN under : No    New Ostomies, if present place, Ostomy referral order under : No     Nurse 1 eSignature: Electronically signed by Nazia Charles RN on 1/31/25 at 6:18 PM EST    **SHARE this note so that the co-signing nurse can place an eSignature**    Nurse 2 eSignature: {Esignature:[id number]}  
4 Eyes Skin Assessment     NAME:  Michael Garcia  YOB: 1945  MEDICAL RECORD NUMBER:  [id number]    The patient is being assessed for  Admission    I agree that at least one RN has performed a thorough Head to Toe Skin Assessment on the patient. ALL assessment sites listed below have been assessed.      Areas assessed by both nurses:    Head, Face, Ears, Shoulders, Back, Chest, Arms, Elbows, Hands, Sacrum. Buttock, Coccyx, Ischium, Legs. Feet and Heels, and Under Medical Devices         Does the Patient have a Wound? No noted wound(s)       Mike Prevention initiated by RN: Yes  Wound Care Orders initiated by RN: No    Pressure Injury (Stage 3,4, Unstageable, DTI, NWPT, and Complex wounds) if present, place Wound referral order by RN under : No    New Ostomies, if present place, Ostomy referral order under : No     Nurse 1 eSignature: Electronically signed by Nazia Charles RN on 1/30/25 at 6:21 PM EST    **SHARE this note so that the co-signing nurse can place an eSignature**    Nurse 2 eSignature: Electronically signed by Aleksandra Blake RN on 1/30/25 at 6:23 PM EST  
Comprehensive Nutrition Assessment    Type and Reason for Visit:  Initial, Positive nutrition screen    Nutrition Recommendations/Plan:     Continue Current Diet, Start Oral Nutrition Supplement       Malnutrition Assessment:  Malnutrition Status:  Severe malnutrition (01/30/25 1340)    Context:  Chronic Illness     Findings of the 6 clinical characteristics of malnutrition:  Energy Intake:  75% or less estimated energy requirements for 1 month or longer  Weight Loss:   (13.5% x 5 mon)     Body Fat Loss:  Mild body fat loss Orbital   Muscle Mass Loss:   (Moderate) Temples (temporalis), Clavicles (pectoralis & deltoids)  Fluid Accumulation:  No fluid accumulation    Strength:  Not Performed    Nutrition Assessment:    Pt admit w/ Septic Shock & Pericardial effusion/ L Pleural effusion. PMHx pancreatic CA s/p whipple 8/2024 on chemo, prostate CA s/p XRT, DM, BPH, recent admit for COVID19/ Sepsis x ~1mon ago s/p mediport removal (concern for infection). Pt meets criteria for Severe Malnutrition. Will add Glucerna TID & monitor.    Nutrition Related Findings:    Pt alert/ [place]/ reads lips, MPA WNL, +I/O's 5L, +2 pitting edema, active BS, weakness, hyperglycemia, phos 2.2     Wound Type: None       Current Nutrition Intake & Therapies:    Average Meal Intake: 51-75% (average)     ADULT DIET; Regular; 4 carb choices (60 gm/meal)    Anthropometric Measures:  Height: 188 cm (6' 2\")  Ideal Body Weight (IBW): 190 lbs (86 kg)    Admission Body Weight: 83.1 kg (183 lb 3.3 oz) (1/30 first measured)  Current Body Weight: 83.1 kg (183 lb 3.3 oz) (1/30 bedscale), 96.4 % IBW.    Current BMI (kg/m2): 23.5  Usual Body Weight: 96.4 kg (212 lb 10 oz) (8/15/24 [facility] bedscale)  % Weight Change (Calculated): -13.8 wt loss x 5 mon  BMI Categories: Normal Weight (BMI 22.0 to 24.9) age over 65    Estimated Daily Nutrient Needs:  Energy Requirements Based On: Formula  Weight Used for Energy Requirements: Current  Energy (kcal/day): MSJ 1626 x 
DC order noted Dr Parsons states to leave In place and pt may go once accepted, CM notes states monday 2/3  
Nurse to nurse report given to snf at this time  
PIV placed with ultrasound left basilic vein. Pt. Tolerated well.  
Pharmacy Consultation Note  (Antibiotic Dosing and Monitoring)    Initial consult date: 1/27/25  Consulting physician/provider: BUD Cruz- CNP  Drug: Vancomycin  Indication: sepsis    Age/  Gender Height Weight IBW  Allergy Information   79 y.o./male  188 cm  83 kg     Ideal body weight: 82.2 kg (181 lb 3.5 oz)  Adjusted ideal body weight: 82.5 kg (181 lb 14.9 oz)   No known allergies      Renal Function:  Recent Labs     01/26/25  1323   BUN 10   CREATININE 0.6*       Intake/Output Summary (Last 24 hours) at 1/26/2025 2122  Last data filed at 1/26/2025 1900  Gross per 24 hour   Intake --   Output 40 ml   Net -40 ml       Vancomycin Monitoring:  Trough:  No results for input(s): \"VANCOTROUGH\" in the last 72 hours.  Random:  No results for input(s): \"VANCORANDOM\" in the last 72 hours.    Vancomycin Administration Times:  Recent vancomycin administrations                     vancomycin (VANCOCIN) 1,750 mg in sodium chloride 0.9 % 500 mL IVPB (mg) 1,750 mg New Bag 01/26/25 6606                    Assessment:  Patient is a 79 y.o. male who has been initiated on vancomycin  Estimated Creatinine Clearance: 120 mL/min (A) (based on SCr of 0.6 mg/dL (L)).  To dose vancomycin, pharmacy will be utilizing  trough based dosing    Plan:  Received 1750 mg vancomycin load  Will start vancomycin 1250 mg IV every 24 hours  Will check vancomycin levels when appropriate  Will continue to monitor renal function   Pharmacy to follow    Thank you for your consult    Binh Campa PharmD BCPS 1/26/2025 9:22 PM  [phone]    
Pharmacy Consultation Note  (Antibiotic Dosing and Monitoring)    Initial consult date: 1/27/25  Consulting physician/provider: BUD Cruz- CNP  Drug: Vancomycin  Indication: sepsis    Vancomycin has been discontinued   Clinical Pharmacy to sign-off  Physician to re-consult pharmacy if future dosing is needed    Thank you for the consult,    Sin Abbas, PharmD, BCPS, BCCCP 1/28/2025 3:48 PM  Phone: [phone]    
Pharmacy Consultation Note  (Antibiotic Dosing and Monitoring)    Initial consult date: 1/27/25  Consulting physician/provider: JUAN M Cruz CNP  Drug: Vancomycin  Indication: sepsis    Age/  Gender Height Weight IBW  Allergy Information   79 y.o./male  188 cm  83 kg     Ideal body weight: 82.2 kg (181 lb 3.5 oz)  Adjusted ideal body weight: 82.5 kg (181 lb 14.9 oz)   No known allergies      Renal Function:  Recent Labs     01/26/25  1323 01/26/25  2138 01/27/25  0405   BUN 10 15 14   CREATININE 0.6* 0.6* 0.7       Intake/Output Summary (Last 24 hours) at 1/27/2025 1214  Last data filed at 1/27/2025 0609  Gross per 24 hour   Intake 2051.7 ml   Output 895 ml   Net 1156.7 ml       Vancomycin Monitoring:  Trough:  No results for input(s): \"VANCOTROUGH\" in the last 72 hours.  Random:    Recent Labs     01/27/25  0405   VANCORANDOM 11.1       Vancomycin Administration Times:    Recent vancomycin administrations                     vancomycin (VANCOCIN) 1,750 mg in sodium chloride 0.9 % 500 mL IVPB (mg) 1,750 mg New Bag 01/26/25 8089                  Assessment:  Patient is a 79 y.o. male who has been initiated on vancomycin  Estimated Creatinine Clearance: 103 mL/min (based on SCr of 0.7 mg/dL).  To dose vancomycin, pharmacy will be utilizing  trough based dosing  1/27: Scr 0.7, random level is 11.6 mcg/mL (~11 hours post dose)    Plan:  Will increase vancomycin to 1500 mg IV every 24 hours  Will check vancomycin random levels as needed to appropriately monitor   Will continue to monitor renal function   Pharmacy to follow    Thank you for your consult,    Sin Hermosillo, PharmD, BCPS, BCCCP 1/27/2025 12:16 PM  Phone: [phone]    
Physical Therapy    Physical Therapy Initial Assessment     Name: Michael Garcia  : 1945  MRN: [id number]      Date of Service: 2025    Evaluating PT:  Festus Alfaro, PT, DPT  [id number]     Room #:  4404/4404-A  Diagnosis:  Pericardial effusion [I31.39]  Shock [R57.9]  Septic shock (HCC) [A41.9, R65.21]  PMHx/PSHx:   has a past medical history of BPH (benign prostatic hyperplasia), Cancer of pancreas (HCC), Deaf, bilateral, Diabetes (HCC), Hypertension, [place] (obstructive sleep apnea), Osteoarthritis, Primary osteoarthritis of left knee, and Prostate cancer (HCC).   Procedure/Surgery:  None  Precautions:  Falls, Deaf (ASL) -- pt prefers lip reading and note writing, Hx pancreas CA/chemo   Equipment Needs:  TBD    SUBJECTIVE:    Pt admitted from [facility].  Pt reports non-ambulatory.  Pt reports mikal lift for transfers bed<>wheelchair.     OBJECTIVE:   Initial Evaluation  Date: 25 Treatment Short Term/ Long Term   Goals   AM-PAC 6 Clicks      Was pt agreeable to Eval/treatment? Yes      Does pt have pain? No c/o pain     Bed Mobility  Rolling: Max A  Supine to sit: Max A  Sit to supine: Max A x2  Scooting: Max A  Rolling: Min A  Supine to sit: Min A  Sit to supine: Min A  Scooting: Min A   Transfers Sit to stand: Max A x2  Stand to sit: Max A x2  Stand pivot: NT  Sit to stand: Mod A  Stand to sit: Mod A  Stand pivot: Mod A with FWW    Ambulation    NT  >3 feet with FWW Mod A   Stair negotiation: ascended and descended  NT  NA   ROM BUE:  Per OT eval   BLE:  WFL     Strength BUE:  Per OT eval   BLE:  grossly 3+/5     Balance Sitting EOB:  SBA  Dynamic Standing:  Max A x2  Sitting EOB:  Supervision  Dynamic Standing:  Mod A with FWW      Pt is A & O x 4  RASS:  0  CAM-ICU:  -  Sensation:  Pt denies numbness and tingling to extremities  Edema:  Unremarkable    Vitals:   HR at rest: 68 bpm HR at end of session: 83 bpm   SpO2 at rest: 100% SpO2 at end of session 97%   BP at rest: 114/62 mmHg BP at 
Pt assessed with ultrasound. At the AC fossa  The median cubital extending to the cephalic veins will not compress.. suggest ultrasound bilateral upper extremities. RN notified of.  
Renal Dose Adjustment Policy (Generic)     This patient is on medication that requires renal, weight, and/or indication dose adjustment.      Date Body Weight IBW  Adjusted BW SCr  CrCl Dialysis status   1/26/2025   Ideal body weight: 82.2 kg (181 lb 3.5 oz)  Adjusted ideal body weight: 82.5 kg (181 lb 14.9 oz) Serum creatinine: 0.6 mg/dL (L) 01/26/25 1323  Estimated creatinine clearance: 120 mL/min (A) N/a       Pharmacy has dose-adjusted the following medication(s):    Date Previous Order Adjusted Order   1/26/2025 Piperacillin-tazobactam 3,375 mg IV q8h Piperacillin-tazobactam 4,500 mg IV q8h       These changes were made per protocol according to the [facility]   Automatic Renal Dose Adjustment Policy.     *Please note this dose may need readjusted if patient's condition changes.    Please contact pharmacy with any questions regarding these changes.    Alina Novoa, PharmD, RP, BCPS 1/26/2025 9:23 PM      
Spiritual Health History and Assessment/Progress Note  [facility]    Palliative Care, Spiritual/Emotional Needs, Follow-up,  ,  ,      Name: Michael Garcia MRN: [id number]    Age: 79 y.o.     Sex: male   Language: Sign language   Muslim: Cheondoism   Shock     Date: 1/31/2025                           Spiritual Assessment began in [facility] SUR[facility]        Referral/Consult From: Palliative Care   Encounter Overview/Reason: Palliative Care, Spiritual/Emotional Needs, Follow-up  Service Provided For: Patient and family together    Juanita, Belief, Meaning:   Patient identifies as spiritual and has beliefs or practices that help with coping during difficult times  Family/Friends No family/friends present      Importance and Influence:  Patient has spiritual/personal beliefs that influence decisions regarding their health  Family/Friends No family/friends present    Community:  Patient feels well-supported. Support system includes: Spouse/Partner, Children, and Extended family  Family/Friends No family/friends present    Assessment and Plan of Care:     Patient Interventions include: Facilitated expression of thoughts and feelings, Explored spiritual coping/struggle/distress, Affirmed coping skills/support systems, and Facilitated life review and/ or legacy  Family/Friends Interventions include: No family/friends present    Patient Plan of Care: Other: The  will follow as needed   Family/Friends Plan of Care: No family/friends present    Electronically signed by Chaplain Bailee on 1/31/2025 at 9:01 AM    
Stage  CI HR MAP TFC SVI   Baseline 3.4 140  105.4 24   Challenge 4.3 141  113.3 36   Result (%?) 24.8% 0.4%  7.6% 49.7%     Pt IS FLUID RESPONSIVE with 250cc NS :)  
CT scan, SVT, recent bacteremia with COVID infection and lactic acidosis.  Patient was recently discharged from the hospital on 1/18/2025 and presented 1/26/2025 with hypotension, tachycardia and fatigue.        Pericardial effusion:  Etiology of pleural effusion and pericardial effusion possibly related to his metastatic pancreatic cancer.  No current evidence of tamponade based on echocardiogram. Repeat echocardiogram with stable effusion  Repeat TTE in 3 weeks,   Bradycardia, questionable brief heart block  Likely vasovagal event  Consider zio patch on discharge      Cardiology service to sign off, please call back with questions or concerns. Thank you for allowing us to participate in patient's care.      Naseem Miller MD  Interventional Cardiology/ Structural heart disease        
pattern is within the normal range otherwise.  There is a moderate volume of retained fecal debris within the colon. Pelvis: The urinary bladder reveals a rounded contour without focal wall thickening.  The prostate gland measures 4.9 cm that is felt to be at the upper limits of normal.  There are no signs of significant lymphadenopathy or ascites within the pelvis Retroperitoneum/peritoneum: There are no signs of retroperitoneal lymphadenopathy, aneurysm or signs of ascites. There is stranding of the abdominal wall concerning for hypoproteinemia Soft Tissues/Bones: No acute bone or soft tissue abnormality.  There signs of multilevel disc disease most significant at the L4-L5 level lumbosacral spine.  There are mild degenerative arthritic changes of the hips bilat     1. No signs of acute pulmonary embolism. 2. Large left pleural effusion with adjacent atelectasis. 3. Small right pleural effusion with adjacent atelectasis. 4. Prominent pericardial effusion. 5. Heterogeneous appearance of the liver concerning for metastatic disease. A triple phase scan or MRI may be of increased sensitivity 6. No signs of urolithiasis or obstructive uropathy. 7. No signs of bowel obstruction.     CTA ABDOMEN PELVIS W CONTRAST    Result Date: 1/26/2025  EXAMINATION: CTA OF THE CHEST; CTA OF THE ABDOMEN AND PELVIS WITH CONTRAST 1/26/2025 2:15 pm TECHNIQUE: CTA of the chest was performed after the administration of intravenous contrast.  Multiplanar reformatted images are provided for review.  MIP images are provided for review. Automated exposure control, iterative reconstruction, and/or weight based adjustment of the mA/kV was utilized to reduce the radiation dose to as low as reasonably achievable.; CTA of the abdomen and pelvis was performed with the administration of intravenous contrast. Multiplanar reformatted images are provided for review.  MIP images are provided for review. Automated exposure control, iterative 
stones are observed. Stomach/bowel: No acute abnormalities of the stomach or proximal small bowel were identified the small bowel pattern is nonobstructive.  The region of the terminal ileum, appendix and cecum demonstrate no acute abnormalities.  The colonic gas pattern is within the normal range otherwise.  There is a moderate volume of retained fecal debris within the colon. Pelvis: The urinary bladder reveals a rounded contour without focal wall thickening.  The prostate gland measures 4.9 cm that is felt to be at the upper limits of normal.  There are no signs of significant lymphadenopathy or ascites within the pelvis Retroperitoneum/peritoneum: There are no signs of retroperitoneal lymphadenopathy, aneurysm or signs of ascites. There is stranding of the abdominal wall concerning for hypoproteinemia Soft Tissues/Bones: No acute bone or soft tissue abnormality.  There signs of multilevel disc disease most significant at the L4-L5 level lumbosacral spine.  There are mild degenerative arthritic changes of the hips bilat     1. No signs of acute pulmonary embolism. 2. Large left pleural effusion with adjacent atelectasis. 3. Small right pleural effusion with adjacent atelectasis. 4. Prominent pericardial effusion. 5. Heterogeneous appearance of the liver concerning for metastatic disease. A triple phase scan or MRI may be of increased sensitivity 6. No signs of urolithiasis or obstructive uropathy. 7. No signs of bowel obstruction.     XR CHEST PORTABLE    Result Date: 1/26/2025  EXAMINATION: ONE XRAY VIEW OF THE CHEST 1/26/2025 1:04 pm COMPARISON: 01/08/2025 and CT of the chest of 01/04/2025 HISTORY: ORDERING SYSTEM PROVIDED HISTORY: Chest Pain TECHNOLOGIST PROVIDED HISTORY: Reason for exam:->Chest Pain FINDINGS: The cardiac silhouette is within normals.  There is no mediastinal widening The right lung is clear The right upper lobe is clear.  There is persistent consolidation seen within the left lung base 
New irregular area of decreased density in segment 7 of the right lobe of the liver measuring 3.0 x 1.7 x 3.0 cm, probably a new metastatic lesion. 3. New low-density nodule in the lateral posterior right lobe of the liver between segments 6 and 7 measuring 1.2 x 0.9 cm, probably an additional metastatic lesion. 4. Stable moderate pneumobilia, more prominent on the left than the right, with stable mild dilatation of the intrahepatic biliary system. Stable appearance of a biliary stent extending from the right proper hepatic duct into the duodenum. The previously seen left biliary stent is no longer present. 5. Stable mild compressive and patchy atelectasis of the posterior lower lobes with tiny bilateral pleural effusions. 6. Stable prominent enlargement of the prostate.     XR CHEST PORTABLE    Result Date: 12/31/2024  EXAMINATION: ONE XRAY VIEW OF THE CHEST 12/31/2024 1:04 pm COMPARISON: September 17, 2024 HISTORY: ORDERING SYSTEM PROVIDED HISTORY: r/o pna TECHNOLOGIST PROVIDED HISTORY: Reason for exam:->r/o pna FINDINGS: The lungs are without acute focal process.  There is no effusion or pneumothorax. The cardiomediastinal silhouette is without acute process. The osseous structures are without acute process.  Infusion port present.     No acute process.       CBC with Differential:    Lab Results   Component Value Date/Time    WBC 7.6 01/29/2025 04:01 AM    RBC 2.93 01/29/2025 04:01 AM    HGB 8.9 01/29/2025 04:01 AM    HCT 28.8 01/29/2025 04:01 AM     01/29/2025 04:01 AM    MCV 98.3 01/29/2025 04:01 AM    MCH 30.4 01/29/2025 04:01 AM    MCHC 30.9 01/29/2025 04:01 AM    RDW 16.3 01/29/2025 04:01 AM    NRBC 1 01/04/2025 10:05 PM    METASPCT 1 08/20/2024 06:00 AM    LYMPHOPCT 2 01/29/2025 04:01 AM    MONOPCT 4 01/29/2025 04:01 AM    MYELOPCT 2 01/12/2025 05:45 AM    EOSPCT 0 01/29/2025 04:01 AM    BASOPCT 0 01/29/2025 04:01 AM    MONOSABS 0.33 01/29/2025 04:01 AM    LYMPHSABS 0.13 01/29/2025 04:01 AM

## 2025-02-06 LAB
EKG ATRIAL RATE: 357 BPM
EKG Q-T INTERVAL: 436 MS
EKG QRS DURATION: 82 MS
EKG QTC CALCULATION (BAZETT): 473 MS
EKG R AXIS: 22 DEGREES
EKG T AXIS: 36 DEGREES
EKG VENTRICULAR RATE: 71 BPM

## 2025-03-20 ENCOUNTER — TELEPHONE (OUTPATIENT)
Dept: HEMATOLOGY | Age: 80
End: 2025-03-20

## 2025-03-20 RX ORDER — METOPROLOL SUCCINATE 25 MG/1
25 TABLET, EXTENDED RELEASE ORAL DAILY
Qty: 90 TABLET | Refills: 3 | OUTPATIENT
Start: 2025-03-20

## 2025-03-20 NOTE — TELEPHONE ENCOUNTER
Razia called in to make patient a follow up appt.  He needs to have a port put back in for chemo but she would like a follow up appt to discuss this with Dr. Santoyo, she feels patient is too weak still and wants him to evaluate him prior to scheduling port placement.  I made her an appt on 3/27/25 at 1:00pm at the [facility] clinic at [facility].    Electronically signed by Amber Schmitz RN on 3/20/2025 at 10:48 AM

## 2025-03-26 LAB
BACTERIA: ABNORMAL
BILIRUBIN, URINE: NEGATIVE
COLOR, UA: YELLOW
GLUCOSE URINE: >=1000 MG/DL
KETONES, URINE: 15 MG/DL
LEUKOCYTE ESTERASE, URINE: ABNORMAL
NITRITE, URINE: NEGATIVE
PH, URINE: 6 (ref 5–8)
PROTEIN UA: NEGATIVE MG/DL
RBC UA: ABNORMAL /HPF
SPECIFIC GRAVITY UA: 1.02 (ref 1–1.03)
TURBIDITY: ABNORMAL
URINE HGB: ABNORMAL
UROBILINOGEN, URINE: 0.2 EU/DL (ref 0–1)
WBC UA: ABNORMAL /HPF
YEAST: PRESENT

## 2025-03-27 ENCOUNTER — OFFICE VISIT (OUTPATIENT)
Dept: HEMATOLOGY | Age: 80
End: 2025-03-27
Payer: MEDICARE

## 2025-03-27 ENCOUNTER — PREP FOR PROCEDURE (OUTPATIENT)
Dept: HEMATOLOGY | Age: 80
End: 2025-03-27

## 2025-03-27 VITALS
DIASTOLIC BLOOD PRESSURE: 72 MMHG | SYSTOLIC BLOOD PRESSURE: 104 MMHG | TEMPERATURE: 98.2 F | HEIGHT: 74 IN | OXYGEN SATURATION: 97 % | HEART RATE: 108 BPM | WEIGHT: 155 LBS | RESPIRATION RATE: 16 BRPM | BODY MASS INDEX: 19.89 KG/M2

## 2025-03-27 DIAGNOSIS — C25.9 ADENOCARCINOMA OF PANCREAS (HCC): Primary | ICD-10-CM

## 2025-03-27 LAB
ALBUMIN: 3.8 G/DL (ref 3.5–5.2)
ALP BLD-CCNC: 149 U/L (ref 40–129)
ALT SERPL-CCNC: 14 U/L (ref 0–40)
ANION GAP SERPL CALCULATED.3IONS-SCNC: 16 MMOL/L (ref 7–16)
AST SERPL-CCNC: 15 U/L (ref 0–39)
BASOPHILS ABSOLUTE: 0.03 K/UL (ref 0–0.2)
BASOPHILS RELATIVE PERCENT: 0 % (ref 0–2)
BILIRUB SERPL-MCNC: 0.8 MG/DL (ref 0–1.2)
BILIRUBIN DIRECT: 0.3 MG/DL (ref 0–0.3)
BILIRUBIN, INDIRECT: 0.5 MG/DL (ref 0–1)
BUN BLDV-MCNC: 17 MG/DL (ref 6–23)
CALCIUM SERPL-MCNC: 9.6 MG/DL (ref 8.6–10.2)
CHLORIDE BLD-SCNC: 99 MMOL/L (ref 98–107)
CO2: 21 MMOL/L (ref 22–29)
CREAT SERPL-MCNC: 0.6 MG/DL (ref 0.7–1.2)
EOSINOPHILS ABSOLUTE: 0.14 K/UL (ref 0.05–0.5)
EOSINOPHILS RELATIVE PERCENT: 2 % (ref 0–6)
GFR, ESTIMATED: >90 ML/MIN/1.73M2
GLUCOSE BLD-MCNC: 164 MG/DL (ref 74–99)
HCT VFR BLD CALC: 45 % (ref 37–54)
HEMOGLOBIN: 14.5 G/DL (ref 12.5–16.5)
IMMATURE GRANULOCYTES %: 0 % (ref 0–5)
IMMATURE GRANULOCYTES ABSOLUTE: <0.03 K/UL (ref 0–0.58)
LYMPHOCYTES ABSOLUTE: 0.92 K/UL (ref 1.5–4)
LYMPHOCYTES RELATIVE PERCENT: 13 % (ref 20–42)
MCH RBC QN AUTO: 30.9 PG (ref 26–35)
MCHC RBC AUTO-ENTMCNC: 32.2 G/DL (ref 32–34.5)
MCV RBC AUTO: 95.9 FL (ref 80–99.9)
MONOCYTES ABSOLUTE: 0.78 K/UL (ref 0.1–0.95)
MONOCYTES RELATIVE PERCENT: 11 % (ref 2–12)
NEUTROPHILS ABSOLUTE: 5.38 K/UL (ref 1.8–7.3)
NEUTROPHILS RELATIVE PERCENT: 74 % (ref 43–80)
PDW BLD-RTO: 14.6 % (ref 11.5–15)
PLATELET # BLD: 291 K/UL (ref 130–450)
PMV BLD AUTO: 10.4 FL (ref 7–12)
POTASSIUM SERPL-SCNC: 3.6 MMOL/L (ref 3.5–5)
RBC # BLD: 4.69 M/UL (ref 3.8–5.8)
SODIUM BLD-SCNC: 136 MMOL/L (ref 132–146)
TOTAL PROTEIN: 7.1 G/DL (ref 6.4–8.3)
WBC # BLD: 7.3 K/UL (ref 4.5–11.5)

## 2025-03-27 PROCEDURE — 3074F SYST BP LT 130 MM HG: CPT | Performed by: TRANSPLANT SURGERY

## 2025-03-27 PROCEDURE — 99213 OFFICE O/P EST LOW 20 MIN: CPT | Performed by: TRANSPLANT SURGERY

## 2025-03-27 PROCEDURE — 1123F ACP DISCUSS/DSCN MKR DOCD: CPT | Performed by: TRANSPLANT SURGERY

## 2025-03-27 PROCEDURE — G8427 DOCREV CUR MEDS BY ELIG CLIN: HCPCS | Performed by: TRANSPLANT SURGERY

## 2025-03-27 PROCEDURE — 3078F DIAST BP <80 MM HG: CPT | Performed by: TRANSPLANT SURGERY

## 2025-03-27 PROCEDURE — 99212 OFFICE O/P EST SF 10 MIN: CPT | Performed by: TRANSPLANT SURGERY

## 2025-03-27 PROCEDURE — 36415 COLL VENOUS BLD VENIPUNCTURE: CPT | Performed by: TRANSPLANT SURGERY

## 2025-03-27 PROCEDURE — 1159F MED LIST DOCD IN RCRD: CPT | Performed by: TRANSPLANT SURGERY

## 2025-03-27 PROCEDURE — G8420 CALC BMI NORM PARAMETERS: HCPCS | Performed by: TRANSPLANT SURGERY

## 2025-03-27 PROCEDURE — 1036F TOBACCO NON-USER: CPT | Performed by: TRANSPLANT SURGERY

## 2025-03-27 RX ORDER — FLUCONAZOLE 100 MG/1
200 TABLET ORAL DAILY
Qty: 42 TABLET | Refills: 0 | Status: SHIPPED | OUTPATIENT
Start: 2025-03-27 | End: 2025-04-17

## 2025-03-27 NOTE — PROGRESS NOTES
Hepatobiliary and Pancreatic Surgery Progress Note    CC: Follow-up    Subjective: Patient was recently admitted to the hospital in January secondary to having pleural effusions as well as third-degree heart block.  Patient will need to have his Mediport inserted.  He has been off of chemotherapy for quite some time.  His last dose of chemotherapy was 4 months ago.  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  He subsequently was found after his Whipple procedure to be a stage III.  In July CA 19-9 was 500.  His last dose of chemotherapy was December.  He does have yeast in the urine.  Urine does have a foul smell.    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  - his segment 4B lesion is unchanged, but it is an arterial study and difficult to see if there is any metastatic disease.  He does not appear to have a recurrence at the resection margin.  - Continue Creon  -Check labs today, including a CA 19-9  - CT scan to evaluate for metastatic disease  - port to be replaced  - start diflucan for yeast in urine 50-100k    20 Minutes of which greater than 50% was

## 2025-03-28 ENCOUNTER — TELEPHONE (OUTPATIENT)
Dept: HEMATOLOGY | Age: 80
End: 2025-03-28

## 2025-03-28 ENCOUNTER — PREP FOR PROCEDURE (OUTPATIENT)
Dept: HEMATOLOGY | Age: 80
End: 2025-03-28

## 2025-03-28 LAB
CULTURE: ABNORMAL
CULTURE: ABNORMAL
SPECIMEN DESCRIPTION: ABNORMAL

## 2025-03-28 RX ORDER — SODIUM CHLORIDE 9 MG/ML
INJECTION, SOLUTION INTRAVENOUS PRN
Status: CANCELLED | OUTPATIENT
Start: 2025-03-28

## 2025-03-28 RX ORDER — SODIUM CHLORIDE 0.9 % (FLUSH) 0.9 %
5-40 SYRINGE (ML) INJECTION EVERY 12 HOURS SCHEDULED
Status: CANCELLED | OUTPATIENT
Start: 2025-03-28

## 2025-03-28 RX ORDER — SODIUM CHLORIDE 0.9 % (FLUSH) 0.9 %
5-40 SYRINGE (ML) INJECTION PRN
Status: CANCELLED | OUTPATIENT
Start: 2025-03-28

## 2025-03-28 NOTE — TELEPHONE ENCOUNTER
Pharmacist called in to make Dr. Santoyo aware that Diflucan can cause and interaction with Eliquis that can increase chance of bleeding.  Dr. Santoyo was made aware and is ok with the patient taking the Diflucan for positive yeast in his urine.    Electronically signed by Amber Schmitz RN on 3/28/2025 at 10:10 AM

## 2025-03-28 NOTE — TELEPHONE ENCOUNTER
No prior auth was needed for cpt codes 34029 and 49900, per celio. The patient is scheduled for his ct scan on 04/21/25 [facility]  Arrive 11:00 am  Scan 12:00 pm\  NPO  3 hours prior    The patient is also scheduled for his mediport insertion on 04/24/25 [facility] at 8:00 am. The patient must hold his jardiance and eliquis 4 days prior to his procedure. If the patient is taking aspirin he is to hold that 7 days prior. I called Razia and TINO with office phone number for her to call and get the above information  Electronically signed by Gale Cazares MA on 3/28/2025 at 1:26 PM    I called and spoke with Razia and reviewed all of the above with her. I let her know that she will also get a phone call from [facility] prior to the patients procedure with further instructions. Razia confirmed all of the above and at this time all questions were answered  Electronically signed by Gale Cazares MA on 3/31/2025 at 9:22 AM

## 2025-03-29 LAB — CA 19-9: 6071 U/ML (ref 0–35)

## 2025-04-21 ENCOUNTER — HOSPITAL ENCOUNTER (OUTPATIENT)
Dept: CT IMAGING | Age: 80
Discharge: HOME | End: 2025-04-23
Attending: TRANSPLANT SURGERY
Payer: MEDICARE

## 2025-04-21 DIAGNOSIS — C25.9 ADENOCARCINOMA OF PANCREAS (HCC): ICD-10-CM

## 2025-04-21 PROCEDURE — 74160 CT ABDOMEN W/CONTRAST: CPT

## 2025-04-21 PROCEDURE — 6360000004 HC RX CONTRAST MEDICATION: Performed by: RADIOLOGY

## 2025-04-21 RX ORDER — IOPAMIDOL 755 MG/ML
75 INJECTION, SOLUTION INTRAVASCULAR
Status: COMPLETED | OUTPATIENT
Start: 2025-04-21 | End: 2025-04-21

## 2025-04-21 RX ORDER — IOPAMIDOL 755 MG/ML
18 INJECTION, SOLUTION INTRAVASCULAR
Status: COMPLETED | OUTPATIENT
Start: 2025-04-21 | End: 2025-04-21

## 2025-04-21 RX ORDER — SODIUM CHLORIDE 0.9 % (FLUSH) 0.9 %
10 SYRINGE (ML) INJECTION PRN
Status: DISCONTINUED | OUTPATIENT
Start: 2025-04-21 | End: 2025-04-24 | Stop reason: HOSPADM

## 2025-04-21 RX ADMIN — IOPAMIDOL 18 ML: 755 INJECTION, SOLUTION INTRAVENOUS at 11:59

## 2025-04-21 RX ADMIN — IOPAMIDOL 75 ML: 755 INJECTION, SOLUTION INTRAVENOUS at 11:59

## 2025-04-22 NOTE — PROGRESS NOTES
[facility] PRE-ADMISSION TESTING INSTRUCTIONS  PROCEDURE TIME: 0730                            ARRIVAL TIME: 0500  The Preadmission Testing patient is instructed accordingly using the following criteria (check applicable):    ARRIVAL INSTRUCTIONS:  [x] Parking the day of Surgery is located in the Main Entrance lot.  Upon entering the door, make an immediate right to the surgery reception desk    [x] Bring photo ID and insurance card    [] Bring in a copy of Living will or Durable Power of  papers.    [x] Please be sure to arrange for responsible adult to provide transportation to and from the hospital    [x] Please arrange for responsible adult to be with you for the 24 hour period post procedure due to having anesthesia    [x] If you awake am of surgery not feeling well or have temperature >100 please call [phone]    GENERAL INSTRUCTIONS:    [x] May have clear liquids until 4 hours prior to surgery. Examples include water, fruit juices (no pulp), jello, popsicles, black coffee or tea, beef or chicken broth.               No gum, candy or mints.    [x] You may brush your teeth, but do not swallow any water    [x] Take medications as instructed with 1-2 oz of water (METOPROLOL AND PROTONIX)    [] Stop herbal supplements and vitamins 5 days prior to procedure    [x] Follow preop dosing of blood thinners per physician instructions    [] Take 1/2 dose of evening insulin, but no insulin after midnight    [x] No oral diabetic medications after midnight    [x] If diabetic and have low blood sugar or feel symptomatic, take 1-2oz apple juice only    [] Bring inhalers day of surgery    [] Bring C-PAP/ Bi-Pap day of surgery    [] Bring urine specimen day of surgery    [x] Shower or bath with soap, lather and rinse well, AM of Surgery, no lotion, powders or creams to surgical site    [] Follow bowel prep as instructed per surgeon    [x] No tobacco products within 24 hours of surgery

## 2025-04-24 ENCOUNTER — APPOINTMENT (OUTPATIENT)
Dept: GENERAL RADIOLOGY | Age: 80
End: 2025-04-24
Attending: TRANSPLANT SURGERY
Payer: MEDICARE

## 2025-04-24 ENCOUNTER — HOSPITAL ENCOUNTER (OUTPATIENT)
Age: 80
Setting detail: OUTPATIENT SURGERY
Discharge: HOME | End: 2025-04-24
Attending: TRANSPLANT SURGERY | Admitting: TRANSPLANT SURGERY
Payer: MEDICARE

## 2025-04-24 ENCOUNTER — ANESTHESIA EVENT (OUTPATIENT)
Dept: OPERATING ROOM | Age: 80
End: 2025-04-24
Payer: MEDICARE

## 2025-04-24 ENCOUNTER — ANESTHESIA (OUTPATIENT)
Dept: OPERATING ROOM | Age: 80
End: 2025-04-24
Payer: MEDICARE

## 2025-04-24 VITALS
RESPIRATION RATE: 14 BRPM | HEIGHT: 74 IN | HEART RATE: 70 BPM | TEMPERATURE: 97.8 F | SYSTOLIC BLOOD PRESSURE: 110 MMHG | BODY MASS INDEX: 19.9 KG/M2 | DIASTOLIC BLOOD PRESSURE: 66 MMHG | OXYGEN SATURATION: 99 %

## 2025-04-24 DIAGNOSIS — C25.9 PANCREATIC ADENOCARCINOMA (HCC): Primary | ICD-10-CM

## 2025-04-24 DIAGNOSIS — R10.84 GENERALIZED ABDOMINAL PAIN: ICD-10-CM

## 2025-04-24 LAB — GLUCOSE BLD-MCNC: 90 MG/DL (ref 74–99)

## 2025-04-24 PROCEDURE — 6360000002 HC RX W HCPCS

## 2025-04-24 PROCEDURE — 3700000000 HC ANESTHESIA ATTENDED CARE: Performed by: TRANSPLANT SURGERY

## 2025-04-24 PROCEDURE — 2500000003 HC RX 250 WO HCPCS: Performed by: CLINICAL NURSE SPECIALIST

## 2025-04-24 PROCEDURE — 77001 FLUOROGUIDE FOR VEIN DEVICE: CPT | Performed by: TRANSPLANT SURGERY

## 2025-04-24 PROCEDURE — 3700000001 HC ADD 15 MINUTES (ANESTHESIA): Performed by: TRANSPLANT SURGERY

## 2025-04-24 PROCEDURE — C1788 PORT, INDWELLING, IMP: HCPCS | Performed by: TRANSPLANT SURGERY

## 2025-04-24 PROCEDURE — 2580000003 HC RX 258: Performed by: CLINICAL NURSE SPECIALIST

## 2025-04-24 PROCEDURE — 7100000011 HC PHASE II RECOVERY - ADDTL 15 MIN: Performed by: TRANSPLANT SURGERY

## 2025-04-24 PROCEDURE — 6360000002 HC RX W HCPCS: Performed by: TRANSPLANT SURGERY

## 2025-04-24 PROCEDURE — 2709999900 HC NON-CHARGEABLE SUPPLY: Performed by: TRANSPLANT SURGERY

## 2025-04-24 PROCEDURE — 6360000002 HC RX W HCPCS: Performed by: CLINICAL NURSE SPECIALIST

## 2025-04-24 PROCEDURE — 76937 US GUIDE VASCULAR ACCESS: CPT | Performed by: TRANSPLANT SURGERY

## 2025-04-24 PROCEDURE — 36561 INSERT TUNNELED CV CATH: CPT | Performed by: TRANSPLANT SURGERY

## 2025-04-24 PROCEDURE — 7100000010 HC PHASE II RECOVERY - FIRST 15 MIN: Performed by: TRANSPLANT SURGERY

## 2025-04-24 PROCEDURE — 71045 X-RAY EXAM CHEST 1 VIEW: CPT

## 2025-04-24 PROCEDURE — 3600000012 HC SURGERY LEVEL 2 ADDTL 15MIN: Performed by: TRANSPLANT SURGERY

## 2025-04-24 PROCEDURE — 3600000002 HC SURGERY LEVEL 2 BASE: Performed by: TRANSPLANT SURGERY

## 2025-04-24 PROCEDURE — 82962 GLUCOSE BLOOD TEST: CPT

## 2025-04-24 DEVICE — VACCESS CT POWER-INJECTABLE IMPLANTABLE PORT (WITH SUTURE PLUGS) (8F)
Type: IMPLANTABLE DEVICE | Site: CHEST | Status: FUNCTIONAL
Brand: VACCESS

## 2025-04-24 RX ORDER — CEFAZOLIN 2 G/1
INJECTION, POWDER, FOR SOLUTION INTRAMUSCULAR; INTRAVENOUS
Status: DISCONTINUED
Start: 2025-04-24 | End: 2025-04-24 | Stop reason: HOSPADM

## 2025-04-24 RX ORDER — FENTANYL CITRATE 50 UG/ML
INJECTION, SOLUTION INTRAMUSCULAR; INTRAVENOUS
Status: DISCONTINUED | OUTPATIENT
Start: 2025-04-24 | End: 2025-04-24 | Stop reason: SDUPTHER

## 2025-04-24 RX ORDER — SODIUM CHLORIDE 0.9 % (FLUSH) 0.9 %
5-40 SYRINGE (ML) INJECTION PRN
Status: DISCONTINUED | OUTPATIENT
Start: 2025-04-24 | End: 2025-04-24 | Stop reason: HOSPADM

## 2025-04-24 RX ORDER — PROPOFOL 10 MG/ML
INJECTION, EMULSION INTRAVENOUS
Status: DISCONTINUED | OUTPATIENT
Start: 2025-04-24 | End: 2025-04-24 | Stop reason: SDUPTHER

## 2025-04-24 RX ORDER — HEPARIN 100 UNIT/ML
SYRINGE INTRAVENOUS PRN
Status: DISCONTINUED | OUTPATIENT
Start: 2025-04-24 | End: 2025-04-24 | Stop reason: HOSPADM

## 2025-04-24 RX ORDER — OXYCODONE AND ACETAMINOPHEN 5; 325 MG/1; MG/1
1 TABLET ORAL EVERY 6 HOURS PRN
Qty: 28 TABLET | Refills: 0 | Status: SHIPPED | OUTPATIENT
Start: 2025-04-24 | End: 2025-05-01

## 2025-04-24 RX ORDER — SODIUM CHLORIDE 9 MG/ML
INJECTION, SOLUTION INTRAVENOUS PRN
Status: DISCONTINUED | OUTPATIENT
Start: 2025-04-24 | End: 2025-04-24 | Stop reason: HOSPADM

## 2025-04-24 RX ORDER — ONDANSETRON 2 MG/ML
INJECTION INTRAMUSCULAR; INTRAVENOUS
Status: DISCONTINUED | OUTPATIENT
Start: 2025-04-24 | End: 2025-04-24 | Stop reason: SDUPTHER

## 2025-04-24 RX ORDER — SODIUM CHLORIDE 0.9 % (FLUSH) 0.9 %
5-40 SYRINGE (ML) INJECTION EVERY 12 HOURS SCHEDULED
Status: DISCONTINUED | OUTPATIENT
Start: 2025-04-24 | End: 2025-04-24 | Stop reason: HOSPADM

## 2025-04-24 RX ORDER — PHENYLEPHRINE HCL IN 0.9% NACL 1 MG/10 ML
SYRINGE (ML) INTRAVENOUS
Status: DISCONTINUED | OUTPATIENT
Start: 2025-04-24 | End: 2025-04-24 | Stop reason: SDUPTHER

## 2025-04-24 RX ORDER — LIDOCAINE HYDROCHLORIDE 10 MG/ML
INJECTION, SOLUTION INFILTRATION; PERINEURAL PRN
Status: DISCONTINUED | OUTPATIENT
Start: 2025-04-24 | End: 2025-04-24 | Stop reason: HOSPADM

## 2025-04-24 RX ADMIN — PROPOFOL 50 MCG/KG/MIN: 10 INJECTION, EMULSION INTRAVENOUS at 07:38

## 2025-04-24 RX ADMIN — FENTANYL CITRATE 25 MCG: 50 INJECTION, SOLUTION INTRAMUSCULAR; INTRAVENOUS at 07:40

## 2025-04-24 RX ADMIN — WATER 2000 MG: 1 INJECTION INTRAMUSCULAR; INTRAVENOUS; SUBCUTANEOUS at 07:39

## 2025-04-24 RX ADMIN — Medication 50 MCG: at 07:46

## 2025-04-24 RX ADMIN — PROPOFOL 40 MG: 10 INJECTION, EMULSION INTRAVENOUS at 07:37

## 2025-04-24 RX ADMIN — Medication 100 MCG: at 08:00

## 2025-04-24 RX ADMIN — ONDANSETRON 4 MG: 2 INJECTION, SOLUTION INTRAMUSCULAR; INTRAVENOUS at 07:41

## 2025-04-24 RX ADMIN — SODIUM CHLORIDE: 9 INJECTION, SOLUTION INTRAVENOUS at 07:15

## 2025-04-24 RX ADMIN — Medication 50 MCG: at 07:57

## 2025-04-24 RX ADMIN — FENTANYL CITRATE 25 MCG: 50 INJECTION, SOLUTION INTRAMUSCULAR; INTRAVENOUS at 07:50

## 2025-04-24 ASSESSMENT — ENCOUNTER SYMPTOMS
DIARRHEA: 0
PHOTOPHOBIA: 0
BACK PAIN: 0
SHORTNESS OF BREATH: 0
EYE PAIN: 0
ABDOMINAL PAIN: 0
NAUSEA: 0
BLOOD IN STOOL: 0
CONSTIPATION: 0
EYE DISCHARGE: 0
VOMITING: 0

## 2025-04-24 ASSESSMENT — PAIN DESCRIPTION - DESCRIPTORS: DESCRIPTORS: ACHING;CRUSHING;DISCOMFORT

## 2025-04-24 ASSESSMENT — PAIN - FUNCTIONAL ASSESSMENT: PAIN_FUNCTIONAL_ASSESSMENT: 0-10

## 2025-04-24 NOTE — H&P
Hepatobiliary and Pancreatic Surgery Attending History and Physical    Patient's Name/Date of Birth: Michael Garcia /1945 (80 y.o.)    Date: April 24, 2025     CC: Pancreatic adenocarcinoma - Stage III    HPI:  Patient was recently admitted to the hospital in January secondary to having pleural effusions as well as third-degree heart block.  Patient will need to have his Mediport inserted.  He has been off of chemotherapy for quite some time.  His last dose of chemotherapy was 4 months ago.  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  He subsequently was found after his Whipple procedure to be a stage III.  In July CA 19-9 was 500.  His last dose of chemotherapy was December.  He does have yeast in the urine.  Urine does have a foul smell.    Past Medical History:   Diagnosis Date    BPH (benign prostatic hyperplasia)     Cancer of pancreas (HCC)     Deaf, bilateral     Diabetes (HCC)     Hx of blood clots     Hypertension     WELLINGTON (obstructive sleep apnea)     Osteoarthritis     Prostate cancer (HCC) 03/27/2024       Past Surgical History:   Procedure Laterality Date    CARPAL TUNNEL RELEASE Bilateral     CHOLECYSTECTOMY      COLONOSCOPY      ERCP N/A 08/02/2024    FAILED ENDOSCOPIC RETROGRADE CHOLANGIOPANCREATOGRAPHY performed by Demetrius Carlos DO at [facility] ENDOSCOPY    IR BILIARY DRAIN INT AND EXT  08/06/2024    IR BILIARY DRAIN INT AND EXT 8/6/2024 Sonny, Rodríguez Greer [place] [facility]    PANCREAS SURGERY N/A 08/14/2024    WHIPPLE with portal vein reconstruction, intraoperative US performed by Demetrius Santoyo III, [place] [facility]    PORT SURGERY Right 09/17/2024    mediport insertion performed by Demetrius [place]oyo III[place] [facility]    PORT SURGERY Right 01/03/2025    PORT REMOVAL performed by Yary Gardiner [place] [facility]    ROTATOR CUFF REPAIR Bilateral 02/2012    SHOULDER SURGERY  Insecurity: No Food Insecurity (2/3/2025)    Hunger Vital Sign     Worried About Running Out of Food in the Last Year: Never true     Ran Out of Food in the Last Year: Never true   Transportation Needs: No Transportation Needs (2/3/2025)    PRAPARE - Transportation     Lack of Transportation (Medical): No     Lack of Transportation (Non-Medical): No   Physical Activity: Not on file   Stress: Not on file   Social Connections: Not on file   Intimate Partner Violence: Not on file   Housing Stability: Low Risk  (2/3/2025)    Housing Stability Vital Sign     Unable to Pay for Housing in the Last Year: No     Number of Times Moved in the Last Year: 1     Homeless in the Last Year: No       ROS:   Review of Systems   Constitutional:  Positive for unexpected weight change. Negative for chills, diaphoresis and fever.   HENT:  Negative for congestion, ear discharge, ear pain, hearing loss, nosebleeds and tinnitus.    Eyes:  Negative for photophobia, pain and discharge.   Respiratory:  Negative for shortness of breath.    Cardiovascular:  Negative for palpitations and leg swelling.   Gastrointestinal:  Negative for abdominal pain, blood in stool, constipation, diarrhea, nausea and vomiting.   Endocrine: Negative for polydipsia.   Genitourinary:  Negative for frequency, hematuria and urgency.   Musculoskeletal:  Negative for back pain and neck pain.   Skin:  Negative for rash.   Allergic/Immunologic: Negative for environmental allergies.   Neurological:  Negative for tremors and seizures.   Psychiatric/Behavioral:  Negative for hallucinations and suicidal ideas. The patient is not nervous/anxious.        Physical Exam:  /68   Pulse 80   Temp 97.8 °F (36.6 °C) (Temporal)   Resp 18   Ht 1.88 m (6' 2\")   SpO2 98%   BMI 19.90 kg/m²       PSYCH: mood and affect normal, alert and oriented x 3: No apparent distress, comfortable  EYES: Sclera white, pupils equal round and reactive to light  ENMT:  Hearing normal, trachea

## 2025-04-24 NOTE — PROGRESS NOTES
Patient is declining  use at this time. Patient's caregiver, Razia, is present to interpret for the patient.

## 2025-04-24 NOTE — OP NOTE
Michael Garcia  1945      DATE OF PROCEDURE: 4/24/2025     SURGEON: Demetrius Santoyo FACS     ASSISTANT: none     PREOPERATIVE DIAGNOSIS: pancreatic adenocarcinoma.     POSTOPERATIVE DIAGNOSIS: Same    OPERATION: Insertion of right internal jugular vein central venous catheter with subcutaneous reservoir using ultrasound guidance, fluoroscopy      ANESTHESIA: Lmac AND LOCAL     ESTIMATED BLOOD LOSS: Minimal     COMPLICATIONS: None    DESCRIPTION OF PROCEDURE: The patient was identified and the procedure was confirmed. The left neck and chest were prepped and draped in the usual sterile fashion. Next, 1% lidocaine mixed with 0.25% Marcaine was used for local anesthesia. The left internal jugular vein was percutaneously entered using ultrasound guidance and followed into the internal jugular vein, then the guidewire was advanced into the superior vena cava under fluoroscopic guidance. A small incision was made around the wire. A second skin incision was made below the clavicle and a pocket was made in the subcutaneous tissue for the reservoir.  The catheter was pulled through a subcutaneous tunnel from the inferior chest incision to the neck incision. The introducer was passed over the wire then the catheter was passed through the introducer and the tip was positioned in the superior vena cava right atrial junction under fluoroscopic guidance. The catheter was connected to the reservoir and the locking hub was engaged.  The port was noted to withdrawal and flush blood easily and was flushed with heparinized saline solution. The reservoir was secured in the pocket with Prolene sutures.  Hemostasis was assured and the incisions were irrigated with antibiotic saline solution.  The incisions were approximated with Vicryl sutures and Dermabond was applied to the incisions in the operating room.  Needle, sponge, and instrument counts were reported as correct times two.  The patient tolerated the procedure and

## 2025-04-24 NOTE — DISCHARGE INSTRUCTIONS
Home Care    Keep the incision area clean and dry, okay to shower and wash incisions after 6:00pm tonight with soap and water, and pat dry.   Diet    Continue to drink shakes and continue to eat small meals throughout the day  Physical Activity    Walk frequently and it is okay to do stairs but do not do anything that causes pain in the incisions.   Medications    Take percoet's for pain. Take the stool softeners if you are taking the oxycodone. Okay to restart eliquis 4/25  Follow-up   Follow up with Dr. Santoyo in 2 weeks, please call his office ([phone]) upon discharge to schedule an appointment.   Call Your Doctor If Any of the Following Occurs     Signs of infection, including fever and chills   Redness, swelling, increasing pain, excessive bleeding, or discharge at the incision site   Cough, shortness of breath, chest pain   Increased abdominal pain   Nausea and/or vomiting that does not resolve off narcotics.  Pain, burning, urgency or frequency of urination, or blood in the urine   Pain and/or swelling in your feet, calves, or legs   Dark urine, light stools, or evidence of jaundice (yellowing of the skin or eyes).   In case of an emergency,    CALL 911  immediately.

## 2025-04-24 NOTE — ANESTHESIA PRE PROCEDURE
Department of Anesthesiology  Preprocedure Note       Name:  Michael Garcia   Age:  80 y.o.  :  1945                                          MRN:  [id number]         Date:  2025      Surgeon: Surgeon(s):  Demetrius Santoyo III, MD    Procedure: Procedure(s):  MEDIPORT INSERTION          ++++    Medications prior to admission:   Prior to Admission medications    Medication Sig Start Date End Date Taking? Authorizing Provider   metoprolol succinate (TOPROL XL) 50 MG extended release tablet Take 1 tablet by mouth daily Hold if SBP<90, HR<55  Patient taking differently: Take 1 tablet by mouth daily 25  Yes Jole Parsons MD   pantoprazole (PROTONIX) 40 MG tablet Take 1 tablet by mouth in the morning and at bedtime   Yes Elliott Thakkar MD   metoclopramide (REGLAN) 5 MG tablet Take 1 tablet by mouth in the morning, at noon, and at bedtime   Yes ProviderElliott MD   lipase-protease-amylase (CREON) 06930-234750 units CPEP delayed release capsule Take 2 capsules by mouth 3 times daily (with meals) 10/10/24  Yes Demetrius Santoyo III, MD   senna-docusate (PERICOLACE) 8.6-50 MG per tablet Take 2 tablets by mouth daily   Yes ProviderElliott MD   montelukast (SINGULAIR) 10 MG tablet Take 1 tablet by mouth nightly   Yes ProviderElliott MD   tamsulosin (FLOMAX) 0.4 MG capsule Take 2 capsules by mouth daily .   Yes ProviderElliott MD   finasteride (PROSCAR) 5 MG tablet Take 1 tablet by mouth daily   Yes Provider, MD Elliott   apixaban (ELIQUIS) 5 MG TABS tablet Take 1 tablet by mouth 2 times daily 25   Sheila Davies MD   empagliflozin (JARDIANCE) 10 MG tablet Take 1 tablet by mouth daily 25   Sheila Davies MD   glucose 4 g chewable tablet Take 4 tablets by mouth as needed for Low blood sugar 24   Sarah Guadalupe MD   glipiZIDE (GLUCOTROL) 5 MG tablet Take 2 tablets by mouth

## 2025-04-24 NOTE — ANESTHESIA POSTPROCEDURE EVALUATION
Department of Anesthesiology  Postprocedure Note    Patient: Michael Garcia  MRN: [id number]  YOB: 1945  Date of evaluation: 4/24/2025    Procedure Summary       Date: 04/24/25 Room / Location: [address]    Anesthesia Start: 0726 Anesthesia Stop: 0833    Procedure: MEDIPORT INSERTION          ++++ (Chest) Diagnosis:       Pancreatic cancer (HCC)      (Pancreatic cancer (HCC) [C25.9])    Surgeons: Demetrius Santoyo III, MD Responsible Provider: Diana Persaud DO    Anesthesia Type: MAC ASA Status: 3            Anesthesia Type: MAC    Lindsey Phase I: Lindsey Score: 10    Lindsey Phase II:      Anesthesia Post Evaluation    Patient location during evaluation: bedside  Patient participation: complete - patient participated  Level of consciousness: awake and alert  Pain score: 0  Airway patency: patent  Nausea & Vomiting: no nausea and no vomiting  Cardiovascular status: hemodynamically stable  Respiratory status: acceptable and room air  Hydration status: stable  Pain management: adequate        No notable events documented.

## 2025-05-08 ENCOUNTER — TELEPHONE (OUTPATIENT)
Dept: HEMATOLOGY | Age: 80
End: 2025-05-08

## 2025-05-08 DIAGNOSIS — C25.9 ADENOCARCINOMA OF PANCREAS (HCC): Primary | ICD-10-CM

## 2025-05-08 DIAGNOSIS — C25.9 ADENOCARCINOMA OF PANCREAS, STAGE 3 (HCC): ICD-10-CM

## 2025-05-08 NOTE — TELEPHONE ENCOUNTER
I called and spoke with rosita today the patients SO. The patient was to have an appt today with our office and was late. Rosita stated that the patient was refusing to get out the chair. She also stated that the patient has been very confused and his urine outake has been super low. She stated that the patients wish is to now go to hospice. I spoke with Dr Santoyo and he stated that it was ok to send the referral. I printed out all information needed and the referral was sent. I also called Rosita back to let her know.   Electronically signed by Gale Cazares MA on 5/8/2025 at 2:10 PM

## (undated) DEVICE — RELOAD STPL L60MM H1.5-3.6MM REG TISS BLU GRIPPING SURF B

## (undated) DEVICE — GAUZE,SPONGE,4"X4",16PLY,XRAY,STRL,LF: Brand: MEDLINE

## (undated) DEVICE — SYRINGE 20ML LL S/C 50

## (undated) DEVICE — ELECTRODE PT RET AD L9FT HI MOIST COND ADH HYDRGEL CORDED

## (undated) DEVICE — CLIP NSL 12-14FR DK BLU FOR NSL TB RET SYS AMT BRIDLE PRO

## (undated) DEVICE — DOUBLE BASIN SET: Brand: MEDLINE INDUSTRIES, INC.

## (undated) DEVICE — NEEDLE HYPO 25GA L1.5IN BLU POLYPR HUB S STL REG BVL STR

## (undated) DEVICE — GAUZE,SPONGE,4"X4",8PLY,STRL,LF,10/TRAY: Brand: MEDLINE

## (undated) DEVICE — CLIP LIG M BLU TI HRT SHP WIRE HORZ 24 CLIPS/PK 25PK/CA

## (undated) DEVICE — BOOT,SUTURE,STANDARD,YELLOW-IN-BLUE: Brand: MEDLINE

## (undated) DEVICE — AGENT HEMOSTATIC SURG ORIGINAL ABS 4X8IN LOOSE KNIT 12/CA

## (undated) DEVICE — MAJOR VASCULAR: Brand: MEDLINE INDUSTRIES, INC.

## (undated) DEVICE — TOWEL,OR,DSP,ST,BLUE,STD,6/PK,12PK/CS: Brand: MEDLINE

## (undated) DEVICE — GLOVE SURG SZ 7.5 L11.73IN FNGR THK9.8MIL STRW LTX POLYMER

## (undated) DEVICE — RETRIEVAL BALLOON CATHETER: Brand: EXTRACTOR™ PRO RX

## (undated) DEVICE — MASTISOL ADHESIVE LIQ 2/3ML

## (undated) DEVICE — SYRINGE MED 10ML TRNSLUC BRL PLUNG BLK MRK POLYPR CTRL

## (undated) DEVICE — COVER,TABLE,60X90,STERILE: Brand: MEDLINE

## (undated) DEVICE — SYRINGE MED 10ML LUERLOCK TIP W/O SFTY DISP

## (undated) DEVICE — 4-PORT MANIFOLD: Brand: NEPTUNE 2

## (undated) DEVICE — BLANKET WRM W35.4XL86.6IN FULL UNDERBODY + FORC AIR

## (undated) DEVICE — OPTIFOAM GENTLE EX, SACRUM, 9X9: Brand: MEDLINE

## (undated) DEVICE — DRAPE,CHEST,FENES,15X10,STERIL: Brand: MEDLINE

## (undated) DEVICE — CATHETER URETH 8FR RED RUB INTMIT ALL PURP 12 PER CA

## (undated) DEVICE — Device

## (undated) DEVICE — DECANTER: Brand: UNBRANDED

## (undated) DEVICE — FORCEPS BX L160CM JAW DIA2.4MM YEL L CAP W/ NDL DISP RAD

## (undated) DEVICE — STRAP POS MP 30X3 IN HK LOOP CLOSURE FOAM DISP

## (undated) DEVICE — GOWN,SIRUS,FABRNF,2XL,18/CS: Brand: MEDLINE

## (undated) DEVICE — SPHINCTEROTOME: Brand: DREAMTOME™ RX 44

## (undated) DEVICE — SPONGE GZ W4XL4IN RAYON POLY CVR W/NONWOVEN FAB STRL 2/PK

## (undated) DEVICE — LIQUIBAND RAPID ADHESIVE 36/CS 0.8ML: Brand: MEDLINE

## (undated) DEVICE — GLOVE SURG SZ 65 THK91MIL LTX FREE SYN POLYISOPRENE

## (undated) DEVICE — FEEDING TUBE • RADIOPAQUE: Brand: ARGYLE

## (undated) DEVICE — MAGNETIC INSTR DRAPE 20X16: Brand: MEDLINE INDUSTRIES, INC.

## (undated) DEVICE — SYRINGE MED 20ML STD CLR PLAS LUERLOCK TIP N CTRL DISP

## (undated) DEVICE — BLADE,STAINLESS-STEEL,11,STRL,DISPOSABLE: Brand: MEDLINE

## (undated) DEVICE — 1LYRTR 16FR10ML100%SILTMPS SNP: Brand: MEDLINE INDUSTRIES, INC.

## (undated) DEVICE — SYRINGE INFL 60ML DISP ALLIANCE II

## (undated) DEVICE — TUBE ENTERAL FEEDING ENVUE 12FR 140CM

## (undated) DEVICE — DRAPE C ARM W41XL74IN UNIV MOB W RUBBERBAND CLP

## (undated) DEVICE — APPLICATOR MEDICATED 26 CC SOLUTION HI LT ORNG CHLORAPREP

## (undated) DEVICE — DRAPE,UTILTY,TAPE,15X26, 4EA/PK: Brand: MEDLINE

## (undated) DEVICE — WORKING LENGTH 155CM, WORKING CHANNEL 2.8MM: Brand: RESOLUTION 360 CLIP

## (undated) DEVICE — BANDAGE ADH W0.75XL3IN UNIV WVN FAB NAT GEN USE STRP N ADH

## (undated) DEVICE — 60ML ENFIT SYRINGE, STERILE: Brand: MEDLINE

## (undated) DEVICE — TRAUMA: Brand: MEDLINE INDUSTRIES, INC.

## (undated) DEVICE — ESOPHAGEAL BALLOON DILATATION CATHETER: Brand: CRE FIXED WIRE

## (undated) DEVICE — FEEDING TUBE RADIOPAQUE
Type: IMPLANTABLE DEVICE | Site: BILE DUCT | Status: NON-FUNCTIONAL
Brand: ARGYLE
Removed: 2024-08-14

## (undated) DEVICE — BITEBLOCK 54FR W/ DENT RIM BLOX

## (undated) DEVICE — BLADE,STAINLESS-STEEL,15,STRL,DISPOSABLE: Brand: MEDLINE

## (undated) DEVICE — STRIP,CLOSURE,WOUND,MEDI-STRIP,1/2X4: Brand: MEDLINE

## (undated) DEVICE — SYRINGE MED 50ML LUERSLIP TIP

## (undated) DEVICE — SPONGE LAP W18XL18IN WHT COT 4 PLY FLD STRUNG RADPQ DISP ST 2 PER PACK

## (undated) DEVICE — RESCUE COMBO FORCEPS

## (undated) DEVICE — 3M™ STERI-DRAPE™ INCISE DRAPE 1050 (60CM X 45CM): Brand: STERI-DRAPE™

## (undated) DEVICE — SYSTEM BX CAP BILI RAP EXCHG CAP LOK DEV COMPATIBLE W/ OLY

## (undated) DEVICE — GRADUATE TRIANG MEASURE 1000ML BLK PRNT

## (undated) DEVICE — BLOCK BITE 60FR RUBBER ADLT DENTAL

## (undated) DEVICE — GLOVE SURG SZ 7 L12IN FNGR THK79MIL GRN LTX FREE

## (undated) DEVICE — LOOP VES W25MM THK1MM MAXI RED SIL FLD REPELLENT 100 PER

## (undated) DEVICE — SPONGE,PEANUT,XRAY,ST,SM,3/8",5/CARD: Brand: MEDLINE INDUSTRIES, INC.

## (undated) DEVICE — SYRINGE MED 5ML STD CLR PLAS LUERLOCK TIP N CTRL DISP

## (undated) DEVICE — BAG DRNGE 9OZ L9.5IN BILE W/ ADPT TWO ADJ RUB BELT DISP FOR

## (undated) DEVICE — GOWN,SIRUS,FABRNF,XL,20/CS: Brand: MEDLINE

## (undated) DEVICE — SINGLE-USE BIOPSY FORCEPS: Brand: RADIAL JAW 4

## (undated) DEVICE — UNIVERSAL DRAPE: Brand: MEDLINE INDUSTRIES, INC.

## (undated) DEVICE — FEEDING TUBE: Brand: KANGAROO

## (undated) DEVICE — ESOPHAGEAL/PYLORIC/COLONIC WIREGUIDED BALLOON DILATATION CATHETER: Brand: CRE WIREGUIDED

## (undated) DEVICE — SOLUTION IRRIG 1000ML STRL H2O USP PLAS POUR BTL

## (undated) DEVICE — DRAPE,REIN 53X77,STERILE: Brand: MEDLINE

## (undated) DEVICE — SEALER LATCHING 23CM LIGASURE MARYLAND XP

## (undated) DEVICE — SYRINGE MEDICAL 3ML CLEAR PLASTIC STANDARD NON CONTROL LUERLOCK TIP DISPOSABLE

## (undated) DEVICE — DRAIN SURG 19FR 100% SIL RADPQ RND CHN FULL FLUT

## (undated) DEVICE — 18 GA N.G. KIT, 10 PACK: Brand: SITE-RITE

## (undated) DEVICE — C-ARM: Brand: UNBRANDED

## (undated) DEVICE — RELOAD STPL H4.1X2MM DIA60MM THCK TISS GRN 6 ROW PWR GST B

## (undated) DEVICE — DEFENDO AIR WATER SUCTION AND BIOPSY VALVE KIT FOR  OLYMPUS: Brand: DEFENDO AIR/WATER/SUCTION AND BIOPSY VALVE

## (undated) DEVICE — STAPLER INT L28CM 60MM 12 FIRING B FRM PWD + ECHELON FLX

## (undated) DEVICE — CANNULATING SPHINCTEROTOME: Brand: JAGTOME™ REVOLUTION RX

## (undated) DEVICE — SOLUTION IV 100ML 0.9% SOD CHL PLAS CONT USP VIAFLX 1 PER

## (undated) DEVICE — ABSORBENT, WATERPROOF, BACTERIA PROOF FILM DRESSING: Brand: OPSITE POST OP 15.5X8.5CM CTN 20